# Patient Record
Sex: FEMALE | Race: WHITE | Employment: OTHER | URBAN - METROPOLITAN AREA
[De-identification: names, ages, dates, MRNs, and addresses within clinical notes are randomized per-mention and may not be internally consistent; named-entity substitution may affect disease eponyms.]

---

## 2017-01-19 ENCOUNTER — ANTI-COAG VISIT (OUTPATIENT)
Dept: PHARMACY | Facility: CLINIC | Age: 77
End: 2017-01-19

## 2017-01-19 LAB — INR BLD: 3.1

## 2017-01-26 ENCOUNTER — HOSPITAL ENCOUNTER (OUTPATIENT)
Dept: CT IMAGING | Age: 77
Discharge: HOME | End: 2017-01-26
Attending: NURSE PRACTITIONER | Admitting: NURSE PRACTITIONER

## 2017-01-26 DIAGNOSIS — C91.10 CLL (CHRONIC LYMPHOID LEUKEMIA) WITH FAILED REMISSION (HCC): ICD-10-CM

## 2017-01-26 DIAGNOSIS — Z08 ENCOUNTER FOR FOLLOW-UP EXAMINATION AFTER COMPLETED TREATMENT FOR CANCER: ICD-10-CM

## 2017-01-26 DIAGNOSIS — C91.10 CHRONIC LYMPHOCYTIC LEUKEMIA OF B-CELL TYPE NOT HAVING ACHIEVED REMISSION (HCC): ICD-10-CM

## 2017-01-26 LAB
GFR AFRICAN AMERICAN: >60
GFR NON-AFRICAN AMERICAN: >60
PERFORMED ON: NORMAL
POC CREATININE: 0.9 MG/DL (ref 0.6–1.2)
POC SAMPLE TYPE: NORMAL

## 2017-02-14 ENCOUNTER — ANTI-COAG VISIT (OUTPATIENT)
Dept: PHARMACY | Facility: CLINIC | Age: 77
End: 2017-02-14

## 2017-02-14 DIAGNOSIS — R79.1 ABNORMAL COAGULATION PROFILE: ICD-10-CM

## 2017-02-14 LAB — INR BLD: 2.1

## 2017-03-03 ENCOUNTER — OFFICE VISIT (OUTPATIENT)
Dept: FAMILY MEDICINE CLINIC | Age: 77
End: 2017-03-03

## 2017-03-03 VITALS
WEIGHT: 168 LBS | DIASTOLIC BLOOD PRESSURE: 70 MMHG | OXYGEN SATURATION: 97 % | HEART RATE: 67 BPM | BODY MASS INDEX: 33.87 KG/M2 | HEIGHT: 59 IN | RESPIRATION RATE: 10 BRPM | SYSTOLIC BLOOD PRESSURE: 124 MMHG

## 2017-03-03 DIAGNOSIS — G89.29 CHRONIC PAIN OF BOTH KNEES: ICD-10-CM

## 2017-03-03 DIAGNOSIS — D69.6 THROMBOCYTOPENIA (HCC): ICD-10-CM

## 2017-03-03 DIAGNOSIS — J43.2 CENTRILOBULAR EMPHYSEMA (HCC): ICD-10-CM

## 2017-03-03 DIAGNOSIS — D68.51 FACTOR V LEIDEN MUTATION (HCC): ICD-10-CM

## 2017-03-03 DIAGNOSIS — R20.9 DISTURBANCE OF SKIN SENSATION: ICD-10-CM

## 2017-03-03 DIAGNOSIS — M25.561 CHRONIC PAIN OF BOTH KNEES: ICD-10-CM

## 2017-03-03 DIAGNOSIS — E55.9 VITAMIN D DEFICIENCY: ICD-10-CM

## 2017-03-03 DIAGNOSIS — E78.00 HYPERCHOLESTEROLEMIA: Primary | ICD-10-CM

## 2017-03-03 DIAGNOSIS — L82.1 SEBORRHEIC KERATOSES: ICD-10-CM

## 2017-03-03 DIAGNOSIS — C85.80: ICD-10-CM

## 2017-03-03 DIAGNOSIS — M25.562 CHRONIC PAIN OF BOTH KNEES: ICD-10-CM

## 2017-03-03 PROCEDURE — G8417 CALC BMI ABV UP PARAM F/U: HCPCS | Performed by: FAMILY MEDICINE

## 2017-03-03 PROCEDURE — 1036F TOBACCO NON-USER: CPT | Performed by: FAMILY MEDICINE

## 2017-03-03 PROCEDURE — 3023F SPIROM DOC REV: CPT | Performed by: FAMILY MEDICINE

## 2017-03-03 PROCEDURE — G8482 FLU IMMUNIZE ORDER/ADMIN: HCPCS | Performed by: FAMILY MEDICINE

## 2017-03-03 PROCEDURE — 1123F ACP DISCUSS/DSCN MKR DOCD: CPT | Performed by: FAMILY MEDICINE

## 2017-03-03 PROCEDURE — 1090F PRES/ABSN URINE INCON ASSESS: CPT | Performed by: FAMILY MEDICINE

## 2017-03-03 PROCEDURE — G8427 DOCREV CUR MEDS BY ELIG CLIN: HCPCS | Performed by: FAMILY MEDICINE

## 2017-03-03 PROCEDURE — G8400 PT W/DXA NO RESULTS DOC: HCPCS | Performed by: FAMILY MEDICINE

## 2017-03-03 PROCEDURE — 4040F PNEUMOC VAC/ADMIN/RCVD: CPT | Performed by: FAMILY MEDICINE

## 2017-03-03 PROCEDURE — G8926 SPIRO NO PERF OR DOC: HCPCS | Performed by: FAMILY MEDICINE

## 2017-03-03 PROCEDURE — 99214 OFFICE O/P EST MOD 30 MIN: CPT | Performed by: FAMILY MEDICINE

## 2017-03-03 PROCEDURE — 17110 DESTRUCTION B9 LES UP TO 14: CPT | Performed by: FAMILY MEDICINE

## 2017-03-06 ENCOUNTER — NURSE ONLY (OUTPATIENT)
Dept: FAMILY MEDICINE CLINIC | Age: 77
End: 2017-03-06

## 2017-03-06 DIAGNOSIS — E78.00 HYPERCHOLESTEROLEMIA: ICD-10-CM

## 2017-03-06 DIAGNOSIS — E55.9 VITAMIN D DEFICIENCY: ICD-10-CM

## 2017-03-06 LAB
A/G RATIO: 2 (ref 1.1–2.2)
ALBUMIN SERPL-MCNC: 4.1 G/DL (ref 3.4–5)
ALP BLD-CCNC: 95 U/L (ref 40–129)
ALT SERPL-CCNC: 28 U/L (ref 10–40)
ANION GAP SERPL CALCULATED.3IONS-SCNC: 9 MMOL/L (ref 3–16)
AST SERPL-CCNC: 25 U/L (ref 15–37)
BILIRUB SERPL-MCNC: 0.4 MG/DL (ref 0–1)
BUN BLDV-MCNC: 11 MG/DL (ref 7–20)
CALCIUM SERPL-MCNC: 9.1 MG/DL (ref 8.3–10.6)
CHLORIDE BLD-SCNC: 104 MMOL/L (ref 99–110)
CHOLESTEROL, TOTAL: 261 MG/DL (ref 0–199)
CO2: 29 MMOL/L (ref 21–32)
CREAT SERPL-MCNC: 0.6 MG/DL (ref 0.6–1.2)
GFR AFRICAN AMERICAN: >60
GFR NON-AFRICAN AMERICAN: >60
GLOBULIN: 2.1 G/DL
GLUCOSE BLD-MCNC: 100 MG/DL (ref 70–99)
HDLC SERPL-MCNC: 63 MG/DL (ref 40–60)
LDL CHOLESTEROL CALCULATED: 182 MG/DL
POTASSIUM SERPL-SCNC: 5.2 MMOL/L (ref 3.5–5.1)
SODIUM BLD-SCNC: 142 MMOL/L (ref 136–145)
TOTAL PROTEIN: 6.2 G/DL (ref 6.4–8.2)
TRIGL SERPL-MCNC: 81 MG/DL (ref 0–150)
VITAMIN D 25-HYDROXY: 21.4 NG/ML
VLDLC SERPL CALC-MCNC: 16 MG/DL

## 2017-03-06 PROCEDURE — 36415 COLL VENOUS BLD VENIPUNCTURE: CPT | Performed by: FAMILY MEDICINE

## 2017-03-14 ENCOUNTER — ANTI-COAG VISIT (OUTPATIENT)
Dept: PHARMACY | Facility: CLINIC | Age: 77
End: 2017-03-14

## 2017-03-14 DIAGNOSIS — R79.1 ABNORMAL COAGULATION PROFILE: ICD-10-CM

## 2017-03-14 LAB — INR BLD: 2.5

## 2017-04-28 ENCOUNTER — ANTI-COAG VISIT (OUTPATIENT)
Dept: PHARMACY | Facility: CLINIC | Age: 77
End: 2017-04-28

## 2017-04-28 DIAGNOSIS — R79.1 ABNORMAL COAGULATION PROFILE: ICD-10-CM

## 2017-04-28 LAB — INR BLD: 2.6

## 2017-05-26 ENCOUNTER — ANTI-COAG VISIT (OUTPATIENT)
Dept: PHARMACY | Facility: CLINIC | Age: 77
End: 2017-05-26

## 2017-05-26 DIAGNOSIS — R79.1 ABNORMAL COAGULATION PROFILE: ICD-10-CM

## 2017-05-26 LAB — INR BLD: 3.8

## 2017-06-23 ENCOUNTER — ANTI-COAG VISIT (OUTPATIENT)
Dept: PHARMACY | Facility: CLINIC | Age: 77
End: 2017-06-23

## 2017-06-23 DIAGNOSIS — R79.1 ABNORMAL COAGULATION PROFILE: ICD-10-CM

## 2017-06-23 LAB — INR BLD: 2.6

## 2017-07-20 ENCOUNTER — ANTI-COAG VISIT (OUTPATIENT)
Dept: PHARMACY | Facility: CLINIC | Age: 77
End: 2017-07-20

## 2017-07-20 DIAGNOSIS — R79.1 ABNORMAL COAGULATION PROFILE: ICD-10-CM

## 2017-07-20 LAB — INR BLD: 2.8

## 2017-08-18 ENCOUNTER — ANTI-COAG VISIT (OUTPATIENT)
Dept: PHARMACY | Facility: CLINIC | Age: 77
End: 2017-08-18

## 2017-08-18 DIAGNOSIS — R79.1 ABNORMAL COAGULATION PROFILE: ICD-10-CM

## 2017-08-18 LAB — INR BLD: 2.8

## 2017-09-21 ENCOUNTER — ANTI-COAG VISIT (OUTPATIENT)
Dept: PHARMACY | Facility: CLINIC | Age: 77
End: 2017-09-21

## 2017-09-21 LAB — INR BLD: 1.7

## 2017-10-23 ENCOUNTER — ANTI-COAG VISIT (OUTPATIENT)
Dept: PHARMACY | Facility: CLINIC | Age: 77
End: 2017-10-23

## 2017-10-23 DIAGNOSIS — R79.1 ABNORMAL COAGULATION PROFILE: ICD-10-CM

## 2017-10-23 LAB — INR BLD: 2.9

## 2017-10-23 NOTE — PROGRESS NOTES
Ms. Lisa Pickering is a 64 y.o.  female with history of factor V Leiden who presents today for anticoagulation monitoring and adjustment. Patient verifies current dosing regimen. Patient denies s/s bleeding/bruising/swelling/SOB. No blood in urine or stool. No dietary changes. No changes in medication/OTC agents/Herbals. No change in alcohol use. No missed doses. No Procedures scheduled in the future at this time. Lab Results   Component Value Date    INR 2.9 10/23/2017    INR 1.7 09/21/2017    INR 2.8 08/18/2017           Coumadin dose: Take 2.5mg today then  Continue Warfarin 5mg (one tablet) daily EXCEPT 7.5mg every Monday, Wednesday and Friday    . Recheck INR in 4 weeks. Patient reminded to call the Anticoagulation Clinic with any signs or symptoms of bleeding or with any medication changes. Patient given instructions utilizing the teach back method. After visit summary printed and reviewed with patient.       Medications reviewed and updated on home medication list Yes  Warfarin dose updated on patient home medication list  Yes    Influenza vaccine:   [] given    [] declined   [] received previously   [] plans to receive at a later time   [] refused    [] documented in EPIC

## 2017-11-01 ENCOUNTER — HOSPITAL ENCOUNTER (OUTPATIENT)
Dept: OTHER | Age: 77
Discharge: HOME | End: 2017-11-30
Attending: FAMILY MEDICINE | Admitting: FAMILY MEDICINE

## 2017-11-21 ENCOUNTER — ANTI-COAG VISIT (OUTPATIENT)
Dept: PHARMACY | Facility: CLINIC | Age: 77
End: 2017-11-21

## 2017-11-21 DIAGNOSIS — R79.1 ABNORMAL COAGULATION PROFILE: ICD-10-CM

## 2017-11-21 LAB — INR BLD: 3.2

## 2017-11-24 RX ORDER — WARFARIN SODIUM 5 MG/1
TABLET ORAL
Qty: 122 TABLET | Refills: 0 | Status: SHIPPED | OUTPATIENT
Start: 2017-11-24 | End: 2018-01-17 | Stop reason: SDUPTHER

## 2017-12-01 ENCOUNTER — HOSPITAL ENCOUNTER (OUTPATIENT)
Dept: OTHER | Age: 77
Discharge: HOME | End: 2017-12-31
Attending: FAMILY MEDICINE | Admitting: FAMILY MEDICINE

## 2017-12-19 ENCOUNTER — HOSPITAL ENCOUNTER (OUTPATIENT)
Dept: CT IMAGING | Age: 77
Discharge: HOME | End: 2017-12-19
Attending: INTERNAL MEDICINE | Admitting: INTERNAL MEDICINE

## 2017-12-19 DIAGNOSIS — C91.10 CHRONIC LYMPHOCYTIC LEUKEMIA OF B-CELL TYPE NOT HAVING ACHIEVED REMISSION (HCC): ICD-10-CM

## 2017-12-19 DIAGNOSIS — C91.10 CLL (CHRONIC LYMPHOCYTIC LEUKEMIA) (HCC): ICD-10-CM

## 2017-12-19 LAB
GFR AFRICAN AMERICAN: >60
GFR AFRICAN AMERICAN: >60
GFR NON-AFRICAN AMERICAN: >60
GFR NON-AFRICAN AMERICAN: >60
PERFORMED ON: ABNORMAL
PERFORMED ON: ABNORMAL
POC CREATININE: <0.3 MG/DL (ref 0.6–1.2)
POC CREATININE: <0.3 MG/DL (ref 0.6–1.2)
POC SAMPLE TYPE: ABNORMAL
POC SAMPLE TYPE: ABNORMAL

## 2017-12-21 ENCOUNTER — ANTI-COAG VISIT (OUTPATIENT)
Dept: PHARMACY | Facility: CLINIC | Age: 77
End: 2017-12-21

## 2017-12-21 DIAGNOSIS — R79.1 ABNORMAL COAGULATION PROFILE: ICD-10-CM

## 2017-12-21 LAB — INTERNATIONAL NORMALIZATION RATIO, POC: 1.7

## 2017-12-21 NOTE — PROGRESS NOTES
Ms. Lexy Tellez is a 76 y.o.  female with history of Factor V Leiden who presents today for anticoagulation monitoring and adjustment. Patient verifies current dosing regimen. Patient denies s/s bleeding/bruising/swelling/SOB. No blood in urine or stool. No changes in medication/OTC agents/Herbals. No change in alcohol use. No Procedures scheduled in the future at this time. Lab Results   Component Value Date    INR 1.7 12/21/2017    INR 3.2 11/21/2017    INR 2.9 10/23/2017       Patient appears well. Ms. Christ Skinner was out of town visiting relatives and states that they walked 5-10 miles most days. Also every day for breakfast she had spinach, banana, strawberry smoothie. The increased activity along with the increase in Vitamin K from the spinach would account for decreased INR today. Patient also states she may have missed a Warfarin dose, but not sure. Coumadin dose: Take Warfarin 7.5mg (1 & 1/2 tablets)   Then continue Warfarin 5mg (one tablet) daily EXCEPT 7.5mg every Monday, Wednesday and Friday    Recheck INR in 4 weeks. Patient reminded to call the Anticoagulation Clinic with any medication changes. Patient given instructions utilizing the teach back method. After visit summary printed and reviewed with patient. Medications reviewed and Warfarin dose updated.

## 2018-01-01 ENCOUNTER — HOSPITAL ENCOUNTER (OUTPATIENT)
Dept: OTHER | Age: 78
Discharge: HOME | End: 2018-01-31
Attending: FAMILY MEDICINE | Admitting: FAMILY MEDICINE

## 2018-01-17 ENCOUNTER — ANTI-COAG VISIT (OUTPATIENT)
Dept: PHARMACY | Facility: CLINIC | Age: 78
End: 2018-01-17

## 2018-01-17 LAB — INTERNATIONAL NORMALIZATION RATIO, POC: 3.2

## 2018-01-17 NOTE — PROGRESS NOTES
Ms. Araceli Garcia is a 76 y.o.  female with history of abnormal anticoagulation profile who presents today for anticoagulation monitoring and adjustment. Patient verifies current dosing regimen  Patient denies s/s bleeding/bruising/swelling/SOB  No blood in urine or stool. No dietary changes. No changes in medication/OTC agents/Herbals. No change in alcohol use. No missed doses. No Procedures scheduled in the future at this time. Lab Results   Component Value Date    INR 3.2 01/17/2018    INR 1.7 12/21/2017    INR 3.2 11/21/2017     She looks and feels well today. She denies any changes in meds or diet at this time. Her INR is slightly elevated today at 3.2. We will have her take 5mg today instead if 7.5mg then resume her previous dose. She will call with any new medications ar bleeding/bruising issues. She is almost out of her warfarin so the following was called into her pharmacy. Called RX to [facility], phone [phone]  Coumadin 5mg tablets  #36 tablets, UD, 5RF  Physician Marino Trujillo  After visit summary printed and reviewed with patient.

## 2018-02-01 ENCOUNTER — HOSPITAL ENCOUNTER (OUTPATIENT)
Dept: OTHER | Age: 78
Discharge: HOME | End: 2018-02-28
Attending: FAMILY MEDICINE | Admitting: FAMILY MEDICINE

## 2018-03-01 ENCOUNTER — HOSPITAL ENCOUNTER (OUTPATIENT)
Dept: OTHER | Age: 78
Discharge: HOME | End: 2018-03-31
Attending: FAMILY MEDICINE | Admitting: FAMILY MEDICINE

## 2018-03-02 ENCOUNTER — ANTI-COAG VISIT (OUTPATIENT)
Dept: PHARMACY | Facility: CLINIC | Age: 78
End: 2018-03-02

## 2018-03-02 DIAGNOSIS — R79.1 ABNORMAL COAGULATION PROFILE: ICD-10-CM

## 2018-03-02 LAB — INTERNATIONAL NORMALIZATION RATIO, POC: 2.8

## 2018-03-28 ENCOUNTER — ANTI-COAG VISIT (OUTPATIENT)
Dept: PHARMACY | Facility: CLINIC | Age: 78
End: 2018-03-28

## 2018-03-28 LAB — INTERNATIONAL NORMALIZATION RATIO, POC: 2.6

## 2018-04-01 ENCOUNTER — HOSPITAL ENCOUNTER (OUTPATIENT)
Dept: OTHER | Age: 78
Discharge: HOME | End: 2018-04-30
Attending: FAMILY MEDICINE | Admitting: FAMILY MEDICINE

## 2018-04-27 ENCOUNTER — ANTI-COAG VISIT (OUTPATIENT)
Dept: PHARMACY | Facility: CLINIC | Age: 78
End: 2018-04-27

## 2018-04-27 LAB — INTERNATIONAL NORMALIZATION RATIO, POC: 2.6

## 2018-04-27 RX ORDER — WARFARIN SODIUM 5 MG/1
TABLET ORAL
Qty: 115 TABLET | Refills: 0 | Status: SHIPPED | OUTPATIENT
Start: 2018-04-27 | End: 2018-05-25 | Stop reason: SDUPTHER

## 2018-05-01 ENCOUNTER — HOSPITAL ENCOUNTER (OUTPATIENT)
Dept: OTHER | Age: 78
Discharge: HOME | End: 2018-05-31
Attending: FAMILY MEDICINE | Admitting: FAMILY MEDICINE

## 2018-05-25 ENCOUNTER — OFFICE VISIT (OUTPATIENT)
Dept: FAMILY MEDICINE CLINIC | Age: 78
End: 2018-05-25

## 2018-05-25 VITALS
WEIGHT: 166 LBS | BODY MASS INDEX: 33.47 KG/M2 | HEART RATE: 74 BPM | DIASTOLIC BLOOD PRESSURE: 74 MMHG | SYSTOLIC BLOOD PRESSURE: 110 MMHG | HEIGHT: 59 IN

## 2018-05-25 DIAGNOSIS — D68.51 FACTOR V LEIDEN MUTATION (HCC): ICD-10-CM

## 2018-05-25 DIAGNOSIS — J43.2 CENTRILOBULAR EMPHYSEMA (HCC): ICD-10-CM

## 2018-05-25 DIAGNOSIS — M25.561 CHRONIC PAIN OF BOTH KNEES: ICD-10-CM

## 2018-05-25 DIAGNOSIS — M15.9 PRIMARY OSTEOARTHRITIS INVOLVING MULTIPLE JOINTS: Primary | ICD-10-CM

## 2018-05-25 DIAGNOSIS — G89.29 CHRONIC PAIN OF BOTH KNEES: ICD-10-CM

## 2018-05-25 DIAGNOSIS — B02.29 POST HERPETIC NEURALGIA: ICD-10-CM

## 2018-05-25 DIAGNOSIS — D69.6 THROMBOCYTOPENIA (HCC): ICD-10-CM

## 2018-05-25 DIAGNOSIS — M25.562 CHRONIC PAIN OF BOTH KNEES: ICD-10-CM

## 2018-05-25 DIAGNOSIS — Z79.899 LONG-TERM USE OF HIGH-RISK MEDICATION: ICD-10-CM

## 2018-05-25 PROCEDURE — G8400 PT W/DXA NO RESULTS DOC: HCPCS | Performed by: FAMILY MEDICINE

## 2018-05-25 PROCEDURE — 1123F ACP DISCUSS/DSCN MKR DOCD: CPT | Performed by: FAMILY MEDICINE

## 2018-05-25 PROCEDURE — G8417 CALC BMI ABV UP PARAM F/U: HCPCS | Performed by: FAMILY MEDICINE

## 2018-05-25 PROCEDURE — G8427 DOCREV CUR MEDS BY ELIG CLIN: HCPCS | Performed by: FAMILY MEDICINE

## 2018-05-25 PROCEDURE — 3023F SPIROM DOC REV: CPT | Performed by: FAMILY MEDICINE

## 2018-05-25 PROCEDURE — 3288F FALL RISK ASSESSMENT DOCD: CPT | Performed by: FAMILY MEDICINE

## 2018-05-25 PROCEDURE — G8510 SCR DEP NEG, NO PLAN REQD: HCPCS | Performed by: FAMILY MEDICINE

## 2018-05-25 PROCEDURE — 99214 OFFICE O/P EST MOD 30 MIN: CPT | Performed by: FAMILY MEDICINE

## 2018-05-25 PROCEDURE — 1036F TOBACCO NON-USER: CPT | Performed by: FAMILY MEDICINE

## 2018-05-25 PROCEDURE — 1090F PRES/ABSN URINE INCON ASSESS: CPT | Performed by: FAMILY MEDICINE

## 2018-05-25 PROCEDURE — G8926 SPIRO NO PERF OR DOC: HCPCS | Performed by: FAMILY MEDICINE

## 2018-05-25 PROCEDURE — 4040F PNEUMOC VAC/ADMIN/RCVD: CPT | Performed by: FAMILY MEDICINE

## 2018-05-25 RX ORDER — WARFARIN SODIUM 5 MG/1
TABLET ORAL
Qty: 115 TABLET | Refills: 3 | Status: SHIPPED | OUTPATIENT
Start: 2018-05-25 | End: 2018-10-19 | Stop reason: DRUGHIGH

## 2018-05-25 RX ORDER — PREGABALIN 300 MG/1
CAPSULE ORAL
Qty: 180 CAPSULE | Refills: 3 | Status: SHIPPED | OUTPATIENT
Start: 2018-05-25 | End: 2018-08-13 | Stop reason: SDUPTHER

## 2018-05-25 ASSESSMENT — PATIENT HEALTH QUESTIONNAIRE - PHQ9
1. LITTLE INTEREST OR PLEASURE IN DOING THINGS: 0
SUM OF ALL RESPONSES TO PHQ9 QUESTIONS 1 & 2: 0
SUM OF ALL RESPONSES TO PHQ QUESTIONS 1-9: 0
2. FEELING DOWN, DEPRESSED OR HOPELESS: 0

## 2018-05-30 ENCOUNTER — ANTI-COAG VISIT (OUTPATIENT)
Dept: PHARMACY | Facility: CLINIC | Age: 78
End: 2018-05-30

## 2018-05-30 LAB — INTERNATIONAL NORMALIZATION RATIO, POC: 3.6

## 2018-06-01 ENCOUNTER — HOSPITAL ENCOUNTER (OUTPATIENT)
Dept: OTHER | Age: 78
Discharge: HOME | End: 2018-06-30
Attending: FAMILY MEDICINE | Admitting: FAMILY MEDICINE

## 2018-06-13 ENCOUNTER — ANTI-COAG VISIT (OUTPATIENT)
Dept: PHARMACY | Facility: CLINIC | Age: 78
End: 2018-06-13

## 2018-06-13 LAB — INTERNATIONAL NORMALIZATION RATIO, POC: 2.4

## 2018-06-25 ENCOUNTER — OFFICE VISIT (OUTPATIENT)
Dept: FAMILY MEDICINE CLINIC | Age: 78
End: 2018-06-25

## 2018-06-25 VITALS
RESPIRATION RATE: 16 BRPM | WEIGHT: 165.8 LBS | OXYGEN SATURATION: 94 % | HEART RATE: 57 BPM | DIASTOLIC BLOOD PRESSURE: 70 MMHG | HEIGHT: 59 IN | SYSTOLIC BLOOD PRESSURE: 116 MMHG | BODY MASS INDEX: 33.43 KG/M2

## 2018-06-25 DIAGNOSIS — R20.9 DISTURBANCE OF SKIN SENSATION: ICD-10-CM

## 2018-06-25 DIAGNOSIS — L82.1 SEBORRHEIC KERATOSES: Primary | ICD-10-CM

## 2018-06-25 PROCEDURE — 99999 PR OFFICE/OUTPT VISIT,PROCEDURE ONLY: CPT | Performed by: FAMILY MEDICINE

## 2018-06-25 PROCEDURE — 17110 DESTRUCTION B9 LES UP TO 14: CPT | Performed by: FAMILY MEDICINE

## 2018-07-01 ENCOUNTER — HOSPITAL ENCOUNTER (OUTPATIENT)
Dept: OTHER | Age: 78
Discharge: HOME | End: 2018-07-31
Attending: FAMILY MEDICINE | Admitting: FAMILY MEDICINE

## 2018-07-11 ENCOUNTER — ANTI-COAG VISIT (OUTPATIENT)
Dept: PHARMACY | Facility: CLINIC | Age: 78
End: 2018-07-11

## 2018-07-11 DIAGNOSIS — R79.1 ABNORMAL COAGULATION PROFILE: ICD-10-CM

## 2018-07-11 LAB — INR BLD: 2.5

## 2018-07-11 NOTE — PROGRESS NOTES
Ms. Fawn Styles is a 76 y.o.  female with history of abnormal coagulation profile who presents today for anticoagulation monitoring and adjustment. Patient verifies current dosing regimen  Patient denies s/s bleeding/bruising/swelling/SOB  No blood in urine or stool. No dietary changes. No changes in medication/OTC agents/Herbals. No change in alcohol use. No missed doses. No Procedures scheduled in the future at this time. Lab Results   Component Value Date    INR 2.50 07/11/2018    INR 2.4 06/13/2018    INR 3.6 05/30/2018       Pertinent findings: Patient appears well. Does not report any changes in medications or diet. Warfarin dosing: INR stable and therapeutic. Continue Warfarin 5mg (one tablet) daily EXCEPT 7.5mg every Monday, Wednesday and Friday    After visit summary printed and reviewed with patient.

## 2018-08-01 ENCOUNTER — HOSPITAL ENCOUNTER (OUTPATIENT)
Dept: OTHER | Age: 78
Discharge: HOME | End: 2018-08-31
Attending: FAMILY MEDICINE | Admitting: FAMILY MEDICINE

## 2018-08-10 ENCOUNTER — ANTI-COAG VISIT (OUTPATIENT)
Dept: PHARMACY | Facility: CLINIC | Age: 78
End: 2018-08-10

## 2018-08-10 DIAGNOSIS — R79.1 ABNORMAL COAGULATION PROFILE: ICD-10-CM

## 2018-08-10 LAB — INR BLD: 3

## 2018-08-13 ENCOUNTER — OFFICE VISIT (OUTPATIENT)
Dept: FAMILY MEDICINE CLINIC | Age: 78
End: 2018-08-13

## 2018-08-13 VITALS
DIASTOLIC BLOOD PRESSURE: 80 MMHG | RESPIRATION RATE: 16 BRPM | HEART RATE: 68 BPM | BODY MASS INDEX: 32.86 KG/M2 | WEIGHT: 163 LBS | SYSTOLIC BLOOD PRESSURE: 130 MMHG | HEIGHT: 59 IN

## 2018-08-13 DIAGNOSIS — B02.29 POST HERPETIC NEURALGIA: Primary | ICD-10-CM

## 2018-08-13 DIAGNOSIS — G44.209 ACUTE NON INTRACTABLE TENSION-TYPE HEADACHE: ICD-10-CM

## 2018-08-13 DIAGNOSIS — R11.0 NAUSEA: ICD-10-CM

## 2018-08-13 PROCEDURE — 1101F PT FALLS ASSESS-DOCD LE1/YR: CPT | Performed by: REGISTERED NURSE

## 2018-08-13 PROCEDURE — 99214 OFFICE O/P EST MOD 30 MIN: CPT | Performed by: REGISTERED NURSE

## 2018-08-13 PROCEDURE — G8400 PT W/DXA NO RESULTS DOC: HCPCS | Performed by: REGISTERED NURSE

## 2018-08-13 PROCEDURE — 1036F TOBACCO NON-USER: CPT | Performed by: REGISTERED NURSE

## 2018-08-13 PROCEDURE — G8510 SCR DEP NEG, NO PLAN REQD: HCPCS | Performed by: REGISTERED NURSE

## 2018-08-13 PROCEDURE — 1090F PRES/ABSN URINE INCON ASSESS: CPT | Performed by: REGISTERED NURSE

## 2018-08-13 PROCEDURE — G8417 CALC BMI ABV UP PARAM F/U: HCPCS | Performed by: REGISTERED NURSE

## 2018-08-13 PROCEDURE — 1123F ACP DISCUSS/DSCN MKR DOCD: CPT | Performed by: REGISTERED NURSE

## 2018-08-13 PROCEDURE — 4040F PNEUMOC VAC/ADMIN/RCVD: CPT | Performed by: REGISTERED NURSE

## 2018-08-13 PROCEDURE — G8427 DOCREV CUR MEDS BY ELIG CLIN: HCPCS | Performed by: REGISTERED NURSE

## 2018-08-13 RX ORDER — PREGABALIN 300 MG/1
300 CAPSULE ORAL DAILY
Qty: 30 CAPSULE | Refills: 0 | Status: SHIPPED | OUTPATIENT
Start: 2018-08-13 | End: 2018-08-13 | Stop reason: SDUPTHER

## 2018-08-13 RX ORDER — ONDANSETRON 8 MG/1
8 TABLET, ORALLY DISINTEGRATING ORAL EVERY 8 HOURS PRN
Qty: 12 TABLET | Refills: 0 | Status: SHIPPED | OUTPATIENT
Start: 2018-08-13 | End: 2019-02-01 | Stop reason: ALTCHOICE

## 2018-08-13 RX ORDER — PREGABALIN 300 MG/1
300 CAPSULE ORAL DAILY
Qty: 30 CAPSULE | Refills: 0 | Status: SHIPPED | OUTPATIENT
Start: 2018-08-13 | End: 2018-09-06 | Stop reason: SDUPTHER

## 2018-08-13 ASSESSMENT — ENCOUNTER SYMPTOMS
NAUSEA: 1
ABDOMINAL PAIN: 0
CONSTIPATION: 0
DIARRHEA: 0
ABDOMINAL DISTENTION: 0
VOMITING: 0
WHEEZING: 0
SHORTNESS OF BREATH: 0
CHEST TIGHTNESS: 0
COUGH: 0

## 2018-08-13 ASSESSMENT — PATIENT HEALTH QUESTIONNAIRE - PHQ9
1. LITTLE INTEREST OR PLEASURE IN DOING THINGS: 0
2. FEELING DOWN, DEPRESSED OR HOPELESS: 0
SUM OF ALL RESPONSES TO PHQ9 QUESTIONS 1 & 2: 0
SUM OF ALL RESPONSES TO PHQ QUESTIONS 1-9: 0
SUM OF ALL RESPONSES TO PHQ QUESTIONS 1-9: 0

## 2018-09-01 ENCOUNTER — HOSPITAL ENCOUNTER (OUTPATIENT)
Dept: OTHER | Age: 78
Discharge: HOME | End: 2018-09-01
Attending: FAMILY MEDICINE | Admitting: FAMILY MEDICINE

## 2018-09-06 DIAGNOSIS — B02.29 POST HERPETIC NEURALGIA: ICD-10-CM

## 2018-09-07 ENCOUNTER — ANTI-COAG VISIT (OUTPATIENT)
Dept: PHARMACY | Facility: CLINIC | Age: 78
End: 2018-09-07

## 2018-09-07 DIAGNOSIS — R79.1 ABNORMAL COAGULATION PROFILE: ICD-10-CM

## 2018-09-07 LAB — INTERNATIONAL NORMALIZATION RATIO, POC: 2.8

## 2018-10-05 ENCOUNTER — ANTI-COAG VISIT (OUTPATIENT)
Dept: PHARMACY | Age: 78
End: 2018-10-05
Payer: MEDICARE

## 2018-10-05 DIAGNOSIS — R79.1 ABNORMAL COAGULATION PROFILE: ICD-10-CM

## 2018-10-05 LAB — INTERNATIONAL NORMALIZATION RATIO, POC: 3.8

## 2018-10-05 PROCEDURE — 99211 OFF/OP EST MAY X REQ PHY/QHP: CPT

## 2018-10-05 PROCEDURE — 85610 PROTHROMBIN TIME: CPT

## 2018-10-05 NOTE — PROGRESS NOTES
Ms. Elian Duke is a 76 y.o.  female with history of abnormal coagulation profile who presents today for anticoagulation monitoring and adjustment. Patient verifies current dosing regimen  Patient denies s/s bleeding/bruising/swelling/SOB  No blood in urine or stool. No dietary changes. No changes in medication/OTC agents/Herbals. No change in alcohol use. No missed doses. No Procedures scheduled in the future at this time. Lab Results   Component Value Date    INR 3.8 10/05/2018    INR 2.8 09/07/2018    INR 3.00 08/10/2018       Pertinent findings: She looks and feels well today. No changes in meds or diet noted. Her INR tends to be both high and low at the same dosing. Her INR is elevated today at 3.8. She states that she has been eating her normal amount of greens. She will try to add a few more in the coming days. For INR of 3.8, we will hold her warfarin today and then continue her current dosing. She will return in 2 weeks for her next INR. Warfarin dosing: Hold warfarin TODAY ONLY then continue Warfarin 5mg (one tablet) daily EXCEPT 7.5mg every Monday, Wednesday and Friday    After visit summary printed and reviewed with patient.

## 2018-10-08 DIAGNOSIS — B02.29 POST HERPETIC NEURALGIA: ICD-10-CM

## 2018-10-19 ENCOUNTER — ANTI-COAG VISIT (OUTPATIENT)
Dept: PHARMACY | Age: 78
End: 2018-10-19
Payer: MEDICARE

## 2018-10-19 DIAGNOSIS — R79.1 ABNORMAL COAGULATION PROFILE: ICD-10-CM

## 2018-10-19 LAB — INTERNATIONAL NORMALIZATION RATIO, POC: 3.3

## 2018-10-19 PROCEDURE — 99211 OFF/OP EST MAY X REQ PHY/QHP: CPT

## 2018-10-19 PROCEDURE — 85610 PROTHROMBIN TIME: CPT

## 2018-10-19 RX ORDER — WARFARIN SODIUM 5 MG/1
7.5 TABLET ORAL EVERY EVENING
COMMUNITY
End: 2019-07-16 | Stop reason: DRUGHIGH

## 2018-10-23 ENCOUNTER — OFFICE VISIT (OUTPATIENT)
Dept: FAMILY MEDICINE CLINIC | Age: 78
End: 2018-10-23
Payer: MEDICARE

## 2018-10-23 VITALS
SYSTOLIC BLOOD PRESSURE: 126 MMHG | RESPIRATION RATE: 12 BRPM | BODY MASS INDEX: 33.67 KG/M2 | WEIGHT: 167 LBS | HEART RATE: 72 BPM | HEIGHT: 59 IN | DIASTOLIC BLOOD PRESSURE: 78 MMHG

## 2018-10-23 DIAGNOSIS — H10.33 ACUTE BACTERIAL CONJUNCTIVITIS OF BOTH EYES: ICD-10-CM

## 2018-10-23 DIAGNOSIS — C44.310 BASAL CELL CARCINOMA, FACE: ICD-10-CM

## 2018-10-23 DIAGNOSIS — B02.29 POST HERPETIC NEURALGIA: Primary | ICD-10-CM

## 2018-10-23 DIAGNOSIS — Z79.899 LONG-TERM USE OF HIGH-RISK MEDICATION: ICD-10-CM

## 2018-10-23 DIAGNOSIS — Z23 NEED FOR INFLUENZA VACCINATION: ICD-10-CM

## 2018-10-23 LAB

## 2018-10-23 PROCEDURE — 99213 OFFICE O/P EST LOW 20 MIN: CPT | Performed by: FAMILY MEDICINE

## 2018-10-23 PROCEDURE — 4040F PNEUMOC VAC/ADMIN/RCVD: CPT | Performed by: FAMILY MEDICINE

## 2018-10-23 PROCEDURE — G8400 PT W/DXA NO RESULTS DOC: HCPCS | Performed by: FAMILY MEDICINE

## 2018-10-23 PROCEDURE — G8482 FLU IMMUNIZE ORDER/ADMIN: HCPCS | Performed by: FAMILY MEDICINE

## 2018-10-23 PROCEDURE — G8417 CALC BMI ABV UP PARAM F/U: HCPCS | Performed by: FAMILY MEDICINE

## 2018-10-23 PROCEDURE — 1101F PT FALLS ASSESS-DOCD LE1/YR: CPT | Performed by: FAMILY MEDICINE

## 2018-10-23 PROCEDURE — 90662 IIV NO PRSV INCREASED AG IM: CPT | Performed by: FAMILY MEDICINE

## 2018-10-23 PROCEDURE — G0008 ADMIN INFLUENZA VIRUS VAC: HCPCS | Performed by: FAMILY MEDICINE

## 2018-10-23 PROCEDURE — 1090F PRES/ABSN URINE INCON ASSESS: CPT | Performed by: FAMILY MEDICINE

## 2018-10-23 PROCEDURE — 80305 DRUG TEST PRSMV DIR OPT OBS: CPT | Performed by: FAMILY MEDICINE

## 2018-10-23 PROCEDURE — 1123F ACP DISCUSS/DSCN MKR DOCD: CPT | Performed by: FAMILY MEDICINE

## 2018-10-23 PROCEDURE — 1036F TOBACCO NON-USER: CPT | Performed by: FAMILY MEDICINE

## 2018-10-23 PROCEDURE — G8427 DOCREV CUR MEDS BY ELIG CLIN: HCPCS | Performed by: FAMILY MEDICINE

## 2018-10-23 RX ORDER — PREGABALIN 300 MG/1
CAPSULE ORAL
Qty: 30 CAPSULE | Refills: 5 | Status: SHIPPED | OUTPATIENT
Start: 2018-10-23 | End: 2019-01-23 | Stop reason: SDUPTHER

## 2018-10-23 RX ORDER — TOBRAMYCIN AND DEXAMETHASONE 3; 1 MG/ML; MG/ML
1 SUSPENSION/ DROPS OPHTHALMIC
Qty: 1 BOTTLE | Refills: 0 | Status: SHIPPED | OUTPATIENT
Start: 2018-10-23 | End: 2018-11-02

## 2018-11-02 ENCOUNTER — ANTI-COAG VISIT (OUTPATIENT)
Dept: PHARMACY | Age: 78
End: 2018-11-02
Payer: MEDICARE

## 2018-11-02 DIAGNOSIS — R79.1 ABNORMAL COAGULATION PROFILE: ICD-10-CM

## 2018-11-02 LAB
INR BLD: 2.31 (ref 0.86–1.14)
PROTHROMBIN TIME: 26.3 SEC (ref 9.8–13)

## 2018-11-02 PROCEDURE — 36415 COLL VENOUS BLD VENIPUNCTURE: CPT

## 2018-11-02 PROCEDURE — 99211 OFF/OP EST MAY X REQ PHY/QHP: CPT

## 2018-11-02 PROCEDURE — 85610 PROTHROMBIN TIME: CPT

## 2018-11-30 ENCOUNTER — ANTI-COAG VISIT (OUTPATIENT)
Dept: PHARMACY | Age: 78
End: 2018-11-30
Payer: MEDICARE

## 2018-11-30 DIAGNOSIS — R79.1 ABNORMAL COAGULATION PROFILE: ICD-10-CM

## 2018-11-30 LAB — INTERNATIONAL NORMALIZATION RATIO, POC: 2

## 2018-11-30 PROCEDURE — 99211 OFF/OP EST MAY X REQ PHY/QHP: CPT

## 2018-11-30 PROCEDURE — 85610 PROTHROMBIN TIME: CPT

## 2019-01-04 ENCOUNTER — ANTI-COAG VISIT (OUTPATIENT)
Dept: PHARMACY | Age: 79
End: 2019-01-04
Payer: MEDICARE

## 2019-01-04 DIAGNOSIS — R79.1 ABNORMAL COAGULATION PROFILE: ICD-10-CM

## 2019-01-04 LAB — INTERNATIONAL NORMALIZATION RATIO, POC: 2

## 2019-01-04 PROCEDURE — 85610 PROTHROMBIN TIME: CPT

## 2019-01-04 PROCEDURE — 99211 OFF/OP EST MAY X REQ PHY/QHP: CPT

## 2019-01-15 ENCOUNTER — HOSPITAL ENCOUNTER (OUTPATIENT)
Dept: CT IMAGING | Age: 79
Discharge: HOME | End: 2019-01-15
Payer: MEDICARE

## 2019-01-15 DIAGNOSIS — C91.11 CHRONIC LYMPHOCYTIC LEUKEMIA OF B-CELL TYPE IN REMISSION (HCC): ICD-10-CM

## 2019-01-15 PROCEDURE — 82565 ASSAY OF CREATININE: CPT

## 2019-01-15 PROCEDURE — 6360000004 HC RX CONTRAST MEDICATION: Performed by: INTERNAL MEDICINE

## 2019-01-15 PROCEDURE — 74177 CT ABD & PELVIS W/CONTRAST: CPT

## 2019-01-15 RX ADMIN — IOHEXOL 50 ML: 240 INJECTION, SOLUTION INTRATHECAL; INTRAVASCULAR; INTRAVENOUS; ORAL at 10:22

## 2019-01-15 RX ADMIN — IOPAMIDOL 75 ML: 755 INJECTION, SOLUTION INTRAVENOUS at 10:22

## 2019-01-21 ENCOUNTER — OFFICE VISIT (OUTPATIENT)
Dept: DERMATOLOGY | Age: 79
End: 2019-01-21
Payer: MEDICARE

## 2019-01-21 DIAGNOSIS — Z12.83 SKIN CANCER SCREENING: ICD-10-CM

## 2019-01-21 DIAGNOSIS — D48.5 NEOPLASM OF UNCERTAIN BEHAVIOR OF SKIN: Primary | ICD-10-CM

## 2019-01-21 DIAGNOSIS — Z85.828 HISTORY OF SQUAMOUS CELL CARCINOMA OF SKIN: ICD-10-CM

## 2019-01-21 PROCEDURE — 11103 TANGNTL BX SKIN EA SEP/ADDL: CPT | Performed by: DERMATOLOGY

## 2019-01-21 PROCEDURE — 11102 TANGNTL BX SKIN SINGLE LES: CPT | Performed by: DERMATOLOGY

## 2019-01-21 PROCEDURE — 99202 OFFICE O/P NEW SF 15 MIN: CPT | Performed by: DERMATOLOGY

## 2019-01-23 ENCOUNTER — OFFICE VISIT (OUTPATIENT)
Dept: FAMILY MEDICINE CLINIC | Age: 79
End: 2019-01-23
Payer: MEDICARE

## 2019-01-23 VITALS
DIASTOLIC BLOOD PRESSURE: 74 MMHG | HEIGHT: 59 IN | RESPIRATION RATE: 14 BRPM | WEIGHT: 166 LBS | SYSTOLIC BLOOD PRESSURE: 112 MMHG | HEART RATE: 76 BPM | BODY MASS INDEX: 33.47 KG/M2

## 2019-01-23 DIAGNOSIS — C44.319 BASAL CELL CARCINOMA (BCC) OF LEFT TEMPLE REGION: Primary | ICD-10-CM

## 2019-01-23 DIAGNOSIS — K86.89 DILATED PANCREATIC DUCT: ICD-10-CM

## 2019-01-23 DIAGNOSIS — C44.310 BASAL CELL CARCINOMA (BCC) OF SKIN OF FACE, UNSPECIFIED PART OF FACE: ICD-10-CM

## 2019-01-23 DIAGNOSIS — C44.311 BASAL CELL CARCINOMA (BCC) OF NASOLABIAL GROOVE: ICD-10-CM

## 2019-01-23 DIAGNOSIS — B02.23 NEUROPATHY DUE TO HERPES ZOSTER: Primary | ICD-10-CM

## 2019-01-23 DIAGNOSIS — H02.402 PTOSIS, LEFT EYELID: ICD-10-CM

## 2019-01-23 DIAGNOSIS — C85.80: ICD-10-CM

## 2019-01-23 DIAGNOSIS — H18.892 CORNEAL IRRITATION OF LEFT EYE: ICD-10-CM

## 2019-01-23 DIAGNOSIS — D68.51 FACTOR V LEIDEN MUTATION (HCC): ICD-10-CM

## 2019-01-23 DIAGNOSIS — J43.2 CENTRILOBULAR EMPHYSEMA (HCC): ICD-10-CM

## 2019-01-23 DIAGNOSIS — D69.6 THROMBOCYTOPENIA (HCC): ICD-10-CM

## 2019-01-23 LAB — DERMATOLOGY PATHOLOGY REPORT: ABNORMAL

## 2019-01-23 PROCEDURE — 4040F PNEUMOC VAC/ADMIN/RCVD: CPT | Performed by: FAMILY MEDICINE

## 2019-01-23 PROCEDURE — G8926 SPIRO NO PERF OR DOC: HCPCS | Performed by: FAMILY MEDICINE

## 2019-01-23 PROCEDURE — 1123F ACP DISCUSS/DSCN MKR DOCD: CPT | Performed by: FAMILY MEDICINE

## 2019-01-23 PROCEDURE — G8400 PT W/DXA NO RESULTS DOC: HCPCS | Performed by: FAMILY MEDICINE

## 2019-01-23 PROCEDURE — 99214 OFFICE O/P EST MOD 30 MIN: CPT | Performed by: FAMILY MEDICINE

## 2019-01-23 PROCEDURE — G8427 DOCREV CUR MEDS BY ELIG CLIN: HCPCS | Performed by: FAMILY MEDICINE

## 2019-01-23 PROCEDURE — 3023F SPIROM DOC REV: CPT | Performed by: FAMILY MEDICINE

## 2019-01-23 PROCEDURE — G8482 FLU IMMUNIZE ORDER/ADMIN: HCPCS | Performed by: FAMILY MEDICINE

## 2019-01-23 PROCEDURE — 1101F PT FALLS ASSESS-DOCD LE1/YR: CPT | Performed by: FAMILY MEDICINE

## 2019-01-23 PROCEDURE — 1036F TOBACCO NON-USER: CPT | Performed by: FAMILY MEDICINE

## 2019-01-23 PROCEDURE — G8417 CALC BMI ABV UP PARAM F/U: HCPCS | Performed by: FAMILY MEDICINE

## 2019-01-23 PROCEDURE — 1090F PRES/ABSN URINE INCON ASSESS: CPT | Performed by: FAMILY MEDICINE

## 2019-01-23 RX ORDER — PREGABALIN 150 MG/1
150 CAPSULE ORAL 2 TIMES DAILY
Qty: 60 CAPSULE | Refills: 5 | Status: SHIPPED | OUTPATIENT
Start: 2019-01-23 | End: 2019-04-26

## 2019-02-01 ENCOUNTER — APPOINTMENT (OUTPATIENT)
Dept: PHARMACY | Age: 79
End: 2019-02-01
Payer: MEDICARE

## 2019-02-04 ENCOUNTER — ANESTHESIA EVENT (OUTPATIENT)
Dept: ENDOSCOPY | Age: 79
End: 2019-02-04
Payer: MEDICARE

## 2019-02-05 ENCOUNTER — ANESTHESIA (OUTPATIENT)
Dept: ENDOSCOPY | Age: 79
End: 2019-02-05
Payer: MEDICARE

## 2019-02-05 ENCOUNTER — ANTI-COAG VISIT (OUTPATIENT)
Dept: PHARMACY | Age: 79
End: 2019-02-05
Payer: MEDICARE

## 2019-02-05 ENCOUNTER — HOSPITAL ENCOUNTER (OUTPATIENT)
Age: 79
Setting detail: OUTPATIENT SURGERY
Discharge: HOME | End: 2019-02-05
Attending: INTERNAL MEDICINE | Admitting: INTERNAL MEDICINE
Payer: MEDICARE

## 2019-02-05 VITALS
SYSTOLIC BLOOD PRESSURE: 170 MMHG | WEIGHT: 166.23 LBS | OXYGEN SATURATION: 92 % | DIASTOLIC BLOOD PRESSURE: 67 MMHG | RESPIRATION RATE: 18 BRPM | BODY MASS INDEX: 32.64 KG/M2 | HEART RATE: 68 BPM | TEMPERATURE: 97 F | HEIGHT: 60 IN

## 2019-02-05 VITALS
RESPIRATION RATE: 8 BRPM | DIASTOLIC BLOOD PRESSURE: 141 MMHG | SYSTOLIC BLOOD PRESSURE: 179 MMHG | OXYGEN SATURATION: 91 %

## 2019-02-05 LAB
ANION GAP SERPL CALCULATED.3IONS-SCNC: 12 MMOL/L (ref 3–16)
BUN BLDV-MCNC: 10 MG/DL (ref 7–20)
CALCIUM SERPL-MCNC: 9.2 MG/DL (ref 8.3–10.6)
CHLORIDE BLD-SCNC: 104 MMOL/L (ref 99–110)
CO2: 26 MMOL/L (ref 21–32)
CREAT SERPL-MCNC: 0.6 MG/DL (ref 0.6–1.2)
EKG ATRIAL RATE: 61 BPM
EKG DIAGNOSIS: NORMAL
EKG P AXIS: 56 DEGREES
EKG P-R INTERVAL: 170 MS
EKG Q-T INTERVAL: 426 MS
EKG QRS DURATION: 92 MS
EKG QTC CALCULATION (BAZETT): 428 MS
EKG R AXIS: -27 DEGREES
EKG T AXIS: 54 DEGREES
EKG VENTRICULAR RATE: 61 BPM
GFR AFRICAN AMERICAN: >60
GFR NON-AFRICAN AMERICAN: >60
GLUCOSE BLD-MCNC: 95 MG/DL (ref 70–99)
HCT VFR BLD CALC: 42.9 % (ref 36–48)
HEMOGLOBIN: 14.3 G/DL (ref 12–16)
INR BLD: 0.97 (ref 0.86–1.14)
INTERNATIONAL NORMALIZATION RATIO, POC: 1
MCH RBC QN AUTO: 30.8 PG (ref 26–34)
MCHC RBC AUTO-ENTMCNC: 33.4 G/DL (ref 31–36)
MCV RBC AUTO: 92.2 FL (ref 80–100)
PDW BLD-RTO: 13.8 % (ref 12.4–15.4)
PLATELET # BLD: 300 K/UL (ref 135–450)
PMV BLD AUTO: 9.5 FL (ref 5–10.5)
POTASSIUM REFLEX MAGNESIUM: 4.9 MMOL/L (ref 3.5–5.1)
PROTHROMBIN TIME: 11.1 SEC (ref 9.8–13)
RBC # BLD: 4.66 M/UL (ref 4–5.2)
SODIUM BLD-SCNC: 142 MMOL/L (ref 136–145)
WBC # BLD: 11 K/UL (ref 4–11)

## 2019-02-05 PROCEDURE — 7100000001 HC PACU RECOVERY - ADDTL 15 MIN: Performed by: INTERNAL MEDICINE

## 2019-02-05 PROCEDURE — 2580000003 HC RX 258: Performed by: NURSE ANESTHETIST, CERTIFIED REGISTERED

## 2019-02-05 PROCEDURE — 7100000010 HC PHASE II RECOVERY - FIRST 15 MIN: Performed by: INTERNAL MEDICINE

## 2019-02-05 PROCEDURE — 3700000001 HC ADD 15 MINUTES (ANESTHESIA): Performed by: INTERNAL MEDICINE

## 2019-02-05 PROCEDURE — 2500000003 HC RX 250 WO HCPCS: Performed by: NURSE ANESTHETIST, CERTIFIED REGISTERED

## 2019-02-05 PROCEDURE — 99211 OFF/OP EST MAY X REQ PHY/QHP: CPT

## 2019-02-05 PROCEDURE — 3609018500 HC EGD US SCOPE W/ADJACENT STRUCTURES: Performed by: INTERNAL MEDICINE

## 2019-02-05 PROCEDURE — 85610 PROTHROMBIN TIME: CPT

## 2019-02-05 PROCEDURE — 3700000000 HC ANESTHESIA ATTENDED CARE: Performed by: INTERNAL MEDICINE

## 2019-02-05 PROCEDURE — 80048 BASIC METABOLIC PNL TOTAL CA: CPT

## 2019-02-05 PROCEDURE — 2709999900 HC NON-CHARGEABLE SUPPLY: Performed by: INTERNAL MEDICINE

## 2019-02-05 PROCEDURE — 7100000011 HC PHASE II RECOVERY - ADDTL 15 MIN: Performed by: INTERNAL MEDICINE

## 2019-02-05 PROCEDURE — 93005 ELECTROCARDIOGRAM TRACING: CPT

## 2019-02-05 PROCEDURE — 6360000002 HC RX W HCPCS: Performed by: NURSE ANESTHETIST, CERTIFIED REGISTERED

## 2019-02-05 PROCEDURE — 36415 COLL VENOUS BLD VENIPUNCTURE: CPT

## 2019-02-05 PROCEDURE — 85027 COMPLETE CBC AUTOMATED: CPT

## 2019-02-05 PROCEDURE — 7100000000 HC PACU RECOVERY - FIRST 15 MIN: Performed by: INTERNAL MEDICINE

## 2019-02-05 PROCEDURE — 2580000003 HC RX 258: Performed by: ANESTHESIOLOGY

## 2019-02-05 RX ORDER — PROPOFOL 10 MG/ML
INJECTION, EMULSION INTRAVENOUS CONTINUOUS PRN
Status: DISCONTINUED | OUTPATIENT
Start: 2019-02-05 | End: 2019-02-05 | Stop reason: SDUPTHER

## 2019-02-05 RX ORDER — PROMETHAZINE HYDROCHLORIDE 25 MG/ML
6.25 INJECTION, SOLUTION INTRAMUSCULAR; INTRAVENOUS
Status: DISCONTINUED | OUTPATIENT
Start: 2019-02-05 | End: 2019-02-05 | Stop reason: HOSPADM

## 2019-02-05 RX ORDER — LABETALOL HYDROCHLORIDE 5 MG/ML
5 INJECTION, SOLUTION INTRAVENOUS EVERY 10 MIN PRN
Status: DISCONTINUED | OUTPATIENT
Start: 2019-02-05 | End: 2019-02-05 | Stop reason: HOSPADM

## 2019-02-05 RX ORDER — SODIUM CHLORIDE 9 MG/ML
INJECTION, SOLUTION INTRAVENOUS CONTINUOUS PRN
Status: DISCONTINUED | OUTPATIENT
Start: 2019-02-05 | End: 2019-02-05 | Stop reason: SDUPTHER

## 2019-02-05 RX ORDER — LIDOCAINE HYDROCHLORIDE 20 MG/ML
INJECTION, SOLUTION INFILTRATION; PERINEURAL PRN
Status: DISCONTINUED | OUTPATIENT
Start: 2019-02-05 | End: 2019-02-05 | Stop reason: SDUPTHER

## 2019-02-05 RX ORDER — PROPOFOL 10 MG/ML
INJECTION, EMULSION INTRAVENOUS PRN
Status: DISCONTINUED | OUTPATIENT
Start: 2019-02-05 | End: 2019-02-05 | Stop reason: SDUPTHER

## 2019-02-05 RX ORDER — ONDANSETRON 2 MG/ML
4 INJECTION INTRAMUSCULAR; INTRAVENOUS
Status: DISCONTINUED | OUTPATIENT
Start: 2019-02-05 | End: 2019-02-05 | Stop reason: HOSPADM

## 2019-02-05 RX ORDER — SODIUM CHLORIDE 9 MG/ML
INJECTION, SOLUTION INTRAVENOUS CONTINUOUS
Status: DISCONTINUED | OUTPATIENT
Start: 2019-02-05 | End: 2019-02-05 | Stop reason: HOSPADM

## 2019-02-05 RX ORDER — SODIUM CHLORIDE 0.9 % (FLUSH) 0.9 %
10 SYRINGE (ML) INJECTION PRN
Status: DISCONTINUED | OUTPATIENT
Start: 2019-02-05 | End: 2019-02-05 | Stop reason: HOSPADM

## 2019-02-05 RX ORDER — SODIUM CHLORIDE 0.9 % (FLUSH) 0.9 %
10 SYRINGE (ML) INJECTION EVERY 12 HOURS SCHEDULED
Status: DISCONTINUED | OUTPATIENT
Start: 2019-02-05 | End: 2019-02-05 | Stop reason: HOSPADM

## 2019-02-05 RX ADMIN — SODIUM CHLORIDE: 9 INJECTION, SOLUTION INTRAVENOUS at 13:28

## 2019-02-05 RX ADMIN — LIDOCAINE HYDROCHLORIDE 40 MG: 20 INJECTION, SOLUTION INFILTRATION; PERINEURAL at 14:15

## 2019-02-05 RX ADMIN — PROPOFOL 30 MG: 10 INJECTION, EMULSION INTRAVENOUS at 14:25

## 2019-02-05 RX ADMIN — PROPOFOL 50 MG: 10 INJECTION, EMULSION INTRAVENOUS at 14:15

## 2019-02-05 RX ADMIN — PROPOFOL 140 MCG/KG/MIN: 10 INJECTION, EMULSION INTRAVENOUS at 14:15

## 2019-02-05 RX ADMIN — PROPOFOL 20 MG: 10 INJECTION, EMULSION INTRAVENOUS at 14:20

## 2019-02-05 RX ADMIN — SODIUM CHLORIDE: 9 INJECTION, SOLUTION INTRAVENOUS at 14:00

## 2019-02-05 ASSESSMENT — PULMONARY FUNCTION TESTS
PIF_VALUE: 1
PIF_VALUE: 0

## 2019-02-05 ASSESSMENT — PAIN SCALES - GENERAL
PAINLEVEL_OUTOF10: 0

## 2019-02-05 ASSESSMENT — PAIN - FUNCTIONAL ASSESSMENT: PAIN_FUNCTIONAL_ASSESSMENT: 0-10

## 2019-02-20 PROBLEM — Z12.83 SKIN CANCER SCREENING: Status: RESOLVED | Noted: 2019-01-21 | Resolved: 2019-02-20

## 2019-02-28 ENCOUNTER — ANTI-COAG VISIT (OUTPATIENT)
Dept: PHARMACY | Age: 79
End: 2019-02-28
Payer: MEDICARE

## 2019-02-28 DIAGNOSIS — R79.1 ABNORMAL COAGULATION PROFILE: ICD-10-CM

## 2019-02-28 LAB — INTERNATIONAL NORMALIZATION RATIO, POC: 2.7

## 2019-02-28 PROCEDURE — 85610 PROTHROMBIN TIME: CPT

## 2019-02-28 PROCEDURE — 99211 OFF/OP EST MAY X REQ PHY/QHP: CPT

## 2019-03-12 ENCOUNTER — PROCEDURE VISIT (OUTPATIENT)
Dept: SURGERY | Age: 79
End: 2019-03-12
Payer: MEDICARE

## 2019-03-12 VITALS
WEIGHT: 163 LBS | HEART RATE: 64 BPM | TEMPERATURE: 97.7 F | DIASTOLIC BLOOD PRESSURE: 74 MMHG | OXYGEN SATURATION: 89 % | SYSTOLIC BLOOD PRESSURE: 152 MMHG | BODY MASS INDEX: 31.83 KG/M2

## 2019-03-12 DIAGNOSIS — C44.319 BASAL CELL CARCINOMA OF RIGHT CHEEK: Primary | ICD-10-CM

## 2019-03-12 DIAGNOSIS — C44.319 BASAL CELL CARCINOMA OF LEFT TEMPLE REGION: ICD-10-CM

## 2019-03-12 PROCEDURE — 17311 MOHS 1 STAGE H/N/HF/G: CPT | Performed by: DERMATOLOGY

## 2019-03-12 PROCEDURE — 12053 INTMD RPR FACE/MM 5.1-7.5 CM: CPT | Performed by: DERMATOLOGY

## 2019-03-12 RX ORDER — PREGABALIN 100 MG/1
100 CAPSULE ORAL 2 TIMES DAILY
COMMUNITY
End: 2019-04-26

## 2019-03-13 ENCOUNTER — TELEPHONE (OUTPATIENT)
Dept: SURGERY | Age: 79
End: 2019-03-13

## 2019-03-28 ENCOUNTER — ANTI-COAG VISIT (OUTPATIENT)
Dept: PHARMACY | Age: 79
End: 2019-03-28
Payer: MEDICARE

## 2019-03-28 DIAGNOSIS — R79.1 ABNORMAL COAGULATION PROFILE: ICD-10-CM

## 2019-03-28 LAB — INTERNATIONAL NORMALIZATION RATIO, POC: 2

## 2019-03-28 PROCEDURE — 85610 PROTHROMBIN TIME: CPT

## 2019-03-28 PROCEDURE — 99211 OFF/OP EST MAY X REQ PHY/QHP: CPT

## 2019-04-01 LAB — PANCREATIC ELASTASE, FECAL: >500 UG/G

## 2019-04-26 ENCOUNTER — ANTI-COAG VISIT (OUTPATIENT)
Dept: PHARMACY | Age: 79
End: 2019-04-26
Payer: MEDICARE

## 2019-04-26 DIAGNOSIS — R79.1 ABNORMAL COAGULATION PROFILE: ICD-10-CM

## 2019-04-26 LAB — INTERNATIONAL NORMALIZATION RATIO, POC: 1.7

## 2019-04-26 PROCEDURE — 85610 PROTHROMBIN TIME: CPT

## 2019-04-26 PROCEDURE — 99211 OFF/OP EST MAY X REQ PHY/QHP: CPT

## 2019-04-26 RX ORDER — PREGABALIN 150 MG/1
150 CAPSULE ORAL 2 TIMES DAILY
COMMUNITY
End: 2019-07-23

## 2019-04-26 NOTE — PROGRESS NOTES
Ms. Natanael Fermin is a [de-identified] y.o.  female with history of abnormal coagulation profile who presents today for anticoagulation monitoring and adjustment. Patient verifies current dosing regimen. Patient denies s/s bleeding/bruising/swelling/SOB. No blood in urine or stool. No changes in medication/OTC agents/Herbals. No change in alcohol use. No missed doses. No Procedures scheduled in the future at this time. Lab Results   Component Value Date    INR 1.7 04/26/2019    INR 2 03/28/2019    INR 2.7 02/28/2019       Patient appears well. Patient states she has been on a no-carb diet. She has been eating a lot of green vegetables and protein (chicken). The increase in greens (Vitamin K) would explain subtherapeutic INR today. I am going to increase Warfarin dose ~6%. Coumadin Dose :   Take Warfarin 10mg (2 tablets) today only 4-26-19. Then NEW DOSE : Warfarin 5mg (one tablet) daily EXCEPT 7.5mg every Monday, Wednesday and Friday    Return in 4 weeks. Patient reminded to call the Anticoagulation Clinic with any medication changes. Patient given instructions utilizing the teach back method. After visit summary printed and reviewed with patient. Medications reviewed and Warfarin dose updated.

## 2019-05-24 ENCOUNTER — ANTI-COAG VISIT (OUTPATIENT)
Dept: PHARMACY | Age: 79
End: 2019-05-24
Payer: MEDICARE

## 2019-05-24 DIAGNOSIS — R79.1 ABNORMAL COAGULATION PROFILE: ICD-10-CM

## 2019-05-24 LAB — INTERNATIONAL NORMALIZATION RATIO, POC: 1.6

## 2019-05-24 PROCEDURE — 85610 PROTHROMBIN TIME: CPT

## 2019-05-24 PROCEDURE — 99211 OFF/OP EST MAY X REQ PHY/QHP: CPT

## 2019-05-30 ENCOUNTER — OFFICE VISIT (OUTPATIENT)
Dept: FAMILY MEDICINE CLINIC | Age: 79
End: 2019-05-30
Payer: MEDICARE

## 2019-05-30 VITALS
WEIGHT: 153.4 LBS | OXYGEN SATURATION: 93 % | DIASTOLIC BLOOD PRESSURE: 82 MMHG | BODY MASS INDEX: 30.12 KG/M2 | HEART RATE: 75 BPM | HEIGHT: 60 IN | SYSTOLIC BLOOD PRESSURE: 128 MMHG

## 2019-05-30 DIAGNOSIS — L50.9 HIVES: Primary | ICD-10-CM

## 2019-05-30 PROCEDURE — 4040F PNEUMOC VAC/ADMIN/RCVD: CPT | Performed by: NURSE PRACTITIONER

## 2019-05-30 PROCEDURE — 1123F ACP DISCUSS/DSCN MKR DOCD: CPT | Performed by: NURSE PRACTITIONER

## 2019-05-30 PROCEDURE — 1036F TOBACCO NON-USER: CPT | Performed by: NURSE PRACTITIONER

## 2019-05-30 PROCEDURE — 3288F FALL RISK ASSESSMENT DOCD: CPT | Performed by: NURSE PRACTITIONER

## 2019-05-30 PROCEDURE — 1090F PRES/ABSN URINE INCON ASSESS: CPT | Performed by: NURSE PRACTITIONER

## 2019-05-30 PROCEDURE — G8417 CALC BMI ABV UP PARAM F/U: HCPCS | Performed by: NURSE PRACTITIONER

## 2019-05-30 PROCEDURE — G8510 SCR DEP NEG, NO PLAN REQD: HCPCS | Performed by: NURSE PRACTITIONER

## 2019-05-30 PROCEDURE — G8427 DOCREV CUR MEDS BY ELIG CLIN: HCPCS | Performed by: NURSE PRACTITIONER

## 2019-05-30 PROCEDURE — 99213 OFFICE O/P EST LOW 20 MIN: CPT | Performed by: NURSE PRACTITIONER

## 2019-05-30 PROCEDURE — G8400 PT W/DXA NO RESULTS DOC: HCPCS | Performed by: NURSE PRACTITIONER

## 2019-05-30 ASSESSMENT — PATIENT HEALTH QUESTIONNAIRE - PHQ9
1. LITTLE INTEREST OR PLEASURE IN DOING THINGS: 0
SUM OF ALL RESPONSES TO PHQ QUESTIONS 1-9: 0
SUM OF ALL RESPONSES TO PHQ9 QUESTIONS 1 & 2: 0
SUM OF ALL RESPONSES TO PHQ QUESTIONS 1-9: 0
2. FEELING DOWN, DEPRESSED OR HOPELESS: 0

## 2019-05-30 NOTE — PROGRESS NOTES
ZYRSUBJECTIVE:    Patient ID: Melissa Seals is a [de-identified] y.o. y.o. female. HPI  Chief Complaint   Patient presents with    Foot Pain     PROBLEM WITH FEET X 2 WEEKS, VERY ITCHY AND PAINFUL, LOTS OF BURNING, ITCH SO BAD THEY BLEED. THEY GET WELTS FROM ALL THE ITCHING. USING ICE PACKS TO HELP THE BURNING. THE ITCHING IS VERY BAD. PT C/O WELTS AND ITCHING FOR THE LAST TWO WEEKS GETTING WORSE SHE HAS NOT HAD INSECT BITES - NO CHANGE IN DETERGENTS OR FOODS - THE WELTS SHOW UP AFTER THE ITCHING - BURNING STARTS - SHE HAS TRIED SEVERAL OTC MEDS - CREAMS THAT HAVE NOT HELPED - CALAMINE LOTION HELPED- NOTHING HAS MADE IT WORSE- THEY TEND TO OCCUR RANDOMLY  Review of Systems   Skin: Positive for rash. WELTS NOTED TO LOWER EXTREMITIES -    All other systems reviewed and are negative. OBJECTIVE:  Vitals:    05/30/19 1038   BP: 128/82   Pulse: 75   SpO2: 93%     Past Medical History:   Diagnosis Date    [facility]     [facility] 2/12/2015    COPD [facility]ch[facility] obstructive [facility] disease[facility]     Essential hypertension 2015    controlled with salt restriction (initially)    Factor V Leiden mutation ([facility]     Goiter Nov, 2011    1.5 cm midline    Hypercholesterolemia     Impaired fasting glucose Nov, 2011    Osteoarthritis     Osteoporosis     Post herpetic neuralgia     Vitamin D deficiency Nov, 2011      Physical Exam   Constitutional: She is oriented to person, place, and time. Vital signs are normal. She appears well-developed and well-nourished. She is active. Cardiovascular: Normal rate, regular rhythm, S1 normal, S2 normal and normal heart sounds. Pulmonary/Chest: Effort normal and breath sounds normal.   Neurological: She is alert and oriented to person, place, and time.    Skin:   BILATERAL LOWER EXTREMITIES-  WITH RED WELT LIKE LESIONS NOTED TO TOP OF FEET ANKLES SHINS AND CALVES   Psychiatric: She has a normal mood and affect. Her speech is normal and behavior is normal. Thought content normal.       Sandor Mays was seen today for foot pain.     Diagnoses and all orders for this visit:    Hives  STEROID INJECTIONS  DEPO MEDROL 80 MG SOLU CORTEF 100 MG IM X 1  ZYRTEC 10 MG - ZANTAC 150-300 MG FOR ITCHING  ETIOLOGY OF HIVES DW PT AS WELL  HANDOUT GIVEN TO PT

## 2019-05-30 NOTE — PATIENT INSTRUCTIONS
Patient Education        Hives: Care Instructions  Your Care Instructions  Hives are raised, red, itchy patches of skin. They are also called wheals or welts. They usually have red borders and pale centers. Hives range in size from ¼ inch to 3 inches or more across. They may seem to move from place to place on the skin. Several hives may form a large area of raised, red skin. You can get hives after an insect sting, after taking medicine or eating certain foods, or because of infection or stress. Other causes include plants, things you breathe in, makeup, heat, cold, sunlight, and latex. You cannot spread hives to other people. Hives may last a few minutes or a few days, but a single spot may last less than 36 hours. Follow-up care is a key part of your treatment and safety. Be sure to make and go to all appointments, and call your doctor if you are having problems. It's also a good idea to know your test results and keep a list of the medicines you take. How can you care for yourself at home? · Avoid whatever you think may have caused your hives, such as a certain food or medicine. However, you may not know the cause. · Put a cool, wet towel on the area to relieve itching. · Take an over-the-counter antihistamine, such as diphenhydramine (Benadryl), cetirizine (Zyrtec), or loratadine (Claritin), to help stop the hives and calm the itching. Read and follow directions on the label. These medicines can make you feel sleepy. Do not drive while using them. · Stay away from strong soaps, detergents, and chemicals. These can make itching worse. When should you call for help? Call 911 anytime you think you may need emergency care. For example, call if:    · You have symptoms of a severe allergic reaction. These may include:  ? Sudden raised, red areas (hives) all over your body. ? Swelling of the throat, mouth, lips, or tongue. ? Trouble breathing. ? Passing out (losing consciousness).  Or you may feel very lightheaded or suddenly feel weak, confused, or restless.    Call your doctor now or seek immediate medical care if:    · You have symptoms of an allergic reaction, such as:  ? A rash or hives (raised, red areas on the skin). ? Itching. ? Swelling. ? Belly pain, nausea, or vomiting.     · You get hives after you start a new medicine.     · Hives have not gone away after 24 hours.    Watch closely for changes in your health, and be sure to contact your doctor if:    · You do not get better as expected. Where can you learn more? Go to [email or web address] and sign in to your [employer] account. Enter W673 in the [employer] box to learn more about \"Hives: Care Instructions. \"     If you do not have an account, please click on the \"Sign Up Now\" link. Current as of: September 23, 2018  Content Version: 12.0  © 2189-1798 [employer]wise, Incorporated. Care instructions adapted under license by [facility] ([facility]). If you have questions about a medical condition or this instruction, always ask your healthcare professional. [place] [address] any warranty or liability for your use of this information.

## 2019-06-09 RX ORDER — METHYLPREDNISOLONE ACETATE 80 MG/ML
80 INJECTION, SUSPENSION INTRA-ARTICULAR; INTRALESIONAL; INTRAMUSCULAR; SOFT TISSUE ONCE
Status: DISCONTINUED | OUTPATIENT
Start: 2019-06-09 | End: 2019-10-04

## 2019-06-13 ENCOUNTER — ANTI-COAG VISIT (OUTPATIENT)
Dept: PHARMACY | Age: 79
End: 2019-06-13
Payer: MEDICARE

## 2019-06-13 DIAGNOSIS — R79.1 ABNORMAL COAGULATION PROFILE: ICD-10-CM

## 2019-06-13 LAB — INTERNATIONAL NORMALIZATION RATIO, POC: 1.8

## 2019-06-13 PROCEDURE — 85610 PROTHROMBIN TIME: CPT

## 2019-06-13 PROCEDURE — 99211 OFF/OP EST MAY X REQ PHY/QHP: CPT

## 2019-07-05 ENCOUNTER — APPOINTMENT (OUTPATIENT)
Dept: PHARMACY | Age: 79
End: 2019-07-05
Payer: MEDICARE

## 2019-07-16 ENCOUNTER — ANTI-COAG VISIT (OUTPATIENT)
Dept: PHARMACY | Age: 79
End: 2019-07-16
Payer: MEDICARE

## 2019-07-16 DIAGNOSIS — R79.1 ABNORMAL COAGULATION PROFILE: ICD-10-CM

## 2019-07-16 LAB — INTERNATIONAL NORMALIZATION RATIO, POC: 1.4

## 2019-07-16 PROCEDURE — 85610 PROTHROMBIN TIME: CPT

## 2019-07-16 PROCEDURE — 99211 OFF/OP EST MAY X REQ PHY/QHP: CPT

## 2019-07-16 RX ORDER — WARFARIN SODIUM 5 MG/1
TABLET ORAL
Qty: 45 TABLET | Refills: 3 | Status: SHIPPED | OUTPATIENT
Start: 2019-07-16 | End: 2019-12-04 | Stop reason: DRUGHIGH

## 2019-07-16 NOTE — PROGRESS NOTES
Ms. Carleen Medley is a [de-identified] y.o.  female with history of abnormal coagulation profile who presents today for anticoagulation monitoring and adjustment. Patient verifies current dosing regimen. Patient denies s/s bleeding/bruising/swelling/SOB. No blood in urine or stool. No changes in medication/OTC agents/Herbals. No change in alcohol use. No missed doses. No Procedures scheduled in the future at this time. Lab Results   Component Value Date    INR 1.4 07/16/2019    INR 1.8 06/13/2019    INR 1.6 05/24/2019       Patient appears well. She continues to eat more greens in her diet including kale. She has lost approximately 12 pounds. Patient denies any missed Warfarin doses. She confirms that she has Warfarin 5mg PEACH tablets. Her INR has been subtherapeutic her past several visits despite increasing her Warfarin dose. I am going to increase her Warfarin dose again today ~10%. Coumadin Dose :  Take Warfarin 10mg (2 tablets) today only 7/16/19  Then NEW DOSE : Warfarin 7.5mg daily EXCEPT 10mg every Wednesday. Return in 2 weeks. Patient reminded to call the Anticoagulation Clinic with any medication changes. Patient given instructions utilizing the teach back method. After visit summary printed and reviewed with patient. Medications reviewed and Warfarin dose updated. Escribed RX to [address]   Coumadin (Warfarin) 5mg tablets  #45 tablets  Warfarin 7.5mg (1 & 1/2 tablets) daily EXCEPT 10mg (2 tablets) every Wednesday or UD by Anticoagulation Clinic  3 RF  Physician Dr. Vipin Hightower

## 2019-07-23 ENCOUNTER — OFFICE VISIT (OUTPATIENT)
Dept: FAMILY MEDICINE CLINIC | Age: 79
End: 2019-07-23
Payer: MEDICARE

## 2019-07-23 VITALS
RESPIRATION RATE: 10 BRPM | SYSTOLIC BLOOD PRESSURE: 118 MMHG | HEART RATE: 62 BPM | BODY MASS INDEX: 30.23 KG/M2 | DIASTOLIC BLOOD PRESSURE: 74 MMHG | WEIGHT: 154 LBS | HEIGHT: 60 IN | OXYGEN SATURATION: 96 %

## 2019-07-23 DIAGNOSIS — C91.11 CHRONIC LYMPHOCYTIC LEUKEMIA IN REMISSION (HCC): ICD-10-CM

## 2019-07-23 DIAGNOSIS — B02.29 POST HERPETIC NEURALGIA: ICD-10-CM

## 2019-07-23 DIAGNOSIS — B02.23 ACUTE HERPES ZOSTER NEUROPATHY: Primary | ICD-10-CM

## 2019-07-23 DIAGNOSIS — D68.51 FACTOR 5 LEIDEN MUTATION, HETEROZYGOUS (HCC): ICD-10-CM

## 2019-07-23 PROCEDURE — G8400 PT W/DXA NO RESULTS DOC: HCPCS | Performed by: FAMILY MEDICINE

## 2019-07-23 PROCEDURE — 99213 OFFICE O/P EST LOW 20 MIN: CPT | Performed by: FAMILY MEDICINE

## 2019-07-23 PROCEDURE — 1090F PRES/ABSN URINE INCON ASSESS: CPT | Performed by: FAMILY MEDICINE

## 2019-07-23 PROCEDURE — G8417 CALC BMI ABV UP PARAM F/U: HCPCS | Performed by: FAMILY MEDICINE

## 2019-07-23 PROCEDURE — G8427 DOCREV CUR MEDS BY ELIG CLIN: HCPCS | Performed by: FAMILY MEDICINE

## 2019-07-23 PROCEDURE — 1036F TOBACCO NON-USER: CPT | Performed by: FAMILY MEDICINE

## 2019-07-23 PROCEDURE — 1123F ACP DISCUSS/DSCN MKR DOCD: CPT | Performed by: FAMILY MEDICINE

## 2019-07-23 PROCEDURE — 4040F PNEUMOC VAC/ADMIN/RCVD: CPT | Performed by: FAMILY MEDICINE

## 2019-07-23 RX ORDER — PREGABALIN 100 MG/1
100 CAPSULE ORAL 2 TIMES DAILY
Qty: 60 CAPSULE | Refills: 5 | Status: SHIPPED | OUTPATIENT
Start: 2019-07-23 | End: 2020-02-13

## 2019-08-07 ENCOUNTER — ANTI-COAG VISIT (OUTPATIENT)
Dept: PHARMACY | Age: 79
End: 2019-08-07
Payer: MEDICARE

## 2019-08-07 LAB — INTERNATIONAL NORMALIZATION RATIO, POC: 2.6

## 2019-08-07 PROCEDURE — 85610 PROTHROMBIN TIME: CPT

## 2019-08-07 PROCEDURE — 99211 OFF/OP EST MAY X REQ PHY/QHP: CPT

## 2019-08-20 ENCOUNTER — OFFICE VISIT (OUTPATIENT)
Dept: DERMATOLOGY | Age: 79
End: 2019-08-20
Payer: MEDICARE

## 2019-08-20 DIAGNOSIS — Z12.83 SKIN CANCER SCREENING: ICD-10-CM

## 2019-08-20 DIAGNOSIS — D48.5 NEOPLASM OF UNCERTAIN BEHAVIOR OF SKIN: Primary | ICD-10-CM

## 2019-08-20 DIAGNOSIS — Z85.828 HISTORY OF BASAL CELL CARCINOMA: ICD-10-CM

## 2019-08-20 DIAGNOSIS — Z85.828 HISTORY OF SQUAMOUS CELL CARCINOMA OF SKIN: ICD-10-CM

## 2019-08-20 DIAGNOSIS — L82.0 SEBORRHEIC KERATOSES, INFLAMED: ICD-10-CM

## 2019-08-20 PROCEDURE — 99213 OFFICE O/P EST LOW 20 MIN: CPT | Performed by: DERMATOLOGY

## 2019-08-22 LAB — DERMATOLOGY PATHOLOGY REPORT: NORMAL

## 2019-09-06 ENCOUNTER — ANTI-COAG VISIT (OUTPATIENT)
Dept: PHARMACY | Age: 79
End: 2019-09-06
Payer: MEDICARE

## 2019-09-06 ENCOUNTER — APPOINTMENT (OUTPATIENT)
Dept: PHARMACY | Age: 79
End: 2019-09-06
Payer: MEDICARE

## 2019-09-06 DIAGNOSIS — R79.1 ABNORMAL COAGULATION PROFILE: ICD-10-CM

## 2019-09-06 LAB — INTERNATIONAL NORMALIZATION RATIO, POC: 3.3

## 2019-09-06 PROCEDURE — 85610 PROTHROMBIN TIME: CPT

## 2019-09-06 PROCEDURE — 99211 OFF/OP EST MAY X REQ PHY/QHP: CPT

## 2019-10-04 ENCOUNTER — ANTI-COAG VISIT (OUTPATIENT)
Dept: PHARMACY | Age: 79
End: 2019-10-04
Payer: MEDICARE

## 2019-10-04 DIAGNOSIS — R79.1 ABNORMAL COAGULATION PROFILE: ICD-10-CM

## 2019-10-04 LAB — INTERNATIONAL NORMALIZATION RATIO, POC: 4.3

## 2019-10-04 PROCEDURE — 85610 PROTHROMBIN TIME: CPT

## 2019-10-04 PROCEDURE — 99211 OFF/OP EST MAY X REQ PHY/QHP: CPT

## 2019-10-18 ENCOUNTER — ANTI-COAG VISIT (OUTPATIENT)
Dept: PHARMACY | Age: 79
End: 2019-10-18
Payer: MEDICARE

## 2019-10-18 DIAGNOSIS — R79.1 ABNORMAL COAGULATION PROFILE: ICD-10-CM

## 2019-10-18 LAB — INTERNATIONAL NORMALIZATION RATIO, POC: 3.9

## 2019-10-18 PROCEDURE — 99211 OFF/OP EST MAY X REQ PHY/QHP: CPT

## 2019-10-18 PROCEDURE — 85610 PROTHROMBIN TIME: CPT

## 2019-11-01 ENCOUNTER — ANTI-COAG VISIT (OUTPATIENT)
Dept: PHARMACY | Age: 79
End: 2019-11-01
Payer: MEDICARE

## 2019-11-01 DIAGNOSIS — R79.1 ABNORMAL COAGULATION PROFILE: ICD-10-CM

## 2019-11-01 LAB — INTERNATIONAL NORMALIZATION RATIO, POC: 2.7

## 2019-11-01 PROCEDURE — 85610 PROTHROMBIN TIME: CPT

## 2019-11-01 PROCEDURE — 99211 OFF/OP EST MAY X REQ PHY/QHP: CPT

## 2019-11-05 ENCOUNTER — HOSPITAL ENCOUNTER (OUTPATIENT)
Dept: CT IMAGING | Age: 79
Discharge: HOME | End: 2019-11-05
Payer: MEDICARE

## 2019-11-05 DIAGNOSIS — C91.11 CHRONIC LYMPHOCYTIC LEUKEMIA IN REMISSION (HCC): ICD-10-CM

## 2019-11-05 LAB
GFR AFRICAN AMERICAN: >60
GFR NON-AFRICAN AMERICAN: >60
PERFORMED ON: NORMAL
POC CREATININE: 0.6 MG/DL (ref 0.6–1.2)
POC SAMPLE TYPE: NORMAL

## 2019-11-05 PROCEDURE — 82565 ASSAY OF CREATININE: CPT

## 2019-11-05 PROCEDURE — 74177 CT ABD & PELVIS W/CONTRAST: CPT

## 2019-11-05 PROCEDURE — 6360000004 HC RX CONTRAST MEDICATION: Performed by: INTERNAL MEDICINE

## 2019-11-05 RX ADMIN — IOPAMIDOL 75 ML: 755 INJECTION, SOLUTION INTRAVENOUS at 11:04

## 2019-11-05 RX ADMIN — IOHEXOL 50 ML: 240 INJECTION, SOLUTION INTRATHECAL; INTRAVASCULAR; INTRAVENOUS; ORAL at 10:05

## 2019-12-04 ENCOUNTER — ANTI-COAG VISIT (OUTPATIENT)
Dept: PHARMACY | Age: 79
End: 2019-12-04
Payer: MEDICARE

## 2019-12-04 DIAGNOSIS — R79.1 ABNORMAL COAGULATION PROFILE: ICD-10-CM

## 2019-12-04 LAB — INR BLD: 4.3

## 2019-12-04 PROCEDURE — 99211 OFF/OP EST MAY X REQ PHY/QHP: CPT

## 2019-12-04 PROCEDURE — 85610 PROTHROMBIN TIME: CPT

## 2019-12-04 RX ORDER — WARFARIN SODIUM 5 MG/1
TABLET ORAL
Qty: 45 TABLET | Refills: 3 | Status: SHIPPED | OUTPATIENT
Start: 2019-12-04 | End: 2020-02-28 | Stop reason: DRUGHIGH

## 2019-12-19 ENCOUNTER — ANTI-COAG VISIT (OUTPATIENT)
Dept: PHARMACY | Age: 79
End: 2019-12-19
Payer: MEDICARE

## 2019-12-19 DIAGNOSIS — R79.1 ABNORMAL COAGULATION PROFILE: ICD-10-CM

## 2019-12-19 LAB — INTERNATIONAL NORMALIZATION RATIO, POC: 2

## 2019-12-19 PROCEDURE — 99211 OFF/OP EST MAY X REQ PHY/QHP: CPT

## 2019-12-19 PROCEDURE — 85610 PROTHROMBIN TIME: CPT

## 2020-01-16 ENCOUNTER — ANTI-COAG VISIT (OUTPATIENT)
Dept: PHARMACY | Age: 80
End: 2020-01-16
Payer: MEDICARE

## 2020-01-16 LAB — INTERNATIONAL NORMALIZATION RATIO, POC: 2.4

## 2020-01-16 PROCEDURE — 85610 PROTHROMBIN TIME: CPT

## 2020-01-16 PROCEDURE — 99211 OFF/OP EST MAY X REQ PHY/QHP: CPT

## 2020-01-31 ENCOUNTER — OFFICE VISIT (OUTPATIENT)
Dept: FAMILY MEDICINE CLINIC | Age: 80
End: 2020-01-31
Payer: MEDICARE

## 2020-01-31 VITALS
HEART RATE: 76 BPM | SYSTOLIC BLOOD PRESSURE: 118 MMHG | DIASTOLIC BLOOD PRESSURE: 70 MMHG | RESPIRATION RATE: 16 BRPM | HEIGHT: 60 IN | BODY MASS INDEX: 32.39 KG/M2 | WEIGHT: 165 LBS

## 2020-01-31 PROCEDURE — G8417 CALC BMI ABV UP PARAM F/U: HCPCS | Performed by: FAMILY MEDICINE

## 2020-01-31 PROCEDURE — G8427 DOCREV CUR MEDS BY ELIG CLIN: HCPCS | Performed by: FAMILY MEDICINE

## 2020-01-31 PROCEDURE — 1123F ACP DISCUSS/DSCN MKR DOCD: CPT | Performed by: FAMILY MEDICINE

## 2020-01-31 PROCEDURE — 4040F PNEUMOC VAC/ADMIN/RCVD: CPT | Performed by: FAMILY MEDICINE

## 2020-01-31 PROCEDURE — G8484 FLU IMMUNIZE NO ADMIN: HCPCS | Performed by: FAMILY MEDICINE

## 2020-01-31 PROCEDURE — G8400 PT W/DXA NO RESULTS DOC: HCPCS | Performed by: FAMILY MEDICINE

## 2020-01-31 PROCEDURE — G8510 SCR DEP NEG, NO PLAN REQD: HCPCS | Performed by: FAMILY MEDICINE

## 2020-01-31 PROCEDURE — 99213 OFFICE O/P EST LOW 20 MIN: CPT | Performed by: FAMILY MEDICINE

## 2020-01-31 PROCEDURE — 1036F TOBACCO NON-USER: CPT | Performed by: FAMILY MEDICINE

## 2020-01-31 PROCEDURE — G0444 DEPRESSION SCREEN ANNUAL: HCPCS | Performed by: FAMILY MEDICINE

## 2020-01-31 PROCEDURE — 1090F PRES/ABSN URINE INCON ASSESS: CPT | Performed by: FAMILY MEDICINE

## 2020-01-31 RX ORDER — PREGABALIN 100 MG/1
100 CAPSULE ORAL 2 TIMES DAILY
Qty: 60 CAPSULE | Refills: 5 | Status: CANCELLED | OUTPATIENT
Start: 2020-01-31 | End: 2020-03-01

## 2020-01-31 RX ORDER — GABAPENTIN 300 MG/1
CAPSULE ORAL
Qty: 60 CAPSULE | Refills: 5 | Status: SHIPPED | OUTPATIENT
Start: 2020-01-31 | End: 2020-08-04

## 2020-01-31 ASSESSMENT — PATIENT HEALTH QUESTIONNAIRE - PHQ9
7. TROUBLE CONCENTRATING ON THINGS, SUCH AS READING THE NEWSPAPER OR WATCHING TELEVISION: 1
9. THOUGHTS THAT YOU WOULD BE BETTER OFF DEAD, OR OF HURTING YOURSELF: 0
SUM OF ALL RESPONSES TO PHQ9 QUESTIONS 1 & 2: 3
5. POOR APPETITE OR OVEREATING: 1
8. MOVING OR SPEAKING SO SLOWLY THAT OTHER PEOPLE COULD HAVE NOTICED. OR THE OPPOSITE, BEING SO FIGETY OR RESTLESS THAT YOU HAVE BEEN MOVING AROUND A LOT MORE THAN USUAL: 0
3. TROUBLE FALLING OR STAYING ASLEEP: 3
SUM OF ALL RESPONSES TO PHQ QUESTIONS 1-9: 11
6. FEELING BAD ABOUT YOURSELF - OR THAT YOU ARE A FAILURE OR HAVE LET YOURSELF OR YOUR FAMILY DOWN: 0
2. FEELING DOWN, DEPRESSED OR HOPELESS: 3
1. LITTLE INTEREST OR PLEASURE IN DOING THINGS: 0
10. IF YOU CHECKED OFF ANY PROBLEMS, HOW DIFFICULT HAVE THESE PROBLEMS MADE IT FOR YOU TO DO YOUR WORK, TAKE CARE OF THINGS AT HOME, OR GET ALONG WITH OTHER PEOPLE: 0
SUM OF ALL RESPONSES TO PHQ QUESTIONS 1-9: 11
4. FEELING TIRED OR HAVING LITTLE ENERGY: 3

## 2020-01-31 NOTE — PROGRESS NOTES
Dr. [address], [place], [address]  Phone: [phone]    HPI:  Chief Complaint   Patient presents with    Pain     ROUTINE PAIN FOLLOW UP Chelsea Vyas is a 78 y.o. female here for evaluation of pain. Patient states that she has been taking lyrica 100 mg 1 tablet daily and her insurance will no longer cover it. She reports that she decreased her lyrica dosage from 2 capsules a day to 1 capsule a day so that her medication would last longer and notes that it is currently working well. Patient notes that she does not sleep well. She reports that she visits Dr. Rainer Altamirano, oncologist, regularly and notes that she has had a good check up. She states that she has neuropathy pain in her lower left abdomen and has history of shingles on her lower back. Patient states that she has INR's done regularly for her Factor V Leiden mutation. She reports that she has not had a blood clot for a while. Patient notes that she tried lidoderm patches on her abdomen before and did not like it. She states that lyrica does not completely get rid of her pain but it helps her pain. Denies chest pain, pressure, tightness, or palpitations. Denies shortness of breath, dyspnea on exertion, or cough.      Vitals:  BP Readings from Last 3 Encounters:   01/31/20 118/70   07/23/19 118/74   05/30/19 128/82       Pulse Readings from Last 3 Encounters:   07/23/19 62   05/30/19 75   03/12/19 64        Wt Readings from Last 3 Encounters:   07/23/19 154 lb (69.9 kg)   05/30/19 153 lb 6.4 oz (69.6 kg)   03/12/19 163 lb (73.9 kg)        Medication Review:  Current Outpatient Medications   Medication Sig Dispense Refill    warfarin (COUMADIN) 5 MG tablet Warfarin 7.5mg ( 1 & 1/2 tablets) daily EXCEPT 5mg (1 tablets) every Monday, Thursday and Saturday or as directed by [facility] Coumadin [facility] [phone] 44 tablet 3    pregabalin (LYRICA) 100 MG capsule Take 1 capsule by mouth 2 times daily for 30 days. 60 capsule 5     No current facility-administered medications for this visit. Review of Systems:   All others are negative, except as noted in the HPI.     Patient History:  Past Medical History:   Diagnosis Date    [facility]     [facility]    COPD [facility]ch[facility] obstructive pulmonary disease) ([facility]     Essential hypertension     controlled with salt restriction (initially)    Factor V Leiden mutation ([facility]     Goiter 2011    1.5 cm midline    Hypercholesterolemia     Impaired fasting glucose 2011    Osteoarthritis     Osteoporosis     Post herpetic neuralgia     Vitamin D deficiency 2011        Past Surgical History:   Procedure Laterality Date    CATARACT REMOVAL WITH IMPLANT  12    left eye    JOINT REPLACEMENT      partial knee R and L    KNEE SURGERY      partial replacements, both knees    MOHS SURGERY  2019    R cheek and R superior temple    SPLENECTOMY      TUNNELED VENOUS PORT PLACEMENT      UPPER GASTROINTESTINAL ENDOSCOPY N/A 2019    EGD ESOPHAGOGASTRODUODENOSCOPY, WITH ENDOSCOPIC ULTRASOUND OF ESOPHAGUS performed by Doug Wayne MD at [address] History     Socioeconomic History    Marital status:      Spouse name: Not on file    Number of children: Not on file    Years of education: Not on file    Highest education level: Not on file   Occupational History    Not on file   Social Needs    Financial resource strain: Not on file    Food insecurity:     Worry: Not on file     Inability: Not on file    Transportation needs:     Medical: Not on file     Non-medical: Not on file   Tobacco Use    Smoking status: Former Smoker     Packs/day: 0.30     Years: 50.00     Pack years: 15.00     Types: Cigarettes     Last attempt to quit: 1995     Years since quittin.2    Smokeless tobacco: Never PM.      I, Giulia Richards MD, personally performed the services described in this documentation. All medical record entries made by the scribe were at my direction and in my presence. I have reviewed the chart and discharge instructions (if applicable) and agree that the record reflects my personal performance and is accurate and complete.  Giulia Richards MD, 1/31/2020, 7:01 PM.

## 2020-02-13 ENCOUNTER — ANTI-COAG VISIT (OUTPATIENT)
Dept: PHARMACY | Age: 80
End: 2020-02-13
Payer: MEDICARE

## 2020-02-13 LAB — INTERNATIONAL NORMALIZATION RATIO, POC: 3.7

## 2020-02-13 PROCEDURE — 99211 OFF/OP EST MAY X REQ PHY/QHP: CPT

## 2020-02-13 PROCEDURE — 85610 PROTHROMBIN TIME: CPT

## 2020-02-13 NOTE — PROGRESS NOTES
Ms. Brett Perez is a 78 y.o.  female with history of factor V Leiden who presents today for anticoagulation monitoring and adjustment. Patient verifies current dosing regimen  Patient denies s/s bleeding/bruising/swelling/SOB  No blood in urine or stool. No dietary changes. No changes in medication/OTC agents/Herbals. No change in alcohol use. No missed doses. No Procedures scheduled in the future at this time. Lab Results   Component Value Date    INR 3.7 02/13/2020    INR 2.4 01/16/2020    INR 2 12/19/2019       Pertinent findings: Her INR is highly variable. She can be both high and low at the same dosing. She recently discontinued her pregabalin and started gabapentin, dur to insurance coverage. Gabapentin does not usually cause any alteration in the INR, so the elevated INR may not be due to the medication change. For INR of 3.7, we will hold her warfarin today. We will then continue her current dose and check her INR in 2 weeks. If her INR remains high at that time, we may need to consider a reduction in her warfarin dose. Warfarin dosing: Do not take any warfarin TODAY ONLY then  Continue Warfarin 7.5mg daily EXCEPT 5mg (1 tablet) Mondays, Thursdays and Saturdays. After visit summary printed and reviewed with patient.

## 2020-02-20 ENCOUNTER — OFFICE VISIT (OUTPATIENT)
Dept: DERMATOLOGY | Age: 80
End: 2020-02-20
Payer: MEDICARE

## 2020-02-20 PROCEDURE — 99213 OFFICE O/P EST LOW 20 MIN: CPT | Performed by: DERMATOLOGY

## 2020-02-20 PROCEDURE — 17110 DESTRUCTION B9 LES UP TO 14: CPT | Performed by: DERMATOLOGY

## 2020-02-20 PROCEDURE — G8484 FLU IMMUNIZE NO ADMIN: HCPCS | Performed by: DERMATOLOGY

## 2020-02-20 PROCEDURE — 1090F PRES/ABSN URINE INCON ASSESS: CPT | Performed by: DERMATOLOGY

## 2020-02-20 PROCEDURE — G8400 PT W/DXA NO RESULTS DOC: HCPCS | Performed by: DERMATOLOGY

## 2020-02-20 PROCEDURE — 1123F ACP DISCUSS/DSCN MKR DOCD: CPT | Performed by: DERMATOLOGY

## 2020-02-20 PROCEDURE — G8417 CALC BMI ABV UP PARAM F/U: HCPCS | Performed by: DERMATOLOGY

## 2020-02-20 PROCEDURE — 1036F TOBACCO NON-USER: CPT | Performed by: DERMATOLOGY

## 2020-02-20 PROCEDURE — G8427 DOCREV CUR MEDS BY ELIG CLIN: HCPCS | Performed by: DERMATOLOGY

## 2020-02-20 PROCEDURE — 4040F PNEUMOC VAC/ADMIN/RCVD: CPT | Performed by: DERMATOLOGY

## 2020-02-20 NOTE — PROGRESS NOTES
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 ABCDE's of melanoma also reviewed    2.) ISKs:  - LN2 x 15 sec x 2 cycles x 5 lesions; edu: irritation, dyspigmentation, redness, possible recurrence, use of Vaseline until healed        Return to Clinic:  6 mo for FBSE  Discussed plan with patient and/or primary caretaker. Patient to call clinic with any questions / concerns. Reviewed side effects of treatment(s) and/or medication(s) with patient and/or primary caretaker.    AVS provided or is available on [employer]   ____________________________________________________________________________   Nevin Valente MD, MPH, FAAD  [facility], [address] [address]

## 2020-02-27 ENCOUNTER — APPOINTMENT (OUTPATIENT)
Dept: PHARMACY | Age: 80
End: 2020-02-27
Payer: MEDICARE

## 2020-02-28 ENCOUNTER — ANTI-COAG VISIT (OUTPATIENT)
Dept: PHARMACY | Age: 80
End: 2020-02-28
Payer: MEDICARE

## 2020-02-28 LAB — INTERNATIONAL NORMALIZATION RATIO, POC: 3.8

## 2020-02-28 PROCEDURE — 99211 OFF/OP EST MAY X REQ PHY/QHP: CPT

## 2020-02-28 PROCEDURE — 85610 PROTHROMBIN TIME: CPT

## 2020-02-28 RX ORDER — WARFARIN SODIUM 5 MG/1
TABLET ORAL
Qty: 45 TABLET | Refills: 3 | Status: SHIPPED
Start: 2020-02-28 | End: 2020-06-22

## 2020-03-12 ENCOUNTER — APPOINTMENT (OUTPATIENT)
Dept: PHARMACY | Age: 80
End: 2020-03-12
Payer: MEDICARE

## 2020-03-16 ENCOUNTER — ANTI-COAG VISIT (OUTPATIENT)
Dept: PHARMACY | Age: 80
End: 2020-03-16
Payer: MEDICARE

## 2020-03-16 LAB — INTERNATIONAL NORMALIZATION RATIO, POC: 2.6

## 2020-03-16 PROCEDURE — 99211 OFF/OP EST MAY X REQ PHY/QHP: CPT

## 2020-03-16 PROCEDURE — 85610 PROTHROMBIN TIME: CPT

## 2020-04-23 ENCOUNTER — TELEPHONE (OUTPATIENT)
Dept: PHARMACY | Age: 80
End: 2020-04-23

## 2020-04-23 ENCOUNTER — ANTI-COAG VISIT (OUTPATIENT)
Dept: PHARMACY | Age: 80
End: 2020-04-23
Payer: MEDICARE

## 2020-04-23 DIAGNOSIS — R79.1 ABNORMAL COAGULATION PROFILE: ICD-10-CM

## 2020-04-23 LAB
INR BLD: 3.93 (ref 0.86–1.14)
PROTHROMBIN TIME: 46.2 SEC (ref 10–13.2)

## 2020-04-23 PROCEDURE — 99211 OFF/OP EST MAY X REQ PHY/QHP: CPT

## 2020-05-29 ENCOUNTER — ANTI-COAG VISIT (OUTPATIENT)
Dept: PHARMACY | Age: 80
End: 2020-05-29
Payer: MEDICARE

## 2020-05-29 DIAGNOSIS — R79.1 ABNORMAL COAGULATION PROFILE: ICD-10-CM

## 2020-05-29 LAB
INR BLD: 2.71 (ref 0.86–1.14)
PROTHROMBIN TIME: 31.8 SEC (ref 10–13.2)

## 2020-05-29 PROCEDURE — 99211 OFF/OP EST MAY X REQ PHY/QHP: CPT | Performed by: PHARMACIST

## 2020-06-22 RX ORDER — WARFARIN SODIUM 5 MG/1
TABLET ORAL
Qty: 115 TABLET | Refills: 1 | Status: SHIPPED | OUTPATIENT
Start: 2020-06-22 | End: 2021-02-24

## 2020-06-23 RX ORDER — WARFARIN SODIUM 5 MG/1
TABLET ORAL
Qty: 172 TABLET | OUTPATIENT
Start: 2020-06-23

## 2020-07-07 ENCOUNTER — ANTI-COAG VISIT (OUTPATIENT)
Dept: PHARMACY | Age: 80
End: 2020-07-07
Payer: MEDICARE

## 2020-07-07 VITALS — TEMPERATURE: 97.3 F

## 2020-07-07 LAB — INTERNATIONAL NORMALIZATION RATIO, POC: 2.9

## 2020-07-07 PROCEDURE — 99211 OFF/OP EST MAY X REQ PHY/QHP: CPT

## 2020-07-07 PROCEDURE — 85610 PROTHROMBIN TIME: CPT

## 2020-08-04 ENCOUNTER — ANTI-COAG VISIT (OUTPATIENT)
Dept: PHARMACY | Age: 80
End: 2020-08-04
Payer: MEDICARE

## 2020-08-04 VITALS — TEMPERATURE: 97.5 F

## 2020-08-04 LAB — INTERNATIONAL NORMALIZATION RATIO, POC: 4.1

## 2020-08-04 PROCEDURE — 99211 OFF/OP EST MAY X REQ PHY/QHP: CPT

## 2020-08-04 PROCEDURE — 85610 PROTHROMBIN TIME: CPT

## 2020-08-04 RX ORDER — GABAPENTIN 300 MG/1
300 CAPSULE ORAL
COMMUNITY
End: 2020-08-17

## 2020-08-04 NOTE — PROGRESS NOTES
Ms. Chelsea Martinez is a 78 y.o.  female. Ms. Chelsea Martinez had an INR test today. Results were reviewed and appropriate warfarin management was completed. THIS VISIT WAS COMPLETED AS:   []    A VIRTUAL VISIT VIA TELEPHONE IN EFFORTS TO REDUCE THE SPREAD OF COVID-19.  []    A DRIVE-THRU VISIT IN EFFORTS TO REDUCE THE SPREAD OF COVID-19. [x]    AN IN PERSON VISIT. PROTOCOLS WERE FOLLOWED WITH PRECAUTIONS TO REDUCE THE SPREAD OF COVID-19. Patient verifies current dosing regimen: Yes     Warfarin medication reviewed and updated on the patient 's home medication list: Yes   All other medications reviewed and updated on the patient 's home medication list: Yes     Lab Results   Component Value Date    INR 4.1 2020    INR 2.9 2020    INR 2.71 (H) 2020       Patient Findings     Positives:   Change in diet/appetite    Negatives:   Signs/symptoms of bleeding, Missed doses, Change in medications, Bruising    Comments:   Eating out more but she states she is still getting her salads 3-4 times week. Anticoagulation Summary  As of 2020    INR goal:   2.0-3.0   TTR:   53.3 % (9.1 y)   INR used for dosin.1! (2020)   Warfarin maintenance plan:   7.5 mg (5 mg x 1.5) every Mon; 5 mg (5 mg x 1) all other days   Weekly warfarin total:   37.5 mg   Plan last modified:   Regi Mccabe, [facility] (2020)   Next INR check:   2020   Priority:   Maintenance   Target end date:    Indefinite    Indications    Abnormal coagulation profile [R79.1]             Anticoagulation Episode Summary     INR check location:       Preferred lab:       Send INR reminders to:   [facility]    Comments:   EPIC      Factor V Leiden       Anticoagulation Care Providers     Provider Role Specialty Phone number    Frank Cook MD Referring Family Medicine [phone]          Warfarin plan:   She denies any alcohol changes, no OTC medications except ibuprofen  X1 dose over the past week. No cranberry juice. Could not find any explanation of why her INR is elevated. Other than she stated that she has been eating out more. Just a change in diet overall may have affected her INR. Her INR today is elevated and her last two INRs were on the high end of her range. I will decrease her weekly dose just slightly by 6%. Expect this to put her INR closer to the middle of her range. Description    Hold warfarin today ONLY  THEN NEW DOSE:  Warfarin 5mg (1 tablet) daily EXCEPT 7.5mg (1.5 tablets) every Monday     Keep your green vegetable intake consistent (3-4 salads per week)                 Reviewed AVS with patient / caregiver.       CLINICAL PHARMACY CONSULT: MED RECONCILIATION/REVIEW ADDENDUM    For Pharmacy Admin Tracking Only    PHSO: No  Total # of Interventions Recommended: 1  - Decreased Dose #: 1  - Updated Order #: 1 Updated Order Reason(s):  Medication  Total Interventions Accepted: 1  Time Spent (min): 15

## 2020-08-17 RX ORDER — GABAPENTIN 300 MG/1
CAPSULE ORAL
Qty: 60 CAPSULE | Refills: 1 | Status: SHIPPED | OUTPATIENT
Start: 2020-08-17 | End: 2020-10-23

## 2020-08-19 RX ORDER — GABAPENTIN 300 MG/1
CAPSULE ORAL
Qty: 60 CAPSULE | Refills: 1 | OUTPATIENT
Start: 2020-08-19 | End: 2020-09-18

## 2020-09-10 ENCOUNTER — TELEPHONE (OUTPATIENT)
Dept: PHARMACY | Age: 80
End: 2020-09-10

## 2020-09-10 NOTE — TELEPHONE ENCOUNTER
Attempted to reach Ms. Paulino Alcantara regarding missed INR appointment on 9-1-20 - no answer. Unable to leave a message. Will follow up at a later date.     [address]  Anticoagulation Service  [phone]

## 2020-09-16 NOTE — TELEPHONE ENCOUNTER
Attempted to reach Ms. Siddiqi Luiz - no answer.   Unable to leave message  regarding missed appointment for INR check on 9-1-20    [facility] \[facility] [place] [facility]\"" - [place]  9/16/2020  11:50 AM

## 2020-09-23 ENCOUNTER — OFFICE VISIT (OUTPATIENT)
Dept: DERMATOLOGY | Age: 80
End: 2020-09-23
Payer: MEDICARE

## 2020-09-23 VITALS — TEMPERATURE: 98.2 F

## 2020-09-23 PROCEDURE — 99214 OFFICE O/P EST MOD 30 MIN: CPT | Performed by: DERMATOLOGY

## 2020-09-23 PROCEDURE — 17110 DESTRUCTION B9 LES UP TO 14: CPT | Performed by: DERMATOLOGY

## 2020-09-23 PROCEDURE — 11102 TANGNTL BX SKIN SINGLE LES: CPT | Performed by: DERMATOLOGY

## 2020-09-25 ENCOUNTER — ANTI-COAG VISIT (OUTPATIENT)
Dept: PHARMACY | Age: 80
End: 2020-09-25
Payer: MEDICARE

## 2020-09-25 LAB
DERMATOLOGY PATHOLOGY REPORT: NORMAL
INR BLD: 1.8

## 2020-09-25 PROCEDURE — 85610 PROTHROMBIN TIME: CPT

## 2020-09-25 PROCEDURE — 99211 OFF/OP EST MAY X REQ PHY/QHP: CPT

## 2020-09-25 NOTE — PROGRESS NOTES
to take 7.5mg today then continue normal regimen indicated below. Scheduled f/u in 4 weeks. Description    TODAY take warfarin 7.5mg, then  Continue:  Warfarin 5mg (1 tablet) daily EXCEPT 7.5mg (1.5 tablets) every Monday     Keep your green vegetable intake consistent (3-4 salads per week)                 Reviewed AVS with patient / caregiver.       CLINICAL PHARMACY CONSULT: MED RECONCILIATION/REVIEW ADDENDUM    For Pharmacy Admin Tracking Only    PHSO: Yes  Total # of Interventions Recommended: 1  - Increased Dose #: 1  - Maintenance Safety Lab Monitoring #: 1  Total Interventions Accepted: 1  Time Spent (min): 15

## 2020-10-20 ENCOUNTER — HOSPITAL ENCOUNTER (OUTPATIENT)
Dept: CT IMAGING | Age: 80
Discharge: HOME | End: 2020-10-20
Payer: MEDICARE

## 2020-10-20 ENCOUNTER — ANTI-COAG VISIT (OUTPATIENT)
Dept: PHARMACY | Age: 80
End: 2020-10-20
Payer: MEDICARE

## 2020-10-20 LAB
GFR AFRICAN AMERICAN: >60
GFR NON-AFRICAN AMERICAN: >60
INTERNATIONAL NORMALIZATION RATIO, POC: 2.7
PERFORMED ON: NORMAL
POC CREATININE: 0.8 MG/DL (ref 0.6–1.2)
POC SAMPLE TYPE: NORMAL

## 2020-10-20 PROCEDURE — 82565 ASSAY OF CREATININE: CPT

## 2020-10-20 PROCEDURE — 74177 CT ABD & PELVIS W/CONTRAST: CPT

## 2020-10-20 PROCEDURE — 99211 OFF/OP EST MAY X REQ PHY/QHP: CPT

## 2020-10-20 PROCEDURE — 85610 PROTHROMBIN TIME: CPT

## 2020-10-20 PROCEDURE — 6360000004 HC RX CONTRAST MEDICATION: Performed by: INTERNAL MEDICINE

## 2020-10-20 RX ORDER — CYANOCOBALAMIN (VITAMIN B-12) 500 MCG
500 TABLET ORAL DAILY
Status: ON HOLD | COMMUNITY

## 2020-10-20 RX ADMIN — IOPAMIDOL 75 ML: 755 INJECTION, SOLUTION INTRAVENOUS at 10:18

## 2020-10-20 RX ADMIN — IOHEXOL 50 ML: 240 INJECTION, SOLUTION INTRATHECAL; INTRAVASCULAR; INTRAVENOUS; ORAL at 10:18

## 2020-10-20 NOTE — PROGRESS NOTES
caregiver.       CLINICAL PHARMACY CONSULT: MED RECONCILIATION/REVIEW ADDENDUM    For Pharmacy Admin Tracking Only    PHSO: Yes  Total # of Interventions Recommended: 0      Total Interventions Accepted: 0  Time Spent (min): 15

## 2020-11-24 ENCOUNTER — ANTI-COAG VISIT (OUTPATIENT)
Dept: PHARMACY | Age: 80
End: 2020-11-24
Payer: MEDICARE

## 2020-11-24 VITALS — TEMPERATURE: 96.4 F

## 2020-11-24 LAB — INR BLD: 4

## 2020-11-24 PROCEDURE — 99211 OFF/OP EST MAY X REQ PHY/QHP: CPT

## 2020-11-24 PROCEDURE — 85610 PROTHROMBIN TIME: CPT

## 2020-11-24 NOTE — PROGRESS NOTES
Ms. Kennedy Phalen is a 78 y.o.  female. Ms. Kennedy Phalen had an INR test today. Results were reviewed and appropriate warfarin management was completed. THIS VISIT WAS COMPLETED AS:   []    A VIRTUAL VISIT VIA TELEPHONE IN EFFORTS TO REDUCE THE SPREAD OF COVID-19.  []    A DRIVE-THRU VISIT IN EFFORTS TO REDUCE THE SPREAD OF COVID-19. [x]    AN IN PERSON VISIT. PROTOCOLS WERE FOLLOWED WITH PRECAUTIONS TO REDUCE THE SPREAD OF COVID-19. Patient verifies current dosing regimen: Yes     Warfarin medication reviewed and updated on the patient 's home medication list: Yes   All other medications reviewed and updated on the patient 's home medication list: No: no changes     Lab Results   Component Value Date    INR 4.00 2020    INR 2.7 10/20/2020    INR 1.80 2020       Patient Findings     Negatives:   Signs/symptoms of bleeding, Change in medications, Change in diet/appetite, Bruising          Anticoagulation Summary  As of 2020    INR goal:   2.0-3.0   TTR:   53.0 % (9.4 y)   INR used for dosin.00! (2020)   Warfarin maintenance plan:   5 mg (5 mg x 1) every day   Weekly warfarin total:   35 mg   Plan last modified:   Luciana Joya (2020)   Next INR check:   2020   Priority:   Maintenance   Target end date: Indefinite    Indications    Abnormal coagulation profile [R79.1]             Anticoagulation Episode Summary     INR check location:       Preferred lab:       Send INR reminders to:   [facility]    Comments:   EPIC      Factor V Leiden       Anticoagulation Care Providers     Provider Role Specialty Phone number    Richie Valladares MD Referring Family Medicine [phone]          Warfarin plan:     Patient states she is doing well and reports no bruising or bleeding. Denies missed doses and reports no changes to her medications.  Maybe ate slightly less salad this past week than usual.    Her INR was 4.0 today, which is above her goal (2-3). We will hold her warfarin today, and decrease her weekly dose by 6.7%. We will recheck her INR in 2 weeks. Description    Continue:  Warfarin 5mg (1 tablet) daily    Keep your green vegetable intake consistent (3-4 salads per week)                  Immunization History   Administered Date(s) Administered    Influenza Virus Vaccine 10/03/2016    Influenza, High Dose (Fluzone 65 yrs and older) 09/27/2011, 10/01/2012, 10/29/2013, 10/23/2014, 11/24/2015, 10/23/2018, 12/21/2019    Pneumococcal Conjugate 13-valent ([id number]) 07/02/2015    Pneumococcal Polysaccharide (Ezalwajzt39) 07/01/2006, 11/07/2011    Td, unspecified formulation 10/04/2004    Tdap (Boostrix, Adacel) 09/01/2015       Immunization history reviewed and updated:  Yes   Influenza vaccine given:  No     Reviewed AVS with patient / caregiver. CLINICAL PHARMACY CONSULT: MED RECONCILIATION/REVIEW ADDENDUM    For Pharmacy Admin Tracking Only    PHSO (orange banner): Yes  Total # of Interventions Recommended (warfarin changes): 2  (warfarin changes) - Decreased Dose #: 2  (#1 for reviewing INR) - Maintenance Safety Lab Monitoring #: 1  Total Interventions Accepted (warfarin related):  2  Time Spent (min) (round up): [address], Pharmacy Student  11/24/2020 10:14 AM

## 2020-12-04 ENCOUNTER — PROCEDURE VISIT (OUTPATIENT)
Dept: DERMATOLOGY | Age: 80
End: 2020-12-04
Payer: MEDICARE

## 2020-12-04 VITALS — TEMPERATURE: 97.9 F

## 2020-12-04 PROCEDURE — 4040F PNEUMOC VAC/ADMIN/RCVD: CPT | Performed by: DERMATOLOGY

## 2020-12-04 PROCEDURE — 1090F PRES/ABSN URINE INCON ASSESS: CPT | Performed by: DERMATOLOGY

## 2020-12-04 PROCEDURE — 1123F ACP DISCUSS/DSCN MKR DOCD: CPT | Performed by: DERMATOLOGY

## 2020-12-04 PROCEDURE — 1036F TOBACCO NON-USER: CPT | Performed by: DERMATOLOGY

## 2020-12-04 PROCEDURE — G8484 FLU IMMUNIZE NO ADMIN: HCPCS | Performed by: DERMATOLOGY

## 2020-12-04 PROCEDURE — 11102 TANGNTL BX SKIN SINGLE LES: CPT | Performed by: DERMATOLOGY

## 2020-12-04 PROCEDURE — 99213 OFFICE O/P EST LOW 20 MIN: CPT | Performed by: DERMATOLOGY

## 2020-12-04 PROCEDURE — G8417 CALC BMI ABV UP PARAM F/U: HCPCS | Performed by: DERMATOLOGY

## 2020-12-04 PROCEDURE — G8427 DOCREV CUR MEDS BY ELIG CLIN: HCPCS | Performed by: DERMATOLOGY

## 2020-12-04 PROCEDURE — G8400 PT W/DXA NO RESULTS DOC: HCPCS | Performed by: DERMATOLOGY

## 2020-12-04 NOTE — PATIENT INSTRUCTIONS
dilute vinegar soaks daily (mix 2 Tbsp vinegar with 2 cups of water, put the mix in the fridge, and when you change the wound dressing each day, use a clean cloth or piece of gauze, dip it in the dilute vinegar solution, and then soak the wound for 5 minutes before putting a small amount of vaseline on the wound and covering it with a new dressing).

## 2020-12-08 ENCOUNTER — ANTI-COAG VISIT (OUTPATIENT)
Dept: PHARMACY | Age: 80
End: 2020-12-08
Payer: MEDICARE

## 2020-12-08 VITALS — TEMPERATURE: 96.5 F

## 2020-12-08 LAB
DERMATOLOGY PATHOLOGY REPORT: ABNORMAL
INTERNATIONAL NORMALIZATION RATIO, POC: 3.6

## 2020-12-08 PROCEDURE — 85610 PROTHROMBIN TIME: CPT

## 2020-12-08 PROCEDURE — 99211 OFF/OP EST MAY X REQ PHY/QHP: CPT

## 2020-12-08 NOTE — PROGRESS NOTES
check. Description    Hold warfarin today only, then  NEW DOSE:  Warfarin 5mg (1 tablet) DAILY EXCEPT 2.5 mg(half a tablet) every [facility]    Keep your green vegetable intake consistent (3-4 salads per week)                  Immunization History   Administered Date(s) Administered    Influenza Virus Vaccine 10/03/2016    Influenza, High Dose (Fluzone 65 yrs and older) 09/27/2011, 10/01/2012, 10/29/2013, 10/23/2014, 11/24/2015, 10/23/2018, 12/21/2019    Pneumococcal Conjugate 13-valent (Fwbelyv73) 07/02/2015    Pneumococcal Polysaccharide (Pecwxiacc83) 07/01/2006, 11/07/2011    Td, unspecified formulation 10/04/2004    Tdap (Boostrix, Adacel) 09/01/2015     Immunization history reviewed and updated:  Yes   Influenza vaccine given:  No: she will get the flu vaccine per visiting nurse later this month. Reviewed AVS with patient / caregiver.       CLINICAL PHARMACY CONSULT: MED RECONCILIATION/REVIEW ADDENDUM    For Pharmacy Admin Tracking Only    PHSO (orange banner): Yes  Total # of Interventions Recommended (warfarin changes): 1  (warfarin changes) - Decreased Dose #: 1  (other medication updates)- Updated Order #: 0 Updated Order Reason(s):   (#1 for reviewing INR) - Maintenance Safety Lab Monitoring #: 1  Total Interventions Accepted (warfarin related): 1  Time Spent (min) (round up): 15

## 2020-12-21 NOTE — RESULT ENCOUNTER NOTE
Made several attempts to reach patient but was not able to connect with her. Please prepare letter for me to sign. Dear Mrs. Yazmin Zaidi ,    It was a pleasure to see you in the dermatology clinic at the [facility] location    As you know, a biopsy was taken of a lesion on your chest during your office visit on 12/4/20. The pathology results showed that this was a squamous cell carcinoma (skin cancer). Following the biopsy, I treated the lesion with electrodesiccation and curettage (scraping and burning). This treatment may have been sufficient,however follow up of the lesion is needed tot determine if further treatment is needed. Please reach out to our office and request to speak with my assistant Ophelia Collins to schedule a follow up appointment before your trip to [place]. If you have any questions or concerns regarding the results of your biopsy, please don't hesitate to contact me.     Sincerely,     Robina Alejo MD, MS

## 2020-12-22 ENCOUNTER — ANTI-COAG VISIT (OUTPATIENT)
Dept: PHARMACY | Age: 80
End: 2020-12-22
Payer: MEDICARE

## 2020-12-22 VITALS — TEMPERATURE: 96.8 F

## 2020-12-22 LAB — INR BLD: 2.5

## 2020-12-22 PROCEDURE — 99211 OFF/OP EST MAY X REQ PHY/QHP: CPT

## 2020-12-22 PROCEDURE — 6360000002 HC RX W HCPCS: Performed by: FAMILY MEDICINE

## 2020-12-22 PROCEDURE — 85610 PROTHROMBIN TIME: CPT

## 2020-12-22 PROCEDURE — G0008 ADMIN INFLUENZA VIRUS VAC: HCPCS | Performed by: FAMILY MEDICINE

## 2020-12-22 PROCEDURE — 90686 IIV4 VACC NO PRSV 0.5 ML IM: CPT | Performed by: FAMILY MEDICINE

## 2020-12-22 RX ADMIN — INFLUENZA A VIRUS A/VICTORIA/2454/2019 IVR-207 (H1N1) ANTIGEN (PROPIOLACTONE INACTIVATED), INFLUENZA A VIRUS A/HONG KONG/2671/2019 IVR-208 (H3N2) ANTIGEN (PROPIOLACTONE INACTIVATED), INFLUENZA B VIRUS B/VICTORIA/705/2018 BVR-11 ANTIGEN (PROPIOLACTONE INACTIVATED), INFLUENZA B VIRUS B/PHUKET/3073/2013 BVR-1B ANTIGEN (PROPIOLACTONE INACTIVATED) 0.5 ML: 15; 15; 15; 15 INJECTION, SUSPENSION INTRAMUSCULAR at 10:14

## 2020-12-22 NOTE — PROGRESS NOTES
Ms. Alvino Lawson is a 78 y.o.  female. Ms. Alvino Lawson had an INR test today. Results were reviewed and appropriate warfarin management was completed. THIS VISIT WAS COMPLETED AS:   []    A VIRTUAL VISIT VIA TELEPHONE IN EFFORTS TO REDUCE THE SPREAD OF COVID-19.  []    A DRIVE-THRU VISIT IN EFFORTS TO REDUCE THE SPREAD OF COVID-19. [x]    AN IN PERSON VISIT. PROTOCOLS WERE FOLLOWED WITH PRECAUTIONS TO REDUCE THE SPREAD OF COVID-19. Patient verifies current dosing regimen: Yes     Warfarin medication reviewed and updated on the patient 's home medication list: Yes   All other medications reviewed and updated on the patient 's home medication list: Yes. No changes     Lab Results   Component Value Date    INR 2.50 2020    INR 3.6 2020    INR 4.00 2020       Patient Findings     Negatives:  Signs/symptoms of bleeding, Change in health, Missed doses, Change in medications, Change in diet/appetite, Bruising          Anticoagulation Summary  As of 2020    INR goal:  2.0-3.0   TTR:  52.8 % (9.5 y)   INR used for dosin.50 (2020)   Warfarin maintenance plan:  2.5 mg (5 mg x 0.5) every Wed; 5 mg (5 mg x 1) all other days   Weekly warfarin total:  32.5 mg   Plan last modified:  Puar Kennedy, [facility] (2020)   Next INR check:  2021   Priority:  Maintenance   Target end date: Indefinite    Indications    Abnormal coagulation profile [R79.1]             Anticoagulation Episode Summary     INR check location:      Preferred lab:      Send INR reminders to:  [facility]    Comments:  EPIC      Factor V Leiden       Anticoagulation Care Providers     Provider Role Specialty Phone number    Suleman Mckinney MD Referring Family Medicine [phone]          Warfarin plan:   Looks and feels well today. No changes in medications or diet. No bruising or bleeding noted. No change in dose for INR of  2.5. Will see hr in 4 weeks for next INR. Description      CONTINUE:  Warfarin 5mg (1 tablet) DAILY EXCEPT 2.5 mg(half a tablet) every [facility]    Keep your green vegetable intake consistent (3-4 salads per week)                  Immunization History   Administered Date(s) Administered    Influenza Virus Vaccine 10/03/2016    Influenza, High Dose (Fluzone 65 yrs and older) 09/27/2011, 10/01/2012, 10/29/2013, 10/23/2014, 11/24/2015, 10/23/2018, 12/21/2019    Influenza, Quadv, IM, PF (6 mo and older Fluzone, Flulaval, Fluarix, and 3 yrs and older Afluria) 12/22/2020    Pneumococcal Conjugate 13-valent (Tknrvcx36) 07/02/2015    Pneumococcal Polysaccharide (Bxapsejyz96) 07/01/2006, 11/07/2011    Td, unspecified formulation 10/04/2004    Tdap (Boostrix, Adacel) 09/01/2015       Immunization history reviewed and updated:  Yes   Influenza vaccine given:  Yes     Reviewed AVS with patient / caregiver.       CLINICAL PHARMACY CONSULT: MED RECONCILIATION/REVIEW ADDENDUM    For Pharmacy Admin Tracking Only    PHSO (orange banner): Yes  Total # of Interventions Recommended (warfarin changes): 0  (warfarin changes)   (other medication updates)- Updated Order #: 0 Updated Order Reason(s):   (#1 for reviewing INR) - Maintenance Safety Lab Monitoring #: 1  Total Interventions Accepted (warfarin related): 0  Time Spent (min) (round up): 15

## 2021-01-19 ENCOUNTER — ANTI-COAG VISIT (OUTPATIENT)
Dept: PHARMACY | Age: 81
End: 2021-01-19
Payer: MEDICARE

## 2021-01-19 VITALS — TEMPERATURE: 96.7 F

## 2021-01-19 DIAGNOSIS — R79.1 ABNORMAL COAGULATION PROFILE: ICD-10-CM

## 2021-01-19 LAB — INR BLD: 2.4

## 2021-01-19 PROCEDURE — 99211 OFF/OP EST MAY X REQ PHY/QHP: CPT

## 2021-01-19 PROCEDURE — 85610 PROTHROMBIN TIME: CPT

## 2021-01-19 NOTE — PROGRESS NOTES
Ms. Fuentes Butler is a 78 y.o.  female. Ms. Fuentes Butler had an INR test today. Results were reviewed and appropriate warfarin management was completed. THIS VISIT WAS COMPLETED AS:   []    A VIRTUAL VISIT VIA TELEPHONE IN EFFORTS TO REDUCE THE SPREAD OF COVID-19.  []    A DRIVE-THRU VISIT IN EFFORTS TO REDUCE THE SPREAD OF COVID-19. [x]    AN IN PERSON VISIT. PROTOCOLS WERE FOLLOWED WITH PRECAUTIONS TO REDUCE THE SPREAD OF COVID-19. Patient verifies current dosing regimen: Yes     Warfarin medication reviewed and updated on the patient 's home medication list: Yes   All other medications reviewed and updated on the patient 's home medication list: No: no changes     Lab Results   Component Value Date    INR 2.40 2021    INR 2.50 2020    INR 3.6 2020       Patient Findings     Negatives:  Signs/symptoms of bleeding, Missed doses, Change in medications, Change in diet/appetite, Bruising          Anticoagulation Summary  As of 2021    INR goal:  2.0-3.0   TTR:  53.2 % (9.5 y)   INR used for dosin.40 (2021)   Warfarin maintenance plan:  2.5 mg (5 mg x 0.5) every Wed; 5 mg (5 mg x 1) all other days   Weekly warfarin total:  32.5 mg   Plan last modified:  [facility] [address] [place] [address] (2020)   Next INR check:  2021   Priority:  Maintenance   Target end date: Indefinite    Indications    Abnormal coagulation profile [R79.1]             Anticoagulation Episode Summary     INR check location:      Preferred lab:      Send INR reminders to:  [facility]AFF    Comments:  EPIC      Factor V Leiden       Anticoagulation Care Providers     Provider Role Specialty Phone number    Stephanie Ribera MD Referring Family Medicine [phone]          Warfarin plan:   INR within goal range today, will continue on same dose and return in 4 weeks.     Description    CONTINUE:  Warfarin 5mg (1 tablet) DAILY EXCEPT 2.5 mg(half a tablet) every [facility] Keep your green vegetable intake consistent (3-4 salads per week)                  Immunization History   Administered Date(s) Administered    Influenza Virus Vaccine 10/03/2016    Influenza, High Dose (Fluzone 65 yrs and older) 09/27/2011, 10/01/2012, 10/29/2013, 10/23/2014, 11/24/2015, 10/23/2018, 12/21/2019    Influenza, Quadv, IM, PF (6 mo and older Fluzone, Flulaval, Fluarix, and 3 yrs and older Afluria) 12/22/2020    Pneumococcal Conjugate 13-valent (Dcznsbo99) 07/02/2015    Pneumococcal Polysaccharide (Aenadrogw88) 07/01/2006, 11/07/2011    Td, unspecified formulation 10/04/2004    Tdap (Boostrix, Adacel) 09/01/2015       Reviewed AVS with patient / caregiver.       CLINICAL PHARMACY CONSULT: MED RECONCILIATION/REVIEW ADDENDUM    For Pharmacy Admin Tracking Only    PHSO (orange banner): Yes  Total # of Interventions Recommended (warfarin changes): 0  (warfarin changes)   (other medication updates)- Updated Order #: 0 Updated Order Reason(s):   (#1 for reviewing INR) - Maintenance Safety Lab Monitoring #: 1  Total Interventions Accepted (warfarin related): 0  Time Spent (min) (round up): 15

## 2021-01-25 ENCOUNTER — TELEPHONE (OUTPATIENT)
Dept: DERMATOLOGY | Age: 81
End: 2021-01-25

## 2021-01-28 RX ORDER — GABAPENTIN 300 MG/1
CAPSULE ORAL
Qty: 60 CAPSULE | Refills: 0 | Status: SHIPPED | OUTPATIENT
Start: 2021-01-28 | End: 2021-02-26 | Stop reason: SDUPTHER

## 2021-01-28 NOTE — TELEPHONE ENCOUNTER
Pt states it is not bothering her, she will send picture    Her grandson is coming to help her set up her mychart and send it then

## 2021-01-28 NOTE — TELEPHONE ENCOUNTER
Refill done for 1 month - please make appointment - virtual okay in next few weeks or can do 2/26 w/ me in office

## 2021-01-29 ENCOUNTER — IMMUNIZATION (OUTPATIENT)
Dept: PRIMARY CARE CLINIC | Age: 81
End: 2021-01-29
Payer: MEDICARE

## 2021-01-29 PROCEDURE — 91300 COVID-19, PFIZER VACCINE 30MCG/0.3ML DOSE: CPT | Performed by: FAMILY MEDICINE

## 2021-01-29 PROCEDURE — 0001A COVID-19, PFIZER VACCINE 30MCG/0.3ML DOSE: CPT | Performed by: FAMILY MEDICINE

## 2021-02-19 ENCOUNTER — IMMUNIZATION (OUTPATIENT)
Dept: PRIMARY CARE CLINIC | Age: 81
End: 2021-02-19
Payer: MEDICARE

## 2021-02-19 PROCEDURE — 91300 COVID-19, PFIZER VACCINE 30MCG/0.3ML DOSE: CPT | Performed by: FAMILY MEDICINE

## 2021-02-19 PROCEDURE — 0002A COVID-19, PFIZER VACCINE 30MCG/0.3ML DOSE: CPT | Performed by: FAMILY MEDICINE

## 2021-02-23 ENCOUNTER — ANTI-COAG VISIT (OUTPATIENT)
Dept: PHARMACY | Age: 81
End: 2021-02-23
Payer: MEDICARE

## 2021-02-23 DIAGNOSIS — R79.1 ABNORMAL COAGULATION PROFILE: ICD-10-CM

## 2021-02-23 LAB — INR BLD: 2.7

## 2021-02-23 NOTE — PROGRESS NOTES
Ms. Comfort Arnold is a 78 y.o.  female. Ms. Comfort Arnold had an INR test today. Results were reviewed and appropriate warfarin management was completed. THIS VISIT WAS COMPLETED AS:   []    A VIRTUAL VISIT VIA TELEPHONE IN EFFORTS TO REDUCE THE SPREAD OF COVID-19.  []    A DRIVE-THRU VISIT IN EFFORTS TO REDUCE THE SPREAD OF COVID-19. [x]    AN IN PERSON VISIT. PROTOCOLS WERE FOLLOWED WITH PRECAUTIONS TO REDUCE THE SPREAD OF COVID-19. Patient verifies current dosing regimen: Yes     Warfarin medication reviewed and updated on the patient 's home medication list: Yes   All other medications reviewed and updated on the patient 's home medication list: No: No Change     Lab Results   Component Value Date    INR 2.70 2021    INR 2.40 2021    INR 2.50 2020       Patient Findings     Negatives:  Signs/symptoms of thrombosis, Signs/symptoms of bleeding, Missed doses, Extra doses, Change in medications, Change in diet/appetite, Bruising          Anticoagulation Summary  As of 2021    INR goal:  2.0-3.0   TTR:  53.6 % (9.6 y)   INR used for dosin.70 (2021)   Warfarin maintenance plan:  2.5 mg (5 mg x 0.5) every Tue; 5 mg (5 mg x 1) all other days   Weekly warfarin total:  32.5 mg   Plan last modified:  Dimitry Downs (2021)   Next INR check:  3/23/2021   Priority:  Maintenance   Target end date:   Indefinite    Indications    Abnormal coagulation profile [R79.1]             Anticoagulation Episode Summary     INR check location:      Preferred lab:      Send INR reminders to:  [facility]AFF    Comments:  EPIC      Factor V Leiden       Anticoagulation Care Providers     Provider Role Specialty Phone number    Christian Barnard MD Referring Family Medicine [phone]          Warfarin assessment / plan: Patient appears well. Patient denies any change in medication and diet. Patient instructed to continue current warfarin regimen based on INR of 2.7. Follow-up scheduled 4 weeks. Description    CONTINUE:  Warfarin 5mg (1 tablet) DAILY EXCEPT 2.5 mg(half a tablet) every [facility]    Keep your green vegetable intake consistent (3-4 salads per week)                  Immunization History   Administered Date(s) Administered    COVID-19, Pfizer, 30mcg/0.3ml Dose 01/29/2021, 02/19/2021    Influenza Virus Vaccine 10/16/2009, 01/26/2010, 09/29/2010, 10/03/2016    Influenza, High Dose (Fluzone 65 yrs and older) 09/27/2011, 10/01/2012, 10/29/2013, 10/23/2014, 11/24/2015, 10/23/2018, 12/21/2019    Influenza, Quadv, IM, PF (6 mo and older Fluzone, Flulaval, Fluarix, and 3 yrs and older Afluria) 12/22/2020    Pneumococcal Conjugate 13-valent (Oibrncd21) 07/02/2015    Pneumococcal Polysaccharide (Isuwbaicv16) 07/01/2006, 11/07/2011    Td, unspecified formulation 10/04/2004    Tdap (Boostrix, Adacel) 09/01/2015     Reviewed AVS with patient / caregiver.       CLINICAL PHARMACY CONSULT: MED RECONCILIATION/REVIEW ADDENDUM    For Pharmacy Admin Tracking Only    PHSO (orange banner): Yes  Total # of Interventions Recommended (warfarin changes): 0  (#1 for reviewing INR) - Maintenance Safety Lab Monitoring #: 1  Total Interventions Accepted (warfarin related): 0  Time Spent (min) (round up): 15

## 2021-02-24 PROCEDURE — 85610 PROTHROMBIN TIME: CPT

## 2021-02-24 PROCEDURE — 99211 OFF/OP EST MAY X REQ PHY/QHP: CPT

## 2021-02-24 RX ORDER — WARFARIN SODIUM 5 MG/1
5 TABLET ORAL DAILY
COMMUNITY
End: 2021-03-08

## 2021-02-26 ENCOUNTER — OFFICE VISIT (OUTPATIENT)
Dept: FAMILY MEDICINE CLINIC | Age: 81
End: 2021-02-26
Payer: MEDICARE

## 2021-02-26 VITALS
BODY MASS INDEX: 31.8 KG/M2 | SYSTOLIC BLOOD PRESSURE: 118 MMHG | DIASTOLIC BLOOD PRESSURE: 74 MMHG | TEMPERATURE: 96 F | WEIGHT: 162 LBS | HEIGHT: 60 IN

## 2021-02-26 DIAGNOSIS — G47.00 INSOMNIA, UNSPECIFIED TYPE: ICD-10-CM

## 2021-02-26 DIAGNOSIS — D68.51 FACTOR V LEIDEN MUTATION (HCC): ICD-10-CM

## 2021-02-26 DIAGNOSIS — C91.11 CHRONIC LYMPHOCYTIC LEUKEMIA IN REMISSION (HCC): ICD-10-CM

## 2021-02-26 DIAGNOSIS — B02.23 ACUTE HERPES ZOSTER NEUROPATHY: Primary | ICD-10-CM

## 2021-02-26 PROCEDURE — 4040F PNEUMOC VAC/ADMIN/RCVD: CPT | Performed by: FAMILY MEDICINE

## 2021-02-26 PROCEDURE — G8482 FLU IMMUNIZE ORDER/ADMIN: HCPCS | Performed by: FAMILY MEDICINE

## 2021-02-26 PROCEDURE — G8400 PT W/DXA NO RESULTS DOC: HCPCS | Performed by: FAMILY MEDICINE

## 2021-02-26 PROCEDURE — G8427 DOCREV CUR MEDS BY ELIG CLIN: HCPCS | Performed by: FAMILY MEDICINE

## 2021-02-26 PROCEDURE — 1036F TOBACCO NON-USER: CPT | Performed by: FAMILY MEDICINE

## 2021-02-26 PROCEDURE — 1090F PRES/ABSN URINE INCON ASSESS: CPT | Performed by: FAMILY MEDICINE

## 2021-02-26 PROCEDURE — 1123F ACP DISCUSS/DSCN MKR DOCD: CPT | Performed by: FAMILY MEDICINE

## 2021-02-26 PROCEDURE — 99214 OFFICE O/P EST MOD 30 MIN: CPT | Performed by: FAMILY MEDICINE

## 2021-02-26 PROCEDURE — G8417 CALC BMI ABV UP PARAM F/U: HCPCS | Performed by: FAMILY MEDICINE

## 2021-02-26 RX ORDER — GABAPENTIN 300 MG/1
CAPSULE ORAL
Qty: 60 CAPSULE | Refills: 5 | Status: SHIPPED | OUTPATIENT
Start: 2021-02-26 | End: 2021-03-01

## 2021-02-26 SDOH — ECONOMIC STABILITY: FOOD INSECURITY: WITHIN THE PAST 12 MONTHS, YOU WORRIED THAT YOUR FOOD WOULD RUN OUT BEFORE YOU GOT MONEY TO BUY MORE.: NEVER TRUE

## 2021-02-26 SDOH — ECONOMIC STABILITY: INCOME INSECURITY: HOW HARD IS IT FOR YOU TO PAY FOR THE VERY BASICS LIKE FOOD, HOUSING, MEDICAL CARE, AND HEATING?: NOT HARD AT ALL

## 2021-02-26 SDOH — ECONOMIC STABILITY: FOOD INSECURITY: WITHIN THE PAST 12 MONTHS, THE FOOD YOU BOUGHT JUST DIDN'T LAST AND YOU DIDN'T HAVE MONEY TO GET MORE.: NEVER TRUE

## 2021-02-26 SDOH — ECONOMIC STABILITY: TRANSPORTATION INSECURITY
IN THE PAST 12 MONTHS, HAS THE LACK OF TRANSPORTATION KEPT YOU FROM MEDICAL APPOINTMENTS OR FROM GETTING MEDICATIONS?: NO

## 2021-02-26 SDOH — ECONOMIC STABILITY: TRANSPORTATION INSECURITY
IN THE PAST 12 MONTHS, HAS LACK OF TRANSPORTATION KEPT YOU FROM MEETINGS, WORK, OR FROM GETTING THINGS NEEDED FOR DAILY LIVING?: NO

## 2021-02-26 NOTE — PROGRESS NOTES
[facility] Medicine  Clinic Note    Date: 2/26/2021                                               Subjective:     Chief Complaint   Patient presents with    Other     CHRONIC CONDITION UPDATE     HPI  USING 600 GABAPENTIN AT NIGHT - WORKING WELL FOR NEUROPATHY PAIN  Waking 0400 recently - hard to get back to sleep. Frustrated w/ isolation - goes to [employer] on Sunday. Goes to bed late. O/w no se's w/ gabapentin - on it for awhile  Not sleeping during day  Shingles neuropathy left lower lateral rib cage  A lot of stress in family - granddaughter age 39 w/ young kids - dying brain ca  Got covid vaccine - arm soreness 2nd shot -ow did okay  Sees oncology regularly - lab work good recently  INR stable per pharmacy mgmt - on 10mg/d w/ 5mg one day right now  No bleeding/ bruising issues. Took melatonin  Caring for  w/ urinary incontinence/ fall risk - hard to manage. 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 07/24/2006     Priority: High    Osteoporosis,Dexas:10/18/02 (Lumbar spine -1.52), hip -0.55 (T scores), Actonel, then fosamax;  Dexa 1/25/06 (Lumbar spine -1.1 and hip -0.5)      Priority: Medium    Factor V Leiden mutation ([facility] [address]      Priority: Medium    Impaired fasting glucose 11/01/2011     Priority: Medium    Vitamin D deficiency 11/01/2011     Priority: Medium    Post herpetic neuralgia      Priority: Low    Osteoarthritis      Priority: Low    Goiter, 1.5 cm midline 11/01/2011     Priority: Low    History of basal cell carcinoma 08/20/2019    Seborrheic keratoses, inflamed 08/20/2019    Basal cell carcinoma of right medial nasolabial fold 03/12/2019    Basal cell carcinoma of left temple region 03/12/2019    Neoplasm of uncertain behavior of skin 01/21/2019    History of squamous cell carcinoma of skin 01/21/2019  Arthritis of knee 2016    Hyperuricemia 2016    Encounter for care related to Port-a-Cath 2016    Encounter for follow-up examination after completed treatment for cancer 01/15/2016    Abnormal coagulation profile 2015    B12 deficiency 11/10/2014    Small cleaved cell (diffuse) NHL ([facility] [address] 10/17/2014     Past Medical History:   Diagnosis Date    Chronic lymphocytic leukemia in remission ([facility] [address]     CLL (chronic lymphocytic leukemia) ([facility] [address] 2015    COPD (chronic obstructive pulmonary disease) ([facility] [address]     Essential hypertension     controlled with salt restriction (initially)    Factor V Leiden mutation ([facility] [address]     Goiter 2011    1.5 cm midline    Hypercholesterolemia     Impaired fasting glucose 2011    Osteoarthritis     Osteoporosis     Post herpetic neuralgia     Vitamin D deficiency 2011        Past Surgical History:   Procedure Laterality Date    CATARACT REMOVAL WITH IMPLANT  12    left eye    JOINT REPLACEMENT      partial knee R and L    KNEE SURGERY      partial replacements, both knees    MOHS SURGERY  2019    R cheek and R superior temple    SPLENECTOMY      TUNNELED VENOUS PORT PLACEMENT      UPPER GASTROINTESTINAL ENDOSCOPY N/A 2019    EGD ESOPHAGOGASTRODUODENOSCOPY, WITH ENDOSCOPIC ULTRASOUND OF ESOPHAGUS performed by Reba Guerra MD at [address] [place] [address]     Anti-coag visit on 2021   Component Date Value Ref Range Status    INR 2021 2.70   Final     Family History   Problem Relation Age of Onset    Diabetes Father     Stroke Sister 50         of a stroke     Current Outpatient Medications   Medication Sig Dispense Refill    warfarin (COUMADIN) 5 MG tablet Take 5 mg by mouth daily EXCEPT 2.5mg every Wednesday or as directed by [facility] Coumadin Service [phone]      gabapentin (NEURONTIN) 300 MG capsule TAKE 1 TO 2 CAPSULES BY MOUTH EVERY NIGHT AS DIRECTED 60 capsule 0  Cyanocobalamin (VITAMIN B 12) 500 MCG TABS Take 1 tablet by mouth daily       No current facility-administered medications for this visit. No Known Allergies    Review of Systems    Objective:  /74 (Site: Left Upper Arm, Position: Sitting, Cuff Size: Medium Adult)   Temp 96 °F (35.6 °C) (Infrared)   Ht 5' (1.524 m)   Wt 162 lb (73.5 kg)   LMP  (Exact Date)   Breastfeeding No   BMI 31.64 kg/m²     BP Readings from Last 3 Encounters:   02/26/21 118/74   01/31/20 118/70   07/23/19 118/74       Pulse Readings from Last 3 Encounters:   01/31/20 76   07/23/19 62   05/30/19 75       Wt Readings from Last 3 Encounters:   02/26/21 162 lb (73.5 kg)   01/31/20 165 lb (74.8 kg)   07/23/19 154 lb (69.9 kg)       Physical Exam  Constitutional:       Appearance: Normal appearance. HENT:      Head: Normocephalic. Eyes:      Conjunctiva/sclera: Conjunctivae normal.   Cardiovascular:      Rate and Rhythm: Normal rate and regular rhythm. Pulmonary:      Effort: Pulmonary effort is normal.      Breath sounds: Normal breath sounds. Abdominal:      General: There is no distension. Palpations: Abdomen is soft. Tenderness: There is no abdominal tenderness. Musculoskeletal:         General: No swelling. Neurological:      Mental Status: She is alert. Psychiatric:         Mood and Affect: Mood normal.         Assessment/Plan:   There are no diagnoses linked to this encounter. Diagnosis Orders   1. Acute herpes zoster neuropathy     2. Factor V Leiden mutation ([facility] [address]     3. Chronic lymphocytic leukemia in remission     4.  Insomnia, unspecified type       Cont gabapentin 600 nightly - working well for neuropathy pain = refill done  Cont warfarin per pharm mgmt - for factor 5 - stable - tolerating well  cll per oncology monitoring  Sleep hygiene dw/ pt - wants to hold on pharm rx - will try benadryl, melatonin and/or sleepy time tea Dw/ pt stressors - coping w/  at length - will try to get help for care of . No follow-ups on file.     Nubia Coronel DO  2/26/2021  1:55 PM

## 2021-03-01 RX ORDER — GABAPENTIN 300 MG/1
CAPSULE ORAL
Qty: 60 CAPSULE | Refills: 5 | Status: SHIPPED | OUTPATIENT
Start: 2021-03-01 | End: 2022-01-18

## 2021-03-08 RX ORDER — WARFARIN SODIUM 5 MG/1
TABLET ORAL
Qty: 115 TABLET | Refills: 1 | Status: ON HOLD | OUTPATIENT
Start: 2021-03-08 | End: 2021-10-22

## 2021-03-23 ENCOUNTER — OFFICE VISIT (OUTPATIENT)
Dept: DERMATOLOGY | Age: 81
End: 2021-03-23
Payer: MEDICARE

## 2021-03-23 VITALS — TEMPERATURE: 98.2 F

## 2021-03-23 DIAGNOSIS — L81.4 SOLAR LENTIGINOSIS: ICD-10-CM

## 2021-03-23 DIAGNOSIS — C44.529 SQUAMOUS CELL CARCINOMA OF SKIN OF CHEST: Primary | ICD-10-CM

## 2021-03-23 DIAGNOSIS — L57.8 ACTINODERMATOSIS: ICD-10-CM

## 2021-03-23 PROCEDURE — 1123F ACP DISCUSS/DSCN MKR DOCD: CPT | Performed by: DERMATOLOGY

## 2021-03-23 PROCEDURE — 4040F PNEUMOC VAC/ADMIN/RCVD: CPT | Performed by: DERMATOLOGY

## 2021-03-23 PROCEDURE — G8417 CALC BMI ABV UP PARAM F/U: HCPCS | Performed by: DERMATOLOGY

## 2021-03-23 PROCEDURE — 1036F TOBACCO NON-USER: CPT | Performed by: DERMATOLOGY

## 2021-03-23 PROCEDURE — G8400 PT W/DXA NO RESULTS DOC: HCPCS | Performed by: DERMATOLOGY

## 2021-03-23 PROCEDURE — 1090F PRES/ABSN URINE INCON ASSESS: CPT | Performed by: DERMATOLOGY

## 2021-03-23 PROCEDURE — 99213 OFFICE O/P EST LOW 20 MIN: CPT | Performed by: DERMATOLOGY

## 2021-03-23 PROCEDURE — G8482 FLU IMMUNIZE ORDER/ADMIN: HCPCS | Performed by: DERMATOLOGY

## 2021-03-23 PROCEDURE — G8427 DOCREV CUR MEDS BY ELIG CLIN: HCPCS | Performed by: DERMATOLOGY

## 2021-03-23 NOTE — PROGRESS NOTES
Patient's Name: Frances Cyr  MRN: [id number]  YOB: 1940  Date of Visit: 3/23/2021  Primary Care Provider: Twin Maya MD  Referring Provider: No ref. provider found    Subjective:   Chief Complaint:   Follow-up (lesion on upper central, sore, scabs over,, and very itchy )      History of Present Illness: Frances Cyr is a 78 y.o. female with CLL in remission and h/o NMSC who presents in clinic today for a follow up on scc on her upper chest.  Patient was supposed to have been scheduled for an excision, however she declined stating that she is not able to drive in the winter and prior to that she was traveling. She presents today reporting the lesion not healing. She reports it is tender to touch and scaly. Past Dermatologic History:  Personal history of non melanoma skin cancer: yes,  basal cell carcinoma, squamous cell carcinoma and actinic keratosis   Personal history of melanoma: no  Personal history of abnormal/dysplastic moles: no  Personal history of tanning bed use current: No  Personal history of tanning bed use: Yes  Personal history of blistering sunburns: Yes  Personal history of extensive sun exposure: No  Burns easily: No  Practicing sun protective habits:  Yes using  sunscreen SPF  30-50        Past medical and surgical histories, current medications, allergies, social and family histories were reviewed with no change since the last visit        Allergies:  No Known Allergies    Current Medications:  Current Outpatient Medications   Medication Sig Dispense Refill    warfarin (COUMADIN) 5 MG tablet TAKE 1 1/2 TABLETS BY MOUTH MONDAY AND FRIDAY, THEN 1 TABLET BY MOUTH DAILY ON THE OTHER DAYS 115 tablet 1    gabapentin (NEURONTIN) 300 MG capsule TAKE 1 TO 2 CAPSULES BY MOUTH EVERY NIGHT AS DIRECTED 60 capsule 5    Cyanocobalamin (VITAMIN B 12) 500 MCG TABS Take 1 tablet by mouth daily       No current facility-administered medications for this visit.         Review of Systems:  Constitutional: No fevers, chills or recent illness. Skin: Skin:As per HPI AND otherwise no new, bleeding or symptomatic skin lesions      Objective:     Vitals:    03/23/21 1143   Temp: 98.2 °F (36.8 °C)   TempSrc: Temporal       Physical Examination:  General: alert, comfortable, no apparent distress, well-appearing  Psych: alert, oriented and pleasant  Neuro: oriented to person, place, and time  Skin: Areas examined: head including face, lips, conjunctiva and lids, neck, hair/scalp, chest, including breasts and axilla, left hand, right hand and digits and nails      All areas examined were within normal limits except those listed below with the appropriate assessment and plan    Assessment and Plan (with relevant objective exam findings):     1. Squamous cell carcinoma  Location/objective findings: upper central chest with an indurated hyperkeratotic plaque. - Discussed the need for an excision, however patient reported she will be traveling to visit her grand daughter who is terminal brain cancer. She will be back end of April and would like to schedule after that. - Will schedule excision    2. Solar Lentigo  Location: sun exposed areas, most prominently on the dorsal hands, face, forearms, neck, shoulders and upper chest      Objective: Numerous lacy brown macules ranging in size from 2-10 mm diameter. The lentigines seen today are benign in character, caused by the sun, and do not require treatment. However, they do occasionally transform into a malignancy, so the patient needs to monitor for changes. They were educated on what a suspicious change would include, change in size or color, and included education on the ABCDs of melanoma. 3. Actinodermatosis  In all photo-exposed areas there were numerous light brown, lacy, 3-6 mm macules and some mottled hypo- and hyperpigmentation. This was most prominent on the face, anterior chest, and shoulders.      The patient was reassured that these

## 2021-03-25 ENCOUNTER — ANTI-COAG VISIT (OUTPATIENT)
Dept: PHARMACY | Age: 81
End: 2021-03-25
Payer: MEDICARE

## 2021-03-25 DIAGNOSIS — R79.1 ABNORMAL COAGULATION PROFILE: ICD-10-CM

## 2021-03-25 LAB — INTERNATIONAL NORMALIZATION RATIO, POC: 2.2

## 2021-03-25 PROCEDURE — 99211 OFF/OP EST MAY X REQ PHY/QHP: CPT

## 2021-03-25 PROCEDURE — 85610 PROTHROMBIN TIME: CPT

## 2021-03-25 NOTE — PROGRESS NOTES
Ms. Nadia Marquez is a 78 y.o.  female. Ms. Nadia Marquez had an INR test today. Results were reviewed and appropriate warfarin management was completed. THIS VISIT WAS COMPLETED AS:   []    A VIRTUAL VISIT VIA TELEPHONE IN EFFORTS TO REDUCE THE SPREAD OF COVID-19.  []    A DRIVE-THRU VISIT IN EFFORTS TO REDUCE THE SPREAD OF COVID-19. [x]    AN IN PERSON VISIT. PROTOCOLS WERE FOLLOWED WITH PRECAUTIONS TO REDUCE THE SPREAD OF COVID-19. Patient verifies current dosing regimen: Yes     Warfarin medication reviewed and updated on the patient 's home medication list: Yes   All other medications reviewed and updated on the patient 's home medication list: Yes     Lab Results   Component Value Date    INR 2.2 2021    INR 2.70 2021    INR 2.40 2021           Anticoagulation Summary  As of 3/25/2021    INR goal:  2.0-3.0   TTR:  54.0 % (9.7 y)   INR used for dosin.2 (3/25/2021)   Warfarin maintenance plan:  2.5 mg (5 mg x 0.5) every Tue; 5 mg (5 mg x 1) all other days   Weekly warfarin total:  32.5 mg   Plan last modified:  Elijah Natalie (2021)   Next INR check:  2021   Priority:  Maintenance   Target end date:   Indefinite    Indications    Abnormal coagulation profile [R79.1]             Anticoagulation Episode Summary     INR check location:      Preferred lab:      Send INR reminders to:  [address] [place] [address]    Comments:  [facility]      Factor V Leiden       Anticoagulation Care Providers     Provider Role Specialty Phone number    Gomez Mixon MD Referring Family Medicine [phone]          Warfarin assessment / plan:   Continue same see in 4 weeks    Description    CONTINUE:  Warfarin 5mg (1 tablet) DAILY EXCEPT 2.5 mg(half a tablet) every [facility]    Keep your green vegetable intake consistent (3-4 salads per week)                  Immunization History   Administered Date(s) Administered    COVID-19, Agee Peter, PF, 30mcg/0.3mL 2021, 2021    Influenza Virus Vaccine 10/16/2009, 01/26/2010, 09/29/2010, 10/03/2016    Influenza, High Dose (Fluzone 65 yrs and older) 09/27/2011, 10/01/2012, 10/29/2013, 10/23/2014, 11/24/2015, 10/23/2018, 12/21/2019    Influenza, Quadv, IM, PF (6 mo and older Fluzone, Flulaval, Fluarix, and 3 yrs and older Afluria) 12/22/2020    Pneumococcal Conjugate 13-valent ([id number]) 07/02/2015    Pneumococcal Polysaccharide ([id number]) 07/01/2006, 11/07/2011    Td, unspecified formulation 10/04/2004    Tdap (Boostrix, Adacel) 09/01/2015         Reviewed AVS with patient / caregiver.       CLINICAL PHARMACY CONSULT: MED RECONCILIATION/REVIEW ADDENDUM    For Pharmacy Admin Tracking Only    PHSO (orange banner): Yes  Total # of Interventions Recommended (warfarin changes): 0  (warfarin changes)   (other medication updates)- Updated Order #: 0 Updated Order Reason(s):   (#1 for reviewing INR) - Maintenance Safety Lab Monitoring #: 1  Total Interventions Accepted (warfarin related): 0  Time Spent (min) (round up): 15

## 2021-04-19 ENCOUNTER — TELEPHONE (OUTPATIENT)
Dept: DERMATOLOGY | Age: 81
End: 2021-04-19

## 2021-04-19 NOTE — TELEPHONE ENCOUNTER
Patient is requesting a return call re/ scheduling an appt to take off a spot on her upper chest. Please advise. Thank you!

## 2021-04-20 ENCOUNTER — OFFICE VISIT (OUTPATIENT)
Dept: FAMILY MEDICINE CLINIC | Age: 81
End: 2021-04-20
Payer: MEDICARE

## 2021-04-20 VITALS
SYSTOLIC BLOOD PRESSURE: 118 MMHG | HEIGHT: 60 IN | DIASTOLIC BLOOD PRESSURE: 60 MMHG | OXYGEN SATURATION: 92 % | WEIGHT: 162 LBS | HEART RATE: 70 BPM | BODY MASS INDEX: 31.8 KG/M2

## 2021-04-20 DIAGNOSIS — H61.23 BILATERAL IMPACTED CERUMEN: ICD-10-CM

## 2021-04-20 DIAGNOSIS — R73.01 IMPAIRED FASTING GLUCOSE: ICD-10-CM

## 2021-04-20 DIAGNOSIS — Z00.00 ROUTINE GENERAL MEDICAL EXAMINATION AT A HEALTH CARE FACILITY: Primary | ICD-10-CM

## 2021-04-20 DIAGNOSIS — R73.03 PREDIABETES: ICD-10-CM

## 2021-04-20 DIAGNOSIS — E78.2 MIXED HYPERLIPIDEMIA: ICD-10-CM

## 2021-04-20 DIAGNOSIS — R79.89 ELEVATED LFTS: ICD-10-CM

## 2021-04-20 DIAGNOSIS — Z71.89 ACP (ADVANCE CARE PLANNING): ICD-10-CM

## 2021-04-20 DIAGNOSIS — N30.01 ACUTE CYSTITIS WITH HEMATURIA: ICD-10-CM

## 2021-04-20 LAB
A/G RATIO: 2 (ref 1.1–2.2)
ALBUMIN SERPL-MCNC: 4.1 G/DL (ref 3.4–5)
ALP BLD-CCNC: 101 U/L (ref 40–129)
ALT SERPL-CCNC: 23 U/L (ref 10–40)
ANION GAP SERPL CALCULATED.3IONS-SCNC: 9 MMOL/L (ref 3–16)
AST SERPL-CCNC: 23 U/L (ref 15–37)
BILIRUB SERPL-MCNC: 0.4 MG/DL (ref 0–1)
BILIRUBIN, POC: ABNORMAL
BLOOD URINE, POC: ABNORMAL
BUN BLDV-MCNC: 9 MG/DL (ref 7–20)
CALCIUM SERPL-MCNC: 8.5 MG/DL (ref 8.3–10.6)
CHLORIDE BLD-SCNC: 105 MMOL/L (ref 99–110)
CHOLESTEROL, TOTAL: 259 MG/DL (ref 0–199)
CLARITY, POC: ABNORMAL
CO2: 28 MMOL/L (ref 21–32)
COLOR, POC: YELLOW
CREAT SERPL-MCNC: 0.7 MG/DL (ref 0.6–1.2)
ESTIMATED AVERAGE GLUCOSE: 131.2 MG/DL
GFR AFRICAN AMERICAN: >60
GFR NON-AFRICAN AMERICAN: >60
GLOBULIN: 2.1 G/DL
GLUCOSE BLD-MCNC: 105 MG/DL (ref 70–99)
GLUCOSE URINE, POC: ABNORMAL
HBA1C MFR BLD: 6.2 %
HDLC SERPL-MCNC: 55 MG/DL (ref 40–60)
KETONES, POC: ABNORMAL
LDL CHOLESTEROL CALCULATED: 178 MG/DL
LEUKOCYTE EST, POC: ABNORMAL
NITRITE, POC: ABNORMAL
PH, POC: 7
POTASSIUM SERPL-SCNC: 4.4 MMOL/L (ref 3.5–5.1)
PROTEIN, POC: 30
SODIUM BLD-SCNC: 142 MMOL/L (ref 136–145)
SPECIFIC GRAVITY, POC: >=1.03
TOTAL PROTEIN: 6.2 G/DL (ref 6.4–8.2)
TRIGL SERPL-MCNC: 128 MG/DL (ref 0–150)
UROBILINOGEN, POC: 0.2
VLDLC SERPL CALC-MCNC: 26 MG/DL

## 2021-04-20 PROCEDURE — 1123F ACP DISCUSS/DSCN MKR DOCD: CPT | Performed by: NURSE PRACTITIONER

## 2021-04-20 PROCEDURE — 4040F PNEUMOC VAC/ADMIN/RCVD: CPT | Performed by: NURSE PRACTITIONER

## 2021-04-20 PROCEDURE — G0438 PPPS, INITIAL VISIT: HCPCS | Performed by: NURSE PRACTITIONER

## 2021-04-20 PROCEDURE — 36415 COLL VENOUS BLD VENIPUNCTURE: CPT | Performed by: NURSE PRACTITIONER

## 2021-04-20 PROCEDURE — 69210 REMOVE IMPACTED EAR WAX UNI: CPT | Performed by: NURSE PRACTITIONER

## 2021-04-20 PROCEDURE — 99497 ADVNCD CARE PLAN 30 MIN: CPT | Performed by: NURSE PRACTITIONER

## 2021-04-20 PROCEDURE — 81002 URINALYSIS NONAUTO W/O SCOPE: CPT | Performed by: NURSE PRACTITIONER

## 2021-04-20 RX ORDER — SULFAMETHOXAZOLE AND TRIMETHOPRIM 800; 160 MG/1; MG/1
1 TABLET ORAL 2 TIMES DAILY
Qty: 10 TABLET | Refills: 0 | Status: SHIPPED | OUTPATIENT
Start: 2021-04-20 | End: 2021-04-25

## 2021-04-20 ASSESSMENT — ENCOUNTER SYMPTOMS
CHEST TIGHTNESS: 0
NAUSEA: 0
CONSTIPATION: 0
COUGH: 0
COLOR CHANGE: 0
SINUS PAIN: 0
ABDOMINAL DISTENTION: 0
ABDOMINAL PAIN: 0
SINUS PRESSURE: 0
BACK PAIN: 0
DIARRHEA: 0
EYE DISCHARGE: 0
SHORTNESS OF BREATH: 0

## 2021-04-20 ASSESSMENT — LIFESTYLE VARIABLES: HOW OFTEN DO YOU HAVE A DRINK CONTAINING ALCOHOL: 0

## 2021-04-20 ASSESSMENT — PATIENT HEALTH QUESTIONNAIRE - PHQ9
SUM OF ALL RESPONSES TO PHQ9 QUESTIONS 1 & 2: 0
1. LITTLE INTEREST OR PLEASURE IN DOING THINGS: 0

## 2021-04-20 NOTE — PATIENT INSTRUCTIONS
Ask Dr Brenton Taylor about  Vaccines:  Meningococcal (ACWY) series  Hib (H influenza) series  Meningococcal B series      Call insurance company to discuss coverage for shingles vaccine (Shingrix) 2 dose series     Avoid/limit 4 Cs- carbonation, caffeine, citrus, and chocolate as these are bladder irritants    Urinate before and after sexual intercourse    Push fluids, water is best    Wipe front to back    Use mild unscented soap for josé area    Avoid bubble baths, keep baths short    Minimize or avoid hot tub use    Take antibiotic in its entirety even if feeling better       Advance Directives: Care Instructions  Overview  An advance directive is a legal way to state your wishes at the end of your life. It tells your family and your doctor what to do if you can't say what you want. There are two main types of advance directives. You can change them any time your wishes change. Living will. This form tells your family and your doctor your wishes about life support and other treatment. The form is also called a declaration. Medical power of . This form lets you name a person to make treatment decisions for you when you can't speak for yourself. This person is called a health care agent (health care proxy, health care surrogate). The form is also called a durable power of  for health care. If you do not have an advance directive, decisions about your medical care may be made by a family member, or by a doctor or a  who doesn't know you. It may help to think of an advance directive as a gift to the people who care for you. If you have one, they won't have to make tough decisions by themselves. Follow-up care is a key part of your treatment and safety. Be sure to make and go to all appointments, and call your doctor if you are having problems. It's also a good idea to know your test results and keep a list of the medicines you take. What should you include in an advance directive?   Many states have a unique advance directive form. (It may ask you to address specific issues.) Or you might use a universal form that's approved by many states. If your form doesn't tell you what to address, it may be hard to know what to include in your advance directive. Use the questions below to help you get started. · Who do you want to make decisions about your medical care if you are not able to? · What life-support measures do you want if you have a serious illness that gets worse over time or can't be cured? · What are you most afraid of that might happen? (Maybe you're afraid of having pain, losing your independence, or being kept alive by machines.)  · Where would you prefer to die? (Your home? A hospital? A nursing home?)  · Do you want to donate your organs when you die? · Do you want certain Orthodox practices performed before you die? When should you call for help? Be sure to contact your doctor if you have any questions. Where can you learn more? Go to https://Communication Intelligence.authorGEN. org and sign in to your BloggersBase account. Enter R264 in the Maven box to learn more about \"Advance Directives: Care Instructions. \"     If you do not have an account, please click on the \"Sign Up Now\" link. Current as of: July 17, 2020               Content Version: 12.8  © 7195-6417 Healthwise, Incorporated. Care instructions adapted under license by [facility] ([facility]). If you have questions about a medical condition or this instruction, always ask your healthcare professional. [place] [address] any warranty or liability for your use of this information. Learning About Medical Power of   What is a medical power of ? A medical power of , also called a durable power of  for health care, is one type of the legal forms called advance directives.  It lets you name the person you want to make treatment decisions for you if you can't speak or agent? You name your health care agent on a legal form. This form is usually called a medical power of . Ask your hospital, state bar association, or office on aging where to find these forms. You must sign the form to make it legal. Some states require you to get the form notarized. This means that a person called a  watches you sign the form and then he or she signs the form. Some states also require that two or more witnesses sign the form. Be sure to tell your family members and doctors who your health care agent is. Where can you learn more? Go to https://chpepiceweb.[email or web address] [email or web address] org and sign in to your Collecta account. Enter [id number] in the Seek & Adore box to learn more about \"Learning About [address]. \"     If you do not have an account, please click on the \"Sign Up Now\" link. Current as of: July 17, 2020               Content Version: 12.8  © 2550-8462 iPositioning. Care instructions adapted under license by [facility] ([facility]). If you have questions about a medical condition or this instruction, always ask your healthcare professional. [address] any warranty or liability for your use of this information. Learning About Living Alpesh Friedman  What is a living will? A living will, also called a declaration, is a legal form. It tells your family and your doctor your wishes when you can't speak for yourself. It's used by the health professionals who will treat you as you near the end of your life or if you get seriously hurt or ill. If you put your wishes in writing, your loved ones and others will know what kind of care you want. They won't need to guess. This can ease your mind and be helpful to others. And you can change or cancel your living will at any time. A living will is not the same as an estate or property will. An estate will explains what you want to happen with your money and property after you die.   How do you use it? A living will is used to describe the kinds of treatment or life support you want as you near the end of your life or if you get seriously hurt or ill. Keep these facts in mind about living solis. · Your living will is used only if you can't speak or make decisions for yourself. Most often, one or more doctors must certify that you can't speak or decide for yourself before your living will takes effect. · If you get better and can speak for yourself again, you can accept or refuse any treatment. It doesn't matter what you said in your living will. · Some states may limit your right to refuse treatment in certain cases. For example, you may need to clearly state in your living will that you don't want artificial hydration and nutrition, such as being fed through a tube. Is a living will a legal document? A living will is a legal document. Each state has its own laws about living solis. And a living will may be called something else in your state. Here are some things to know about living solis. · You don't need an  to complete a living will. But legal advice can be helpful if your state's laws are unclear. It can also help if your health history is complicated or your family can't agree on what should be in your living will. · You can change your living will at any time. Some people find that their wishes about end-of-life care change as their health changes. If you make big changes to your living will, complete a new form. · If you move to another state, make sure that your living will is legal in the state where you now live. In most cases, doctors will respect your wishes even if you have a form from a different state. · You might use a universal form that has been approved by many states. This kind of form can sometimes be filled out and stored online. Your digital copy will then be available wherever you have a connection to the internet.  The doctors and nurses who need to treat you can find it right away. · Your state may offer an online registry. This is another place where you can store your living will online. · It's a good idea to get your living will notarized. This means using a person called a  to watch two people sign, or witness, your living will. What should you know when you create a living will? Here are some questions to ask yourself as you make your living will:  · Do you know enough about life support methods that might be used? If not, talk to your doctor so you know what might be done if you can't breathe on your own, your heart stops, or you can't swallow. · What things would you still want to be able to do after you receive life-support methods? Would you want to be able to walk? To speak? To eat on your own? To live without the help of machines? · Do you want certain Zoroastrian practices performed if you become very ill? · If you have a choice, where do you want to be cared for? In your home? At a hospital or nursing home? · If you have a choice at the end of your life, where would you prefer to die? At home? In a hospital or nursing home? Somewhere else? · Would you prefer to be buried or cremated? · Do you want your organs to be donated after you die? What should you do with your living will? · Make sure that your family members and your health care agent have copies of your living will (also called a declaration). · Give your doctor a copy of your living will. Ask him or her to keep it as part of your medical record. If you have more than one doctor, make sure that each one has a copy. · Put a copy of your living will where it can be easily found. For example, some people may put a copy on their refrigerator door. If you are using a digital copy, be sure your doctor, family members, and health care agent know how to find and access it. Where can you learn more? Go to https://mary.healthDouble R [email or web address] org and sign in to your MyChart account. Enter J254 in the [facility] box to learn more about \"Learning About Living Chandler Mckeon. \"     If you do not have an account, please click on the \"Sign Up Now\" link. Current as of: July 17, 2020               Content Version: 12.8  © 2006-2021 Healthwise, Incorporated. Care instructions adapted under license by [facility] ([facility]). If you have questions about a medical condition or this instruction, always ask your healthcare professional. [address] any warranty or liability for your use of this information. Learning About Cutting Calories  How do calories affect your weight? Food gives your body energy. Energy from the food you eat is measured in calories. This energy keeps your heart beating, your brain active, and your muscles working. Your body needs a certain number of calories each day. After your body uses the calories it needs, it stores extra calories as fat. To lose weight safely, you have to eat fewer calories while eating in a healthy way. How many calories do you need each day? The more active you are, the more calories you need. When you are less active, you need fewer calories. How many calories you need each day also depends on several things, including your age and whether you are male or female. Here are some general guidelines for adults:  · Less active women and older adults need 1,600 to 2,000 calories each day. · Active women and less active men need 2,000 to 2,400 calories each day. · Active men need 2,400 to 3,000 calories each day. How can you cut calories and eat healthy meals? Whole grains, vegetables and fruits, and dried beans are good lower-calorie foods. They give you lots of nutrients and fiber. And they fill you up. Sweets, energy drinks, and soda pop are high in calories. They give you few nutrients and no fiber. Try to limit soda pop, fruit juice, and energy drinks. Drink water instead.   Some fats can be part of a healthy diet. But cutting back on fats from highly processed foods like fast foods and many snack foods is a good way to lower the calories in your diet. Also, use smaller amounts of fats like butter, margarine, salad dressing, and mayonnaise. Add fresh garlic, lemon, or herbs to your meals to add flavor without adding fat. Meats and dairy products can be a big source of hidden fats. Try to choose lean or low-fat versions of these products. Fat-free cookies, candies, chips, and frozen treats can still be high in sugar and calories. Some fat-free foods have more calories than regular ones. Eat fat-free treats in moderation, as you would other foods. If your favorite foods are high in fat, salt, sugar, or calories, limit how often you eat them. Eat smaller servings, or look for healthy substitutes. Fill up on fruits, vegetables, and whole grains. Eating at home  · Use meat as a side dish instead of as the main part of your meal.  · Try main dishes that use whole wheat pasta, brown rice, dried beans, or vegetables. · Find ways to cook with little or no fat, such as broiling, steaming, or grilling. · Use cooking spray instead of oil. If you use oil, use a monounsaturated oil, such as canola or olive oil. · Trim fat from meats before you cook them. · Drain off fat after you brown the meat or while you roast it. · Chill soups and stews after you cook them. Then skim the fat off the top after it hardens. Eating out  · Order foods that are broiled or poached rather than fried or breaded. · Cut back on the amount of butter or margarine that you use on bread. · Order sauces, gravies, and salad dressings on the side, and use only a little. · When you order pasta, choose tomato sauce rather than cream sauce. · Ask for salsa with your baked potato instead of sour cream, butter, cheese, or de la paz. · Order meals in a small size instead of upgrading to a large.   · Share an entree, or take part of your food home to eat as another meal.  · Share appetizers and desserts. Where can you learn more? Go to https://[email or web address] Fix [email or web address] and sign in to your hipages Group account. Enter I880 in the [facility] box to learn more about \"Learning About Cutting Calories. \"     If you do not have an account, please click on the \"Sign Up Now\" link. Current as of: December 17, 2020               Content Version: 12.8  © 2006-2021 Healthwise, [place]. Care instructions adapted under license by [facility] ([facility]). If you have questions about a medical condition or this instruction, always ask your healthcare professional. [place] [address] any warranty or liability for your use of this information. Eating Healthy Foods: Care Instructions  Your Care Instructions     Eating healthy foods can help lower your risk for disease. Healthy food gives you energy and keeps your heart strong, your brain active, your muscles working, and your bones strong. A healthy diet includes a variety of foods from the basic food groups: grains, vegetables, fruits, milk and milk products, and meat and beans. Some people may eat more of their favorite foods from only one food group and, as a result, miss getting the nutrients they need. So, it is important to pay attention not only to what you eat but also to what you are missing from your diet. You can eat a healthy, balanced diet by making a few small changes. Follow-up care is a key part of your treatment and safety. Be sure to make and go to all appointments, and call your doctor if you are having problems. It's also a good idea to know your test results and keep a list of the medicines you take. How can you care for yourself at home? Look at what you eat  · Keep a food diary for a week or two and record everything you eat or drink. Track the number of servings you eat from each food group.   · For a balanced diet every day, eat a variety of:  ? 6 or more ounce-equivalents of grains, such as cereals, breads, crackers, rice, or pasta, every day. An ounce-equivalent is 1 slice of bread, 1 cup of ready-to-eat cereal, or ½ cup of cooked rice, cooked pasta, or cooked cereal.  ? 2½ cups of vegetables, especially:  § Dark-green vegetables such as broccoli and spinach. § Orange vegetables such as carrots and sweet potatoes. § Dry beans (such as pelayo and kidney beans) and peas (such as lentils). ? 2 cups of fresh, frozen, or canned fruit. A small apple or 1 banana or orange equals 1 cup. ? 3 cups of nonfat or low-fat milk, yogurt, or other milk products. ? 5½ ounces of meat and beans, such as chicken, fish, lean meat, beans, nuts, and seeds. One egg, 1 tablespoon of peanut butter, ½ ounce nuts or seeds, or ¼ cup of cooked beans equals 1 ounce of meat. · Learn how to read food labels for serving sizes and ingredients. Fast-food and convenience-food meals often contain few or no fruits or vegetables. Make sure you eat some fruits and vegetables to make the meal more nutritious. · Look at your food diary. For each food group, add up what you have eaten and then divide the total by the number of days. This will give you an idea of how much you are eating from each food group. See if you can find some ways to change your diet to make it more healthy. Start small  · Do not try to make dramatic changes to your diet all at once. You might feel that you are missing out on your favorite foods and then be more likely to fail. · Start slowly, and gradually change your habits. Try some of the following:  ? Use whole wheat bread instead of white bread. ? Use nonfat or low-fat milk instead of whole milk. ? Eat brown rice instead of white rice, and eat whole wheat pasta instead of white-flour pasta. ? Try low-fat cheeses and low-fat yogurt. ? Add more fruits and vegetables to meals and have them for snacks. ? Add lettuce, tomato, cucumber, and onion to sandwiches.   ? Add fruit to yogurt and cereal.  Enjoy food  · You can still eat your favorite foods. You just may need to eat less of them. If your favorite foods are high in fat, salt, and sugar, limit how often you eat them, but do not cut them out entirely. · Eat a wide variety of foods. Make healthy choices when eating out  · The type of restaurant you choose can help you make healthy choices. Even fast-food chains are now offering more low-fat or healthier choices on the menu. · Choose smaller portions, or take half of your meal home. · When eating out, try:  ? A veggie pizza with a whole wheat crust or grilled chicken (instead of sausage or pepperoni). ? Pasta with roasted vegetables, grilled chicken, or marinara sauce instead of cream sauce. ? A vegetable wrap or grilled chicken wrap. ? Broiled or poached food instead of fried or breaded items. Make healthy choices easy  · Buy packaged, prewashed, ready-to-eat fresh vegetables and fruits, such as baby carrots, salad mixes, and chopped or shredded broccoli and cauliflower. · Buy packaged, presliced fruits, such as melon or pineapple. · Choose 100% fruit or vegetable juice instead of soda. Limit juice intake to 4 to 6 oz (½ to ¾ cup) a day. · Blend low-fat yogurt, fruit juice, and canned or frozen fruit to make a smoothie for breakfast or a snack. Where can you learn more? Go to https://FeedBurnermathew.healthQteros. org and sign in to your Stem[employer] account. Enter J469 in the [facility] box to learn more about \"Eating Healthy Foods: Care Instructions. \"     If you do not have an account, please click on the \"Sign Up Now\" link. Current as of: December 17, 2020               Content Version: 12.8  © 7632-1569 Healthwise, Incorporated. Care instructions adapted under license by [facility] ([facility]).  If you have questions about a medical condition or this instruction, always ask your healthcare professional. [place] [address] any warranty or liability for your use of this information. Personalized Preventive Plan for Spike Vasquez - 4/20/2021  Medicare offers a range of preventive health benefits. Some of the tests and screenings are paid in full while other may be subject to a deductible, co-insurance, and/or copay. Some of these benefits include a comprehensive review of your medical history including lifestyle, illnesses that may run in your family, and various assessments and screenings as appropriate. After reviewing your medical record and screening and assessments performed today your provider may have ordered immunizations, labs, imaging, and/or referrals for you. A list of these orders (if applicable) as well as your Preventive Care list are included within your After Visit Summary for your review. Other Preventive Recommendations:    · A preventive eye exam performed by an eye specialist is recommended every 1-2 years to screen for glaucoma; cataracts, macular degeneration, and other eye disorders. · A preventive dental visit is recommended every 6 months. · Try to get at least 150 minutes of exercise per week or 10,000 steps per day on a pedometer . · Order or download the FREE \"Exercise & Physical Activity: Your Everyday Guide\" from [employer]. Call 7-170.666.9002 or search [employer] online. · You need 9500-8135 mg of calcium and 3482-8080 IU of vitamin D per day. It is possible to meet your calcium requirement with diet alone, but a vitamin D supplement is usually necessary to meet this goal.  · When exposed to the sun, use a sunscreen that protects against both UVA and UVB radiation with an SPF of 30 or greater. Reapply every 2 to 3 hours or after sweating, drying off with a towel, or swimming. · Always wear a seat belt when traveling in a car. Always wear a helmet when riding a bicycle or motorcycle.       Patient Education        Advance Directives: Care Instructions  Overview  An advance directive Be sure to make and go to all appointments, and call your doctor if you are having problems. It's also a good idea to know your test results and keep a list of the medicines you take. How can you care for yourself at home? · Watch your weight. A healthy weight helps your body use insulin properly. · Limit the amount of calories, sweets, and unhealthy fat you eat. Ask your doctor if you should see a dietitian. A registered dietitian can help you create meal plans that fit your lifestyle. · Get at least 30 minutes of exercise on most days of the week. Exercise helps control your blood sugar. It also helps you maintain a healthy weight. Walking is a good choice. You also may want to do other activities, such as running, swimming, cycling, or playing tennis or team sports. · Do not smoke. Smoking can make prediabetes worse. If you need help quitting, talk to your doctor about stop-smoking programs and medicines. These can increase your chances of quitting for good. · If your doctor prescribed medicines, take them exactly as prescribed. Call your doctor if you think you are having a problem with your medicine. You will get more details on the specific medicines your doctor prescribes. When should you call for help? Watch closely for changes in your health, and be sure to contact your doctor if:    · You have any symptoms of diabetes. These may include:  ? Being thirsty more often. ? Urinating more. ? Being hungrier. ? Losing weight. ? Being very tired. ? Having blurry vision.     · You have a wound that will not heal.     · You have an infection that will not go away.     · You have problems with your blood pressure.     · You want more information about diabetes and how you can keep from getting it. Where can you learn more? Go to https://mary.Guidekick. org and sign in to your Advasense account.  Enter I222 in the Admittedly box to learn more about \"Prediabetes: Care Instructions. \"     If you do not have an account, please click on the \"Sign Up Now\" link. Current as of: August 31, 2020               Content Version: 12.8  © 2006-2021 MeBeam. Care instructions adapted under license by [facility] ([facility]). If you have questions about a medical condition or this instruction, always ask your healthcare professional. [address]  any warranty or liability for your use of this information. Patient Education        Urinary Tract Infection (UTI) in Women: Care Instructions  Overview     A urinary tract infection, or UTI, is a general term for an infection anywhere between the kidneys and the urethra (where urine comes out). Most UTIs are bladder infections. They often cause pain or burning when you urinate. UTIs are caused by bacteria and can be cured with antibiotics. Be sure to complete your treatment so that the infection does not get worse. Follow-up care is a key part of your treatment and safety. Be sure to make and go to all appointments, and call your doctor if you are having problems. It's also a good idea to know your test results and keep a list of the medicines you take. How can you care for yourself at home? · Take your antibiotics as directed. Do not stop taking them just because you feel better. You need to take the full course of antibiotics. · Drink extra water and other fluids for the next day or two. This will help make the urine less concentrated and help wash out the bacteria that are causing the infection. (If you have kidney, heart, or liver disease and have to limit fluids, talk with your doctor before you increase the amount of fluids you drink.)  · Avoid drinks that are carbonated or have caffeine. They can irritate the bladder. · Urinate often. Try to empty your bladder each time. · To relieve pain, take a hot bath or lay a heating pad set on low over your lower belly or genital area.  Never go to sleep with a heating pad in place. To prevent UTIs  · Drink plenty of water each day. This helps you urinate often, which clears bacteria from your system. (If you have kidney, heart, or liver disease and have to limit fluids, talk with your doctor before you increase the amount of fluids you drink.)  · Urinate when you need to. · If you are sexually active, urinate right after you have sex. · Change sanitary pads often. · Avoid douches, bubble baths, feminine hygiene sprays, and other feminine hygiene products that have deodorants. · After going to the bathroom, wipe from front to back. When should you call for help? Call your doctor now or seek immediate medical care if:    · Symptoms such as fever, chills, nausea, or vomiting get worse or appear for the first time.     · You have new pain in your back just below your rib cage. This is called flank pain.     · There is new blood or pus in your urine.     · You have any problems with your antibiotic medicine. Watch closely for changes in your health, and be sure to contact your doctor if:    · You are not getting better after taking an antibiotic for 2 days.     · Your symptoms go away but then come back. Where can you learn more? Go to [email or web address] and sign in to your [employer] account. Enter W424 in the [facility] box to learn more about \"Urinary Tract Infection (UTI) in Women: Care Instructions. \"     If you do not have an account, please click on the \"Sign Up Now\" link. Current as of: June 29, 2020               Content Version: 12.8  © 1601-6261 [employer]. Care instructions adapted under license by [facility] ([facility]). If you have questions about a medical condition or this instruction, always ask your healthcare professional. [place] [address] any warranty or liability for your use of this information.

## 2021-04-20 NOTE — PROGRESS NOTES
Medicare Annual Wellness Visit  Name: Karlo Turner Date: 2021   MRN: [id number] Sex: Female   Age: 78 y.o. Ethnicity: Non-/Non    : 1940 Race: Marlin Humphrey is here for Medicare AWV (awv ) and Urinary Tract Infection (pt is c/o of dysuria, pink tint to the urine, some days ae worse, urinary frequency )    Screenings for behavioral, psychosocial and functional/safety risks, and cognitive dysfunction are all negative except as indicated below. These results, as well as other patient data from the [address] form, are documented in Flowsheets linked to this Encounter. No Known Allergies      Prior to Visit Medications    Medication Sig Taking?  Authorizing Provider   sulfamethoxazole-trimethoprim (BACTRIM DS;SEPTRA DS) 800-160 MG per tablet Take 1 tablet by mouth 2 times daily for 5 days Yes Bhaskar Nam APRN - CNP   warfarin (COUMADIN) 5 MG tablet TAKE 1 1/2 TABLETS BY MOUTH MONDAY AND FRIDAY, THEN 1 TABLET BY MOUTH DAILY ON THE OTHER DAYS Yes Kianna Jimenez MD   gabapentin (NEURONTIN) 300 MG capsule TAKE 1 TO 2 CAPSULES BY MOUTH EVERY NIGHT AS DIRECTED Yes Telma Akers APRN - CNP   Cyanocobalamin (VITAMIN B 12) 500 MCG TABS Take 1 tablet by mouth daily Yes Historical Provider, MD         Past Medical History:   Diagnosis Date    [facility]     CLL [facility]chronic lymphocytic leukemia) [facility] 2015    COPD [facility]ronic obstructive pulmonary disease) [facility]     Essential hypertension     controlled with salt restriction (initially)    Factor V Leiden mutation ([facility]     Goiter 2011    1.5 cm midline    Hypercholesterolemia     Impaired fasting glucose 2011    Osteoarthritis     Osteoporosis     Post herpetic neuralgia     Vitamin D deficiency 2011       Past Surgical History:   Procedure Laterality Date    CATARACT REMOVAL WITH IMPLANT  12    left eye    JOINT REPLACEMENT      partial knee R and L    KNEE SURGERY  2008    partial replacements, both knees    MOHS SURGERY  2019    R cheek and R superior temple    SPLENECTOMY      TUNNELED VENOUS PORT PLACEMENT      UPPER GASTROINTESTINAL ENDOSCOPY N/A 2019    EGD ESOPHAGOGASTRODUODENOSCOPY, WITH ENDOSCOPIC ULTRASOUND OF ESOPHAGUS performed by Radha Arora MD at [address] [place] [address] History   Problem Relation Age of Onset    Diabetes Father     Stroke Sister 50         of a stroke       CareTeam (Including outside providers/suppliers regularly involved in providing care):   Patient Care Team:  Kianna Jimenez MD as PCP - Sana Wade MD as PCP - Hematology/Oncology (Hematology and Oncology)  Kianna Jimenez MD as PCP - [facility]    Wt Readings from Last 3 Encounters:   21 162 lb (73.5 kg)   21 162 lb (73.5 kg)   20 165 lb (74.8 kg)     Vitals:    21 0749   BP: 118/60   Site: Right Upper Arm   Position: Sitting   Cuff Size: Medium Adult   Pulse: 70   SpO2: 92%   Weight: 162 lb (73.5 kg)   Height: 5' (1.524 m)     Body mass index is 31.64 kg/m². Based upon direct observation of the patient, evaluation of cognition reveals recent and remote memory intact. Review of Systems   Constitutional: Negative for activity change, appetite change, fatigue, fever and unexpected weight change. HENT: Positive for hearing loss (left ear worst- hearing aid). Negative for congestion, ear pain, sinus pressure and sinus pain. Eyes: Negative for discharge and visual disturbance. Respiratory: Negative for cough, chest tightness and shortness of breath. Cardiovascular: Negative for chest pain, palpitations and leg swelling. Gastrointestinal: Negative for abdominal distention, abdominal pain, constipation, diarrhea and nausea. Endocrine: Negative for cold intolerance, heat intolerance, polydipsia, polyphagia and polyuria.    Genitourinary: Positive for decreased urine volume, difficulty urinating, dysuria, frequency and urgency. Negative for flank pain. Musculoskeletal: Negative for arthralgias, back pain, gait problem, joint swelling, myalgias and neck pain. Skin: Negative for color change, rash and wound. Allergic/Immunologic: Negative for food allergies and immunocompromised state. Neurological: Negative for dizziness, tremors, speech difficulty, weakness, light-headedness, numbness and headaches. Hematological: Negative for adenopathy. Does not bruise/bleed easily. Psychiatric/Behavioral: Negative for confusion, decreased concentration, self-injury, sleep disturbance and suicidal ideas. The patient is not nervous/anxious. Patient's complete Health Risk Assessment and screening values have been reviewed and are found in Flowsheets. The following problems were reviewed today and where indicated follow up appointments were made and/or referrals ordered. Physical Exam  Vitals signs reviewed. Constitutional:       General: She is awake. Appearance: Normal appearance. She is well-developed and well-groomed. She is obese. She is not ill-appearing. HENT:      Head: Normocephalic and atraumatic. Right Ear: Tympanic membrane and external ear normal. Decreased hearing (hearing aid) noted. There is impacted cerumen. Left Ear: External ear normal. Decreased hearing noted. There is impacted cerumen. Tympanic membrane is erythematous. Nose: Nose normal.      Mouth/Throat:      Lips: Pink. Mouth: Mucous membranes are moist.      Pharynx: Oropharynx is clear. Eyes:      General: Lids are normal.      Extraocular Movements: Extraocular movements intact. Conjunctiva/sclera: Conjunctivae normal.      Pupils: Pupils are equal, round, and reactive to light. Neck:      Musculoskeletal: Full passive range of motion without pain, normal range of motion and neck supple. Thyroid: No thyromegaly. Speech: Speech normal.         Behavior: Behavior normal. Behavior is cooperative. Thought Content: Thought content normal.         Cognition and Memory: Cognition and memory normal.         Judgment: Judgment normal.           Positive Risk Factor Screenings with Interventions:            General Health and ACP:  General  In general, how would you say your health is?: Very Good  In the past 7 days, have you experienced any of the following?  New or Increased Pain, New or Increased Fatigue, Loneliness, Social Isolation, Stress or Anger?: (!) New or Increased Pain(uti)  Do you get the social and emotional support that you need?: Yes  Do you have a Living Will?: Yes  Advance Directives     Power of  Living Will ACP-Advance Directive ACP-Power of     Not on File Not on File Not on File Not on File      General Health Risk Interventions:  · Pain issues: home exercises provided, treat UTI  · No Living Will: requested pt bring a copy for records, says [facility]- [facility] Habits/Nutrition:  Health Habits/Nutrition  Do you exercise for at least 20 minutes 2-3 times per week?: Yes  Have you lost any weight without trying in the past 3 months?: No  Do you eat only one meal per day?: No  Have you seen the dentist within the past year?: (!) No  Body mass index: (!) 31.64  Health Habits/Nutrition Interventions:  · Dental exam overdue:  patient encouraged to make appointment with his/her dentist    Hearing/Vision:  No exam data present  Hearing/Vision  Do you or your family notice any trouble with your hearing that hasn't been managed with hearing aids?: (!) Yes  Do you have difficulty driving, watching TV, or doing any of your daily activities because of your eyesight?: No  Have you had an eye exam within the past year?: Yes  Hearing/Vision Interventions:  · Hearing concerns:  wears hearing aids, encouraged to follow up with audiologist      Personalized Preventive Plan Current Health Maintenance Status  Immunization History   Administered Date(s) Administered    COVID-19, Pfizer, PF, 30mcg/0.3mL 01/29/2021, 02/19/2021    Influenza Virus Vaccine 10/16/2009, 01/26/2010, 09/29/2010, 10/03/2016    Influenza, High Dose (Fluzone 65 yrs and older) 09/27/2011, 10/01/2012, 10/29/2013, 10/23/2014, 11/24/2015, 10/23/2018, 12/21/2019    Influenza, Quadv, IM, PF (6 mo and older Fluzone, Flulaval, Fluarix, and 3 yrs and older Afluria) 12/22/2020    Pneumococcal Conjugate 13-valent ([id number]) 07/02/2015    Pneumococcal Polysaccharide ([id number]) 07/01/2006, 11/07/2011    Td, unspecified formulation 10/04/2004    Tdap (Boostrix, Adacel) 09/01/2015        Health Maintenance   Topic Date Due    Meningococcal (ACWY) vaccine (1 - Risk start before 7 months 4-dose series) Never done    Hib vaccine (1 of 1 - Risk 1-dose series) Never done    Meningococcal B vaccine (1 of 4 - Increased Risk Bexsero 2-dose series) Never done    Shingles Vaccine (1 of 2) Never done    Annual Wellness Visit (AWV)  Never done    DTaP/Tdap/Td vaccine (2 - Td) 09/01/2025    DEXA (modify frequency per FRAX score)  Completed    Flu vaccine  Completed    Pneumococcal 65+ yrs at Risk Vaccine  Completed    COVID-19 Vaccine  Completed    Hepatitis A vaccine  Aged Out    Hepatitis B vaccine  Aged Out     Recommendations for [employer] Due: see orders and patient instructions/AVS.  . Recommended screening schedule for the next 5-10 years is provided to the patient in written form: see Patient Eufemia Poole was seen today for medicare awv and urinary tract infection. Diagnoses and all orders for this visit:    Routine general medical examination at a health care facility  -     Comprehensive Metabolic Panel;  Future    ACP (advance care planning)  -     [place] ADVANCED CARE PLAN FACE TO FACE, 1ST 30MIN [id number]    Acute cystitis with hematuria  -     sulfamethoxazole-trimethoprim (BACTRIM DS;SEPTRA DS) 800-160 MG per tablet; Take 1 tablet by mouth 2 times daily for 5 days    Prediabetes    Impaired fasting glucose  -     Hemoglobin A1C; Future    Mixed hyperlipidemia  -     Lipid Panel; Future    Elevated LFTs  -     Comprehensive Metabolic Panel; Future    Bilateral impacted cerumen                 Advance Care Planning   Advanced Care Planning: Discussed the patients choices for care and treatment in case of a health event that adversely affects decision-making abilities. Also discussed the patients long-term treatment options. Reviewed with the patient the [address] [place] [address] of [address] [place] [address] Declaration forms  Reviewed the process of designating a competent adult as an Agent (or -in-fact) that could take make health care decisions for the patient if incompetent. Patient was asked to complete the declaration forms, either acknowledge the forms by a public notary or an eligible witness and provide a signed copy to the practice office. Time spent (minutes): 15    Pt has a DNR that she states is signed by Dr Harjit Cai. Pt would not want to be resuscitated. Pt wants DNR-CCA. States her  would be [facility]. Obesity Counseling: Assessed behavioral health risks and factors affecting choice of behavior. Suggested weight control approaches, including dietary changes behavioral modification and follow up plan. Provided educational and support documentation. Time spent (minutes): 15  Cardiovascular Disease Risk Counseling: Assessed the patient's risk to develop cardiovascular disease and reviewed main risk factors.    Reviewed steps to reduce disease risk including:   · Quitting tobacco use, reducing amount smoked, or not starting the habit  · Making healthy food choices  · Being physically active and gradualy increasing activity levels   · Reduce weight and determine a healthy BMI goal  · Monitor blood pressure and treat if higher than 140/90 mmHg  · Maintain blood total cholesterol levels under 5 mmol/l or 190 mg/dl  · Maintain LDL cholesterol levels under 3.0 mmol/l or 115 mg/dl   · Control blood glucose levels  · Consider taking aspirin (75 mg daily), once blood pressure is controlled   Provided a follow up plan. Time spent (minutes): 15    Urinary Tract Infection: Patient complains of dysuria, frequency, urgency, hematuria, incomplete bladder emptying, nocturia, suprapubic pressure She has had symptoms for 1 week. Patient also complains of none. Patient denies back pain, congestion, cough, fever, headache, rhinitis, sorethroat, stomach ache and vaginal discharge. Patient does not have a history of recurrent UTI. Patient does not have a history of pyelonephritis. UA + small leukocytes and moderate blood. 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 1. Routine general medical examination at a health care facility  -     Comprehensive Metabolic Panel; Future    2. ACP (advance care planning)  -     [place] ADVANCED CARE PLAN FACE TO [address], 1ST 30MIN [19763]  - Pt wants to be DNR-CCA, states she has paperwork completed on [facility]. Requested a copy for our office. 3. Acute cystitis with hematuria  -     sulfamethoxazole-trimethoprim (BACTRIM DS;SEPTRA DS) 800-160 MG per tablet; Take 1 tablet by mouth 2 times daily for 5 days, Disp-10 tablet, R-0Normal  - Take medication in its entirety even if feeling better to avoid a resistance to antibiotics  Avoid/limit 4 Cs- carbonation, caffeine, citrus, and chocolate as these are bladder irritants  Urinate before and after sexual intercourse  Push fluids, water is best  Wipe front to back  Use mild unscented soap for josé area  Avoid bubble baths, keep baths short  Minimize or avoid hot tub use  Take antibiotic in its entirety even if feeling better  - We will call pt in 1-3 days with culture result.  No previous urine cultures to compare to. 4. Prediabetes   - on labs, A1C 8 years ago 6.1, no repeat level noted    This does not require medication at this time but a diet moderate in carbohydrates, weight loss with an initial goal of 10% of your body weight (16 lbs), and physical activity of 150 minutes weekly is recommended. We will follow this level yearly. 5. Impaired fasting glucose  -     Hemoglobin A1C; Future   - on labs, A1C 8 years ago 6.1, no repeat level noted    6. Mixed hyperlipidemia  -     Lipid Panel; Future  Work on limiting saturated fats in diet, and eating a healthy balance of fruits, vegetables, lean proteins, and multigrains. Physical activity 150 minutes weekly recommended   Weight loss, initial goal 10% of body weight recommended    7. Elevated LFTs  -     Comprehensive Metabolic Panel; Future    8. Bilateral impacted cerumen   Bilateral ear canal with impacted cerumen interfering with visualization of TM; cerumen removed using combination of lighted curette instrumentation and irrigation; cerumen successfully removed. Pt tolerated well. External canal slightly erythematous following procedure. Red/cloudy TM after procedure. Avoid use of Qtips   Recommend use of hydrogen peroxide or debrox   [id number] provider remova; impacted cerumen instrumentation unilateral- right ear   74971 MA removal of impacted cerumen with irrigation- left ear      Care Gaps Addressed  AWV today  Call insurance company to discuss coverage for shingles vaccine (Shingrix) 2 dose series   Pt has CLL in remission- vaccines to be discussed with oncologist- follows with Dr Navjot Beverly every 3-6 months    I have reviewed patient's pertinent medical history, relevant laboratory and imaging studies, and past/future health maintenance. Discussed with the patient the importance of adhering to their current medication regimen as directed.  Advised the patient that they should continue to work on eating a healthy balanced diet and staying active by exercising within their personal limits. Orders as listed above. Patient was advised to keep future appointments with their respective specialty care team(s). Patient had the opportunity to ask questions, all of which were answered to the best of my ability and with patient satisfaction. Patient understands and is agreeable with the care plan following today's visit. Patient is to schedule an appointment for any new or worsening symptoms. Go to ER for significant shortness of breath, chest pain, or uncontrolled pain or fever. I discussed with patient the risk and benefits of any medications that were prescribed today. I verified that the patient understands their medications, labs, and/or procedures. The patient is doing well with current medication regimen and does not have any barriers to adherence. The patient's self-management abilities are good.      Follow Up in 6 Months for Gabapentin Refill Check Up

## 2021-04-22 ENCOUNTER — ANTI-COAG VISIT (OUTPATIENT)
Dept: PHARMACY | Age: 81
End: 2021-04-22
Payer: MEDICARE

## 2021-04-22 DIAGNOSIS — R79.1 ABNORMAL COAGULATION PROFILE: ICD-10-CM

## 2021-04-22 LAB
INTERNATIONAL NORMALIZATION RATIO, POC: 2.3
ORGANISM: ABNORMAL
URINE CULTURE, ROUTINE: ABNORMAL
URINE CULTURE, ROUTINE: ABNORMAL

## 2021-04-22 PROCEDURE — 85610 PROTHROMBIN TIME: CPT

## 2021-04-22 PROCEDURE — 99211 OFF/OP EST MAY X REQ PHY/QHP: CPT

## 2021-04-22 NOTE — PROGRESS NOTES
Ms. Spike Vasquez is a 78 y.o.  female. Ms. Spike Vasquez had an INR test today. Results were reviewed and appropriate warfarin management was completed. THIS VISIT WAS COMPLETED AS:   []    A VIRTUAL VISIT VIA TELEPHONE IN EFFORTS TO REDUCE THE SPREAD OF COVID-19.  []    A DRIVE-THRU VISIT IN EFFORTS TO REDUCE THE SPREAD OF COVID-19. [x]    AN IN PERSON VISIT. PROTOCOLS WERE FOLLOWED WITH PRECAUTIONS TO REDUCE THE SPREAD OF COVID-19. Patient verifies current dosing regimen: Yes     Warfarin medication reviewed and updated on the patient 's home medication list: Yes   All other medications reviewed and updated on the patient 's home medication list: Yes : 5 day course of Bactrim on 21    Lab Results   Component Value Date    INR 2.3 2021    INR 2.2 2021    INR 2.70 2021           Anticoagulation Summary  As of 2021    INR goal:  2.0-3.0   TTR:  54.4 % (9.8 y)   INR used for dosin.3 (2021)   Warfarin maintenance plan:  2.5 mg (5 mg x 0.5) every Tue; 5 mg (5 mg x 1) all other days   Weekly warfarin total:  32.5 mg   Plan last modified:  Tyson Montoya (2021)   Next INR check:  2021   Priority:  Maintenance   Target end date: Indefinite    Indications    Abnormal coagulation profile [R79.1]             Anticoagulation Episode Summary     INR check location:      Preferred lab:      Send INR reminders to:  [facility]    Comments:  EPIC      Factor V Leiden       Anticoagulation Care Providers     Provider Role Specialty Phone number    Wendi Anaya MD Referring Family Medicine [phone]          Warfarin assessment / plan: [facility] states that she started a 5 day course of Bactrim on 21 for a UTI. I advised her to reduce her warfarin dose to 2.5mg daily while on the antibiotic and then resume her previous dosing. Her INR is in range today at 2.3. She will return in 4 weeks for her next INR. She will call with any issues. Description    Take warfarin 2.5mg daily while on the Bactrim then   CONTINUE:  Warfarin 5mg (1 tablet) DAILY EXCEPT 2.5 mg(half a tablet) every TUESDAY    Keep your green vegetable intake consistent (3-4 salads per week)                  Immunization History   Administered Date(s) Administered    COVID-19, Pfizer, PF, 30mcg/0.3mL 01/29/2021, 02/19/2021    Influenza Virus Vaccine 10/16/2009, 01/26/2010, 09/29/2010, 10/03/2016    Influenza, High Dose (Fluzone 65 yrs and older) 09/27/2011, 10/01/2012, 10/29/2013, 10/23/2014, 11/24/2015, 10/23/2018, 12/21/2019    Influenza, Quadv, IM, PF (6 mo and older Fluzone, Flulaval, Fluarix, and 3 yrs and older Afluria) 12/22/2020    Pneumococcal Conjugate 13-valent ([id number]) 07/02/2015    Pneumococcal Polysaccharide ([id number]) 07/01/2006, 11/07/2011    Td, unspecified formulation 10/04/2004    Tdap (Boostrix, Adacel) 09/01/2015         Reviewed AVS with patient / caregiver.       CLINICAL PHARMACY CONSULT: MED RECONCILIATION/REVIEW ADDENDUM    For Pharmacy Admin Tracking Only    PHSO (orange banner): Yes  Total # of Interventions Recommended (warfarin changes): 1  (warfarin changes) - Decreased Dose #: 1  (other medication updates)- Updated Order #: 0 Updated Order Reason(s):   (#1 for reviewing INR) - Maintenance Safety Lab Monitoring #: 1  Total Interventions Accepted (warfarin related): 1  Time Spent (min) (round up): 15

## 2021-04-23 ENCOUNTER — PROCEDURE VISIT (OUTPATIENT)
Dept: DERMATOLOGY | Age: 81
End: 2021-04-23
Payer: MEDICARE

## 2021-04-23 VITALS — TEMPERATURE: 97.7 F | DIASTOLIC BLOOD PRESSURE: 88 MMHG | SYSTOLIC BLOOD PRESSURE: 139 MMHG

## 2021-04-23 DIAGNOSIS — C44.529 SQUAMOUS CELL CARCINOMA OF SKIN OF CHEST: Primary | ICD-10-CM

## 2021-04-23 PROCEDURE — 13101 CMPLX RPR TRUNK 2.6-7.5 CM: CPT | Performed by: DERMATOLOGY

## 2021-04-23 PROCEDURE — 11603 EXC TR-EXT MAL+MARG 2.1-3 CM: CPT | Performed by: DERMATOLOGY

## 2021-04-23 NOTE — PATIENT INSTRUCTIONS
Excision Wound Steri-strip Bandage Instructions          Location of wound- upper central chest     Appointment for removal of stitches or Wound check if absorbable sutures- 2 months    Keep the wound completely dry for the next 48 hours. After 48 hours, cover the area with plastic when showering if possible and  never soak in water (no swimming or bathing until the stitches are removed/or 14 days if steri-strips in)    Remove the white bandage from the wound 48 hours after surgery. This bandage consists of telfa (white non-adhesive pad) and white tape. Please start acetic acid soaks, daily until sutures are removed or 14 days if absorbable sutures (sutures that dissolve on their own) placed. You may apply ointment (like vaseline or aquaphor) over the steri-strips on the excision line and then cover, after soaks. Underneath the bandage are steri-strips (narrow white strips) that should remain on the wound until stitches are removed or after 14 days if absorbable sutures (sutures that dissolve on their own)    Take two Extra Strength Tylenol (500mg) or age recommended dose 4-6 hours following surgery for pain and repeat as necessary. Call if pain becomes worse. Apply an ice pack to the wound (not directly, but wrapped in a towel) for 10-15 minutes an hour for the next 3-4 hours or longer if directed. Watch the wound for signs of infection which includes: redness spreading from the wound, pus like drainage, pain in the area or if the area becomes warm to the touch. If these occur, please call the clinic. If the area is actively bleeding, apply pressure  for 20 minutes to the wound with no peaking and please call the clinic if it doesn't stop.

## 2021-04-23 NOTE — PROGRESS NOTES
Patient's Name: Clarissa Fragoso  MRN: [id number]  YOB: 1940  Date of Visit: 4/23/2021  Referring Provider: No ref. provider found      Clarissa Fragoso is a 78 y.o. female who presents in clinic today for Removal of a squamous cell carcinoma, which is located on the central chest        Patient is on a blood thinner, Yes. Patient has a pacemaker, No    Vitals:    04/23/21 1403   BP: 139/88   Temp: 97.7 °F (36.5 °C)       PRE PROCEDURE TIME OUT:  Site confirmed using dermpath report, photographs and residual biopsy scar with patient by:           Qasim Francois MD, MS    PATHOLOGY REPORT:    DIAGNOSIS:   CENTRAL CHEST-   Squamous cell carcinoma, moderately differentiated   There are atypical keratinocytes with moderate pleomorphism   proliferating within the epithelium.  These cells are   extending into the underlying dermis as irregularly shaped   islands. RESULTS SIGNATURE   Carmen Kan MD     Surgeon: Qasim Francois MD, MS  Assistant: Crystal Kruse MD  Procedure:   Excision  Reason for Excision:  excision of skin cancer  Location:  Central chest  Indication:  Squamous cell carcinoma  Size of Lesion: 13mm  Size of Defect (lesion plus margins):  21 mm  The distance of undermining was 21 mm, which is equal to or greater than the maximum width of the defect, measured perpendicular to the closure line along at least one entire edge of the defect   Size of Closure: 65 mm    Risks including bleeding, scarring, infection and recurrence discussed. Verbal and written informed consent was obtained. The area was not photographed. The area was anesthetized using 1% lidocaine with epinephrine buffered with 8.4% sodium bicarbonate. The area was prepped and draped in the usual sterile manner and an incision was made to the level of deep subcutaneous fat. The specimen was removed and marked with a surgical stitch in the 9 o'clock pole for orientation.  Wide  and extensive undermining was performed to help release tension on the skin and make the repair of the large defect easier. Hemostasis was achieved with electrocautery. The defect was repaired with a complex linear repair to help restore normal function of the skin and prevent infection using  3.0 vicryl suture in the subcutis and dermal layers, and 4.0 monocryl suture to close superficial wound edges in a subcuticular fashion. steristrips and a pressure dressing was placed. The patient tolerated the procedure well. Wound care instructions were given.        Sonam Braxton MD, MS

## 2021-04-27 LAB — DERMATOLOGY PATHOLOGY REPORT: ABNORMAL

## 2021-05-20 ENCOUNTER — ANTI-COAG VISIT (OUTPATIENT)
Dept: PHARMACY | Age: 81
End: 2021-05-20
Payer: MEDICARE

## 2021-05-20 DIAGNOSIS — R79.1 ABNORMAL COAGULATION PROFILE: Primary | ICD-10-CM

## 2021-05-20 LAB — INTERNATIONAL NORMALIZATION RATIO, POC: 1.8

## 2021-05-20 PROCEDURE — 99211 OFF/OP EST MAY X REQ PHY/QHP: CPT

## 2021-05-20 PROCEDURE — 85610 PROTHROMBIN TIME: CPT

## 2021-05-20 NOTE — PROGRESS NOTES
Ms. Abhinav Prince is a 78 y.o.  female. Ms. Abhinav Prince had an INR test today. Results were reviewed and appropriate warfarin management was completed. THIS VISIT WAS PERFORMED AS: AN IN PERSON VISIT. PROTOCOLS WERE FOLLOWED WITH PRECAUTIONS TO REDUCE THE SPREAD OF COVID-19. Patient verifies current dosing regimen: Yes     Warfarin medication reviewed and updated on the patient 's home medication list: Yes   All other medications reviewed and updated on the patient 's home medication list: No changes reported     Lab Results   Component Value Date    INR 1.8 2021    INR 2.3 2021    INR 2.2 2021       Patient Findings     Negatives:  Signs/symptoms of thrombosis, Signs/symptoms of bleeding, Change in health, Missed doses, Change in medications, Change in diet/appetite, Bruising          Anticoagulation Summary  As of 2021    INR goal:  2.0-3.0   TTR:  54.4 % (9.9 y)   INR used for dosin.8 (2021)   Warfarin maintenance plan:  2.5 mg (5 mg x 0.5) every Tue; 5 mg (5 mg x 1) all other days   Weekly warfarin total:  32.5 mg   Plan last modified:  Artemio Persaud (2021)   Next INR check:  2021   Priority:  Maintenance   Target end date: Indefinite    Indications    Abnormal coagulation profile [R79.1]             Anticoagulation Episode Summary     INR check location:      Preferred lab:      Send INR reminders to:  [facility]    Comments:  EPIC      Factor V Leiden       Anticoagulation Care Providers     Provider Role Specialty Phone number    Tiffanie Somers MD Referring Family Medicine [phone]          Warfarin assessment / plan:   Patient looks and feels well today. No medication or dietary changes. Patients INR was a little low today at 1.8 so told patient to take 7.5 mg today only and then continue her normal warfarin dosing regimen.  Her INR has been in range on the same warfarin dose for over 6 months so will follow up in 4 weeks. Description    Take Warfarin 7.5 mg (1 &1/2 tablets) today ONLY and then   CONTINUE:  Warfarin 5mg (1 tablet) DAILY EXCEPT 2.5 mg(half a tablet) every TUESDAY    Keep your green vegetable intake consistent (3-4 salads per week)                  Immunization History   Administered Date(s) Administered    COVID-19, Pfizer, PF, 30mcg/0.3mL 01/29/2021, 02/19/2021    Influenza Virus Vaccine 10/16/2009, 01/26/2010, 09/29/2010, 10/03/2016    Influenza, High Dose (Fluzone 65 yrs and older) 09/27/2011, 10/01/2012, 10/29/2013, 10/23/2014, 11/24/2015, 10/23/2018, 12/21/2019    Influenza, Quadv, IM, PF (6 mo and older Fluzone, Flulaval, Fluarix, and 3 yrs and older Afluria) 12/22/2020    Pneumococcal Conjugate 13-valent ([id number]) 07/02/2015    Pneumococcal Polysaccharide ([id number]) 07/01/2006, 11/07/2011    Td, unspecified formulation 10/04/2004    Tdap (Boostrix, Adacel) 09/01/2015         Reviewed AVS with patient / caregiver.       CLINICAL PHARMACY CONSULT: MED RECONCILIATION/REVIEW ADDENDUM    For Pharmacy Admin Tracking Only     Intervention Detail: Dose Adjustment: 1: reason: Therapy Optimization   Total # of Interventions Recommended: 1   Total # of Interventions Accepted: 1   Time Spent (min): 15

## 2021-06-17 ENCOUNTER — TELEPHONE (OUTPATIENT)
Dept: DERMATOLOGY | Age: 81
End: 2021-06-17

## 2021-06-17 ENCOUNTER — ANTI-COAG VISIT (OUTPATIENT)
Dept: PHARMACY | Age: 81
End: 2021-06-17
Payer: MEDICARE

## 2021-06-17 DIAGNOSIS — R79.1 ABNORMAL COAGULATION PROFILE: Primary | ICD-10-CM

## 2021-06-17 LAB — INR BLD: 1.9

## 2021-06-17 PROCEDURE — 85610 PROTHROMBIN TIME: CPT

## 2021-06-17 PROCEDURE — 99211 OFF/OP EST MAY X REQ PHY/QHP: CPT

## 2021-06-17 NOTE — PROGRESS NOTES
Ms. El Soni is a 78 y.o.  female. Ms. El Soni had an INR test today. Results were reviewed and appropriate warfarin management was completed. THIS VISIT WAS PERFORMED AS: An in person visit. Protocols were followed with precautions to reduce the spread of COVID-19. Patient verifies current dosing regimen: Yes     Warfarin medication reviewed and updated on the patient 's home medication list: Yes   All other medications reviewed and updated on the patient 's home medication list: No changes     Lab Results   Component Value Date    INR 1.90 2021    INR 1.8 2021    INR 2.3 2021       Patient Findings     Negatives:  Signs/symptoms of bleeding, Change in health, Missed doses, Change in medications, Change in diet/appetite, Bruising          Anticoagulation Summary  As of 2021    INR goal:  2.0-3.0   TTR:  54.0 % (9.9 y)   INR used for dosin.90 (2021)   Warfarin maintenance plan:  5 mg (5 mg x 1) every day   Weekly warfarin total:  35 mg   Plan last modified:  Pita Colindres (2021)   Next INR check:  7/15/2021   Priority:  Maintenance   Target end date: Indefinite    Indications    Abnormal coagulation profile [R79.1]             Anticoagulation Episode Summary     INR check location:      Preferred lab:      Send INR reminders to:  [facility]    Comments:  EPIC      Factor V Leiden       Anticoagulation Care Providers     Provider Role Specialty Phone number    Clay Black MD Referring Family Medicine [phone]          Warfarin assessment / plan:   Subtherapeutic INR: 1.9  Denies missed doses  Denies increased vitamin K intake   Denies medication changes     Appears well, no complaints. This her second consecutive subtherapeutic INR, it is unclear why it has been low. We will increase her weekly dose today by 7.7% and see her back in 4 weeks.          Description    NEW DOSE: Warfarin 5mg (1 tablet) DAILY     Keep your green vegetable intake consistent (3-4 salads per week)                  Immunization History   Administered Date(s) Administered    COVID-19, Pfizer, PF, 30mcg/0.3mL 01/29/2021, 02/19/2021    Influenza Virus Vaccine 10/16/2009, 01/26/2010, 09/29/2010, 10/03/2016    Influenza, High Dose (Fluzone 65 yrs and older) 09/27/2011, 10/01/2012, 10/29/2013, 10/23/2014, 11/24/2015, 10/23/2018, 12/21/2019    Influenza, Quadv, IM, PF (6 mo and older Fluzone, Flulaval, Fluarix, and 3 yrs and older Afluria) 12/22/2020    Pneumococcal Conjugate 13-valent ([id number]) 07/02/2015    Pneumococcal Polysaccharide ([id number]) 07/01/2006, 11/07/2011    Td, unspecified formulation 10/04/2004    Tdap (Boostrix, Adacel) 09/01/2015         Reviewed AVS with patient / caregiver.       CLINICAL PHARMACY CONSULT: MED RECONCILIATION/REVIEW ADDENDUM    For Pharmacy Admin Tracking Only     Intervention Detail: Dose Adjustment: 1, reason: Therapy Optimization   Total # of Interventions Recommended: 1   Total # of Interventions Accepted: 1   Time Spent (min): 20

## 2021-06-17 NOTE — TELEPHONE ENCOUNTER
Called patient. Patient did not answer. I was not able to leave a message, patient mailbox is not yet set up.

## 2021-06-29 ENCOUNTER — OFFICE VISIT (OUTPATIENT)
Dept: DERMATOLOGY | Age: 81
End: 2021-06-29
Payer: MEDICARE

## 2021-06-29 VITALS — TEMPERATURE: 98.1 F

## 2021-06-29 DIAGNOSIS — Z85.9 HISTORY OF SQUAMOUS CELL CARCINOMA EXCISION: ICD-10-CM

## 2021-06-29 DIAGNOSIS — L90.5 SCAR CONDITION AND FIBROSIS OF SKIN: Primary | ICD-10-CM

## 2021-06-29 DIAGNOSIS — Z98.890 HISTORY OF SQUAMOUS CELL CARCINOMA EXCISION: ICD-10-CM

## 2021-06-29 DIAGNOSIS — L57.8 ACTINODERMATOSIS: ICD-10-CM

## 2021-06-29 PROCEDURE — G8400 PT W/DXA NO RESULTS DOC: HCPCS | Performed by: DERMATOLOGY

## 2021-06-29 PROCEDURE — 4040F PNEUMOC VAC/ADMIN/RCVD: CPT | Performed by: DERMATOLOGY

## 2021-06-29 PROCEDURE — 1123F ACP DISCUSS/DSCN MKR DOCD: CPT | Performed by: DERMATOLOGY

## 2021-06-29 PROCEDURE — G8417 CALC BMI ABV UP PARAM F/U: HCPCS | Performed by: DERMATOLOGY

## 2021-06-29 PROCEDURE — 99213 OFFICE O/P EST LOW 20 MIN: CPT | Performed by: DERMATOLOGY

## 2021-06-29 PROCEDURE — 1090F PRES/ABSN URINE INCON ASSESS: CPT | Performed by: DERMATOLOGY

## 2021-06-29 PROCEDURE — G8427 DOCREV CUR MEDS BY ELIG CLIN: HCPCS | Performed by: DERMATOLOGY

## 2021-06-29 PROCEDURE — 1036F TOBACCO NON-USER: CPT | Performed by: DERMATOLOGY

## 2021-06-30 NOTE — PROGRESS NOTES
Patient's Name: Kristen Haji  MRN: [id number]  YOB: 1940  Date of Visit: 6/29/2021  Primary Care Provider: Marialuisa Shankar MD  Referring Provider: No ref. provider found    Subjective:     Chief Complaint   Patient presents with    Follow-up     2 month f/u, last visit 4/23/21 excision SCC Central Chest, she is doing well        History of Present Illness: Kristen Haji a 78 y.o. female is a follow up patient to my practice. They were last seen in clinic on 4/23/21     She reports the excision site healed very well and pleased with how the scar looks. She has no concerning lesions today. Denies any changing, bleeding, painful, or non healing lesions. Past medical/surgical/family history reviewed with no changes since last visit on 4/23/21        Allergies:  No Known Allergies    Current Medications:  Current Outpatient Medications   Medication Sig Dispense Refill    warfarin (COUMADIN) 5 MG tablet TAKE 1 1/2 TABLETS BY MOUTH MONDAY AND FRIDAY, THEN 1 TABLET BY MOUTH DAILY ON THE OTHER DAYS (Patient taking differently: 5 mg daily ) 115 tablet 1    Cyanocobalamin (VITAMIN B 12) 500 MCG TABS Take 1 tablet by mouth daily      gabapentin (NEURONTIN) 300 MG capsule TAKE 1 TO 2 CAPSULES BY MOUTH EVERY NIGHT AS DIRECTED (Patient taking differently: 300 mg. TAKE 1 TO 2 CAPSULES BY MOUTH EVERY NIGHT AS DIRECTED) 60 capsule 5     No current facility-administered medications for this visit. Review of Systems:  Constitutional: No fevers, chills or recent illness.   Skin: Skin:As per HPI AND otherwise no new, bleeding or symptomatic skin lesions      Objective:     Vitals:    06/29/21 1404   Temp: 98.1 °F (36.7 °C)       Physical Examination:  General: alert, comfortable, no apparent distress, well-appearing  Psych: alert, oriented and pleasant  Neuro: oriented to person, place, and time  Skin: Areas examined: head including face, lips, conjunctiva and lids, neck, hair/scalp, chest, including breasts and axilla, right upper extremity, left upper extremity, left hand, right hand and digits and nails      All areas examined were within normal limits except those listed below with the appropriate assessment and plan    Assessment and Plan (with relevant objective exam findings):     1. Scar conditions and fibrosis of skin. Squamous cell carcinoma s/p excision  Location: central chest  Objective findings: Linear scar without signs of recurrence, healed and remodeling well    Discussed Risk factors for conditions such as nevi, skin cancers, photo-aging and skin damage including genetics and UV radiation from the sun and tanning beds. Recommendation was made for sun avoidance, use of protective clothing and daily use of broad spectrum sunscreen containing avobenzone, titanium, or zinc with SPF 30 or higher and yearly full skin exams with a dermatologist.  Also instructed to do self skin checks each month, and return to clinic sooner than a year if any new, changing, or concerning spots are identified. 2. Actinodermatosis  In all photo-exposed areas there were numerous light brown, lacy, 3-6 mm macules and some mottled hypo- and hyperpigmentation. This was most prominent on the face, anterior chest and forearms     The patient was reassured that these changes do not require treatment, but indicated a significant amount of sun damage in the patient's past. The patient was briefly counseled on the importance of sun protective habits and encouraged to be seen for follow up evaluations in the future. ABCDEs of melanoma were reviewed. Follow up:  Return visit in December for TBSE or as needed for change in condition. All questions addressed.      Procedure:   No procedure performed          Phyllis Gowers, MD. MS

## 2021-07-15 ENCOUNTER — ANTI-COAG VISIT (OUTPATIENT)
Dept: PHARMACY | Age: 81
End: 2021-07-15
Payer: MEDICARE

## 2021-07-15 DIAGNOSIS — R79.1 ABNORMAL COAGULATION PROFILE: Primary | ICD-10-CM

## 2021-07-15 LAB — INTERNATIONAL NORMALIZATION RATIO, POC: 2.4

## 2021-07-15 PROCEDURE — 99211 OFF/OP EST MAY X REQ PHY/QHP: CPT

## 2021-07-15 PROCEDURE — 85610 PROTHROMBIN TIME: CPT

## 2021-07-15 NOTE — PROGRESS NOTES
Ms. Kelley Huitron is a 78 y.o.  female. Ms. Kelley Huitron had an INR test today. Results were reviewed and appropriate warfarin management was completed. This visit was performed as: An in person visit. Protocols were followed with precautions to reduce the spread of COVID-19. Patient verifies current dosing regimen: Yes     Warfarin medication reviewed and updated on the patient 's home medication list: Yes   All other medications reviewed and updated on the patient 's home medication list: No: no changes. Lab Results   Component Value Date    INR 2.4 07/15/2021    INR 1.90 2021    INR 1.8 2021       Patient Findings     Negatives:  Signs/symptoms of bleeding, Missed doses, Change in medications, Change in diet/appetite, Bruising          Anticoagulation Summary  As of 7/15/2021    INR goal:  2.0-3.0   TTR:  54.2 % (10 y)   INR used for dosin.4 (7/15/2021)   Warfarin maintenance plan:  5 mg (5 mg x 1) every day   Weekly warfarin total:  35 mg   Plan last modified:  Lily Kaye (2021)   Next INR check:  2021   Priority:  Maintenance   Target end date: Indefinite    Indications    Abnormal coagulation profile [R79.1]             Anticoagulation Episode Summary     INR check location:      Preferred lab:      Send INR reminders to:  [facility]    Comments:  EPIC      Factor V Leiden       Anticoagulation Care Providers     Provider Role Specialty Phone number    Joshua Mcgowan MD Referring Family Medicine [phone]          Warfarin assessment / plan:     Appears well. No changes   No acute findings  No change to warfarin therapy today. Description    CONTINUE: Warfarin 5mg (1 tablet) DAILY     Keep your green vegetable intake consistent (3-4 salads per week). Please call if this changes. Call [phone] with signs or symptoms of bleeding or ANY medication changes (including over-the-counter medications or herbal supplements). If significant bleeding occurs please seek immediate medical attention. Limit alcohol intake. Please call if this changes. Immunization History   Administered Date(s) Administered    COVID-19, Pfizer, PF, 30mcg/0.3mL 01/29/2021, 02/19/2021    Influenza Virus Vaccine 10/16/2009, 01/26/2010, 09/29/2010, 10/03/2016    Influenza, High Dose (Fluzone 65 yrs and older) 09/27/2011, 10/01/2012, 10/29/2013, 10/23/2014, 11/24/2015, 10/23/2018, 12/21/2019    Influenza, Quadv, IM, PF (6 mo and older Fluzone, Flulaval, Fluarix, and 3 yrs and older Afluria) 12/22/2020    Pneumococcal Conjugate 13-valent ([id number]) 07/02/2015    Pneumococcal Polysaccharide ([id number]) 07/01/2006, 11/07/2011    Td, unspecified formulation 10/04/2004    Tdap (Boostrix, Adacel) 09/01/2015         Reviewed AVS with patient / caregiver.       CLINICAL PHARMACY CONSULT: MED RECONCILIATION/REVIEW ADDENDUM    For Pharmacy Admin Tracking Only     Intervention Detail:    Total # of Interventions Recommended: 0   Total # of Interventions Accepted: 0   Time Spent (min): 20

## 2021-07-16 VITALS
BODY MASS INDEX: 32.04 KG/M2 | OXYGEN SATURATION: 93 % | RESPIRATION RATE: 24 BRPM | TEMPERATURE: 98.2 F | HEIGHT: 59 IN | HEART RATE: 122 BPM | DIASTOLIC BLOOD PRESSURE: 78 MMHG | WEIGHT: 158.95 LBS | SYSTOLIC BLOOD PRESSURE: 133 MMHG

## 2021-07-16 PROCEDURE — 93005 ELECTROCARDIOGRAM TRACING: CPT | Performed by: STUDENT IN AN ORGANIZED HEALTH CARE EDUCATION/TRAINING PROGRAM

## 2021-07-16 ASSESSMENT — PAIN DESCRIPTION - FREQUENCY: FREQUENCY: CONTINUOUS

## 2021-07-16 ASSESSMENT — PAIN - FUNCTIONAL ASSESSMENT: PAIN_FUNCTIONAL_ASSESSMENT: PREVENTS OR INTERFERES SOME ACTIVE ACTIVITIES AND ADLS

## 2021-07-16 ASSESSMENT — PAIN DESCRIPTION - LOCATION: LOCATION: BACK

## 2021-07-16 ASSESSMENT — PAIN DESCRIPTION - ORIENTATION: ORIENTATION: MID

## 2021-07-16 ASSESSMENT — PAIN DESCRIPTION - PAIN TYPE: TYPE: ACUTE PAIN

## 2021-07-16 ASSESSMENT — PAIN DESCRIPTION - DESCRIPTORS: DESCRIPTORS: PRESSURE;RADIATING;SHARP

## 2021-07-16 ASSESSMENT — PAIN SCALES - GENERAL: PAINLEVEL_OUTOF10: 10

## 2021-07-16 ASSESSMENT — PAIN DESCRIPTION - PROGRESSION: CLINICAL_PROGRESSION: NOT CHANGED

## 2021-07-16 ASSESSMENT — PAIN DESCRIPTION - ONSET: ONSET: ON-GOING

## 2021-07-17 ENCOUNTER — HOSPITAL ENCOUNTER (EMERGENCY)
Age: 81
Discharge: LEFT BEFORE DISCHARGE | DRG: 215 | End: 2021-07-17
Payer: MEDICARE

## 2021-07-19 ENCOUNTER — HOSPITAL ENCOUNTER (INPATIENT)
Age: 81
LOS: 7 days | Discharge: HOME | DRG: 215 | End: 2021-07-26
Attending: EMERGENCY MEDICINE | Admitting: INTERNAL MEDICINE
Payer: MEDICARE

## 2021-07-19 ENCOUNTER — NURSE TRIAGE (OUTPATIENT)
Dept: OTHER | Facility: CLINIC | Age: 81
End: 2021-07-19

## 2021-07-19 ENCOUNTER — APPOINTMENT (OUTPATIENT)
Dept: GENERAL RADIOLOGY | Age: 81
DRG: 215 | End: 2021-07-19
Payer: MEDICARE

## 2021-07-19 DIAGNOSIS — R07.9 CHEST PAIN, UNSPECIFIED TYPE: ICD-10-CM

## 2021-07-19 DIAGNOSIS — I21.3 ST ELEVATION MYOCARDIAL INFARCTION (STEMI), UNSPECIFIED ARTERY (HCC): Primary | ICD-10-CM

## 2021-07-19 DIAGNOSIS — I48.91 ATRIAL FIBRILLATION WITH RAPID VENTRICULAR RESPONSE (HCC): ICD-10-CM

## 2021-07-19 PROBLEM — I51.81 TAKOTSUBO CARDIOMYOPATHY: Status: ACTIVE | Noted: 2021-07-19

## 2021-07-19 PROBLEM — R57.0 SHOCK, CARDIOGENIC (HCC): Status: ACTIVE | Noted: 2021-07-19

## 2021-07-19 LAB
A/G RATIO: 1.3 (ref 1.1–2.2)
ALBUMIN SERPL-MCNC: 3.8 G/DL (ref 3.4–5)
ALP BLD-CCNC: 103 U/L (ref 40–129)
ALT SERPL-CCNC: 38 U/L (ref 10–40)
ANION GAP SERPL CALCULATED.3IONS-SCNC: 15 MMOL/L (ref 3–16)
APTT: 50.5 SEC (ref 26.2–38.6)
AST SERPL-CCNC: 84 U/L (ref 15–37)
ATYPICAL LYMPHOCYTE RELATIVE PERCENT: 32 % (ref 0–6)
BANDED NEUTROPHILS RELATIVE PERCENT: 1 % (ref 0–7)
BASE EXCESS MIXED: 0
BASE EXCESS MIXED: 1
BASE EXCESS MIXED: 2
BASE EXCESS MIXED: 3
BASOPHILS ABSOLUTE: 0 K/UL (ref 0–0.2)
BASOPHILS RELATIVE PERCENT: 0 %
BILIRUB SERPL-MCNC: 0.9 MG/DL (ref 0–1)
BUN BLDV-MCNC: 18 MG/DL (ref 7–20)
CALCIUM SERPL-MCNC: 8.5 MG/DL (ref 8.3–10.6)
CHLORIDE BLD-SCNC: 97 MMOL/L (ref 99–110)
CO2: 22 MMOL/L (ref 21–32)
CREAT SERPL-MCNC: 1 MG/DL (ref 0.6–1.2)
EKG ATRIAL RATE: 121 BPM
EKG DIAGNOSIS: NORMAL
EKG P AXIS: 73 DEGREES
EKG P-R INTERVAL: 152 MS
EKG Q-T INTERVAL: 324 MS
EKG QRS DURATION: 92 MS
EKG QTC CALCULATION (BAZETT): 460 MS
EKG R AXIS: -49 DEGREES
EKG T AXIS: 47 DEGREES
EKG VENTRICULAR RATE: 121 BPM
EOSINOPHILS ABSOLUTE: 0 K/UL (ref 0–0.6)
EOSINOPHILS RELATIVE PERCENT: 0 %
GFR AFRICAN AMERICAN: >60
GFR NON-AFRICAN AMERICAN: 53
GLOBULIN: 3 G/DL
GLUCOSE BLD-MCNC: 163 MG/DL (ref 70–99)
HCO3, MIXED: 25.9 MMOL/L
HCO3, MIXED: 26.7 MMOL/L
HCO3, MIXED: 27.2 MMOL/L
HCO3, MIXED: 28.2 MMOL/L
HCT VFR BLD CALC: 50.9 % (ref 36–48)
HEMATOLOGY PATH CONSULT: YES
HEMOGLOBIN: 16.7 G/DL (ref 12–16)
INR BLD: 4.75 (ref 0.88–1.12)
LACTATE: 1.3 MMOL/L (ref 0.4–2)
LYMPHOCYTES ABSOLUTE: 9.5 K/UL (ref 1–5.1)
LYMPHOCYTES RELATIVE PERCENT: 11 %
MCH RBC QN AUTO: 30.7 PG (ref 26–34)
MCHC RBC AUTO-ENTMCNC: 32.8 G/DL (ref 31–36)
MCV RBC AUTO: 93.5 FL (ref 80–100)
MONOCYTES ABSOLUTE: 0.2 K/UL (ref 0–1.3)
MONOCYTES RELATIVE PERCENT: 1 %
NEUTROPHILS ABSOLUTE: 12.3 K/UL (ref 1.7–7.7)
NEUTROPHILS RELATIVE PERCENT: 55 %
O2 SAT, MIXED: 49 %
O2 SAT, MIXED: 49 %
O2 SAT, MIXED: 51 %
O2 SAT, MIXED: 53 %
PCO2 MIXED: 48.1 MM HG
PCO2 MIXED: 48.6 MM HG
PCO2 MIXED: 49.5 MM HG
PCO2 MIXED: 52.4 MM HG
PDW BLD-RTO: 14.4 % (ref 12.4–15.4)
PERFORMED ON: ABNORMAL
PH, MIXED: 7.34 (ref 7.35–7.45)
PH, MIXED: 7.34 (ref 7.35–7.45)
PH, MIXED: 7.35 (ref 7.35–7.45)
PH, MIXED: 7.35 (ref 7.35–7.45)
PLATELET # BLD: 246 K/UL (ref 135–450)
PLATELET SLIDE REVIEW: ADEQUATE
PMV BLD AUTO: 8.8 FL (ref 5–10.5)
PO2 MIXED: 28 MM HG
PO2 MIXED: 28 MM HG
PO2 MIXED: 30 MM HG
PO2 MIXED: 30 MM HG
POC ACT LR: >400 SEC
POC SAMPLE TYPE: ABNORMAL
POTASSIUM REFLEX MAGNESIUM: 4.3 MMOL/L (ref 3.5–5.1)
PRO-BNP: ABNORMAL PG/ML (ref 0–449)
PROTHROMBIN TIME: 57.3 SEC (ref 9.9–12.7)
RBC # BLD: 5.45 M/UL (ref 4–5.2)
RBC # BLD: NORMAL 10*6/UL
SLIDE REVIEW: ABNORMAL
SODIUM BLD-SCNC: 134 MMOL/L (ref 136–145)
TCO2 CALC MIXED: 27 MMOL/L
TCO2 CALC MIXED: 28 MMOL/L
TCO2 CALC MIXED: 29 MMOL/L
TCO2 CALC MIXED: 30 MMOL/L
TOTAL PROTEIN: 6.8 G/DL (ref 6.4–8.2)
TROPONIN: 2.22 NG/ML
WBC # BLD: 22 K/UL (ref 4–11)

## 2021-07-19 PROCEDURE — 6370000000 HC RX 637 (ALT 250 FOR IP): Performed by: INTERNAL MEDICINE

## 2021-07-19 PROCEDURE — 6360000002 HC RX W HCPCS

## 2021-07-19 PROCEDURE — 2700000000 HC OXYGEN THERAPY PER DAY

## 2021-07-19 PROCEDURE — 93308 TTE F-UP OR LMTD: CPT

## 2021-07-19 PROCEDURE — 82803 BLOOD GASES ANY COMBINATION: CPT

## 2021-07-19 PROCEDURE — 83605 ASSAY OF LACTIC ACID: CPT

## 2021-07-19 PROCEDURE — 4A023N8 MEASUREMENT OF CARDIAC SAMPLING AND PRESSURE, BILATERAL, PERCUTANEOUS APPROACH: ICD-10-PCS | Performed by: INTERNAL MEDICINE

## 2021-07-19 PROCEDURE — 6360000002 HC RX W HCPCS: Performed by: INTERNAL MEDICINE

## 2021-07-19 PROCEDURE — 93460 R&L HRT ART/VENTRICLE ANGIO: CPT

## 2021-07-19 PROCEDURE — 94761 N-INVAS EAR/PLS OXIMETRY MLT: CPT

## 2021-07-19 PROCEDURE — 2500000003 HC RX 250 WO HCPCS: Performed by: INTERNAL MEDICINE

## 2021-07-19 PROCEDURE — 6370000000 HC RX 637 (ALT 250 FOR IP): Performed by: EMERGENCY MEDICINE

## 2021-07-19 PROCEDURE — 2720000010 HC SURG SUPPLY STERILE

## 2021-07-19 PROCEDURE — 99282 EMERGENCY DEPT VISIT SF MDM: CPT

## 2021-07-19 PROCEDURE — 2580000003 HC RX 258: Performed by: EMERGENCY MEDICINE

## 2021-07-19 PROCEDURE — 93010 ELECTROCARDIOGRAM REPORT: CPT | Performed by: INTERNAL MEDICINE

## 2021-07-19 PROCEDURE — 85610 PROTHROMBIN TIME: CPT

## 2021-07-19 PROCEDURE — 92960 CARDIOVERSION ELECTRIC EXT: CPT

## 2021-07-19 PROCEDURE — 33990 INSJ PERQ VAD L HRT ARTERIAL: CPT

## 2021-07-19 PROCEDURE — B2151ZZ FLUOROSCOPY OF LEFT HEART USING LOW OSMOLAR CONTRAST: ICD-10-PCS | Performed by: INTERNAL MEDICINE

## 2021-07-19 PROCEDURE — 2709999900 HC NON-CHARGEABLE SUPPLY

## 2021-07-19 PROCEDURE — 5A0221D ASSISTANCE WITH CARDIAC OUTPUT USING IMPELLER PUMP, CONTINUOUS: ICD-10-PCS | Performed by: INTERNAL MEDICINE

## 2021-07-19 PROCEDURE — C1769 GUIDE WIRE: HCPCS

## 2021-07-19 PROCEDURE — B2111ZZ FLUOROSCOPY OF MULTIPLE CORONARY ARTERIES USING LOW OSMOLAR CONTRAST: ICD-10-PCS | Performed by: INTERNAL MEDICINE

## 2021-07-19 PROCEDURE — 92960 CARDIOVERSION ELECTRIC EXT: CPT | Performed by: INTERNAL MEDICINE

## 2021-07-19 PROCEDURE — 85025 COMPLETE CBC W/AUTO DIFF WBC: CPT

## 2021-07-19 PROCEDURE — 83880 ASSAY OF NATRIURETIC PEPTIDE: CPT

## 2021-07-19 PROCEDURE — 85347 COAGULATION TIME ACTIVATED: CPT

## 2021-07-19 PROCEDURE — 51702 INSERT TEMP BLADDER CATH: CPT

## 2021-07-19 PROCEDURE — C1751 CATH, INF, PER/CENT/MIDLINE: HCPCS

## 2021-07-19 PROCEDURE — 85730 THROMBOPLASTIN TIME PARTIAL: CPT

## 2021-07-19 PROCEDURE — 02HA3RZ INSERTION OF SHORT-TERM EXTERNAL HEART ASSIST SYSTEM INTO HEART, PERCUTANEOUS APPROACH: ICD-10-PCS | Performed by: INTERNAL MEDICINE

## 2021-07-19 PROCEDURE — 99152 MOD SED SAME PHYS/QHP 5/>YRS: CPT | Performed by: INTERNAL MEDICINE

## 2021-07-19 PROCEDURE — 6360000002 HC RX W HCPCS: Performed by: EMERGENCY MEDICINE

## 2021-07-19 PROCEDURE — 6360000004 HC RX CONTRAST MEDICATION: Performed by: INTERNAL MEDICINE

## 2021-07-19 PROCEDURE — 5A2204Z RESTORATION OF CARDIAC RHYTHM, SINGLE: ICD-10-PCS | Performed by: INTERNAL MEDICINE

## 2021-07-19 PROCEDURE — 99152 MOD SED SAME PHYS/QHP 5/>YRS: CPT

## 2021-07-19 PROCEDURE — 80053 COMPREHEN METABOLIC PANEL: CPT

## 2021-07-19 PROCEDURE — 96374 THER/PROPH/DIAG INJ IV PUSH: CPT

## 2021-07-19 PROCEDURE — 2500000003 HC RX 250 WO HCPCS

## 2021-07-19 PROCEDURE — 36415 COLL VENOUS BLD VENIPUNCTURE: CPT

## 2021-07-19 PROCEDURE — 99153 MOD SED SAME PHYS/QHP EA: CPT

## 2021-07-19 PROCEDURE — C1760 CLOSURE DEV, VASC: HCPCS

## 2021-07-19 PROCEDURE — 33990 INSJ PERQ VAD L HRT ARTERIAL: CPT | Performed by: INTERNAL MEDICINE

## 2021-07-19 PROCEDURE — 93460 R&L HRT ART/VENTRICLE ANGIO: CPT | Performed by: INTERNAL MEDICINE

## 2021-07-19 PROCEDURE — 2100000000 HC CCU R&B

## 2021-07-19 PROCEDURE — C1894 INTRO/SHEATH, NON-LASER: HCPCS

## 2021-07-19 PROCEDURE — 84484 ASSAY OF TROPONIN QUANT: CPT

## 2021-07-19 PROCEDURE — 93005 ELECTROCARDIOGRAM TRACING: CPT | Performed by: EMERGENCY MEDICINE

## 2021-07-19 RX ORDER — FUROSEMIDE 10 MG/ML
40 INJECTION INTRAMUSCULAR; INTRAVENOUS ONCE
Status: COMPLETED | OUTPATIENT
Start: 2021-07-19 | End: 2021-07-19

## 2021-07-19 RX ORDER — ONDANSETRON 2 MG/ML
INJECTION INTRAMUSCULAR; INTRAVENOUS
Status: COMPLETED
Start: 2021-07-19 | End: 2021-07-19

## 2021-07-19 RX ORDER — MILRINONE LACTATE 0.2 MG/ML
0.25 INJECTION, SOLUTION INTRAVENOUS CONTINUOUS
Status: DISCONTINUED | OUTPATIENT
Start: 2021-07-19 | End: 2021-07-25

## 2021-07-19 RX ORDER — NITROGLYCERIN 20 MG/100ML
5-200 INJECTION INTRAVENOUS CONTINUOUS
Status: DISCONTINUED | OUTPATIENT
Start: 2021-07-19 | End: 2021-07-21

## 2021-07-19 RX ORDER — HEPARIN SODIUM 1000 [USP'U]/ML
4000 INJECTION, SOLUTION INTRAVENOUS; SUBCUTANEOUS ONCE
Status: COMPLETED | OUTPATIENT
Start: 2021-07-19 | End: 2021-07-19

## 2021-07-19 RX ORDER — ONDANSETRON 2 MG/ML
4 INJECTION INTRAMUSCULAR; INTRAVENOUS EVERY 6 HOURS PRN
Status: DISCONTINUED | OUTPATIENT
Start: 2021-07-19 | End: 2021-07-26 | Stop reason: HOSPADM

## 2021-07-19 RX ORDER — ASPIRIN 81 MG/1
324 TABLET, CHEWABLE ORAL ONCE
Status: COMPLETED | OUTPATIENT
Start: 2021-07-19 | End: 2021-07-19

## 2021-07-19 RX ORDER — ACETAMINOPHEN 325 MG/1
650 TABLET ORAL EVERY 4 HOURS PRN
Status: DISCONTINUED | OUTPATIENT
Start: 2021-07-19 | End: 2021-07-26 | Stop reason: HOSPADM

## 2021-07-19 RX ADMIN — AMIODARONE HYDROCHLORIDE 150 MG: 50 INJECTION, SOLUTION INTRAVENOUS at 11:00

## 2021-07-19 RX ADMIN — ASPIRIN 81 MG 324 MG: 81 TABLET ORAL at 11:00

## 2021-07-19 RX ADMIN — ONDANSETRON 4 MG: 2 INJECTION INTRAMUSCULAR; INTRAVENOUS at 20:27

## 2021-07-19 RX ADMIN — NITROGLYCERIN 5 MCG/MIN: 20 INJECTION INTRAVENOUS at 13:21

## 2021-07-19 RX ADMIN — AMIODARONE HYDROCHLORIDE 0.5 MG/MIN: 50 INJECTION, SOLUTION INTRAVENOUS at 18:31

## 2021-07-19 RX ADMIN — HEPARIN SODIUM 4000 UNITS: 1000 INJECTION INTRAVENOUS; SUBCUTANEOUS at 11:30

## 2021-07-19 RX ADMIN — FUROSEMIDE 40 MG: 10 INJECTION, SOLUTION INTRAMUSCULAR; INTRAVENOUS at 18:33

## 2021-07-19 RX ADMIN — MILRINONE LACTATE 0.25 MCG/KG/MIN: 0.2 INJECTION, SOLUTION INTRAVENOUS at 14:35

## 2021-07-19 RX ADMIN — AMIODARONE HYDROCHLORIDE 1 MG/MIN: 50 INJECTION, SOLUTION INTRAVENOUS at 11:02

## 2021-07-19 RX ADMIN — ACETAMINOPHEN 650 MG: 325 TABLET ORAL at 15:14

## 2021-07-19 RX ADMIN — IOPAMIDOL 65 ML: 755 INJECTION, SOLUTION INTRAVENOUS at 11:52

## 2021-07-19 ASSESSMENT — PAIN SCALES - GENERAL
PAINLEVEL_OUTOF10: 1
PAINLEVEL_OUTOF10: 1
PAINLEVEL_OUTOF10: 0
PAINLEVEL_OUTOF10: 3
PAINLEVEL_OUTOF10: 3

## 2021-07-19 ASSESSMENT — ENCOUNTER SYMPTOMS
NAUSEA: 0
SHORTNESS OF BREATH: 1
ABDOMINAL PAIN: 0
COUGH: 0
VOMITING: 0

## 2021-07-19 ASSESSMENT — PAIN DESCRIPTION - ONSET: ONSET: ON-GOING

## 2021-07-19 ASSESSMENT — PAIN DESCRIPTION - DESCRIPTORS: DESCRIPTORS: TIGHTNESS

## 2021-07-19 ASSESSMENT — PAIN DESCRIPTION - FREQUENCY: FREQUENCY: CONTINUOUS

## 2021-07-19 ASSESSMENT — PAIN DESCRIPTION - ORIENTATION: ORIENTATION: LEFT;MID

## 2021-07-19 ASSESSMENT — PAIN DESCRIPTION - PAIN TYPE: TYPE: ACUTE PAIN

## 2021-07-19 ASSESSMENT — PAIN DESCRIPTION - LOCATION: LOCATION: CHEST

## 2021-07-19 ASSESSMENT — PAIN DESCRIPTION - PROGRESSION: CLINICAL_PROGRESSION: NOT CHANGED

## 2021-07-19 ASSESSMENT — PAIN - FUNCTIONAL ASSESSMENT: PAIN_FUNCTIONAL_ASSESSMENT: PREVENTS OR INTERFERES SOME ACTIVE ACTIVITIES AND ADLS

## 2021-07-19 NOTE — ED NOTES
Pt on monitor rhythm was Vtach per monitor. Pt c/o SOB and chest pain. Pt placed on 2lpm via nc and defibrillator. Dr Anna Torres at bedside.       Romelia Jimenez RN  07/19/21 [id number]

## 2021-07-19 NOTE — PRE SEDATION
Sedation Pre-Procedure Note    Patient Name: Star Pritchett   YOB: 1940  Room/Bed: CATH/NONE  Medical Record Number: [id number]  Date: 7/19/2021   Time: 11:56 AM       Indication:  stemi     Consent: I have discussed with the patient and/or the patient representative the indication, alternatives, and the possible risks and/or complications of the planned procedure and the anesthesia methods. The patient and/or patient representative appear to understand and agree to proceed. Vital Signs:   Vitals:    07/19/21 1048   BP: 120/63   Pulse: 155   Resp: 28   Temp: 98.3 °F (36.8 °C)   SpO2: 92%       Past Medical History:   has a past medical history of Chronic lymphocytic leukemia in remission ([facility] [address] CLL (chronic lymphocytic leukemia) ([facility] COPD (chronic obstructive pulmonary disease) ([facility] Essential hypertension, Factor V Leiden mutation ([facility] Goiter, Hypercholesterolemia, Impaired fasting glucose, Osteoarthritis, Osteoporosis, Post herpetic neuralgia, and Vitamin D deficiency. Past Surgical History:   has a past surgical history that includes Splenectomy; knee surgery (2008); Cataract removal with implant (5/14/12); Tunneled venous port placement; Upper gastrointestinal endoscopy (N/A, 2/5/2019); Mohs surgery (03/12/2019); and joint replacement. Medications:   Scheduled Meds:   Continuous Infusions:    amiodarone 1 mg/min (07/19/21 1102)    Followed by   Creston Sicard amiodarone       PRN Meds:   Home Meds:   Prior to Admission medications    Medication Sig Start Date End Date Taking? Authorizing Provider   warfarin (COUMADIN) 5 MG tablet TAKE 1 1/2 TABLETS BY MOUTH MONDAY AND FRIDAY, THEN 1 TABLET BY MOUTH DAILY ON THE OTHER DAYS  Patient taking differently: 5 mg daily  3/8/21   Jackeline Lamb MD   gabapentin (NEURONTIN) 300 MG capsule TAKE 1 TO 2 CAPSULES BY MOUTH EVERY NIGHT AS DIRECTED  Patient taking differently: 300 mg.  TAKE 1 TO 2 CAPSULES BY MOUTH EVERY NIGHT AS DIRECTED 3/1/21 4/20/21  Telma Stephy Lozada APRN - CNP   Cyanocobalamin (VITAMIN B 12) 500 MCG TABS Take 1 tablet by mouth daily    Historical Provider, MD     Coumadin Use Last 7 Days:  yes -   Antiplatelet drug therapy use last 7 days: no  Other anticoagulant use last 7 days: no  Additional Medication Information:  n/a      Pre-Sedation Documentation and Exam:   I have personally completed a history, physical exam & review of systems for this patient (see notes).     Mallampati Airway Assessment:  Mallampati Class I - (soft palate, fauces, uvula & anterior/posterior tonsillar pillars are visible)    Prior History of Anesthesia Complications:   none    ASA Classification:  Class 3 - A patient with severe systemic disease that limits activity but is not incapacitating    Sedation/ Anesthesia Plan:   intravenous sedation    Medications Planned:   midazolam (Versed) intravenously    Patient is an appropriate candidate for plan of sedation: yes    Electronically signed by Brunilda Iverson MD on 7/19/2021 at 11:56 AM

## 2021-07-19 NOTE — OP NOTE
[address]   Procedure Note    CLINICAL HISTORY:       Colin Quezada is a 78 y.o. female with a history of CLL who presents with dyspnea, ECG c/w STEMI. 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    Factor V Leiden mutation ([facility] [address]    Chronic lymphocytic leukemia in remission    Osteoarthritis    Hypercholesterolemia    Goiter, 1.5 cm midline    Impaired fasting glucose    Vitamin D deficiency    Small cleaved cell (diffuse) NHL (HCC)    B12 deficiency    Abnormal coagulation profile    Encounter for follow-up examination after completed treatment for cancer    Encounter for care related to Port-a-Cath    Hyperuricemia    Arthritis of knee    Neoplasm of uncertain behavior of skin    History of squamous cell carcinoma of skin    Basal cell carcinoma of right medial nasolabial fold    Basal cell carcinoma of left temple region    History of basal cell carcinoma    Seborrheic keratoses, inflamed    Takotsubo cardiomyopathy       Prior to Admission medications    Medication Sig Start Date End Date Taking? Authorizing Provider   warfarin (COUMADIN) 5 MG tablet TAKE 1 1/2 TABLETS BY MOUTH MONDAY AND FRIDAY, THEN 1 TABLET BY MOUTH DAILY ON THE OTHER DAYS  Patient taking differently: 5 mg daily  3/8/21   Lynsey Moran MD   gabapentin (NEURONTIN) 300 MG capsule TAKE 1 TO 2 CAPSULES BY MOUTH EVERY NIGHT AS DIRECTED  Patient taking differently: 300 mg. TAKE 1 TO 2 CAPSULES BY MOUTH EVERY NIGHT AS DIRECTED 3/1/21 4/20/21  FAB Perez CNP   Cyanocobalamin (VITAMIN B 12) 500 MCG TABS Take 1 tablet by mouth daily    Historical Provider, MD        The risks, benefits, and details of the procedure were explained to the patient.   The patient verbalized understanding and wanted to proceed. Informed written consent was obtained. INDICATION:  STEMI     PROCEDURES PERFORMED:   [facility]   Percutaneous LVAD Impella CP mechanical support   Synchronized cardioversion     PROCEDURE TECHNIQUE:  The patient was approached from the right femoral artery using a 6  Indonesian sheath, with US/micro/fluroscopic technique. Left coronary angiography was done using a Miara L3.5 diagnostic catheter. Right coronary angiography was done using a Maira R4 guide catheter. Left ventriculography was done using a pigtail catheter. CONTRAST:  Total of 45 cc. COMPLICATIONS:  None. EBL: 10 cc    Moderate Sedation:  Start time: 1117  Stop time: 1200  1 mg versed   25 mcg fentanyl   An independent trained observer pushed medications at my direction. We monitored the patient's level of consciousness and vital signs/physiologic status throughout the procedure duration (see start and start times above). HEMODYNAMICS:  Aortic pressure was 100/74/83;  LVEDP 30. There was no gradient between the left ventricle and aorta. RA 7  RV 22/7  PA 30/17/22  PCWP 20   PA % 52  AO % 97  CO/CI 2.22 L/min 1.3 L/min/m2  SVR 2763 dynes . Sec/cm-5   dynes . Sec/cm-5  TPG 2   0.4      ANGIOGRAM/CORONARY ARTERIOGRAM:       The left main coronary artery is normal .    The left anterior descending artery is normal .    The left circumflex artery is normal .    The right coronary artery is normal .    LEFT VENTRICULOGRAM:  Left ventricular angiogram was done in the 30° MARCELO projection and revealed severe LV systolic dysfunction   with an estimated ejection fraction of 15%. INTERVENTION  1. From the right femoral approach to artery was dilated to 12F, followed by placement of 14F impella sheath   2. Impella CP was positioned into the LV apex to assist in cardiac output as patient is in cardiogenic shock   3.  After 40 mg brevitol, 2 synchronized cardioversions were performed from afib to NSR    SUMMARY:   Normal coronary arteries  Severe LV systolic dysfunction   Cardiogenic shock   Afib with rapid rate     RECOMMENDATION:     - Amio gtt   - heparin gtt  - nitro gtt to keep SVR < 1500   - stat echo for impella positioning   - MV02 q6 hrs to keep > 55%     Hansel Vargas MD [address] [facility], Interventional Cardiology, and Peripheral Vascular [address] [facility]   (C): [phone]  (O): [phone]

## 2021-07-19 NOTE — ED PROVIDER NOTES
[facility]  EMERGENCY DEPARTMENT ENCOUNTER      Pt Name: Arcadio Crockett  MRN: [id number]  [place] 1940  Date of evaluation: 7/19/2021  Provider: Camila Nino, [address] [place] [address]       Chief Complaint   Patient presents with    Shortness of Breath     Pt states she has been SOB dizziness with N/V/D. state she was here Friday but left due to being in the waiting area for 3 hours.  Dizziness         HISTORY OF PRESENT ILLNESS   (Location/Symptom, Timing/Onset, Context/Setting, Quality, Duration, Modifying Factors, Severity)  Note limiting factors. Arcadio Crockett is a 78 y.o. female who presents to the emergency department with a complaint of midsternal chest pain that radiates to the left scapular area that began on Friday evening at approximately 7 PM. She reports that she actually developed nausea vomiting diaphoresis and diarrhea prior to the onset of her chest pain. She has had persistent intermittent pain since that time. She reports that the vomiting and diarrhea only lasted for a couple of hours. She denies any current chest discomfort. She denies any abdominal pain back pain or flank pain. She denies any syncope or loss of consciousness. She does report some shortness of breath and dyspnea on exertion with lightheadedness today since waking. She denies any prior history of coronary artery disease. She does report a history of hyperlipidemia and type 2 diabetes. She does have a history of COPD but does not wear oxygen. She reports some family history of heart disease in her mother. 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 Nursing Notes were reviewed. HPI        REVIEW OF SYSTEMS    (2-9 systems for level 4, 10 or more for level 5)       Constitutional: Negative for fever or chills. HENT: Negative for rhinorrhea and sore throat. Eyes: Negative for redness or drainage.    Gastrointestinal: Negative for abdominal pain. Negative for vomiting or diarrhea. Genitourinary: Negative for flank pain. Negative for dysuria. Negative for hematuria. Neurological: Negative for headache. All systems are reviewed and are negative except for those listed above in the history of present illness and ROS.         PAST MEDICAL HISTORY     Past Medical History:   Diagnosis Date    [facility]ocytic [facility] remission ([facility]     CLL (chronic lymphocytic leukemia) ([facility] 2/12/2015    COPD (chronic obstructive pulmonary disease) ([facility]     Essential hypertension 2015    controlled with salt restriction (initially)    Factor V Leiden mutation ([facility]     Goiter Nov, 2011    1.5 cm midline    Hypercholesterolemia     Impaired fasting glucose Nov, 2011    Osteoarthritis     Osteoporosis     Post herpetic neuralgia     Vitamin D deficiency Nov, 2011         SURGICAL HISTORY       Past Surgical History:   Procedure Laterality Date    CATARACT REMOVAL WITH IMPLANT  5/14/12    left eye    JOINT REPLACEMENT      partial knee R and L    KNEE SURGERY  2008    partial replacements, both knees    MOHS SURGERY  03/12/2019    R cheek and R superior temple    SPLENECTOMY      TUNNELED VENOUS PORT PLACEMENT      UPPER GASTROINTESTINAL ENDOSCOPY N/A 2/5/2019    EGD ESOPHAGOGASTRODUODENOSCOPY, WITH ENDOSCOPIC ULTRASOUND OF ESOPHAGUS [facility]       Current Discharge Medication List      CONTINUE these medications which have NOT CHANGED    Details   warfarin (COUMADIN) 5 MG tablet TAKE 1 1/2 TABLETS BY MOUTH MONDAY AND FRIDAY, THEN 1 TABLET BY MOUTH DAILY ON THE OTHER DAYS  Qty: 115 tablet, Refills: 1      gabapentin (NEURONTIN) 300 MG capsule TAKE 1 TO 2 CAPSULES BY MOUTH EVERY NIGHT AS DIRECTED  Qty: 60 capsule, Refills: 5      Cyanocobalamin (VITAMIN B 12) 500 MCG TABS Take 1 tablet by mouth daily             ALLERGIES     Patient has no known allergies. FAMILY HISTORY       Family History   Problem Relation Age of Onset    Diabetes Father     Stroke Sister 50         of a stroke          SOCIAL HISTORY       Social History     Socioeconomic History    Marital status:      Spouse name: Not on file    Number of children: Not on file    Years of education: Not on file    Highest education level: Not on file   Occupational History    Not on file   Tobacco Use    Smoking status: Former Smoker     Packs/day: 0.30     Years: 50.00     Pack years: 15.00     Types: Cigarettes     Quit date: 1995     Years since quittin.7    Smokeless tobacco: Never Used   Vaping Use    Vaping Use: Never used   Substance and Sexual Activity    Alcohol use: No     Alcohol/week: 0.0 standard drinks    Drug use: Not on file    Sexual activity: Not on file   Other Topics Concern    Not on file   Social History Narrative    Not on file     Social Determinants of Health     Financial Resource Strain: Low Risk     Difficulty of Paying Living Expenses: Not hard at all   Food Insecurity: No Food Insecurity    Worried About [address] [employer] in the Last Year: Never true    [facility] [employer] in the Last Year: Never true   Transportation Needs: No Transportation Needs    Lack of Transportation (Medical): No    Lack of Transportation (Non-Medical):  No   Physical Activity:     Days of Exercise per Week:     Minutes of Exercise per Session:    Stress:     Feeling of Stress :    Social Connections:     Frequency of Communication with Friends and Family:     Frequency of Social Gatherings with Friends and Family:     Attends Christianity Services:     Active Member of Clubs or Organizations:     Attends Club or Organization Meetings:     Marital Status:    Intimate Partner Violence:     Fear of Current or Ex-Partner:     Emotionally Abused:     Physically Abused:     Sexually Abused:        SCREENINGS             PHYSICAL EXAM    (up to 7 for level 4, 8 or more for level 5)     ED Triage Vitals [07/19/21 1048]   BP Temp Temp Source Pulse Resp SpO2 Height Weight   120/63 98.3 °F (36.8 °C) Oral 155 28 92 % 4' 11\" (1.499 m) --         Physical Exam   Constitutional: Awake and alert. Slightly anxious about her symptoms. Head: No visible evidence of trauma. Normocephalic. Eyes: Pupils equal and reactive. No photophobia. Conjunctiva normal.    HENT: Oral mucosa moist.  Airway patent. Pharynx without erythema. Nares were clear. Neck:  Soft and supple. Nontender. Heart:  Regular rate and rhythm. No murmur. Lungs:  Clear to auscultation. No wheezes, rales, or ronchi. No conversational dyspnea or accessory muscle use. Chest: Chest wall non-tender. No evidence of trauma. Abdomen:  Soft, nondistended, bowel sounds present. Nontender. No guarding rigidity or rebound. No masses. Unable to elicit any abdominal discomfort to palpation. Musculoskeletal: Extremities non-tender with full range of motion. Radial and dorsalis pedis pulses were intact. No calf tenderness erythema or edema. Neurological: Alert and oriented x 3. Speech clear. Cranial nerves II-XII intact. No facial droop. No acute focal motor or sensory deficits. Skin: Skin is warm and dry. No rash. Lymphatic:  No lympadenopathy. Psychiatric: Normal mood and affect. Behavior is normal.         DIAGNOSTIC RESULTS     EKG: All EKG's are interpreted by the Emergency Department Physician who either signs or Co-signs this chart in the absence of a cardiologist.    Atrial fibrillation with rapid ventricular response. Rate 182. QRS duration 82 ms. QTc 448 ms. R axis 33 degrees.  ST elevation noted in the inferior lateral leads with ST depression/reciprocal changes in V1.    RADIOLOGY:   Non-plain film images such as CT, Ultrasound and MRI are read by the radiologist. Plain radiographic images are visualized and preliminarily interpreted by the emergency physician with the below findings:        Interpretation per the Radiologist below, if available at the time of this note:    XR CHEST PORTABLE    (Results Pending)         ED BEDSIDE ULTRASOUND:   Performed by ED Physician - none    LABS:  Labs Reviewed   PROTIME-INR - Abnormal; Notable for the following components:       Result Value    Protime 57.3 (*)     INR 4.75 (*)     All other components within normal limits    Narrative:     Leisa Burton tel. [phone],  Coag results called to and read back by Lakia Erazo RN, 07/19/2021 11:29,  by [facility]  Performed at:  [address] [facility] [address]   Phone [phone]   APTT - Abnormal; Notable for the following components:    aPTT 50.5 (*)     All other components within normal limits    Narrative:     Performed at:  [address] [facility] [address]   Phone [phone]   CBC WITH AUTO DIFFERENTIAL   COMPREHENSIVE METABOLIC PANEL W/ REFLEX TO MG FOR LOW K   TROPONIN   BRAIN NATRIURETIC PEPTIDE   URINE RT REFLEX TO CULTURE       All other labs were within normal range or not returned as of this dictation. EMERGENCY DEPARTMENT COURSE and DIFFERENTIAL DIAGNOSIS/MDM:   Vitals:    Vitals:    07/19/21 1048   BP: 120/63   Pulse: 155   Resp: 28   Temp: 98.3 °F (36.8 °C)   TempSrc: Oral   SpO2: 92%   Height: 4' 11\" (1.499 m)         MDM      The patient presents with chest pain that began on Friday evening as noted above at around 7 PM. She has had intermittent pain through the weekend. She reports palpitations since this morning. At the time of initial examination the patient was awake and alert. Initial EKG reveals ST elevation in the inferior and lateral leads with some reciprocal changes in the anterior septal leads. Findings are consistent with ST elevation myocardial infarction. STEMI alert was initiated.       At the time of my examination she denied any chest pain.    Because her rate was in the 180s with A. fib with RVR, amiodarone 150 mg bolus followed by amiodarone drip was initiated. She was given heparin 4000 units IV and aspirin 324 mg p.o. Cardiologist on call was paged. 11:01 AM: Dr. Ila Saucedo arrived to evaluate the patient. He plans to take the patient to the cardiac catheterization lab.    11:39 AM: INR is noted to be 4.75. Cath Lab was notified. Spoke with the charge nurse, Keke Ortiz. REASSESSMENT              CRITICAL CARE TIME   Total Critical Care time was 30 minutes, excluding separately reportable procedures. There was a high probability of clinically significant/life threatening deterioration in the patient's condition which required my urgent intervention. CONSULTS:  None    PROCEDURES:  Unless otherwise noted below, none     Procedures        FINAL IMPRESSION      1. ST elevation myocardial infarction (STEMI), unspecified artery (HCC)    2. Chest pain, unspecified type    3. Atrial fibrillation with rapid ventricular response (HCC)          DISPOSITION/PLAN   DISPOSITION        PATIENT REFERRED TO:  No follow-up provider specified. DISCHARGE MEDICATIONS:  Current Discharge Medication List        Controlled Substances Monitoring:     RX Monitoring 8/13/2018   Attestation The Prescription Monitoring Report for this patient was reviewed today. Periodic Controlled Substance Monitoring Possible medication side effects, risk of tolerance/dependence & alternative treatments discussed. ;Obtaining appropriate analgesic effect of treatment. ;No signs of potential drug abuse or diversion identified. (Please note that portions of this note were completed with a voice recognition program.  Efforts were made to edit the dictations but occasionally words are mis-transcribed. )    [id number] James Simon DO (electronically signed)  Attending Emergency Physician          Jolie Severe, DO  07/19/21 1146

## 2021-07-19 NOTE — TELEPHONE ENCOUNTER
Received call from [address] at [facility] with Red Flag Complaint. Brief description of triage: Shortness of breath, started 4 days ago, has not eaten much for 4 days,    Triage indicates for patient to call 911 now    Care advice provided, patient verbalizes understanding; denies any other questions or concerns; instructed to call back for any new or worsening symptoms. .    Attention Provider: Thank you for allowing me to participate in the care of your patient. The patient was connected to triage in response to information provided to the [facility]. Please do not respond through this encounter as the response is not directed to a shared pool. Reason for Disposition   SEVERE difficulty breathing (e.g., struggling for each breath, speaks in single words, pulse > 120)    Answer Assessment - Initial Assessment Questions  1. RESPIRATORY STATUS: \"Describe your breathing? \" (e.g., wheezing, shortness of breath, unable to speak, severe coughing)      Panting, wheezing, cold sweats    2. ONSET: \"When did this breathing problem begin? \"       4 days ago    3. PATTERN \"Does the difficult breathing come and go, or has it been constant since it started? \"       Constant    4. SEVERITY: \"How bad is your breathing? \" (e.g., mild, moderate, severe)     - MILD: No SOB at rest, mild SOB with walking, speaks normally in sentences, can lay down, no retractions, pulse < 100.     - MODERATE: SOB at rest, SOB with minimal exertion and prefers to sit, cannot lie down flat, speaks in phrases, mild retractions, audible wheezing, pulse 100-120.     - SEVERE: Very SOB at rest, speaks in single words, struggling to breathe, sitting hunched forward, retractions, pulse > 120       Moderate    5. RECURRENT SYMPTOM: \"Have you had difficulty breathing before? \" If so, ask: \"When was the last time? \" and \"What happened that time? \"       no    6. CARDIAC HISTORY: \"Do you have any history of heart disease? \" (e.g., heart attack, angina, bypass surgery, angioplasty)       No    7. LUNG HISTORY: \"Do you have any history of lung disease? \"  (e.g., pulmonary embolus, asthma, emphysema)      No    8. CAUSE: \"What do you think is causing the breathing problem? \"       Unknown    9. OTHER SYMPTOMS: \"Do you have any other symptoms? (e.g., dizziness, runny nose, cough, chest pain, fever)      Dizzy    10. PREGNANCY: \"Is there any chance you are pregnant? \" \"When was your last menstrual period? \"        N/a    11. TRAVEL: \"Have you traveled out of the country in the last month? \" (e.g., travel history, exposures)        no    Protocols used: BREATHING DIFFICULTY-ADULT-[place]

## 2021-07-19 NOTE — PROGRESS NOTES
1240: [facility] checked with cath lab RN. Patient attached to CVU cardiac monitors. Patient has amio gtt infusing. Patient on 4L NC. VSS, denies chest pain at this time. Admission assessment completed. Manual cardiac output completed. CO 2.4, CI 1.47. SVR 1966. Patient started on nitro gtt per orders for goal of SVR less than 1500. Heparin purge solution being held at the time due to high ACT and INR    1320: Nitro gtt titrated per orders parameters. 1345: Order received to insert stevenson catheter. Patient tolerated well. 250 ml of clear yellow urine noted. 1600: Reassessment completed. MVO2 51%. , heparin purge solution is being held until order parameters are met per Summer. Stevenson output adequate, urine is more tea colored than when stevenson was placed. SVR 2300, Nitro gtt titrated. 1800: ACT remain too high to start heparin purge solution. SVR 1742, MVO2 49%. Stevenson  Output remains dark tea/bloody with output dropping. Updates sent to Dr. Ira Monroe. Awaiting response. 1830: Call back received from Dr. Ira Monroe. Orders received for 40 Lasix one time, to drop Impella to P-6 and to increase Primacor to 0.3. read back and verified. Would like a recheck of Cardiac output/SVR and MVO2 at 2100 and would like to be updated. Will pass on to night shift RN    1900: report given to [facility] JUAREZ DONOHUE. Patient left in stable condition.   Electronically signed by Bill Hearn RN on 7/19/2021 at 7:00 PM

## 2021-07-19 NOTE — ED PROVIDER NOTES
[facility] LAB  EMERGENCY DEPARTMENT ENCOUNTER        Pt Name: Kennedy Phalen  MRN: [id number]  [place] 1940  Date of evaluation: 7/19/2021  Provider: KENA Su  PCP: Richie Valladares MD  Note Started: 11:19 AM EDT        I have seen and evaluated this patient with my supervising physician Dr Taylor Barr. CHIEF COMPLAINT       Chief Complaint   Patient presents with    Shortness of Breath     Pt states she has been SOB dizziness with N/V/D. state she was here Friday but left due to being in the waiting area for 3 hours.  Dizziness       HISTORY OF PRESENT ILLNESS   (Location, Timing/Onset, Context/Setting, Quality, Duration, Modifying Factors, Severity, Associated Signs and Symptoms)  Note limiting factors. Chief Complaint: SOB, dizziness     Kennedy Phalen is a 78 y.o. female who presents to the ER today with complaints of shortness of breath, dizziness that started on Friday. She states that it gets worse when she bends over. She has mild chest discomfort that has also been present since Friday. She has never had anything like this before. She has not tried taking anything to treat her symptoms. She states she has been treated for CLL in the past but is currently in remission. She has no further complaints at this time. Nursing Notes were all reviewed and agreed with or any disagreements were addressed in the HPI. REVIEW OF SYSTEMS    (2-9 systems for level 4, 10 or more for level 5)     Review of Systems   Constitutional: Negative for chills and fever. Respiratory: Positive for shortness of breath. Negative for cough. Cardiovascular: Positive for chest pain. Negative for palpitations. Gastrointestinal: Negative for abdominal pain, nausea and vomiting. Neurological: Positive for dizziness. Negative for seizures and weakness. Positives and Pertinent negatives as per HPI. Except as noted above in the ROS, all other systems were reviewed and negative. PAST MEDICAL HISTORY     Past Medical History:   Diagnosis Date    [facility]     CLL (chronic lymphocytic leukemia) ([facility] 2015    COPD (chronic obstructive pulmonary disease) [facility]     Essential hypertension     controlled with salt restriction (initially)    Factor V Leiden mutation ([facility]     Goiter 2011    1.5 cm midline    Hypercholesterolemia     Impaired fasting glucose 2011    Osteoarthritis     Osteoporosis     Post herpetic neuralgia     Vitamin D deficiency 2011         SURGICAL HISTORY     Past Surgical History:   Procedure Laterality Date    CATARACT REMOVAL WITH IMPLANT  12    left eye    JOINT REPLACEMENT      partial knee R and L    KNEE SURGERY      partial replacements, both knees    MOHS SURGERY  2019    R cheek and R superior temple    SPLENECTOMY      TUNNELED VENOUS PORT PLACEMENT      UPPER GASTROINTESTINAL ENDOSCOPY N/A 2019    EGD ESOPHAGOGASTRODUODENOSCOPY, WITH ENDOSCOPIC ULTRASOUND OF ESOPHAGUS performed by Aleksandra Carlos MD at [facility]       Current Discharge Medication List      CONTINUE these medications which have NOT CHANGED    Details   warfarin (COUMADIN) 5 MG tablet TAKE 1 1/2 TABLETS BY MOUTH MONDAY AND FRIDAY, THEN 1 TABLET BY MOUTH DAILY ON THE OTHER DAYS  Qty: 115 tablet, Refills: 1      gabapentin (NEURONTIN) 300 MG capsule TAKE 1 TO 2 CAPSULES BY MOUTH EVERY NIGHT AS DIRECTED  Qty: 60 capsule, Refills: 5      Cyanocobalamin (VITAMIN B 12) 500 MCG TABS Take 1 tablet by mouth daily               ALLERGIES     Patient has no known allergies.     FAMILYHISTORY       Family History   Problem Relation Age of Onset    Diabetes Father     Stroke Sister 50         of a stroke          SOCIAL HISTORY       Social History     Tobacco Use    Smoking status: Former Smoker     Packs/day: 0.30     Years: 50.00     Pack years: 15.00     Types: Cigarettes     Quit date: 1995     Years since quittin.7    Smokeless tobacco: Never Used   Vaping Use    Vaping Use: Never used   Substance Use Topics    Alcohol use: No     Alcohol/week: 0.0 standard drinks    Drug use: Not on file       SCREENINGS             PHYSICAL EXAM    (up to 7 for level 4, 8 or more for level 5)     ED Triage Vitals [21 1048]   BP Temp Temp Source Pulse Resp SpO2 Height Weight   120/63 98.3 °F (36.8 °C) Oral 155 28 92 % 4' 11\" (1.499 m) --       Physical Exam  Vitals and nursing note reviewed. Constitutional:       General: She is not in acute distress. Appearance: She is well-developed. She is not ill-appearing, toxic-appearing or diaphoretic. HENT:      Head: Normocephalic and atraumatic. Eyes:      Conjunctiva/sclera: Conjunctivae normal.      Pupils: Pupils are equal, round, and reactive to light. Cardiovascular:      Rate and Rhythm: Regular rhythm. Tachycardia present. Pulses: Normal pulses. Pulmonary:      Effort: Pulmonary effort is normal. No tachypnea or respiratory distress. Breath sounds: No decreased breath sounds. Musculoskeletal:      Right lower leg: No edema. Left lower leg: No edema. Skin:     General: Skin is warm and dry. Neurological:      Mental Status: She is alert and oriented to person, place, and time. Psychiatric:         Behavior: Behavior normal. Behavior is cooperative. Thought Content:  Thought content normal.         DIAGNOSTIC RESULTS   LABS:    Labs Reviewed   PROTIME-INR - Abnormal; Notable for the following components:       Result Value    Protime 57.3 (*)     INR 4.75 (*)     All other components within normal limits    Narrative:     Adi Manuel tel. [phone],  Coag results called to and read back by Hal Tolbert RN, 2021 11:29,  by [facility]  Performed at:  [address] [address]   Phone [phone]   APTT - Abnormal; Notable for the following components:    aPTT 50.5 (*)     All other components within normal limits    Narrative:     Performed at:  [facility]  [address] [place] [address]   Phone [phone]   CBC WITH AUTO DIFFERENTIAL   COMPREHENSIVE METABOLIC PANEL W/ REFLEX TO MG FOR LOW K   TROPONIN   BRAIN NATRIURETIC PEPTIDE   URINE RT REFLEX TO CULTURE       When ordered only abnormal lab results are displayed. All other labs were within normal range or not returned as of this dictation. EKG: When ordered, EKG's are interpreted by the Emergency Department Physician in the absence of a cardiologist.  Please see their note for interpretation of EKG. RADIOLOGY:   Non-plain film images such as CT, Ultrasound and MRI are read by the radiologist. Plain radiographic images are visualized and preliminarily interpreted by the ED Provider with the below findings:        Interpretation per the Radiologist below, if available at the time of this note:    XR CHEST PORTABLE    (Results Pending)     No results found.         PROCEDURES   Unless otherwise noted below, none     Procedures    CRITICAL CARE TIME   N/A    CONSULTS:  None      EMERGENCY DEPARTMENT COURSE and DIFFERENTIAL DIAGNOSIS/MDM:   Vitals:    Vitals:    07/19/21 1048   BP: 120/63   Pulse: 155   Resp: 28   Temp: 98.3 °F (36.8 °C)   TempSrc: Oral   SpO2: 92%   Height: 4' 11\" (1.499 m)       Patient was given the following medications:  Medications   amiodarone (CORDARONE) 150 mg in dextrose 5 % 100 mL bolus (150 mg Intravenous New Bag 7/19/21 1100)     Followed by   amiodarone (CORDARONE) 450 mg in dextrose 5 % 250 mL infusion (1 mg/min Intravenous New Bag 7/19/21 1102)     Followed by   amiodarone (CORDARONE) 450 mg in dextrose 5 % 250 mL infusion (has no administration in time range)   heparin (porcine) injection 4,000 Units (4,000 Units Intravenous Given 7/19/21 1130)   aspirin chewable tablet 324 mg (324 mg Oral Given 7/19/21 1100)           ED COURSE & MEDICAL DECISION MAKING    - The patient presented to the ER with complaints of shortness of breath, dizziness. Vital signs were reviewed. Exam with well-developed, well-nourished female in no acute distress. Peripheral IV placed. Labs, Imaging ordered. - Pertinent Labs & Imaging studies reviewed. (See chart for details)   -  Patient seen and evaluated in the emergency department. -  Triage and nursing notes reviewed and incorporated. -  Old chart records reviewed and incorporated. -   I have seen and evaluated this patient with my supervising physician Dr Mack Bush. -  Differential diagnosis includes: acute coronary syndrome, pulmonary embolism, COPD/asthma, pneumonia, sepsis, pericardial tamponade, pneumothorax, CHF, thoracic aortic dissection, anxiety  -  Work-up included:  See above  -  ED treatment included:   Aspirin, heparin, amiodarone  - Consults: Cardiology, STEMI activated  -  Results discussed with patient and/or family. Patient with findings of STEMI on EKG, Cath Lab activated and patient is taken to the Cath Lab emergently. At this time, we recommend admission, as the patient is having an acute MI. The patient and/or family is agreeable with plan of care and disposition.  -  Disposition:   Admission  - Critical Care: Because of high probability of sudden clinical deterioration of the patient's condition or  further deterioration, critical care time included my full attention to the patient's condition, including chart data review, documentation, medication ordering, reviewing the patient's old records, reevaluation patient's cardiac, pulmonary and neurological status. Reassessment of vital signs. Consultations with ED attending and admitting physician. Ordering, interpreting reviewing diagnostic testing.  Therefore, my critical care time was 7 minutes of direct attention to the patient's condition did not include time spent on separately billable procedures. FINAL IMPRESSION      1. ST elevation myocardial infarction (STEMI), unspecified artery (HCC)    2. Chest pain, unspecified type    3. Atrial fibrillation with rapid ventricular response (HCC)          DISPOSITION/PLAN   DISPOSITION        PATIENT REFERRED TO:  No follow-up provider specified.     DISCHARGE MEDICATIONS:  Current Discharge Medication List          DISCONTINUED MEDICATIONS:  Current Discharge Medication List                 (Please note that portions of this note were completed with a voice recognition program.  Efforts were made to edit the dictations but occasionally words are mis-transcribed.)    KENA Chacon (electronically signed)            Amando Youssef, [address]  07/19/21 7954

## 2021-07-19 NOTE — H&P
Cardiology Consultation     Scotty Tubbs  1940      Referring Physician: [facility] ER   Reason for Referral: stemi   Chief Complaint:   Chief Complaint   Patient presents with    Shortness of Breath     Pt states she has been SOB dizziness with N/V/D. state she was here Friday but left due to being in the waiting area for 3 hours.  Dizziness       Subjective:     History of Present Illness: The patient is 78 y.o. female with a past medical history significant for CLL, factor V mutation who presents with the above complaint. Admits to 3 days of worsening dyspnea with minimal exertion. Took OTC tylenol with no help thus coming to the ER for evaluation.  STEMI page activated by ER staff           Past Medical History:   Diagnosis Date    Chronic lymphocytic leukemia in remission ([address]     [facility] 2015    COPD (chronic obstructive pulmonary disease) ([address]     Essential hypertension     controlled with salt restriction (initially)    Factor V Leiden mutation ([facility]     Goiter 2011    1.5 cm midline    Hypercholesterolemia     Impaired fasting glucose 2011    Osteoarthritis     Osteoporosis     Post herpetic neuralgia     Vitamin D deficiency 2011     Past Surgical History:   Procedure Laterality Date    CATARACT REMOVAL WITH IMPLANT  12    left eye    JOINT REPLACEMENT      partial knee R and L    KNEE SURGERY      partial replacements, both knees    MOHS SURGERY  2019    R cheek and R superior temple    SPLENECTOMY      TUNNELED VENOUS PORT PLACEMENT      UPPER GASTROINTESTINAL ENDOSCOPY N/A 2019    EGD ESOPHAGOGASTRODUODENOSCOPY, WITH ENDOSCOPIC ULTRASOUND OF ESOPHAGUS performed by Rigoberto Rivera MD at [address] History   Problem Relation Age of Onset    Diabetes Father     Stroke Sister 50         of a stroke     Social History     Tobacco Use    Smoking status: Former Smoker     Packs/day: 0.30     Years: 50.00     Pack years: 15.00     Types: Cigarettes     Quit date: 1995     Years since quittin.7    Smokeless tobacco: Never Used   Vaping Use    Vaping Use: Never used   Substance Use Topics    Alcohol use: No     Alcohol/week: 0.0 standard drinks    Drug use: Not on file       No Known Allergies  Current Facility-Administered Medications   Medication Dose Route Frequency Provider Last Rate Last Admin    amiodarone (CORDARONE) 450 mg in dextrose 5 % 250 mL infusion  1 mg/min Intravenous Continuous Lockett Bermudian, DO 33.3 mL/hr at 21 1102 1 mg/min at 21 1102    Followed by   [facility] amiodarone (CORDARONE) 450 mg in dextrose 5 % 250 mL infusion  0.5 mg/min Intravenous Continuous Lockett Bermudian, DO        iopamidol (ISOVUE-370) 76 % injection 65 mL  65 mL Other ONCE PRN Pete Watts MD           Review of Systems:  · Constitutional: No unanticipated weight loss. There's been no change in energy level, sleep pattern, or activity level. No fevers, chills. · Eyes: No visual changes or diplopia. No scleral icterus. · ENT: No Headaches, hearing loss or vertigo. No mouth sores or sore throat. · Cardiovascular: as reviewed in HPI  · Respiratory: No cough or wheezing, no sputum production. No hemoptysis. · Gastrointestinal: No abdominal pain, appetite loss, blood in stools. No change in bowel or bladder habits. · Genitourinary: No dysuria, trouble voiding, or hematuria. · Musculoskeletal:  No gait disturbance, no joint complaints. · Integumentary: No rash or pruritis. · Neurological: No headache, diplopia, change in muscle strength, numbness or tingling. · Psychiatric: No anxiety or depression. · Endocrine: No temperature intolerance. No excessive thirst, fluid intake, or urination. No tremor. · Hematologic/Lymphatic: No abnormal bruising or bleeding, blood clots or swollen lymph nodes. · Allergic/Immunologic: No nasal congestion or hives.     Physical hesitate to contact me if you have any questions.     Valeria Patel MD [address], Interventional Cardiology, and Peripheral Vascular Disease   [address]   Ph: [phone]  Fax: [phone]

## 2021-07-19 NOTE — ED NOTES
Bed: E-46  Expected date: 7/19/21  Expected time: 10:38 AM  Means of arrival: [facility]  Comments:  80F SOB     Porfirio Schwartz RN  07/19/21 4270

## 2021-07-20 LAB
A/G RATIO: 1 (ref 1.1–2.2)
ALBUMIN SERPL-MCNC: 3 G/DL (ref 3.4–5)
ALP BLD-CCNC: 75 U/L (ref 40–129)
ALT SERPL-CCNC: 31 U/L (ref 10–40)
ANION GAP SERPL CALCULATED.3IONS-SCNC: 8 MMOL/L (ref 3–16)
AST SERPL-CCNC: 135 U/L (ref 15–37)
BASE EXCESS MIXED: 0
BASE EXCESS MIXED: 0
BASE EXCESS MIXED: 3
BILIRUB SERPL-MCNC: 1.5 MG/DL (ref 0–1)
BUN BLDV-MCNC: 28 MG/DL (ref 7–20)
CALCIUM SERPL-MCNC: 7.9 MG/DL (ref 8.3–10.6)
CHLORIDE BLD-SCNC: 96 MMOL/L (ref 99–110)
CO2: 26 MMOL/L (ref 21–32)
CREAT SERPL-MCNC: 1.6 MG/DL (ref 0.6–1.2)
EKG ATRIAL RATE: 340 BPM
EKG DIAGNOSIS: NORMAL
EKG Q-T INTERVAL: 258 MS
EKG QRS DURATION: 82 MS
EKG QTC CALCULATION (BAZETT): 448 MS
EKG R AXIS: 33 DEGREES
EKG T AXIS: 36 DEGREES
EKG VENTRICULAR RATE: 182 BPM
GFR AFRICAN AMERICAN: 37
GFR NON-AFRICAN AMERICAN: 31
GLOBULIN: 2.9 G/DL
GLUCOSE BLD-MCNC: 169 MG/DL (ref 70–99)
GLUCOSE BLD-MCNC: 181 MG/DL (ref 70–99)
GLUCOSE BLD-MCNC: 186 MG/DL (ref 70–99)
HCO3, MIXED: 25.9 MMOL/L
HCO3, MIXED: 26.2 MMOL/L
HCO3, MIXED: 28.5 MMOL/L
HCT VFR BLD CALC: 41.4 % (ref 36–48)
HEMATOLOGY PATH CONSULT: NORMAL
HEMOGLOBIN: 13.9 G/DL (ref 12–16)
INR BLD: 6.37 (ref 0.88–1.12)
MAGNESIUM: 1.9 MG/DL (ref 1.8–2.4)
MCH RBC QN AUTO: 31 PG (ref 26–34)
MCHC RBC AUTO-ENTMCNC: 33.6 G/DL (ref 31–36)
MCV RBC AUTO: 92.3 FL (ref 80–100)
O2 SAT, MIXED: 46 %
O2 SAT, MIXED: 51 %
O2 SAT, MIXED: 57 %
PCO2 MIXED: 46.6 MM HG
PCO2 MIXED: 50.6 MM HG
PCO2 MIXED: 53.7 MM HG
PDW BLD-RTO: 13.9 % (ref 12.4–15.4)
PERFORMED ON: ABNORMAL
PERFORMED ON: ABNORMAL
PERFORMED ON: NORMAL
PH, MIXED: 7.32 (ref 7.35–7.45)
PH, MIXED: 7.33 (ref 7.35–7.45)
PH, MIXED: 7.35 (ref 7.35–7.45)
PHOSPHORUS: 3.1 MG/DL (ref 2.5–4.9)
PLATELET # BLD: 203 K/UL (ref 135–450)
PMV BLD AUTO: 9.1 FL (ref 5–10.5)
PO2 MIXED: 28 MM HG
PO2 MIXED: 30 MM HG
PO2 MIXED: 31 MM HG
POC SAMPLE TYPE: ABNORMAL
POC SAMPLE TYPE: ABNORMAL
POC SAMPLE TYPE: NORMAL
POTASSIUM SERPL-SCNC: 3.6 MMOL/L (ref 3.5–5.1)
PROTHROMBIN TIME: 77.8 SEC (ref 9.9–12.7)
RBC # BLD: 4.48 M/UL (ref 4–5.2)
SODIUM BLD-SCNC: 130 MMOL/L (ref 136–145)
TCO2 CALC MIXED: 27 MMOL/L
TCO2 CALC MIXED: 28 MMOL/L
TCO2 CALC MIXED: 30 MMOL/L
TOTAL PROTEIN: 5.9 G/DL (ref 6.4–8.2)
WBC # BLD: 19.7 K/UL (ref 4–11)

## 2021-07-20 PROCEDURE — 83735 ASSAY OF MAGNESIUM: CPT

## 2021-07-20 PROCEDURE — 80051 ELECTROLYTE PANEL: CPT

## 2021-07-20 PROCEDURE — 80047 BASIC METABLC PNL IONIZED CA: CPT

## 2021-07-20 PROCEDURE — 2580000003 HC RX 258: Performed by: INTERNAL MEDICINE

## 2021-07-20 PROCEDURE — 36415 COLL VENOUS BLD VENIPUNCTURE: CPT

## 2021-07-20 PROCEDURE — 6360000002 HC RX W HCPCS: Performed by: EMERGENCY MEDICINE

## 2021-07-20 PROCEDURE — 2700000000 HC OXYGEN THERAPY PER DAY

## 2021-07-20 PROCEDURE — 85027 COMPLETE CBC AUTOMATED: CPT

## 2021-07-20 PROCEDURE — 83605 ASSAY OF LACTIC ACID: CPT

## 2021-07-20 PROCEDURE — 2580000003 HC RX 258: Performed by: EMERGENCY MEDICINE

## 2021-07-20 PROCEDURE — 6360000002 HC RX W HCPCS: Performed by: INTERNAL MEDICINE

## 2021-07-20 PROCEDURE — 2500000003 HC RX 250 WO HCPCS: Performed by: INTERNAL MEDICINE

## 2021-07-20 PROCEDURE — 83880 ASSAY OF NATRIURETIC PEPTIDE: CPT

## 2021-07-20 PROCEDURE — 84484 ASSAY OF TROPONIN QUANT: CPT

## 2021-07-20 PROCEDURE — 85610 PROTHROMBIN TIME: CPT

## 2021-07-20 PROCEDURE — 84132 ASSAY OF SERUM POTASSIUM: CPT

## 2021-07-20 PROCEDURE — 94761 N-INVAS EAR/PLS OXIMETRY MLT: CPT

## 2021-07-20 PROCEDURE — 36592 COLLECT BLOOD FROM PICC: CPT

## 2021-07-20 PROCEDURE — 82803 BLOOD GASES ANY COMBINATION: CPT

## 2021-07-20 PROCEDURE — 93010 ELECTROCARDIOGRAM REPORT: CPT | Performed by: INTERNAL MEDICINE

## 2021-07-20 PROCEDURE — 82330 ASSAY OF CALCIUM: CPT

## 2021-07-20 PROCEDURE — 93308 TTE F-UP OR LMTD: CPT

## 2021-07-20 PROCEDURE — 82565 ASSAY OF CREATININE: CPT

## 2021-07-20 PROCEDURE — 84100 ASSAY OF PHOSPHORUS: CPT

## 2021-07-20 PROCEDURE — 6370000000 HC RX 637 (ALT 250 FOR IP): Performed by: INTERNAL MEDICINE

## 2021-07-20 PROCEDURE — 84520 ASSAY OF UREA NITROGEN: CPT

## 2021-07-20 PROCEDURE — 85014 HEMATOCRIT: CPT

## 2021-07-20 PROCEDURE — 80053 COMPREHEN METABOLIC PANEL: CPT

## 2021-07-20 PROCEDURE — 85347 COAGULATION TIME ACTIVATED: CPT

## 2021-07-20 PROCEDURE — 82947 ASSAY GLUCOSE BLOOD QUANT: CPT

## 2021-07-20 PROCEDURE — 99221 1ST HOSP IP/OBS SF/LOW 40: CPT | Performed by: THORACIC SURGERY (CARDIOTHORACIC VASCULAR SURGERY)

## 2021-07-20 PROCEDURE — 84295 ASSAY OF SERUM SODIUM: CPT

## 2021-07-20 PROCEDURE — 2100000000 HC CCU R&B

## 2021-07-20 RX ORDER — DOCUSATE SODIUM 100 MG/1
100 CAPSULE, LIQUID FILLED ORAL DAILY
Status: DISCONTINUED | OUTPATIENT
Start: 2021-07-20 | End: 2021-07-26 | Stop reason: HOSPADM

## 2021-07-20 RX ORDER — DEXTROSE MONOHYDRATE 50 MG/ML
500 INJECTION, SOLUTION INTRAVENOUS CONTINUOUS
Status: DISCONTINUED | OUTPATIENT
Start: 2021-07-20 | End: 2021-07-22

## 2021-07-20 RX ORDER — 0.9 % SODIUM CHLORIDE 0.9 %
250 INTRAVENOUS SOLUTION INTRAVENOUS ONCE
Status: COMPLETED | OUTPATIENT
Start: 2021-07-20 | End: 2021-07-20

## 2021-07-20 RX ORDER — SENNA PLUS 8.6 MG/1
1 TABLET ORAL NIGHTLY
Status: DISCONTINUED | OUTPATIENT
Start: 2021-07-20 | End: 2021-07-26 | Stop reason: HOSPADM

## 2021-07-20 RX ORDER — DEXTROSE MONOHYDRATE 50 MG/ML
500 INJECTION, SOLUTION INTRAVENOUS CONTINUOUS
Status: DISCONTINUED | OUTPATIENT
Start: 2021-07-20 | End: 2021-07-20

## 2021-07-20 RX ORDER — METOPROLOL TARTRATE 5 MG/5ML
5 INJECTION INTRAVENOUS ONCE
Status: COMPLETED | OUTPATIENT
Start: 2021-07-20 | End: 2021-07-20

## 2021-07-20 RX ORDER — POTASSIUM CHLORIDE 7.45 MG/ML
10 INJECTION INTRAVENOUS
Status: COMPLETED | OUTPATIENT
Start: 2021-07-20 | End: 2021-07-20

## 2021-07-20 RX ADMIN — PHYTONADIONE 1 MG: 10 INJECTION, EMULSION INTRAMUSCULAR; INTRAVENOUS; SUBCUTANEOUS at 15:30

## 2021-07-20 RX ADMIN — ONDANSETRON 4 MG: 2 INJECTION INTRAMUSCULAR; INTRAVENOUS at 20:24

## 2021-07-20 RX ADMIN — SODIUM NITROPRUSSIDE 5 MCG/MIN: 25 INJECTION, SOLUTION, CONCENTRATE INTRAVENOUS at 13:07

## 2021-07-20 RX ADMIN — AMIODARONE HYDROCHLORIDE 1 MG/MIN: 50 INJECTION, SOLUTION INTRAVENOUS at 23:09

## 2021-07-20 RX ADMIN — METOPROLOL TARTRATE 5 MG: 1 INJECTION, SOLUTION INTRAVENOUS at 18:48

## 2021-07-20 RX ADMIN — ACETAMINOPHEN 650 MG: 325 TABLET ORAL at 03:10

## 2021-07-20 RX ADMIN — DEXTROSE MONOHYDRATE 500 ML: 50 INJECTION, SOLUTION INTRAVENOUS at 22:21

## 2021-07-20 RX ADMIN — Medication 10 MEQ: at 18:49

## 2021-07-20 RX ADMIN — AMIODARONE HYDROCHLORIDE 150 MG: 50 INJECTION, SOLUTION INTRAVENOUS at 08:14

## 2021-07-20 RX ADMIN — NITROGLYCERIN 80 MCG/MIN: 20 INJECTION INTRAVENOUS at 06:19

## 2021-07-20 RX ADMIN — SODIUM CHLORIDE 250 ML: 9 INJECTION, SOLUTION INTRAVENOUS at 13:46

## 2021-07-20 RX ADMIN — SODIUM CHLORIDE 250 ML: 9 INJECTION, SOLUTION INTRAVENOUS at 12:27

## 2021-07-20 RX ADMIN — SENNOSIDES 8.6 MG: 8.6 TABLET, FILM COATED ORAL at 20:20

## 2021-07-20 RX ADMIN — DOCUSATE SODIUM 100 MG: 100 CAPSULE ORAL at 20:20

## 2021-07-20 RX ADMIN — METOPROLOL TARTRATE 25 MG: 25 TABLET, FILM COATED ORAL at 22:08

## 2021-07-20 RX ADMIN — ONDANSETRON 4 MG: 2 INJECTION INTRAMUSCULAR; INTRAVENOUS at 12:14

## 2021-07-20 RX ADMIN — HEPARIN SODIUM: 10000 INJECTION INTRAVENOUS; SUBCUTANEOUS at 22:08

## 2021-07-20 RX ADMIN — MILRINONE LACTATE 0.45 MCG/KG/MIN: 0.2 INJECTION, SOLUTION INTRAVENOUS at 02:10

## 2021-07-20 RX ADMIN — AMIODARONE HYDROCHLORIDE 0.5 MG/MIN: 50 INJECTION, SOLUTION INTRAVENOUS at 10:49

## 2021-07-20 RX ADMIN — Medication 10 MEQ: at 18:09

## 2021-07-20 ASSESSMENT — PAIN DESCRIPTION - PROGRESSION
CLINICAL_PROGRESSION: GRADUALLY WORSENING
CLINICAL_PROGRESSION: GRADUALLY WORSENING

## 2021-07-20 ASSESSMENT — PAIN DESCRIPTION - PAIN TYPE: TYPE: ACUTE PAIN

## 2021-07-20 ASSESSMENT — PAIN DESCRIPTION - FREQUENCY: FREQUENCY: CONTINUOUS

## 2021-07-20 ASSESSMENT — PAIN DESCRIPTION - DESCRIPTORS: DESCRIPTORS: ACHING;SORE

## 2021-07-20 ASSESSMENT — PAIN SCALES - GENERAL
PAINLEVEL_OUTOF10: 7
PAINLEVEL_OUTOF10: 3
PAINLEVEL_OUTOF10: 0
PAINLEVEL_OUTOF10: 0

## 2021-07-20 ASSESSMENT — PAIN DESCRIPTION - LOCATION: LOCATION: ABDOMEN

## 2021-07-20 ASSESSMENT — PAIN DESCRIPTION - ORIENTATION: ORIENTATION: LOWER;MID

## 2021-07-20 ASSESSMENT — PAIN - FUNCTIONAL ASSESSMENT: PAIN_FUNCTIONAL_ASSESSMENT: PREVENTS OR INTERFERES SOME ACTIVE ACTIVITIES AND ADLS

## 2021-07-20 ASSESSMENT — PAIN DESCRIPTION - ONSET: ONSET: ON-GOING

## 2021-07-20 NOTE — PROGRESS NOTES
Site check to right groin completed. Bruising noted to right groin area, groin however remains soft and non tender. Impella remains in place and operating. Patient stated that she was not feeling well since going back into afib. Patient in Afib with RVR and RN was at bedside administering amio bolus per MD order.   Nader Hobbs RN  8:32 AM

## 2021-07-20 NOTE — PROGRESS NOTES
4704 - Pt went into A fib RVR. Dr. Ira Monroe made aware, HR 120s-180s. Verbal order from amio bolus ordered. 6177 - Pt converted to NSR  0930 - CO 3.22, CI 1.97, SVR 1565. 22. Dr. Ira Monroe made aware. 200 - Dr. Ira Monroe made aware of CVP on 2, urine being bright red, and creatine now at 1.6. No new orders at this time. 1213 - CO 2.87, CI 1.76, SvO2 46, SVR 1923.34. Dr. Ira Monroe made aware. Order to give 250 ml normal saline bolus, turn impella to p-8. And stop nitro and start nipride. INR 6.37, Summer, [facility] made aware. 1230 - Suction alarm on impella. States resolved. Dr. Ira Monroe aware. 18 - Dr. Ira Monroe and Liu Kaur with [employer] at bedside. Another order for 250 ml NS bolus. 1400 - CO 3.35, CI 2.06, SVR 1695.52  1420 - Dr. Angeles Leach and Mac Scott NP at bedside to discuss possible 5.5 axillary impella placement. 1520 - Vitamin K infusion started at this time. 1600 - CO 3.34, CI 2.1, SVR 1632.65  1617 - Pt in A Fib RVR. Dr Ira Monroe made aware. 1620 - Pt converted to NSR  1700 - CO 3.55, CI 2.18, SvO2 59.8, SVR 1554.93. Dr Ira Monroe made aware. 1748 - Pt in and out of SVT. Dr. Ira Monroe made aware. 1830 - Order for 5mg IVP metoprolol and increase amio gtt to 1 mg/min  1850 - Pt complaining of abdominal pain. Abdomen soft, no reproducible pain, Dr. Ira Monroe made aware. Order to given colace and senna. 1915 - Report given KERMIT Lima. No further questions.

## 2021-07-20 NOTE — CARE COORDINATION
Chart Reviewed. Met with raine and her sister at bedside to introduce  role, initiate discussion regarding DC planning needs and to complete ACP. See separate note for ACP Planning. INITIAL CASE MANAGEMENT ASSESSMENT    Reviewed chart, met with patient to assess possible discharge needs. Explained Case Management role/services. Living Situation:   Patient and spouse live in a condo with two steps entry. It is a one floor plan. She is the spouse's caregiver as he needs constant care. Their son came into town to be with the spouse while she is in hospital       [facility] provided brochure for [address] [place] [address], Adult Day Care Program, Private Duty Services for the family's review. Pt reports she thinks it is time for them to consider moving into [facility]. [facility] also gave her [facility] living resources. [facility] also provided information on Visiting MDs as pt is unable to leave house with two steps. ADLs:   Pt is totally independent with all adls. She changes linens daily as spouse is incontinent; does all cooking and cleaning. DME:   None for her    PT/OT Recs:   TBD     Active Services:   She reports her insurance sends a visiting RN from [place] to check on them once a month. Spouse uses them for home care pt. Transportation:    family     Medications:   Likes to use [employer] [address]    PCP:   Carleen Celaya;    Sees him frequently      HD/PD:   neither    PLAN/COMMENTS:   Following for DC needs.      [facility]     Case Management   [phone]    7/20/2021  1:08 PM

## 2021-07-20 NOTE — CONSULTS
Consultation H&P    Date of Admission:  7/19/2021 10:44 AM  Date of Consultation:  7/20/2021    PCP:  Ankur Black MD      Cards: Ursula Dwyer    Chief Complaint:  Nonischemic CMP    History of Present Illness: We are asked to see this patient in consultation by Dr. Ursula Dwyer regarding potential alternative mechanical support. Meghana Davila is a 78 y.o. female with hx CLL 2015, Factor V Leiden (Coumadin). no prior cardiac hx. Presented to ED 7/19/21 - chest pain since 7pm 7/18, N/V/D and diaphoresis. +dyspnea. VT, AF, Amio. STEMI. Cath lab, no CAD, EF 15. R fem Impella placed. Past Medical History:  Past Medical History:   Diagnosis Date    [facility]     CLL (chronic lymphocytic leukemia) ([facility] 2/12/2015    COPD (chronic obstructive pulmonary disease) ([facility]     Essential hypertension 2015    controlled with salt restriction (initially)    Factor V Leiden mutation ([facility]     Goiter Nov, 2011    1.5 cm midline    Hypercholesterolemia     Impaired fasting glucose Nov, 2011    Osteoarthritis     Osteoporosis     Post herpetic neuralgia     Vitamin D deficiency Nov, 2011       Past Surgical History:  Past Surgical History:   Procedure Laterality Date    CATARACT REMOVAL WITH IMPLANT  5/14/12    left eye    JOINT REPLACEMENT      partial knee R and L    KNEE SURGERY  2008    partial replacements, both knees    MOHS SURGERY  03/12/2019    R cheek and R superior temple    SPLENECTOMY      TUNNELED VENOUS PORT PLACEMENT      UPPER GASTROINTESTINAL ENDOSCOPY N/A 2/5/2019    EGD ESOPHAGOGASTRODUODENOSCOPY, WITH ENDOSCOPIC ULTRASOUND OF ESOPHAGUS performed by Dayan Ryan MD at [address] [place] [address] Medications:   Prior to Admission medications    Medication Sig Start Date End Date Taking?  Authorizing Provider   warfarin (COUMADIN) 5 MG tablet TAKE 1 1/2 TABLETS BY MOUTH MONDAY AND FRIDAY, THEN 1 TABLET BY MOUTH DAILY ON THE OTHER DAYS  Patient taking differently: 5 mg daily  3/8/21   Chandu Hutchison MD   gabapentin (NEURONTIN) 300 MG capsule TAKE 1 TO 2 CAPSULES BY MOUTH EVERY NIGHT AS DIRECTED  Patient taking differently: 300 mg. TAKE 1 TO 2 CAPSULES BY MOUTH EVERY NIGHT AS DIRECTED 3/1/21 4/20/21  Dino Girard APRN - CNP   Cyanocobalamin (VITAMIN B 12) 500 MCG TABS Take 1 tablet by mouth daily    Historical Provider, MD        Facility Administered Medications:    sodium chloride  250 mL Intravenous Once    phytonadione (VITAMIN K)  IVPB  2.5 mg Intravenous Once    metoprolol tartrate  25 mg Oral BID       Allergies:  No Known Allergies     Social History:    Working: no  Caffeine:   Lifestyle: cares for spouse   Social History     Socioeconomic History    Marital status:      Spouse name: Not on file    Number of children: Not on file    Years of education: Not on file    Highest education level: Not on file   Occupational History    Not on file   Tobacco Use    Smoking status: Former Smoker     Packs/day: 0.30     Years: 50.00     Pack years: 15.00     Types: Cigarettes     Quit date: 1995     Years since quittin.7    Smokeless tobacco: Never Used   Vaping Use    Vaping Use: Never used   Substance and Sexual Activity    Alcohol use: No     Alcohol/week: 0.0 standard drinks    Drug use: Not on file    Sexual activity: Not on file   Other Topics Concern    Not on file   Social History Narrative    Not on file     Social Determinants of Health     Financial Resource Strain: Low Risk     Difficulty of Paying Living Expenses: Not hard at all   Food Insecurity: No Food Insecurity    Worried About Running Out of Food in the Last Year: Never true    Nguyen of Food in the Last Year: Never true   Transportation Needs: No Transportation Needs    Lack of Transportation (Medical): No    Lack of Transportation (Non-Medical):  No   Physical Activity:     Days of Exercise per Week:     Minutes of Exercise per Session:    Stress:     Feeling of Stress :    Social Connections:     Frequency of Communication with Friends and Family:     Frequency of Social Gatherings with Friends and Family:     Attends Voodoo Services:     Active Member of Clubs or Organizations:     Attends Club or Organization Meetings:     Marital Status:    Intimate Partner Violence:     Fear of Current or Ex-Partner:     Emotionally Abused:     Physically Abused:     Sexually Abused:        Family History:      Problem Relation Age of Onset    Diabetes Father     Stroke Sister 50         of a stroke       Review of Systems:  Reviewed, negative unless noted  Constitutional: weight change, weakness, impairment of ADLs  Eyes: eyestrain, redness, discharges  ENMT: post nasal drip, sinus pain, discharge   Cardiovascular: faintness, vertigo, color changes in fingers/toes  Respiratory: cough, sputum, hemoptysis  GI: excessive thirst, changes in bowel habits, abdominal swelling  : painful urination, pyuria, incontinence  Musculoskeletal: cramps, swelling, limitation of motor activity  Integumentary: cyanosis, changes in skin, dryness  Neurological: paralysis, tingling, tremors  Psychiatric: restlessness, irritability, mood swings  Endocrine: heat/cold intolerance, excessive sweating, hair loss  Hematologic/lymphatic: swollen glands, anemia, easy bruising/bleeding      Physical Examination:    /65   Pulse 96   Temp 98.7 °F (37.1 °C) (Core)   Resp 18   Ht 4' 11\" (1.499 m)   Wt 150 lb 9.2 oz (68.3 kg)   SpO2 90%   BMI 30.41 kg/m²      Admission Weight: 150 lb 9.2 oz (68.3 kg)   Drips: primacor, ntg, nipride    General appearance: NAD, well nourished  Eyes: anicteric, PERRLA  ENMT: no scars or lesions, no nasal deformity, normal dentition, no cyanosis of oral mucosa  Neck: no masses, no thyroid enlargement, no JVD. Respiratory: effort is unlabored, symmetric, no crackles, wheezes or rubs.  No palpable/percussable abnormalities. Cardiovascular: regular, no murmur. PMI normal, no thrill. No edema or varicosities. Abdominal aorta cannot be appreciated given body habitus. Pulses:    carotid brachial radial femoral popliteal DP PT   RIGHT   2+       LEFT   2+       GI: abdomen soft, nondistended, no organomegaly. No masses. Lymphatic: no cervical/supraclavicular adenopathy  Musculoskeletal: strength and tone normal. No scoliosis. Extremities: warm and pink. No clubbing or petechiae. Skin: no dermatitis or ulceration. No nodularity or induration. Neuro: CN grossly intact. Sensation and motor function grossly intact. Psychiatric: oriented, appropriate mood/affect. MEDICAL DECISION MAKING/TESTING  Studies personally reviewed. Cath: 7/19/21  RA 7  RV 22/7  PA 30/17/22  PCWP 20   PA % 52  AO % 97  CO/CI 2.22 L/min 1.3 L/min/m2  SVR 2763 dynes . Sec/cm-5   dynes . Sec/cm-5  TPG 2   0.4  The left main coronary artery is normal .   The left anterior descending artery is normal .   The left circumflex artery is normal .   The right coronary artery is normal .  Left ventricular angiogram was done in the 30° MARCELO projection and revealed severe LV systolic dysfunction   with an estimated ejection fraction of 15%.    Echo: 7/19/21  Impella well seated LV measuring 3.2cm from inlet to AV. Ejection fraction is visually estimated to be 15-20%. Regional wall motion abnormalities are noted. CT chest/abd: 10/20/20  Mediastinum: Coronary artery calcification is seen.  No pericardial effusion   is seen.  Small hiatal hernia seen. Yancy Pita is nonspecific thickening at the   GE junction. .  Thyroid gland is unchanged.       Lungs/pleura: No obstructing endobronchial lesions are seen.  No pneumonia.    No edema.  No pleural effusion a few scattered punctate noncalcified   pulmonary nodules in the right upper lobe appears similar to prior.       A few scattered tiny punctate noncalcified pulmonary nodule seen in left   upper lobe, similar to prior       Calcified granuloma seen in the right lower lobe.       Punctate pulmonary nodule left lower lobe is also unchanged       Soft Tissues/Bones: Degenerative changes are seen in the spine and shoulder   joints           Abdomen/Pelvis:       Organs: Spleen is absent.       Hypodense nodules in the adrenal glands appears similar       No hydronephrosis on the right.  No hydronephrosis on the left       Circumscribed hypodense nodules seen in the right and left kidney,   incompletely characterized, likely cyst.       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       No intrahepatic ductal dilatation.  No perihepatic fluid       No peripancreatic inflammatory change.  Prominence of the pancreatic duct in   the pancreatic head appears similar       GI/Bowel: No significant small or large bowel distention noted.  Moderate   stool seen in the colon.  Scattered diverticula are seen.  No bowel   obstruction       Pelvis: No free fluid in pelvis.  No pelvic adenopathy.  Bladder is   incompletely distended, accentuating its wall thickness.       Peritoneum/Retroperitoneum: Atherosclerotic change seen in aorta.  No   aneurysm.  Atherosclerotic change seen in the iliacs.       Bones/Soft Tissues: Bladder is incompletely distended, accentuating its wall   thickness       Spurring is seen in the spine.  Spurring is seen in the hips. Morna Jose Raul   periumbilical hernia containing fat is seen       Labs:   CBC:   Recent Labs     07/19/21  1100 07/20/21  0412   WBC 22.0* 19.7*   HGB 16.7* 13.9   HCT 50.9* 41.4   MCV 93.5 92.3    203     BMP:   Recent Labs     07/19/21  1100 07/20/21  0412   * 130*   K 4.3 3.6   CL 97* 96*   CO2 22 26   PHOS  --  3.1   BUN 18 28*   CREATININE 1.0 1.6*   CALCIUM 8.5 7.9*   MG  --  1.90     Cardiac Enzymes:   Recent Labs     07/19/21  1100   TROPONINI 2.22*     PT/INR:   Recent Labs     07/19/21  1100 07/20/21  1200   PROTIME 57.3* 77.8* INR 4.75* 6.37*     APTT:   Recent Labs     07/19/21  1100   APTT 50.5*     Liver Profile:  Lab Results   Component Value Date     07/20/2021    ALT 31 07/20/2021    BILITOT 1.5 07/20/2021    ALKPHOS 75 07/20/2021    LABALBU 3.0 07/20/2021     Lab Results   Component Value Date    CHOL 259 04/20/2021    HDL 55 04/20/2021    HDL 62 11/08/2011    TRIG 128 04/20/2021     HgbA1c:  Lab Results   Component Value Date    LABA1C 6.2 04/20/2021     UA:   Lab Results   Component Value Date    NITRITE neg 04/20/2021    COLORU yellow 04/20/2021    PHUR 7.0 04/20/2021    CLARITYU cloudy 04/20/2021    SPECGRAV >=1.030 04/20/2021    LEUKOCYTESUR small 04/20/2021    BILIRUBINUR neg 04/20/2021    BLOODU moderate 04/20/2021    GLUCOSEU neg 04/20/2021       History obtained: chart, pt    Diagnosis: nonischemic CMP    Plan:   - nonischemic CMP  No prior echo for comparison. Will review records with regard to CLL, poss chemo (rituxin, fludarabine) affecting cardiac function? Regardless of etiology, supported currently with Impella. BSA is 1.6. Device flowing 3.6 L so over 2.0 index. Appears well perfused. No metabolic acidosis. LFTs essentially ok but INR further elevated. Could be representative of last dose effects being seen today, compounded by vit deficiency. Cr elevated. Appears to be acute injury. BUN elevated as well so likely prerenal. good u/o. Continue support with current femoral device. Monitor metabolic parameters over next 24-36 hours. Follow with serial echo. If improvement, try weaning to explantation.  If none, consider transitioning to axillary device to allow upright positioning, optimal pulm toilet, ambulation etc.    Discussed with Dr. Cristofer Atkinson MD  7/20/2021  7:17 PM

## 2021-07-20 NOTE — ACP (ADVANCE CARE PLANNING)
Advance Care Planning     Advance Care Planning Activator (Inpatient)  Conversation Note      Date of ACP Conversation: 7/20/2021     [employer] with: Anika Mendez and her sister at bedside    ACP Activator: [id number] Decision Maker:     Current Designated Health Care Decision Maker:   Theresa Albarran, [facility] [phone]    Click here to complete Healthcare Decision Makers including section of the Healthcare Decision Maker Relationship (ie \"Primary\")    Care Preferences    Ventilation: \"If you were in your present state of health and suddenly became very ill and were unable to breathe on your own, what would your preference be about the use of a ventilator (breathing machine) if it were available to you? \"      Would the patient desire the use of ventilator (breathing machine)?:   \"Yes\"    \"If your health worsens and it becomes clear that your chance of recovery is unlikely, what would your preference be about the use of a ventilator (breathing machine) if it were available to you? \"     Would the patient desire the use of ventilator (breathing machine)?:   \"NO\"      Resuscitation  \"CPR works best to restart the heart when there is a sudden event, like a heart attack, in someone who is otherwise healthy. Unfortunately, CPR does not typically restart the heart for people who have serious health conditions or who are very sick. \"    \"In the event your heart stopped as a result of an underlying serious health condition, would you want attempts to be made to restart your heart (answer \"yes\" for attempt to resuscitate) or would you prefer a natural death (answer \"no\" for do not attempt to resuscitate)? \"   \"Yes\"       [] Yes   [] No   Educated Patient / Alfie Isbell regarding differences between Advance Directives and portable DNR orders.     Length of ACP Conversation in minutes:  3 minutes    Conversation Outcomes:  [x] ACP discussion completed  [] Existing advance directive reviewed with patient; no changes to patient's previously recorded wishes  [] New Advance Directive completed  [] Portable Do Not Rescitate prepared for Provider review and signature  [] POLST/POST/MOLST/MOST prepared for Provider review and signature      Follow-up plan:    [] Schedule follow-up conversation to continue planning  [] Referred individual to Provider for additional questions/concerns   [] Advised patient/agent/surrogate to review completed ACP document and update if needed with changes in condition, patient preferences or care setting    [x] This note routed to one or more involved healthcare providers    {    Patient reports she already has completed Advanced Directives in place. Reviewed her wishes as above.     Diane Cevallos, [place]     Case Management   [phone]    7/20/2021  1:03 PM

## 2021-07-20 NOTE — PROGRESS NOTES
Late Entry    A call was placed to cardiology because the pt's rhythm went back into a-fib. The rate was 140-150. Dr. Tanika Whitehead called back and after the change was discussed, no new orders was taken at this time.

## 2021-07-20 NOTE — PROGRESS NOTES
Late Entry    Pt was complaining of nausea so a call was placed to cardiology. Dr. Claudia Ba was covering. New orders was taken see MAR.

## 2021-07-20 NOTE — PROGRESS NOTES
Late Entry    A call was placed to Dr. Dayan Ospina to update him on the pt's most recent labs and cardiac numbers. After all questions was answered new orders was taken for both of the meds infusing, see MAR.

## 2021-07-21 LAB
A/G RATIO: 1.2 (ref 1.1–2.2)
ALBUMIN SERPL-MCNC: 3 G/DL (ref 3.4–5)
ALP BLD-CCNC: 63 U/L (ref 40–129)
ALT SERPL-CCNC: 25 U/L (ref 10–40)
ANION GAP SERPL CALCULATED.3IONS-SCNC: 10 MMOL/L (ref 3–16)
AST SERPL-CCNC: 127 U/L (ref 15–37)
BASE EXCESS MIXED: -1
BASE EXCESS MIXED: 0
BASE EXCESS MIXED: 1
BASE EXCESS MIXED: 2
BASE EXCESS MIXED: 3
BILIRUB SERPL-MCNC: 1.9 MG/DL (ref 0–1)
BUN BLDV-MCNC: 33 MG/DL (ref 7–20)
CALCIUM SERPL-MCNC: 8.4 MG/DL (ref 8.3–10.6)
CHLORIDE BLD-SCNC: 105 MMOL/L (ref 99–110)
CO2: 25 MMOL/L (ref 21–32)
CREAT SERPL-MCNC: 1.2 MG/DL (ref 0.6–1.2)
GFR AFRICAN AMERICAN: 52
GFR NON-AFRICAN AMERICAN: 43
GLOBULIN: 2.6 G/DL
GLUCOSE BLD-MCNC: 158 MG/DL (ref 70–99)
GLUCOSE BLD-MCNC: 165 MG/DL (ref 70–99)
HCO3, MIXED: 25.5 MMOL/L
HCO3, MIXED: 25.9 MMOL/L
HCO3, MIXED: 26 MMOL/L
HCO3, MIXED: 26.3 MMOL/L
HCO3, MIXED: 27.3 MMOL/L
HCO3, MIXED: 28.2 MMOL/L
HCO3, MIXED: 28.7 MMOL/L
HCT VFR BLD CALC: 36.1 % (ref 36–48)
HEMOGLOBIN: 10.4 GM/DL (ref 12–16)
HEMOGLOBIN: 11.1 GM/DL (ref 12–16)
HEMOGLOBIN: 12.3 G/DL (ref 12–16)
INR BLD: 1.17 (ref 0.88–1.12)
INR BLD: 1.48 (ref 0.88–1.12)
LACTATE: 0.74 MMOL/L (ref 0.4–2)
MAGNESIUM: 2.2 MG/DL (ref 1.8–2.4)
MCH RBC QN AUTO: 31.5 PG (ref 26–34)
MCHC RBC AUTO-ENTMCNC: 34.1 G/DL (ref 31–36)
MCV RBC AUTO: 92.4 FL (ref 80–100)
O2 SAT, MIXED: 55 %
O2 SAT, MIXED: 57 %
O2 SAT, MIXED: 58 %
O2 SAT, MIXED: 60 %
O2 SAT, MIXED: 60 %
O2 SAT, MIXED: 62 %
O2 SAT, MIXED: 62 %
PCO2 MIXED: 49.2 MM HG
PCO2 MIXED: 50.7 MM HG
PCO2 MIXED: 51.5 MM HG
PCO2 MIXED: 51.6 MM HG
PCO2 MIXED: 51.9 MM HG
PCO2 MIXED: 55.1 MM HG
PCO2 MIXED: 55.8 MM HG
PDW BLD-RTO: 13.8 % (ref 12.4–15.4)
PERFORMED ON: ABNORMAL
PH, MIXED: 7.3 (ref 7.35–7.45)
PH, MIXED: 7.32 (ref 7.35–7.45)
PH, MIXED: 7.33 (ref 7.35–7.45)
PH, MIXED: 7.33 (ref 7.35–7.45)
PLATELET # BLD: 168 K/UL (ref 135–450)
PMV BLD AUTO: 9.2 FL (ref 5–10.5)
PO2 MIXED: 32 MM HG
PO2 MIXED: 33 MM HG
PO2 MIXED: 34 MM HG
PO2 MIXED: 34 MM HG
PO2 MIXED: 35 MM HG
POC HEMATOCRIT: 31 % (ref 36–48)
POC HEMATOCRIT: 33 % (ref 36–48)
POC POTASSIUM: 3.4 MMOL/L (ref 3.5–5.1)
POC SAMPLE TYPE: ABNORMAL
POC SODIUM: 135 MMOL/L (ref 136–145)
POTASSIUM SERPL-SCNC: 4.2 MMOL/L (ref 3.5–5.1)
PROTHROMBIN TIME: 13.3 SEC (ref 9.9–12.7)
PROTHROMBIN TIME: 17 SEC (ref 9.9–12.7)
RBC # BLD: 3.91 M/UL (ref 4–5.2)
SODIUM BLD-SCNC: 140 MMOL/L (ref 136–145)
TCO2 CALC MIXED: 27 MMOL/L
TCO2 CALC MIXED: 28 MMOL/L
TCO2 CALC MIXED: 29 MMOL/L
TCO2 CALC MIXED: 30 MMOL/L
TCO2 CALC MIXED: 30 MMOL/L
TOTAL PROTEIN: 5.6 G/DL (ref 6.4–8.2)
WBC # BLD: 22.5 K/UL (ref 4–11)

## 2021-07-21 PROCEDURE — 80051 ELECTROLYTE PANEL: CPT

## 2021-07-21 PROCEDURE — 2100000000 HC CCU R&B

## 2021-07-21 PROCEDURE — 85027 COMPLETE CBC AUTOMATED: CPT

## 2021-07-21 PROCEDURE — 2500000003 HC RX 250 WO HCPCS: Performed by: INTERNAL MEDICINE

## 2021-07-21 PROCEDURE — 82803 BLOOD GASES ANY COMBINATION: CPT

## 2021-07-21 PROCEDURE — 6360000002 HC RX W HCPCS: Performed by: INTERNAL MEDICINE

## 2021-07-21 PROCEDURE — 82565 ASSAY OF CREATININE: CPT

## 2021-07-21 PROCEDURE — 99291 CRITICAL CARE FIRST HOUR: CPT | Performed by: INTERNAL MEDICINE

## 2021-07-21 PROCEDURE — 83605 ASSAY OF LACTIC ACID: CPT

## 2021-07-21 PROCEDURE — 85014 HEMATOCRIT: CPT

## 2021-07-21 PROCEDURE — 84484 ASSAY OF TROPONIN QUANT: CPT

## 2021-07-21 PROCEDURE — 2700000000 HC OXYGEN THERAPY PER DAY

## 2021-07-21 PROCEDURE — 80053 COMPREHEN METABOLIC PANEL: CPT

## 2021-07-21 PROCEDURE — 2580000003 HC RX 258: Performed by: INTERNAL MEDICINE

## 2021-07-21 PROCEDURE — 85347 COAGULATION TIME ACTIVATED: CPT

## 2021-07-21 PROCEDURE — 83735 ASSAY OF MAGNESIUM: CPT

## 2021-07-21 PROCEDURE — 94761 N-INVAS EAR/PLS OXIMETRY MLT: CPT

## 2021-07-21 PROCEDURE — 82330 ASSAY OF CALCIUM: CPT

## 2021-07-21 PROCEDURE — 85610 PROTHROMBIN TIME: CPT

## 2021-07-21 PROCEDURE — 84520 ASSAY OF UREA NITROGEN: CPT

## 2021-07-21 PROCEDURE — 83880 ASSAY OF NATRIURETIC PEPTIDE: CPT

## 2021-07-21 PROCEDURE — 6370000000 HC RX 637 (ALT 250 FOR IP): Performed by: INTERNAL MEDICINE

## 2021-07-21 PROCEDURE — 99024 POSTOP FOLLOW-UP VISIT: CPT | Performed by: THORACIC SURGERY (CARDIOTHORACIC VASCULAR SURGERY)

## 2021-07-21 PROCEDURE — 36415 COLL VENOUS BLD VENIPUNCTURE: CPT

## 2021-07-21 RX ORDER — HEPARIN SODIUM 1000 [USP'U]/ML
1000 INJECTION, SOLUTION INTRAVENOUS; SUBCUTANEOUS ONCE
Status: COMPLETED | OUTPATIENT
Start: 2021-07-21 | End: 2021-07-21

## 2021-07-21 RX ORDER — 0.9 % SODIUM CHLORIDE 0.9 %
250 INTRAVENOUS SOLUTION INTRAVENOUS ONCE
Status: DISCONTINUED | OUTPATIENT
Start: 2021-07-21 | End: 2021-07-26 | Stop reason: HOSPADM

## 2021-07-21 RX ORDER — 0.9 % SODIUM CHLORIDE 0.9 %
500 INTRAVENOUS SOLUTION INTRAVENOUS ONCE
Status: DISCONTINUED | OUTPATIENT
Start: 2021-07-21 | End: 2021-07-26 | Stop reason: HOSPADM

## 2021-07-21 RX ORDER — HEPARIN SODIUM 10000 [USP'U]/100ML
0-3000 INJECTION, SOLUTION INTRAVENOUS CONTINUOUS
Status: DISCONTINUED | OUTPATIENT
Start: 2021-07-21 | End: 2021-07-22

## 2021-07-21 RX ORDER — SODIUM CHLORIDE 9 MG/ML
INJECTION, SOLUTION INTRAVENOUS CONTINUOUS
Status: DISCONTINUED | OUTPATIENT
Start: 2021-07-21 | End: 2021-07-24

## 2021-07-21 RX ADMIN — SENNOSIDES 8.6 MG: 8.6 TABLET, FILM COATED ORAL at 22:19

## 2021-07-21 RX ADMIN — AMIODARONE HYDROCHLORIDE 1 MG/MIN: 50 INJECTION, SOLUTION INTRAVENOUS at 19:13

## 2021-07-21 RX ADMIN — SODIUM CHLORIDE: 9 INJECTION, SOLUTION INTRAVENOUS at 11:30

## 2021-07-21 RX ADMIN — SODIUM CHLORIDE: 9 INJECTION, SOLUTION INTRAVENOUS at 21:44

## 2021-07-21 RX ADMIN — ONDANSETRON 4 MG: 2 INJECTION INTRAMUSCULAR; INTRAVENOUS at 08:01

## 2021-07-21 RX ADMIN — ACETAMINOPHEN 325 MG: 325 TABLET ORAL at 13:04

## 2021-07-21 RX ADMIN — SODIUM CHLORIDE: 9 INJECTION, SOLUTION INTRAVENOUS at 15:05

## 2021-07-21 RX ADMIN — Medication 250 ML: at 11:30

## 2021-07-21 RX ADMIN — HEPARIN SODIUM 100 UNITS/HR: 10000 INJECTION, SOLUTION INTRAVENOUS at 03:45

## 2021-07-21 RX ADMIN — SACUBITRIL AND VALSARTAN 1 TABLET: 24; 26 TABLET, FILM COATED ORAL at 13:36

## 2021-07-21 RX ADMIN — MILRINONE LACTATE 0.45 MCG/KG/MIN: 0.2 INJECTION, SOLUTION INTRAVENOUS at 23:50

## 2021-07-21 RX ADMIN — DOCUSATE SODIUM 100 MG: 100 CAPSULE ORAL at 10:49

## 2021-07-21 RX ADMIN — METOPROLOL TARTRATE 25 MG: 25 TABLET, FILM COATED ORAL at 08:18

## 2021-07-21 RX ADMIN — ACETAMINOPHEN 325 MG: 325 TABLET ORAL at 18:41

## 2021-07-21 RX ADMIN — HEPARIN SODIUM 1000 UNITS: 1000 INJECTION INTRAVENOUS; SUBCUTANEOUS at 11:26

## 2021-07-21 RX ADMIN — ONDANSETRON 4 MG: 2 INJECTION INTRAMUSCULAR; INTRAVENOUS at 15:08

## 2021-07-21 RX ADMIN — HEPARIN SODIUM 1000 UNITS: 1000 INJECTION INTRAVENOUS; SUBCUTANEOUS at 22:17

## 2021-07-21 RX ADMIN — MILRINONE LACTATE 0.45 MCG/KG/MIN: 0.2 INJECTION, SOLUTION INTRAVENOUS at 01:13

## 2021-07-21 RX ADMIN — ACETAMINOPHEN 325 MG: 325 TABLET ORAL at 08:15

## 2021-07-21 RX ADMIN — MILRINONE LACTATE 0.45 MCG/KG/MIN: 0.2 INJECTION, SOLUTION INTRAVENOUS at 11:50

## 2021-07-21 ASSESSMENT — PAIN - FUNCTIONAL ASSESSMENT
PAIN_FUNCTIONAL_ASSESSMENT: PREVENTS OR INTERFERES SOME ACTIVE ACTIVITIES AND ADLS
PAIN_FUNCTIONAL_ASSESSMENT: PREVENTS OR INTERFERES SOME ACTIVE ACTIVITIES AND ADLS

## 2021-07-21 ASSESSMENT — PAIN SCALES - GENERAL
PAINLEVEL_OUTOF10: 5
PAINLEVEL_OUTOF10: 0
PAINLEVEL_OUTOF10: 4
PAINLEVEL_OUTOF10: 3
PAINLEVEL_OUTOF10: 2
PAINLEVEL_OUTOF10: 5

## 2021-07-21 ASSESSMENT — PAIN DESCRIPTION - DESCRIPTORS
DESCRIPTORS: ACHING;SORE
DESCRIPTORS: ACHING
DESCRIPTORS: ACHING;SORE

## 2021-07-21 ASSESSMENT — PAIN DESCRIPTION - ORIENTATION
ORIENTATION: LOWER;MID

## 2021-07-21 ASSESSMENT — PAIN DESCRIPTION - LOCATION
LOCATION: HEAD
LOCATION: ABDOMEN
LOCATION: ABDOMEN;BACK
LOCATION: ABDOMEN

## 2021-07-21 ASSESSMENT — PAIN DESCRIPTION - ONSET
ONSET: ON-GOING
ONSET: ON-GOING

## 2021-07-21 ASSESSMENT — PAIN DESCRIPTION - PAIN TYPE
TYPE: ACUTE PAIN

## 2021-07-21 ASSESSMENT — PAIN DESCRIPTION - PROGRESSION
CLINICAL_PROGRESSION: GRADUALLY IMPROVING

## 2021-07-21 ASSESSMENT — PAIN DESCRIPTION - FREQUENCY
FREQUENCY: CONTINUOUS
FREQUENCY: CONTINUOUS

## 2021-07-21 NOTE — PROGRESS NOTES
overnight. 0500- , no changes. 0600- , no changes. 0630- CO 3.44, CI 2.11, SvO2 61.5, SVR 1604. Dr. Nirmala Watkins updated via text by this RN. No new orders at this time. 0700- Report given to [facility].

## 2021-07-21 NOTE — PROGRESS NOTES
Issues with EPOC crossing over into epic    Mixed Venous blood gas performed at 1209:    PH          7.318  PCO2     55.1 mmhg  PO2        32.8 mmol/L  cHCO3-  28.2 mmol/L  BE(ecf)   2.1  cSO2      56.6 %  cTCO2   29.9 mmol/L  Hct          33 %  cHgb       11.1 g/dl

## 2021-07-21 NOTE — PLAN OF CARE
Problem: Skin Integrity:  Goal: Will show no infection signs and symptoms  Description: Will show no infection signs and symptoms  Outcome: Ongoing     Problem: Falls - Risk of:  Goal: Will remain free from falls  Description: Will remain free from falls  Outcome: Ongoing     Problem: OXYGENATION/RESPIRATORY FUNCTION  Goal: Patient will maintain patent airway  Outcome: Ongoing     Problem: FLUID AND ELECTROLYTE IMBALANCE  Goal: Fluid and electrolyte balance are achieved/maintained  Outcome: Ongoing

## 2021-07-21 NOTE — CARE COORDINATION
Chart Reviewed. Spoke with bedside RN about pt's condition. Following for DC disposition.   [place], [place]     Case Management   [phone]    7/21/2021  3:39 PM

## 2021-07-21 NOTE — PROGRESS NOTES
Progress Note  Nonischemic CMP-    Vital Signs:                                                 /63   Pulse 78   Temp 98.6 °F (37 °C) (Core)   Resp 15   Ht 4' 11\" (1.499 m)   Wt 153 lb 3.5 oz (69.5 kg)   SpO2 96%   BMI 30.95 kg/m²  O2 Flow Rate (L/min): 3 L/min   SR  CVP (Mean): 2 mmHg  PAP: 30/9  PAP (Mean): 17 mmHg  CCI: 2.11 L/min  Admission Weight: 150 lb 9.2 oz (68.3 kg)      Drips:  Mil 0.45, nipride, amio, hep  P8    I/O:      Intake/Output Summary (Last 24 hours) at 7/21/2021 0723  Last data filed at 7/21/2021 0630  Gross per 24 hour   Intake 2575.22 ml   Output 4170 ml   Net -1594.78 ml       Data Review:  CBC:   Recent Labs     07/19/21  1100 07/20/21  0412 07/21/21  0510   WBC 22.0* 19.7* 22.5*   HGB 16.7* 13.9 12.3   HCT 50.9* 41.4 36.1   MCV 93.5 92.3 92.4    203 168     BMP:   Recent Labs     07/19/21  1100 07/20/21  0412 07/21/21  0510   * 130* 140   K 4.3 3.6 4.2   CL 97* 96* 105   CO2 22 26 25   PHOS  --  3.1  --    BUN 18 28* 33*   CREATININE 1.0 1.6* 1.2   CALCIUM 8.5 7.9* 8.4   MG  --  1.90  --      Cardiac Enzymes:   Recent Labs     07/19/21  1100   TROPONINI 2.22*     PT/INR:   Recent Labs     07/19/21  1100 07/20/21  1200 07/21/21  0510   PROTIME 57.3* 77.8* 17.0*   INR 4.75* 6.37* 1.48*     APTT:   Recent Labs     07/19/21  1100   APTT 50.5*       Assessment/Plan:  CV - echo 7/20 EF<10%. Impella at P8, flowing 3.2L. Remains well perfused, no acidosis. Suspect pt has had chronic low EF, well compensated. pulm - should be able to wean O2   Renal - Cr normalized.   Heme - INR down after 1mg vit K    Pastora Alcocer MD  7/21/2021  7:23 AM

## 2021-07-21 NOTE — PROGRESS NOTES
ACT  133   Current rate: 100 units/hr     Increase to 200 uinits/h          Give bolus 1000 unit x 1   Titrated per pharmacy protocol to target ACT ACT

## 2021-07-21 NOTE — PROGRESS NOTES
EPOC not crossing into Epic    Mixed venous performed at 1457     PH          7.329  PCO2     51.9 mmhg  PO2        31.5 mmol/L  cHCO3-  27.3 mmol/L  BE(ecf)   1.3  cSO2      55 %  cTCO2   28.9 mmol/L  Hct          31 %  cHgb       10.4 g/dl

## 2021-07-21 NOTE — PROGRESS NOTES
Cardiology Progress    [facility]  1940      Referring Physician: [facility] DIV[facility] ER   Reason for Referral: stemi   Chief Complaint:   Chief Complaint   Patient presents with    Shortness of Breath     Pt states she has been SOB dizziness with N/V/D. state she was here Friday but left due to being in the waiting area for 3 hours.  Dizziness       Interval history:  Severe LV dysfunction   Mechanical support with IMpella CP LVAD  Intropic support with primacor   No chest pain or dyspnea, lying comfortable in bed     Subjective:     History of Present Illness: The patient is 78 y.o. female with a past medical history significant for CLL, factor V mutation who presents with the above complaint. Admits to 3 days of worsening dyspnea with minimal exertion. Took OTC tylenol with no help thus coming to the ER for evaluation.  STEMI page activated by ER staff           Past Medical History:   Diagnosis Date    Chronic lymphocytic leukemia in remission ([address]     CLL (chronic lymphocytic [facility]) ([facility] 2/12/2015    COPD (chronic obstructive pulmonary disease) ([facility]     Essential hypertension 2015    controlled with salt restriction (initially)    Factor V Leiden mutation ([facility]     Goiter Nov, 2011    1.5 cm midline    Hypercholesterolemia     Impaired fasting glucose Nov, 2011    Osteoarthritis     Osteoporosis     Post herpetic neuralgia     Vitamin D deficiency Nov, 2011     Past Surgical History:   Procedure Laterality Date    CATARACT REMOVAL WITH IMPLANT  5/14/12    left eye    JOINT REPLACEMENT      partial knee R and L    KNEE SURGERY  2008    partial replacements, both knees    MOHS SURGERY  03/12/2019    R cheek and R superior temple    SPLENECTOMY      TUNNELED VENOUS PORT PLACEMENT      UPPER GASTROINTESTINAL ENDOSCOPY N/A 2/5/2019    EGD ESOPHAGOGASTRODUODENOSCOPY, WITH ENDOSCOPIC ULTRASOUND OF ESOPHAGUS performed by Odalys Hernandez MD at [address] [facility], [place] milrinone (PRIMACOR) 20 mg in dextrose 5 % 100 mL infusion  0.45 mcg/kg/min Intravenous Continuous Maribell Nelson MD 9.2 mL/hr at 07/20/21 0210 0.45 mcg/kg/min at 07/20/21 0210    ondansetron (ZOFRAN) injection 4 mg  4 mg Intravenous Q6H PRN Sandra Viera MD   4 mg at 07/20/21 2024    metoprolol tartrate (LOPRESSOR) tablet 25 mg  25 mg Oral BID Maribell Nelson MD   25 mg at 07/20/21 2208       Review of Systems:  · Constitutional: No unanticipated weight loss. There's been no change in energy level, sleep pattern, or activity level. No fevers, chills. · Eyes: No visual changes or diplopia. No scleral icterus. · ENT: No Headaches, hearing loss or vertigo. No mouth sores or sore throat. · Cardiovascular: as reviewed in HPI  · Respiratory: No cough or wheezing, no sputum production. No hemoptysis. · Gastrointestinal: No abdominal pain, appetite loss, blood in stools. No change in bowel or bladder habits. · Genitourinary: No dysuria, trouble voiding, or hematuria. · Musculoskeletal:  No gait disturbance, no joint complaints. · Integumentary: No rash or pruritis. · Neurological: No headache, diplopia, change in muscle strength, numbness or tingling. · Psychiatric: No anxiety or depression. · Endocrine: No temperature intolerance. No excessive thirst, fluid intake, or urination. No tremor. · Hematologic/Lymphatic: No abnormal bruising or bleeding, blood clots or swollen lymph nodes. · Allergic/Immunologic: No nasal congestion or hives. Physical Exam:   BP 97/68   Pulse 77   Temp 99.5 °F (37.5 °C) (Core)   Resp 19   Ht 4' 11\" (1.499 m)   Wt 158 lb 15.2 oz (72.1 kg)   SpO2 95%   BMI 32.10 kg/m²   Wt Readings from Last 3 Encounters:   07/20/21 158 lb 15.2 oz (72.1 kg)   07/16/21 158 lb 15.2 oz (72.1 kg)   04/20/21 162 lb (73.5 kg)     Constitutional: appears ill, diaphoretic   Head: Normocephalic and atraumatic. Pupils equal and round. Neck: Neck supple. + JVP or carotid bruit appreciated.  No mass and no thyromegaly present. No lymphadenopathy present. Cardiovascular: Normal rate. Normal heart sounds. Exam reveals no gallop and no friction rub. No murmur heard. Pulmonary/Chest: diffuse rales   Abdominal: Soft, non-tender. Bowel sounds are normal. She exhibits no organomegaly, mass or bruit. Extremities: No edema. No cyanosis or clubbing. Pulses are 2+ radial/carotid bilaterally. Neurological: No gross cranial nerve deficit. Coordination normal.   Skin: Skin is warm and dry. There is no rash or diaphoresis. Psychiatric: She has a normal mood and affect. Her speech is normal and behavior is normal.     Lab Review:   FLP:    Lab Results   Component Value Date    TRIG 128 04/20/2021    HDL 55 04/20/2021    HDL 62 11/08/2011    LDLCALC 178 04/20/2021    LDLDIRECT 117 12/14/2015    LABVLDL 26 04/20/2021     BUN/Creatinine:    Lab Results   Component Value Date    BUN 28 07/20/2021    CREATININE 1.6 07/20/2021     PT/INR, TNI, HGB A1C:   Lab Results   Component Value Date/Time    TROPONINI 2.22 (HH) 07/19/2021 11:00 AM    LABA1C 6.2 04/20/2021 08:54 AM      No results found for: CBCAUTODIF    EKG Interpretation: afib, with diffuse ST elevations     Echo: severe LV dysfunction with EF < 10%     Stress Test:     Cath:   Normal coronary arteries     CT:    Doppler:     All above diagnostic testing and laboratory data was independently visualized and reviewed by me (not simply review of report)       Assessment and Plan   1) acute on chronic LV systolic heart failure   NYHA class IV, stage D  Attempt to titrate down impella to p6  C/w primacor to keep MVO2 > 55 %   SVR remains elevated, will changed nitro to nipride gtt   Will ask CT SX to comment about impella 5.0 axillary upgrade     2) pafib   - c/w amio gtt   - INR supratherapeutic   - low dose vit K     3) BRENNAN   - continue to monitor   - good UOP   - maintain CVP ~ 5-8       Thank you very much for allowing me to participate in the care of your patient. Please do not hesitate to contact me if you have any questions.     Danii Mcfarland MD [address] [facility], Interventional Cardiology, and Peripheral Vascular Disease   [facility]   Ph: [phone]  Fax: [phone]

## 2021-07-21 NOTE — PROGRESS NOTES
Late entries due to patient care: At 0730, report received from [address] [facility]. Medications handed off at bedside. Patient repositioned. Complaining of some back discomfort. Patient satisfied at this time. Will monitor. At [id number] [phone], Dr. India Zavala and Robb Carrizales NP at bedside to assess the patient. At 0800, , no changes. At [facility], Patient's impella with suction alarms and CVP of 0, PA and CVP had been leveled and rezeroed. Message sent to Dr. Gavi Rockwell to notify him, and 250 ml Normal saline bolus started for patient. Dr. Gavi Rockwell also notified that patient still having some abdominal pain and nausea. Request made for protonix IV once a day and physician asked if he would like an abdominal xray. Awaiting response. At [address] [facility], Patient's sister Cleveland Johnson at bedside. At 0900, , no changes. At 0942, nipride titrated per parameters of  SVR. Current SVR with last obtained CO at 1505. Blood pressure also taken into account, for nipride titration. 86/58. Patient received beta blocker this morning. At 0947, nipride titrated per parameters of  SVR. Current SVR with last obtained CO at 1552. Blood pressure also taken into account, for nipride titration; 93/60. Decreased urine output this hour. CVP at 1. Receiving and additional 100 mls of fluid bolus at current time to increase CVP into physician prefferred range of 5-8. Closely monitoring all intake and output. At 1000, 162, no changes. At 1020, Michelle Garcia Representative at bedside to assess the patient. Updated on suction alarms, and fluid given to support urine output, CVP levels today. States she will update physician    At 65, Dr. Gavi Rockwell at bedside to assess the patient. States to give a 250 ml bolus and start a maintenance Normal saline infusion at 75 mls/hr. At 1100, ACT was 133. Pharmacy was notified and orders for heparin bolus and dose change received. At 1200, ACT at 172. No changes.     At 1255, Dr. Matt Ortega was updated on most recent Mixed Venous Blood gas results, and Ficks Cardiac Output results. Instructed to reduce P level to 6 and repeat Mixed Venous in 2 hours. Asked for order clarification regarding metoprolol, dose and frequency. Awaiting response. At 1300, , no changes. At 1320, Dr. Matt Ortega updated on patient's condition. Order received for new medication, entresto. At 1400, , no changes. At 1500, Dr. Matt Ortega updated on patient's mixed venous results, and ficks output calculation. No new orders received. , no changes. At 1600, , no changes. At 1700, ACT of 163. Discussed with pharmacy. ACT to be kept between 165-180. Heparin adjusted per pharmacy. At [id number], Dr. Matt Ortega at bedside to speak with and assess the patient. Updated on current manual cardiac output. Impella weaned to P-4. Instructed to have nightshift run next Mixed venous gases and report to Physician. Will monitor patient closely. At 1800, ACT of 165. No changes. At 1900, ACT of 164. Heparin dose adjusted per pharmacy. At 1915, bedside report with Flory Klnie RN. Medications handed off at bedside.

## 2021-07-22 ENCOUNTER — APPOINTMENT (OUTPATIENT)
Dept: CARDIAC CATH/INVASIVE PROCEDURES | Age: 81
DRG: 215 | End: 2021-07-22
Payer: MEDICARE

## 2021-07-22 LAB
A/G RATIO: 1.1 (ref 1.1–2.2)
ALBUMIN SERPL-MCNC: 2.4 G/DL (ref 3.4–5)
ALP BLD-CCNC: 61 U/L (ref 40–129)
ALT SERPL-CCNC: 18 U/L (ref 10–40)
ANION GAP SERPL CALCULATED.3IONS-SCNC: 6 MMOL/L (ref 3–16)
APTT: 30.6 SEC (ref 26.2–38.6)
AST SERPL-CCNC: 46 U/L (ref 15–37)
BASE EXCESS MIXED: -1
BASE EXCESS MIXED: -1
BASE EXCESS MIXED: 0
BASE EXCESS MIXED: 1
BASE EXCESS VENOUS: 0 (ref -3–3)
BASE EXCESS VENOUS: 1 (ref -3–3)
BASE EXCESS VENOUS: 1 (ref -3–3)
BASOPHILS ABSOLUTE: 0.1 K/UL (ref 0–0.2)
BASOPHILS RELATIVE PERCENT: 0.4 %
BILIRUB SERPL-MCNC: 0.8 MG/DL (ref 0–1)
BUN BLDV-MCNC: 19 MG/DL (ref 7–20)
CALCIUM SERPL-MCNC: 7.4 MG/DL (ref 8.3–10.6)
CHLORIDE BLD-SCNC: 110 MMOL/L (ref 99–110)
CO2: 25 MMOL/L (ref 21–32)
CREAT SERPL-MCNC: 1.1 MG/DL (ref 0.6–1.2)
EOSINOPHILS ABSOLUTE: 0.5 K/UL (ref 0–0.6)
EOSINOPHILS RELATIVE PERCENT: 2.3 %
GFR AFRICAN AMERICAN: 57
GFR NON-AFRICAN AMERICAN: 47
GLOBULIN: 2.2 G/DL
GLUCOSE BLD-MCNC: 135 MG/DL (ref 70–99)
HCO3 VENOUS: 25.9 MMOL/L (ref 23–29)
HCO3 VENOUS: 27.1 MMOL/L (ref 23–29)
HCO3 VENOUS: 27.2 MMOL/L (ref 23–29)
HCO3, MIXED: 25.1 MMOL/L
HCO3, MIXED: 25.6 MMOL/L
HCO3, MIXED: 26.4 MMOL/L
HCO3, MIXED: 27.1 MMOL/L
HCT VFR BLD CALC: 30.3 % (ref 36–48)
HEMATOLOGY PATH CONSULT: NO
HEMOGLOBIN: 9.8 G/DL (ref 12–16)
INR BLD: 1.31 (ref 0.88–1.12)
LYMPHOCYTES ABSOLUTE: 9.2 K/UL (ref 1–5.1)
LYMPHOCYTES RELATIVE PERCENT: 43.7 %
MCH RBC QN AUTO: 30.3 PG (ref 26–34)
MCHC RBC AUTO-ENTMCNC: 32.5 G/DL (ref 31–36)
MCV RBC AUTO: 93.4 FL (ref 80–100)
MONOCYTES ABSOLUTE: 2 K/UL (ref 0–1.3)
MONOCYTES RELATIVE PERCENT: 9.7 %
NEUTROPHILS ABSOLUTE: 9.2 K/UL (ref 1.7–7.7)
NEUTROPHILS RELATIVE PERCENT: 43.9 %
O2 SAT, MIXED: 55 %
O2 SAT, MIXED: 57 %
O2 SAT, MIXED: 59 %
O2 SAT, MIXED: 62 %
O2 SAT, VEN: 55 %
O2 SAT, VEN: 60 %
O2 SAT, VEN: 63 %
PCO2 MIXED: 48 MM HG
PCO2 MIXED: 52.1 MM HG
PCO2 MIXED: 52.7 MM HG
PCO2 MIXED: 53.2 MM HG
PCO2, VEN: 50.9 MM HG (ref 40–50)
PCO2, VEN: 51.7 MM HG (ref 40–50)
PCO2, VEN: 55.5 MM HG (ref 40–50)
PDW BLD-RTO: 14.1 % (ref 12.4–15.4)
PERFORMED ON: ABNORMAL
PH VENOUS: 7.3 (ref 7.35–7.45)
PH VENOUS: 7.31 (ref 7.35–7.45)
PH VENOUS: 7.34 (ref 7.35–7.45)
PH, MIXED: 7.29 (ref 7.35–7.45)
PH, MIXED: 7.3 (ref 7.35–7.45)
PH, MIXED: 7.32 (ref 7.35–7.45)
PH, MIXED: 7.33 (ref 7.35–7.45)
PLATELET # BLD: 132 K/UL (ref 135–450)
PMV BLD AUTO: 9.7 FL (ref 5–10.5)
PO2 MIXED: 32 MM HG
PO2 MIXED: 33 MM HG
PO2 MIXED: 33 MM HG
PO2 MIXED: 36 MM HG
PO2, VEN: 31 MM HG
PO2, VEN: 35 MM HG
PO2, VEN: 37 MM HG
POC ACT LR: 121 SEC
POC ACT LR: 133 SEC
POC ACT LR: 140 SEC
POC ACT LR: 149 SEC
POC ACT LR: 152 SEC
POC ACT LR: 154 SEC
POC ACT LR: 157 SEC
POC ACT LR: 159 SEC
POC ACT LR: 160 SEC
POC ACT LR: 161 SEC
POC ACT LR: 162 SEC
POC ACT LR: 163 SEC
POC ACT LR: 163 SEC
POC ACT LR: 164 SEC
POC ACT LR: 164 SEC
POC ACT LR: 165 SEC
POC ACT LR: 166 SEC
POC ACT LR: 166 SEC
POC ACT LR: 168 SEC
POC ACT LR: 169 SEC
POC ACT LR: 170 SEC
POC ACT LR: 170 SEC
POC ACT LR: 171 SEC
POC ACT LR: 171 SEC
POC ACT LR: 172 SEC
POC ACT LR: 172 SEC
POC ACT LR: 173 SEC
POC ACT LR: 174 SEC
POC ACT LR: 174 SEC
POC ACT LR: 177 SEC
POC ACT LR: 180 SEC
POC ACT LR: 181 SEC
POC ACT LR: 193 SEC
POC ACT LR: 194 SEC
POC ACT LR: 201 SEC
POC ACT LR: 201 SEC
POC ACT LR: 204 SEC
POC ACT LR: 206 SEC
POC ACT LR: 209 SEC
POC ACT LR: 213 SEC
POC ACT LR: 213 SEC
POC ACT LR: 214 SEC
POC ACT LR: 215 SEC
POC ACT LR: 218 SEC
POC ACT LR: 218 SEC
POC ACT LR: 219 SEC
POC ACT LR: 223 SEC
POC ACT LR: 224 SEC
POC ACT LR: 225 SEC
POC ACT LR: 229 SEC
POC ACT LR: 230 SEC
POC ACT LR: 239 SEC
POC SAMPLE TYPE: ABNORMAL
POTASSIUM SERPL-SCNC: 3.4 MMOL/L (ref 3.5–5.1)
PROTHROMBIN TIME: 15 SEC (ref 9.9–12.7)
RBC # BLD: 3.24 M/UL (ref 4–5.2)
SODIUM BLD-SCNC: 141 MMOL/L (ref 136–145)
TCO2 CALC MIXED: 27 MMOL/L
TCO2 CALC MIXED: 27 MMOL/L
TCO2 CALC MIXED: 28 MMOL/L
TCO2 CALC MIXED: 29 MMOL/L
TCO2 CALC VENOUS: 28 MMOL/L
TCO2 CALC VENOUS: 29 MMOL/L
TCO2 CALC VENOUS: 29 MMOL/L
TOTAL PROTEIN: 4.6 G/DL (ref 6.4–8.2)
WBC # BLD: 21 K/UL (ref 4–11)

## 2021-07-22 PROCEDURE — 6360000002 HC RX W HCPCS: Performed by: INTERNAL MEDICINE

## 2021-07-22 PROCEDURE — 2580000003 HC RX 258: Performed by: INTERNAL MEDICINE

## 2021-07-22 PROCEDURE — 80053 COMPREHEN METABOLIC PANEL: CPT

## 2021-07-22 PROCEDURE — 6360000002 HC RX W HCPCS: Performed by: NURSE PRACTITIONER

## 2021-07-22 PROCEDURE — 85347 COAGULATION TIME ACTIVATED: CPT

## 2021-07-22 PROCEDURE — 88185 FLOWCYTOMETRY/TC ADD-ON: CPT

## 2021-07-22 PROCEDURE — 88184 FLOWCYTOMETRY/ TC 1 MARKER: CPT

## 2021-07-22 PROCEDURE — 6370000000 HC RX 637 (ALT 250 FOR IP): Performed by: INTERNAL MEDICINE

## 2021-07-22 PROCEDURE — 2500000003 HC RX 250 WO HCPCS: Performed by: NURSE PRACTITIONER

## 2021-07-22 PROCEDURE — 02PA3RZ REMOVAL OF SHORT-TERM EXTERNAL HEART ASSIST SYSTEM FROM HEART, PERCUTANEOUS APPROACH: ICD-10-PCS | Performed by: INTERNAL MEDICINE

## 2021-07-22 PROCEDURE — 85610 PROTHROMBIN TIME: CPT

## 2021-07-22 PROCEDURE — 82803 BLOOD GASES ANY COMBINATION: CPT

## 2021-07-22 PROCEDURE — 2700000000 HC OXYGEN THERAPY PER DAY

## 2021-07-22 PROCEDURE — 85025 COMPLETE CBC W/AUTO DIFF WBC: CPT

## 2021-07-22 PROCEDURE — 6360000002 HC RX W HCPCS

## 2021-07-22 PROCEDURE — 99231 SBSQ HOSP IP/OBS SF/LOW 25: CPT | Performed by: THORACIC SURGERY (CARDIOTHORACIC VASCULAR SURGERY)

## 2021-07-22 PROCEDURE — 2500000003 HC RX 250 WO HCPCS: Performed by: INTERNAL MEDICINE

## 2021-07-22 PROCEDURE — 2580000003 HC RX 258: Performed by: NURSE PRACTITIONER

## 2021-07-22 PROCEDURE — 94761 N-INVAS EAR/PLS OXIMETRY MLT: CPT

## 2021-07-22 PROCEDURE — 2100000000 HC CCU R&B

## 2021-07-22 PROCEDURE — 85730 THROMBOPLASTIN TIME PARTIAL: CPT

## 2021-07-22 RX ORDER — SODIUM CHLORIDE 9 MG/ML
25 INJECTION, SOLUTION INTRAVENOUS PRN
Status: DISCONTINUED | OUTPATIENT
Start: 2021-07-22 | End: 2021-07-26 | Stop reason: HOSPADM

## 2021-07-22 RX ORDER — ATROPINE SULFATE 0.1 MG/ML
INJECTION INTRAVENOUS
Status: DISPENSED
Start: 2021-07-22 | End: 2021-07-23

## 2021-07-22 RX ORDER — MIDODRINE HYDROCHLORIDE 10 MG/1
10 TABLET ORAL ONCE
Status: COMPLETED | OUTPATIENT
Start: 2021-07-22 | End: 2021-07-22

## 2021-07-22 RX ORDER — SODIUM CHLORIDE 0.9 % (FLUSH) 0.9 %
5-40 SYRINGE (ML) INJECTION EVERY 12 HOURS SCHEDULED
Status: DISCONTINUED | OUTPATIENT
Start: 2021-07-22 | End: 2021-07-26 | Stop reason: HOSPADM

## 2021-07-22 RX ORDER — LIDOCAINE HYDROCHLORIDE 10 MG/ML
INJECTION, SOLUTION INFILTRATION; PERINEURAL
Status: DISPENSED
Start: 2021-07-22 | End: 2021-07-23

## 2021-07-22 RX ORDER — FENTANYL CITRATE 50 UG/ML
25 INJECTION, SOLUTION INTRAMUSCULAR; INTRAVENOUS ONCE
Status: COMPLETED | OUTPATIENT
Start: 2021-07-22 | End: 2021-07-22

## 2021-07-22 RX ORDER — HEPARIN SODIUM 10000 [USP'U]/100ML
0-3000 INJECTION, SOLUTION INTRAVENOUS CONTINUOUS
Status: DISCONTINUED | OUTPATIENT
Start: 2021-07-22 | End: 2021-07-23

## 2021-07-22 RX ORDER — AMIODARONE HYDROCHLORIDE 200 MG/1
400 TABLET ORAL 2 TIMES DAILY
Status: DISCONTINUED | OUTPATIENT
Start: 2021-07-22 | End: 2021-07-25

## 2021-07-22 RX ORDER — SODIUM CHLORIDE 0.9 % (FLUSH) 0.9 %
5-40 SYRINGE (ML) INJECTION PRN
Status: DISCONTINUED | OUTPATIENT
Start: 2021-07-22 | End: 2021-07-26 | Stop reason: HOSPADM

## 2021-07-22 RX ORDER — POTASSIUM CHLORIDE 29.8 MG/ML
20 INJECTION INTRAVENOUS
Status: COMPLETED | OUTPATIENT
Start: 2021-07-22 | End: 2021-07-22

## 2021-07-22 RX ORDER — FENTANYL CITRATE 50 UG/ML
INJECTION, SOLUTION INTRAMUSCULAR; INTRAVENOUS
Status: COMPLETED
Start: 2021-07-22 | End: 2021-07-22

## 2021-07-22 RX ORDER — LIDOCAINE HYDROCHLORIDE 10 MG/ML
5 INJECTION, SOLUTION EPIDURAL; INFILTRATION; INTRACAUDAL; PERINEURAL ONCE
Status: COMPLETED | OUTPATIENT
Start: 2021-07-22 | End: 2021-07-22

## 2021-07-22 RX ORDER — MORPHINE SULFATE 2 MG/ML
INJECTION, SOLUTION INTRAMUSCULAR; INTRAVENOUS
Status: COMPLETED
Start: 2021-07-22 | End: 2021-07-22

## 2021-07-22 RX ORDER — WARFARIN SODIUM 5 MG/1
10 TABLET ORAL
Status: COMPLETED | OUTPATIENT
Start: 2021-07-22 | End: 2021-07-22

## 2021-07-22 RX ORDER — MORPHINE SULFATE 2 MG/ML
2 INJECTION, SOLUTION INTRAMUSCULAR; INTRAVENOUS ONCE
Status: COMPLETED | OUTPATIENT
Start: 2021-07-22 | End: 2021-07-22

## 2021-07-22 RX ADMIN — WARFARIN SODIUM 10 MG: 5 TABLET ORAL at 19:39

## 2021-07-22 RX ADMIN — FENTANYL CITRATE 25 MCG: 50 INJECTION, SOLUTION INTRAMUSCULAR; INTRAVENOUS at 14:04

## 2021-07-22 RX ADMIN — ACETAMINOPHEN 650 MG: 325 TABLET ORAL at 07:47

## 2021-07-22 RX ADMIN — DOCUSATE SODIUM 100 MG: 100 CAPSULE ORAL at 08:40

## 2021-07-22 RX ADMIN — METOPROLOL TARTRATE 25 MG: 25 TABLET, FILM COATED ORAL at 19:40

## 2021-07-22 RX ADMIN — AMIODARONE HYDROCHLORIDE 400 MG: 200 TABLET ORAL at 20:50

## 2021-07-22 RX ADMIN — HEPARIN SODIUM: 10000 INJECTION INTRAVENOUS; SUBCUTANEOUS at 02:00

## 2021-07-22 RX ADMIN — AMIODARONE HYDROCHLORIDE 400 MG: 200 TABLET ORAL at 08:40

## 2021-07-22 RX ADMIN — FENTANYL CITRATE 25 MCG: 50 INJECTION INTRAMUSCULAR; INTRAVENOUS at 14:04

## 2021-07-22 RX ADMIN — Medication 20 MEQ: at 08:20

## 2021-07-22 RX ADMIN — LIDOCAINE HYDROCHLORIDE 5 ML: 10 INJECTION, SOLUTION EPIDURAL; INFILTRATION; INTRACAUDAL; PERINEURAL at 13:30

## 2021-07-22 RX ADMIN — MORPHINE SULFATE 2 MG: 2 INJECTION, SOLUTION INTRAMUSCULAR; INTRAVENOUS at 13:30

## 2021-07-22 RX ADMIN — CALCIUM CHLORIDE 1000 MG: 100 INJECTION, SOLUTION INTRAVENOUS at 10:01

## 2021-07-22 RX ADMIN — Medication 20 MEQ: at 13:00

## 2021-07-22 RX ADMIN — SENNOSIDES 8.6 MG: 8.6 TABLET, FILM COATED ORAL at 20:50

## 2021-07-22 RX ADMIN — SODIUM CHLORIDE: 9 INJECTION, SOLUTION INTRAVENOUS at 18:21

## 2021-07-22 RX ADMIN — ACETAMINOPHEN 650 MG: 325 TABLET ORAL at 03:16

## 2021-07-22 RX ADMIN — MIDODRINE HYDROCHLORIDE 10 MG: 10 TABLET ORAL at 10:01

## 2021-07-22 RX ADMIN — ONDANSETRON 4 MG: 2 INJECTION INTRAMUSCULAR; INTRAVENOUS at 07:47

## 2021-07-22 RX ADMIN — AMIODARONE HYDROCHLORIDE 1 MG/MIN: 50 INJECTION, SOLUTION INTRAVENOUS at 07:59

## 2021-07-22 RX ADMIN — AMIODARONE HYDROCHLORIDE 1 MG/MIN: 50 INJECTION, SOLUTION INTRAVENOUS at 00:27

## 2021-07-22 RX ADMIN — ACETAMINOPHEN 650 MG: 325 TABLET ORAL at 23:59

## 2021-07-22 RX ADMIN — ONDANSETRON 4 MG: 2 INJECTION INTRAMUSCULAR; INTRAVENOUS at 18:21

## 2021-07-22 ASSESSMENT — PAIN DESCRIPTION - ORIENTATION
ORIENTATION: RIGHT
ORIENTATION: LOWER;MID

## 2021-07-22 ASSESSMENT — PAIN SCALES - GENERAL
PAINLEVEL_OUTOF10: 0
PAINLEVEL_OUTOF10: 5
PAINLEVEL_OUTOF10: 3
PAINLEVEL_OUTOF10: 0
PAINLEVEL_OUTOF10: 1
PAINLEVEL_OUTOF10: 10
PAINLEVEL_OUTOF10: 0
PAINLEVEL_OUTOF10: 0

## 2021-07-22 ASSESSMENT — PAIN DESCRIPTION - LOCATION
LOCATION: BACK
LOCATION: GROIN

## 2021-07-22 ASSESSMENT — PAIN DESCRIPTION - ONSET
ONSET: ON-GOING
ONSET: SUDDEN

## 2021-07-22 ASSESSMENT — PAIN DESCRIPTION - PAIN TYPE
TYPE: ACUTE PAIN
TYPE: ACUTE PAIN

## 2021-07-22 ASSESSMENT — PAIN DESCRIPTION - FREQUENCY
FREQUENCY: CONTINUOUS
FREQUENCY: CONTINUOUS

## 2021-07-22 ASSESSMENT — PAIN DESCRIPTION - DESCRIPTORS
DESCRIPTORS: ACHING
DESCRIPTORS: ACHING

## 2021-07-22 ASSESSMENT — PAIN DESCRIPTION - PROGRESSION
CLINICAL_PROGRESSION: NOT CHANGED
CLINICAL_PROGRESSION: GRADUALLY WORSENING

## 2021-07-22 NOTE — PROGRESS NOTES
Clinical Pharmacy Note  Heparin Dosing - Impella Device    Patient receiving heparin purge solution via Impella device. Consult received from Dr. Zandra Florentino to titrate systemic heparin to maintain -180. Shift ACT Results and Heparin Adjustments:      Date   Time POC ACT  Result Heparin  Bolus   (units) Systemic Heparin Infusion Rate (100 units/mL)   7/21/21 2100 162  450 units/hr    2200 140 1000 units 550 units/hr    2300 181  550 units/hr   7/22/21 0000 180  550 units/hr    0100 169  550 units/hr    0200 170  550 units/hr    0300 169  550 units/hr    0400 171  550 units/hr    0500 154  650 units/hr    0600 174  650 units/hr       Pharmacy will continue to monitor and adjust based on ACT results.     Yeison Siu, [facility]  7/22/2021 10:07 PM

## 2021-07-22 NOTE — PROGRESS NOTES
Physician Progress Note      Juhi Tariq  [facility] #:                  [id number]  :                       1940  ADMIT DATE:       2021 10:44 AM  DISCH DATE:  RESPONDING  PROVIDER #:        SANTA Caceres MD          QUERY TEXT:    Pt admitted with STEMI and had cardiac cath which showed normal coronaries. Takotsubo cardiomyopathy added to Active Problem List.  If possible, please   document in the progress notes and discharge summary if STEMI was: The medical record reflects the following:  Risk Factors: HTN, COPD  Clinical Indicators: ST elevation, normal coronaries  Treatment: C & RHC, Impella, Primacor    Thank you,  James Lynch RN, BSN, [email or web address]  Options provided:  -- STEMI confirmed after study  -- STEMI ruled out after study  -- Other - I will add my own diagnosis  -- Disagree - Not applicable / Not valid  -- Disagree - Clinically unable to determine / Unknown  -- Refer to Clinical Documentation Reviewer    PROVIDER RESPONSE TEXT:    STEMI ruled out after study. Query created by: Daniella Wilhelm on 2021 9:58 AM      QUERY TEXT:    Pt admitted with suspected STEMI and had cardiac cath. On arrival BUN 18,   creatinine 1.0, GFR 53 and the following day BUN 28, creatinine 1.6, GFR 31. If possible, please document in the progress notes and discharge summary if   you are evaluating and/or treating any of the following: The medical record reflects the following:  Risk Factors: post cardiac cath  Clinical Indicators: initially BUN 18, creatinine 1.0, GFR 53; following cath   BUN 28, creatinine 1.6, GFR 31  Treatment: monitoring renal labs    Thank you,  James Lynch RN, BSN, [email or web address]  Options provided:  -- Acute kidney failure  -- Acute kidney injury  -- Other - I will add my own diagnosis  -- Disagree - Not applicable / Not valid  -- Disagree - Clinically unable to determine / Unknown  -- Refer to Clinical Documentation Reviewer    PROVIDER RESPONSE TEXT:    This patient has an Acute kidney injury.     Query created by: Marialuisa Elliott on 7/20/2021 9:58 AM      Electronically signed by:  Tom Fernández MD 7/22/2021 9:26 AM

## 2021-07-22 NOTE — PROGRESS NOTES
1900- Report received from [facility]. See MAR for med handoff. Patient resting in bed with Impella running @ P-4 at time of report. Patient tolerating well. 2000- , this RN calls Pharmacist Helene Persaud. Heparin gtt increased to 400 units/hr. 2100- , Pharmacist Ridge Shafer notified and Heparin gtt increased to 450 units/hr. CO 4.53, CI 2.78, SvO2 60.3. Dr. Lily Gary texted updates. Ordered 500ml NS bolus for CVP of -1.     2200- , Pharmacist Larry notified. Increased Heparin gtt to 500units/hr. [facility] calls back to also add 1,000 unit Heparin bolus and increase Heparin gtt to 550units/hr. Pumps cleared. 2300- , no changes. 0000- Patient made NPO. , no changes. 0100- , no changes. CO 4.5, CI 2.76, SvO2 61.6.     0200- , no changes. New 1/4 strength Heparin purge hung. 0230- High purge pressure alarms go off on Impella. This RN tracks down the purge line to find it pinched under patient's foot. Alarms turn off.     0300- , no changes. 0400- , no changes. 0500- , Pharmacist Vasquez orders for Heparin gtt to be increased to 650 units/hr. 0600- , no changes. CO 4.3, CI 2.64, SvO2 59, & SVR 1041. Dr. Lily Gary updated. 0700- , Heparin gtt increased to 700 units/hr. 0715-Dr. Mckay orders impella be dropped to P-2. This RN drops impella to P-2. Also orders to let patient eat as she will not be going to the OR. Report given to [facility].

## 2021-07-22 NOTE — PROGRESS NOTES
Clinical Pharmacy Note  Heparin Dosing - Impella Device    Patient receiving heparin purge solution via Impella device. Consult received from Dr. Javier Pettit to titrate systemic heparin to maintain -180. Shift ACT Results and Heparin Adjustments:      Date   Time POC ACT  Result Heparin  Bolus   (units) Systemic Heparin Infusion Rate (100 units/mL)      07/22    0700    163       700 units/hr       07/22    0800      177       700 units/hr      07/22    0930    174       700 units/hr     Pharmacy will continue to monitor and adjust based on ACT results.     Cherylene Hausen, [facility]  7/22/2021  7:43 AM

## 2021-07-22 NOTE — PROGRESS NOTES
Clinical Pharmacy Note  Heparin Dosing Consult    Moody Calvillo is a 78 y.o. female ordered heparin per low dose nomogram (no bolus) by Dr. Shira Bobby. Lab Results   Component Value Date    APTT 30.6 07/22/2021     Lab Results   Component Value Date    HGB 9.8 07/22/2021    HCT 30.3 07/22/2021     07/22/2021    INR 1.31 07/22/2021       Ht Readings from Last 1 Encounters:   07/22/21 4' 11\" (1.499 m)        Wt Readings from Last 1 Encounters:   07/22/21 157 lb 13.6 oz (71.6 kg)         Assessment/Plan:  Initial infusion rate: 1000 units/hr  Next aPTT: 7/23/21 at [address] [place] [address] Rubén will continue to monitor adjust heparin based on aPTT results using nomogram below:     LOW DOSE HEPARIN PROTOCOL (ACS/STEMI/A FIB)     Initial Rate: 12 units/kg/hr Max Initial Rate: 1,000 units/hr     aPTT < 45   No bolus   Increase infusion by 4 units/kg/hr   aPTT 45-59.9   No bolus   Increase infusion by 2 units/kg/hr   aPTT 60-90   No bolus   No change   aPTT 90.1-97.5  No bolus   Decrease infusion by 1 units/kg/hr   aPTT 97.6-105   No bolus   Decrease infusion by 2 units/kg/hr   aPTT > 105     Hold heparin for 1 hour Decrease infusion by 3 units/kg/hr     Obtain aPTT 6 hours after initial bolus and 6 hours after any dose change until two consecutive therapeutic aPTTs are achieved - then daily.     ANGELA Ramirez [facility], PharmD 7/22/2021 7:38 PM

## 2021-07-22 NOTE — PROGRESS NOTES
Progress Note  Nonischemic CMP-    Vital Signs:                                                 BP (!) 112/46   Pulse 74   Temp 97.7 °F (36.5 °C) (Core)   Resp 14   Ht 4' 11\" (1.499 m)   Wt 157 lb 13.6 oz (71.6 kg)   SpO2 97%   BMI 31.88 kg/m²  O2 Flow Rate (L/min): 2 L/min   SR  CVP (Mean): 8 mmHg  PAP: 35/16  PAP (Mean): 22 mmHg  CCI: 2.64 L/min  SVO2 (%): 60.3 %  Admission Weight: 150 lb 9.2 oz (68.3 kg)      Drips:  Mil 0.45, nipride, amio, hep  P4    I/O:      Intake/Output Summary (Last 24 hours) at 7/22/2021 0806  Last data filed at 7/22/2021 0552  Gross per 24 hour   Intake 4360.87 ml   Output 2495 ml   Net 1865.87 ml       Data Review:  CBC:   Recent Labs     07/20/21  0412 07/20/21  0412 07/21/21  0510 07/21/21  0510 07/21/21  1209 07/21/21  1457 07/22/21  0530   WBC 19.7*  --  22.5*  --   --   --  21.0*   HGB 13.9   < > 12.3   < > 11.1* 10.4* 9.8*   HCT 41.4  --  36.1  --   --   --  30.3*   MCV 92.3  --  92.4  --   --   --  93.4     --  168  --   --   --  132*    < > = values in this interval not displayed. BMP:   Recent Labs     07/20/21  0412 07/21/21  0510 07/22/21  0530   * 140 141   K 3.6 4.2 3.4*   CL 96* 105 110   CO2 26 25 25   PHOS 3.1  --   --    BUN 28* 33* 19   CREATININE 1.6* 1.2 1.1   CALCIUM 7.9* 8.4 7.4*   MG 1.90 2.20  --      Cardiac Enzymes:   Recent Labs     07/19/21  1100   TROPONINI 2.22*     PT/INR:   Recent Labs     07/20/21  1200 07/21/21  0510 07/21/21  1936   PROTIME 77.8* 17.0* 13.3*   INR 6.37* 1.48* 1.17*     APTT:   Recent Labs     07/19/21  1100   APTT 50.5*       Assessment/Plan:  CV - echo 7/20 EF<10%. Impella at [facility]. Well perfused. Replete Ca. Agree with plan for trial P2.   - entresto, BB  pulm - wean O2   Renal - Cr nl. Excellent u/o without diuretics.    - replete K  Heme - eventual resumption of coumadin for Kalina Thurman MD  7/22/2021  8:06 AM

## 2021-07-22 NOTE — PROGRESS NOTES
Cardiology Progress    [facility]  1940      Referring Physician: [facility] [place] [facility] DIV[facility] ER   Reason for Referral: stemi   Chief Complaint:   Chief Complaint   Patient presents with    Shortness of Breath     Pt states she has been SOB dizziness with N/V/D. state she was here Friday but left due to being in the waiting area for 3 hours.  Dizziness       Interval history:  Severe LV dysfunction   Mechanical support with IMpella CP LVAD, titrated down to p6  Intropic support with primacor   No chest pain or dyspnea, lying comfortable in bed     Subjective:     History of Present Illness: The patient is 78 y.o. female with a past medical history significant for CLL, factor V mutation who presents with the above complaint. Admits to 3 days of worsening dyspnea with minimal exertion. Took OTC tylenol with no help thus coming to the ER for evaluation.  STEMI page activated by ER staff           Past Medical History:   Diagnosis Date    [facility]     CLL (chronic lymphocytic leukemia) ([facility] 2/12/2015    COPD (chronic obstructive pulmonary disease) ([facility]     Essential hypertension 2015    controlled with salt restriction (initially)    Factor V Leiden mutation ([facility]     Goiter Nov, 2011    1.5 cm midline    Hypercholesterolemia     Impaired fasting glucose Nov, 2011    Osteoarthritis     Osteoporosis     Post herpetic neuralgia     Vitamin D deficiency Nov, 2011     Past Surgical History:   Procedure Laterality Date    CATARACT REMOVAL WITH IMPLANT  5/14/12    left eye    JOINT REPLACEMENT      partial knee R and L    KNEE SURGERY  2008    partial replacements, both knees    MOHS SURGERY  03/12/2019    R cheek and R superior temple    SPLENECTOMY      TUNNELED VENOUS PORT PLACEMENT      UPPER GASTROINTESTINAL ENDOSCOPY N/A 2/5/2019    EGD ESOPHAGOGASTRODUODENOSCOPY, WITH ENDOSCOPIC ULTRASOUND OF ESOPHAGUS performed by Austyn Morrison MD at [facility] ENDOSCOPY     Family History   Problem Relation Age of Onset    Diabetes Father     Stroke Sister 50         of a stroke     Social History     Tobacco Use    Smoking status: Former Smoker     Packs/day: 0.30     Years: 50.00     Pack years: 15.00     Types: Cigarettes     Quit date: 1995     Years since quittin.7    Smokeless tobacco: Never Used   Vaping Use    Vaping Use: Never used   Substance Use Topics    Alcohol use: No     Alcohol/week: 0.0 standard drinks    Drug use: Not on file       No Known Allergies  Current Facility-Administered Medications   Medication Dose Route Frequency Provider Last Rate Last Admin    heparin 25,000 unit in sodium chloride 0.45% 250 mL (premix) infusion  0-3,000 Units/hr Intravenous Continuous Marianela Sarah MD 5.5 mL/hr at 218 550 Units/hr at 21 2218    0.9 % sodium chloride bolus  250 mL Intravenous Once Marianela Sarah MD        0.9 % sodium chloride infusion   Intravenous Continuous Marianela Sarah MD 75 mL/hr at 214 New Bag at 21    sacubitril-valsartan (ENTRESTO) 24-26 MG per tablet 1 tablet  1 tablet Oral BID Marianela Sarah MD   1 tablet at 21 1336    0.9 % sodium chloride bolus  500 mL Intravenous Once Marianela Sarah  mL/hr at 215 Restarted at 21 211    nitroPRUSSide (NIPRIDE) 50 mg in dextrose 5 % 250 mL infusion  5-200 mcg/min Intravenous Continuous Marianela Sarah MD   Stopped at 21 1045    perflutren lipid microspheres (DEFINITY) injection 1.65 mg  1.5 mL Intravenous ONCE PRN Marianela Sarah MD        heparin (porcine) 6,250 Units in dextrose 5 % 500 mL infusion (FOR IMPELLA PURGE)   Intracatheter Continuous Marianela Sarah MD   Stopped at 21 2221    docusate sodium (COLACE) capsule 100 mg  100 mg Oral Daily Marianela Sarah MD   100 mg at 21 1049    senna (SENOKOT) tablet 8.6 mg  1 tablet Oral Nightly Marianela Sarah MD   8.6 mg at 21 2219    dextrose 5 % solution 500 mL  500 mL Intravenous Continuous Brunilda Iverson  mL/hr at 07/20/21 2221 500 mL at 07/20/21 2221    amiodarone (CORDARONE) 450 mg in dextrose 5 % 250 mL infusion  1 mg/min Intravenous Continuous Brunilda Iverson MD 33.3 mL/hr at 07/21/21 1913 1 mg/min at 07/21/21 1913    acetaminophen (TYLENOL) tablet 650 mg  650 mg Oral Q4H PRN Brunilda Iverson MD   325 mg at 07/21/21 1841    perflutren lipid microspheres (DEFINITY) injection 1.65 mg  1.5 mL Intravenous ONCE PRN Brunilda Iverson MD        [facility]) 20 mg in dextrose 5 % 100 mL infusion  0.45 mcg/kg/min Intravenous Continuous Brunilda Iverson MD 9.2 mL/hr at 07/21/21 1150 0.45 mcg/kg/min at 07/21/21 1150    ondansetron (ZOFRAN) injection 4 mg  4 mg Intravenous Q6H PRN Jessica Hope MD   4 mg at 07/21/21 1508    metoprolol tartrate (LOPRESSOR) tablet 25 mg  25 mg Oral BID Brunilda Iverson MD   25 mg at 07/21/21 0818       Review of Systems:  · Constitutional: No unanticipated weight loss. There's been no change in energy level, sleep pattern, or activity level. No fevers, chills. · Eyes: No visual changes or diplopia. No scleral icterus. · ENT: No Headaches, hearing loss or vertigo. No mouth sores or sore throat. · Cardiovascular: as reviewed in HPI  · Respiratory: No cough or wheezing, no sputum production. No hemoptysis. · Gastrointestinal: No abdominal pain, appetite loss, blood in stools. No change in bowel or bladder habits. · Genitourinary: No dysuria, trouble voiding, or hematuria. · Musculoskeletal:  No gait disturbance, no joint complaints. · Integumentary: No rash or pruritis. · Neurological: No headache, diplopia, change in muscle strength, numbness or tingling. · Psychiatric: No anxiety or depression. · Endocrine: No temperature intolerance. No excessive thirst, fluid intake, or urination. No tremor.   · Hematologic/Lymphatic: No abnormal bruising or bleeding, blood clots or swollen lymph nodes.  · Allergic/Immunologic: No nasal congestion or hives. Physical Exam:   BP (!) 91/49   Pulse 74   Temp 98.3 °F (36.8 °C) (Core)   Resp 16   Ht 4' 11\" (1.499 m)   Wt 153 lb 3.5 oz (69.5 kg)   SpO2 98%   BMI 30.95 kg/m²   Wt Readings from Last 3 Encounters:   07/21/21 153 lb 3.5 oz (69.5 kg)   07/16/21 158 lb 15.2 oz (72.1 kg)   04/20/21 162 lb (73.5 kg)     Constitutional: appears ill, diaphoretic   Head: Normocephalic and atraumatic. Pupils equal and round. Neck: Neck supple. + JVP or carotid bruit appreciated. No mass and no thyromegaly present. No lymphadenopathy present. Cardiovascular: Normal rate. Normal heart sounds. Exam reveals no gallop and no friction rub. No murmur heard. Pulmonary/Chest: diffuse rales   Abdominal: Soft, non-tender. Bowel sounds are normal. She exhibits no organomegaly, mass or bruit. Extremities: No edema. No cyanosis or clubbing. Pulses are 2+ radial/carotid bilaterally. Neurological: No gross cranial nerve deficit. Coordination normal.   Skin: Skin is warm and dry. There is no rash or diaphoresis. Psychiatric: She has a normal mood and affect.  Her speech is normal and behavior is normal.     Lab Review:   FLP:    Lab Results   Component Value Date    TRIG 128 04/20/2021    HDL 55 04/20/2021    HDL 62 11/08/2011    LDLCALC 178 04/20/2021    LDLDIRECT 117 12/14/2015    LABVLDL 26 04/20/2021     BUN/Creatinine:    Lab Results   Component Value Date    BUN 33 07/21/2021    CREATININE 1.2 07/21/2021     PT/INR, TNI, HGB A1C:   Lab Results   Component Value Date/Time    TROPONINI 2.22 (HH) 07/19/2021 11:00 AM    LABA1C 6.2 04/20/2021 08:54 AM      No results found for: CBCAUTODIF    EKG Interpretation: afib, with diffuse ST elevations     Echo: severe LV dysfunction with EF < 10%     Stress Test:     Cath:   Normal coronary arteries     CT:    Doppler:     All above diagnostic testing and laboratory data was independently visualized and reviewed by me (not simply

## 2021-07-22 NOTE — PROGRESS NOTES
1066- Report and handoff with Adry Linares RN. Patient in bed. Impella to right groin. Having a great deal of back discomfort and some upset stomach. PRN Tylenol and Zofran to be given. 0730- Impella to p2.   0800- Patient complaining of soreness to right forearm. Below where amio is infusing. IV with amio infusing has brisk blood return. Switched amio to other peripheral and applied ice pack to forearm. Will bring up in rounds switching to po amiodarone. 0900- Dr. Nirmala Watkins rounding. Order for PICC. Will remove impella around lunch time and if able to get PICC everything else as well. 1000- Spoke to Dr. Nirmala Watkins about low BP. Order for one time dose of midodrine. 1115- Heparin stopped for impella removal.   1200- Dr. Nirmala Watkins and CCL at bedside for impella removal.   1330- CCL staff done with removal of impella, swan and sheath. Bedrest for 4 hours. Pressure device placed for 1 hour. 1430- Pressure dressing removed, d-stat transparent dressing applied. 1700- Spoke with Dr. Nirmala Watkins. To restart heparin and coumadin. 1730- Patient HOB elevated. Groin site with old drainage on d-stat, no hematoma. Site soft. 1800-Groin site still soft with some old oozing on dressing but does not appear to be actively bleeding. Heparin drip restarted after ptt drawn. 1900-Report and handoff with Kristina Nicole. Groin site double checked and signed off at bedside.

## 2021-07-22 NOTE — PROGRESS NOTES
Clinical Pharmacy Note  Heparin Dosing - Impella Device    Patient receiving heparin purge solution via Impella device. Consult received from Dr. Misa Urena to titrate systemic heparin to maintain -180. Shift ACT Results and Heparin Adjustments:      Date   Time POC ACT  Result Heparin  Bolus   (units) Systemic Heparin Infusion Rate (100 units/mL)      07/21    1500    169      200 units/hr      07/21    1600    161      200 units/hr      07/21    1700    163      250 units/hr      07/21    1800    165      250 units/hr      07/21    1900    164      300 units/hr      07/21    2000    153      400 units/hr       Pharmacy will continue to monitor and adjust based on ACT results.     Yodit Malagon, PharmD, BCPS  7/21/2021  9:16 PM

## 2021-07-22 NOTE — CONSULTS
[address] [place] [address]                                  CONSULTATION    PATIENT NAME: Qasim Stern                    :        1940  MED REC NO:   [id number]                          ROOM:       6944  ACCOUNT NO:   [de-identified]                           ADMIT DATE: 2021  PROVIDER:     Aneesh Calderon MD    ONCOLOGY CONSULTATION    CONSULT DATE:  2021    REASON FOR CONSULTATION:  History of CLL, in with cardiogenic shock. CONSULTING PROVIDER:  Mikhail Chang MD    HISTORY OF PRESENT ILLNESS:  The patient is an 39-year-old female that I  follow with a history of CLL, who presented to the hospital with  increasing dyspnea on exertion and shortness of breath. She was found  to be in cardiogenic shock and underwent a heart catheterization on  admission that showed normal coronary arteries. Her echo showed an EF  in the 10% range. She is now on LVAD device. She denies any recent  fevers or night sweats or weight loss or enlarging lymphadenopathy. Her  white blood cell count is 22 and she does have an absolute  lymphocytosis. Her last white blood cell count when I saw her four to  six months ago was in the 17-range. PAST MEDICAL HISTORY:  1.  CLL. She was treated in  with fludarabine and Rituxan. She did  have a splenectomy in the past.  I did not follow her at that time. 2.  COPD. 3.  Hypertension. 4.  Factor V Leiden mutation. 5.  Hypercholesterolemia. 6.  Osteoporosis. 7.  Osteoarthritis. 8.  Vitamin D deficiency. 9.  Postherpetic neuralgia. PAST SURGICAL HISTORY:  1. Cataracts. 2.  Partial right and left knee replacement. 3.  Mohs surgery. 4.  Splenectomy. ALLERGIES:  She has no known drug allergies. MEDICINES:  Colace 100 mg p.o. daily, heparin, amiodarone drip. SOCIAL HISTORY:  She is . She does not drink or smoke. She is  retired.     FAMILY HISTORY: Noncontributory. REVIEW OF SYSTEMS:  She denies any recent fever, chills, sweats, nausea,  vomiting, abdominal pain, chest pain, headaches, any new bone aches,  dysphagia, odynophagia, diarrhea, constipation, hemoptysis, hematemesis,  change in vision/hearing/smell/taste, weakness, neuropathy, skin rashes,  productive cough, urinary or bowel prolapse or incontinence, petechiae,  purpura, skin rashes, vaginal bleeding, pruritus, hallucinations, nasal  congestion or drainage, seizures, strokes, syncope, depression, anxiety,  suicidal ideations, melena, or hematochezia. She has mild to moderate  fatigue and mild to moderate dyspnea on exertion. Her 10-system review  of systems is otherwise negative. PHYSICAL EXAMINATION:  VITAL SIGNS:  She is afebrile with normal vital signs. GENERAL:  She is in no acute distress. HEENT:  Her pupils are round and reactive to light and accommodation. Extraocular muscles are intact. NECK:  She has no jugular venous distention. No thyromegaly. Oropharynx is clear. She has no carotid bruits. She has no palpable  cervical, supraclavicular, or axillary lymphadenopathy. HEART:  Regular rate and rhythm. LUNGS:  Clear to auscultation bilaterally. ABDOMEN:  Nondistended, nontender with bowel sounds x4. No  hepatosplenomegaly. EXTREMITIES:  She has trace lower extremity edema bilaterally. NEUROLOGIC EXAM:  Nonfocal.    LABORATORY DATA:  White blood cell count is 22.5, hemoglobin 12.3,  platelets of 880. ASSESSMENT AND PLAN:  History of CCL/cardiogenic shock. She did receive  fludarabine and Rituxan in 2015. Fludarabine has an extremely rare risk  of cardiomyopathy in the less than 1% range. This is unlikely the  cause. Her white blood cell count is going up slowly likely due to  recurrence of her CLL. I do not see any signs or symptoms of infection  and she has been afebrile.   She does have stage 0 disease with  lymphocytosis only and a normal hemoglobin and platelet count. She has  no indication for treatment at this time. Her prognosis is good from a  CLL standpoint, so I would consider aggressive care for cardiogenic  shock. I will get a flow cytometry to rule out progression to some sort  of acute leukemia or high-grade lymphoma but this is very unlikely. Thank you for the consultation. I will follow closely.         Terence Specne MD    D: 07/21/2021 23:41:21       T: 07/22/2021 2:42:17     [email or web address]  Job#: [id number]     Doc#: [id number]    CC:  Maricel Martinez MD

## 2021-07-23 LAB
A/G RATIO: 1.3 (ref 1.1–2.2)
ALBUMIN SERPL-MCNC: 2.5 G/DL (ref 3.4–5)
ALP BLD-CCNC: 53 U/L (ref 40–129)
ALT SERPL-CCNC: 12 U/L (ref 10–40)
ANION GAP SERPL CALCULATED.3IONS-SCNC: 5 MMOL/L (ref 3–16)
APTT: 101.1 SEC (ref 26.2–38.6)
APTT: 60.3 SEC (ref 26.2–38.6)
APTT: 75.9 SEC (ref 26.2–38.6)
AST SERPL-CCNC: 20 U/L (ref 15–37)
BASE EXCESS VENOUS: 2.4 MMOL/L
BASE EXCESS VENOUS: 3 (ref -3–3)
BASE EXCESS VENOUS: 3.2 MMOL/L
BASOPHILS ABSOLUTE: 0.2 K/UL (ref 0–0.2)
BASOPHILS RELATIVE PERCENT: 1 %
BILIRUB SERPL-MCNC: 0.5 MG/DL (ref 0–1)
BUN BLDV-MCNC: 18 MG/DL (ref 7–20)
CALCIUM SERPL-MCNC: 8 MG/DL (ref 8.3–10.6)
CARBOXYHEMOGLOBIN: 1.4 %
CARBOXYHEMOGLOBIN: 1.4 %
CHLORIDE BLD-SCNC: 108 MMOL/L (ref 99–110)
CO2: 27 MMOL/L (ref 21–32)
CREAT SERPL-MCNC: 0.9 MG/DL (ref 0.6–1.2)
EOSINOPHILS ABSOLUTE: 0.4 K/UL (ref 0–0.6)
EOSINOPHILS RELATIVE PERCENT: 2 %
GFR AFRICAN AMERICAN: >60
GFR NON-AFRICAN AMERICAN: 60
GLOBULIN: 1.9 G/DL
GLUCOSE BLD-MCNC: 105 MG/DL (ref 70–99)
HCO3 VENOUS: 28 MMOL/L (ref 23–29)
HCO3 VENOUS: 28.7 MMOL/L (ref 23–29)
HCO3 VENOUS: 29 MMOL/L (ref 23–29)
HCT VFR BLD CALC: 26.3 % (ref 36–48)
HEMATOLOGY PATH CONSULT: NO
HEMOGLOBIN: 8.7 G/DL (ref 12–16)
INR BLD: 2.22 (ref 0.88–1.12)
LYMPHOCYTES ABSOLUTE: 6.9 K/UL (ref 1–5.1)
LYMPHOCYTES RELATIVE PERCENT: 31 %
MCH RBC QN AUTO: 30.8 PG (ref 26–34)
MCHC RBC AUTO-ENTMCNC: 33 G/DL (ref 31–36)
MCV RBC AUTO: 93.2 FL (ref 80–100)
METHEMOGLOBIN VENOUS: 0.7 %
METHEMOGLOBIN VENOUS: 0.7 %
MONOCYTES ABSOLUTE: 1.6 K/UL (ref 0–1.3)
MONOCYTES RELATIVE PERCENT: 7 %
NEUTROPHILS ABSOLUTE: 13.2 K/UL (ref 1.7–7.7)
NEUTROPHILS RELATIVE PERCENT: 59 %
NUCLEATED RED BLOOD CELLS: 2 /100 WBC
O2 SAT, VEN: 60 %
O2 SAT, VEN: 71 %
O2 SAT, VEN: 77 %
O2 THERAPY: NORMAL
O2 THERAPY: NORMAL
PCO2, VEN: 49.6 MMHG (ref 40–50)
PCO2, VEN: 49.8 MMHG (ref 40–50)
PCO2, VEN: 54.1 MM HG (ref 40–50)
PDW BLD-RTO: 14.1 % (ref 12.4–15.4)
PERFORMED ON: ABNORMAL
PH VENOUS: 7.33 (ref 7.35–7.45)
PH VENOUS: 7.36 (ref 7.35–7.45)
PH VENOUS: 7.38 (ref 7.35–7.45)
PLATELET # BLD: 131 K/UL (ref 135–450)
PMV BLD AUTO: 10.1 FL (ref 5–10.5)
PO2, VEN: 34 MM HG
PO2, VEN: 37 MMHG
PO2, VEN: 41 MMHG
POC SAMPLE TYPE: ABNORMAL
POLYCHROMASIA: ABNORMAL
POTASSIUM SERPL-SCNC: 3.7 MMOL/L (ref 3.5–5.1)
PROTHROMBIN TIME: 25.9 SEC (ref 9.9–12.7)
RBC # BLD: 2.82 M/UL (ref 4–5.2)
REASON FOR REJECTION: NORMAL
REJECTED TEST: NORMAL
SMUDGE CELLS: PRESENT
SODIUM BLD-SCNC: 140 MMOL/L (ref 136–145)
TCO2 CALC VENOUS: 30 MMOL/L
TCO2 CALC VENOUS: 30 MMOL/L
TCO2 CALC VENOUS: 31 MMOL/L
TOTAL PROTEIN: 4.4 G/DL (ref 6.4–8.2)
WBC # BLD: 22.3 K/UL (ref 4–11)

## 2021-07-23 PROCEDURE — 6360000002 HC RX W HCPCS: Performed by: INTERNAL MEDICINE

## 2021-07-23 PROCEDURE — 82803 BLOOD GASES ANY COMBINATION: CPT

## 2021-07-23 PROCEDURE — 6370000000 HC RX 637 (ALT 250 FOR IP): Performed by: INTERNAL MEDICINE

## 2021-07-23 PROCEDURE — 85730 THROMBOPLASTIN TIME PARTIAL: CPT

## 2021-07-23 PROCEDURE — 97110 THERAPEUTIC EXERCISES: CPT

## 2021-07-23 PROCEDURE — 2580000003 HC RX 258: Performed by: INTERNAL MEDICINE

## 2021-07-23 PROCEDURE — 2100000000 HC CCU R&B

## 2021-07-23 PROCEDURE — 85610 PROTHROMBIN TIME: CPT

## 2021-07-23 PROCEDURE — 2700000000 HC OXYGEN THERAPY PER DAY

## 2021-07-23 PROCEDURE — 97530 THERAPEUTIC ACTIVITIES: CPT

## 2021-07-23 PROCEDURE — 97535 SELF CARE MNGMENT TRAINING: CPT

## 2021-07-23 PROCEDURE — 80053 COMPREHEN METABOLIC PANEL: CPT

## 2021-07-23 PROCEDURE — 97166 OT EVAL MOD COMPLEX 45 MIN: CPT

## 2021-07-23 PROCEDURE — 97116 GAIT TRAINING THERAPY: CPT

## 2021-07-23 PROCEDURE — 97162 PT EVAL MOD COMPLEX 30 MIN: CPT

## 2021-07-23 PROCEDURE — 2500000003 HC RX 250 WO HCPCS: Performed by: INTERNAL MEDICINE

## 2021-07-23 PROCEDURE — 85025 COMPLETE CBC W/AUTO DIFF WBC: CPT

## 2021-07-23 PROCEDURE — 94761 N-INVAS EAR/PLS OXIMETRY MLT: CPT

## 2021-07-23 RX ORDER — MIDODRINE HYDROCHLORIDE 5 MG/1
5 TABLET ORAL PRN
Status: DISCONTINUED | OUTPATIENT
Start: 2021-07-23 | End: 2021-07-26 | Stop reason: HOSPADM

## 2021-07-23 RX ORDER — MIDODRINE HYDROCHLORIDE 5 MG/1
5 TABLET ORAL 2 TIMES DAILY WITH MEALS
Status: DISCONTINUED | OUTPATIENT
Start: 2021-07-23 | End: 2021-07-26 | Stop reason: HOSPADM

## 2021-07-23 RX ADMIN — MILRINONE LACTATE 0.3 MCG/KG/MIN: 0.2 INJECTION, SOLUTION INTRAVENOUS at 18:13

## 2021-07-23 RX ADMIN — SENNOSIDES 8.6 MG: 8.6 TABLET, FILM COATED ORAL at 20:44

## 2021-07-23 RX ADMIN — METOPROLOL TARTRATE 25 MG: 25 TABLET, FILM COATED ORAL at 20:44

## 2021-07-23 RX ADMIN — SACUBITRIL AND VALSARTAN 1 TABLET: 24; 26 TABLET, FILM COATED ORAL at 15:42

## 2021-07-23 RX ADMIN — ACETAMINOPHEN 650 MG: 325 TABLET ORAL at 20:44

## 2021-07-23 RX ADMIN — DOCUSATE SODIUM 100 MG: 100 CAPSULE ORAL at 09:54

## 2021-07-23 RX ADMIN — HEPARIN SODIUM 1000 UNITS/HR: 10000 INJECTION, SOLUTION INTRAVENOUS at 06:37

## 2021-07-23 RX ADMIN — METOPROLOL TARTRATE 25 MG: 25 TABLET, FILM COATED ORAL at 09:54

## 2021-07-23 RX ADMIN — SACUBITRIL AND VALSARTAN 1 TABLET: 24; 26 TABLET, FILM COATED ORAL at 23:07

## 2021-07-23 RX ADMIN — SODIUM CHLORIDE, PRESERVATIVE FREE 10 ML: 5 INJECTION INTRAVENOUS at 09:56

## 2021-07-23 RX ADMIN — ONDANSETRON 4 MG: 2 INJECTION INTRAMUSCULAR; INTRAVENOUS at 17:35

## 2021-07-23 RX ADMIN — MILRINONE LACTATE 0.35 MCG/KG/MIN: 0.2 INJECTION, SOLUTION INTRAVENOUS at 02:48

## 2021-07-23 RX ADMIN — AMIODARONE HYDROCHLORIDE 400 MG: 200 TABLET ORAL at 20:44

## 2021-07-23 RX ADMIN — AMIODARONE HYDROCHLORIDE 400 MG: 200 TABLET ORAL at 09:54

## 2021-07-23 RX ADMIN — MIDODRINE HYDROCHLORIDE 5 MG: 5 TABLET ORAL at 17:34

## 2021-07-23 RX ADMIN — ONDANSETRON 4 MG: 2 INJECTION INTRAMUSCULAR; INTRAVENOUS at 00:30

## 2021-07-23 ASSESSMENT — PAIN SCALES - GENERAL
PAINLEVEL_OUTOF10: 0
PAINLEVEL_OUTOF10: 1
PAINLEVEL_OUTOF10: 2

## 2021-07-23 ASSESSMENT — PAIN DESCRIPTION - ONSET: ONSET: ON-GOING

## 2021-07-23 ASSESSMENT — PAIN DESCRIPTION - LOCATION: LOCATION: GENERALIZED

## 2021-07-23 ASSESSMENT — PAIN DESCRIPTION - DESCRIPTORS: DESCRIPTORS: ACHING;SORE

## 2021-07-23 ASSESSMENT — PAIN - FUNCTIONAL ASSESSMENT: PAIN_FUNCTIONAL_ASSESSMENT: ACTIVITIES ARE NOT PREVENTED

## 2021-07-23 ASSESSMENT — PAIN DESCRIPTION - FREQUENCY: FREQUENCY: CONTINUOUS

## 2021-07-23 ASSESSMENT — PAIN DESCRIPTION - PROGRESSION: CLINICAL_PROGRESSION: GRADUALLY IMPROVING

## 2021-07-23 NOTE — PROGRESS NOTES
0730- Handoff completed with Dulce Stoddard.    0800- PT alert and oriented x4, vital signs stable, bilateral breath sounds clear. Pt up to chair with PT/OT. 1000- PT has small stool in toilet, ambulates self with aid of walker and one person assist to stand. Pt ambulates back to chair. 1150- pt returned to bed ambulated with aid of walker and one person assist to stand. 1400- aptt redraw for lab. Pt stable and comfortable in bed turning self. 1- MD Gimenez called RN concerning pt medications, Heparin drip is to D/C, decrease Milrinone from 0.35mcg to . 3mcg and midorine starting and give the morning dose of Entresto. 1630- pt in bed resting comfortably, turning self as needed. Pt vitals stable . 1800- pt in bed stable and resting. Pt states that she is feeling nausea, 4mg of zofran given IV push.

## 2021-07-23 NOTE — PROGRESS NOTES
Physical Therapy    Facility/Department: [address] CVICU  Initial Assessment    NAME: Susie Love  : 1940  MRN: [id number]    Date of Service: 2021    Discharge Recommendations:  Continue to assess pending progress   PT Equipment Recommendations  Other: Will monitor for potential equipt needs. Assessment   Body structures, Functions, Activity limitations: Decreased functional mobility ; Decreased endurance  Assessment: 79 y/o female admit 2021 with A-Fib wirh RVR, R/O STEMI, Takotsubo Cardiomyopathy. 2021 Cardiac Cath : Normal Coronary Arteries, Severe LV Systolic Dysfunction, Cardiogenic Shock; Impella placed (removed 2021). PMH as noted including CLL, COPD, Factor V Leinden Mutation, Post Herpatic Neuralgia, OA, B Partial Knee Replacement. PTA pt living with  in [address] [place] [address] with 1+1 step to enter and 1st floor bed/bath etc.  Pt independent daily care and functional mobility (without assist device) and is caregiver for . Pt has son who has been staying to assist; supportive family also nearby. Pt anticipates adequate assist/support upon d/c. Do not anticipate need cont PT Services upon d/c. Will monitor pt's progress. Prognosis: Good  Decision Making: Medium Complexity  History: 79 y/o female admit 2021 with A-Fib wirh RVR, R/O STEMI, Takotsubo Cardiomyopathy. 2021 Cardiac Cath : Normal Coronary Arteries, Severe LV Systolic Dysfunction, Cardiogenic Shock; Impella placed (removed 2021). PMH as noted including CLL, COPD, Factor V Leinden Mutation, Post Herpatic Neuralgia, OA, B Partial Knee Replacement. Exam: See above. Clinical Presentation: See above. Patient Education: Role of PT, POC, Need to call for assist.  Barriers to Learning: [place]. REQUIRES PT FOLLOW UP: Yes  Activity Tolerance  Activity Tolerance: Patient Tolerated treatment well  Activity Tolerance: No c/o dizziness/lighthead. BP following oob : 127/55 (72).        Patient Diagnosis(es): The primary encounter diagnosis was ST elevation myocardial infarction (STEMI), unspecified [facility] Diagnoses of Chest pain, unspecified type and Atrial fibrillation with rapid ventricular response ([facility] were also pertinent to this visit. has a past medical history of Chronic lymphocytic leukemia in remission ([facility] CLL (chronic lymphocytic leukemia) ([facility] COPD (chronic obstructive pulmonary disease) ([facility] Essential hypertension, Factor V Leiden mutation ([facility] Goiter, Hypercholesterolemia, Impaired fasting glucose, Osteoarthritis, Osteoporosis, Post herpetic neuralgia, and Vitamin D deficiency. has a past surgical history that includes Splenectomy; knee surgery (2008); Cataract removal with implant (5/14/12); Tunneled venous port placement; Upper gastrointestinal endoscopy (N/A, 2/5/2019); Mohs surgery (03/12/2019); and joint replacement. Restrictions  Restrictions/Precautions  Restrictions/Precautions: Fall Risk, Up as Tolerated  Position Activity Restriction  Other position/activity restrictions: O2 2L via NC. Vision/Hearing  Vision: Within Functional Limits (Wears Glasses.)  Hearing: Exceptions to [facility]  Hearing Exceptions: Hard of hearing/hearing concerns     Subjective  General  Chart Reviewed: Yes  Patient assessed for rehabilitation services?: Yes  Additional Pertinent Hx: 77 y/o female admit 7/19/2021 with A-Fib wirh RVR, R/O STEMI, Takotsubo Cardiomyopathy. 7/19/2021 Cardiac Cath : Normal Coronary Arteries, Severe LV Systolic Dysfunction, Cardiogenic Shock; Impella placed (removed 7/22/2021). PMH as noted including CLL, COPD, Factor V Leinden Mutation, Post Herpatic Neuralgia, OA, B Partial Knee Replacement. Family / Caregiver Present: No  Referring Practitioner: Dr. Betzy Browning  Diagnosis: Severe LV Systolic Dysfunction, Cardiogenic Shock. Follows Commands: Within Functional Limits  Subjective  Subjective: Pt agreeable to PT Eval/Rx.   Pain Screening  Patient Currently in Pain: Denies          Orientation  Orientation  Overall Orientation Status: Within Functional Limits  Social/Functional History  Social/Functional History  Lives With: Spouse ( Deandre Syed) : amb with walker, requires assist bathing/dressing.)  Type of Home:  (Condo)  Home Layout: Two level, Able to Live on Main level with bedroom/bathroom (Main level with basement (laundry on main level). )  Home Access: Stairs to enter without rails (1+1 step to enter.)  Bathroom Shower/Tub: Tub/Shower unit, Walk-in shower  Bathroom Toilet:  (Comfort Height.)  Bathroom Equipment: Grab bars in shower, Shower chair, Grab bars around toilet  Bathroom Accessibility: [place]e [place]: 4 wheeled walker ( (uses Luis Opal). 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 Additional Comments: Pt is caregiver for her . Son has been able to stay with them (works from home); supportive family (5 children in total). Cognition   Cognition  Overall Cognitive Status: WFL    Objective          AROM RLE (degrees)  RLE AROM: WFL  AROM LLE (degrees)  LLE AROM : WFL  AROM RUE (degrees)  RUE AROM : WFL  AROM LUE (degrees)  LUE AROM : WFL  Strength RLE  Strength RLE: WFL  Strength LLE  Strength LLE: WFL  Strength RUE  Strength RUE: WFL  Strength LUE  Strength LUE: WFL     Sensation  Overall Sensation Status: WFL  Bed mobility  Supine to Sit: Moderate assistance (HOB elevated. Use of Bedrail.)  Transfers  Sit to Stand: Minimal Assistance (With Luis Wallingford. Cues for safe hand placement.)  Stand to sit: Minimal Assistance  Ambulation  Ambulation?: Yes  Ambulation 1  Device: Rolling Walker  Distance: 4-5 steps bed to chair, to/from bathroom  with Lilia Colonler assist (+1 CGA).   No LE buckling/giving way; alittle guarded. No c/o dizziness/lighthead. Comments: Pt to bathroom for care with OT prior return to chair. Balance  Sitting - Static: Good  Sitting - Dynamic: Good  Standing - Static: Good (With Walker.)  Standing - Dynamic: Good;- (With Walker. Alittle guarded.)  Exercises  Hip Flexion: 10x2  Knee Long Arc Quad: 10x2  Ankle Pumps: 10x2     Plan   Plan  Times per week: 3-5x week while in acute care setting. Current Treatment Recommendations: Strengthening, Functional Mobility Training, Transfer Training, Gait Training, Safety Education & Training, Patient/Caregiver Education & Training  Safety Devices  Type of devices: Call light within reach, Left in chair, Nurse notified          AM-PAC Score  AM-PAC Inpatient Mobility Raw Score : 18 (07/23/21 0744)  AM-PAC Inpatient T-Scale Score : 43.63 (07/23/21 0744)  Mobility Inpatient CMS 0-100% Score: 46.58 (07/23/21 0744)  Mobility Inpatient CMS G-Code Modifier : CK (07/23/21 0744)          Goals  Short term goals  Time Frame for Short term goals: Upon d/c acute care setting. Short term goal 1: Bed Mob Independent. Short term goal 2: Transfers with/without assist device Supervision. Short term goal 3: Amb with/without assist device 100' SBA/Supervision. Patient Goals   Patient goals : Return home with .        Therapy Time   Individual Concurrent Group Co-treatment   Time In [address]         Time Out 0730         Minutes Diane Gardner

## 2021-07-23 NOTE — PROGRESS NOTES
reach;Gait belt           Patient Diagnosis(es): The primary encounter diagnosis was ST elevation myocardial infarction (STEMI), unspecified artery ([facility] Diagnoses of Chest pain, unspecified type and Atrial fibrillation with rapid ventricular response ([facility] were also pertinent to this visit. has a past medical history of Chronic lymphocytic leukemia in remission ([facility] CLL (chronic lymphocytic leukemia) ([facility] COPD (chronic obstructive pulmonary disease) ([facility] Essential hypertension, Factor V Leiden mutation ([facility] Goiter, Hypercholesterolemia, Impaired fasting glucose, Osteoarthritis, Osteoporosis, Post herpetic neuralgia, and Vitamin D deficiency. has a past surgical history that includes Splenectomy; knee surgery (2008); Cataract removal with implant (5/14/12); Tunneled venous port placement; Upper gastrointestinal endoscopy (N/A, 2/5/2019); Mohs surgery (03/12/2019); and joint replacement. Restrictions  Restrictions/Precautions  Restrictions/Precautions: Fall Risk, Up as Tolerated  Position Activity Restriction  Other position/activity restrictions: O2 2L via NC. Subjective   General  Chart Reviewed: Yes  Patient assessed for rehabilitation services?: Yes  Additional Pertinent Hx: 77 y/o female admit 7/19/2021 with A-Fib wirh RVR, R/O STEMI, Takotsubo Cardiomyopathy. 7/19/2021 Cardiac Cath : Normal Coronary Arteries, Severe LV Systolic Dysfunction, Cardiogenic Shock; Impella placed (removed 7/22/2021). PMH as noted including CLL, COPD, Factor V Leinden Mutation, Post Herpatic Neuralgia, OA, B Partial Knee Replacement. Family / Caregiver Present: No  Referring Practitioner: Dr. Juan Roman  Subjective  Subjective: Pt seen bedside and agreeable to therapy. General Comment  Comments: Per RN ok for therapy.   Patient Currently in Pain: No  Pain Assessment  Pain Assessment: 0-10  Pain Level: 0  Patient's Stated Pain Goal: No pain  Vital Signs  Temp: 98.3 °F (36.8 °C)  Pulse: 84  Resp: 12  BP: (!) 114/44  MAP (mmHg): (!) 61  Patient Position: Sitting  Patient Currently in Pain: No  Oxygen Therapy  SpO2: 93 %  O2 Device: Nasal cannula  Skin Assessment: Clean, dry, & intact  O2 Flow Rate (L/min): 2 L/min     Social/Functional History  Social/Functional History  Lives With: Spouse ( Pavan Wheeler) : amb with walker, requires assist bathing/dressing.)  Type of Home:  (Condo)  Home Layout: Two level, Able to Live on Main level with bedroom/bathroom (Main level with basement (laundry on main level). )  Home Access: Stairs to enter without rails (1+1 step to enter.)  Bathroom Shower/Tub: Tub/Shower unit, Walk-in shower  Bathroom Toilet:  (Comfort Height.)  Bathroom Equipment: Grab bars in shower, Shower chair, Grab bars around toilet  Bathroom Accessibility: [facility]: 4 wheeled walker ( (uses [employer]). 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 Additional Comments: Pt is caregiver for her . Son has been able to stay with them (works from home); supportive family (5 children in total). Objective   Vision: Within Functional Limits (Wears Glasses.)  Hearing: Exceptions to [facility]  Hearing Exceptions: Hard of hearing/hearing concerns    Orientation  Overall Orientation Status: Within Functional Limits     Balance  Sitting Balance: Stand by assistance  Standing Balance: Contact guard assistance  Functional Mobility  Functional Mobility Comments: chair <> bathroom with 4WW with CGA. ADL  Grooming: Stand by assistance (seated in front of sink)  Toileting: Dependent/Total (stevenson)  Additional Comments: Pt washed hair seated in front of sink- assist with changing down and socks prior to ambulation back to chair.  Anticipate will require min A for LB dressing and standing balance. Bed mobility  Comment: Pt in chair at start/end of session  Transfers  Sit to stand: Contact guard assistance  Stand to sit: Contact guard assistance  Transfer Comments: to/from 4WW     Cognition  Overall Cognitive Status: WFL  Perception  Overall Perceptual Status: WFL     Sensation  Overall Sensation Status: WFL        LUE AROM (degrees)  LUE AROM : WFL  Left Hand AROM (degrees)  Left Hand AROM: WFL  RUE AROM (degrees)  RUE AROM : WFL  Right Hand AROM (degrees)  Right Hand AROM: WFL           Plan   Plan  Times per week: 3-5  Current Treatment Recommendations: Strengthening, Endurance Training, Balance Training, Gait Training, Functional Mobility Training, Self-Care / ADL    AM-PAC Score  AM-PAC Inpatient Daily Activity Raw Score: 18 (07/23/21 0832)  AM-PAC Inpatient ADL T-Scale Score : 38.66 (07/23/21 0832)  ADL Inpatient CMS 0-100% Score: 46.65 (07/23/21 0832)  ADL Inpatient CMS G-Code Modifier : CK (07/23/21 8104)    Goals  Short term goals  Time Frame for Short term goals: Prior to DC: Short term goal 1: Pt will complete ADL transfers and mobility with supervision  Short term goal 2: Pt will tolerate standing > 5 min for functional task with supervision  Short term goal 3: Pt will complete toileting with supervision  Short term goal 4: Pt will complete LB dressing with supervision  Patient Goals   Patient goals : to return home       Therapy Time   Individual Concurrent Group Co-treatment   Time In 0720         Time Out 0800         Minutes 40         Timed Code Treatment Minutes: 25 Minutes     This note to serve as OT d/c summary if pt is d/c-ed prior to next therapy session.     Ulises Andrea OTR/L

## 2021-07-23 NOTE — CONSULTS
Clinical Pharmacy Note  Warfarin Consult    Ashley Kirby is a 78 y.o. female receiving warfarin managed by pharmacy. Patient being bridged with NSAIDS and heparin. Warfarin Indication: Factor V Leiden mutation  Target INR range: 2-3   Dose prior to admission: 5 mg daily    Current warfarin drug-drug interactions: Amiodarone 400 mg BID  Drug- disease state interactions: Acute systolic heart failure    Recent Labs     07/21/21  0510 07/21/21  0510 07/21/21  1209 07/21/21  1457 07/21/21  1936 07/22/21  0530 07/22/21  1500 07/23/21  0527 07/23/21  1040   HGB 12.3  --    < > 10.4*  --  9.8*  --  8.7*  --    HCT 36.1  --   --   --   --  30.3*  --  26.3*  --    INR 1.48*   < >  --   --  1.17*  --  1.31*  --  2.22*    < > = values in this interval not displayed. Assessment/Plan:    Warfarin ordered as 10 mg x 1 per Dr. Erik Jones last PM  INR jumped from 1.3 to 2.2 likely due to amiodarone interaction, acute CHF, and patient was supratherapeutic upon admission on 5 mg daily. Hold warfarin tonight. Daily PT/INR until stable within therapeutic range. Thank you for the consult. Will continue to follow.     Mickey Rodriguez, GaetanoD.  7/23/2021  2:12 PM

## 2021-07-23 NOTE — PROGRESS NOTES
Hematology Oncology Daily Progress Note    Admit Date: 7/19/2021  Hospital day several    Subjective:     Patient has complaints of mild to moderate fatigue and BRUNER--denies SOB/CP. Medication side effects: none    Scheduled Meds:   amiodarone  400 mg Oral BID    sodium chloride flush  5-40 mL Intravenous 2 times per day    warfarin (COUMADIN) daily dosing (placeholder)   Other RX Placeholder    sodium chloride  250 mL Intravenous Once    sacubitril-valsartan  1 tablet Oral BID    sodium chloride  500 mL Intravenous Once    docusate sodium  100 mg Oral Daily    senna  1 tablet Oral Nightly    metoprolol tartrate  25 mg Oral BID     Continuous Infusions:   sodium chloride      heparin (Porcine) 1,000 Units/hr (07/23/21 9893)    sodium chloride 50 mL/hr at 07/22/21 1821    nitroprusside (NIPRIDE) 50 mg in D5W infusion Stopped (07/21/21 1045)    milrinone 0.35 mcg/kg/min (07/23/21 0248)     PRN Meds:sodium chloride flush, sodium chloride, perflutren lipid microspheres, acetaminophen, ondansetron    Review of Systems  Pertinent items are noted in HPI. REVIEW OF SYSTEMS:         · Constitutional: Denies fever, sweats, weight loss     · Eyes: No visual changes or diplopia. No scleral icterus. · ENT: No Headaches, hearing loss or vertigo. No mouth sores or sore throat. · Cardiovascular: No chest pain, dyspnea on exertion, palpitations or loss of consciousness. · Respiratory: No cough or wheezing, no sputum production. No hemoptysis. .    · Gastrointestinal: No abdominal pain, appetite loss, blood in stools. No change in bowel habits. · Genitourinary: No dysuria, trouble voiding, or hematuria. · Musculoskeletal:  Generalized weakness. No joint complaints. · Integumentary: No rash or pruritis. · Neurological: No headache, diplopia. No change in gait, balance, or coordination. No paresthesias. · Endocrine: No temperature intolerance. No excessive thirst, fluid intake, or urination. · Hematologic/Lymphatic: No abnormal bruising or ecchymoses, blood clots or swollen lymph nodes. · Allergic/Immunologic: No nasal congestion or hives. ·     Objective:     Patient Vitals for the past 8 hrs:   BP Temp Pulse Resp SpO2 Weight   07/23/21 1145 (!) 108/44 -- 77 12 97 % --   07/23/21 1100 (!) 107/38 -- 70 17 91 % --   07/23/21 1030 -- -- 78 17 95 % --   07/23/21 1000 (!) 122/46 -- 92 16 94 % --   07/23/21 0900 136/62 -- 85 17 92 % --   07/23/21 0818 -- -- 84 12 93 % --   07/23/21 0800 (!) 114/44 -- 80 15 95 % --   07/23/21 0730 -- 98.3 °F (36.8 °C) 81 19 93 % --   07/23/21 0600 (!) 115/48 -- 75 14 92 % --   07/23/21 0535 -- -- -- -- -- 157 lb 3 oz (71.3 kg)   07/23/21 0500 (!) 89/36 -- 74 15 94 % --     I/O last 3 completed shifts: In: 3734.5 [P.O.:600;  I.V.:2934.5; IV Piggyback:200]  Out: 9093 [Urine:2615]  I/O this shift:  In: -   Out: 350 [Urine:350]    BP (!) 108/44   Pulse 77   Temp 98.3 °F (36.8 °C)   Resp 12   Ht 4' 11\" (1.499 m)   Wt 157 lb 3 oz (71.3 kg)   SpO2 97%   BMI 31.75 kg/m²     General Appearance:    Alert, cooperative, no distress, appears stated age   Head:    Normocephalic, without obvious abnormality, atraumatic   Eyes:    PERRL, conjunctiva/corneas clear, EOM's intact, fundi     benign, both eyes        Ears:    Normal TM's and external ear canals, both ears   Nose:   Nares normal, septum midline, mucosa normal, no drainage    or sinus tenderness   Throat:   Lips, mucosa, and tongue normal; teeth and gums normal   Neck:   Supple, symmetrical, trachea midline, no adenopathy;        thyroid:  No enlargement/tenderness/nodules; no carotid    bruit or JVD   Back:     Symmetric, no curvature, ROM normal, no CVA tenderness   Lungs:     Clear to auscultation bilaterally, respirations unlabored   Chest wall:    No tenderness or deformity   Heart:    Regular rate and rhythm, S1 and S2 normal, no murmur, rub   or gallop   Abdomen:     Soft, non-tender, bowel sounds active all four quadrants,     no masses, no organomegaly           Extremities:   Extremities normal, atraumatic, no cyanosis or edema   Pulses:   2+ and symmetric all extremities   Skin:   Skin color, texture, turgor normal, no rashes or lesions   Lymph nodes:   Cervical, supraclavicular, and axillary nodes normal   Neurologic:   CNII-XII intact. Normal strength, sensation and reflexes       throughout       Data Review  CBC:   Lab Results   Component Value Date    WBC 22.3 07/23/2021    RBC 2.82 07/23/2021    RBC 4.62 06/23/2017       Assessment:     Active Problems:    Takotsubo cardiomyopathy    Shock, cardiogenic ([facility]  Resolved Problems:    * No resolved hospital problems. *      Plan:     1.  CLL. Her white blood cell count is stable. There are no signs of infection. Her hemoglobin and platelet count on admission were normal.  It recently dropped likely due to procedures and blood draws. I will check for hemolysis. From a CLL perspective, her last treatment was in 2015 and included fludarabine and Rituxan. Fludarabine has an extremely low risk of cardiac toxicity (less than 1%). She has been followed ever since. She has a very good prognosis from a CLL perspective and there are multiple other treatment options if she does need treatment so I would consider being aggressive from the cardiac perspective. 2.  New onset cardiomyopathy.   I will defer to cardiology        Electronically signed by Steph Gallagher MD on 7/23/2021 at 12:10 PM

## 2021-07-24 LAB
A/G RATIO: 1.2 (ref 1.1–2.2)
ALBUMIN SERPL-MCNC: 2.4 G/DL (ref 3.4–5)
ALP BLD-CCNC: 55 U/L (ref 40–129)
ALT SERPL-CCNC: 11 U/L (ref 10–40)
ANION GAP SERPL CALCULATED.3IONS-SCNC: 5 MMOL/L (ref 3–16)
AST SERPL-CCNC: 17 U/L (ref 15–37)
BASE EXCESS MIXED: 5
BASE EXCESS VENOUS: 5.4 MMOL/L
BILIRUB SERPL-MCNC: 0.5 MG/DL (ref 0–1)
BUN BLDV-MCNC: 15 MG/DL (ref 7–20)
CALCIUM SERPL-MCNC: 7.9 MG/DL (ref 8.3–10.6)
CARBOXYHEMOGLOBIN: 1.4 %
CHLORIDE BLD-SCNC: 106 MMOL/L (ref 99–110)
CO2: 29 MMOL/L (ref 21–32)
CREAT SERPL-MCNC: 1 MG/DL (ref 0.6–1.2)
GFR AFRICAN AMERICAN: >60
GFR NON-AFRICAN AMERICAN: 53
GLOBULIN: 2 G/DL
GLUCOSE BLD-MCNC: 101 MG/DL (ref 70–99)
HCO3 VENOUS: 32 MMOL/L (ref 23–29)
HCO3, MIXED: 30.3 MMOL/L
INR BLD: 4.3 (ref 0.88–1.12)
METHEMOGLOBIN VENOUS: 0.5 %
O2 SAT, MIXED: 56 %
O2 SAT, VEN: 68 %
O2 THERAPY: ABNORMAL
PCO2 MIXED: 50.7 MM HG
PCO2, VEN: 54.3 MMHG (ref 40–50)
PERFORMED ON: NORMAL
PH VENOUS: 7.37 (ref 7.35–7.45)
PH, MIXED: 7.38 (ref 7.35–7.45)
PO2 MIXED: 31 MM HG
PO2, VEN: 36 MMHG
POC SAMPLE TYPE: NORMAL
POTASSIUM SERPL-SCNC: 3.2 MMOL/L (ref 3.5–5.1)
PROTHROMBIN TIME: 51.6 SEC (ref 9.9–12.7)
SODIUM BLD-SCNC: 140 MMOL/L (ref 136–145)
TCO2 CALC MIXED: 32 MMOL/L
TCO2 CALC VENOUS: 33 MMOL/L
TOTAL PROTEIN: 4.4 G/DL (ref 6.4–8.2)

## 2021-07-24 PROCEDURE — 6360000002 HC RX W HCPCS: Performed by: INTERNAL MEDICINE

## 2021-07-24 PROCEDURE — 2140000000 HC CCU INTERMEDIATE R&B

## 2021-07-24 PROCEDURE — 36592 COLLECT BLOOD FROM PICC: CPT

## 2021-07-24 PROCEDURE — 85610 PROTHROMBIN TIME: CPT

## 2021-07-24 PROCEDURE — 2700000000 HC OXYGEN THERAPY PER DAY

## 2021-07-24 PROCEDURE — 99233 SBSQ HOSP IP/OBS HIGH 50: CPT | Performed by: INTERNAL MEDICINE

## 2021-07-24 PROCEDURE — 6370000000 HC RX 637 (ALT 250 FOR IP): Performed by: INTERNAL MEDICINE

## 2021-07-24 PROCEDURE — 2580000003 HC RX 258: Performed by: INTERNAL MEDICINE

## 2021-07-24 PROCEDURE — 82803 BLOOD GASES ANY COMBINATION: CPT

## 2021-07-24 PROCEDURE — 80053 COMPREHEN METABOLIC PANEL: CPT

## 2021-07-24 PROCEDURE — 85025 COMPLETE CBC W/AUTO DIFF WBC: CPT

## 2021-07-24 PROCEDURE — 94761 N-INVAS EAR/PLS OXIMETRY MLT: CPT

## 2021-07-24 RX ADMIN — AMIODARONE HYDROCHLORIDE 400 MG: 200 TABLET ORAL at 09:37

## 2021-07-24 RX ADMIN — MIDODRINE HYDROCHLORIDE 5 MG: 5 TABLET ORAL at 09:38

## 2021-07-24 RX ADMIN — SENNOSIDES 8.6 MG: 8.6 TABLET, FILM COATED ORAL at 21:27

## 2021-07-24 RX ADMIN — AMIODARONE HYDROCHLORIDE 400 MG: 200 TABLET ORAL at 21:27

## 2021-07-24 RX ADMIN — METOPROLOL TARTRATE 25 MG: 25 TABLET, FILM COATED ORAL at 09:38

## 2021-07-24 RX ADMIN — SODIUM CHLORIDE, PRESERVATIVE FREE 10 ML: 5 INJECTION INTRAVENOUS at 09:38

## 2021-07-24 RX ADMIN — DOCUSATE SODIUM 100 MG: 100 CAPSULE ORAL at 09:38

## 2021-07-24 RX ADMIN — SACUBITRIL AND VALSARTAN 1 TABLET: 24; 26 TABLET, FILM COATED ORAL at 10:49

## 2021-07-24 RX ADMIN — MIDODRINE HYDROCHLORIDE 5 MG: 5 TABLET ORAL at 17:41

## 2021-07-24 RX ADMIN — MILRINONE LACTATE 0.3 MCG/KG/MIN: 0.2 INJECTION, SOLUTION INTRAVENOUS at 12:33

## 2021-07-24 RX ADMIN — SACUBITRIL AND VALSARTAN 1 TABLET: 24; 26 TABLET, FILM COATED ORAL at 21:27

## 2021-07-24 RX ADMIN — MIDODRINE HYDROCHLORIDE 5 MG: 5 TABLET ORAL at 01:18

## 2021-07-24 RX ADMIN — METOPROLOL TARTRATE 25 MG: 25 TABLET, FILM COATED ORAL at 21:27

## 2021-07-24 ASSESSMENT — PAIN SCALES - GENERAL: PAINLEVEL_OUTOF10: 0

## 2021-07-24 NOTE — PROGRESS NOTES
Clinical Pharmacy Note  Warfarin Consult    Flakita Padilla is a 78 y.o. female receiving warfarin managed by pharmacy. Warfarin Indication: Factor V Leiden mutation  Target INR range: 2-3   Dose prior to admission: 5 mg daily    Current warfarin drug-drug interactions: Amiodarone 400 mg BID  Drug- disease state interactions: Acute systolic heart failure    Recent Labs     07/21/21  1936 07/22/21  0530 07/22/21  1500 07/23/21  0527 07/23/21  1040 07/24/21  0540   HGB  --  9.8*  --  8.7*  --  8.4*   HCT  --  30.3*  --  26.3*  --  25.8*   INR   < >  --  1.31*  --  2.22* 4.30*    < > = values in this interval not displayed. Assessment/Plan:    Warfarin ordered as 10 mg x 1 per Dr. Eric Frame PM 7/22/21  INR jumped from 1.3 -> 2.2 -> 4.3 likely due to amiodarone interaction, acute CHF, and patient was supratherapeutic upon admission on 5 mg daily.     Continue to hold warfarin tonight.      Daily PT/INR until stable within therapeutic range. Thank you for the consult. Will continue to follow.     Marixa Artis, PharmD, BCPS  7/24/2021  10:26 AM

## 2021-07-24 NOTE — PROGRESS NOTES
@6992 Dr Dawna Reyna contacted regarding evening Entresto dose as morning dose was not administered until 1542 this afternoon. Current . Per Dr Ila Saucedo give BB now and give Entresto dose at 2300hrs. New order also received for Midodrine 5mg PRN overnight for SBP <90, and change VBG to daily. @2300 ; Entresto given per order, will monitor BP and use PRN Midodrine of meets parameters. @0100 SBP 84, will re-check t 0115.    @0115 SBP remains 84; Midodrine given per PRN eMAR order. @0200 . @1552 Bed scale weight 73.9kg, up from 71.3kg, despite approx 1400ml negative fluid balance over past 24 hours.

## 2021-07-24 NOTE — PROGRESS NOTES
Cardiology Progress    Herlinda Bara  1940      Referring Physician: [facility]   Reason for Referral: stemi   Chief Complaint:   Chief Complaint   Patient presents with    Shortness of Breath     Pt states she has been SOB dizziness with N/V/D. state she was here Friday but left due to being in the waiting area for 3 hours.  Dizziness       Interval history:  Severe LV dysfunction   S/p impella explant   Maintaining hemodynamics on inotropic support   Good UOP    Subjective:     History of Present Illness: The patient is 78 y.o. female with a past medical history significant for CLL, factor V mutation who presents with the above complaint. Admits to 3 days of worsening dyspnea with minimal exertion. Took OTC tylenol with no help thus coming to the ER for evaluation.  STEMI page activated by ER staff           Past Medical History:   Diagnosis Date    Chronic lymphocytic leukemia in remission ([address]     CLL (chronic lymphocytic leukemia) ([facility] 2/12/2015    COPD (chronic obstructive pulmonary disease) ([address]     Essential hypertension 2015    controlled with salt restriction (initially)    Factor V Leiden mutation ([address]     Goiter Nov, 2011    1.5 cm midline    Hypercholesterolemia     Impaired fasting glucose Nov, 2011    Osteoarthritis     Osteoporosis     Post herpetic neuralgia     Vitamin D deficiency Nov, 2011     Past Surgical History:   Procedure Laterality Date    CATARACT REMOVAL WITH IMPLANT  5/14/12    left eye    JOINT REPLACEMENT      partial knee R and L    KNEE SURGERY  2008    partial replacements, both knees    MOHS SURGERY  03/12/2019    R cheek and R superior temple    SPLENECTOMY      TUNNELED VENOUS PORT PLACEMENT      UPPER GASTROINTESTINAL ENDOSCOPY N/A 2/5/2019    EGD ESOPHAGOGASTRODUODENOSCOPY, WITH ENDOSCOPIC ULTRASOUND OF ESOPHAGUS performed by Wilmer Terrell MD at [address] History   Problem Relation Age of Onset    Diabetes Father     Stroke Sister 50         of a stroke     Social History     Tobacco Use    Smoking status: Former Smoker     Packs/day: 0.30     Years: 50.00     Pack years: 15.00     Types: Cigarettes     Quit date: 1995     Years since quittin.7    Smokeless tobacco: Never Used   Vaping Use    Vaping Use: Never used   Substance Use Topics    Alcohol use: No     Alcohol/week: 0.0 standard drinks    Drug use: Not on file       No Known Allergies  Current Facility-Administered Medications   Medication Dose Route Frequency Provider Last Rate Last Admin    midodrine (PROAMATINE) tablet 5 mg  5 mg Oral BID WC Carlota Cisneros MD   5 mg at 21 1734    midodrine (PROAMATINE) tablet 5 mg  5 mg Oral PRN Carlota Cisneros MD        amiodarone (CORDARONE) tablet 400 mg  400 mg Oral BID Carlota Cisneros MD   400 mg at 21 2044    sodium chloride flush 0.9 % injection 5-40 mL  5-40 mL Intravenous 2 times per day Carlota Cisneros MD   10 mL at 21 3037    sodium chloride flush 0.9 % injection 5-40 mL  5-40 mL Intravenous PRN Carlota Cisneros MD        0.9 % sodium chloride infusion  25 mL Intravenous PRN Carlota Cisneros MD        warfarin (COUMADIN) daily dosing (placeholder)   Other RX Placeholder Carlota Cisneros MD        0.9 % sodium chloride bolus  250 mL Intravenous Once Carlota Cisneros MD        0.9 % sodium chloride infusion   Intravenous Continuous Carlota Cisneros MD 50 mL/hr at 21 1821 New Bag at 21 1821    sacubitril-valsartan (ENTRESTO) 24-26 MG per tablet 1 tablet  1 tablet Oral BID Carlota Cisneros MD   1 tablet at 21 1542    0.9 % sodium chloride bolus  500 mL Intravenous Once Carlota Cisneros MD   Stopped at 21 2315    nitroPRUSSide (NIPRIDE) 50 mg in dextrose 5 % 250 mL infusion  5-200 mcg/min Intravenous Continuous Carlota Cisneros MD   Stopped at 21 1045    perflutren lipid microspheres (DEFINITY) injection 1.65 mg  1.5 mL Intravenous ONCE PRN Carlene Dunn Lily Gary MD        docusate sodium (COLACE) capsule 100 mg  100 mg Oral Daily Jamel Saeed MD   100 mg at 07/23/21 0954    senna (SENOKOT) tablet 8.6 mg  1 tablet Oral Nightly Jamel Saeed MD   8.6 mg at 07/23/21 2044    acetaminophen (TYLENOL) tablet 650 mg  650 mg Oral Q4H PRN Jamel Saeed MD   650 mg at 07/23/21 2044    milrinone (PRIMACOR) 20 mg in dextrose 5 % 100 mL infusion  0.3 mcg/kg/min Intravenous Continuous Jamel Saeed MD 6.1 mL/hr at 07/23/21 1813 0.3 mcg/kg/min at 07/23/21 1813    ondansetron (ZOFRAN) injection 4 mg  4 mg Intravenous Q6H PRN Wilian Hannha MD   4 mg at 07/23/21 1735    metoprolol tartrate (LOPRESSOR) tablet 25 mg  25 mg Oral BID Jamel Saeed MD   25 mg at 07/23/21 2044       Review of Systems:  · Constitutional: No unanticipated weight loss. There's been no change in energy level, sleep pattern, or activity level. No fevers, chills. · Eyes: No visual changes or diplopia. No scleral icterus. · ENT: No Headaches, hearing loss or vertigo. No mouth sores or sore throat. · Cardiovascular: as reviewed in HPI  · Respiratory: No cough or wheezing, no sputum production. No hemoptysis. · Gastrointestinal: No abdominal pain, appetite loss, blood in stools. No change in bowel or bladder habits. · Genitourinary: No dysuria, trouble voiding, or hematuria. · Musculoskeletal:  No gait disturbance, no joint complaints. · Integumentary: No rash or pruritis. · Neurological: No headache, diplopia, change in muscle strength, numbness or tingling. · Psychiatric: No anxiety or depression. · Endocrine: No temperature intolerance. No excessive thirst, fluid intake, or urination. No tremor. · Hematologic/Lymphatic: No abnormal bruising or bleeding, blood clots or swollen lymph nodes. · Allergic/Immunologic: No nasal congestion or hives.     Physical Exam:   BP (!) 115/41   Pulse 80   Temp 98.3 °F (36.8 °C) (Oral)   Resp 20   Ht 4' 11\" (1.499 m)   Wt 157 lb 3 oz (71.3 kg) SpO2 98%   BMI 31.75 kg/m²   Wt Readings from Last 3 Encounters:   07/23/21 157 lb 3 oz (71.3 kg)   07/16/21 158 lb 15.2 oz (72.1 kg)   04/20/21 162 lb (73.5 kg)     Constitutional: appears ill, diaphoretic   Head: Normocephalic and atraumatic. Pupils equal and round. Neck: Neck supple. + JVP or carotid bruit appreciated. No mass and no thyromegaly present. No lymphadenopathy present. Cardiovascular: Normal rate. Normal heart sounds. Exam reveals no gallop and no friction rub. No murmur heard. Pulmonary/Chest: diffuse rales   Abdominal: Soft, non-tender. Bowel sounds are normal. She exhibits no organomegaly, mass or bruit. Extremities: No edema. No cyanosis or clubbing. Pulses are 2+ radial/carotid bilaterally. Neurological: No gross cranial nerve deficit. Coordination normal.   Skin: Skin is warm and dry. There is no rash or diaphoresis. Psychiatric: She has a normal mood and affect.  Her speech is normal and behavior is normal.     Lab Review:   FLP:    Lab Results   Component Value Date    TRIG 128 04/20/2021    HDL 55 04/20/2021    HDL 62 11/08/2011    LDLCALC 178 04/20/2021    LDLDIRECT 117 12/14/2015    LABVLDL 26 04/20/2021     BUN/Creatinine:    Lab Results   Component Value Date    BUN 18 07/23/2021    CREATININE 0.9 07/23/2021     PT/INR, TNI, HGB A1C:   Lab Results   Component Value Date/Time    TROPONINI 2.22 (HH) 07/19/2021 11:00 AM    LABA1C 6.2 04/20/2021 08:54 AM      No results found for: CBCAUTODIF    EKG Interpretation: afib, with diffuse ST elevations     Echo: severe LV dysfunction with EF < 10%     Stress Test:     Cath:   Normal coronary arteries     CT:    Doppler:     All above diagnostic testing and laboratory data was independently visualized and reviewed by me (not simply review of report)       Assessment and Plan   1) acute on chronic LV systolic heart failure   NYHA class IV, stage D  impella explanted   Start GDMT w/ BB/entresto  Low dose midodrine to initiate medical therapy   Titrate inotropes based on mixed O2    2) pafib   - warfarin  - PO amio   - PO BB     3) BRENNAN   - continue to monitor   - good UOP       Thank you very much for allowing me to participate in the care of your patient. Please do not hesitate to contact me if you have any questions.     Baron Leanna MD [address], Interventional Cardiology, and Peripheral Vascular Disease   [facility]   Ph: [phone]  Fax: [phone]

## 2021-07-25 LAB
A/G RATIO: 1.2 (ref 1.1–2.2)
ALBUMIN SERPL-MCNC: 2.7 G/DL (ref 3.4–5)
ALP BLD-CCNC: 66 U/L (ref 40–129)
ALT SERPL-CCNC: 15 U/L (ref 10–40)
ANION GAP SERPL CALCULATED.3IONS-SCNC: 8 MMOL/L (ref 3–16)
AST SERPL-CCNC: 24 U/L (ref 15–37)
BASE EXCESS VENOUS: 7.2 MMOL/L
BILIRUB SERPL-MCNC: 0.5 MG/DL (ref 0–1)
BUN BLDV-MCNC: 12 MG/DL (ref 7–20)
CALCIUM SERPL-MCNC: 8 MG/DL (ref 8.3–10.6)
CARBOXYHEMOGLOBIN: 1.4 %
CHLORIDE BLD-SCNC: 105 MMOL/L (ref 99–110)
CO2: 29 MMOL/L (ref 21–32)
CREAT SERPL-MCNC: 0.9 MG/DL (ref 0.6–1.2)
GFR AFRICAN AMERICAN: >60
GFR NON-AFRICAN AMERICAN: 60
GLOBULIN: 2.2 G/DL
GLUCOSE BLD-MCNC: 139 MG/DL (ref 70–99)
HCO3 VENOUS: 33 MMOL/L (ref 23–29)
INR BLD: 4.15 (ref 0.88–1.12)
METHEMOGLOBIN VENOUS: 0.5 %
O2 SAT, VEN: 68 %
O2 THERAPY: ABNORMAL
PCO2, VEN: 55.2 MMHG (ref 40–50)
PH VENOUS: 7.39 (ref 7.35–7.45)
PO2, VEN: 36 MMHG
POTASSIUM SERPL-SCNC: 3 MMOL/L (ref 3.5–5.1)
PROTHROMBIN TIME: 49.7 SEC (ref 9.9–12.7)
SODIUM BLD-SCNC: 142 MMOL/L (ref 136–145)
TCO2 CALC VENOUS: 35 MMOL/L
TOTAL PROTEIN: 4.9 G/DL (ref 6.4–8.2)

## 2021-07-25 PROCEDURE — 82803 BLOOD GASES ANY COMBINATION: CPT

## 2021-07-25 PROCEDURE — 94761 N-INVAS EAR/PLS OXIMETRY MLT: CPT

## 2021-07-25 PROCEDURE — 6370000000 HC RX 637 (ALT 250 FOR IP): Performed by: INTERNAL MEDICINE

## 2021-07-25 PROCEDURE — 36592 COLLECT BLOOD FROM PICC: CPT

## 2021-07-25 PROCEDURE — 6360000002 HC RX W HCPCS: Performed by: INTERNAL MEDICINE

## 2021-07-25 PROCEDURE — 80053 COMPREHEN METABOLIC PANEL: CPT

## 2021-07-25 PROCEDURE — 2580000003 HC RX 258: Performed by: INTERNAL MEDICINE

## 2021-07-25 PROCEDURE — 2700000000 HC OXYGEN THERAPY PER DAY

## 2021-07-25 PROCEDURE — 2140000000 HC CCU INTERMEDIATE R&B

## 2021-07-25 PROCEDURE — 85025 COMPLETE CBC W/AUTO DIFF WBC: CPT

## 2021-07-25 PROCEDURE — 85610 PROTHROMBIN TIME: CPT

## 2021-07-25 PROCEDURE — 99233 SBSQ HOSP IP/OBS HIGH 50: CPT | Performed by: INTERNAL MEDICINE

## 2021-07-25 RX ORDER — POTASSIUM CHLORIDE 29.8 MG/ML
40 INJECTION INTRAVENOUS ONCE
Status: COMPLETED | OUTPATIENT
Start: 2021-07-25 | End: 2021-07-25

## 2021-07-25 RX ORDER — MILRINONE LACTATE 0.2 MG/ML
0.25 INJECTION, SOLUTION INTRAVENOUS CONTINUOUS
Status: DISCONTINUED | OUTPATIENT
Start: 2021-07-25 | End: 2021-07-26 | Stop reason: HOSPADM

## 2021-07-25 RX ORDER — AMIODARONE HYDROCHLORIDE 200 MG/1
200 TABLET ORAL DAILY
Status: DISCONTINUED | OUTPATIENT
Start: 2021-07-26 | End: 2021-07-26 | Stop reason: HOSPADM

## 2021-07-25 RX ADMIN — ONDANSETRON 4 MG: 2 INJECTION INTRAMUSCULAR; INTRAVENOUS at 16:51

## 2021-07-25 RX ADMIN — METOPROLOL TARTRATE 25 MG: 25 TABLET, FILM COATED ORAL at 08:48

## 2021-07-25 RX ADMIN — SODIUM CHLORIDE, PRESERVATIVE FREE 10 ML: 5 INJECTION INTRAVENOUS at 08:47

## 2021-07-25 RX ADMIN — METOPROLOL TARTRATE 25 MG: 25 TABLET, FILM COATED ORAL at 20:53

## 2021-07-25 RX ADMIN — POTASSIUM CHLORIDE 40 MEQ: 29.8 INJECTION, SOLUTION INTRAVENOUS at 10:40

## 2021-07-25 RX ADMIN — MIDODRINE HYDROCHLORIDE 5 MG: 5 TABLET ORAL at 16:51

## 2021-07-25 RX ADMIN — AMIODARONE HYDROCHLORIDE 400 MG: 200 TABLET ORAL at 08:48

## 2021-07-25 RX ADMIN — SACUBITRIL AND VALSARTAN 1 TABLET: 24; 26 TABLET, FILM COATED ORAL at 20:53

## 2021-07-25 RX ADMIN — MIDODRINE HYDROCHLORIDE 5 MG: 5 TABLET ORAL at 08:48

## 2021-07-25 RX ADMIN — SENNOSIDES 8.6 MG: 8.6 TABLET, FILM COATED ORAL at 20:53

## 2021-07-25 RX ADMIN — SACUBITRIL AND VALSARTAN 1 TABLET: 24; 26 TABLET, FILM COATED ORAL at 08:48

## 2021-07-25 RX ADMIN — MILRINONE LACTATE 0.3 MCG/KG/MIN: 0.2 INJECTION, SOLUTION INTRAVENOUS at 05:37

## 2021-07-25 ASSESSMENT — PAIN SCALES - GENERAL
PAINLEVEL_OUTOF10: 0

## 2021-07-25 NOTE — PROGRESS NOTES
2635    senna (SENOKOT) tablet 8.6 mg  1 tablet Oral Nightly Raiza Segura MD   8.6 mg at 07/23/21 2044    acetaminophen (TYLENOL) tablet 650 mg  650 mg Oral Q4H PRN Raiza Segura MD   650 mg at 07/23/21 2044    milrinone (PRIMACOR) 20 mg in dextrose 5 % 100 mL infusion  0.3 mcg/kg/min Intravenous Continuous Raiza Segura MD 6.1 mL/hr at 07/24/21 1233 0.3 mcg/kg/min at 07/24/21 1233    ondansetron (ZOFRAN) injection 4 mg  4 mg Intravenous Q6H PRN Jyoti Eason MD   4 mg at 07/23/21 1735    metoprolol tartrate (LOPRESSOR) tablet 25 mg  25 mg Oral BID Raiza Segura MD   25 mg at 07/24/21 0528       Review of Systems:  · Constitutional: No unanticipated weight loss. There's been no change in energy level, sleep pattern, or activity level. No fevers, chills. · Eyes: No visual changes or diplopia. No scleral icterus. · ENT: No Headaches, hearing loss or vertigo. No mouth sores or sore throat. · Cardiovascular: as reviewed in HPI  · Respiratory: No cough or wheezing, no sputum production. No hemoptysis. · Gastrointestinal: No abdominal pain, appetite loss, blood in stools. No change in bowel or bladder habits. · Genitourinary: No dysuria, trouble voiding, or hematuria. · Musculoskeletal:  No gait disturbance, no joint complaints. · Integumentary: No rash or pruritis. · Neurological: No headache, diplopia, change in muscle strength, numbness or tingling. · Psychiatric: No anxiety or depression. · Endocrine: No temperature intolerance. No excessive thirst, fluid intake, or urination. No tremor. · Hematologic/Lymphatic: No abnormal bruising or bleeding, blood clots or swollen lymph nodes. · Allergic/Immunologic: No nasal congestion or hives.     Physical Exam:   BP (!) 116/38   Pulse 69   Temp 97.9 °F (36.6 °C) (Oral)   Resp 17   Ht 4' 11\" (1.499 m)   Wt 162 lb 14.7 oz (73.9 kg)   SpO2 97%   BMI 32.91 kg/m²   Wt Readings from Last 3 Encounters:   07/24/21 162 lb 14.7 oz (73.9 kg) 07/16/21 158 lb 15.2 oz (72.1 kg)   04/20/21 162 lb (73.5 kg)     Constitutional: appears ill, diaphoretic   Head: Normocephalic and atraumatic. Pupils equal and round. Neck: Neck supple. + JVP or carotid bruit appreciated. No mass and no thyromegaly present. No lymphadenopathy present. Cardiovascular: Normal rate. Normal heart sounds. Exam reveals no gallop and no friction rub. No murmur heard. Pulmonary/Chest: diffuse rales   Abdominal: Soft, non-tender. Bowel sounds are normal. She exhibits no organomegaly, mass or bruit. Extremities: No edema. No cyanosis or clubbing. Pulses are 2+ radial/carotid bilaterally. Neurological: No gross cranial nerve deficit. Coordination normal.   Skin: Skin is warm and dry. There is no rash or diaphoresis. Psychiatric: She has a normal mood and affect.  Her speech is normal and behavior is normal.     Lab Review:   FLP:    Lab Results   Component Value Date    TRIG 128 04/20/2021    HDL 55 04/20/2021    HDL 62 11/08/2011    LDLCALC 178 04/20/2021    LDLDIRECT 117 12/14/2015    LABVLDL 26 04/20/2021     BUN/Creatinine:    Lab Results   Component Value Date    BUN 15 07/24/2021    CREATININE 1.0 07/24/2021     PT/INR, TNI, HGB A1C:   Lab Results   Component Value Date/Time    TROPONINI 2.22 (HH) 07/19/2021 11:00 AM    LABA1C 6.2 04/20/2021 08:54 AM      No results found for: CBCAUTODIF    EKG Interpretation: afib, with diffuse ST elevations     Echo: severe LV dysfunction with EF < 10%     Stress Test:     Cath:   Normal coronary arteries     CT:    Doppler:     All above diagnostic testing and laboratory data was independently visualized and reviewed by me (not simply review of report)       Assessment and Plan   1) acute on chronic LV systolic heart failure   NYHA class IV, stage D  impella explanted   Start GDMT w/ BB/entresto  Low dose midodrine to initiate medical therapy   Titrate inotropes based on mixed O2, 56% today , will continue at 0.3 mcg/kg/min   Repeat echo on Monday     2) pafib   - warfarin  - PO amio   - PO BB     3) BRENNAN   - resolved   - continue to monitor   - good UOP       Thank you very much for allowing me to participate in the care of your patient. Please do not hesitate to contact me if you have any questions.     Alla Gray MD [address] [place] [address], Interventional Cardiology, and Peripheral Vascular Disease   [facility]   Ph: [phone]  Fax: [phone]

## 2021-07-25 NOTE — PROGRESS NOTES
Cardiology Progress    Arcadio Crockett  1940      Referring Physician: [facility] ER   Reason for Referral: stemi   Chief Complaint:   Chief Complaint   Patient presents with    Shortness of Breath     Pt states she has been SOB dizziness with N/V/D. state she was here Friday but left due to being in the waiting area for 3 hours.  Dizziness       Interval history:  Severe LV dysfunction   S/p impella explant   Maintaining hemodynamics on inotropic support   Good UOP  Walked on the unit today  No new issues       Subjective:     History of Present Illness: The patient is 78 y.o. female with a past medical history significant for CLL, factor V mutation who presents with the above complaint. Admits to 3 days of worsening dyspnea with minimal exertion. Took OTC tylenol with no help thus coming to the ER for evaluation.  STEMI page activated by ER staff           Past Medical History:   Diagnosis Date    Chronic lymphocytic leukemia in remission ([address]     CLL (chronic lymphocytic leukemia) ([address] 2/12/2015    COPD (chronic obstructive pulmonary disease) ([address]     Essential hypertension 2015    controlled with salt restriction (initially)    Factor V Leiden mutation ([address]     Goiter Nov, 2011    1.5 cm midline    Hypercholesterolemia     Impaired fasting glucose Nov, 2011    Osteoarthritis     Osteoporosis     Post herpetic neuralgia     Vitamin D deficiency Nov, 2011     Past Surgical History:   Procedure Laterality Date    CATARACT REMOVAL WITH IMPLANT  5/14/12    left eye    JOINT REPLACEMENT      partial knee R and L    KNEE SURGERY  2008    partial replacements, both knees    MOHS SURGERY  03/12/2019    R cheek and R superior temple    SPLENECTOMY      TUNNELED VENOUS PORT PLACEMENT      UPPER GASTROINTESTINAL ENDOSCOPY N/A 2/5/2019    EGD ESOPHAGOGASTRODUODENOSCOPY, WITH ENDOSCOPIC ULTRASOUND OF ESOPHAGUS performed by Areli Guy MD at [facility] ENDOSCOPY     Family History Problem Relation Age of Onset    Diabetes Father     Stroke Sister 50         of a stroke     Social History     Tobacco Use    Smoking status: Former Smoker     Packs/day: 0.30     Years: 50.00     Pack years: 15.00     Types: Cigarettes     Quit date: 1995     Years since quittin.7    Smokeless tobacco: Never Used   Vaping Use    Vaping Use: Never used   Substance Use Topics    Alcohol use: No     Alcohol/week: 0.0 standard drinks    Drug use: Not on file       No Known Allergies  Current Facility-Administered Medications   Medication Dose Route Frequency Provider Last Rate Last Admin    milrinone (PRIMACOR) 20 mg in dextrose 5 % 100 mL infusion  0.25 mcg/kg/min Intravenous Continuous Nilda García MD 5.1 mL/hr at 21 0944 0.25 mcg/kg/min at 21 0944    [START ON 2021] amiodarone (CORDARONE) tablet 200 mg  200 mg Oral Daily Nilda García MD        midodrine (PROAMATINE) tablet 5 mg  5 mg Oral BID WC Nilda García MD   5 mg at 21 0848    midodrine (PROAMATINE) tablet 5 mg  5 mg Oral PRN Nilda García MD   5 mg at 21 0118    perflutren lipid microspheres (DEFINITY) injection 1.65 mg  1.5 mL Intravenous ONCE PRN Nilda García MD        sodium chloride flush 0.9 % injection 5-40 mL  5-40 mL Intravenous 2 times per day Nilda García MD   10 mL at 21 0847    sodium chloride flush 0.9 % injection 5-40 mL  5-40 mL Intravenous PRN Nilda García MD        0.9 % sodium chloride infusion  25 mL Intravenous PRN Nilda García MD        warfarin (COUMADIN) daily dosing (placeholder)   Other RX Placeholder Nilda García MD        0.9 % sodium chloride bolus  250 mL Intravenous Once Nilda García MD        sacubitril-valsartan (ENTRESTO) 24-26 MG per tablet 1 tablet  1 tablet Oral BID Nilda García MD   1 tablet at 21 0848    0.9 % sodium chloride bolus  500 mL Intravenous Once Nilda García MD   Stopped at 21 2315    perflutren lipid microspheres (DEFINITY) injection 1.65 mg  1.5 mL Intravenous ONCE PRN Francis Lora MD        docusate sodium (COLACE) capsule 100 mg  100 mg Oral Daily Francis Lora MD   100 mg at 07/24/21 4693    senna (SENOKOT) tablet 8.6 mg  1 tablet Oral Nightly Francis Lora MD   8.6 mg at 07/24/21 2127    acetaminophen (TYLENOL) tablet 650 mg  650 mg Oral Q4H PRN Francis Lora MD   650 mg at 07/23/21 2044    ondansetron (ZOFRAN) injection 4 mg  4 mg Intravenous Q6H PRN Roberta Roman MD   4 mg at 07/23/21 1735    metoprolol tartrate (LOPRESSOR) tablet 25 mg  25 mg Oral BID Francis Lora MD   25 mg at 07/25/21 0848       Review of Systems:  · Constitutional: No unanticipated weight loss. There's been no change in energy level, sleep pattern, or activity level. No fevers, chills. · Eyes: No visual changes or diplopia. No scleral icterus. · ENT: No Headaches, hearing loss or vertigo. No mouth sores or sore throat. · Cardiovascular: as reviewed in HPI  · Respiratory: No cough or wheezing, no sputum production. No hemoptysis. · Gastrointestinal: No abdominal pain, appetite loss, blood in stools. No change in bowel or bladder habits. · Genitourinary: No dysuria, trouble voiding, or hematuria. · Musculoskeletal:  No gait disturbance, no joint complaints. · Integumentary: No rash or pruritis. · Neurological: No headache, diplopia, change in muscle strength, numbness or tingling. · Psychiatric: No anxiety or depression. · Endocrine: No temperature intolerance. No excessive thirst, fluid intake, or urination. No tremor. · Hematologic/Lymphatic: No abnormal bruising or bleeding, blood clots or swollen lymph nodes. · Allergic/Immunologic: No nasal congestion or hives.     Physical Exam:   BP (!) 124/51   Pulse 69   Temp 98 °F (36.7 °C) (Oral)   Resp 17   Ht 4' 11\" (1.499 m)   Wt 165 lb 12.6 oz (75.2 kg)   SpO2 95%   BMI 33.48 kg/m²   Wt Readings from Last 3 Encounters:   07/25/21 165 lb 12.6 oz (75.2 kg)   07/16/21 158 lb 15.2 oz (72.1 kg)   04/20/21 162 lb (73.5 kg)     Constitutional: appears ill, diaphoretic   Head: Normocephalic and atraumatic. Pupils equal and round. Neck: Neck supple. + JVP or carotid bruit appreciated. No mass and no thyromegaly present. No lymphadenopathy present. Cardiovascular: Normal rate. Normal heart sounds. Exam reveals no gallop and no friction rub. No murmur heard. Pulmonary/Chest: diffuse rales   Abdominal: Soft, non-tender. Bowel sounds are normal. She exhibits no organomegaly, mass or bruit. Extremities: No edema. No cyanosis or clubbing. Pulses are 2+ radial/carotid bilaterally. Neurological: No gross cranial nerve deficit. Coordination normal.   Skin: Skin is warm and dry. There is no rash or diaphoresis. Psychiatric: She has a normal mood and affect.  Her speech is normal and behavior is normal.     Lab Review:   FLP:    Lab Results   Component Value Date    TRIG 128 04/20/2021    HDL 55 04/20/2021    HDL 62 11/08/2011    LDLCALC 178 04/20/2021    LDLDIRECT 117 12/14/2015    LABVLDL 26 04/20/2021     BUN/Creatinine:    Lab Results   Component Value Date    BUN 12 07/25/2021    CREATININE 0.9 07/25/2021     PT/INR, TNI, HGB A1C:   Lab Results   Component Value Date/Time    TROPONINI 2.22 (HH) 07/19/2021 11:00 AM    LABA1C 6.2 04/20/2021 08:54 AM      No results found for: CBCAUTODIF    EKG Interpretation: afib, with diffuse ST elevations     Echo: severe LV dysfunction with EF < 10%     Stress Test:     Cath:   Normal coronary arteries     CT:    Doppler:     All above diagnostic testing and laboratory data was independently visualized and reviewed by me (not simply review of report)       Assessment and Plan   1) acute on chronic LV systolic heart failure   NYHA class IV, stage D  impella explanted   Start GDMT w/ BB/entresto  Low dose midodrine to initiate medical therapy   Titrate inotropes based on mixed O2, decrease to 0.25 and titrate by 0.05 q6 hrs until off   Repeat echo on Monday     2) pafib   - warfarin  - PO amio; decrease to 200mg qd, INR supra-therapeutic     - PO BB     3) BRENNAN   - resolved   - continue to monitor   - good UOP       Thank you very much for allowing me to participate in the care of your patient. Please do not hesitate to contact me if you have any questions.     Bailey Bates MD [address] [facility], Interventional Cardiology, and Peripheral Vascular Disease   [facility]   Ph: [phone]  Fax: [phone]

## 2021-07-25 NOTE — PROGRESS NOTES
Clinical Pharmacy Note  Warfarin Consult    Renetta Myers is a 78 y.o. female receiving warfarin managed by pharmacy. Warfarin Indication: Factor V Leiden mutation  Target INR range: 2-3   Dose prior to admission: 5 mg daily    Current warfarin drug-drug interactions: Amiodarone 400 mg BID  Drug- disease state interactions: Acute systolic heart failure     Recent Labs     07/22/21  1500 07/23/21  0527 07/23/21  1040 07/24/21  0540 07/25/21  0534   HGB  --  8.7*  --  8.4* 8.8*   HCT  --  26.3*  --  25.8* 26.7*   INR   < >  --  2.22* 4.30* 4.15*    < > = values in this interval not displayed. Assessment/Plan:    Warfarin ordered as 10 mg x 1 per Dr. Brandon Anthony PM 7/22/21  INR jumped from 1.3 -> 2.2 -> 4.3 and now 4.15 today, likely due to amiodarone interaction, acute CHF, and patient was supratherapeutic upon admission on 5 mg daily.     Continue to hold warfarin tonight.     Thank you for the consult. Will continue to follow.     Francisco Morse, GaetanoD, BCPS  7/25/2021  9:44 AM

## 2021-07-26 VITALS
OXYGEN SATURATION: 93 % | WEIGHT: 165.79 LBS | HEART RATE: 75 BPM | RESPIRATION RATE: 16 BRPM | TEMPERATURE: 97.9 F | DIASTOLIC BLOOD PRESSURE: 79 MMHG | SYSTOLIC BLOOD PRESSURE: 108 MMHG | HEIGHT: 59 IN | BODY MASS INDEX: 33.42 KG/M2

## 2021-07-26 LAB
A/G RATIO: 1.3 (ref 1.1–2.2)
ALBUMIN SERPL-MCNC: 2.8 G/DL (ref 3.4–5)
ALP BLD-CCNC: 63 U/L (ref 40–129)
ALT SERPL-CCNC: 15 U/L (ref 10–40)
ANION GAP SERPL CALCULATED.3IONS-SCNC: 6 MMOL/L (ref 3–16)
AST SERPL-CCNC: 22 U/L (ref 15–37)
BANDED NEUTROPHILS RELATIVE PERCENT: 1 % (ref 0–7)
BASE EXCESS VENOUS: 8.1 MMOL/L
BASOPHILIC STIPPLING: ABNORMAL
BASOPHILS ABSOLUTE: 0 K/UL (ref 0–0.2)
BASOPHILS ABSOLUTE: 0 K/UL (ref 0–0.2)
BASOPHILS ABSOLUTE: 0.4 K/UL (ref 0–0.2)
BASOPHILS RELATIVE PERCENT: 0 %
BASOPHILS RELATIVE PERCENT: 0 %
BASOPHILS RELATIVE PERCENT: 2 %
BILIRUB SERPL-MCNC: 0.6 MG/DL (ref 0–1)
BUN BLDV-MCNC: 10 MG/DL (ref 7–20)
CALCIUM SERPL-MCNC: 8.2 MG/DL (ref 8.3–10.6)
CARBOXYHEMOGLOBIN: 1.3 %
CHLORIDE BLD-SCNC: 107 MMOL/L (ref 99–110)
CO2: 32 MMOL/L (ref 21–32)
CREAT SERPL-MCNC: 0.9 MG/DL (ref 0.6–1.2)
EOSINOPHILS ABSOLUTE: 0.9 K/UL (ref 0–0.6)
EOSINOPHILS ABSOLUTE: 1.1 K/UL (ref 0–0.6)
EOSINOPHILS ABSOLUTE: 2 K/UL (ref 0–0.6)
EOSINOPHILS RELATIVE PERCENT: 4 %
EOSINOPHILS RELATIVE PERCENT: 5 %
EOSINOPHILS RELATIVE PERCENT: 9 %
GFR AFRICAN AMERICAN: >60
GFR NON-AFRICAN AMERICAN: 60
GLOBULIN: 2.1 G/DL
GLUCOSE BLD-MCNC: 107 MG/DL (ref 70–99)
HCO3 VENOUS: 34 MMOL/L (ref 23–29)
HCT VFR BLD CALC: 25.8 % (ref 36–48)
HCT VFR BLD CALC: 26.7 % (ref 36–48)
HCT VFR BLD CALC: 27.9 % (ref 36–48)
HEMATOLOGY PATH CONSULT: NO
HEMOGLOBIN: 8.4 G/DL (ref 12–16)
HEMOGLOBIN: 8.8 G/DL (ref 12–16)
HEMOGLOBIN: 9.3 G/DL (ref 12–16)
HOWELL-JOLLY BODIES: ABNORMAL
HOWELL-JOLLY BODIES: ABNORMAL
INR BLD: 3.9 (ref 0.88–1.12)
LYMPHOCYTES ABSOLUTE: 12.4 K/UL (ref 1–5.1)
LYMPHOCYTES ABSOLUTE: 6.4 K/UL (ref 1–5.1)
LYMPHOCYTES ABSOLUTE: 6.9 K/UL (ref 1–5.1)
LYMPHOCYTES RELATIVE PERCENT: 29 %
LYMPHOCYTES RELATIVE PERCENT: 30 %
LYMPHOCYTES RELATIVE PERCENT: 56 %
MACROCYTES: ABNORMAL
MACROCYTES: ABNORMAL
MCH RBC QN AUTO: 30.3 PG (ref 26–34)
MCH RBC QN AUTO: 30.6 PG (ref 26–34)
MCH RBC QN AUTO: 31.4 PG (ref 26–34)
MCHC RBC AUTO-ENTMCNC: 32.4 G/DL (ref 31–36)
MCHC RBC AUTO-ENTMCNC: 32.8 G/DL (ref 31–36)
MCHC RBC AUTO-ENTMCNC: 33.3 G/DL (ref 31–36)
MCV RBC AUTO: 93.3 FL (ref 80–100)
MCV RBC AUTO: 93.5 FL (ref 80–100)
MCV RBC AUTO: 94.1 FL (ref 80–100)
METAMYELOCYTES RELATIVE PERCENT: 1 %
METHEMOGLOBIN VENOUS: 0.5 %
MONOCYTES ABSOLUTE: 0.7 K/UL (ref 0–1.3)
MONOCYTES ABSOLUTE: 1.8 K/UL (ref 0–1.3)
MONOCYTES ABSOLUTE: 2 K/UL (ref 0–1.3)
MONOCYTES RELATIVE PERCENT: 3 %
MONOCYTES RELATIVE PERCENT: 8 %
MONOCYTES RELATIVE PERCENT: 9 %
NEUTROPHILS ABSOLUTE: 11.4 K/UL (ref 1.7–7.7)
NEUTROPHILS ABSOLUTE: 14.4 K/UL (ref 1.7–7.7)
NEUTROPHILS ABSOLUTE: 6.6 K/UL (ref 1.7–7.7)
NEUTROPHILS RELATIVE PERCENT: 30 %
NEUTROPHILS RELATIVE PERCENT: 52 %
NEUTROPHILS RELATIVE PERCENT: 61 %
NUCLEATED RED BLOOD CELLS: 3 /100 WBC
NUCLEATED RED BLOOD CELLS: 4 /100 WBC
O2 SAT, VEN: 64 %
O2 THERAPY: ABNORMAL
PCO2, VEN: 55.4 MMHG (ref 40–50)
PDW BLD-RTO: 14.1 % (ref 12.4–15.4)
PDW BLD-RTO: 14.2 % (ref 12.4–15.4)
PDW BLD-RTO: 14.4 % (ref 12.4–15.4)
PH VENOUS: 7.4 (ref 7.35–7.45)
PLATELET # BLD: 157 K/UL (ref 135–450)
PLATELET # BLD: 232 K/UL (ref 135–450)
PLATELET # BLD: 290 K/UL (ref 135–450)
PLATELET SLIDE REVIEW: ADEQUATE
PMV BLD AUTO: 9.4 FL (ref 5–10.5)
PMV BLD AUTO: 9.5 FL (ref 5–10.5)
PMV BLD AUTO: 9.6 FL (ref 5–10.5)
PO2, VEN: 34 MMHG
POLYCHROMASIA: ABNORMAL
POTASSIUM SERPL-SCNC: 3.4 MMOL/L (ref 3.5–5.1)
PROTHROMBIN TIME: 46.7 SEC (ref 9.9–12.7)
RBC # BLD: 2.76 M/UL (ref 4–5.2)
RBC # BLD: 2.86 M/UL (ref 4–5.2)
RBC # BLD: 2.96 M/UL (ref 4–5.2)
SLIDE REVIEW: ABNORMAL
SMUDGE CELLS: PRESENT
SODIUM BLD-SCNC: 145 MMOL/L (ref 136–145)
STOMATOCYTES: ABNORMAL
TCO2 CALC VENOUS: 36 MMOL/L
TOTAL PROTEIN: 4.9 G/DL (ref 6.4–8.2)
WBC # BLD: 22 K/UL (ref 4–11)
WBC # BLD: 22.1 K/UL (ref 4–11)
WBC # BLD: 22.9 K/UL (ref 4–11)

## 2021-07-26 PROCEDURE — 97116 GAIT TRAINING THERAPY: CPT

## 2021-07-26 PROCEDURE — 80053 COMPREHEN METABOLIC PANEL: CPT

## 2021-07-26 PROCEDURE — 6370000000 HC RX 637 (ALT 250 FOR IP): Performed by: INTERNAL MEDICINE

## 2021-07-26 PROCEDURE — 93308 TTE F-UP OR LMTD: CPT

## 2021-07-26 PROCEDURE — 85610 PROTHROMBIN TIME: CPT

## 2021-07-26 PROCEDURE — 93321 DOPPLER ECHO F-UP/LMTD STD: CPT

## 2021-07-26 PROCEDURE — 94761 N-INVAS EAR/PLS OXIMETRY MLT: CPT

## 2021-07-26 PROCEDURE — 82803 BLOOD GASES ANY COMBINATION: CPT

## 2021-07-26 PROCEDURE — 6360000002 HC RX W HCPCS: Performed by: INTERNAL MEDICINE

## 2021-07-26 PROCEDURE — 93325 DOPPLER ECHO COLOR FLOW MAPG: CPT

## 2021-07-26 PROCEDURE — 2700000000 HC OXYGEN THERAPY PER DAY

## 2021-07-26 PROCEDURE — 99238 HOSP IP/OBS DSCHRG MGMT 30/<: CPT | Performed by: INTERNAL MEDICINE

## 2021-07-26 PROCEDURE — 85025 COMPLETE CBC W/AUTO DIFF WBC: CPT

## 2021-07-26 PROCEDURE — 6360000004 HC RX CONTRAST MEDICATION

## 2021-07-26 PROCEDURE — 97530 THERAPEUTIC ACTIVITIES: CPT

## 2021-07-26 RX ORDER — AMIODARONE HYDROCHLORIDE 200 MG/1
200 TABLET ORAL DAILY
Qty: 90 TABLET | Refills: 3 | Status: SHIPPED | OUTPATIENT
Start: 2021-07-27 | End: 2022-08-26

## 2021-07-26 RX ORDER — MIDODRINE HYDROCHLORIDE 5 MG/1
5 TABLET ORAL 2 TIMES DAILY WITH MEALS
Qty: 90 TABLET | Refills: 3 | Status: SHIPPED | OUTPATIENT
Start: 2021-07-26 | End: 2021-07-26

## 2021-07-26 RX ORDER — MIDODRINE HYDROCHLORIDE 5 MG/1
TABLET ORAL
Qty: 180 TABLET | Refills: 1 | Status: SHIPPED | OUTPATIENT
Start: 2021-07-26 | End: 2021-08-18

## 2021-07-26 RX ADMIN — MILRINONE LACTATE IN DEXTROSE 0.1 MCG/KG/MIN: 200 INJECTION, SOLUTION INTRAVENOUS at 06:39

## 2021-07-26 RX ADMIN — AMIODARONE HYDROCHLORIDE 200 MG: 200 TABLET ORAL at 08:20

## 2021-07-26 RX ADMIN — METOPROLOL TARTRATE 25 MG: 25 TABLET, FILM COATED ORAL at 08:20

## 2021-07-26 RX ADMIN — SACUBITRIL AND VALSARTAN 1 TABLET: 24; 26 TABLET, FILM COATED ORAL at 08:20

## 2021-07-26 RX ADMIN — DOCUSATE SODIUM 100 MG: 100 CAPSULE ORAL at 08:20

## 2021-07-26 RX ADMIN — MIDODRINE HYDROCHLORIDE 5 MG: 5 TABLET ORAL at 08:20

## 2021-07-26 RX ADMIN — PERFLUTREN 1.65 MG: 6.52 INJECTION, SUSPENSION INTRAVENOUS at 14:55

## 2021-07-26 ASSESSMENT — PAIN SCALES - GENERAL: PAINLEVEL_OUTOF10: 0

## 2021-07-26 NOTE — TELEPHONE ENCOUNTER
Last OV: x  Next OV: 8/4/21  Last refill: x  Most recent Labs: cmp today  Last PT/INR (if needed):  Last EKG (if needed):

## 2021-07-26 NOTE — PROGRESS NOTES
Physical Therapy  Facility/Department: [facility]  Daily Treatment Note  NAME: Gordy Mustafa  : 1940  MRN: [id number]    Date of Service: 2021    Discharge Recommendations:  Continue to assess pending progress   PT Equipment Recommendations  Other: Russell Islas. Assessment   Body structures, Functions, Activity limitations: Decreased functional mobility ; Decreased endurance  Assessment: 77 y/o female admit 2021 with A-Fib wirh RVR, R/O STEMI, Takotsubo Cardiomyopathy. 2021 Cardiac Cath : Normal Coronary Arteries, Severe LV Systolic Dysfunction, Cardiogenic Shock; Impella placed (removed 2021). PMH as noted including CLL, COPD, Factor V Leinden Mutation, Post Herpatic Neuralgia, OA, B Partial Knee Replacement. PTA pt living with  in [address] [place] [address] with 1+1 step to enter and 1st floor bed/bath etc.  Pt independent daily care and functional mobility (without assist device) and is caregiver for . Pt has son who has been staying to assist; supportive family also nearby. Pt anticipates adequate assist/support upon d/c. Progressing well; pt hopeful for d/c home soon and reports that she is more comfortable with use of Walker. Prognosis: Good  Decision Making: Medium Complexity  History: 77 y/o female admit 2021 with A-Fib wirh RVR, R/O STEMI, Takotsubo Cardiomyopathy. 2021 Cardiac Cath : Normal Coronary Arteries, Severe LV Systolic Dysfunction, Cardiogenic Shock; Impella placed (removed 2021). PMH as noted including CLL, COPD, Factor V Leinden Mutation, Post Herpatic Neuralgia, OA, B Partial Knee Replacement. Exam: See above. Clinical Presentation: See above. Patient Education: Role of PT, POC, Need to call for assist, Safe use of Walker. Barriers to Learning: Teller.   REQUIRES PT FOLLOW UP: Yes  Activity Tolerance  Activity Tolerance: Patient Tolerated treatment well     Patient Diagnosis(es): The primary encounter diagnosis was ST elevation myocardial infarction (STEMI), unspecified artery ([facility] Diagnoses of Chest pain, unspecified type and Atrial fibrillation with rapid ventricular response ([facility] were also pertinent to this visit. has a past medical history of Chronic lymphocytic leukemia in remission ([facility] CLL (chronic lymphocytic leukemia) ([facility] COPD [facility]chronic obstructive [facility] disease) [facility] Essential hypertension, Factor V Leiden mutation ([facility] Goiter, Hypercholesterolemia, Impaired fasting glucose, Osteoarthritis, Osteoporosis, Post herpetic neuralgia, and Vitamin D deficiency. has a past surgical history that includes Splenectomy; knee surgery (2008); Cataract removal with implant (5/14/12); Tunneled venous port placement; Upper gastrointestinal endoscopy (N/A, 2/5/2019); Mohs surgery (03/12/2019); and joint replacement. Restrictions  Restrictions/Precautions  Restrictions/Precautions: Up as Tolerated  Position Activity Restriction  Other position/activity restrictions: O2 1L via NC. Subjective   General  Chart Reviewed: Yes  Additional Pertinent Hx: 77 y/o female admit 7/19/2021 with A-Fib wirh RVR, R/O STEMI, Takotsubo Cardiomyopathy. 7/19/2021 Cardiac Cath : Normal Coronary Arteries, Severe LV Systolic Dysfunction, Cardiogenic Shock; Impella placed (removed 7/22/2021). PMH as noted including CLL, COPD, Factor V Leinden Mutation, Post Herpatic Neuralgia, OA, B Partial Knee Replacement. Response To Previous Treatment: Patient with no complaints from previous session. Family / Caregiver Present: No  Referring Practitioner: Dr. Cherri Olsen  Subjective  Subjective: Pt agreeable to PT Rx. Feeling better, hopeful to go home soon. Pt reports more comfortable with use of Walker at this time. Orientation  Orientation  Overall Orientation Status: Within Functional Limits  Cognition   Cognition  Overall Cognitive Status: WFL  Objective   Bed mobility  Supine to Sit: Stand by assistance (HOB elevated.    Use of Bedrail.)  Transfers  Sit to Stand: Stand by assistance (With Jeananne Garre. Initial cues for safe hand placement.)  Stand to sit: Stand by assistance (With Jeananne Garre. Initial cues for safe hand placement.)  Comment: Toilet Transfer SBA. Independent pericare. Ambulation  Ambulation?: Yes  Ambulation 1  Distance: Pt amb 150' x 2 with Walker SBA. No LE buckling/giving way. Guarded  alittle although improving as distance progressed. No LOB noted. AM-PAC Score  AM-PAC Inpatient Mobility Raw Score : 20 (07/26/21 0707)  AM-PAC Inpatient T-Scale Score : 47.67 (07/26/21 0707)  Mobility Inpatient CMS 0-100% Score: 35.83 (07/26/21 0707)  Mobility Inpatient CMS G-Code Modifier : CJ (07/26/21 9778)          Goals  Short term goals  Time Frame for Short term goals: Upon d/c acute care setting. Short term goal 1: Bed Mob Independent. Short term goal 2: Transfers with/without assist device Supervision. Short term goal 3: Amb with/without assist device 100' SBA/Supervision. Patient Goals   Patient goals : Return home with . Plan    Plan  Times per week: 3-5x week while in acute care setting.   Current Treatment Recommendations: Strengthening, Functional Mobility Training, Transfer Training, Gait Training, Safety Education & Training, Patient/Caregiver Education & Training  Safety Devices  Type of devices: Call light within reach, Left in chair, Nurse notified     Therapy Time   Individual Concurrent Group Co-treatment   Time In 0630         Time Out [address], PT

## 2021-07-26 NOTE — PROGRESS NOTES
admission       Nutrition Monitoring and Evaluation:   Food/Nutrient Intake Outcomes:  Food and Nutrient Intake, Supplement Intake  Physical Signs/Symptoms Outcomes:  Biochemical Data, Weight, Skin, Nutrition Focused Physical Findings     Discharge Planning:     Too soon to determine     Electronically signed by Navjot Gutierrez RD, LD on 7/26/21 at 1:21 PM EDT    Contact: [phone]

## 2021-07-26 NOTE — PROGRESS NOTES
Clinical Pharmacy Note  Warfarin Consult    Lana Bailey is a 78 y.o. female receiving warfarin managed by pharmacy. Warfarin Indication: Factor V Leiden mutation  Target INR range: 2-3   Dose prior to admission: 5 mg daily    Current warfarin drug-drug interactions: Amiodarone 400 mg BID - decreased to 200 mg daily on 07/26/21  Drug- disease state interactions: Acute systolic heart failure     Recent Labs     07/24/21  0540 07/25/21  0534 07/26/21  0510   HGB 8.4* 8.8* 9.3*   HCT 25.8* 26.7* 27.9*   INR 4.30* 4.15* 3.90*       Assessment/Plan:    Warfarin ordered as 10 mg x 1 per Dr. Norma Lamb PM 7/22/21  INR jumped from 1.3 -> 2.2 -> 4.3->4.15 and now 3.9 today, likely due to amiodarone interaction, acute CHF, and patient was supratherapeutic upon admission on 5 mg daily.     Continue to hold warfarin tonight.     Thank you for the consult. Will continue to follow.     Elizabeth Bloom, PharmD.  7/26/2021  1:05 PM

## 2021-07-26 NOTE — PROGRESS NOTES
1645: Discharge instructions reviewed with patient and family member. Patient and family verbalized understanding. All home medications have been reviewed, questions answered and patient voiced understanding. All medication side effects reviewed and patient and family verbalized understanding. Follow up appointment(s) reviewed with patient and all attempts made to schedule within 7-10 days of discharge. Patient given prescriptions, discharge instructions, and appointment times. Patient discharged to home with family via private car. Taken to lobby via wheelchair.       Electronically signed by Diamante Pinon RN on 7/26/2021 at 4:59 PM

## 2021-07-26 NOTE — CARE COORDINATION
[employer] rep delivered requested 2-Wheeled Walker Gokul to patient and reviewed insurance coverage and equipment set up with patient. Notified RN's at desk.     Thank you for the referral.  Electronically signed by Joyceann Lanes on 7/26/2021 at 3:50 PM Cell ph# [phone]

## 2021-07-26 NOTE — PROGRESS NOTES
Hematology Oncology Daily Progress Note    Admit Date: 7/19/2021  Hospital day several    Subjective:     Patient has complaints of improved BRUNER/SOB--denies CP. Medication side effects: none    Scheduled Meds:   amiodarone  200 mg Oral Daily    midodrine  5 mg Oral BID WC    sodium chloride flush  5-40 mL Intravenous 2 times per day    warfarin (COUMADIN) daily dosing (placeholder)   Other RX Placeholder    sodium chloride  250 mL Intravenous Once    sacubitril-valsartan  1 tablet Oral BID    sodium chloride  500 mL Intravenous Once    docusate sodium  100 mg Oral Daily    senna  1 tablet Oral Nightly    metoprolol tartrate  25 mg Oral BID     Continuous Infusions:   milrinone Stopped (07/26/21 0744)    sodium chloride       PRN Meds:midodrine, perflutren lipid microspheres, sodium chloride flush, sodium chloride, perflutren lipid microspheres, acetaminophen, ondansetron    Review of Systems  Pertinent items are noted in HPI. REVIEW OF SYSTEMS:         · Constitutional: Denies fever, sweats, weight loss     · Eyes: No visual changes or diplopia. No scleral icterus. · ENT: No Headaches, hearing loss or vertigo. No mouth sores or sore throat. · Cardiovascular: No chest pain, dyspnea on exertion, palpitations or loss of consciousness. · Respiratory: No cough or wheezing, no sputum production. No hemoptysis. .    · Gastrointestinal: No abdominal pain, appetite loss, blood in stools. No change in bowel habits. · Genitourinary: No dysuria, trouble voiding, or hematuria. · Musculoskeletal:  Generalized weakness. No joint complaints. · Integumentary: No rash or pruritis. · Neurological: No headache, diplopia. No change in gait, balance, or coordination. No paresthesias. · Endocrine: No temperature intolerance. No excessive thirst, fluid intake, or urination. · Hematologic/Lymphatic: No abnormal bruising or ecchymoses, blood clots or swollen lymph nodes.   · Allergic/Immunologic: No nasal congestion or hives. ·     Objective:     Patient Vitals for the past 8 hrs:   BP Pulse SpO2   07/26/21 0410 103/62 67 92 %   07/26/21 0022 (!) 114/50 67 95 %     I/O last 3 completed shifts: In: 480 [P.O.:480]  Out: 1725 [Urine:1725]  No intake/output data recorded. /62   Pulse 67   Temp 97.8 °F (36.6 °C) (Oral)   Resp 18   Ht 4' 11\" (1.499 m)   Wt 165 lb 12.6 oz (75.2 kg)   SpO2 92%   BMI 33.48 kg/m²     General Appearance:    Alert, cooperative, no distress, appears stated age   Head:    Normocephalic, without obvious abnormality, atraumatic   Eyes:    PERRL, conjunctiva/corneas clear, EOM's intact, fundi     benign, both eyes        Ears:    Normal TM's and external ear canals, both ears   Nose:   Nares normal, septum midline, mucosa normal, no drainage    or sinus tenderness   Throat:   Lips, mucosa, and tongue normal; teeth and gums normal   Neck:   Supple, symmetrical, trachea midline, no adenopathy;        thyroid:  No enlargement/tenderness/nodules; no carotid    bruit or JVD   Back:     Symmetric, no curvature, ROM normal, no CVA tenderness   Lungs:     Clear to auscultation bilaterally, respirations unlabored   Chest wall:    No tenderness or deformity   Heart:    Regular rate and rhythm, S1 and S2 normal, no murmur, rub   or gallop   Abdomen:     Soft, non-tender, bowel sounds active all four quadrants,     no masses, no organomegaly           Extremities:   Extremities normal, atraumatic, no cyanosis or edema   Pulses:   2+ and symmetric all extremities   Skin:   Skin color, texture, turgor normal, no rashes or lesions   Lymph nodes:   Cervical, supraclavicular, and axillary nodes normal   Neurologic:   CNII-XII intact.  Normal strength, sensation and reflexes       throughout         Data Review  CBC:   Lab Results   Component Value Date    WBC 22.1 07/26/2021    RBC 2.96 07/26/2021    RBC 4.62 06/23/2017       Assessment:     Active Problems:    Takotsubo cardiomyopathy    Shock, cardiogenic [facility])  Resolved Problems:    * No resolved hospital problems. *      Plan:     1.  CLL. Her white blood cell count is stable and close to her baseline. Her hemoglobin did drop during this admission which is likely due to blood draws and various procedures. It is stable. I do not suspect suspect hemolysis. 2.  Cardiogenic shock.   I will defer to the cardiologist.        Electronically signed by Aniya Alberto MD on 7/26/2021 at 8:09 AM

## 2021-07-26 NOTE — CARE COORDINATION
Dc ORDER RECEIVED. Spoke with bedside RN who reports her ride will come later today around dinner time. She needs a wh walker. Call placed to [facility] for wh walker. Rn to put in the order for it.   [place], [place]     Case Management   [phone]    7/26/2021  3:00 PM

## 2021-07-27 ENCOUNTER — CARE COORDINATION (OUTPATIENT)
Dept: CASE MANAGEMENT | Age: 81
End: 2021-07-27

## 2021-07-27 NOTE — CARE COORDINATION
Patrick 45 Transitions Initial Follow Up Call    Call within 2 business days of discharge: Yes    Patient: Lizeth John Patient : 1940   MRN: <[id number]>  Reason for Admission: STEMI  Discharge Date: 21 RARS: Readmission Risk Score: 19      Last Discharge [address]       Complaint Diagnosis Description Type Department Provider    21 Shortness of Breath; Dizziness ST elevation myocardial infarction (STEMI), unspecified artery ([address] . .. ED to Hosp-Admission (Discharged) (ADMITTED) Bibi Hanley MD; Brendon Cruz,... Spoke with: NA    Facility: [facility]    Attempted to reach patient via phone for initial post hospital transition call. VM left stating purpose of call along with my contact information requesting a return call.    Sanjana Luna LPN [address] [place] [address]  [phone]      Care Transitions 24 Hour Call    Were you discharged with any Home Care or Post Acute Services: No  Care Transitions Interventions         Follow Up  Future Appointments   Date Time Provider Buck Soares   2021  1:00 PM [address]   2021  3:20 PM Pura Lujan APRN - CNP [facility]   2021 10:00 AM [place] [facility]   10/20/2021  1:40 PM Latanya Louis MD [place] [facility]   2021  9:00 AM Betsy Bales MD [facility]       Sanjana Luna LPN

## 2021-07-28 ENCOUNTER — ANTI-COAG VISIT (OUTPATIENT)
Dept: PHARMACY | Age: 81
End: 2021-07-28
Payer: MEDICARE

## 2021-07-28 ENCOUNTER — CARE COORDINATION (OUTPATIENT)
Dept: CASE MANAGEMENT | Age: 81
End: 2021-07-28

## 2021-07-28 DIAGNOSIS — R79.1 ABNORMAL COAGULATION PROFILE: Primary | ICD-10-CM

## 2021-07-28 DIAGNOSIS — R57.0 SHOCK, CARDIOGENIC (HCC): Primary | ICD-10-CM

## 2021-07-28 LAB — INTERNATIONAL NORMALIZATION RATIO, POC: 2.3

## 2021-07-28 PROCEDURE — 1111F DSCHRG MED/CURRENT MED MERGE: CPT | Performed by: FAMILY MEDICINE

## 2021-07-28 NOTE — CARE COORDINATION
Patrick 45 Transitions Initial Follow Up Call    Call within 2 business days of discharge: Yes    Patient: Moody Calvillo Patient : 1940   MRN: <[id number]>  Reason for Admission: STEMI  Discharge Date: 21 RARS: Readmission Risk Score: 19      Last Discharge [address]       Complaint Diagnosis Description Type Department Provider    21 Shortness of Breath; Dizziness ST elevation myocardial infarction (STEMI), unspecified artery ([address] . .. ED to Hosp-Admission (Discharged) (ADMITTED) Pam Quintanilla MD; Bam Lancaster... Spoke with: [address] [facility]    Non-face-to-face services provided:  Obtained and reviewed discharge summary and/or continuity of care documents     Transitions of Care Initial Call    Was this an external facility discharge? No Discharge Facility: NA     Challenges to be reviewed by the provider   Additional needs identified to be addressed with provider: No  none             Method of communication with provider : phone      Advance Care Planning:   Does patient have an Advance Directive: reviewed and current. Advance Care Planning   Healthcare Decision Maker:    Primary Decision Maker: Gurmeet Quinn Child - [phone]    Was this a readmission? No  Patient stated reason for admission: SOB, dizziness  Patients top risk factors for readmission: medical condition-STEMI    Care Transition Nurse (CTN) contacted the patient by telephone to perform post hospital discharge assessment. Verified name and  with patient as identifiers. Provided introduction to self, and explanation of the CTN role. CTN reviewed discharge instructions, medical action plan and red flags with patient who verbalized understanding. Patient given an opportunity to ask questions and does not have any further questions or concerns at this time. Were discharge instructions available to patient? Yes.  Reviewed appropriate site of care based on symptoms and resources available to patient including: PCP and Specialist. The patient agrees to contact the PCP office for questions related to their healthcare. Medication reconciliation was performed with patient, who verbalizes understanding of administration of home medications. Advised obtaining a 90-day supply of all daily and as-needed medications. Covid Risk Education  Completed Pfizer 21     Educated patient about risk for severe COVID-19 due to risk factors according to [facility] guidelines. LPN CC reviewed discharge instructions, medical action plan and red flag symptoms with the patient who verbalized understanding. Discussed COVID vaccination status: Yes. Education provided on COVID-19 vaccination as appropriate. Discussed exposure protocols and quarantine with CDC Guidelines. Patient was given an opportunity to verbalize any questions and concerns and agrees to contact LPN CC or health care provider for questions related to their healthcare. Reviewed and educated patient on any new and changed medications related to discharge diagnosis. Was patient discharged with a pulse oximeter? No Discussed and confirmed pulse oximeter discharge instructions and when to notify provider or seek emergency care. Patient verified  and was pleasant and agreeable to transition calls. States she is much better. Denies CP, SOB, dizziness, N/V/D. Very complimentary of staff and care at [facility]. Appetite good. Ambulating with and without wheeled walker. Activity level improving. Scheduled for bloodwork today d/t Factor V Leiden mutation. Cardiology . Full medication reconciliation and 1111f completed. Denies any acute needs at present time. Agreeable to f/u calls. Educated on the use of urgent care or physicians 24 hr access line if assistance is needed after hours. LPN CC provided contact information. Plan for follow-up call in 5-7 days based on severity of symptoms and risk factors.   N[place]hi

## 2021-07-28 NOTE — PROGRESS NOTES
Ms. Gordy Mustafa is a 78 y.o.  female. Ms. Gordy Mustafa had an INR test today. Results were reviewed and appropriate warfarin management was completed. This visit was performed as: An in person visit. Protocols were followed with precautions to reduce the spread of COVID-19. Patient verifies current dosing regimen: Yes     Warfarin medication reviewed and updated on the patient 's home medication list: Yes   All other medications reviewed and updated on the patient 's home medication list: No: no changes. Lab Results   Component Value Date    INR 2.3 2021    INR 3.90 (H) 2021    INR 4.15 (H) 2021       Patient Findings     Positives:  Missed doses, Change in medications, Change in diet/appetite, Hospital admission, Bruising    Negatives:  Signs/symptoms of bleeding    Comments:  Ad[facility] - for Heart Failure and Atrial Fibrillation  Bruising from IVs as well as redness from IV-- getting better  Started Entresto and amiodarone. Significant interaction with amiodarone and warfarin. Received vitamin K on  during her stay prior to a procedure. No appetite, nauseous          Anticoagulation Summary  As of 2021    INR goal:  2.0-3.0   TTR:  54.2 % (10 y)   INR used for dosin.3 (2021)   Warfarin maintenance plan:  2.5 mg (5 mg x 0.5) every day   Weekly warfarin total:  17.5 mg   Plan last modified:  [place], [facility] (2021)   Next INR check:  2021   Priority:  Maintenance   Target end date:   Indefinite    Indications    Abnormal coagulation profile [R79.1]             Anticoagulation Episode Summary     INR check location:      Preferred lab:      Send INR reminders to:  [facility]    Comments:  EPIC      Factor V Leiden       Anticoagulation Care Providers     Provider Role Specialty Phone number    Morgan Beard MD Referring Family Medicine [phone]          Warfarin assessment / plan:     Appears Pneumococcal Polysaccharide ([id number]) 07/01/2006, 11/07/2011    Td, unspecified formulation 10/04/2004    Tdap (Boostrix, Adacel) 09/01/2015         Reviewed AVS with patient / caregiver.       CLINICAL PHARMACY CONSULT: MED RECONCILIATION/REVIEW ADDENDUM    For Pharmacy Admin Tracking Only     Intervention Detail: Dose Adjustment: 2, reason: Therapy Optimization   Total # of Interventions Recommended: 2   Total # of Interventions Accepted: 2   Time Spent (min): 20     Seen by Yessenia Bunn, Student P4

## 2021-07-29 PROCEDURE — 99211 OFF/OP EST MAY X REQ PHY/QHP: CPT

## 2021-07-29 PROCEDURE — 85610 PROTHROMBIN TIME: CPT

## 2021-07-30 ENCOUNTER — ANTI-COAG VISIT (OUTPATIENT)
Dept: PHARMACY | Age: 81
End: 2021-07-30
Payer: MEDICARE

## 2021-07-30 VITALS
SYSTOLIC BLOOD PRESSURE: 121 MMHG | WEIGHT: 166.9 LBS | DIASTOLIC BLOOD PRESSURE: 88 MMHG | BODY MASS INDEX: 33.71 KG/M2 | HEART RATE: 75 BPM | OXYGEN SATURATION: 93 %

## 2021-07-30 DIAGNOSIS — R79.1 ABNORMAL COAGULATION PROFILE: Primary | ICD-10-CM

## 2021-07-30 LAB — INTERNATIONAL NORMALIZATION RATIO, POC: 2.1

## 2021-07-30 PROCEDURE — 99211 OFF/OP EST MAY X REQ PHY/QHP: CPT

## 2021-07-30 PROCEDURE — 85610 PROTHROMBIN TIME: CPT

## 2021-07-30 NOTE — PROGRESS NOTES
pounds. She reports her weight has been up and down. She does not take a diuretic. However, she is taking Entresto. She denies any SOB with lying down and normally sleeps well. However, she was constipated and took some stool softeners and was up most of the night last night trying to go to the bathroom which was straining her body. She did finally have a BM. She denies any SOB at rest.     Her BP, HR and O2 are normal  Although O2 is on the low end of range. It teetered between 90-93. She felt better knowing her vitals and reports that she was told some of her SOB was from being anxious. I advised to call [facility] should her weight increase and if her symptoms should worsen or continue to be bothersome. She sees them 8/4/21. She reports getting back to a normal diet and she is getting help for her handicapped  and her children ordered a cleaning lady. We decreased her warfarin dose at her last visit due to starting amiodarone. I will continue her at 2.5mg daily. I expect her INR will increase. Description    Warfarin 2.5 mg (half a tablet) DAILY     Keep your green vegetable intake consistent (3-4 salads per week). Please call if this changes. Call [phone] with signs or symptoms of bleeding or ANY medication changes (including over-the-counter medications or herbal supplements). If significant bleeding occurs please seek immediate medical attention. Limit alcohol intake. Please call if this changes.           Immunization History   Administered Date(s) Administered    COVID-19, Agee Peter, PF, 30mcg/0.3mL 01/29/2021, 02/19/2021    Influenza Virus Vaccine 10/16/2009, 01/26/2010, 09/29/2010, 10/03/2016    Influenza, High Dose (Fluzone 65 yrs and older) 09/27/2011, 10/01/2012, 10/29/2013, 10/23/2014, 11/24/2015, 10/23/2018, 12/21/2019    Influenza, Quadv, IM, PF (6 mo and older Fluzone, Flulaval, Fluarix, and 3 yrs and older Afluria) 12/22/2020    Pneumococcal Conjugate 13-valent Lulú Causey) 07/02/2015    Pneumococcal Polysaccharide ([id number]) 07/01/2006, 11/07/2011    Td, unspecified formulation 10/04/2004    Tdap (Boostrix, Adacel) 09/01/2015         Reviewed AVS with patient / caregiver.       CLINICAL PHARMACY CONSULT: MED RECONCILIATION/REVIEW ADDENDUM    For Pharmacy Admin Tracking Only     Intervention Detail:    Total # of Interventions Recommended: 0   Total # of Interventions Accepted: 0   Time Spent (min): 20

## 2021-08-02 RX ORDER — TORSEMIDE 20 MG/1
TABLET ORAL
Qty: 90 TABLET | OUTPATIENT
Start: 2021-08-02

## 2021-08-04 ENCOUNTER — CARE COORDINATION (OUTPATIENT)
Dept: CASE MANAGEMENT | Age: 81
End: 2021-08-04

## 2021-08-04 ENCOUNTER — TELEPHONE (OUTPATIENT)
Dept: CARDIOLOGY CLINIC | Age: 81
End: 2021-08-04

## 2021-08-04 ENCOUNTER — OFFICE VISIT (OUTPATIENT)
Dept: CARDIOLOGY CLINIC | Age: 81
End: 2021-08-04
Payer: MEDICARE

## 2021-08-04 ENCOUNTER — ANTI-COAG VISIT (OUTPATIENT)
Dept: PHARMACY | Age: 81
End: 2021-08-04
Payer: MEDICARE

## 2021-08-04 VITALS
OXYGEN SATURATION: 95 % | SYSTOLIC BLOOD PRESSURE: 114 MMHG | WEIGHT: 159 LBS | BODY MASS INDEX: 32.05 KG/M2 | HEIGHT: 59 IN | HEART RATE: 54 BPM | DIASTOLIC BLOOD PRESSURE: 62 MMHG

## 2021-08-04 DIAGNOSIS — I50.21 ACUTE SYSTOLIC HEART FAILURE (HCC): ICD-10-CM

## 2021-08-04 DIAGNOSIS — I50.21 ACUTE SYSTOLIC HEART FAILURE (HCC): Primary | ICD-10-CM

## 2021-08-04 DIAGNOSIS — R06.02 SOB (SHORTNESS OF BREATH) ON EXERTION: ICD-10-CM

## 2021-08-04 DIAGNOSIS — R06.02 SOB (SHORTNESS OF BREATH) ON EXERTION: Primary | ICD-10-CM

## 2021-08-04 DIAGNOSIS — R79.1 ABNORMAL COAGULATION PROFILE: Primary | ICD-10-CM

## 2021-08-04 LAB
ANION GAP SERPL CALCULATED.3IONS-SCNC: 11 MMOL/L (ref 3–16)
BUN BLDV-MCNC: 13 MG/DL (ref 7–20)
CALCIUM SERPL-MCNC: 8.5 MG/DL (ref 8.3–10.6)
CHLORIDE BLD-SCNC: 103 MMOL/L (ref 99–110)
CO2: 29 MMOL/L (ref 21–32)
CREAT SERPL-MCNC: 1.6 MG/DL (ref 0.6–1.2)
GFR AFRICAN AMERICAN: 37
GFR NON-AFRICAN AMERICAN: 31
GLUCOSE BLD-MCNC: 111 MG/DL (ref 70–99)
INTERNATIONAL NORMALIZATION RATIO, POC: 2.1
MAGNESIUM: 2.3 MG/DL (ref 1.8–2.4)
POTASSIUM SERPL-SCNC: 4.8 MMOL/L (ref 3.5–5.1)
PRO-BNP: 9250 PG/ML (ref 0–449)
SODIUM BLD-SCNC: 143 MMOL/L (ref 136–145)

## 2021-08-04 PROCEDURE — 85610 PROTHROMBIN TIME: CPT

## 2021-08-04 PROCEDURE — G8400 PT W/DXA NO RESULTS DOC: HCPCS | Performed by: NURSE PRACTITIONER

## 2021-08-04 PROCEDURE — 93000 ELECTROCARDIOGRAM COMPLETE: CPT | Performed by: NURSE PRACTITIONER

## 2021-08-04 PROCEDURE — 99214 OFFICE O/P EST MOD 30 MIN: CPT | Performed by: NURSE PRACTITIONER

## 2021-08-04 PROCEDURE — 1090F PRES/ABSN URINE INCON ASSESS: CPT | Performed by: NURSE PRACTITIONER

## 2021-08-04 PROCEDURE — 4040F PNEUMOC VAC/ADMIN/RCVD: CPT | Performed by: NURSE PRACTITIONER

## 2021-08-04 PROCEDURE — 1111F DSCHRG MED/CURRENT MED MERGE: CPT | Performed by: NURSE PRACTITIONER

## 2021-08-04 PROCEDURE — 1036F TOBACCO NON-USER: CPT | Performed by: NURSE PRACTITIONER

## 2021-08-04 PROCEDURE — 99211 OFF/OP EST MAY X REQ PHY/QHP: CPT

## 2021-08-04 PROCEDURE — G8427 DOCREV CUR MEDS BY ELIG CLIN: HCPCS | Performed by: NURSE PRACTITIONER

## 2021-08-04 PROCEDURE — 1123F ACP DISCUSS/DSCN MKR DOCD: CPT | Performed by: NURSE PRACTITIONER

## 2021-08-04 PROCEDURE — G8417 CALC BMI ABV UP PARAM F/U: HCPCS | Performed by: NURSE PRACTITIONER

## 2021-08-04 RX ORDER — TORSEMIDE 20 MG/1
20 TABLET ORAL DAILY
COMMUNITY
Start: 2021-07-31 | End: 2021-08-18 | Stop reason: SDUPTHER

## 2021-08-04 NOTE — PATIENT INSTRUCTIONS
Instructions:   1. Medications: continue current medications  2. Labs: BNP, BMP, troponin  3. Follow up: 2 weeks   4. Daily weight: Call for increase 3 lbs/day or 5 lbs/week. 5. 2 gm sodium diet:  6. Fluid Restriction: 64 oz.

## 2021-08-04 NOTE — PROGRESS NOTES
The [address] [place] [address], [address] [place] [address] [address] [address]  [phone]    PrimaryCare Doctor:  Joshua Mcgowan MD  Primary Cardiologist: Dr Nelson Torres   Patient presents with    Follow-Up from Hospital     SOB on exertion    Edema     feet, ankles, calves    Other     INR managed by [facility]         History of Present Illness: Kelley Huitron is a 78 y.o. female with PMH SHF, NICM, PAF (warfarin), BRENNAN, CLL, factor V mutation. Former smoker. Patient was admitted to [facility] 7/19-7/26/2021 with CP. EKG indicated inferoanterolateral STEMI. Went for emergent LHC>patent coronaries. Found acute systolic heart failure, EF initially <10%. Unclear etiology. Required impella support and inotropes. Started on GDMT and midodrine for low BP. Hem/Onc consulted with possible concern for hx chemo R/T CM and noted that it was unlikely. DC wt 166lbs. Patient presents to [facility] cardiology for follow up for heart failure. Daily wt 166>161 lbs.   + SOB with exertion. She is taking torsemide intermittnently. Last took 2 days ago. SOB does improve with taking torsemide. SOB associated with fatigue. SOB and fatigue improve with rest. Denies any CP with exertion or at rest.     She is following 2gm Na diet and 64 fl oz restriction. Denies orthopnea - she lays flat to sleep at night. Denies palpitations, LH, dizziness, syncope. Review of Systems:   General: Denies fever, chills  Skin: Denies skin changes, rash, itching, lesions.   HEENT: Denies headache, dizziness, vision changes, nosebleeds, sore throat, nasal drainage  RESP: Denies cough, sputum,wheeze , snoring  CARD: Denies palpitations,  murmur  GI:Denies nausea, vomiting, heartburn, loss of appetite, change in bowels  : Denies frequency, pain, incontinence, polyuria  VASC: Denies claudication, leg cramps, clots  MUSC/SKEL: Denies pain, stiffness, arthritis  PSYCH: Denies anxiety, depression, stress  NEURO: Denies numbness, tingling, weakness,change in mood or memory  HEME: Denies abn bruising, bleeding, anemia  ENDO: Denies intolerance to heat, cold, excessive thirst or hunger, hx thyroid disease    /62   Pulse 54   Ht 4' 11\" (1.499 m)   Wt 159 lb (72.1 kg) Comment: with shoes  SpO2 95%   BMI 32.11 kg/m²   Wt Readings from Last 3 Encounters:   08/04/21 159 lb (72.1 kg)   07/30/21 166 lb 14.4 oz (75.7 kg)   07/25/21 165 lb 12.6 oz (75.2 kg)     Physical Exam:  GEN: Appears fatigued, no acute distress  SKIN: Pink, warm, dry. Nails without clubbing. HEENT: PERRLA. Normocephalic, atraumatic. Neck supple. No adenopathy. LUNG: AP diameter normal. Diminished bilateral bases. Clear bilateral. No wheeze, rales, or ronchi. Respiratory effort increased with activity. HEART: S1S2 A/R. No JVD. No carotid bruit. No murmur, rub or gallop. ABD: Soft, nontender. +BS X 4 quads. No hepatomegaly. EXT: Radial and pedal pulses 2+ and symmetric. Without varicosities. Trace pedal/ankle edema. MUSCSKEL: Good ROM X4 extremities. No deformity. NEURO: A/O X3. Calm and cooperative. Past Medical History:   has a past medical history of Chronic lymphocytic leukemia in remission ([facility] [address] CLL (chronic lymphocytic leukemia) ([facility] [address] COPD (chronic obstructive pulmonary disease) ([facility] [address] Essential hypertension, Factor V Leiden mutation ([facility] [address] Goiter, Hypercholesterolemia, Impaired fasting glucose, Osteoarthritis, Osteoporosis, Post herpetic neuralgia, and Vitamin D deficiency. Surgical History:   has a past surgical history that includes Splenectomy; knee surgery (2008); Cataract removal with implant (5/14/12); Tunneled venous port placement; Upper gastrointestinal endoscopy (N/A, 2/5/2019); Mohs surgery (03/12/2019); and joint replacement. Social History:   reports that she quit smoking about 25 years ago. Her smoking use included cigarettes. She has a 15.00 pack-year smoking history.  She has never used smokeless tobacco. She reports that she does not drink alcohol. Family History:   Family History   Problem Relation Age of Onset    Diabetes Father     Stroke Sister 50         of a stroke       HomeMedications:  Prior to Admission medications    Medication Sig Start Date End Date Taking? Authorizing Provider   torsemide (DEMADEX) 20 MG tablet Take 20 mg by mouth daily as needed 21  Yes Historical Provider, MD   sacubitril-valsartan (ENTRESTO) 24-26 MG per tablet Take 1 tablet by mouth 2 times daily 21  Yes Maribell Nelson MD   amiodarone (CORDARONE) 200 MG tablet Take 1 tablet by mouth daily 21  Yes Maribell Nelson MD   midodrine (PROAMATINE) 5 MG tablet TAKE 1 TABLET BY MOUTH TWICE DAILY WITH MEALS 21  Yes Maribell Nelson MD   metoprolol tartrate (LOPRESSOR) 25 MG tablet TAKE 1 TABLET BY MOUTH TWICE DAILY 21  Yes Maribell Nelson MD   warfarin (COUMADIN) 5 MG tablet TAKE 1 1/2 TABLETS BY MOUTH MONDAY AND FRIDAY, THEN 1 TABLET BY MOUTH DAILY ON THE OTHER DAYS  Patient taking differently: Take 2.5 mg by mouth daily EXCEPT 5 mg (one tablet) on  or as directed by [facility] [phone] 3/8/21  Yes Evan Baker MD   gabapentin (NEURONTIN) 300 MG capsule TAKE 1 TO 2 CAPSULES BY MOUTH EVERY NIGHT AS DIRECTED 3/1/21 8/4/21 Yes Telma Chavis APRN - CNP   Cyanocobalamin (VITAMIN B 12) 500 MCG TABS Take 1 tablet by mouth daily   Yes Historical Provider, MD        Allergies:  Patient has no known allergies. LABS: Results reviewed with patient today.     CBC:   Lab Results   Component Value Date    WBC 22.1 2021    WBC 22.9 2021    WBC 22.0 2021    RBC 2.96 2021    RBC 2.86 2021    RBC 2.76 2021    RBC 4.62 2017    RBC 4.70 2017    RBC 4.97 2016    HGB 9.3 2021    HGB 8.8 2021    HGB 8.4 2021    HCT 27.9 2021    HCT 26.7 2021    HCT 25.8 2021    MCV 94.1 2021    MCV 93.3 07/25/2021    MCV 93.5 07/24/2021    RDW 14.4 07/26/2021    RDW 14.1 07/25/2021    RDW 14.2 07/24/2021     07/26/2021     07/25/2021     07/24/2021     BMP:  Lab Results   Component Value Date     07/26/2021     07/25/2021     07/24/2021    K 3.4 07/26/2021    K 3.0 07/25/2021    K 3.2 07/24/2021    K 4.3 07/19/2021    K 4.9 02/05/2019     07/26/2021     07/25/2021     07/24/2021    CO2 32 07/26/2021    CO2 29 07/25/2021    CO2 29 07/24/2021    PHOS 3.1 07/20/2021    BUN 10 07/26/2021    BUN 12 07/25/2021    BUN 15 07/24/2021    CREATININE 0.9 07/26/2021    CREATININE 0.9 07/25/2021    CREATININE 1.0 07/24/2021     BNP:   Lab Results   Component Value Date    PROBNP 25,962 07/19/2021       Parameters:   > 450 pg/mL under age 48  > 900 pg/mL ages 54-65  > 1800 pg/mL over age 76    Iron Studies:  No results found for: TIBC, FERRITIN  GLUCOSE: No results for input(s): GLUCOSE in the last 72 hours. LIVER PROFILE:   Lab Results   Component Value Date    AST 22 07/26/2021    ALT 15 07/26/2021    LABALBU 2.8 07/26/2021    BILITOT 0.6 07/26/2021    ALKPHOS 63 07/26/2021     PT/INR:   Lab Results   Component Value Date    PROTIME 46.7 07/26/2021    INR 2.1 08/04/2021    INR 3.90 07/26/2021     Cardiac Enzymes:  Lab Results   Component Value Date    TROPONINI 2.22 07/19/2021     FASTING LIPID PANEL:  Lab Results   Component Value Date    CHOL 259 04/20/2021    HDL 55 04/20/2021    HDL 62 11/08/2011    LDLCALC 178 04/20/2021    TRIG 128 04/20/2021       Cardiac Imaging: Reports reviewed with patient today. EKG: Anterlateral and inferior infarct  Today, continues with inverted T waves anterolateral and inferior leads. ECHO:     Summary 7/19/2021  Impella well seated LV measuring 3.2cm from inlet to AV. Ejection fraction is visually estimated to be 15-20%. Regional wall motion abnormalities are noted.     Summary 7/20/2021  Impella device well seated in LV and Aorta measuring 3.1cm  Ejection fraction is visually estimated to be <10%. , Severe hypokinesis except for the bases  Unchanged from prior study    Summary 7/26/2021  Ejection fraction is visually estimated to be 45-50%. There is mid to apical akinesis. No evidence of left ventricular mass or thrombus noted. There is a moderate left pleural effusion. Estimated pulmonary artery systolic pressure is moderately elevated at 63 mmHg assuming a right atrial pressure of 8 mmHg. CATH:  Cath: 7/19/21  RA 7  RV 22/7  PA 30/17/22  PCWP 20   PA % 52  AO % 97  CO/CI 2.22 L/min 1.3 L/min/m2  LWF 4954 ZKRJU . Sec/cm-5  MARIELLA 188 GSFJT . Sec/cm-5  TPG 2   0.4  The left main coronary artery is normal .   The left anterior descending artery is normal .   The left circumflex artery is normal .   The right coronary artery is normal .  Left ventricular angiogram was done in the 30° MARCELO projection and revealed severe LV systolic dysfunction   with an estimated ejection fraction of 15%. Assessment/Plan:    1.) Acute systolic heart failure EF <10%, improved to 45%: Etiology unclear. Discussed with Dr. Misa Urena. Tachycardia induced from AF? Viral? Takotsubo? She continues with SOB that improves with torsemide. She will continue to take torsemide daily. Cont GDMT. Check labs today. NYHA Class III   Stage C  Diuretic: torsemide 20mg daily  Beta Blocker:lopressor- decrease to 12.5mg  ACEi/ARB/ARNI:entresto  Aldosterone Antagonist: held D/T low BP  SGLT2 Inhibitor: consider when HF stable  2gm Na diet, daily weight, 64 oz fluid restriction  Avoid NSAIDS and other nephrotoxic meds  Cardiac Rehab: Not indicated for EF>35%  ICD: Not indicated for EF>35%  [facility] Referral: yes  Not a candidate for advanced HF therapy such as LVAD per Dr. Misa Urena (discussed with [facility] HF team).      2.) Paroxysmal Atrial Fib: NSR/Sinus walt  CHADSVASC- 4    HASBLED-  Thromboembolic risk reduction: warfarin INR goal 2-3 mgmt per coag clinic  Rate Control/ Rhythm Control: lopressor- decrease to 12.5mg BID    3.) Hypotension:   Continue midodrine    Instructions:   1. Medications: decrease lopressor to 12.5mg BID, take torsemide daily  2. Labs: BNP, BMP, troponin  3. Follow up: 2 weeks   4. Daily weight: Call for increase 3 lbs/day or 5 lbs/week. 5. 2 gm sodium diet:  6. Fluid Restriction: 64 oz.       I appreciate the opportunity of cooperating in the care of this individual.    ELIAS Metz, FAB - CNP, CNP, 8/4/2021,4:00 PM

## 2021-08-04 NOTE — PROGRESS NOTES
Ms. Troy Hu is a 78 y.o.  female. Ms. Troy Hu had an INR test today. Results were reviewed and appropriate warfarin management was completed. This visit was performed as: An in person visit. Protocols were followed with precautions to reduce the spread of COVID-19. Patient verifies current dosing regimen: Yes     Warfarin medication reviewed and updated on the patient 's home medication list: Yes   All other medications reviewed and updated on the patient 's home medication list: No, discussed medication changes     Lab Results   Component Value Date    INR 2.1 2021    INR 2.1 2021    INR 2.3 2021       Patient Findings     Positives:  Change in medications, Change in diet/appetite, Hospital admission    Negatives:  Missed doses    Comments:  Decreased appetite, started torsemide as needed for fluid retention, hospital admission -          Anticoagulation Summary  As of 2021    INR goal:  2.0-3.0   TTR:  54.3 % (10 y)   INR used for dosin.1 (2021)   Warfarin maintenance plan:  5 mg (5 mg x 1) every Wed; 2.5 mg (5 mg x 0.5) all other days   Weekly warfarin total:  20 mg   Plan last modified:  Ancelmo Neil, [address] (2021)   Next INR check:  2021   Priority:  Maintenance   Target end date: Indefinite    Indications    Abnormal coagulation profile [R79.1]             Anticoagulation Episode Summary     INR check location:      Preferred lab:      Send INR reminders to:  [facility]    Comments:  EPIC      Factor V Leiden       Anticoagulation Care Providers     Provider Role Specialty Phone number    Dario Ocasio MD Referring Family Medicine [phone]          Warfarin assessment / plan:     Patient states she has recently hospitalized with an MI. Patient requiring close monitoring of INR with recent medication and dietary changes including reduced appetite.  Patient requiring far less warfarin than before hospitalization. For INR of 2.1, we will increase dose slightly to achieve a value that is more center to her therapeutic range. Plan - Increase warfarin to 2.5 mg daily EXCEPT 5 mg on Wednesdays. Recheck INR in 10 days. Description    Warfarin 2.5 mg (half a tablet) daily EXCEPT 5 mg (one tablet) on Wednesdays    Keep your green vegetable intake consistent (3-4 salads per week). Please call if this changes. Call [phone] with signs or symptoms of bleeding or ANY medication changes (including over-the-counter medications or herbal supplements). If significant bleeding occurs please seek immediate medical attention. Limit alcohol intake. Please call if this changes. Immunization History   Administered Date(s) Administered    COVID-19, Pfizer, PF, 30mcg/0.3mL 01/29/2021, 02/19/2021    Influenza Virus Vaccine 10/16/2009, 01/26/2010, 09/29/2010, 10/03/2016    Influenza, High Dose (Fluzone 65 yrs and older) 09/27/2011, 10/01/2012, 10/29/2013, 10/23/2014, 11/24/2015, 10/23/2018, 12/21/2019    Influenza, Quadv, IM, PF (6 mo and older Fluzone, Flulaval, Fluarix, and 3 yrs and older Afluria) 12/22/2020    Pneumococcal Conjugate 13-valent ([id number]) 07/02/2015    Pneumococcal Polysaccharide ([id number]) 07/01/2006, 11/07/2011    Td, unspecified formulation 10/04/2004    Tdap (Boostrix, Adacel) 09/01/2015         Reviewed AVS with patient / caregiver.       CLINICAL PHARMACY CONSULT: MED RECONCILIATION/REVIEW ADDENDUM    For Pharmacy Admin Tracking Only     Intervention Detail: Dose Adjustment: 1, reason: Therapy Optimization   Total # of Interventions Recommended: 1   Total # of Interventions Accepted: 1   Time Spent (min): 20

## 2021-08-04 NOTE — TELEPHONE ENCOUNTER
Called patient. Relayed message from Pura PATTERSON. Patient verbalized and confirmed understanding.

## 2021-08-04 NOTE — CARE COORDINATION
[facility] Transitions Follow Up Call    2021    Patient: Dixie Basurto  Patient : 1940   MRN: <[id number]>  Reason for Admission: STEMI  Discharge Date: 21 RARS: Readmission Risk Score: 19         Spoke with: Ezekiel [place] Transitions Follow Up Call    Needs to be reviewed by the provider   Additional needs identified to be addressed with provider: No  none             Method of communication with provider : phone      Care Transition Nurse (CTN) contacted the patient by telephone to follow up after admission. Verified name and  with patient as identifiers. Addressed changes since last contact: none  Discussed follow-up appointments. If no appointment was previously scheduled, appointment scheduling offered: Yes. Is follow up appointment scheduled within 7 days of discharge? Yes. Advance Care Planning:   Does patient have an Advance Directive: decision maker updated. CTN reviewed discharge instructions, medical action plan and red flags with patient and discussed any barriers to care and/or understanding of plan of care after discharge. Discussed appropriate site of care based on symptoms and resources available to patient including: PCP and Specialist. The patient agrees to contact the PCP office for questions related to their healthcare. Patients top risk factors for readmission: medical condition-STEMI    Patient verified  and was pleasant and agreeable to transition calls. States she is doing well, though wishes she was recovered a little more quickly. Denies SOB, CP, dizziness, N/V/D. Denies problems with bowel or bladder. Appetite good. Ambulating without walker. Showering independently. Got a pedicure and manicure. Son is helping at home. Visiting Sarah Mcgowan helping with care of  a few days a week. Family hired cleaning lady. Activity level good. Patient again very complimentary of [facility] staff.  Patient scheduled for cardiac visit and coag visit today. Denies any acute needs at present time. Agreeable to f/u calls. Educated on the use of urgent care or physicians 24 hr access line if assistance is needed after hours. CTN provided contact information for future needs. Plan for follow-up call in 7-10 days based on severity of symptoms and risk factors. Nidhi Haile  [facility]  Care Transitions  [phone]    Care Transitions Subsequent and Final Call    Subsequent and Final Calls  Do you have any ongoing symptoms?: No  Have your medications changed?: No  Do you have any questions related to your medications?: No  Do you currently have any active services?: No  Do you have any needs or concerns that I can assist you with?: No  Identified Barriers: None  Care Transitions Interventions  No Identified Needs  Other Interventions:            Follow Up  Future Appointments   Date Time Provider Buck Soares   8/4/2021  2:40 PM [place] [facility] [address] [facility]   8/4/2021  3:20 PM Pura Breaux APRN - CNP [facility]   10/20/2021  1:40 PM Nhung Wu MD [place] - [facility]   12/6/2021  9:00 AM MD Hans Gilliam formerly [facility]       Mariel Styles LPN

## 2021-08-04 NOTE — TELEPHONE ENCOUNTER
Changed lopressor to 12.5mg BID. Change made after she left office. Attempted to reach via telephone, could not leave VM. Please reach out to patient and notify of change.

## 2021-08-09 NOTE — DISCHARGE SUMMARY
Cardiology Discharge     Discharge condition: stable   Discharge: home        Chief Complaint:        Chief Complaint   Patient presents with    Shortness of Breath       Pt states she has been SOB dizziness with N/V/D. state she was here Friday but left due to being in the waiting area for 3 hours.  Dizziness         Interval history:  Repeat echo with improved LV function  S/p impella explant   Maintaining hemodynamics off inotropic support   Good UOP  Walked on the unit today  No new issues         Subjective:      History of Present Illness: The patient is 78 y.o. female with a past medical history significant for CLL, factor V mutation who presents with the above complaint. Admits to 3 days of worsening dyspnea with minimal exertion. Took OTC tylenol with no help thus coming to the ER for evaluation.  STEMI page activated by ER staff               Past Medical History        Past Medical History:   Diagnosis Date    [facility] [address]      CLL (chronic lymphocytic leukemia) ([facility] [address] 2/12/2015    COPD (chronic obstructive pulmonary disease) ([facility] [address] [facility]      Essential hypertension 2015     controlled with salt restriction (initially)    Factor V Leiden mutation [facility])      Goiter Nov, 2011     1.5 cm midline    Hypercholesterolemia      Impaired fasting glucose Nov, 2011    Osteoarthritis      Osteoporosis      Post herpetic neuralgia      Vitamin D deficiency Nov, 2011         Past Surgical History         Past Surgical History:   Procedure Laterality Date    CATARACT REMOVAL WITH IMPLANT   5/14/12     left eye    JOINT REPLACEMENT         partial knee R and L    KNEE SURGERY   2008     partial replacements, both knees    MOHS SURGERY   03/12/2019     R cheek and R superior temple    SPLENECTOMY        TUNNELED VENOUS PORT PLACEMENT        UPPER GASTROINTESTINAL ENDOSCOPY N/A 2/5/2019     EGD ESOPHAGOGASTRODUODENOSCOPY, WITH ENDOSCOPIC ULTRASOUND OF ESOPHAGUS performed by Bryson Islas MD at [address] History         Family History   Problem Relation Age of Onset    Diabetes Father      Stroke Sister 50          of a stroke         Social History            Tobacco Use    Smoking status: Former Smoker       Packs/day: 0.30       Years: 50.00       Pack years: 15.00       Types: Cigarettes       Quit date: 1995       Years since quittin.7    Smokeless tobacco: Never Used   Vaping Use    Vaping Use: Never used   Substance Use Topics    Alcohol use:  No       Alcohol/week: 0.0 standard drinks    Drug use: Not on file         No Known Allergies  Current Facility-Administered Medications             Current Facility-Administered Medications   Medication Dose Route Frequency Provider Last Rate Last Admin    milrinone (PRIMACOR) 20 mg in dextrose 5 % 100 mL infusion  0.25 mcg/kg/min Intravenous Continuous Melani Mcgowan MD 5.1 mL/hr at 21 0944 0.25 mcg/kg/min at 21 0944    [START ON 2021] amiodarone (CORDARONE) tablet 200 mg  200 mg Oral Daily Melani Mcgowan MD        midodrine (PROAMATINE) tablet 5 mg  5 mg Oral BID WC Melani Mcgowan MD   5 mg at 21 0848    midodrine (PROAMATINE) tablet 5 mg  5 mg Oral PRN Melani Mcgowan MD   5 mg at 21 0118    perflutren lipid microspheres (DEFINITY) injection 1.65 mg  1.5 mL Intravenous ONCE PRN Melani Mcgowan MD        sodium chloride flush 0.9 % injection 5-40 mL  5-40 mL Intravenous 2 times per day Melani Mcgowan MD   10 mL at 21 0847    sodium chloride flush 0.9 % injection 5-40 mL  5-40 mL Intravenous PRN Melani Mcgowan MD        0.9 % sodium chloride infusion  25 mL Intravenous PRN Melani Mcgowan MD        warfarin (COUMADIN) daily dosing (placeholder)   Other RX Placeholder Melani Mcgowan MD        0.9 % sodium chloride bolus  250 mL Intravenous Once Melani Mcgowan MD        sacubitril-valsartan (ENTRESTO) 24-26 MG per tablet 1 tablet 1 tablet Oral BID Leisa Lopez MD   1 tablet at 07/25/21 0848    0.9 % sodium chloride bolus  500 mL Intravenous Once Leisa Lopez MD   Stopped at 07/21/21 2315    perflutren lipid microspheres (DEFINITY) injection 1.65 mg  1.5 mL Intravenous ONCE PRN Leisa Lopez MD        docusate sodium (COLACE) capsule 100 mg  100 mg Oral Daily Leisa Lopez MD   100 mg at 07/24/21 3675    senna (SENOKOT) tablet 8.6 mg  1 tablet Oral Nightly Leisa Lopez MD   8.6 mg at 07/24/21 2127    acetaminophen (TYLENOL) tablet 650 mg  650 mg Oral Q4H PRN Leisa Lopez MD   650 mg at 07/23/21 2044    ondansetron (ZOFRAN) injection 4 mg  4 mg Intravenous Q6H PRN Sven Burden MD   4 mg at 07/23/21 1735    metoprolol tartrate (LOPRESSOR) tablet 25 mg  25 mg Oral BID Leisa Lopez MD   25 mg at 07/25/21 0848            Review of Systems:  · Constitutional: No unanticipated weight loss. There's been no change in energy level, sleep pattern, or activity level. No fevers, chills. · Eyes: No visual changes or diplopia. No scleral icterus. · ENT: No Headaches, hearing loss or vertigo. No mouth sores or sore throat. · Cardiovascular: as reviewed in HPI  · Respiratory: No cough or wheezing, no sputum production. No hemoptysis. · Gastrointestinal: No abdominal pain, appetite loss, blood in stools. No change in bowel or bladder habits. · Genitourinary: No dysuria, trouble voiding, or hematuria. · Musculoskeletal:  No gait disturbance, no joint complaints. · Integumentary: No rash or pruritis. · Neurological: No headache, diplopia, change in muscle strength, numbness or tingling. · Psychiatric: No anxiety or depression. · Endocrine: No temperature intolerance. No excessive thirst, fluid intake, or urination. No tremor. · Hematologic/Lymphatic: No abnormal bruising or bleeding, blood clots or swollen lymph nodes.   · Allergic/Immunologic: No nasal congestion or hives.     Physical Exam:   BP (!) 124/51   Pulse 69 Temp 98 °F (36.7 °C) (Oral)   Resp 17   Ht 4' 11\" (1.499 m)   Wt 165 lb 12.6 oz (75.2 kg)   SpO2 95%   BMI 33.48 kg/m²       Wt Readings from Last 3 Encounters:   07/25/21 165 lb 12.6 oz (75.2 kg)   07/16/21 158 lb 15.2 oz (72.1 kg)   04/20/21 162 lb (73.5 kg)      Constitutional: appears ill, diaphoretic   Head: Normocephalic and atraumatic. Pupils equal and round. Neck: Neck supple. + JVP or carotid bruit appreciated. No mass and no thyromegaly present. No lymphadenopathy present. Cardiovascular: Normal rate. Normal heart sounds. Exam reveals no gallop and no friction rub. No murmur heard. Pulmonary/Chest: diffuse rales   Abdominal: Soft, non-tender. Bowel sounds are normal. She exhibits no organomegaly, mass or bruit. Extremities: No edema. No cyanosis or clubbing. Pulses are 2+ radial/carotid bilaterally. Neurological: No gross cranial nerve deficit. Coordination normal.   Skin: Skin is warm and dry. There is no rash or diaphoresis. Psychiatric: She has a normal mood and affect.  Her speech is normal and behavior is normal.      Lab Review:   FLP:          Lab Results   Component Value Date     TRIG 128 04/20/2021     HDL 55 04/20/2021     HDL 62 11/08/2011     LDLCALC 178 04/20/2021     LDLDIRECT 117 12/14/2015     LABVLDL 26 04/20/2021      BUN/Creatinine:          Lab Results   Component Value Date     BUN 12 07/25/2021     CREATININE 0.9 07/25/2021      PT/INR, TNI, HGB A1C:         Lab Results   Component Value Date/Time     TROPONINI 2.22 (HH) 07/19/2021 11:00 AM     LABA1C 6.2 04/20/2021 08:54 AM      No results found for: CBCAUTODIF     EKG Interpretation: afib, with diffuse ST elevations      Echo: severe LV dysfunction with EF < 10%      Stress Test:      Cath:   Normal coronary arteries      CT:     Doppler:      All above diagnostic testing and laboratory data was independently visualized and reviewed by me (not simply review of report)         Assessment and Plan   1) acute on chronic LV systolic heart failure   NYHA class IV, stage D  impella explanted   Start GDMT w/ BB/entresto  Low dose midodrine to initiate medical therapy   Titrate inotropes based on mixed O2, decrease to 0.25 and titrate by 0.05 q6 hrs until off   Repeat echo on Monday      2) pafib   - warfarin  - PO amio; decrease to 200mg qd, INR supra-therapeutic     - PO BB      3) BRENNAN   - resolved   - continue to monitor   - good UOP         Thank you very much for allowing me to participate in the care of your patient. Please do not hesitate to contact me if you have any questions.     Valentino Bhatia MD [address] [facility], Interventional Cardiology, and Peripheral Vascular Disease   [address]   Ph: [phone]  Fax: [phone]                        Cardiology Progress     [facility]  1940        Referring Physician: [facility] ER   Reason for Referral: stemi   Chief Complaint:        Chief Complaint   Patient presents with    Shortness of Breath       Pt states she has been SOB dizziness with N/V/D. state she was here Friday but left due to being in the waiting area for 3 hours.  Dizziness         Interval history:  Severe LV dysfunction   S/p impella explant   Maintaining hemodynamics on inotropic support   Good UOP  Walked on the unit today  No new issues         Subjective:      History of Present Illness: The patient is 78 y.o. female with a past medical history significant for CLL, factor V mutation who presents with the above complaint. Admits to 3 days of worsening dyspnea with minimal exertion. Took OTC tylenol with no help thus coming to the ER for evaluation.  STEMI page activated by ER staff               Past Medical History        Past Medical History:   Diagnosis Date    Chronic lymphocytic leukemia in remission ([facility] [address]      CLL (chronic lymphocytic leukemia) ([facility] [address] 2/12/2015    COPD (chronic obstructive pulmonary disease) ([facility] [address]      Essential hypertension 2015     controlled with salt restriction (initially)    Factor V Leiden mutation [facility])      Goiter 2011     1.5 cm midline    Hypercholesterolemia      Impaired fasting glucose 2011    Osteoarthritis      Osteoporosis      Post herpetic neuralgia      Vitamin D deficiency 2011         Past Surgical History         Past Surgical History:   Procedure Laterality Date    CATARACT REMOVAL WITH IMPLANT   12     left eye    JOINT REPLACEMENT         partial knee R and L    KNEE SURGERY        partial replacements, both knees    MOHS SURGERY   2019     R cheek and R superior temple    SPLENECTOMY        TUNNELED VENOUS PORT PLACEMENT        UPPER GASTROINTESTINAL ENDOSCOPY N/A 2019     EGD ESOPHAGOGASTRODUODENOSCOPY, WITH ENDOSCOPIC ULTRASOUND OF ESOPHAGUS performed by Alfredo Medrano MD at [facility] History         Family History   Problem Relation Age of Onset    Diabetes Father      Stroke Sister 50          of a stroke         Social History            Tobacco Use    Smoking status: Former Smoker       Packs/day: 0.30       Years: 50.00       Pack years: 15.00       Types: Cigarettes       Quit date: 1995       Years since quittin.7    Smokeless tobacco: Never Used   Vaping Use    Vaping Use: Never used   Substance Use Topics    Alcohol use:  No       Alcohol/week: 0.0 standard drinks    Drug use: Not on file         No Known Allergies  Current Facility-Administered Medications             Current Facility-Administered Medications   Medication Dose Route Frequency Provider Last Rate Last Admin    milrinone (PRIMACOR) 20 mg in dextrose 5 % 100 mL infusion  0.25 mcg/kg/min Intravenous Continuous Edson Cruz MD 5.1 mL/hr at 21 0944 0.25 mcg/kg/min at 21 0944    [START ON 2021] amiodarone (CORDARONE) tablet 200 mg  200 mg Oral Daily Edson Cruz MD        midodrine (PROAMATINE) tablet 5 mg  5 mg Oral BID  Edson Cruz MD   5 mg at 21 0628  midodrine (PROAMATINE) tablet 5 mg  5 mg Oral PRN Roxanne Dotson MD   5 mg at 07/24/21 0118    perflutren lipid microspheres (DEFINITY) injection 1.65 mg  1.5 mL Intravenous ONCE PRN Roxanne Dotson MD        sodium chloride flush 0.9 % injection 5-40 mL  5-40 mL Intravenous 2 times per day Roxanne Dotson MD   10 mL at 07/25/21 0847    sodium chloride flush 0.9 % injection 5-40 mL  5-40 mL Intravenous PRN Roxanne Dotson MD        0.9 % sodium chloride infusion  25 mL Intravenous PRN Roxanne Dotson MD        warfarin (COUMADIN) daily dosing (placeholder)   Other RX Pratima Tate MD        0.9 % sodium chloride bolus  250 mL Intravenous Once Roxanne Dotson MD        sacubitril-valsartan (ENTRESTO) 24-26 MG per tablet 1 tablet  1 tablet Oral BID Roxanne Dotson MD   1 tablet at 07/25/21 0848    0.9 % sodium chloride bolus  500 mL Intravenous Once Roxanne Dotson MD   Stopped at 07/21/21 2315    perflutren lipid microspheres (DEFINITY) injection 1.65 mg  1.5 mL Intravenous ONCE PRN Roxanne Dotson MD        docusate sodium (COLACE) capsule 100 mg  100 mg Oral Daily Roxanne Dotson MD   100 mg at 07/24/21 1770    senna (SENOKOT) tablet 8.6 mg  1 tablet Oral Nightly Roxanne Dotson MD   8.6 mg at 07/24/21 2127    acetaminophen (TYLENOL) tablet 650 mg  650 mg Oral Q4H PRN Roxanne Dotson MD   650 mg at 07/23/21 2044    ondansetron (ZOFRAN) injection 4 mg  4 mg Intravenous Q6H PRN Alec Tse MD   4 mg at 07/23/21 1735    metoprolol tartrate (LOPRESSOR) tablet 25 mg  25 mg Oral BID Roxanne Dotson MD   25 mg at 07/25/21 0848            Review of Systems:  · Constitutional: No unanticipated weight loss. There's been no change in energy level, sleep pattern, or activity level. No fevers, chills. · Eyes: No visual changes or diplopia. No scleral icterus. · ENT: No Headaches, hearing loss or vertigo. No mouth sores or sore throat.   · Cardiovascular: as reviewed in HPI  · Respiratory: No cough or wheezing, no sputum production. No hemoptysis. · Gastrointestinal: No abdominal pain, appetite loss, blood in stools. No change in bowel or bladder habits. · Genitourinary: No dysuria, trouble voiding, or hematuria. · Musculoskeletal:  No gait disturbance, no joint complaints. · Integumentary: No rash or pruritis. · Neurological: No headache, diplopia, change in muscle strength, numbness or tingling. · Psychiatric: No anxiety or depression. · Endocrine: No temperature intolerance. No excessive thirst, fluid intake, or urination. No tremor. · Hematologic/Lymphatic: No abnormal bruising or bleeding, blood clots or swollen lymph nodes. · Allergic/Immunologic: No nasal congestion or hives.     Physical Exam:   BP (!) 124/51   Pulse 69   Temp 98 °F (36.7 °C) (Oral)   Resp 17   Ht 4' 11\" (1.499 m)   Wt 165 lb 12.6 oz (75.2 kg)   SpO2 95%   BMI 33.48 kg/m²       Wt Readings from Last 3 Encounters:   07/25/21 165 lb 12.6 oz (75.2 kg)   07/16/21 158 lb 15.2 oz (72.1 kg)   04/20/21 162 lb (73.5 kg)      Constitutional: appears ill, diaphoretic   Head: Normocephalic and atraumatic. Pupils equal and round. Neck: Neck supple. + JVP or carotid bruit appreciated. No mass and no thyromegaly present. No lymphadenopathy present. Cardiovascular: Normal rate. Normal heart sounds. Exam reveals no gallop and no friction rub. No murmur heard. Pulmonary/Chest: diffuse rales   Abdominal: Soft, non-tender. Bowel sounds are normal. She exhibits no organomegaly, mass or bruit. Extremities: No edema. No cyanosis or clubbing. Pulses are 2+ radial/carotid bilaterally. Neurological: No gross cranial nerve deficit. Coordination normal.   Skin: Skin is warm and dry. There is no rash or diaphoresis. Psychiatric: She has a normal mood and affect.  Her speech is normal and behavior is normal.      Lab Review:   FLP:          Lab Results   Component Value Date     TRIG 128 04/20/2021     HDL 55 04/20/2021     HDL 62 11/08/2011   LDLCALC 178 04/20/2021     LDLDIRECT 117 12/14/2015     LABVLDL 26 04/20/2021      BUN/Creatinine:          Lab Results   Component Value Date     BUN 12 07/25/2021     CREATININE 0.9 07/25/2021      PT/INR, TNI, HGB A1C:   Lab Results   Component Value Date/Time     TROPONINI 2.22 (HH) 07/19/2021 11:00 AM     LABA1C 6.2 04/20/2021 08:54 AM      No results found for: CBCAUTODIF     EKG Interpretation: afib, with diffuse ST elevations      Echo: severe LV dysfunction with EF < 10%      Stress Test:      Cath:   Normal coronary arteries      CT:     Doppler:      All above diagnostic testing and laboratory data was independently visualized and reviewed by me (not simply review of report)         Assessment and Plan   1) acute on chronic LV systolic heart failure   NYHA class III, stage C  impella explanted   Start GDMT w/ BB/entresto  Low dose midodrine to initiate medical therapy      2) pafib   - warfarin  - PO amio; decrease to 200mg qd,   - PO BB      3) BRENNAN   - resolved   - continue to monitor   - good UOP         Thank you very much for allowing me to participate in the care of your patient.  Please do not hesitate to contact me if you have any questions.     Lana Spring MD [facility], Interventional Cardiology, and Peripheral Vascular Disease   [address]   Ph: [phone]  Fax: [phone]

## 2021-08-12 ENCOUNTER — TELEPHONE (OUTPATIENT)
Dept: PHARMACY | Age: 81
End: 2021-08-12

## 2021-08-12 NOTE — TELEPHONE ENCOUNTER
Outbound call from the outpatient wellness center. Attempting to schedule patient for new heart failure visit. Unable to leave voicemail.

## 2021-08-13 ENCOUNTER — ANTI-COAG VISIT (OUTPATIENT)
Dept: PHARMACY | Age: 81
End: 2021-08-13
Payer: MEDICARE

## 2021-08-13 ENCOUNTER — CARE COORDINATION (OUTPATIENT)
Dept: CASE MANAGEMENT | Age: 81
End: 2021-08-13

## 2021-08-13 DIAGNOSIS — R79.1 ABNORMAL COAGULATION PROFILE: Primary | ICD-10-CM

## 2021-08-13 LAB — INR BLD: 1.9

## 2021-08-13 PROCEDURE — 99211 OFF/OP EST MAY X REQ PHY/QHP: CPT

## 2021-08-13 PROCEDURE — 85610 PROTHROMBIN TIME: CPT

## 2021-08-13 NOTE — PROGRESS NOTES
Ms. Dixie Basurto is a 78 y.o.  female. Ms. Dixie Basurto had an INR test today. Results were reviewed and appropriate warfarin management was completed. This visit was performed as: An in person visit. Protocols were followed with precautions to reduce the spread of COVID-19. Patient verifies current dosing regimen: Yes     Warfarin medication reviewed and updated on the patient 's home medication list: Yes   All other medications reviewed and updated on the patient 's home medication list: No: no changes     Lab Results   Component Value Date    INR 1.90 2021    INR 2.1 2021    INR 2.1 2021       Patient Findings     Negatives:  Missed doses, Change in medications          Anticoagulation Summary  As of 2021    INR goal:  2.0-3.0   TTR:  54.2 % (10.1 y)   INR used for dosin.90 (2021)   Warfarin maintenance plan:  5 mg (5 mg x 1) every Tue, Fri; 2.5 mg (5 mg x 0.5) all other days   Weekly warfarin total:  22.5 mg   Plan last modified:  [facility] (2021)   Next INR check:  2021   Priority:  Maintenance   Target end date: Indefinite    Indications    Abnormal coagulation profile [R79.1]             Anticoagulation Episode Summary     INR check location:      Preferred lab:      Send INR reminders to:  [facility]    Comments:  EPIC      Factor V Leiden       Anticoagulation Care Providers     Provider Role Specialty Phone number    Jamel Richardson MD Referring Family Medicine [phone]          Warfarin assessment / plan:     Patient's INR subtherapeutic today at 1.9. Patient denies missed doses. Patient recently hospitalized with an MI and started on multiple new medications including amiodarone - requiring less warfarin since her hospital stay and medication changes. Plan - warfarin dose increased to warfarin 2.5 mg daily EXCEPT 5 mg on  and .  Will recheck INR next week on Wednesday ()    Description NEW DOSE: Warfarin 2.5 mg (half a tablet) daily EXCEPT 5 mg (one tablet) on Tuesdays and Fridays    Keep your green vegetable intake consistent (3-4 salads per week). Please call if this changes. Call [phone] with signs or symptoms of bleeding or ANY medication changes (including over-the-counter medications or herbal supplements). If significant bleeding occurs please seek immediate medical attention. Limit alcohol intake. Please call if this changes. Immunization History   Administered Date(s) Administered    COVID-19, Pfizer, PF, 30mcg/0.3mL 01/29/2021, 02/19/2021    Influenza Virus Vaccine 10/16/2009, 01/26/2010, 09/29/2010, 10/03/2016    Influenza, High Dose (Fluzone 65 yrs and older) 09/27/2011, 10/01/2012, 10/29/2013, 10/23/2014, 11/24/2015, 10/23/2018, 12/21/2019    Influenza, Quadv, IM, PF (6 mo and older Fluzone, Flulaval, Fluarix, and 3 yrs and older Afluria) 12/22/2020    Pneumococcal Conjugate 13-valent ([id number]) 07/02/2015    Pneumococcal Polysaccharide ([id number]) 07/01/2006, 11/07/2011    Td, unspecified formulation 10/04/2004    Tdap (Boostrix, Adacel) 09/01/2015         Reviewed AVS with patient / caregiver.       CLINICAL PHARMACY CONSULT: MED RECONCILIATION/REVIEW ADDENDUM    For Pharmacy Admin Tracking Only     Intervention Detail: Dose Adjustment: 1, reason: Therapy Optimization   Total # of Interventions Recommended: 1   Total # of Interventions Accepted: 1   Time Spent (min): 20

## 2021-08-13 NOTE — CARE COORDINATION
[facility] Transitions Follow Up Call    2021    Patient: Ashley Jarquin  Patient : 1940   MRN: <[id number]>  Reason for Admission: STEMI  Discharge Date: 21 RARS: Readmission Risk Score: 19         Spoke with: Ashley Jarquin    Patient verified  and was pleasant and agreeable to transition calls. Call was very brief. Patient states she is doing fine, but her  is in the hospital. States he will have to go to SNF. Denies CP, SOB. Upon chart review, patient went to cardiology f/u and had medication changes. Adia Brown  [facility]  Care Transitions  [phone]       Care Transitions Subsequent and Final Call    Subsequent and Final Calls  Do you currently have any active services?: No  Care Transitions Interventions  Other Interventions:            Follow Up  Future Appointments   Date Time Provider Buck Soares   2021 12:15 PM Marisa Baird APRN - CNP [facility]   2021  1:20 PM Tan Markham [facility]   2021  1:40 PM Pura Sullivan APRN - CNP [facility]   10/20/2021  1:40 PM Luz Pritchard MD [facility]   2021  9:00 AM MD Norbert Pickard [facility]       Adia Brown LPN

## 2021-08-18 ENCOUNTER — ANTI-COAG VISIT (OUTPATIENT)
Dept: PHARMACY | Age: 81
End: 2021-08-18
Payer: MEDICARE

## 2021-08-18 ENCOUNTER — OFFICE VISIT (OUTPATIENT)
Dept: CARDIOLOGY CLINIC | Age: 81
End: 2021-08-18
Payer: MEDICARE

## 2021-08-18 VITALS
BODY MASS INDEX: 31.69 KG/M2 | WEIGHT: 157.2 LBS | DIASTOLIC BLOOD PRESSURE: 60 MMHG | HEART RATE: 52 BPM | HEIGHT: 59 IN | OXYGEN SATURATION: 93 % | SYSTOLIC BLOOD PRESSURE: 112 MMHG

## 2021-08-18 DIAGNOSIS — R79.1 ABNORMAL COAGULATION PROFILE: Primary | ICD-10-CM

## 2021-08-18 DIAGNOSIS — I50.22 CHRONIC SYSTOLIC CONGESTIVE HEART FAILURE (HCC): Primary | ICD-10-CM

## 2021-08-18 DIAGNOSIS — I50.22 CHRONIC SYSTOLIC HEART FAILURE (HCC): ICD-10-CM

## 2021-08-18 LAB — INR BLD: 1.7

## 2021-08-18 PROCEDURE — G8417 CALC BMI ABV UP PARAM F/U: HCPCS | Performed by: NURSE PRACTITIONER

## 2021-08-18 PROCEDURE — 99214 OFFICE O/P EST MOD 30 MIN: CPT | Performed by: NURSE PRACTITIONER

## 2021-08-18 PROCEDURE — G8400 PT W/DXA NO RESULTS DOC: HCPCS | Performed by: NURSE PRACTITIONER

## 2021-08-18 PROCEDURE — 99211 OFF/OP EST MAY X REQ PHY/QHP: CPT

## 2021-08-18 PROCEDURE — 4040F PNEUMOC VAC/ADMIN/RCVD: CPT | Performed by: NURSE PRACTITIONER

## 2021-08-18 PROCEDURE — 1123F ACP DISCUSS/DSCN MKR DOCD: CPT | Performed by: NURSE PRACTITIONER

## 2021-08-18 PROCEDURE — 1036F TOBACCO NON-USER: CPT | Performed by: NURSE PRACTITIONER

## 2021-08-18 PROCEDURE — G8427 DOCREV CUR MEDS BY ELIG CLIN: HCPCS | Performed by: NURSE PRACTITIONER

## 2021-08-18 PROCEDURE — 1111F DSCHRG MED/CURRENT MED MERGE: CPT | Performed by: NURSE PRACTITIONER

## 2021-08-18 PROCEDURE — 1090F PRES/ABSN URINE INCON ASSESS: CPT | Performed by: NURSE PRACTITIONER

## 2021-08-18 PROCEDURE — 85610 PROTHROMBIN TIME: CPT

## 2021-08-18 RX ORDER — TORSEMIDE 20 MG/1
20 TABLET ORAL DAILY
Qty: 100 TABLET | Refills: 3 | Status: SHIPPED | OUTPATIENT
Start: 2021-08-18 | End: 2021-08-24

## 2021-08-18 RX ORDER — MIDODRINE HYDROCHLORIDE 5 MG/1
2.5 TABLET ORAL 2 TIMES DAILY
Qty: 30 TABLET | Refills: 3 | Status: SHIPPED | OUTPATIENT
Start: 2021-08-18 | End: 2022-05-16

## 2021-08-18 NOTE — PROGRESS NOTES
[facility], [address] [place] [address] [address], [place] [address]  [phone]    PrimaryCare Doctor:  Moraima Patel MD  Primary Cardiologist: Dr Karyn Damon    Chief Complaint   Patient presents with    Follow-up     no cc         History of Present Illness: Lila Deng is a 78 y.o. female with PMH SHF, NICM, PAF (warfarin), BRENNAN, CLL, factor V mutation. Former smoker. Patient was admitted to [facility] 7/19-7/26/2021 with CP. EKG indicated inferoanterolateral STEMI. Went for emergent LHC>patent coronaries. Found acute systolic heart failure, EF initially <10%. Unclear etiology. Required impella support and inotropes. Started on GDMT and midodrine for low BP. Hem/Onc consulted with possible concern for hx chemo R/T CM and noted that it was unlikely. DC wt 166lbs. Patient presents to [facility] cardiology for follow up for heart failure. Daily wt 166>161 lbs>157lsb today. Denies SOB. Taking torsemide 20mg twice daily but would like to decrease to daily to avoid voiding at night. She has been active taking care of her  who is now at [facility]. Denies wt gain, CP, orthopnea, edema, activity intolerance, syncope. Lopressor was decreased last OV D/T bradycardia. She is following 2gm Na diet and 64 fl oz restriction. Review of Systems:   General: Denies fever, chills  Skin: Denies skin changes, rash, itching, lesions.   HEENT: Denies headache, dizziness, vision changes, nosebleeds, sore throat, nasal drainage  RESP: Denies cough, sputum,wheeze , snoring  CARD: Denies palpitations,  murmur  GI:Denies nausea, vomiting, heartburn, loss of appetite, change in bowels  : Denies frequency, pain, incontinence, polyuria  VASC: Denies claudication, leg cramps, clots  MUSC/SKEL: Denies pain, stiffness, arthritis  PSYCH: Denies anxiety, depression, stress  NEURO: Denies numbness, tingling, weakness,change in mood or memory  HEME: Denies abn bruising, bleeding, anemia  ENDO: Denies intolerance to heat, cold, excessive thirst or hunger, hx thyroid disease    /60   Pulse 52   Ht 4' 11\" (1.499 m)   Wt 157 lb 3.2 oz (71.3 kg)   SpO2 93%   BMI 31.75 kg/m²   Wt Readings from Last 3 Encounters:   21 157 lb 3.2 oz (71.3 kg)   21 159 lb (72.1 kg)   21 166 lb 14.4 oz (75.7 kg)     Physical Exam:  GEN: Appears well, no acute distress  SKIN: Pink, warm, dry. Nails without clubbing. HEENT: PERRLA. Normocephalic, atraumatic. Neck supple. No adenopathy. LUNG: AP diameter normal.  Clear bilateral. No wheeze, rales, or ronchi. Respiratory effort increased with activity. HEART: S1S2 A/R. No JVD. No carotid bruit. No murmur, rub or gallop. ABD: Soft, nontender. +BS X 4 quads. No hepatomegaly. EXT: Radial and pedal pulses 2+ and symmetric. Without varicosities. No edema. MUSCSKEL: Good ROM X4 extremities. No deformity. NEURO: A/O X3. Calm and cooperative. Past Medical History:   has a past medical history of Chronic lymphocytic leukemia in remission ([facility] [address] [facility] CLL (chronic lymphocytic leukemia) ([facility] [address] [facility] COPD (chronic obstructive pulmonary disease) ([facility] [address] [facility] Essential hypertension, Factor V Leiden mutation ([facility] [address] Goiter, Hypercholesterolemia, Impaired fasting glucose, Osteoarthritis, Osteoporosis, Post herpetic neuralgia, and Vitamin D deficiency. Surgical History:   has a past surgical history that includes Splenectomy; knee surgery (); Cataract removal with implant (12); Tunneled venous port placement; Upper gastrointestinal endoscopy (N/A, 2019); Mohs surgery (2019); and joint replacement. Social History:   reports that she quit smoking about 25 years ago. Her smoking use included cigarettes. She has a 15.00 pack-year smoking history. She has never used smokeless tobacco. She reports that she does not drink alcohol.    Family History:   Family History   Problem Relation Age of Onset    Diabetes Father     Stroke Sister 50         of a stroke HomeMedications:  Prior to Admission medications    Medication Sig Start Date End Date Taking? Authorizing Provider   torsemide (DEMADEX) 20 MG tablet Take 20 mg by mouth daily as needed 7/31/21  Yes Historical Provider, MD   metoprolol tartrate (LOPRESSOR) 25 MG tablet Take 0.5 tablets by mouth 2 times daily 8/4/21  Yes FAB Powell - CNP   sacubitril-valsartan (ENTRESTO) 24-26 MG per tablet Take 1 tablet by mouth 2 times daily 7/26/21  Yes James Jaramillo MD   amiodarone (CORDARONE) 200 MG tablet Take 1 tablet by mouth daily 7/27/21  Yes James Jaramillo MD   midodrine (PROAMATINE) 5 MG tablet TAKE 1 TABLET BY MOUTH TWICE DAILY WITH MEALS 7/26/21  Yes James Jaramillo MD   warfarin (COUMADIN) 5 MG tablet TAKE 1 1/2 TABLETS BY MOUTH MONDAY AND FRIDAY, THEN 1 TABLET BY MOUTH DAILY ON THE OTHER DAYS  Patient taking differently: Take 2.5 mg by mouth daily EXCEPT 5 mg (one tablet) on Mondays, Wednesdays, and Fridays or as directed by [facility] [phone] 3/8/21  Yes Brett Bennett MD   gabapentin (NEURONTIN) 300 MG capsule TAKE 1 TO 2 CAPSULES BY MOUTH EVERY NIGHT AS DIRECTED 3/1/21 8/18/21 Yes FAB Juarez - CNP   Cyanocobalamin (VITAMIN B 12) 500 MCG TABS Take 1 tablet by mouth daily   Yes Historical Provider, MD        Allergies:  Patient has no known allergies. LABS: Results reviewed with patient today.     CBC:   Lab Results   Component Value Date    WBC 22.1 07/26/2021    WBC 22.9 07/25/2021    WBC 22.0 07/24/2021    RBC 2.96 07/26/2021    RBC 2.86 07/25/2021    RBC 2.76 07/24/2021    RBC 4.62 06/23/2017    RBC 4.70 02/03/2017    RBC 4.97 07/29/2016    HGB 9.3 07/26/2021    HGB 8.8 07/25/2021    HGB 8.4 07/24/2021    HCT 27.9 07/26/2021    HCT 26.7 07/25/2021    HCT 25.8 07/24/2021    MCV 94.1 07/26/2021    MCV 93.3 07/25/2021    MCV 93.5 07/24/2021    RDW 14.4 07/26/2021    RDW 14.1 07/25/2021    RDW 14.2 07/24/2021     07/26/2021     07/25/2021    PLT 157 07/24/2021     BMP:  Lab Results   Component Value Date     08/04/2021     07/26/2021     07/25/2021    K 4.8 08/04/2021    K 3.4 07/26/2021    K 3.0 07/25/2021    K 4.3 07/19/2021    K 4.9 02/05/2019     08/04/2021     07/26/2021     07/25/2021    CO2 29 08/04/2021    CO2 32 07/26/2021    CO2 29 07/25/2021    PHOS 3.1 07/20/2021    BUN 13 08/04/2021    BUN 10 07/26/2021    BUN 12 07/25/2021    CREATININE 1.6 08/04/2021    CREATININE 0.9 07/26/2021    CREATININE 0.9 07/25/2021     BNP:   Lab Results   Component Value Date    PROBNP 9,250 08/04/2021    PROBNP 25,962 07/19/2021       Parameters:   > 450 pg/mL under age 48  > 900 pg/mL ages 54-65  > 1800 pg/mL over age 76    Iron Studies:  No results found for: TIBC, FERRITIN  GLUCOSE: No results for input(s): GLUCOSE in the last 72 hours. LIVER PROFILE:   Lab Results   Component Value Date    AST 22 07/26/2021    ALT 15 07/26/2021    LABALBU 2.8 07/26/2021    BILITOT 0.6 07/26/2021    ALKPHOS 63 07/26/2021     PT/INR:   Lab Results   Component Value Date    PROTIME 46.7 07/26/2021    INR 1.70 08/18/2021     Cardiac Enzymes:  Lab Results   Component Value Date    TROPONINI 2.22 07/19/2021     FASTING LIPID PANEL:  Lab Results   Component Value Date    CHOL 259 04/20/2021    HDL 55 04/20/2021    HDL 62 11/08/2011    LDLCALC 178 04/20/2021    TRIG 128 04/20/2021       Cardiac Imaging: Reports reviewed with patient today. EKG: Anterlateral and inferior infarct  Today, continues with inverted T waves anterolateral and inferior leads. ECHO:     Summary 7/19/2021  Impella well seated LV measuring 3.2cm from inlet to AV. Ejection fraction is visually estimated to be 15-20%. Regional wall motion abnormalities are noted. Summary 7/20/2021  Impella device well seated in LV and Aorta measuring 3.1cm  Ejection fraction is visually estimated to be <10%. , Severe hypokinesis except for the bases  Unchanged from prior study    Summary 7/26/2021  Ejection fraction is visually estimated to be 45-50%. There is mid to apical akinesis. No evidence of left ventricular mass or thrombus noted. There is a moderate left pleural effusion. Estimated pulmonary artery systolic pressure is moderately elevated at 63 mmHg assuming a right atrial pressure of 8 mmHg. CATH:  Cath: 7/19/21  RA 7  RV 22/7  PA 30/17/22  PCWP 20   PA % 52  AO % 97  CO/CI 2.22 L/min 1.3 L/min/m2  CPC 8379 RIJYI . Sec/cm-5  RPE 658 RXFGI . Sec/cm-5  TPG 2   0.4  The left main coronary artery is normal .   The left anterior descending artery is normal .   The left circumflex artery is normal .   The right coronary artery is normal .  Left ventricular angiogram was done in the 30° MARCELO projection and revealed severe LV systolic dysfunction   with an estimated ejection fraction of 15%. Assessment/Plan:    1.) Chronic systolic heart failure EF <10%, improved to 45%: Etiology unclear. Discussed with Dr. Juan Roman. Tachycardia induced from AF? Viral? Takotsubo? SOB now resolved and she is feeling much improved. NYHA Class III   Stage C  Diuretic: decrease to torsemide 20mg daily and extra tab as needed  Beta Blocker:lopressor- decreased to 12.5mg for bradycardia  ACEi/ARB/ARNI:entresto  Aldosterone Antagonist: held D/T low BP  SGLT2 Inhibitor: consider when HF stable  2gm Na diet, daily weight, 64 oz fluid restriction  Avoid NSAIDS and other nephrotoxic meds  Cardiac Rehab: Not indicated for EF>35%  ICD: Not indicated for EF>35%  [facility] Referral: yes  Not a candidate for advanced HF therapy such as LVAD per Dr. Juan Roman (discussed with [facility] HF team). 2.) Paroxysmal Atrial Fib: NSR/Sinus walt  CHADSVASC- 4    HASBLED-  Thromboembolic risk reduction: warfarin INR goal 2-3 mgmt per coag clinic  Rate Control/ Rhythm Control: lopressor- decrease to 12.5mg BID    3.) Hypotension:   Improving - decrease midodrine to 2.5mg BID    Instructions:   1. Medications: take torsemide daily and extra tab if needed for weight gain,SOB, swelling  2. Labs: BNP, BMP  3. Follow up: 4-6  weeks - Dr. Juan Roman  4. Daily weight: Call for increase 3 lbs/day or 5 lbs/week. 5. 2 gm sodium diet:  6. Fluid Restriction: 64 oz.       I appreciate the opportunity of cooperating in the care of this individual.    FAB Kyle - CNP, CNP, 8/18/2021,2:02 PM

## 2021-08-18 NOTE — PROGRESS NOTES
Ms. Troy Hu is a 78 y.o.  female. Ms. Troy Hu had an INR test today. Results were reviewed and appropriate warfarin management was completed. This visit was performed as: An in person visit. Protocols were followed with precautions to reduce the spread of COVID-19. Patient verifies current dosing regimen: Yes     Warfarin medication reviewed and updated on the patient 's home medication list: Yes   All other medications reviewed and updated on the patient 's home medication list: No: medication changes reviewed     Lab Results   Component Value Date    INR 1.70 2021    INR 1.90 2021    INR 2.1 2021       Patient Findings     Positives:  Change in medications    Negatives:  Signs/symptoms of bleeding, Missed doses, Change in diet/appetite    Comments:  Metoprolol dose decreased - no expected effect on warfarin          Anticoagulation Summary  As of 2021    INR goal:  2.0-3.0   TTR:  54.2 % (10.1 y)   INR used for dosin.70 (2021)   Warfarin maintenance plan:  5 mg (5 mg x 1) every Mon, Wed, Fri; 2.5 mg (5 mg x 0.5) all other days   Weekly warfarin total:  25 mg   Plan last modified:  [facility] (2021)   Next INR check:  2021   Priority:  Maintenance   Target end date: Indefinite    Indications    Abnormal coagulation profile [R79.1]             Anticoagulation Episode Summary     INR check location:      Preferred lab:      Send INR reminders to:  [facility]    Comments:  EPIC      Factor V Leiden       Anticoagulation Care Providers     Provider Role Specialty Phone number    Dario Ocasio MD Referring Family Medicine [phone]          Warfarin assessment / plan:     Patient's INR subtherapeutic today at 1.7 despite an increase in her warfarin dose five days ago. Patient denies any missed doses or any change in the amount of vitamin K rich foods she has been eating.  Patient's metoprolol dose was recently decreased - no expected effect on warfarin. Patient started on amiodarone about 4 weeks ago. Patient instructed to take warfarin 7.5 mg today and then start new dose of warfarin 2.5 mg daily EXCEPT 5 mg on Monday, Wednesday, and Friday. Plan to recheck INR in 10 days. Description    TODAY take 7.5 mg (one and a half tablet) then  NEW DOSE: Warfarin 2.5 mg (half a tablet) daily EXCEPT 5 mg (one tablet) on Monday, Wednesday, and Friday    Keep your green vegetable intake consistent (3-4 salads per week). Please call if this changes. Call [phone] with signs or symptoms of bleeding or ANY medication changes (including over-the-counter medications or herbal supplements). If significant bleeding occurs please seek immediate medical attention. Limit alcohol intake. Please call if this changes. Immunization History   Administered Date(s) Administered    COVID-19, Pfizer, PF, 30mcg/0.3mL 01/29/2021, 02/19/2021    Influenza Virus Vaccine 10/16/2009, 01/26/2010, 09/29/2010, 10/03/2016    Influenza, High Dose (Fluzone 65 yrs and older) 09/27/2011, 10/01/2012, 10/29/2013, 10/23/2014, 11/24/2015, 10/23/2018, 12/21/2019    Influenza, Quadv, IM, PF (6 mo and older Fluzone, Flulaval, Fluarix, and 3 yrs and older Afluria) 12/22/2020    Pneumococcal Conjugate 13-valent ([id number]) 07/02/2015    Pneumococcal Polysaccharide ([id number]) 07/01/2006, 11/07/2011    Td, unspecified formulation 10/04/2004    Tdap (Boostrix, Adacel) 09/01/2015         Reviewed AVS with patient / caregiver.       CLINICAL PHARMACY CONSULT: MED RECONCILIATION/REVIEW ADDENDUM    For Pharmacy Admin Tracking Only     Intervention Detail: Dose Adjustment: 1, reason: Therapy Optimization   Total # of Interventions Recommended: 1   Total # of Interventions Accepted: 1   Time Spent (min): 20

## 2021-08-18 NOTE — PATIENT INSTRUCTIONS
Instructions:   1. Medications: take torsemide daily and extra tab if needed for weight gain,SOB, swelling  2. Labs: BNP, BMP  3. Follow up: 4-6  weeks - Dr. Elena Mathews  4. Daily weight: Call for increase 3 lbs/day or 5 lbs/week. 5. 2 gm sodium diet:  6. Fluid Restriction: 64 oz.

## 2021-08-19 PROBLEM — I50.22 CHRONIC SYSTOLIC HEART FAILURE (HCC): Status: ACTIVE | Noted: 2021-08-19

## 2021-08-20 ENCOUNTER — CARE COORDINATION (OUTPATIENT)
Dept: CASE MANAGEMENT | Age: 81
End: 2021-08-20

## 2021-08-20 RX ORDER — MIDODRINE HYDROCHLORIDE 5 MG/1
TABLET ORAL
Qty: 90 TABLET | OUTPATIENT
Start: 2021-08-20

## 2021-08-20 NOTE — CARE COORDINATION
[facility] Transitions Follow Up Call    2021    Patient: Lila Deng  Patient : 1940   MRN: <[id number]>  Reason for Admission: STEMI  Discharge Date: 21 RARS: Readmission Risk Score: 19         Spoke with: Ezekiel [place] Transitions Follow Up Call    Needs to be reviewed by the provider   Additional needs identified to be addressed with provider: No  none             Method of communication with provider : phone      Care Transition Nurse (CTN) contacted the patient by telephone to follow up after admission. Verified name and  with patient as identifiers. Addressed changes since last contact: none  Discussed follow-up appointments. If no appointment was previously scheduled, appointment scheduling offered: Yes. Is follow up appointment scheduled within 7 days of discharge? Yes. Advance Care Planning:   Does patient have an Advance Directive: reviewed and current. CTN reviewed discharge instructions, medical action plan and red flags with patient and discussed any barriers to care and/or understanding of plan of care after discharge. Discussed appropriate site of care based on symptoms and resources available to patient including: PCP and Specialist. The patient agrees to contact the PCP office for questions related to their healthcare. Patients top risk factors for readmission: medical condition-STEMI    Patient verified  and was pleasant and agreeable to transition calls. States she is well. Visited  in [address]. Denies SOB, cough, edema, CP. Weight 156 this AM. Had good cardiology and coag visits. Taking all medications as directed. States lab work was good. Cardio . Denies any acute needs at present time. Educated on the use of urgent care or physicians 24 hr access line if assistance is needed after hours. CTN provided contact information for future needs. No further follow-up call indicated based on severity of symptoms and risk factors.  Episode ended at this time. Nidhi Haile  [facility]  Care Transitions  [phone]    Care Transitions Subsequent and Final Call    Subsequent and Final Calls  Do you have any ongoing symptoms?: No  Have your medications changed?: No  Do you have any questions related to your medications?: No  Do you currently have any active services?: No  Do you have any needs or concerns that I can assist you with?: No  Identified Barriers: None  Care Transitions Interventions  Other Interventions:            Follow Up  Future Appointments   Date Time Provider Buck Soares   8/24/2021  1:00 PM FAB [place] - CNP [facility] [place] [facility]   8/27/2021 11:40 AM [place] [facility] MG[facility] CLINIC [facility] [place] [facility]   9/28/2021  3:00 PM Benja Soria MD [facility]   10/20/2021  1:40 PM Summer Maza MD [facility]   12/6/2021  9:00 [facility]       Irene Belcher LPN

## 2021-08-24 ENCOUNTER — OFFICE VISIT (OUTPATIENT)
Dept: PHARMACY | Age: 81
End: 2021-08-24
Payer: MEDICARE

## 2021-08-24 ENCOUNTER — ANTI-COAG VISIT (OUTPATIENT)
Dept: PHARMACY | Age: 81
End: 2021-08-24
Payer: MEDICARE

## 2021-08-24 DIAGNOSIS — R79.1 ABNORMAL COAGULATION PROFILE: Primary | ICD-10-CM

## 2021-08-24 DIAGNOSIS — I50.22 CHRONIC SYSTOLIC HEART FAILURE (HCC): Primary | ICD-10-CM

## 2021-08-24 LAB — INR BLD: 3.2

## 2021-08-24 PROCEDURE — 85610 PROTHROMBIN TIME: CPT

## 2021-08-24 PROCEDURE — 99213 OFFICE O/P EST LOW 20 MIN: CPT | Performed by: NURSE PRACTITIONER

## 2021-08-24 PROCEDURE — 99211 OFF/OP EST MAY X REQ PHY/QHP: CPT

## 2021-08-24 RX ORDER — TORSEMIDE 20 MG/1
TABLET ORAL
Qty: 30 TABLET | Refills: 3 | Status: ON HOLD | OUTPATIENT
Start: 2021-08-24 | End: 2021-09-18 | Stop reason: SDUPTHER

## 2021-08-24 NOTE — PROGRESS NOTES
[facility]  Heart Failure    [address]  Phone: [phone]  Fax: [phone]      NAME: Tre Chaudhry RECORD NUMBER:  [id number]  AGE: 78 y.o. GENDER: female  : 1940  EPISODE DATE:  2021      No chief complaint on file.        Past Medical History:   Diagnosis Date    [facility]ocytic [facility] in [facility]     CLL (chronic lymphocytic leukemia) ([facility] 2015    COPD (chronic obstructive pulmonary disease) ([facility]     Essential hypertension     controlled with salt restriction (initially)    Factor V Leiden mutation ([facility]     Goiter 2011    1.5 cm midline    Hypercholesterolemia     Impaired fasting glucose 2011    Osteoarthritis     Osteoporosis     Post herpetic neuralgia     Vitamin D deficiency 2011      Past Surgical History:   Procedure Laterality Date    CATARACT REMOVAL WITH IMPLANT  12    left eye    JOINT REPLACEMENT      partial knee R and L    KNEE SURGERY      partial replacements, both knees    MOHS SURGERY  2019    R cheek and R superior temple    SPLENECTOMY      TUNNELED VENOUS PORT PLACEMENT      UPPER GASTROINTESTINAL ENDOSCOPY N/A 2019    EGD ESOPHAGOGASTRODUODENOSCOPY, WITH ENDOSCOPIC ULTRASOUND OF ESOPHAGUS performed by Wilmer Terrell MD at [address] [place] [address] Road History   Problem Relation Age of Onset    Diabetes Father     Stroke Sister 50         of a stroke     Social History     Tobacco Use    Smoking status: Former Smoker     Packs/day: 0.30     Years: 50.00     Pack years: 15.00     Types: Cigarettes     Quit date: 1995     Years since quittin.8    Smokeless tobacco: Never Used   Vaping Use    Vaping Use: Never used   Substance Use Topics    Alcohol use: No     Alcohol/week: 0.0 standard drinks    Drug use: Not on file     Counseling given: Not Answered     Immunization History Administered Date(s) Administered    COVID-19, Pfizer, PF, 30mcg/0.3mL 01/29/2021, 02/19/2021    Influenza Virus Vaccine 10/16/2009, 01/26/2010, 09/29/2010, 10/03/2016    Influenza, High Dose (Fluzone 65 yrs and older) 09/27/2011, 10/01/2012, 10/29/2013, 10/23/2014, 11/24/2015, 10/23/2018, 12/21/2019    Influenza, Quadv, IM, PF (6 mo and older Fluzone, Flulaval, Fluarix, and 3 yrs and older Afluria) 12/22/2020    Pneumococcal Conjugate 13-valent ([id number]) 07/02/2015    Pneumococcal Polysaccharide ([id number]) 07/01/2006, 11/07/2011    Td, unspecified formulation 10/04/2004    Tdap (Boostrix, Adacel) 09/01/2015     No Known Allergies       ECHO EF%: 45-50  date: 7/19/2021      Last Hospitalization: 7/19/2021    History of ALYSON: Denies  []BIPAP/CPAP use:   []Education about proper cleaning completed today     Subjective   HPI: Ms. Gilma Aguilar is a 78 y.o. female here for Heart Failure Services. She has a medical history of chronic systolic heart failure, MI July 2021, factor V Leiden and CLL in remission. She tells me that she is not sleeping well because her  of 59 years is in a SNF. She has 5 children to work a great deal of help to her and they have hired a . Reports fatigue but states is probably due to not sleeping well. Does have some shortness of breath with exertion. Is weighing herself daily and also checking her blood pressure. She monitors her sodium closely as well as her fluids. She loves Diet Coke and drinks four 12 ounce cans per day and a little bit of water she tells me. Overall reports that her intake is decreased and she is not as interested in eatimg now that her  is not home. She has all of her medications at home and reports good compliance. Denies edema, fatigue, CP, palpitations, orthopnea, or abdominal fullness. Her only complaint today is that she misses driving and traveling.   She is anxiously awaiting clearance from cardiology to do so.    Review of Systems:   Constitutional: + fatigue, Negative for activity change, appetite change, chills, diaphoresis, fever and unexpected weight change. HENT: Negative for congestion, ear pain, postnasal drip, rhinorrhea, sinus pressure, sinus pain, sneezing, sore throat and trouble swallowing. Eyes: Negative for discharge and redness. Respiratory: Negative for cough, chest tightness, sputum, shortness of breath and wheezing. Cardiovascular: Negative for chest pain, palpitations and leg swelling. Gastrointestinal: Negative for abdominal distention, abdominal pain, constipation, diarrhea, nausea and vomiting. Genitourinary: Negative for decreased urine volume, difficulty urinating, dysuria and hematuria. Skin: Negative for pallor and rash. Neurological: Negative for dizziness, tremors, weakness, light-headedness and headaches. Psychiatric/Behavioral: Negative for confusion and sleep disturbance. The patient is not nervous/anxious. Sleep: + restless sleep and frequent waking       Functional Activity New York Heart Association Classification  Patient Symptoms   []   Class I No limitation of physical activity. Ordinary physical activity does not cause undue fatigue, palpitation, dyspnea (shortness of breath). [x]   Class II Slight limitation of physical activity. Comfortable at rest. Ordinary physical activity results in fatigue, palpitation, dyspnea (shortness of breath). []   Class III  Jarrod limitation of physical activity. Comfortable at rest.  Less than ordinary activity causes fatigue, palpitation, dyspnea. []   Class IV Unable to carry on any physical activity without discomfort. Symptoms of heart failure at rest.  If any physical activity is undertaken, discomfort increases.     Shortness of Breath:   []   None   [x]   Dyspnea on exertion   []   Dyspnea with normal activities  []   Dyspnea at rest  []   Dyspnea while sleeping    Patient Findings:   []  Missed doses  []  Diet changes  []  Sodium intake changes    []  Alcohol intake changes  []  Night time cough  []  Edema    []  Activity changes   [x]  Fatigue that limits activity []  Acute illness  []  Early saiety, abd fullness []  Chest pain   []  Other        Objective: There were no vitals taken for this visit. BP Readings from Last 3 Encounters:   08/18/21 112/60   08/04/21 114/62   07/30/21 121/88     Wt Readings from Last 6 Encounters:   08/18/21 157 lb 3.2 oz (71.3 kg)   08/04/21 159 lb (72.1 kg)   07/30/21 166 lb 14.4 oz (75.7 kg)   07/25/21 165 lb 12.6 oz (75.2 kg)   07/16/21 158 lb 15.2 oz (72.1 kg)   04/20/21 162 lb (73.5 kg)     Physical Exam:   Constitutional: Oriented to person, place, and time. Appears well-developed and well-nourished. No distress. HENT:   Head: Normocephalic and atraumatic. Right Ear: External ear normal.   Left Ear: External ear normal.   Eyes: Pupils are equal, round, and reactive to light. Conjunctivae and EOM are normal.   Neck: Normal range of motion. Neck supple. No JVD present. No tracheal deviation present. Cardiovascular: Normal rate, regular rhythm and normal heart sounds. No murmur heard. Pulmonary/Chest: Effort normal and breath sounds normal. No respiratory distress. No wheezes. No rales. Abdominal: Soft. Bowel sounds are normal. No distension. There is no tenderness. Musculoskeletal: Normal range of motion. No edema. Neurological: Alert and oriented to person, place, and time. Skin: Skin is warm and dry. Capillary refill takes less than 2 seconds. No rash noted. He is not diaphoretic. Psychiatric: Normal mood and affect.      BMP:   Lab Results   Component Value Date     08/18/2021    K 4.7 08/18/2021    K 4.3 07/19/2021    CL 99 08/18/2021    CO2 28 08/18/2021    BUN 14 08/18/2021    CREATININE 1.7 08/18/2021       CBC:    Lab Results   Component Value Date    WBC 22.1 07/26/2021    HGB 9.3 07/26/2021    HCT 27.9 07/26/2021     07/26/2021     HgA1C: Lab Results   Component Value Date    LABA1C 6.2 04/20/2021    LABA1C 6.1 11/01/2013    LABA1C 6.3 (H) 11/08/2011     TSH:   Lab Results   Component Value Date    TSH 1.67 11/08/2011     Lipids:   Lab Results   Component Value Date    CHOL 259 04/20/2021    TRIG 128 04/20/2021    HDL 55 04/20/2021    HDL 62 11/08/2011    LDLCALC 178 04/20/2021     ProBNP:   Lab Results   Component Value Date    PROBNP 3,148 08/18/2021    PROBNP 9,250 08/04/2021    PROBNP 25,962 07/19/2021       [x]Reviewed daily weight log and assessed self management skills including early recognition and notification of exacerbation   [x]Encouraged daily weights and to call with increase of 3 pounds in one day or 5 pounds in one week or weight increased above dry weight    [x]Discussed fluid restriction and sodium restriction as well as nutrition goals   []Weight loss counseling performed  []Exercise Counseling performed      Medications reviewed:   [] compared patient's bottles with EPIC list  [x] patient did not bring medication bottles      Current medications:  Outpatient Medications Marked as Taking for the 8/24/21 encounter (Office Visit) with FAB Barton CNP   Medication Sig Dispense Refill    midodrine (PROAMATINE) 5 MG tablet Take 0.5 tablets by mouth 2 times daily 30 tablet 3    [DISCONTINUED] torsemide (DEMADEX) 20 MG tablet Take 1 tablet by mouth daily Take 20mg daily. May take extra dose in the evening for weight gain, SOB or swelling.  100 tablet 3    metoprolol tartrate (LOPRESSOR) 25 MG tablet Take 0.5 tablets by mouth 2 times daily 45 tablet 3    sacubitril-valsartan (ENTRESTO) 24-26 MG per tablet Take 1 tablet by mouth 2 times daily 60 tablet 3    amiodarone (CORDARONE) 200 MG tablet Take 1 tablet by mouth daily 90 tablet 3    warfarin (COUMADIN) 5 MG tablet TAKE 1 1/2 TABLETS BY MOUTH MONDAY AND FRIDAY, THEN 1 TABLET BY MOUTH DAILY ON THE OTHER DAYS (Patient taking differently: Take 2.5 mg by mouth daily EXCEPT 5 mg (one tablet) on Mondays, Wednesdays, and Fridays or as directed by [facility] Coumadin Service [phone]) 115 tablet 1    gabapentin (NEURONTIN) 300 MG capsule TAKE 1 TO 2 CAPSULES BY MOUTH EVERY NIGHT AS DIRECTED 60 capsule 5    Cyanocobalamin (VITAMIN B 12) 500 MCG TABS Take 1 tablet by mouth daily         [x]Reviewed heart failure medications including how they work and potential side effects. [x]Advised patient to avoid NSAIDs. Get With The Guidelines  Ms. Dana Lagunas is on a Beta-Blocker for (HFrEF) (systolic) EF </= 53%  Yes    Ms. Dana Lagunas is on an Ace-Inhibitor / ARB / Entresto for (HFrEF) (systolic) EF </= 19% Yes      Ms. Dana Lagunas is on a Aldosterone Receptor Antagonist for (HFrEF) (systolic) EF </= 89% or EF </= 40% with MI (Okay to use if SCr </= 2.5mg/dL in men, SCr </= 2mg/dL in women; Potassium < 5.0meq/L) No      Ms. Dana Lagunas is on a Diuretic Yes    If the above guidelines are not met, reason documented above or recommendations made: Yes.       [] Advanced Directives completed and scanned  [x] Advanced Directives need to be addressed      Barriers to Adherence: Verbalizes interest and Active attentive participant    Environmental Concerns: unremarkable    Readiness to Change  action - ready to set action plan and implement      A heart failure binder has been provided in addition to extensive education including the information noted above. Assessment/Plan     Problem List Items Addressed This Visit     Chronic systolic heart failure ([facility] [address] - Primary     No symptoms of acute exacerbation today. Continue daily weight, sodium reduction and fluid restriction. Emergency action plan reviewed today with patient. Continue management and follow-up with cardiology. No orders of the defined types were placed in this encounter.        Follow up with wellness center: States she would like to follow-up sometime in the future and will schedule when she is ready  Time spent in visit today including counseling and education: 60 minutes. *This note was transcribed using [id number] Vsnap. Please disregard any translational errors.          Electronically signed by FAB Calderon CNP,  on 8/26/2021 at 1:07 PM

## 2021-08-24 NOTE — PROGRESS NOTES
Ms. Nba Hooks is a 78 y.o.  female. Ms. Nba Hooks had an INR test today. Results were reviewed and appropriate warfarin management was completed. This visit was performed as: An in person visit. Protocols were followed with precautions to reduce the spread of COVID-19. Patient verifies current dosing regimen: Yes     Warfarin medication reviewed and updated on the patient 's home medication list: Yes   All other medications reviewed and updated on the patient 's home medication list: No: no changes     Lab Results   Component Value Date    INR 3.20 08/24/2021    INR 1.70 08/18/2021    INR 1.90 08/13/2021       Patient Findings     Negatives:  Signs/symptoms of bleeding, Missed doses, Change in medications, Change in diet/appetite          Anticoagulation Summary  As of 8/24/2021    INR goal:  2.0-3.0   TTR:  54.2 % (10.1 y)   INR used for dosing:  3.20 (8/24/2021)   Warfarin maintenance plan:  5 mg (5 mg x 1) every Mon, Wed, Fri; 2.5 mg (5 mg x 0.5) all other days   Weekly warfarin total:  25 mg   Plan last modified:  Maryann Leisure (8/18/2021)   Next INR check:  9/8/2021   Priority:  Maintenance   Target end date: Indefinite    Indications    Abnormal coagulation profile [R79.1]             Anticoagulation Episode Summary     INR check location:      Preferred lab:      Send INR reminders to:  [facility]    Comments:  EPIC      Factor V Leiden       Anticoagulation Care Providers     Provider Role Specialty Phone number    Nhung Wu MD Referring Family Medicine [phone]          Warfarin assessment / plan:     Patient's INR slightly supratherapeutic today at 3.2 - has been subtherapeutic the past two weeks. Patient reports no changes to her medications or diet. Reports no missed doses. She is likely above goal due to getting a higher dose last week. Plan - continue warfarin 2.5 mg daily EXCEPT 5 mg on Monday, Wednesday, and Friday.  Recheck INR in 2 weeks. Description    CONTINUE: Warfarin 2.5 mg (half a tablet) daily EXCEPT 5 mg (one tablet) on Monday, Wednesday, and Friday    Keep your green vegetable intake consistent (3-4 salads per week). Please call if this changes. Call [phone] with signs or symptoms of bleeding or ANY medication changes (including over-the-counter medications or herbal supplements). If significant bleeding occurs please seek immediate medical attention. Limit alcohol intake. Please call if this changes. Immunization History   Administered Date(s) Administered    COVID-19, Pfizer, PF, 30mcg/0.3mL 01/29/2021, 02/19/2021    Influenza Virus Vaccine 10/16/2009, 01/26/2010, 09/29/2010, 10/03/2016    Influenza, High Dose (Fluzone 65 yrs and older) 09/27/2011, 10/01/2012, 10/29/2013, 10/23/2014, 11/24/2015, 10/23/2018, 12/21/2019    Influenza, Quadv, IM, PF (6 mo and older Fluzone, Flulaval, Fluarix, and 3 yrs and older Afluria) 12/22/2020    Pneumococcal Conjugate 13-valent ([id number]) 07/02/2015    Pneumococcal Polysaccharide ([id number]) 07/01/2006, 11/07/2011    Td, unspecified formulation 10/04/2004    Tdap (Boostrix, Adacel) 09/01/2015         Reviewed AVS with patient / caregiver.       CLINICAL PHARMACY CONSULT: MED RECONCILIATION/REVIEW ADDENDUM    For Pharmacy Admin Tracking Only     Intervention Detail:    Total # of Interventions Recommended: 0   Total # of Interventions Accepted: 0   Time Spent (min): 15

## 2021-08-24 NOTE — TELEPHONE ENCOUNTER
Last OV: 8/18/21  Next OV: 9/28/21  Last refill:8/18/21  Most recent Labs: 8/19/21  Last PT/INR (if needed):8/18/21  Last EKG (if needed):

## 2021-08-26 VITALS
BODY MASS INDEX: 32.56 KG/M2 | DIASTOLIC BLOOD PRESSURE: 57 MMHG | HEART RATE: 58 BPM | SYSTOLIC BLOOD PRESSURE: 115 MMHG | RESPIRATION RATE: 12 BRPM | WEIGHT: 161.2 LBS | OXYGEN SATURATION: 96 %

## 2021-08-26 NOTE — ASSESSMENT & PLAN NOTE
No symptoms of acute exacerbation today. Continue daily weight, sodium reduction and fluid restriction. Emergency action plan reviewed today with patient. Continue management and follow-up with cardiology.

## 2021-08-31 ENCOUNTER — NURSE TRIAGE (OUTPATIENT)
Dept: OTHER | Facility: CLINIC | Age: 81
End: 2021-08-31

## 2021-08-31 NOTE — TELEPHONE ENCOUNTER
Reason for Disposition   MODERATE-SEVERE itching (i.e., interferes with school, work, or sleep)    Answer Assessment - Initial Assessment Questions  1. SYMPTOM: \"What's the main symptom you're concerned about? \" (e.g., rash, itching, swelling, dryness)      Vaginal itching and swollen in groin area. Vulva and pelvic area. 2. LOCATION: \"Where is the  itchiness located? \" (e.g., inside/outside, left/right)      Outside, right and left. 3. ONSET: \"When did the  itchiness  start? \"      3-4 weeks ago . Was hoping for it to get better but she is itching until she bleeds. 4. PAIN: \"Is there any pain? \" If so, ask: \"How bad is it? \" (Scale: 1-10; mild, moderate, severe)    -  MILD (1-3): doesn't interfere with normal activities     -  MODERATE (4-7): interferes with normal activities (e.g., work or school) or awakens from sleep      -  SEVERE (8-10): excruciating pain, unable to do any normal activities      She did have pain with urination, which has resolved. 5. CAUSE: \"What do you think is causing the symptoms? \"      Possibly new medication? 6. OTHER SYMPTOMS: \"Do you have any other symptoms? \" (e.g., fever, vaginal bleeding, pain with urination)      No other symptoms    7. PREGNANCY: \"Is there any chance you are pregnant? \" \"When was your last menstrual period? \"      No    Protocols used: VULVAR SYMPTOMS-ADULT-AH, VULVAR SYMPTOMS-ADULT-OH    Received call from [address] [place] [address] at [facility] with Red Flag Complaint. Brief description of triage: severe vaginal itchiness and swelling. No pain, no burning with urination and no urinary odor. Triage indicates for patient to be seen within the next 24 hours. Care advice provided, patient verbalizes understanding; denies any other questions or concerns; instructed to call back for any new or worsening symptoms. Writer provided warm transfer to Ambrosio at [facility] for appointment scheduling. Attention Provider:   Thank you for allowing me to participate in the care of your patient. The patient was connected to triage in response to information provided to the ECC. Please do not respond through this encounter as the response is not directed to a shared pool.

## 2021-09-01 ENCOUNTER — OFFICE VISIT (OUTPATIENT)
Dept: FAMILY MEDICINE CLINIC | Age: 81
End: 2021-09-01
Payer: MEDICARE

## 2021-09-01 VITALS
HEART RATE: 56 BPM | WEIGHT: 161 LBS | OXYGEN SATURATION: 94 % | SYSTOLIC BLOOD PRESSURE: 106 MMHG | BODY MASS INDEX: 32.46 KG/M2 | DIASTOLIC BLOOD PRESSURE: 52 MMHG | HEIGHT: 59 IN

## 2021-09-01 DIAGNOSIS — I21.3 ACUTE ST ELEVATION MYOCARDIAL INFARCTION (STEMI), UNSPECIFIED ARTERY (HCC): ICD-10-CM

## 2021-09-01 DIAGNOSIS — N89.8 VAGINA ITCHING: Primary | ICD-10-CM

## 2021-09-01 DIAGNOSIS — Z09 HOSPITAL DISCHARGE FOLLOW-UP: ICD-10-CM

## 2021-09-01 PROCEDURE — 1123F ACP DISCUSS/DSCN MKR DOCD: CPT | Performed by: NURSE PRACTITIONER

## 2021-09-01 PROCEDURE — G8427 DOCREV CUR MEDS BY ELIG CLIN: HCPCS | Performed by: NURSE PRACTITIONER

## 2021-09-01 PROCEDURE — 1090F PRES/ABSN URINE INCON ASSESS: CPT | Performed by: NURSE PRACTITIONER

## 2021-09-01 PROCEDURE — 1036F TOBACCO NON-USER: CPT | Performed by: NURSE PRACTITIONER

## 2021-09-01 PROCEDURE — G8400 PT W/DXA NO RESULTS DOC: HCPCS | Performed by: NURSE PRACTITIONER

## 2021-09-01 PROCEDURE — G8417 CALC BMI ABV UP PARAM F/U: HCPCS | Performed by: NURSE PRACTITIONER

## 2021-09-01 PROCEDURE — 4040F PNEUMOC VAC/ADMIN/RCVD: CPT | Performed by: NURSE PRACTITIONER

## 2021-09-01 PROCEDURE — 99214 OFFICE O/P EST MOD 30 MIN: CPT | Performed by: NURSE PRACTITIONER

## 2021-09-01 RX ORDER — FLUCONAZOLE 150 MG/1
150 TABLET ORAL
Qty: 2 TABLET | Refills: 0 | Status: SHIPPED | OUTPATIENT
Start: 2021-09-01 | End: 2021-09-07

## 2021-09-01 ASSESSMENT — ENCOUNTER SYMPTOMS
DIARRHEA: 0
SINUS PRESSURE: 0
NAUSEA: 0
EYE DISCHARGE: 0
CHEST TIGHTNESS: 0
COLOR CHANGE: 0
COUGH: 0
ABDOMINAL PAIN: 0
CONSTIPATION: 0
SHORTNESS OF BREATH: 0
ABDOMINAL DISTENTION: 0
SINUS PAIN: 0
BACK PAIN: 0

## 2021-09-01 NOTE — PROGRESS NOTES
Date of Service:  2021    Goran Shepard (:  1940) is a 78 y.o. female, here for evaluation of the following medical concerns:    Chief Complaint   Patient presents with    Vaginal Itching     pt is c/o of vaginal itching, feels swollen, x 3 weeks, took benarly last night         HPI    Patient just 6 weeks ago had massive heart attack and was in hospital for 8 days. Pt not currently allowed to drive. Pt reports vaginal itching started about 3 weeks ago when she returned from the hospital. Itches herself so much and feels raw in her vaginal area. Pt tried vagisil but uses about a tube a day. Pt tried benadryl last night which helped the itching but made her too tired. No discharge. Possible mild swelling of labia. No odor. Review of Systems   Constitutional: Negative for activity change, appetite change, fatigue, fever and unexpected weight change. HENT: Negative for congestion, ear pain, sinus pressure and sinus pain. Eyes: Negative for discharge and visual disturbance. Respiratory: Negative for cough, chest tightness and shortness of breath. Cardiovascular: Negative for chest pain, palpitations and leg swelling. Gastrointestinal: Negative for abdominal distention, abdominal pain, constipation, diarrhea and nausea. Endocrine: Negative for cold intolerance, heat intolerance, polydipsia, polyphagia and polyuria. Genitourinary: Negative for decreased urine volume, difficulty urinating, dysuria, flank pain, frequency and urgency. Vaginal pain: severe itching. Musculoskeletal: Negative for arthralgias, back pain, gait problem, joint swelling, myalgias and neck pain. Skin: Positive for rash (itching in vaginal area). Negative for color change and wound. Allergic/Immunologic: Negative for food allergies and immunocompromised state. Neurological: Negative for dizziness, tremors, speech difficulty, weakness, light-headedness, numbness and headaches.    Hematological: Negative for adenopathy. Does not bruise/bleed easily. Psychiatric/Behavioral: Negative for confusion, decreased concentration, self-injury, sleep disturbance and suicidal ideas. The patient is not nervous/anxious. Prior to Visit Medications    Medication Sig Taking?  Authorizing Provider   fluconazole (DIFLUCAN) 150 MG tablet Take 1 tablet by mouth every 72 hours for 6 days Yes FAB Ji CNP   torsemide (DEMADEX) 20 MG tablet TAKE 1 TABLET BY MOUTH DAILY AS NEEDED Yes Timmy Lay MD   midodrine (PROAMATINE) 5 MG tablet Take 0.5 tablets by mouth 2 times daily Yes FAB Porter CNP   metoprolol tartrate (LOPRESSOR) 25 MG tablet Take 0.5 tablets by mouth 2 times daily Yes FAB Porter CNP   sacubitril-valsartan (ENTRESTO) 24-26 MG per tablet Take 1 tablet by mouth 2 times daily Yes Kasandra Masters MD   amiodarone (CORDARONE) 200 MG tablet Take 1 tablet by mouth daily Yes Kasandra Masters MD   warfarin (COUMADIN) 5 MG tablet TAKE 1 1/2 TABLETS BY MOUTH MONDAY AND FRIDAY, THEN 1 TABLET BY MOUTH DAILY ON THE OTHER DAYS  Patient taking differently: Take 2.5 mg by mouth daily EXCEPT 5 mg (one tablet) on , , and  or as directed by [facility] Coumadin Service [phone] Yes Jae Nguyễn MD   gabapentin (NEURONTIN) 300 MG capsule TAKE 1 TO 2 CAPSULES BY MOUTH EVERY NIGHT AS DIRECTED Yes FAB Teixeira CNP   Cyanocobalamin (VITAMIN B 12) 500 MCG TABS Take 1 tablet by mouth daily Yes Historical Provider, MD        Social History     Tobacco Use    Smoking status: Former Smoker     Packs/day: 0.30     Years: 50.00     Pack years: 15.00     Types: Cigarettes     Quit date: 1995     Years since quittin.8    Smokeless tobacco: Never Used   Substance Use Topics    Alcohol use: No     Alcohol/week: 0.0 standard drinks        Vitals:    21 1052 21 1056   BP: (!) 108/50 (!) 106/52   Site: Right Upper Arm Right Upper Arm Position: Sitting Sitting   Cuff Size: Medium Adult Medium Adult   Pulse: 56    SpO2: 94%    Weight: 161 lb (73 kg)    Height: 4' 11\" (1.499 m)      Estimated body mass index is 32.52 kg/m² as calculated from the following:    Height as of this encounter: 4' 11\" (1.499 m). Weight as of this encounter: 161 lb (73 kg). Physical Exam  Vitals reviewed. Constitutional:       General: She is awake. Appearance: Normal appearance. She is well-developed and well-groomed. She is obese. She is not ill-appearing. HENT:      Head: Normocephalic and atraumatic. Right Ear: Hearing, tympanic membrane, ear canal and external ear normal.      Left Ear: Hearing, tympanic membrane, ear canal and external ear normal.      Nose: Nose normal.      Mouth/Throat:      Lips: Pink. Mouth: Mucous membranes are moist.      Pharynx: Oropharynx is clear. Eyes:      General: Lids are normal.      Extraocular Movements: Extraocular movements intact. Conjunctiva/sclera: Conjunctivae normal.      Pupils: Pupils are equal, round, and reactive to light. Neck:      Thyroid: No thyromegaly. Vascular: No carotid bruit. Cardiovascular:      Rate and Rhythm: Normal rate. Pulses:           Carotid pulses are 2+ on the right side and 2+ on the left side. Radial pulses are 2+ on the right side and 2+ on the left side. Posterior tibial pulses are 2+ on the right side and 2+ on the left side. Heart sounds: Normal heart sounds, S1 normal and S2 normal. No murmur heard. Pulmonary:      Effort: Pulmonary effort is normal.      Breath sounds: Normal breath sounds. Abdominal:      General: Bowel sounds are normal. There is no abdominal bruit. Palpations: Abdomen is soft. Tenderness: There is no abdominal tenderness. Genitourinary:     Comments: Declined  Musculoskeletal:         General: Normal range of motion.       Cervical back: Full passive range of motion without pain, normal range of motion and neck supple. Right lower leg: No edema. Left lower leg: No edema. Lymphadenopathy:      Head:      Right side of head: No submental, submandibular, tonsillar, preauricular, posterior auricular or occipital adenopathy. Left side of head: No submental, submandibular, tonsillar, preauricular, posterior auricular or occipital adenopathy. Cervical: No cervical adenopathy. Right cervical: No superficial, deep or posterior cervical adenopathy. Left cervical: No superficial, deep or posterior cervical adenopathy. Upper Body:      Right upper body: No supraclavicular adenopathy. Left upper body: No supraclavicular adenopathy. Skin:     General: Skin is warm and dry. Capillary Refill: Capillary refill takes less than 2 seconds. Neurological:      General: No focal deficit present. Mental Status: She is alert and oriented to person, place, and time. Mental status is at baseline. Sensory: Sensation is intact. Motor: Motor function is intact. Coordination: Coordination is intact. Gait: Gait is intact. Psychiatric:         Attention and Perception: Attention and perception normal.         Mood and Affect: Mood and affect normal.         Speech: Speech normal.         Behavior: Behavior normal. Behavior is cooperative. Thought Content: Thought content normal.         Cognition and Memory: Cognition and memory normal.         Judgment: Judgment normal.         ASSESSMENT/PLAN:  1. Vagina itching  -     fluconazole (DIFLUCAN) 150 MG tablet; Take 1 tablet by mouth every 72 hours for 6 days, Disp-2 tablet, R-0Normal  2. Acute ST elevation myocardial infarction (STEMI), unspecified artery ([facility] [address]  3.  Hospital discharge follow-up   Discussed hospitalization from about 6 weeks ago   Reviewed EKG   Pt following with Dr Kip Cruz- cardiology now   Had interventional cath for AMI       Care Gaps Addressed  Vaccines recommended  Call insurance company to discuss coverage for shingles vaccine (Shingrix) 2 dose series   Second COVID vaccine recommended  Thyroid testing recommended with next blood draw  Flu vaccine recommended       I have reviewed patient's pertinent medical history, relevant laboratory and imaging studies, and past/future health maintenance. Discussed with the patient the importance of adhering to their current medication regimen as directed. Advised the patient that they should continue to work on eating a healthy balanced diet and staying active by exercising within their personal limits. Orders as listed above. Patient was advised to keep future appointments with their respective specialty care team(s). Patient had the opportunity to ask questions, all of which were answered to the best of my ability and with patient satisfaction. Patient understands and is agreeable with the care plan following today's visit. Patient is to schedule an appointment for any new or worsening symptoms. Go to ER for significant shortness of breath, chest pain, or uncontrolled pain or fever. I discussed with patient the risk and benefits of any medications that were prescribed today. I verified that the patient understands their medications, labs, and/or procedures. The patient is doing well with current medication regimen and does not have any barriers to adherence. The patient's self-management abilities are good. Return in about 7 weeks (around 10/20/2021) for Nerve Pain Follow Up. An electronic signature was used to authenticate this note.     --FAB Terry - CNP on 9/1/2021 at 11:17 AM

## 2021-09-01 NOTE — PATIENT INSTRUCTIONS
Patient Education        Vaginitis: Care Instructions  Your Care Instructions     Vaginitis is soreness or infection of the vagina. This common problem can cause itching and burning. And it can cause a change in vaginal discharge. Sometimes it can cause pain during sex. Vaginitis may be caused by bacteria, yeast, or other germs. Some infections that cause it are caught from a sexual partner. Bath products, spermicides, and douches can irritate the vagina too. Some women have this problem during and after menopause. A drop in estrogen levels during this time can cause dryness, soreness, and pain during sex. Your doctor can give you medicine to treat an infection. And home care may help you feel better. For certain types of infections, your sex partner must be treated too. Follow-up care is a key part of your treatment and safety. Be sure to make and go to all appointments, and call your doctor if you are having problems. It's also a good idea to know your test results and keep a list of the medicines you take. How can you care for yourself at home? · If your doctor prescribed antibiotics, take them as directed. Do not stop taking them just because you feel better. You need to take the full course of antibiotics. · Take your medicines exactly as prescribed. Call your doctor if you think you are having a problem with your medicine. · Do not eat or drink anything that has alcohol if you are taking metronidazole (Flagyl). · If you have a yeast infection, use over-the-counter products as your doctor tells you to. Or take medicine your doctor prescribes exactly as directed. · Wash your vaginal area daily with water. You also can use a mild, unscented soap if you want. · Do not use scented bath products. And do not use vaginal sprays or douches. · Put a washcloth soaked in cool water on the area to relieve itching. Or you can take cool baths.   · If you have dryness because of menopause, use estrogen cream or pills that your doctor prescribes. · Ask your doctor about when it is okay to have sex. · Use a personal lubricant before sex if you have dryness. Examples are Astroglide, K-Y Jelly, and Wet Lubricant Gel. · Ask your doctor if your sex partner also needs treatment. When should you call for help? Call your doctor now or seek immediate medical care if:    · You have a fever and pelvic pain. Watch closely for changes in your health, and be sure to contact your doctor if:    · You have bleeding other than your period.     · You do not get better as expected. Where can you learn more? Go to https://chpepiceweb.[email or web address] and sign in to your [employer] account. Enter G082 in the [employer] box to learn more about \"Vaginitis: Care Instructions. \"     If you do not have an account, please click on the \"Sign Up Now\" link. Current as of: February 11, 2021               Content Version: 12.9  © 2006-2021 [email or web address]. Care instructions adapted under license by [facility] ([facility]). If you have questions about a medical condition or this instruction, always ask your healthcare professional. [place] [address] any warranty or liability for your use of this information. Patient Education        Vaginal Yeast Infection: Care Instructions  Overview     A vaginal yeast infection is the growth of too many yeast cells in the vagina. This is common in women of all ages. Itching, vaginal discharge and irritation, and other symptoms can bother you. But yeast infections don't often cause other health problems. Some medicines can increase your risk of getting a yeast infection. These include antibiotics, birth control pills, hormones, and steroids. You may also be more likely to get a yeast infection if you are pregnant, have diabetes, douche, or wear tight clothes. With treatment, most yeast infections get better in 2 to 3 days.   Follow-up care is a key part of your treatment and safety. Be sure to make and go to all appointments, and call your doctor if you are having problems. It's also a good idea to know your test results and keep a list of the medicines you take. How can you care for yourself at home? · Take your medicines exactly as prescribed. Call your doctor if you think you are having a problem with your medicine. · Ask your doctor about over-the-counter (OTC) medicines for yeast infections. They may cost less than prescription medicines. If you use an OTC treatment, read and follow all instructions on the label. · Don't use tampons while using a vaginal cream or suppository. The tampons can absorb the medicine. Use pads instead. · Wear loose cotton clothing. Don't wear nylon or other fabric that holds body heat and moisture close to the skin. · Try sleeping without underwear. · Don't scratch. Relieve itching with a cold pack or a cool bath. · Don't wash your vaginal area more than once a day. Use plain water or a mild, unscented soap. Air-dry the vaginal area. · Change out of wet swimsuits after swimming. · If you are using a vaginal medicine, don't have sex until you have finished your treatment. But if you do have sex, don't depend on a condom or diaphragm for birth control. The oil in some vaginal medicines weakens latex. · Don't douche. When should you call for help? Call your doctor now or seek immediate medical care if:    · You have unexpected vaginal bleeding.     · You have new or increased pain in your vagina or pelvis. Watch closely for changes in your health, and be sure to contact your doctor if:    · You have a fever.     · You are not getting better after 2 days.     · Your symptoms come back after you finish your medicines. Where can you learn more? Go to https://chpepiceweb.[email or web address] and sign in to your [employer] account. Enter F884 in the [employer] box to learn more about \"Vaginal Yeast Infection: Care Instructions. \" If you do not have an account, please click on the \"Sign Up Now\" link. Current as of: February 11, 2021               Content Version: 12.9  © 2006-2021 [employer]. Care instructions adapted under license by [facility] ([facility]). If you have questions about a medical condition or this instruction, always ask your healthcare professional. [address] any warranty or liability for your use of this information.

## 2021-09-08 ENCOUNTER — APPOINTMENT (OUTPATIENT)
Dept: PHARMACY | Age: 81
End: 2021-09-08
Payer: MEDICARE

## 2021-09-11 ENCOUNTER — APPOINTMENT (OUTPATIENT)
Dept: GENERAL RADIOLOGY | Age: 81
DRG: 871 | End: 2021-09-11
Payer: MEDICARE

## 2021-09-11 ENCOUNTER — HOSPITAL ENCOUNTER (INPATIENT)
Age: 81
LOS: 6 days | Discharge: HOME HEALTH | DRG: 871 | End: 2021-09-18
Attending: EMERGENCY MEDICINE | Admitting: STUDENT IN AN ORGANIZED HEALTH CARE EDUCATION/TRAINING PROGRAM
Payer: MEDICARE

## 2021-09-11 DIAGNOSIS — I50.9 ACUTE CONGESTIVE HEART FAILURE, UNSPECIFIED HEART FAILURE TYPE (HCC): ICD-10-CM

## 2021-09-11 DIAGNOSIS — J96.01 ACUTE RESPIRATORY FAILURE WITH HYPOXIA AND HYPERCAPNIA (HCC): ICD-10-CM

## 2021-09-11 DIAGNOSIS — J18.9 PNEUMONIA OF BOTH LOWER LOBES DUE TO INFECTIOUS ORGANISM: ICD-10-CM

## 2021-09-11 DIAGNOSIS — Z79.01 ANTICOAGULATED: ICD-10-CM

## 2021-09-11 DIAGNOSIS — R06.03 RESPIRATORY DISTRESS: Primary | ICD-10-CM

## 2021-09-11 DIAGNOSIS — J96.02 ACUTE RESPIRATORY FAILURE WITH HYPOXIA AND HYPERCAPNIA (HCC): ICD-10-CM

## 2021-09-11 LAB
ANION GAP SERPL CALCULATED.3IONS-SCNC: 15 MMOL/L (ref 3–16)
APTT: 53.9 SEC (ref 26.2–38.6)
ATYPICAL LYMPHOCYTE RELATIVE PERCENT: 7 % (ref 0–6)
BASE EXCESS ARTERIAL: -5.6 MMOL/L (ref -3–3)
BASOPHILS ABSOLUTE: 0.2 K/UL (ref 0–0.2)
BASOPHILS RELATIVE PERCENT: 1 %
BUN BLDV-MCNC: 7 MG/DL (ref 7–20)
CALCIUM SERPL-MCNC: 9.1 MG/DL (ref 8.3–10.6)
CARBOXYHEMOGLOBIN ARTERIAL: 1.1 % (ref 0–1.5)
CHLORIDE BLD-SCNC: 100 MMOL/L (ref 99–110)
CO2: 20 MMOL/L (ref 21–32)
CREAT SERPL-MCNC: 0.8 MG/DL (ref 0.6–1.2)
EOSINOPHILS ABSOLUTE: 0.9 K/UL (ref 0–0.6)
EOSINOPHILS RELATIVE PERCENT: 6 %
GFR AFRICAN AMERICAN: >60
GFR NON-AFRICAN AMERICAN: >60
GLUCOSE BLD-MCNC: 127 MG/DL (ref 70–99)
HCO3 ARTERIAL: 24 MMOL/L (ref 21–29)
HCT VFR BLD CALC: 40.7 % (ref 36–48)
HEMATOLOGY PATH CONSULT: NO
HEMOGLOBIN, ART, EXTENDED: 14.7 G/DL (ref 12–16)
HEMOGLOBIN: 13.5 G/DL (ref 12–16)
INR BLD: 5.05 (ref 0.88–1.12)
LYMPHOCYTES ABSOLUTE: 10.6 K/UL (ref 1–5.1)
LYMPHOCYTES RELATIVE PERCENT: 60 %
MAGNESIUM: 2 MG/DL (ref 1.8–2.4)
MCH RBC QN AUTO: 31.8 PG (ref 26–34)
MCHC RBC AUTO-ENTMCNC: 33.1 G/DL (ref 31–36)
MCV RBC AUTO: 96.3 FL (ref 80–100)
METHEMOGLOBIN ARTERIAL: 0 %
MONOCYTES ABSOLUTE: 0.6 K/UL (ref 0–1.3)
MONOCYTES RELATIVE PERCENT: 4 %
NEUTROPHILS ABSOLUTE: 3.5 K/UL (ref 1.7–7.7)
NEUTROPHILS RELATIVE PERCENT: 22 %
O2 SAT, ARTERIAL: 95 %
O2 THERAPY: ABNORMAL
PCO2 ARTERIAL: 63.8 MMHG (ref 35–45)
PDW BLD-RTO: 15.2 % (ref 12.4–15.4)
PH ARTERIAL: 7.18 (ref 7.35–7.45)
PLATELET # BLD: 267 K/UL (ref 135–450)
PLATELET SLIDE REVIEW: ADEQUATE
PMV BLD AUTO: 9.1 FL (ref 5–10.5)
PO2 ARTERIAL: 91.4 MMHG (ref 75–108)
POTASSIUM REFLEX MAGNESIUM: 3.5 MMOL/L (ref 3.5–5.1)
PROTHROMBIN TIME: 61.1 SEC (ref 9.9–12.7)
RBC # BLD: 4.23 M/UL (ref 4–5.2)
SLIDE REVIEW: ABNORMAL
SODIUM BLD-SCNC: 135 MMOL/L (ref 136–145)
TCO2 ARTERIAL: 25.9 MMOL/L
TROPONIN: <0.01 NG/ML
WBC # BLD: 15.8 K/UL (ref 4–11)

## 2021-09-11 PROCEDURE — 71045 X-RAY EXAM CHEST 1 VIEW: CPT

## 2021-09-11 PROCEDURE — 6360000002 HC RX W HCPCS: Performed by: EMERGENCY MEDICINE

## 2021-09-11 PROCEDURE — 99285 EMERGENCY DEPT VISIT HI MDM: CPT

## 2021-09-11 PROCEDURE — 82803 BLOOD GASES ANY COMBINATION: CPT

## 2021-09-11 PROCEDURE — 94760 N-INVAS EAR/PLS OXIMETRY 1: CPT

## 2021-09-11 PROCEDURE — 85025 COMPLETE CBC W/AUTO DIFF WBC: CPT

## 2021-09-11 PROCEDURE — 36600 WITHDRAWAL OF ARTERIAL BLOOD: CPT

## 2021-09-11 PROCEDURE — 96365 THER/PROPH/DIAG IV INF INIT: CPT

## 2021-09-11 PROCEDURE — 84484 ASSAY OF TROPONIN QUANT: CPT

## 2021-09-11 PROCEDURE — 6370000000 HC RX 637 (ALT 250 FOR IP): Performed by: EMERGENCY MEDICINE

## 2021-09-11 PROCEDURE — 94640 AIRWAY INHALATION TREATMENT: CPT

## 2021-09-11 PROCEDURE — 94660 CPAP INITIATION&MGMT: CPT

## 2021-09-11 PROCEDURE — 83880 ASSAY OF NATRIURETIC PEPTIDE: CPT

## 2021-09-11 PROCEDURE — 83735 ASSAY OF MAGNESIUM: CPT

## 2021-09-11 PROCEDURE — 85730 THROMBOPLASTIN TIME PARTIAL: CPT

## 2021-09-11 PROCEDURE — 2700000000 HC OXYGEN THERAPY PER DAY

## 2021-09-11 PROCEDURE — 85610 PROTHROMBIN TIME: CPT

## 2021-09-11 PROCEDURE — 80048 BASIC METABOLIC PNL TOTAL CA: CPT

## 2021-09-11 PROCEDURE — 93005 ELECTROCARDIOGRAM TRACING: CPT | Performed by: EMERGENCY MEDICINE

## 2021-09-11 PROCEDURE — 96367 TX/PROPH/DG ADDL SEQ IV INF: CPT

## 2021-09-11 PROCEDURE — 36415 COLL VENOUS BLD VENIPUNCTURE: CPT

## 2021-09-11 RX ORDER — IPRATROPIUM BROMIDE AND ALBUTEROL SULFATE 2.5; .5 MG/3ML; MG/3ML
1 SOLUTION RESPIRATORY (INHALATION) ONCE
Status: COMPLETED | OUTPATIENT
Start: 2021-09-11 | End: 2021-09-11

## 2021-09-11 RX ORDER — METHYLPREDNISOLONE SODIUM SUCCINATE 40 MG/ML
40 INJECTION, POWDER, LYOPHILIZED, FOR SOLUTION INTRAMUSCULAR; INTRAVENOUS ONCE
Status: COMPLETED | OUTPATIENT
Start: 2021-09-11 | End: 2021-09-11

## 2021-09-11 RX ORDER — FUROSEMIDE 10 MG/ML
40 INJECTION INTRAMUSCULAR; INTRAVENOUS ONCE
Status: COMPLETED | OUTPATIENT
Start: 2021-09-11 | End: 2021-09-11

## 2021-09-11 RX ADMIN — IPRATROPIUM BROMIDE AND ALBUTEROL SULFATE 1 AMPULE: .5; 3 SOLUTION RESPIRATORY (INHALATION) at 23:13

## 2021-09-11 RX ADMIN — FUROSEMIDE 40 MG: 10 INJECTION, SOLUTION INTRAMUSCULAR; INTRAVENOUS at 23:37

## 2021-09-11 RX ADMIN — METHYLPREDNISOLONE SODIUM SUCCINATE 40 MG: 40 INJECTION, POWDER, FOR SOLUTION INTRAMUSCULAR; INTRAVENOUS at 23:37

## 2021-09-11 ASSESSMENT — PAIN DESCRIPTION - ONSET: ONSET: ON-GOING

## 2021-09-11 ASSESSMENT — PAIN DESCRIPTION - PAIN TYPE: TYPE: ACUTE PAIN

## 2021-09-11 ASSESSMENT — PAIN DESCRIPTION - DESCRIPTORS: DESCRIPTORS: DISCOMFORT

## 2021-09-11 ASSESSMENT — PAIN DESCRIPTION - LOCATION: LOCATION: BACK

## 2021-09-11 ASSESSMENT — PAIN DESCRIPTION - PROGRESSION: CLINICAL_PROGRESSION: NOT CHANGED

## 2021-09-11 ASSESSMENT — PAIN - FUNCTIONAL ASSESSMENT: PAIN_FUNCTIONAL_ASSESSMENT: PREVENTS OR INTERFERES SOME ACTIVE ACTIVITIES AND ADLS

## 2021-09-11 ASSESSMENT — PAIN SCALES - GENERAL: PAINLEVEL_OUTOF10: 5

## 2021-09-11 ASSESSMENT — PAIN DESCRIPTION - ORIENTATION: ORIENTATION: MID

## 2021-09-11 ASSESSMENT — PAIN DESCRIPTION - FREQUENCY: FREQUENCY: CONTINUOUS

## 2021-09-12 ENCOUNTER — APPOINTMENT (OUTPATIENT)
Dept: CT IMAGING | Age: 81
DRG: 871 | End: 2021-09-12
Payer: MEDICARE

## 2021-09-12 PROBLEM — J96.01 ACUTE RESPIRATORY FAILURE WITH HYPOXIA (HCC): Status: ACTIVE | Noted: 2021-09-12

## 2021-09-12 PROBLEM — A41.9 SEPSIS (HCC): Status: ACTIVE | Noted: 2021-09-12

## 2021-09-12 PROBLEM — I50.9 ACUTE CHF (CONGESTIVE HEART FAILURE) (HCC): Status: ACTIVE | Noted: 2021-09-12

## 2021-09-12 PROBLEM — J18.9 PNEUMONIA: Status: ACTIVE | Noted: 2021-09-12

## 2021-09-12 LAB
ANION GAP SERPL CALCULATED.3IONS-SCNC: 15 MMOL/L (ref 3–16)
BASE EXCESS ARTERIAL: 0.5 MMOL/L (ref -3–3)
BASOPHILS ABSOLUTE: 0.1 K/UL (ref 0–0.2)
BASOPHILS RELATIVE PERCENT: 0.5 %
BUN BLDV-MCNC: 8 MG/DL (ref 7–20)
CALCIUM SERPL-MCNC: 8.7 MG/DL (ref 8.3–10.6)
CARBOXYHEMOGLOBIN ARTERIAL: 0.9 % (ref 0–1.5)
CHLORIDE BLD-SCNC: 101 MMOL/L (ref 99–110)
CO2: 26 MMOL/L (ref 21–32)
CREAT SERPL-MCNC: 1 MG/DL (ref 0.6–1.2)
EOSINOPHILS ABSOLUTE: 0 K/UL (ref 0–0.6)
EOSINOPHILS RELATIVE PERCENT: 0 %
GFR AFRICAN AMERICAN: >60
GFR NON-AFRICAN AMERICAN: 53
GLUCOSE BLD-MCNC: 183 MG/DL (ref 70–99)
HCO3 ARTERIAL: 26.9 MMOL/L (ref 21–29)
HCT VFR BLD CALC: 42.2 % (ref 36–48)
HEMATOLOGY PATH CONSULT: NO
HEMOGLOBIN, ART, EXTENDED: 13.7 G/DL (ref 12–16)
HEMOGLOBIN: 13.7 G/DL (ref 12–16)
INR BLD: 5.02 (ref 0.88–1.12)
LACTIC ACID, SEPSIS: 2.2 MMOL/L (ref 0.4–1.9)
LACTIC ACID: 1.3 MMOL/L (ref 0.4–2)
LYMPHOCYTES ABSOLUTE: 6.9 K/UL (ref 1–5.1)
LYMPHOCYTES RELATIVE PERCENT: 35.3 %
MAGNESIUM: 1.9 MG/DL (ref 1.8–2.4)
MCH RBC QN AUTO: 30.9 PG (ref 26–34)
MCHC RBC AUTO-ENTMCNC: 32.5 G/DL (ref 31–36)
MCV RBC AUTO: 94.8 FL (ref 80–100)
METHEMOGLOBIN ARTERIAL: 0.2 %
MONOCYTES ABSOLUTE: 0.2 K/UL (ref 0–1.3)
MONOCYTES RELATIVE PERCENT: 1.1 %
NEUTROPHILS ABSOLUTE: 12.4 K/UL (ref 1.7–7.7)
NEUTROPHILS RELATIVE PERCENT: 63.1 %
O2 SAT, ARTERIAL: >100 %
O2 THERAPY: ABNORMAL
PCO2 ARTERIAL: 49.4 MMHG (ref 35–45)
PDW BLD-RTO: 15 % (ref 12.4–15.4)
PH ARTERIAL: 7.34 (ref 7.35–7.45)
PHOSPHORUS: 3.4 MG/DL (ref 2.5–4.9)
PLATELET # BLD: 285 K/UL (ref 135–450)
PMV BLD AUTO: 9.8 FL (ref 5–10.5)
PO2 ARTERIAL: 316 MMHG (ref 75–108)
POTASSIUM REFLEX MAGNESIUM: 3.4 MMOL/L (ref 3.5–5.1)
PRO-BNP: 5424 PG/ML (ref 0–449)
PROCALCITONIN: 0.33 NG/ML (ref 0–0.15)
PROTHROMBIN TIME: 60.7 SEC (ref 9.9–12.7)
RBC # BLD: 4.45 M/UL (ref 4–5.2)
SARS-COV-2, NAAT: NOT DETECTED
SODIUM BLD-SCNC: 142 MMOL/L (ref 136–145)
TCO2 ARTERIAL: 28.4 MMOL/L
WBC # BLD: 19.6 K/UL (ref 4–11)

## 2021-09-12 PROCEDURE — 6360000002 HC RX W HCPCS: Performed by: STUDENT IN AN ORGANIZED HEALTH CARE EDUCATION/TRAINING PROGRAM

## 2021-09-12 PROCEDURE — 96375 TX/PRO/DX INJ NEW DRUG ADDON: CPT

## 2021-09-12 PROCEDURE — 6360000002 HC RX W HCPCS: Performed by: EMERGENCY MEDICINE

## 2021-09-12 PROCEDURE — 85025 COMPLETE CBC W/AUTO DIFF WBC: CPT

## 2021-09-12 PROCEDURE — 36415 COLL VENOUS BLD VENIPUNCTURE: CPT

## 2021-09-12 PROCEDURE — 36591 DRAW BLOOD OFF VENOUS DEVICE: CPT

## 2021-09-12 PROCEDURE — 2580000003 HC RX 258: Performed by: STUDENT IN AN ORGANIZED HEALTH CARE EDUCATION/TRAINING PROGRAM

## 2021-09-12 PROCEDURE — 6360000002 HC RX W HCPCS: Performed by: INTERNAL MEDICINE

## 2021-09-12 PROCEDURE — 83605 ASSAY OF LACTIC ACID: CPT

## 2021-09-12 PROCEDURE — 94761 N-INVAS EAR/PLS OXIMETRY MLT: CPT

## 2021-09-12 PROCEDURE — 2580000003 HC RX 258: Performed by: INTERNAL MEDICINE

## 2021-09-12 PROCEDURE — 36600 WITHDRAWAL OF ARTERIAL BLOOD: CPT

## 2021-09-12 PROCEDURE — 85610 PROTHROMBIN TIME: CPT

## 2021-09-12 PROCEDURE — 2000000000 HC ICU R&B

## 2021-09-12 PROCEDURE — 87635 SARS-COV-2 COVID-19 AMP PRB: CPT

## 2021-09-12 PROCEDURE — 82803 BLOOD GASES ANY COMBINATION: CPT

## 2021-09-12 PROCEDURE — 6370000000 HC RX 637 (ALT 250 FOR IP): Performed by: INTERNAL MEDICINE

## 2021-09-12 PROCEDURE — 83735 ASSAY OF MAGNESIUM: CPT

## 2021-09-12 PROCEDURE — 2580000003 HC RX 258: Performed by: EMERGENCY MEDICINE

## 2021-09-12 PROCEDURE — 99291 CRITICAL CARE FIRST HOUR: CPT | Performed by: INTERNAL MEDICINE

## 2021-09-12 PROCEDURE — 6360000004 HC RX CONTRAST MEDICATION: Performed by: STUDENT IN AN ORGANIZED HEALTH CARE EDUCATION/TRAINING PROGRAM

## 2021-09-12 PROCEDURE — 6370000000 HC RX 637 (ALT 250 FOR IP): Performed by: STUDENT IN AN ORGANIZED HEALTH CARE EDUCATION/TRAINING PROGRAM

## 2021-09-12 PROCEDURE — 2700000000 HC OXYGEN THERAPY PER DAY

## 2021-09-12 PROCEDURE — 2500000003 HC RX 250 WO HCPCS: Performed by: STUDENT IN AN ORGANIZED HEALTH CARE EDUCATION/TRAINING PROGRAM

## 2021-09-12 PROCEDURE — 84100 ASSAY OF PHOSPHORUS: CPT

## 2021-09-12 PROCEDURE — 80048 BASIC METABOLIC PNL TOTAL CA: CPT

## 2021-09-12 PROCEDURE — 84145 PROCALCITONIN (PCT): CPT

## 2021-09-12 PROCEDURE — 87040 BLOOD CULTURE FOR BACTERIA: CPT

## 2021-09-12 PROCEDURE — 71260 CT THORAX DX C+: CPT

## 2021-09-12 RX ORDER — ACETAMINOPHEN 325 MG/1
650 TABLET ORAL EVERY 6 HOURS PRN
Status: DISCONTINUED | OUTPATIENT
Start: 2021-09-12 | End: 2021-09-18 | Stop reason: HOSPADM

## 2021-09-12 RX ORDER — ACETAMINOPHEN 650 MG/1
650 SUPPOSITORY RECTAL EVERY 6 HOURS PRN
Status: DISCONTINUED | OUTPATIENT
Start: 2021-09-12 | End: 2021-09-18 | Stop reason: HOSPADM

## 2021-09-12 RX ORDER — MIDODRINE HYDROCHLORIDE 5 MG/1
2.5 TABLET ORAL 2 TIMES DAILY WITH MEALS
Status: DISCONTINUED | OUTPATIENT
Start: 2021-09-12 | End: 2021-09-14

## 2021-09-12 RX ORDER — LEVOFLOXACIN 5 MG/ML
750 INJECTION, SOLUTION INTRAVENOUS
Status: DISCONTINUED | OUTPATIENT
Start: 2021-09-12 | End: 2021-09-12

## 2021-09-12 RX ORDER — SODIUM CHLORIDE 9 MG/ML
25 INJECTION, SOLUTION INTRAVENOUS PRN
Status: DISCONTINUED | OUTPATIENT
Start: 2021-09-12 | End: 2021-09-18 | Stop reason: HOSPADM

## 2021-09-12 RX ORDER — POTASSIUM CHLORIDE 20 MEQ/1
40 TABLET, EXTENDED RELEASE ORAL PRN
Status: DISCONTINUED | OUTPATIENT
Start: 2021-09-12 | End: 2021-09-18 | Stop reason: HOSPADM

## 2021-09-12 RX ORDER — AMIODARONE HYDROCHLORIDE 200 MG/1
200 TABLET ORAL DAILY
Status: DISCONTINUED | OUTPATIENT
Start: 2021-09-12 | End: 2021-09-18 | Stop reason: HOSPADM

## 2021-09-12 RX ORDER — SODIUM CHLORIDE 0.9 % (FLUSH) 0.9 %
5-40 SYRINGE (ML) INJECTION EVERY 12 HOURS SCHEDULED
Status: DISCONTINUED | OUTPATIENT
Start: 2021-09-12 | End: 2021-09-18 | Stop reason: HOSPADM

## 2021-09-12 RX ORDER — SODIUM CHLORIDE 9 MG/ML
INJECTION, SOLUTION INTRAVENOUS CONTINUOUS
Status: DISCONTINUED | OUTPATIENT
Start: 2021-09-12 | End: 2021-09-14

## 2021-09-12 RX ORDER — POTASSIUM CHLORIDE 7.45 MG/ML
10 INJECTION INTRAVENOUS PRN
Status: DISCONTINUED | OUTPATIENT
Start: 2021-09-12 | End: 2021-09-18 | Stop reason: HOSPADM

## 2021-09-12 RX ORDER — ONDANSETRON 2 MG/ML
4 INJECTION INTRAMUSCULAR; INTRAVENOUS EVERY 6 HOURS PRN
Status: DISCONTINUED | OUTPATIENT
Start: 2021-09-12 | End: 2021-09-18 | Stop reason: HOSPADM

## 2021-09-12 RX ORDER — SODIUM CHLORIDE 0.9 % (FLUSH) 0.9 %
5-40 SYRINGE (ML) INJECTION PRN
Status: DISCONTINUED | OUTPATIENT
Start: 2021-09-12 | End: 2021-09-18 | Stop reason: HOSPADM

## 2021-09-12 RX ADMIN — CEFTRIAXONE 1000 MG: 1 INJECTION, POWDER, FOR SOLUTION INTRAMUSCULAR; INTRAVENOUS at 00:48

## 2021-09-12 RX ADMIN — IOPAMIDOL 75 ML: 755 INJECTION, SOLUTION INTRAVENOUS at 02:17

## 2021-09-12 RX ADMIN — CEFTRIAXONE 1000 MG: 1 INJECTION, POWDER, FOR SOLUTION INTRAMUSCULAR; INTRAVENOUS at 13:49

## 2021-09-12 RX ADMIN — LEVOFLOXACIN 750 MG: 5 INJECTION, SOLUTION INTRAVENOUS at 07:39

## 2021-09-12 RX ADMIN — Medication 10 MCG/MIN: at 04:52

## 2021-09-12 RX ADMIN — Medication 8 MCG/MIN: at 03:26

## 2021-09-12 RX ADMIN — MIDODRINE HYDROCHLORIDE 2.5 MG: 5 TABLET ORAL at 07:39

## 2021-09-12 RX ADMIN — Medication 6 MCG/MIN: at 02:14

## 2021-09-12 RX ADMIN — SODIUM CHLORIDE: 9 INJECTION, SOLUTION INTRAVENOUS at 07:37

## 2021-09-12 RX ADMIN — AMIODARONE HYDROCHLORIDE 200 MG: 200 TABLET ORAL at 07:39

## 2021-09-12 RX ADMIN — Medication 4 MCG/MIN: at 01:47

## 2021-09-12 RX ADMIN — Medication 2 MCG/MIN: at 01:43

## 2021-09-12 RX ADMIN — POTASSIUM CHLORIDE 40 MEQ: 20 TABLET, EXTENDED RELEASE ORAL at 09:39

## 2021-09-12 RX ADMIN — Medication 10 ML: at 09:39

## 2021-09-12 RX ADMIN — MIDODRINE HYDROCHLORIDE 2.5 MG: 5 TABLET ORAL at 16:16

## 2021-09-12 ASSESSMENT — ENCOUNTER SYMPTOMS
ABDOMINAL PAIN: 0
SHORTNESS OF BREATH: 1
NAUSEA: 0
BACK PAIN: 0
RHINORRHEA: 0
SORE THROAT: 0
COUGH: 0
VOMITING: 0
EYE DISCHARGE: 0
DIARRHEA: 0
WHEEZING: 0
EYE PAIN: 0

## 2021-09-12 ASSESSMENT — PAIN SCALES - GENERAL
PAINLEVEL_OUTOF10: 0

## 2021-09-12 NOTE — PROGRESS NOTES
Clinical Pharmacy Note  Warfarin Consult    Katelynn Schulz is a 78 y.o. female receiving warfarin managed by pharmacy. Warfarin Indication: Factor V Leiden mutation  Target INR range: 2-3  Dose prior to admission: 5 mg MWF and 2.5 mg all other days    Current warfarin drug-drug interactions:     Recent Labs     09/11/21 2132 09/11/21 2212 09/12/21  0756   HGB 13.5  --  13.7   HCT 40.7  --  42.2   INR  --  5.05* 5.02*       Assessment/Plan:    No warfarin tonight due to INR = 5.02. Daily PT/INR until stable within therapeutic range. Thank you for the consult. Will continue to follow.     Mortimer Mooring, PharmD, BCPS  9/12/2021  9:23 AM

## 2021-09-12 NOTE — PROGRESS NOTES
Results for Shankar Garcia (MRN [id number]) as of 9/12/2021 01:41   Ref.  Range 9/12/2021 01:30   pH, Arterial Latest Ref Range: 7.350 - 7.450  7.344 (L)   pCO2, Arterial Latest Ref Range: 35.0 - 45.0 mmHg 49.4 (H)   pO2, Arterial Latest Ref Range: 75.0 - 108.0 mmHg 316.0 (H)   HCO3, Arterial Latest Ref Range: 21.0 - 29.0 mmol/L 26.9     Pt taken off bipap and placed on 6l/m cannula

## 2021-09-12 NOTE — PROGRESS NOTES
Results for Ryan Morton (MRN [id number]) as of 9/11/2021 23:56   Ref.  Range 9/11/2021 23:30   pH, Arterial Latest Ref Range: 7.350 - 7.450  7.183 (LL)   pCO2, Arterial Latest Ref Range: 35.0 - 45.0 mmHg 63.8 (H)   pO2, Arterial Latest Ref Range: 75.0 - 108.0 mmHg 91.4   HCO3, Arterial Latest Ref Range: 21.0 - 29.0 mmol/L 24.0       Bipap started

## 2021-09-12 NOTE — ED NOTES
Patient to CT via stretcher. Placed on non-rebreather mask for transport.       Netta Reddy RN  09/12/21 4844

## 2021-09-12 NOTE — ED PROVIDER NOTES
arthralgias, back pain, joint swelling and neck pain. Skin: Negative for rash. Allergic/Immunologic: Negative for environmental allergies. Neurological: Negative for dizziness, seizures, syncope and headaches. Hematological: Negative for adenopathy. Psychiatric/Behavioral: Negative for dysphoric mood and suicidal ideas. The patient is not nervous/anxious.           PAST MEDICAL HISTORY     Past Medical History:   Diagnosis Date    Acute ST elevation myocardial infarction (STEMI) ([facility] 07/19/2021    Chronic lymphocytic leukemia in remission ([facility]     CLL (chronic lymphocytic leukemia) ([facility] 02/12/2015    COPD (chronic obstructive pulmonary disease) ([facility]     Essential hypertension 01/01/2015    controlled with salt restriction (initially)    Factor V Leiden mutation ([facility]     Goiter 11/01/2011    1.5 cm midline    Hypercholesterolemia     Impaired fasting glucose 11/01/2011    Osteoarthritis     Osteoporosis     Post herpetic neuralgia     Vitamin D deficiency 11/01/2011         SURGICAL HISTORY     Past Surgical History:   Procedure Laterality Date    CATARACT REMOVAL WITH IMPLANT  5/14/12    left eye    JOINT REPLACEMENT      partial knee R and L    KNEE SURGERY  2008    partial replacements, both knees    MOHS SURGERY  03/12/2019    R cheek and R superior temple    SPLENECTOMY      TUNNELED VENOUS PORT PLACEMENT      UPPER GASTROINTESTINAL ENDOSCOPY N/A 2/5/2019    EGD ESOPHAGOGASTRODUODENOSCOPY, WITH ENDOSCOPIC ULTRASOUND OF ESOPHAGUS performed by Sofia Moore MD at \[facility]\""       Previous Medications    AMIODARONE (CORDARONE) 200 MG TABLET    Take 1 tablet by mouth daily    CYANOCOBALAMIN (VITAMIN B 12) 500 MCG TABS    Take 1 tablet by mouth daily    GABAPENTIN (NEURONTIN) 300 MG CAPSULE    TAKE 1 TO 2 CAPSULES BY MOUTH EVERY NIGHT AS DIRECTED    METOPROLOL TARTRATE (LOPRESSOR) 25 MG TABLET    Take 0.5 tablets by mouth 2 times daily    MIDODRINE (PROAMATINE) 5 MG TABLET    Take 0.5 tablets by mouth 2 times daily    SACUBITRIL-VALSARTAN (ENTRESTO) 24-26 MG PER TABLET    Take 1 tablet by mouth 2 times daily    TORSEMIDE (DEMADEX) 20 MG TABLET    TAKE 1 TABLET BY MOUTH DAILY AS NEEDED    WARFARIN (COUMADIN) 5 MG TABLET    TAKE 1 1/2 TABLETS BY MOUTH MONDAY AND FRIDAY, THEN 1 TABLET BY MOUTH DAILY ON THE OTHER DAYS       ALLERGIES     Patient has no known allergies. FAMILY HISTORY       Family History   Problem Relation Age of Onset    Diabetes Father     Stroke Sister 50         of a stroke          SOCIAL HISTORY       Social History     Socioeconomic History    Marital status:      Spouse name: None    Number of children: None    Years of education: None    Highest education level: None   Occupational History    None   Tobacco Use    Smoking status: Former Smoker     Packs/day: 0.30     Years: 50.00     Pack years: 15.00     Types: Cigarettes     Quit date: 1995     Years since quittin.8    Smokeless tobacco: Never Used   Vaping Use    Vaping Use: Never used   Substance and Sexual Activity    Alcohol use: No     Alcohol/week: 0.0 standard drinks    Drug use: None    Sexual activity: None   Other Topics Concern    None   Social History Narrative    None     Social Determinants of Health     Financial Resource Strain: Low Risk     Difficulty of Paying Living Expenses: Not hard at all   Food Insecurity: No Food Insecurity    Worried About Running Out of Food in the Last Year: Never true    Nguyen of Food in the Last Year: Never true   Transportation Needs: No Transportation Needs    Lack of Transportation (Medical): No    Lack of Transportation (Non-Medical):  No   Physical Activity:     Days of Exercise per Week:     Minutes of Exercise per Session:    Stress:     Feeling of Stress :    Social Connections:     Frequency of Communication with Friends and Family:     Frequency of Social Gatherings with Friends and Family:     Attends Nondenominational Services:     Active Member of Clubs or Organizations:     Attends Club or Organization Meetings:     Marital Status:    Intimate Partner Violence:     Fear of Current or Ex-Partner:     Emotionally Abused:     Physically Abused:     Sexually Abused:        SCREENINGS             PHYSICAL EXAM    (up to 7 for level 4, 8 or more for level 5)     ED Triage Vitals [09/11/21 2113]   BP Temp Temp src Pulse Resp SpO2 Height Weight   (!) 181/98 97.9 °F (36.6 °C) -- 86 16 90 % -- --      height is 4' 11\" (1.499 m) and weight is 166 lb 7.2 oz (75.5 kg). Her temperature is 97.9 °F (36.6 °C). Her blood pressure is 119/104 (abnormal) and her pulse is 103. Her respiration is 31 (abnormal) and oxygen saturation is 96%. Physical Exam  Constitutional:       Appearance: She is well-developed. She is not diaphoretic. HENT:      Head: Normocephalic and atraumatic. Right Ear: External ear normal.      Left Ear: External ear normal.   Eyes:      General: No scleral icterus. Right eye: No discharge. Left eye: No discharge. Neck:      Thyroid: No thyromegaly. Vascular: No JVD. Trachea: No tracheal deviation. Cardiovascular:      Rate and Rhythm: Normal rate and regular rhythm. Heart sounds: No murmur heard. No friction rub. No gallop. Pulmonary:      Effort: Pulmonary effort is normal. No respiratory distress. Breath sounds: Decreased air movement present. No stridor. Wheezing present. No rales. Abdominal:      General: There is no distension. Palpations: Abdomen is soft. Tenderness: There is no abdominal tenderness. There is no guarding or rebound. Musculoskeletal:         General: No tenderness. Cervical back: Normal range of motion. Skin:     General: Skin is warm and dry. Findings: No rash (On exposed body surfaces). Neurological:      Mental Status: She is alert and oriented to person, place, and time. Coordination: Coordination normal.   Psychiatric:         Behavior: Behavior normal.         Thought Content: Thought content normal.         DIAGNOSTIC RESULTS   LABS:    Results for orders placed or performed during the hospital encounter of 09/11/21   CBC Auto Differential   Result Value Ref Range    WBC 15.8 (H) 4.0 - 11.0 K/uL    RBC 4.23 4.00 - 5.20 M/uL    Hemoglobin 13.5 12.0 - 16.0 g/dL    Hematocrit 40.7 36.0 - 48.0 %    MCV 96.3 80.0 - 100.0 fL    MCH 31.8 26.0 - 34.0 pg    MCHC 33.1 31.0 - 36.0 g/dL    RDW 15.2 12.4 - 15.4 %    Platelets 624 163 - 514 K/uL    MPV 9.1 5.0 - 10.5 fL    PLATELET SLIDE REVIEW Adequate     SLIDE REVIEW see below     Path Consult No     Neutrophils % 22.0 %    Lymphocytes % 60.0 %    Monocytes % 4.0 %    Eosinophils % 6.0 %    Basophils % 1.0 %    Neutrophils Absolute 3.5 1.7 - 7.7 K/uL    Lymphocytes Absolute 10.6 (H) 1.0 - 5.1 K/uL    Monocytes Absolute 0.6 0.0 - 1.3 K/uL    Eosinophils Absolute 0.9 (H) 0.0 - 0.6 K/uL    Basophils Absolute 0.2 0.0 - 0.2 K/uL    Atypical Lymphocytes Relative 7 (H) 0 - 6 %   Basic Metabolic Panel w/ Reflex to MG   Result Value Ref Range    Sodium 135 (L) 136 - 145 mmol/L    Potassium reflex Magnesium 3.5 3.5 - 5.1 mmol/L    Chloride 100 99 - 110 mmol/L    CO2 20 (L) 21 - 32 mmol/L    Anion Gap 15 3 - 16    Glucose 127 (H) 70 - 99 mg/dL    BUN 7 7 - 20 mg/dL    CREATININE 0.8 0.6 - 1.2 mg/dL    GFR Non-African American >60 >60    GFR African American >60 >60    Calcium 9.1 8.3 - 10.6 mg/dL   Troponin   Result Value Ref Range    Troponin <0.01 <0.01 ng/mL   Brain Natriuretic Peptide   Result Value Ref Range    Pro-BNP 5,424 (H) 0 - 449 pg/mL   Protime-INR   Result Value Ref Range    Protime 61.1 (H) 9.9 - 12.7 sec    INR 5.05 (HH) 0.88 - 1.12   APTT   Result Value Ref Range    aPTT 53.9 (H) 26.2 - 38.6 sec   Magnesium   Result Value Ref Range    Magnesium 2.00 1.80 - 2.40 mg/dL   Blood Gas, Arterial   Result Value Ref Range    pH, Arterial 7. [facility] (LL) 7.350 - 7.450    pCO2, Arterial 63.8 (H) 35.0 - 45.0 mmHg    pO2, Arterial 91.4 75.0 - 108.0 mmHg    HCO3, Arterial 24.0 21.0 - 29.0 mmol/L    Base Excess, Arterial -5.6 (L) -3.0 - 3.0 mmol/L    Hemoglobin, Art, Extended 14.7 12.0 - 16.0 g/dL    O2 Sat, Arterial 95.0 >92 %    Carboxyhgb, Arterial 1.1 0.0 - 1.5 %    Methemoglobin, Arterial 0.0 <1.5 %    TCO2, Arterial 25.9 Not Established mmol/L    O2 Therapy Unknown        All other labs were within normal range or not returned as of this dictation. EKG: All EKG's are interpreted by the Emergency Department Physician who either signs orCo-signs this chart in the absence of a cardiologist.    EKG visualized preliminarily interpreted by myself shows sinus rhythm. The rate is 87 the axis is -43. There is an RSR prime pattern consistent with an incomplete right bundle branch block. Intervals are normal and there is no acute injury or acute ischemia. RADIOLOGY:   Non-plain film images such as CT, Ultrasound and MRI are read by the radiologist. Claudetta Goodie radiographic images are visualized and preliminarily interpreted by the  EDProvider with the below findings:    XR CHEST PORTABLE    Result Date: 9/11/2021  EXAMINATION: ONE XRAY VIEW OF THE CHEST 9/11/2021 11:43 pm COMPARISON: None. HISTORY: ORDERING SYSTEM PROVIDED HISTORY: sudden change in resp status TECHNOLOGIST PROVIDED HISTORY: Reason for exam:->sudden change in resp status Reason for Exam: sudden change in resp status Acuity: Acute Type of Exam: Initial FINDINGS: Possible right lower lobe opacity. There is no effusion or pneumothorax. The cardiomediastinal silhouette is without acute process. The osseous structures are without acute process. Right-sided PICC line tip in the mid SVC. Possible right lower lobe opacity. XR CHEST PORTABLE    Result Date: 9/11/2021  EXAMINATION: ONE XRAY VIEW OF THE CHEST 9/11/2021 9:59 pm COMPARISON: None.  HISTORY: ORDERING SYSTEM PROVIDED HISTORY: Chest Pain TECHNOLOGIST PROVIDED HISTORY: Reason for exam:->Chest Pain Reason for Exam: chest pain, irregular heart beat Acuity: Acute Type of Exam: Initial FINDINGS: The lungs are without acute focal process. There is no effusion or pneumothorax. The cardiomediastinal silhouette is without acute process. The osseous structures are without acute process. Right-sided PICC line tip in the SVC. No acute process. PROCEDURES   Unless otherwise noted below, none     Procedures    CRITICAL CARE TIME   CRITICAL CARE: There was a high probability of clinically significant/life threatening deterioration in this patient's condition which required my urgent intervention. Total critical care time was 60 minutes. This excludes any time for separately reportable procedures. CONSULTS:  None    EMERGENCY DEPARTMENT COURSE and DIFFERENTIAL DIAGNOSIS/MDM:   Vitals:    Vitals:    09/11/21 2334 09/11/21 2349 09/11/21 2353 09/11/21 2354   BP:       Pulse:       Resp: (!) 33   (!) 31   Temp:       SpO2: 94% 93% 96% 96%   Weight:       Height:           Patient was given the following medications:  Medications   ipratropium-albuterol (DUONEB) nebulizer solution 1 ampule (1 ampule Inhalation Given 9/11/21 2313)   furosemide (LASIX) injection 40 mg (40 mg IntraVENous Given 9/11/21 2337)   methylPREDNISolone sodium (SOLU-MEDROL) injection 40 mg (40 mg IntraVENous Given 9/11/21 2337)       Patient looks quite well. When I was first in the room she was warm dry resting comfortable and really did not have a lot of symptoms. I thought about doing a D-dimer for PE study but she is on Coumadin and if anything her INR came back high. I had done an add-on B-type natriuretic peptide but it just was not resulting it. I ordered a breathing treatment. Patient has to go to the bathroom. They walked her to the bathroom but she got increasingly short of breath and all of a sudden had an acute decompensation.   She got agitated she was diaphoretic she was short of breath. She received a DuoNeb breathing treatment I gave her 40 mg of Lasix and some Solu-Medrol. I repeated her x-ray and maybe there is a right lower lobe pneumonia but really still not significantly different. We did an arterial blood gas that was quite abnormal.  So we put her on BiPAP and at the moment that seems to be helping. She is drying up she is talking she is definitely improved. Currently I have a call out to the hospitalist for admission. 12:29 AM EDT  Fortunately, patient continues to improve. She looks markedly better. She is now warm and dry speaking without difficulty but she still is on the BiPAP. FINAL IMPRESSION      1. Respiratory distress    2. Acute respiratory failure with hypoxia and hypercapnia (HCC)    3. Anticoagulated          DISPOSITION/PLAN    DISPOSITION        PATIENT REFERRED TO:  No follow-up provider specified.     DISCHARGE MEDICATIONS:  New Prescriptions    No medications on file       DISCONTINUED MEDICATIONS:  Discontinued Medications    No medications on file              (Please note that portions of this note were completed with a voice recognition program.  Efforts were made to editthe dictations but occasionally words are mis-transcribed.)    Perry Trimble MD (electronically signed)           Perry Trimble MD  09/12/21 0022       Perry Trimble MD  09/12/21 0030

## 2021-09-12 NOTE — ED NOTES
Patient requesting  to ambulate to BR on room air, ambulated approx 50 ft before having to turn around d/t SOB, patient wheezing with labored breathing, SpO2 80% while on room air. SpO2 now 94% while on 3L NC, breathing remains labored with audible wheezing, but states she feels better now back in bed, will notify RT about scheduled breathing tx.       Michael Hansen RN  09/11/21 2405

## 2021-09-12 NOTE — PROGRESS NOTES
Pharmacy Medication Reconciliation Note     List of medications patient is currently taking is complete. Source of information:   1. Patient  2.  EMR      Chiqui Simon PharmD, BCPS  9/12/2021  3:15 PM

## 2021-09-12 NOTE — ED TRIAGE NOTES
Patient to the ED via private car, states starting this morning she has had trouble concentrating, and has been weak, states \"it has been hard to get off the couch and do anything. \" States she had chest pain \"sometime in the afternoon\" and she checked her HR and it was \"in the 130s and my blood pressure was high. \" Had MI about 7 weeks ago, states no interventions were done. No pain at this time, states she is nauseous, no other c/o.

## 2021-09-12 NOTE — ED NOTES
Bed: B-10  Expected date: 9/11/21  Expected time: 9:07 PM  Means of arrival: Walk In  Comments:  Chest pain     Tony Stevens RN  09/11/21 8704

## 2021-09-12 NOTE — ED NOTES
Dr. Frances Smith at bedside to evaluate, patient having increased work of breathing, diaphoretic, SPO2 85% while on 6L NC, RR 30s, using accessory muscles to breathe. Sitting high fowlers in bed, continues to be in respiratory distress. RT at bedside to place patient on Bipap.       Flores Kim RN  09/11/21 2383

## 2021-09-12 NOTE — H&P
become significantly decreased. Past Medical History:        Diagnosis Date    Acute ST elevation myocardial infarction (STEMI) ([facility] 07/19/2021    Chronic lymphocytic leukemia in remission ([facility]     CLL (chronic lymphocytic leukemia) ([facility] 02/12/2015    COPD (chronic obstructive pulmonary disease) ([facility]     Essential hypertension 01/01/2015    controlled with salt restriction (initially)    Factor V Leiden mutation ([facility]     Goiter 11/01/2011    1.5 cm midline    Hypercholesterolemia     Impaired fasting glucose 11/01/2011    Osteoarthritis     Osteoporosis     Post herpetic neuralgia     Vitamin D deficiency 11/01/2011       Past Surgical History:        Procedure Laterality Date    CATARACT REMOVAL WITH IMPLANT  5/14/12    left eye    JOINT REPLACEMENT      partial knee R and L    KNEE SURGERY  2008    partial replacements, both knees    MOHS SURGERY  03/12/2019    R cheek and R superior temple    SPLENECTOMY      TUNNELED VENOUS PORT PLACEMENT      UPPER GASTROINTESTINAL ENDOSCOPY N/A 2/5/2019    EGD ESOPHAGOGASTRODUODENOSCOPY, WITH ENDOSCOPIC ULTRASOUND OF ESOPHAGUS performed by Kianna Bledsoe MD at [facility]       Medications Prior to Admission:    Prior to Admission medications    Medication Sig Start Date End Date Taking?  Authorizing Provider   torsemide (DEMADEX) 20 MG tablet TAKE 1 TABLET BY MOUTH DAILY AS NEEDED 8/24/21   Marcus De Guzman MD   midodrine (PROAMATINE) 5 MG tablet Take 0.5 tablets by mouth 2 times daily 8/18/21   FAB Nicholson - PRATIMA   metoprolol tartrate (LOPRESSOR) 25 MG tablet Take 0.5 tablets by mouth 2 times daily 8/4/21   FAB Nicholson - CNP   sacubitril-valsartan (ENTRESTO) 24-26 MG per tablet Take 1 tablet by mouth 2 times daily 7/26/21   Rigoberto Barnes MD   amiodarone (CORDARONE) 200 MG tablet Take 1 tablet by mouth daily 7/27/21   Rigoberto Barnes MD   warfarin (COUMADIN) 5 MG tablet TAKE 1 1/2 TABLETS BY MOUTH MONDAY AND FRIDAY, THEN 1 TABLET BY MOUTH DAILY ON THE OTHER DAYS  Patient taking differently: Take 2.5 mg by mouth daily EXCEPT 5 mg (one tablet) on , , and  or as directed by [facility] Coumadin [facility] [phone] 3/8/21   Nkechi Orantes MD   gabapentin (NEURONTIN) 300 MG capsule TAKE 1 TO 2 CAPSULES BY MOUTH EVERY NIGHT AS DIRECTED 3/1/21 9/1/21  Telma Pierce APRN - CNP   Cyanocobalamin (VITAMIN B 12) 500 MCG TABS Take 1 tablet by mouth daily    Historical Provider, MD       Allergies:  Patient has no known allergies. Social History:  The patient currently lives home    TOBACCO:   reports that she quit smoking about 25 years ago. Her smoking use included cigarettes. She has a 15.00 pack-year smoking history. She has never used smokeless tobacco.  ETOH:   reports no history of alcohol use. Family History:  Reviewed in detail and negative for DM, Early CAD, Cancer, CVA. Positive as follows:        Problem Relation Age of Onset    Diabetes Father     Stroke Sister 50         of a stroke       REVIEW OF SYSTEMS:  as noted in the HPI. All other systems reviewed and negative. PHYSICAL EXAM:    BP (!) 114/47   Pulse 68   Temp 97.9 °F (36.6 °C) (Axillary)   Resp 18   Ht 4' 11\" (1.499 m)   Wt 161 lb 6 oz (73.2 kg)   SpO2 92%   BMI 32.59 kg/m²     General appearance: Acutely ill-appearing, pleasant and conversational, on nasal cannula oxygen at this time, more comfortable than before  HEENT Normal cephalic, atraumatic without obvious deformity. Pupils equal, round, and reactive to light. Extra ocular muscles intact. Conjunctivae/corneas clear.   Dry mucous membranes  Neck: Supple, no JVD  Lungs: Scattered crackles throughout, no wheezing  Heart: Tachycardic although regular rhythm, no murmurs  Abdomen: Soft, nontender, nondistended and active bowel sounds  Extremities: Trace bilateral lower extremity pitting edema  Skin: No rashes yet  Neurologic: Grossly intact neurologically  Mental status: Alert, oriented, thought content appropriate. Capillary Refill: Acceptable  < 3 seconds  Peripheral Pulses: +3 Easily felt, not easily obliterated with pressure      Chest x-ray: Initially no acute process, repeat chest x-ray showed possible right lower lobe opacity  CT chest pulmonary emphysema IV contrast:  1. No findings of pulmonary embolism. 2. Cardiovascular findings suggestive of pulmonary hypertension and right   heart dysfunction. 3. Central predominant interstitial and alveolar pulmonary edema suggestive   of congestive heart failure given mild cardiomegaly and bilateral pleural   effusions.  Superimposed pneumonia is not excluded. 4. Mild bronchial wall thickening potentially due to pulmonary vascular   congestion, reactive airways disease, or bronchitis. 5. 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  Recommend   follow-up imaging in 1 year as below. CBC   Recent Labs     09/11/21 2132   WBC 15.8*   HGB 13.5   HCT 40.7         RENAL  Recent Labs     09/11/21 2132 09/12/21  0756   * 142   K 3.5 3.4*    101   CO2 20* 26   PHOS  --  3.4   BUN 7 8   CREATININE 0.8 1.0     LFT'S  No results for input(s): AST, ALT, ALB, BILIDIR, BILITOT, ALKPHOS in the last 72 hours.   COAG  Recent Labs     09/11/21 2212   INR 5.05*     CARDIAC ENZYMES  Recent Labs     09/11/21 2132   TROPONINI <0.01       U/A:    Lab Results   Component Value Date    NITRITE neg 04/20/2021    COLORU yellow 04/20/2021    CLARITYU cloudy 04/20/2021    SPECGRAV >=1.030 04/20/2021    LEUKOCYTESUR small 04/20/2021    BLOODU moderate 04/20/2021    GLUCOSEU neg 04/20/2021       ABG    Lab Results   Component Value Date    MIO9XEB 26.9 09/12/2021    BEART 0.5 09/12/2021    W4IZTLLV >100.0 09/12/2021    PHART 7.344 09/12/2021    MUL5KXJ 49.4 09/12/2021    PO2ART 316.0 09/12/2021    FIZ6PSL 28.4 09/12/2021           Active Hospital Problems    Diagnosis Date Noted    Acute respiratory failure with hypoxia ([facility] [J96.01] 09/12/2021    Pneumonia [J18.9] 09/12/2021    Acute CHF (congestive heart failure) ([facility] [I50.9] 09/12/2021    Sepsis ([facility] [A41.9] 09/12/2021         PHYSICIANS CERTIFICATION:    I certify that Mayo Lujan is expected to be hospitalized for greater than 2 midnights based on the following assessment and plan:      ASSESSMENT/PLAN:  · Sepsis  · Pneumonia  · Acute CHF  · Acute respiratory failure with hypoxia    Plan:  · Start patient on Levophed for worsening hypotension and taken to ICU  · Start patient on Zosyn and Levaquin for broad-spectrum antibiotic coverage  · Restart patient's home medications  · If pressures are improving, can consider further Lasix but for now will refrain due to hypotension  · Repeat labs in the morning    DVT Prophylaxis: Lovenox  Diet: ADULT DIET; Regular; 4 carb choices (60 gm/meal); Low Sodium (2 gm); 1500 ml  Code Status: Full Code  PT/OT Eval Status: Ambulatory    Dispo -pending clinical course       Brandon Barnett, DO    Thank you Fabienne Newberry MD for the opportunity to be involved in this patient's care. If you have any questions or concerns please feel free to contact me at [phone].

## 2021-09-12 NOTE — CONSULTS
REASON FOR CONSULTATION/CC: sob       Consult at request of DO Clifford for sob    PCP: Antonette Ruelas MD  Established Pulmonologist:  None    HISTORY OF PRESENT ILLNESS: Ciera Gottlieb is a 78y.o. year old female with a history of MI who presents with :     Patient was recently admitted for STEMI who presented with hypotension after self check at home. She also had complaints of shortness of breath and hypoxemia initially treated with Lasix. Assessment:        History of CHF, EF 45%, PASP pressure 63  History of CLL  History of factor V mutation  History of STEMI    Plan:      Hospital Day 0      Bilateral effusions  *Likely secondary to fluid overload. Monitor after able to diurese    Patchy airspace disease  *Likely commendation of pneumonia along with fluid overload. *Currently on levo. When improves, start diuresis      Acute hypoxemic respiratory failure saturation less than 90% on room air  *Wean to 90% saturations      Sepsis with shock  *Empiric antibiotics with Zosyn Levaquin  *Check and follow procalcitonin  *Started on midodrine  *Levaquin improving. 6  *Last hospitalization was July. Therefore, CAP, therefore, transitioned over to ceftriaxone azithromycin  * WBC may be secondary to CLL . Typically ~ 19. Coagulopathy  *On Coumadin. INR currently 5. On antibiotics. Electrolytes  - K, hypokalemia, start replacement protocol   - Ca:  - Mg:  - Phos:         Nutrition  - ADULT DIET; Regular; 4 carb choices (60 gm/meal); Low Sodium (2 gm); 1500 ml    Mobility       Access  PORT    I spent 34 minutes of critical care time with this patient excluding any procedures. This note was transcribed using [address]. Please disregard any translational errors.     Thank you for the consult    Joan Gill [facility]  [phone]             Data:     PAST MEDICAL HISTORY:  Past Medical History:   Diagnosis Date    Acute ST elevation myocardial infarction (STEMI) ([facility] 07/19/2021    Chronic lymphocytic leukemia in remission ([facility]     CLL (chronic lymphocytic leukemia) ([facility] 02/12/2015    COPD (chronic obstructive pulmonary disease) ([facility]     Essential hypertension 01/01/2015    controlled with salt restriction (initially)    Factor V Leiden mutation ([facility]     Goiter 11/01/2011    1.5 cm midline    Hypercholesterolemia     Impaired fasting glucose 11/01/2011    Osteoarthritis     Osteoporosis     Post herpetic neuralgia     Vitamin D deficiency 11/01/2011       PAST SURGICAL HISTORY:  Past Surgical History:   Procedure Laterality Date    CATARACT REMOVAL WITH IMPLANT  5/14/12    left eye    JOINT REPLACEMENT      partial knee R and L    KNEE SURGERY  2008    partial replacements, both knees    MOHS SURGERY  03/12/2019    R cheek and R superior temple    SPLENECTOMY      TUNNELED VENOUS PORT PLACEMENT      UPPER GASTROINTESTINAL ENDOSCOPY N/A 2/5/2019    EGD ESOPHAGOGASTRODUODENOSCOPY, WITH ENDOSCOPIC ULTRASOUND OF ESOPHAGUS performed by Leticia Roche MD at [address]:  family history includes Diabetes in her father; Stroke (age of onset: 50) in her sister. SOCIAL HISTORY:   reports that she quit smoking about 25 years ago. Her smoking use included cigarettes. She has a 15.00 pack-year smoking history.  She has never used smokeless tobacco.    Scheduled Meds:   amiodarone  200 mg Oral Daily    midodrine  2.5 mg Oral BID WC    sodium chloride flush  5-40 mL IntraVENous 2 times per day    piperacillin-tazobactam  3,375 mg IntraVENous Q8H    And    levofloxacin  750 mg IntraVENous Q48H    warfarin (COUMADIN) daily dosing (placeholder)   Other RX Placeholder       Continuous Infusions:   norepinephrine 6 mcg/min (09/12/21 0732)    sodium chloride      sodium chloride 5 mL/hr at 09/12/21 0737       PRN Meds:  sodium chloride flush, sodium chloride, acetaminophen **OR** acetaminophen, ondansetron    ALLERGIES:  Patient has No Known Allergies. REVIEW OF SYSTEMS:  Constitutional: Negative for fever    HENT: Negative for sore throat  Eyes: Negative for redness   Respiratory: + for dyspnea, - cough  Cardiovascular: Negative for chest pain  Gastrointestinal: Negative for vomiting, diarrhea    Genitourinary: Negative for hematuria   Musculoskeletal: Negative for arthralgias   Skin: Negative for rash  Neurological: Negative for syncope  Hematological: Negative for adenopathy  Psychiatric/Behavorial: Negative for anxiety    Objective:   PHYSICAL EXAM:  Blood pressure (!) 114/47, pulse 68, temperature 97.9 °F (36.6 °C), temperature source Axillary, resp. rate 18, height 4' 11\" (1.499 m), weight 161 lb 6 oz (73.2 kg), SpO2 92 %, not currently breastfeeding.'  Gen: No distress. Eyes: PERRL. No sclera icterus. No conjunctival injection. ENT: No discharge. Pharynx clear. External appearance of ears and nose normal.  Neck: Trachea midline. No obvious mass. Resp: No accessory muscle use. fewcrackles. No wheezes. No rhonchi. CV: Regular rate. Regular rhythm. No murmur or rub. No edema. GI: Non-tender. Non-distended. No hernia. Skin: Warm, dry, normal texture and turgor. No nodule on exposed extremities. Lymph: No cervical LAD. No supraclavicular LAD. M/S: No cyanosis. No clubbing. No joint deformity. Neuro: Moves all four extremities. Psych: Oriented x 3. No anxiety. Awake. Alert. Intact judgement and insight. Data Reviewed:   LABS:  CBC:   Recent Labs     09/11/21 2132 09/12/21  0756   WBC 15.8* 19.6*   HGB 13.5 13.7   HCT 40.7 42.2   MCV 96.3 94.8    285     BMP:   Recent Labs     09/11/21 2132 09/12/21  0756   * 142   K 3.5 3.4*    101   CO2 20* 26   PHOS  --  3.4   BUN 7 8   CREATININE 0.8 1.0     LIVER PROFILE: No results for input(s): AST, ALT, LIPASE, BILIDIR, BILITOT, ALKPHOS in the last 72 hours. Invalid input(s):   AMYLASE,  ALB  PT/INR:   Recent Labs     09/11/21 2212 09/12/21  0756   PROTIME 61.1* 60.7*   INR 5.05* 5.02*     APTT:   Recent Labs     09/11/21 2212   APTT 53.9*     UA:No results for input(s): NITRITE, COLORU, PHUR, LABCAST, WBCUA, RBCUA, MUCUS, TRICHOMONAS, YEAST, BACTERIA, CLARITYU, SPECGRAV, LEUKOCYTESUR, UROBILINOGEN, BILIRUBINUR, BLOODU, GLUCOSEU, AMORPHOUS in the last 72 hours. Invalid input(s): KETONESU  Recent Labs     09/11/21  2330 09/12/21  0130   PHART 7.183* 7.344*   SFA8XMT 63.8* 49.4*   PO2ART 91.4 316.0*       Vent Information  Skin Assessment: Clean, dry, & intact  FiO2 : 100 %  SpO2: 92 %  SpO2/FiO2 ratio: 98  I Time/ I Time %: 0.8 s  Mask Type: Full face mask  Mask Size: Small    Radiology Review:  Pertinent images / reports were reviewed as a part of this visit. CT Chest w/ contrast: No results found for this or any previous visit. CT Chest w/o contrast: No results found for this or any previous visit. CTPA: Results for orders placed during the hospital encounter of 09/11/21    CT CHEST PULMONARY EMBOLISM W CONTRAST    Narrative  EXAMINATION:  CTA OF THE CHEST    9/12/2021 1:43 am    TECHNIQUE:  CTA of the chest was performed after the administration of intravenous  contrast.  Multiplanar reformatted images are provided for review. MIP  images are provided for review. Dose modulation, iterative reconstruction,  and/or weight based adjustment of the mA/kV was utilized to reduce the  radiation dose to as low as reasonably achievable.     COMPARISON:  Chest radiograph 09/11/2021 at 11:33 p.m.; chest, abdomen, and pelvis CTs  12/19/2017 and 11/21/2012    HISTORY:  ORDERING SYSTEM PROVIDED HISTORY: SOB  TECHNOLOGIST PROVIDED HISTORY:  Reason for exam:->SOB  Decision Support Exception - unselect if not a suspected or confirmed  emergency medical condition->Emergency Medical Condition (MA)  Reason for Exam: SOB  Acuity: Acute  Type of Exam: Initial    FINDINGS:  PULMONARY ARTERIES:  Mild dilation of the pulmonary trunk but not out of  proportion with the ascending thoracic aorta. Slight dilation of  intrapulmonary branches out of proportion with accompanying bronchi. Adequately opacified for evaluation. No filling defects consistent with  emboli. MEDIASTINUM:  Right subclavian central venous port catheter with the tip not  well seen but extending at least into the upper superior vena cava. Mild  cardiomegaly. Moderate right ventricular hypertrophy. Suspected moderate  concentric left ventricular hypertrophy. Mitral and aortic valve annular  calcifications. Flattening of the interventricular septum. No pericardial  effusion. Minimal coronary and mild systemic atherosclerotic calcifications. Reflux of the contrast bolus into the azygos vein. No mediastinal nor hilar  lymphadenopathy. Calcified mediastinal and right hilar lymph nodes, likely  sequelae of granulomatous disease. LUNGS/PLEURA:  Patent central airways. Mild bronchial wall thickening. Smooth interlobular septal thickening most prominent near the apices and  bases. Central predominant peribronchovascular groundglass opacities with  superimposed consolidative opacities in the bilateral lower lobes. No  significant change in scattered noncalcified solid nodules bilaterally,  considered benign. Calcified granuloma in the right middle lobe. Passive  atelectasis in the bilateral lower lobes. Small right and trace to small  left pleural effusions. No pneumothoraces. UPPER ABDOMEN:  Mild to moderate pancreatic atrophy. Unchanged 1.5 cm x 1.1  cm nodular soft tissue along the anteroinferior margin of the pancreas,  potentially a splenule or accessory pancreatic tissue with no significant  change since 11/21/2012 and considered benign. Suspected splenectomy. Unchanged 1.9 cm x 1.3 cm right adrenal adenoma. Adjacent peripherally  calcified aneurysm of the distal splenic artery measuring approximately 1.7  cm x 1.1 cm and 1.4 cm x 1.2 cm. Reflux of the contrast bolus into the  inferior vena cava and hepatic veins. SOFT TISSUES/BONES:   Reflux of the contrast bolus into venous collaterals  about the right shoulder. Minimal subcutaneous edema. No supraclavicular  nor axillary lymphadenopathy. No acute fractures nor suspicious bony lesions. Impression  1. No findings of pulmonary embolism. 2. Cardiovascular findings suggestive of pulmonary hypertension and right  heart dysfunction. 3. Central predominant interstitial and alveolar pulmonary edema suggestive  of congestive heart failure given mild cardiomegaly and bilateral pleural  effusions. Superimposed pneumonia is not excluded. 4. Mild bronchial wall thickening potentially due to pulmonary vascular  congestion, reactive airways disease, or bronchitis. 5. 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 Recommend  follow-up imaging in 1 year as below. RECOMMENDATIONS:  Incidental Vascular Findings on CT and MRI    - Splenic artery aneurysm, <2 cm:  Follow-up imaging every 1 year    Reference:  Harley et al.  Managing incidental findings on abdominal and  pelvic CT and MRI, part 2: white paper of the ACR Incidental Findings  Committee II on vascular findings. [address] [id number]. CXR PA/LAT: No results found for this or any previous visit. CXR portable: Results for orders placed during the hospital encounter of 09/11/21    XR CHEST PORTABLE    Narrative  EXAMINATION:  ONE XRAY VIEW OF THE CHEST    9/11/2021 11:43 pm    COMPARISON:  None. HISTORY:  ORDERING SYSTEM PROVIDED HISTORY: sudden change in resp status  TECHNOLOGIST PROVIDED HISTORY:  Reason for exam:->sudden change in resp status  Reason for Exam: sudden change in resp status  Acuity: Acute  Type of Exam: Initial    FINDINGS:  Possible right lower lobe opacity. There is no effusion or pneumothorax. The  cardiomediastinal silhouette is without acute process.  The osseous structures  are without acute process. Right-sided PICC line tip in the mid SVC. Impression  Possible right lower lobe opacity.

## 2021-09-13 ENCOUNTER — APPOINTMENT (OUTPATIENT)
Dept: PHARMACY | Age: 81
End: 2021-09-13
Payer: MEDICARE

## 2021-09-13 LAB
A/G RATIO: 1.5 (ref 1.1–2.2)
ALBUMIN SERPL-MCNC: 3.4 G/DL (ref 3.4–5)
ALP BLD-CCNC: 81 U/L (ref 40–129)
ALT SERPL-CCNC: 18 U/L (ref 10–40)
ANION GAP SERPL CALCULATED.3IONS-SCNC: 13 MMOL/L (ref 3–16)
AST SERPL-CCNC: 18 U/L (ref 15–37)
BASOPHILS ABSOLUTE: 0.1 K/UL (ref 0–0.2)
BASOPHILS RELATIVE PERCENT: 0.8 %
BILIRUB SERPL-MCNC: 0.4 MG/DL (ref 0–1)
BUN BLDV-MCNC: 13 MG/DL (ref 7–20)
CALCIUM SERPL-MCNC: 8.5 MG/DL (ref 8.3–10.6)
CHLORIDE BLD-SCNC: 103 MMOL/L (ref 99–110)
CO2: 26 MMOL/L (ref 21–32)
CREAT SERPL-MCNC: 0.9 MG/DL (ref 0.6–1.2)
EKG ATRIAL RATE: 87 BPM
EKG DIAGNOSIS: NORMAL
EKG P AXIS: 45 DEGREES
EKG P-R INTERVAL: 174 MS
EKG Q-T INTERVAL: 354 MS
EKG QRS DURATION: 100 MS
EKG QTC CALCULATION (BAZETT): 425 MS
EKG R AXIS: -43 DEGREES
EKG T AXIS: 56 DEGREES
EKG VENTRICULAR RATE: 87 BPM
EOSINOPHILS ABSOLUTE: 0 K/UL (ref 0–0.6)
EOSINOPHILS RELATIVE PERCENT: 0.2 %
GFR AFRICAN AMERICAN: >60
GFR NON-AFRICAN AMERICAN: 60
GLOBULIN: 2.3 G/DL
GLUCOSE BLD-MCNC: 109 MG/DL (ref 70–99)
HCT VFR BLD CALC: 37.6 % (ref 36–48)
HEMATOLOGY PATH CONSULT: NO
HEMOGLOBIN: 12.3 G/DL (ref 12–16)
INR BLD: 4.77 (ref 0.88–1.12)
LYMPHOCYTES ABSOLUTE: 8.3 K/UL (ref 1–5.1)
LYMPHOCYTES RELATIVE PERCENT: 48.7 %
MAGNESIUM: 2 MG/DL (ref 1.8–2.4)
MCH RBC QN AUTO: 31.4 PG (ref 26–34)
MCHC RBC AUTO-ENTMCNC: 32.8 G/DL (ref 31–36)
MCV RBC AUTO: 95.9 FL (ref 80–100)
MONOCYTES ABSOLUTE: 1.4 K/UL (ref 0–1.3)
MONOCYTES RELATIVE PERCENT: 8.4 %
NEUTROPHILS ABSOLUTE: 7.1 K/UL (ref 1.7–7.7)
NEUTROPHILS RELATIVE PERCENT: 41.9 %
PDW BLD-RTO: 15.1 % (ref 12.4–15.4)
PLATELET # BLD: 270 K/UL (ref 135–450)
PMV BLD AUTO: 9.8 FL (ref 5–10.5)
POTASSIUM SERPL-SCNC: 3.8 MMOL/L (ref 3.5–5.1)
PROCALCITONIN: 0.38 NG/ML (ref 0–0.15)
PROTHROMBIN TIME: 57.5 SEC (ref 9.9–12.7)
RBC # BLD: 3.93 M/UL (ref 4–5.2)
SODIUM BLD-SCNC: 142 MMOL/L (ref 136–145)
TOTAL PROTEIN: 5.7 G/DL (ref 6.4–8.2)
WBC # BLD: 17 K/UL (ref 4–11)

## 2021-09-13 PROCEDURE — 94761 N-INVAS EAR/PLS OXIMETRY MLT: CPT

## 2021-09-13 PROCEDURE — 6370000000 HC RX 637 (ALT 250 FOR IP): Performed by: INTERNAL MEDICINE

## 2021-09-13 PROCEDURE — 99223 1ST HOSP IP/OBS HIGH 75: CPT | Performed by: INTERNAL MEDICINE

## 2021-09-13 PROCEDURE — 99232 SBSQ HOSP IP/OBS MODERATE 35: CPT | Performed by: INTERNAL MEDICINE

## 2021-09-13 PROCEDURE — 2580000003 HC RX 258: Performed by: INTERNAL MEDICINE

## 2021-09-13 PROCEDURE — 6370000000 HC RX 637 (ALT 250 FOR IP): Performed by: STUDENT IN AN ORGANIZED HEALTH CARE EDUCATION/TRAINING PROGRAM

## 2021-09-13 PROCEDURE — 2700000000 HC OXYGEN THERAPY PER DAY

## 2021-09-13 PROCEDURE — 93010 ELECTROCARDIOGRAM REPORT: CPT | Performed by: INTERNAL MEDICINE

## 2021-09-13 PROCEDURE — 1200000000 HC SEMI PRIVATE

## 2021-09-13 PROCEDURE — 80053 COMPREHEN METABOLIC PANEL: CPT

## 2021-09-13 PROCEDURE — 2580000003 HC RX 258: Performed by: STUDENT IN AN ORGANIZED HEALTH CARE EDUCATION/TRAINING PROGRAM

## 2021-09-13 PROCEDURE — 6360000002 HC RX W HCPCS: Performed by: STUDENT IN AN ORGANIZED HEALTH CARE EDUCATION/TRAINING PROGRAM

## 2021-09-13 PROCEDURE — 85025 COMPLETE CBC W/AUTO DIFF WBC: CPT

## 2021-09-13 PROCEDURE — 85610 PROTHROMBIN TIME: CPT

## 2021-09-13 PROCEDURE — 84145 PROCALCITONIN (PCT): CPT

## 2021-09-13 PROCEDURE — 6360000002 HC RX W HCPCS: Performed by: INTERNAL MEDICINE

## 2021-09-13 PROCEDURE — 83735 ASSAY OF MAGNESIUM: CPT

## 2021-09-13 RX ORDER — METOPROLOL SUCCINATE 25 MG/1
25 TABLET, EXTENDED RELEASE ORAL DAILY
Status: DISCONTINUED | OUTPATIENT
Start: 2021-09-13 | End: 2021-09-18 | Stop reason: HOSPADM

## 2021-09-13 RX ORDER — LANOLIN ALCOHOL/MO/W.PET/CERES
3 CREAM (GRAM) TOPICAL NIGHTLY PRN
Status: DISCONTINUED | OUTPATIENT
Start: 2021-09-13 | End: 2021-09-18 | Stop reason: HOSPADM

## 2021-09-13 RX ORDER — TORSEMIDE 20 MG/1
20 TABLET ORAL DAILY
Status: DISCONTINUED | OUTPATIENT
Start: 2021-09-13 | End: 2021-09-15

## 2021-09-13 RX ADMIN — Medication 10 ML: at 08:52

## 2021-09-13 RX ADMIN — TORSEMIDE 20 MG: 20 TABLET ORAL at 13:14

## 2021-09-13 RX ADMIN — METOPROLOL SUCCINATE 25 MG: 25 TABLET, EXTENDED RELEASE ORAL at 13:13

## 2021-09-13 RX ADMIN — CEFTRIAXONE 1000 MG: 1 INJECTION, POWDER, FOR SOLUTION INTRAMUSCULAR; INTRAVENOUS at 16:19

## 2021-09-13 RX ADMIN — AZITHROMYCIN MONOHYDRATE 500 MG: 500 INJECTION, POWDER, LYOPHILIZED, FOR SOLUTION INTRAVENOUS at 06:48

## 2021-09-13 RX ADMIN — AMIODARONE HYDROCHLORIDE 200 MG: 200 TABLET ORAL at 08:52

## 2021-09-13 RX ADMIN — ONDANSETRON 4 MG: 2 INJECTION INTRAMUSCULAR; INTRAVENOUS at 17:25

## 2021-09-13 RX ADMIN — Medication 10 ML: at 22:09

## 2021-09-13 RX ADMIN — MIDODRINE HYDROCHLORIDE 2.5 MG: 5 TABLET ORAL at 08:52

## 2021-09-13 RX ADMIN — Medication 3 MG: at 22:09

## 2021-09-13 RX ADMIN — MIDODRINE HYDROCHLORIDE 2.5 MG: 5 TABLET ORAL at 16:13

## 2021-09-13 RX ADMIN — ACETAMINOPHEN 650 MG: 325 TABLET ORAL at 16:13

## 2021-09-13 RX ADMIN — SODIUM CHLORIDE 25 ML: 9 INJECTION, SOLUTION INTRAVENOUS at 16:16

## 2021-09-13 ASSESSMENT — PAIN SCALES - GENERAL
PAINLEVEL_OUTOF10: 0
PAINLEVEL_OUTOF10: 4
PAINLEVEL_OUTOF10: 3
PAINLEVEL_OUTOF10: 0
PAINLEVEL_OUTOF10: 0
PAINLEVEL_OUTOF10: 4
PAINLEVEL_OUTOF10: 0

## 2021-09-13 NOTE — PROGRESS NOTES
Hospitalist Progress Note      PCP: Anastasia Souza MD    Chief Complaint. Presented to hospital for low BP    Date of Admission: 9/11/2021      Subjective:   denies chest pain, nausea, vomiting, shortness of breath, fever or chills. mention feels overall better    Medications:  Reviewed    Infusion Medications    sodium chloride 25 mL (09/13/21 1616)    sodium chloride Stopped (09/13/21 0650)     Scheduled Medications    metoprolol succinate  25 mg Oral Daily    torsemide  20 mg Oral Daily    amiodarone  200 mg Oral Daily    midodrine  2.5 mg Oral BID WC    sodium chloride flush  5-40 mL IntraVENous 2 times per day    warfarin (COUMADIN) daily dosing (placeholder)   Other RX Placeholder    cefTRIAXone (ROCEPHIN) IV  1,000 mg IntraVENous Q24H    [START ON 9/14/2021] azithromycin  250 mg IntraVENous Q24H     PRN Meds: sodium chloride flush, sodium chloride, acetaminophen **OR** acetaminophen, ondansetron, potassium chloride **OR** potassium alternative oral replacement **OR** potassium chloride      Intake/Output Summary (Last 24 hours) at 9/13/2021 1636  Last data filed at 9/13/2021 1502  Gross per 24 hour   Intake 294.82 ml   Output 1125 ml   Net -830.18 ml       Physical Exam Performed:    /66   Pulse 82   Temp 98.1 °F (36.7 °C) (Oral)   Resp 20   Ht 4' 11\" (1.499 m)   Wt 158 lb 15.2 oz (72.1 kg)   SpO2 95%   BMI 32.10 kg/m²     General appearance: No apparent distress,   HEENT:  Conjunctivae/corneas clear. Neck: Supple, with full range of motion. Respiratory:  Normal respiratory effort. Clear to auscultation, bilaterally without Rales/Wheezes/Rhonchi. Cardiovascular: Regular rate and rhythm with normal S1/S2 without murmurs or rubs  Abdomen: Soft, non-tender, non-distended, normal bowel sounds. Musculoskeletal: No cyanosis or edema bilaterally  Neurologic:  without any focal sensory/motor deficits.  grossly non-focal.  Psychiatric: Alert and oriented, Normal mood  Peripheral Pulses: +2 palpable, equal bilaterally       Labs:   Recent Labs     09/11/21 2132 09/12/21  0756 09/13/21  0544   WBC 15.8* 19.6* 17.0*   HGB 13.5 13.7 12.3   HCT 40.7 42.2 37.6    285 270     Recent Labs     09/11/21 2132 09/12/21  0756 09/13/21  0544   * 142 142   K 3.5 3.4* 3.8    101 103   CO2 20* 26 26   BUN 7 8 13   CREATININE 0.8 1.0 0.9   CALCIUM 9.1 8.7 8.5   PHOS  --  3.4  --      Recent Labs     09/13/21  0544   AST 18   ALT 18   BILITOT 0.4   ALKPHOS 81     Recent Labs     09/11/21 2212 09/12/21  0756 09/13/21  0544   INR 5.05* 5.02* 4.77*     Recent Labs     09/11/21 2132   TROPONINI <0.01       Urinalysis:      Lab Results   Component Value Date    BLOODU moderate 04/20/2021    SPECGRAV >=1.030 04/20/2021    GLUCOSEU neg 04/20/2021       Radiology:  CT CHEST PULMONARY EMBOLISM W CONTRAST   Final Result   1. No findings of pulmonary embolism. 2. Cardiovascular findings suggestive of pulmonary hypertension and right   heart dysfunction. 3. Central predominant interstitial and alveolar pulmonary edema suggestive   of congestive heart failure given mild cardiomegaly and bilateral pleural   effusions. Superimposed pneumonia is not excluded. 4. Mild bronchial wall thickening potentially due to pulmonary vascular   congestion, reactive airways disease, or bronchitis. 5. 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 Recommend   follow-up imaging in 1 year as below. RECOMMENDATIONS:   Incidental Vascular Findings on CT and MRI      - Splenic artery aneurysm, <2 cm:  Follow-up imaging every 1 year      Reference:  Harley et al.  Managing incidental findings on abdominal and   pelvic CT and MRI, part 2: white paper of the ACR Incidental Findings   Committee II on vascular findings. [address];[id number]. XR CHEST PORTABLE   Final Result   Possible right lower lobe opacity. XR CHEST PORTABLE   Final Result   No acute process. Assessment/Plan:    Active Hospital Problems    Diagnosis     [facility] [J96.01]     Pneumonia [J18.9]     Acute CHF [facility]congestive heart failure) [facility] [I50.9]     Sepsis ([facility] [A41.9]      Assessment  · Sepsis  · Pneumonia  · Acute CHF  · Acute respiratory failure with hypoxia     Plan:  · Off of levophed, continue IV abx with azithro, and rocephin,   · Restart patient's home medications  start torsemide     DVT Prophylaxis: ordered  Diet: ADULT DIET; Regular; 4 carb choices (60 gm/meal);  Low Sodium (2 gm); 1500 ml  Code Status: Full Code    PT/OT Eval Status: ordered    Dispo - floor    Dileep Lawrence MD

## 2021-09-13 NOTE — ACP (ADVANCE CARE PLANNING)
Spoke with patient in the room with her daughter present. She states she has a HPOA but it is not in her chart. Her daughter will try to locate the HPOA and will bring it to the hospital to be placed in her chart. She confirmed her decisions regarding ACP are the same as previous assessment.     Shanell Esposito RN, BSN, Case Management  Phone: [phone]  Electronically signed by Shanell Esposito RN on 9/13/2021 at 1:11 PM

## 2021-09-13 NOTE — PROGRESS NOTES
Clinical Pharmacy Note  Warfarin Consult    Margarita Mathews is a 78 y.o. female receiving warfarin managed by pharmacy. Warfarin Indication: Factor V Leiden mutation  Target INR range: 2-3  Dose prior to admission: 5 mg MWF and 2.5 mg all other days    Current warfarin drug-drug interactions: azithromycin    Recent Labs     09/11/21  2132 09/11/21  2212 09/12/21  0756 09/13/21  0544   HGB 13.5  --  13.7 12.3   HCT 40.7  --  42.2 37.6   INR  --  5.05* 5.02* 4.77*       Assessment/Plan:    No warfarin tonight due to INR = 4.77. Daily PT/INR until stable within therapeutic range. Thank you for the consult. Will continue to follow.     Edin Flores, [address] [facility], PharmD   9/13/2021 8:14 AM

## 2021-09-13 NOTE — CARE COORDINATION
INITIAL CASE MANAGEMENT ASSESSMENT    Met with patient and daughter, Zunilda Barros, to assess possible discharge needs. Explained Case Management role/services. Living Situation: confirmed address, lives with her spouse (who she provides care for) in a 1 level condo with 2 steps to enter -  is currently inpt and will likely be D/C to SNF care    ADLs: independent     DME: has a walker, but is not using it currently, has a walkin shower with bench, grab bars throughout the house, handicapped toilet seat    PT/OT Recs: not ordered at this time. The patient states she is getting around well in the room with help but hasn't been up much     Active Services: goes to [address]     Transportation:  Has not been released to drive again from last hospital stay, daughter transports     Medications: confirmed Medicare and [place], uses [facility] on [address]    PCP: confirmed Gerald Chicas MD      HD/PD: N/A    PLAN/COMMENTS: Plan is to return home at D/C. She denies any needs. Will monitor for possible therapy needs if it is needed. Provided contact information for patient or family to call with any questions. Will follow and assist as needed.     Kya Woodruff RN, BSN, Case Management  [phone]  Electronically signed by Kya Woodruff RN on 9/13/2021 at 1:05 PM

## 2021-09-13 NOTE — PROGRESS NOTES
Pulmonary Progress Note    Date of Admission: 9/11/2021   LOS: 1 day     CC:  Chief Complaint   Patient presents with    Irregular Heart Beat     elevated HR at home, 's   hx: MI about 7 wks ago           Assessment/Plan       Circulatory shock-resolved  -Off pressors    Acute pulmonary edema with pleural effusions, due to  Acute on chronic systolic heart failure, LVEF 45%  -Shock resolved, would initiate gentle diuretics  -Covid negative  -Mildly elevated pro-Andres    Community-acquired pneumonia  -Ceftriaxone/azithromycin  -Strep/Legionella antigens  -Respiratory culture    CLL  -Line baseline leukocytosis        HPI/Subjective  No acute events overnight, weaned off pressors    ROS:   No nausea  No Vomiting  No chest pain      Intake/Output Summary (Last 24 hours) at 9/13/2021 0738  Last data filed at 9/13/2021 0651  Gross per 24 hour   Intake 858.17 ml   Output 300 ml   Net 558.17 ml         PHYSICAL EXAM:   Blood pressure (!) 130/53, pulse 78, temperature 98.5 °F (36.9 °C), temperature source Oral, resp. rate 22, height 4' 11\" (1.499 m), weight 158 lb 15.2 oz (72.1 kg), SpO2 96 %, not currently breastfeeding.'  Gen:  No acute distress. Eyes: PERRL. Anicteric sclera. No conjunctival injection. ENT: No discharge. Posterior oropharynx clear. External appearance of ears and nose normal.  Neck: Trachea midline. No mass   Resp:  No crackles. No wheezes. No rhonchi. No dullness on percussion. CV: Regular rate. Regular rhythm. No murmur or rub. No edema. GI: Soft, Non-tender. Non-distended. +BS  Skin: Warm, dry, w/o erythema. Lymph: No cervical or supraclavicular LAD. M/S: No cyanosis. No clubbing. Neuro:  no focal neurologic deficit. Moves all extremities  Psych: Awake and alert, Oriented x 3. Judgement and insight appropriate. Mood stable.       Medications:    Scheduled Meds:   amiodarone  200 mg Oral Daily    midodrine  2.5 mg Oral BID WC    sodium chloride flush  5-40 mL IntraVENous 2 times per day    warfarin (COUMADIN) daily dosing (placeholder)   Other RX Placeholder    cefTRIAXone (ROCEPHIN) IV  1,000 mg IntraVENous Q24H    [START ON 9/14/2021] azithromycin  250 mg IntraVENous Q24H       Continuous Infusions:   norepinephrine Stopped (09/12/21 2218)    sodium chloride      sodium chloride Stopped (09/13/21 0650)       PRN Meds:  sodium chloride flush, sodium chloride, acetaminophen **OR** acetaminophen, ondansetron, potassium chloride **OR** potassium alternative oral replacement **OR** potassium chloride    Labs reviewed:  CBC:   Recent Labs     09/11/21 2132 09/12/21 0756 09/13/21  0544   WBC 15.8* 19.6* 17.0*   HGB 13.5 13.7 12.3   HCT 40.7 42.2 37.6   MCV 96.3 94.8 95.9    285 270     BMP:   Recent Labs     09/11/21 2132 09/12/21 0756   * 142   K 3.5 3.4*    101   CO2 20* 26   PHOS  --  3.4   BUN 7 8   CREATININE 0.8 1.0     LIVER PROFILE: No results for input(s): AST, ALT, LIPASE, BILIDIR, BILITOT, ALKPHOS in the last 72 hours. Invalid input(s): AMYLASE,  ALB  PT/INR:   Recent Labs     09/11/21 2212 09/12/21 0756 09/13/21  0544   PROTIME 61.1* 60.7* 57.5*   INR 5.05* 5.02* 4.77*     APTT:   Recent Labs     09/11/21 2212   APTT 53.9*     UA:No results for input(s): NITRITE, COLORU, PHUR, LABCAST, WBCUA, RBCUA, MUCUS, TRICHOMONAS, YEAST, BACTERIA, CLARITYU, SPECGRAV, LEUKOCYTESUR, UROBILINOGEN, BILIRUBINUR, BLOODU, GLUCOSEU, AMORPHOUS in the last 72 hours. Invalid input(s): Celestine Gardner  No results for input(s): PH, PCO2, PO2 in the last 72 hours. Films:  Radiology Review:  Pertinent images / reports were reviewed as a part of this visit. CT CHEST PULMONARY EMBOLISM W CONTRAST  Narrative: EXAMINATION:  CTA OF THE CHEST    9/12/2021 1:43 am    TECHNIQUE:  CTA of the chest was performed after the administration of intravenous  contrast.  Multiplanar reformatted images are provided for review. MIP  images are provided for review.  Dose modulation, iterative reconstruction,  and/or weight based adjustment of the mA/kV was utilized to reduce the  radiation dose to as low as reasonably achievable. COMPARISON:  Chest radiograph 09/11/2021 at 11:33 p.m.; chest, abdomen, and pelvis CTs  12/19/2017 and 11/21/2012    HISTORY:  ORDERING SYSTEM PROVIDED HISTORY: SOB  TECHNOLOGIST PROVIDED HISTORY:  Reason for exam:->SOB  Decision Support Exception - unselect if not a suspected or confirmed  emergency medical condition->Emergency Medical Condition (MA)  Reason for Exam: SOB  Acuity: Acute  Type of Exam: Initial    FINDINGS:  PULMONARY ARTERIES:  Mild dilation of the pulmonary trunk but not out of  proportion with the ascending thoracic aorta. Slight dilation of  intrapulmonary branches out of proportion with accompanying bronchi. Adequately opacified for evaluation. No filling defects consistent with  emboli. MEDIASTINUM:  Right subclavian central venous port catheter with the tip not  well seen but extending at least into the upper superior vena cava. Mild  cardiomegaly. Moderate right ventricular hypertrophy. Suspected moderate  concentric left ventricular hypertrophy. Mitral and aortic valve annular  calcifications. Flattening of the interventricular septum. No pericardial  effusion. Minimal coronary and mild systemic atherosclerotic calcifications. Reflux of the contrast bolus into the azygos vein. No mediastinal nor hilar  lymphadenopathy. Calcified mediastinal and right hilar lymph nodes, likely  sequelae of granulomatous disease. LUNGS/PLEURA:  Patent central airways. Mild bronchial wall thickening. Smooth interlobular septal thickening most prominent near the apices and  bases. Central predominant peribronchovascular groundglass opacities with  superimposed consolidative opacities in the bilateral lower lobes. No  significant change in scattered noncalcified solid nodules bilaterally,  considered benign.   Calcified granuloma in the right middle Stanislaw (Active)   Number of days: 52           CVC                   Thank you for this consult,    Damon Johnson MD  [facility] Pulmonary, Critical Care, and Sleep Medicine

## 2021-09-13 NOTE — PROGRESS NOTES
1125: Report given Jasper Maldonado RN.  Pt to transfer to [address] [place] [address]  Electronically signed by Yaya Edwards RN on 9/13/2021 at 11:29 AM

## 2021-09-14 ENCOUNTER — APPOINTMENT (OUTPATIENT)
Dept: GENERAL RADIOLOGY | Age: 81
DRG: 871 | End: 2021-09-14
Payer: MEDICARE

## 2021-09-14 LAB
A/G RATIO: 1.4 (ref 1.1–2.2)
ALBUMIN SERPL-MCNC: 3.5 G/DL (ref 3.4–5)
ALP BLD-CCNC: 82 U/L (ref 40–129)
ALT SERPL-CCNC: 16 U/L (ref 10–40)
ANION GAP SERPL CALCULATED.3IONS-SCNC: 12 MMOL/L (ref 3–16)
AST SERPL-CCNC: 16 U/L (ref 15–37)
BASOPHILS ABSOLUTE: 0.2 K/UL (ref 0–0.2)
BASOPHILS RELATIVE PERCENT: 1.4 %
BILIRUB SERPL-MCNC: 0.4 MG/DL (ref 0–1)
BUN BLDV-MCNC: 12 MG/DL (ref 7–20)
CALCIUM SERPL-MCNC: 8.9 MG/DL (ref 8.3–10.6)
CHLORIDE BLD-SCNC: 104 MMOL/L (ref 99–110)
CO2: 29 MMOL/L (ref 21–32)
CREAT SERPL-MCNC: 0.8 MG/DL (ref 0.6–1.2)
EOSINOPHILS ABSOLUTE: 0.3 K/UL (ref 0–0.6)
EOSINOPHILS RELATIVE PERCENT: 2 %
GFR AFRICAN AMERICAN: >60
GFR NON-AFRICAN AMERICAN: >60
GLOBULIN: 2.5 G/DL
GLUCOSE BLD-MCNC: 100 MG/DL (ref 70–99)
HCT VFR BLD CALC: 38.6 % (ref 36–48)
HEMATOLOGY PATH CONSULT: NO
HEMOGLOBIN: 12.6 G/DL (ref 12–16)
INR BLD: 3.32 (ref 0.88–1.12)
LYMPHOCYTES ABSOLUTE: 7.7 K/UL (ref 1–5.1)
LYMPHOCYTES RELATIVE PERCENT: 53.6 %
MAGNESIUM: 2.1 MG/DL (ref 1.8–2.4)
MCH RBC QN AUTO: 31 PG (ref 26–34)
MCHC RBC AUTO-ENTMCNC: 32.6 G/DL (ref 31–36)
MCV RBC AUTO: 95.2 FL (ref 80–100)
MONOCYTES ABSOLUTE: 1 K/UL (ref 0–1.3)
MONOCYTES RELATIVE PERCENT: 6.9 %
NEUTROPHILS ABSOLUTE: 5.2 K/UL (ref 1.7–7.7)
NEUTROPHILS RELATIVE PERCENT: 36.1 %
PDW BLD-RTO: 14.9 % (ref 12.4–15.4)
PLATELET # BLD: 293 K/UL (ref 135–450)
PMV BLD AUTO: 9.5 FL (ref 5–10.5)
POTASSIUM SERPL-SCNC: 3.8 MMOL/L (ref 3.5–5.1)
PRO-BNP: ABNORMAL PG/ML (ref 0–449)
PRO-BNP: ABNORMAL PG/ML (ref 0–449)
PROCALCITONIN: 0.29 NG/ML (ref 0–0.15)
PROTHROMBIN TIME: 39.5 SEC (ref 9.9–12.7)
RBC # BLD: 4.06 M/UL (ref 4–5.2)
SODIUM BLD-SCNC: 145 MMOL/L (ref 136–145)
TOTAL PROTEIN: 6 G/DL (ref 6.4–8.2)
WBC # BLD: 14.4 K/UL (ref 4–11)

## 2021-09-14 PROCEDURE — 80053 COMPREHEN METABOLIC PANEL: CPT

## 2021-09-14 PROCEDURE — 99233 SBSQ HOSP IP/OBS HIGH 50: CPT | Performed by: INTERNAL MEDICINE

## 2021-09-14 PROCEDURE — 6360000002 HC RX W HCPCS: Performed by: INTERNAL MEDICINE

## 2021-09-14 PROCEDURE — 6370000000 HC RX 637 (ALT 250 FOR IP): Performed by: STUDENT IN AN ORGANIZED HEALTH CARE EDUCATION/TRAINING PROGRAM

## 2021-09-14 PROCEDURE — 85610 PROTHROMBIN TIME: CPT

## 2021-09-14 PROCEDURE — 84145 PROCALCITONIN (PCT): CPT

## 2021-09-14 PROCEDURE — 97116 GAIT TRAINING THERAPY: CPT

## 2021-09-14 PROCEDURE — 1200000000 HC SEMI PRIVATE

## 2021-09-14 PROCEDURE — 83880 ASSAY OF NATRIURETIC PEPTIDE: CPT

## 2021-09-14 PROCEDURE — 97530 THERAPEUTIC ACTIVITIES: CPT

## 2021-09-14 PROCEDURE — 2700000000 HC OXYGEN THERAPY PER DAY

## 2021-09-14 PROCEDURE — 97165 OT EVAL LOW COMPLEX 30 MIN: CPT

## 2021-09-14 PROCEDURE — 2580000003 HC RX 258: Performed by: INTERNAL MEDICINE

## 2021-09-14 PROCEDURE — 36415 COLL VENOUS BLD VENIPUNCTURE: CPT

## 2021-09-14 PROCEDURE — 6370000000 HC RX 637 (ALT 250 FOR IP): Performed by: INTERNAL MEDICINE

## 2021-09-14 PROCEDURE — 6370000000 HC RX 637 (ALT 250 FOR IP)

## 2021-09-14 PROCEDURE — 71045 X-RAY EXAM CHEST 1 VIEW: CPT

## 2021-09-14 PROCEDURE — 97161 PT EVAL LOW COMPLEX 20 MIN: CPT

## 2021-09-14 PROCEDURE — 2580000003 HC RX 258: Performed by: STUDENT IN AN ORGANIZED HEALTH CARE EDUCATION/TRAINING PROGRAM

## 2021-09-14 PROCEDURE — 36591 DRAW BLOOD OFF VENOUS DEVICE: CPT

## 2021-09-14 PROCEDURE — 94761 N-INVAS EAR/PLS OXIMETRY MLT: CPT

## 2021-09-14 PROCEDURE — 85025 COMPLETE CBC W/AUTO DIFF WBC: CPT

## 2021-09-14 PROCEDURE — 83735 ASSAY OF MAGNESIUM: CPT

## 2021-09-14 RX ORDER — WARFARIN SODIUM 2.5 MG/1
2.5 TABLET ORAL
Status: COMPLETED | OUTPATIENT
Start: 2021-09-14 | End: 2021-09-14

## 2021-09-14 RX ORDER — FUROSEMIDE 10 MG/ML
20 INJECTION INTRAMUSCULAR; INTRAVENOUS ONCE
Status: COMPLETED | OUTPATIENT
Start: 2021-09-14 | End: 2021-09-14

## 2021-09-14 RX ADMIN — SODIUM CHLORIDE 5 ML: 9 INJECTION, SOLUTION INTRAVENOUS at 15:25

## 2021-09-14 RX ADMIN — CEFTRIAXONE 1000 MG: 1 INJECTION, POWDER, FOR SOLUTION INTRAMUSCULAR; INTRAVENOUS at 15:28

## 2021-09-14 RX ADMIN — AMIODARONE HYDROCHLORIDE 200 MG: 200 TABLET ORAL at 09:28

## 2021-09-14 RX ADMIN — Medication 3 MG: at 21:46

## 2021-09-14 RX ADMIN — TORSEMIDE 20 MG: 20 TABLET ORAL at 09:29

## 2021-09-14 RX ADMIN — AZITHROMYCIN MONOHYDRATE 250 MG: 500 INJECTION, POWDER, LYOPHILIZED, FOR SOLUTION INTRAVENOUS at 06:03

## 2021-09-14 RX ADMIN — METOPROLOL SUCCINATE 25 MG: 25 TABLET, EXTENDED RELEASE ORAL at 09:28

## 2021-09-14 RX ADMIN — FUROSEMIDE 20 MG: 10 INJECTION, SOLUTION INTRAMUSCULAR; INTRAVENOUS at 10:57

## 2021-09-14 RX ADMIN — Medication 10 ML: at 21:47

## 2021-09-14 RX ADMIN — WARFARIN SODIUM 2.5 MG: 2.5 TABLET ORAL at 18:43

## 2021-09-14 ASSESSMENT — PAIN SCALES - GENERAL: PAINLEVEL_OUTOF10: 0

## 2021-09-14 NOTE — PROGRESS NOTES
Hospital Medicine Progress Note      Admit Date: 9/11/2021       CC: F/U for irregular HR, elevated 's per ER note, hx: MI 7 wks ago    HPI: The patient is a 78 y.o. female with past medical history of recent acute STEMI but was noted to have negative coronary lesions 7 weeks ago although had severe systolic dysfunction, previous CLL, COPD, hypertension, factor V Leiden mutation on Coumadin currently, hyperlipidemia, and osteoporosis who presents to [facility] with concern for acute onset shortness of breath at home without any provocation as well as some intermittent chest tightness as well as some chills which she could not explain but she noted that the initial symptoms seem similar to her previous STEMI from 7 weeks ago. He denies any other recent symptoms of fever, dizziness, syncope, leg swelling, nausea/vomiting/diarrhea/abdominal pain, blood in urine/stool/sputum. She did check her blood pressure at home and it was noted to be very hypertensive although on presentation to the ED it was only 543 systolic. She is not been in contact with anyone sick. Upon presentation to the ED, patient was initially seen by ED provider and was notably improving on nasal cannula oxygen. It was only after she was found to be ambulating to the bathroom she then became increasingly short of breath and looked very ill-appearing. She required nonrebreather oxygen and then was treated with IV Lasix and improved. ED physician initially suspected flash pulmonary edema although x-ray did not demonstrate much finding of flash pulmonary edema. She felt better after the IV Lasix that was given although then her blood pressure started to become significantly decreased. Interval History/Subjective: on 3L nc this a.m. Cardiology giving IV dose Lasix today on top of demadex and possibly aldactone as well. Repeat CXR today and BNP today - CXR improving.     She states she feels that her breathing is a little better. Review of Systems:     The patient denied headaches, visual changes, LOC, SOB, CP, ABD pain, N/V/D, skin changes, new or worsening weakness or neuromuscular deficits. Comprehensive ROS negative except as mentioned above. Past Medical History:        Diagnosis Date    Acute ST elevation myocardial infarction (STEMI) ([facility] 07/19/2021    Chronic lymphocytic leukemia in remission ([facility]     CLL (chronic lymphocytic leukemia) ([facility] 02/12/2015    COPD (chronic obstructive pulmonary disease) ([facility]     Essential hypertension 01/01/2015    controlled with salt restriction (initially)    Factor V Leiden mutation ([facility]     Goiter 11/01/2011    1.5 cm midline    Hypercholesterolemia     Impaired fasting glucose 11/01/2011    Osteoarthritis     Osteoporosis     Post herpetic neuralgia     Vitamin D deficiency 11/01/2011       Past Surgical History:        Procedure Laterality Date    CATARACT REMOVAL WITH IMPLANT  5/14/12    left eye    JOINT REPLACEMENT      partial knee R and L    KNEE SURGERY  2008    partial replacements, both knees    MOHS SURGERY  03/12/2019    R cheek and R superior temple    SPLENECTOMY      TUNNELED VENOUS PORT PLACEMENT      UPPER GASTROINTESTINAL ENDOSCOPY N/A 2/5/2019    EGD ESOPHAGOGASTRODUODENOSCOPY, WITH ENDOSCOPIC ULTRASOUND OF ESOPHAGUS performed by Ezra Newman MD at [facility]:  Patient has no known allergies. Past medical and surgical history reviewed. Any changes have been noted. PHYSICAL EXAM:  /68   Pulse 66   Temp 98.2 °F (36.8 °C) (Oral)   Resp 15   Ht 4' 11\" (1.499 m)   Wt 156 lb 4.9 oz (70.9 kg)   SpO2 97%   BMI 31.57 kg/m²       Intake/Output Summary (Last 24 hours) at 9/14/2021 0823  Last data filed at 9/14/2021 0656  Gross per 24 hour   Intake 360 ml   Output 1425 ml   Net -1065 ml        General appearance:   No apparent distress, appears stated age. Cooperative. HEENT:  Normocephalic, atraumatic. opacity  - strep/legionella pending  - resp culture pending   - covid neg  - consult to pulmonology     Sepsis, as evidenced by hypoxia, leukocytosis, tachypneia  - off levophed, cont IV antibx (azith + rocephin)  - strep/legionella antigens  - procalcitonin 0.29/ WBC improving    Acute CHF   - restart home meds : bb, entresto, amiodarone, midodrine  - torsemide 20mg daily restarted  - cardiology consult  - CT chest: pulmonary HTN and right heart dysfunction, bilat pleural effusions  - on po demadex and getting additional IV Lasix 20mg by cardiology today  - repeat BNP and CXR today -- CXR improving  - BNP 17,372 down from 21,704 up from adm 5,424    Acute pulmonary edema with moderate left pleural effusions  - Consult Pulmonology  - LVEF 45%; mild apical akinesis  - COVID neg  - on po demadex and getting additional IV Lasix 20mg by cardiology today  - adding aldactone 12.5mg daily per cardiology  -  BNP 17,271 from 21,704        DVT Prophylaxis: ordered  Diet: ADULT DIET; Regular; 4 carb choices (60 gm/meal); Low Sodium (2 gm); 1500 ml  Code Status: Full Code     PT/OT Eval Status: ordered         Continue current regimen/therapies. Monitor. Adjust medical regimen as appropriate. Body mass index is 31.57 kg/m². The patient and / or the family were informed of the results of any tests, a time was given to answer questions, a plan was proposed and they agreed with plan. Diet: ADULT DIET; Regular; 4 carb choices (60 gm/meal);  Low Sodium (2 gm); 1500 ml    Consults:  PHARMACY TO DOSE MEDICATION  IP CONSULT TO CARDIOLOGY    DISPO/placement plan: pending    Code Status: Full Code      FAB Flores - PRATIMA  09/14/21

## 2021-09-14 NOTE — PROGRESS NOTES
Occupational Therapy   Occupational Therapy Initial Assessment  Date: 2021   Patient Name: Manfred Kapadia  MRN: [id number]     : 1940    Date of Service: 2021    Discharge Recommendations:  24 hour supervision or assist  OT Equipment Recommendations  Equipment Needed: No    Manfred Kapadia scored a 20/24 on the AM-Merged with [facility] ADL Inpatient form. At this time, no further OT is recommended upon discharge due to [facility] Recommend patient returns to prior setting with prior services. Assessment   Performance deficits / Impairments: Decreased functional mobility ; Decreased endurance;Decreased ADL status; Decreased high-level IADLs  Assessment: 79 yo female admitted  for acute respiratory failure with hypoxia with Tachycardia. PMH: MI 2021, CLL, COPD, Factor V Leinden Mutation, Post Herpatic Neuralgia, OA, B Partial Knee Replacement. PTA, pt lives with spouse (whom she takes care of) and reports assist for IADLs from family/aid, otherwise independent with ADL. Today, pt functioning just below baseline d/t above deficits. Pt SBA for bed mobility, tx with cues, and fxl mobility within room with RW within room ~50 ft. Pt with no SOB and SpO2 >96% throughout. BUE WFL for self care. Anticipate SBA LB and SUP UB ADL based on balance, endurance, cognition, pain and PLOF. Cont acute OT and rec pt safe to return home if family can provide initial 24 hr supervision and continues IADL assist  Treatment Diagnosis: impaired ADL/fxl mobility  Prognosis: Good  Decision Making: Low Complexity  OT Education: OT Role;Transfer Training;Plan of Care  REQUIRES OT FOLLOW UP: Yes  Activity Tolerance  Activity Tolerance: Patient Tolerated treatment well  Safety Devices  Safety Devices in place: Yes  Type of devices: Nurse notified;Gait belt;Call light within reach; Bed alarm in place; Patient at risk for falls; Left in bed         Patient Diagnosis(es): The primary encounter diagnosis was Respiratory distress. Diagnoses of Acute respiratory failure with hypoxia and hypercapnia ([facility] Anticoagulated, and Acute congestive heart failure, unspecified heart failure type [facility]) were also pertinent to this visit. has a past medical history of Acute ST elevation myocardial infarction (STEMI) ([facility] Chronic lymphocytic leukemia in remission ([facility] COPD (chronic obstructive pulmonary disease) ([facility] Essential hypertension, Factor V Leiden mutation ([facility] Goiter, Hypercholesterolemia, Impaired fasting glucose, Osteoarthritis, Osteoporosis, Post herpetic neuralgia, and Vitamin D deficiency. has a past surgical history that includes Splenectomy; knee surgery (2008); Cataract removal with implant (5/14/12); Tunneled venous port placement; Upper gastrointestinal endoscopy (N/A, 2/5/2019); Mohs surgery (03/12/2019); and joint replacement. Treatment Diagnosis: impaired ADL/fxl mobility      Restrictions  Restrictions/Precautions  Restrictions/Precautions: Fall Risk    Subjective   General  Chart Reviewed: Yes  Patient assessed for rehabilitation services?: Yes  Additional Pertinent Hx: 77 yo female admitted 9/11 for acute respiratory failure with hypoxia with Tachycardia.  PMH: MI July 2021, CLL, COPD, Factor V Leinden Mutation, Post Herpatic Neuralgia, OA, B Partial Knee Replacement  Family / Caregiver Present: No  Referring Practitioner: Jeramy Joshi MD  Diagnosis: Hypoxia  Subjective  Subjective: Pt resting in bed upon arrival and agreeable to OT/PT eval. Pt reporting no pain and no SOB noted  General Comment  Comments: RN ok to see     Social/Functional History  Social/Functional History  Lives With: Spouse ( Deepak Ruby) : amb with walker, requires assist bathing/dressing.))  Type of Home:  (Condo)  Home Layout: Able to Live on Main level with bedroom/bathroom, One level, Laundry in basement  Home Access: Stairs to enter without rails  Entrance Stairs - Number of Steps: 1+1  Bathroom Shower/Tub: Tub/Shower unit, Walk-in shower  Bathroom Toilet: Handicap height  Bathroom Equipment: Grab bars in shower, Shower chair, Grab bars around toilet  Bathroom Accessibility: Accessible  Home Equipment: 4 wheeled walker  ADL Assistance: Independent  Homemaking Assistance: Independent  Ambulation Assistance: Independent  Transfer Assistance: Independent  Active : Yes  Occupation: Retired  Type of occupation: Director of [employer]  Additional Comments: Pt is caregiver for her .   Son has been able to stay with them (works from home); supportive family (5 children in total)       Objective   Vision: Impaired  Vision Exceptions: Wears glasses at all times  Hearing: Exceptions to Snipd  Hearing Exceptions: Hard of hearing/hearing concerns    Orientation  Overall Orientation Status: Within Normal Limits  Observation/Palpation  Posture: Good  Observation: 3 L of O2 via NC  Balance  Sitting Balance: Stand by assistance  Standing Balance: Stand by assistance  Functional Mobility  Functional - Mobility Device: Rolling Walker  Activity:  (EOB>within room x50 feet>EOB)  Assist Level: Stand by assistance  Functional Mobility Comments: no unsteadiness or LOB, pt with no SOB and SpO2 >96% throughout  ADL  Additional Comments: Anticipate SBA LB and SUP UB ADL based on balance, endurance, cognition, pain and PLOF  Tone RUE  RUE Tone: Normotonic  Tone LUE  LUE Tone: Normotonic  Coordination  Movements Are Fluid And Coordinated: Yes     Bed mobility  Supine to Sit: Stand by assistance  Sit to Supine: Stand by assistance  Scooting: Stand by assistance  Comment: HOB elevated  Transfers  Sit to stand: Stand by assistance  Stand to sit: Stand by assistance  Transfer Comments: RW. cues for hand placement     Cognition  Overall Cognitive Status: WNL        Sensation  Overall Sensation Status: WFL        LUE AROM (degrees)  LUE AROM : WFL  RUE AROM (degrees)  RUE AROM : WFL  LUE Strength  Gross LUE Strength: WFL  RUE Strength  Gross RUE Strength: WFL                Plan   Plan  Times per week: 3-5  Current Treatment Recommendations: Strengthening, Endurance Training, Patient/Caregiver Education & Training, ROM, Self-Care / ADL, Balance Training, Pain Management, Functional Mobility Training, Safety Education & Training, Positioning    AM-PAC Score        AM-Merged with [facility] Inpatient Daily Activity Raw Score: 20 (09/14/21 1105)  AM-PAC Inpatient ADL T-Scale Score : 42.03 (09/14/21 1105)  ADL Inpatient CMS 0-100% Score: 38.32 (09/14/21 1105)  ADL Inpatient CMS G-Code Modifier : Timur Marcum (09/14/21 1105)    Goals  Short term goals  Time Frame for Short term goals: prior to d/c  Short term goal 1: LB dressing Mod I  Short term goal 2: UB bathing/dressing mod I  Short term goal 3: tolerate 5 min fxl standing task Mod I  Short term goal 4: BUE exercises in all planes to improve strength and endurance for ADL  Short term goal 5: toileting Mod I  Patient Goals   Patient goals : get home       Therapy Time   Individual Concurrent Group Co-treatment   Time In 1125         Time Out 1155         Minutes 30         Timed Code Treatment Minutes: 15 Minutes (15 eval. 15 TA)       BRETT Phillips, OTR/L

## 2021-09-14 NOTE — PROGRESS NOTES
[facility], [place], operated by [facility]   Daily Progress Note      Admit Date:  9/11/2021    CC: \" I am still not feeling quite well\"  This is an 78-year-old female who has a  history of nonischemic cardiomyopathy probably Takotsubo or  tachycardia-induced cardiomyopathy, COPD, previous CLL, hypertension,  factor V Leiden deficiency, on chronic Coumadin therapy, hyperlipidemia,  presented to the hospital with a sudden increase in the shortness of  breath. She said she was on a significant amount of stress as the   was admitted to the hospital last week and she has been doing  more physical work than she used to. She also admits that she has  stopped taking her water pill for few days. She started experiencing  weak, tired, and suddenly short of breath. She checked the blood  pressure and it was significantly elevated. She presented to the  emergency room and she was given IV Lasix with improvement in her  symptoms. She also at one point had significant shortness of breath and  required to be on BiPAP. Her blood pressure though dropped  significantly and she was placed on pressor and was admitted to ICU. She denies any significant fever, cough, or sputum production. She  feels significantly better this morning. Subjective:  Patient is now transferred out of ICU. Still complaining of vague arm pain cough and was unable to sleep very well last night. She still needs 3 L of oxygen to keep her oxygen saturation above 90%.   Her blood pressure is much better this morning    Objective:   /68   Pulse 66   Temp 98.2 °F (36.8 °C) (Oral)   Resp 15   Ht 4' 11\" (1.499 m)   Wt 156 lb 4.9 oz (70.9 kg)   SpO2 97%   BMI 31.57 kg/m²     Intake/Output Summary (Last 24 hours) at 9/14/2021 0932  Last data filed at 9/14/2021 0656  Gross per 24 hour   Intake 360 ml   Output 1025 ml   Net -665 ml     Wt Readings from Last 3 Encounters:   09/14/21 156 lb 4.9 oz (70.9 kg)   09/01/21 161 lb (73 kg)   08/24/21 161 lb 3.2 oz (73.1 kg)     Telemetry:NSR    Physical Exam:  General:  NAD, Awake, alert and oriented X4  Skin:  Warm and dry  Neck:  Supple, no JVP appreciated, no bruit  Chest: Bibasilar crackles  Cardiovascular:  Regular rate. S1S2  Abdomen:  Soft, nontender, +bowel sounds  Extremities:  No LE edema    Cardiac Diagnosis:  hypertension, hyperlipidemia and CHF    Medications:    metoprolol succinate  25 mg Oral Daily    torsemide  20 mg Oral Daily    amiodarone  200 mg Oral Daily    midodrine  2.5 mg Oral BID WC    sodium chloride flush  5-40 mL IntraVENous 2 times per day    warfarin (COUMADIN) daily dosing (placeholder)   Other RX Placeholder    cefTRIAXone (ROCEPHIN) IV  1,000 mg IntraVENous Q24H    azithromycin  250 mg IntraVENous Q24H      sodium chloride 25 mL (09/13/21 1616)    sodium chloride Stopped (09/13/21 0650)     melatonin, sodium chloride flush, sodium chloride, acetaminophen **OR** acetaminophen, ondansetron, potassium chloride **OR** potassium alternative oral replacement **OR** potassium chloride    Lab Data:  CBC:   Recent Labs     09/12/21  0756 09/13/21  0544 09/14/21 0447   WBC 19.6* 17.0* 14.4*   HGB 13.7 12.3 12.6    270 293     BMP:    Recent Labs     09/12/21  0756 09/13/21  0544 09/14/21 0447    142 145   K 3.4* 3.8 3.8   CO2 26 26 29   BUN 8 13 12   CREATININE 1.0 0.9 0.8     LIVR:   Recent Labs     09/13/21  0544 09/14/21 0447   AST 18 16   ALT 18 16     INR:    Recent Labs     09/12/21  0756 09/13/21  0544 09/14/21  0447   INR 5.02* 4.77* 3.32*     APTT:   Recent Labs     09/11/21  2212   APTT 53.9*     BNP:  No results for input(s): BNP in the last 72 hours. Imaging: Echocardiogram 07/26/2021 ejection fraction is visually estimated to be 45-50%. There is mid to apical akinesis. No evidence of left ventricular mass or thrombus noted. There is a moderate left pleural effusion.    Estimated pulmonary artery systolic pressure is moderately elevated at 63   mmHg assuming a right atrial pressure of 8 mmHg. Assessment:Plan  Acute hypoxic respiratory failure  -Due to acute on chronic systolic heart failure and possible pneumonia  -Overall doing better still needs 3 L of oxygen  -Patient currently on p.o. Demadex but will give her additional 20 mg of IV Lasix  -We will repeat BNP and chest x-ray  : We will continue to wean oxygen off as tolerated and keep her oxygen saturation above 92%    Acute on chronic systolic/diastolic heart failure. NYHA class III/IV on admission  -Patient is showing steady improvement still requires 3 L of oxygen  -Patient currently on p.o. Demadex but will give her additional 20 mg of IV Lasix  -We will repeat BNP and chest x-ray.  -She is now on Toprol-XL and if her blood pressure remained stable, will restart her back on Entresto in next 24 hours.   will also add Aldactone 12.5 mg daily  -No need for ICD since LVEF is improved about 35% by last echocardiogram  -We will discuss about adding SGLT2 inhibitors as an outpatient      Hypotension  -Resolved    Complexity of medical decision making-high  Electronically signed by Elissa Matos MD on 9/14/2021 at 9:32 AM

## 2021-09-14 NOTE — CONSULTS
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 She said she was on a significant amount of stress as the   was admitted to the hospital last week and she has been doing  more physical work than she used to. She also admits that she has  stopped taking her water pill for few days. She started experiencing  weak, tired, and suddenly short of breath. She checked the blood  pressure and it was significantly elevated. She presented to the  emergency room and she was given IV Lasix with improvement in her  symptoms. She also at one point had significant shortness of breath and  required to be on BiPAP. Her blood pressure though dropped  significantly and she was placed on pressor and was admitted to ICU. She denies any significant fever, cough, or sputum production. She  feels significantly better this morning. REVIEW OF SYSTEMS:  Please see HPI. All other systems are reviewed and  they are negative. PAST MEDICAL HISTORY:  1. History of acute ST-elevation treated by Dr. Keke Smith in 2021. Coronary angiography personally reviewed showed no coronary artery  disease and had severe LV dysfunction.   2.  Her cardiomyopathy is considered to be either a Takotsubo or  tachycardia-induced cardiomyopathy. 3.  Chronic lymphocytic leukemia. 4.  COPD.  5.  History of hypertension. 6.  Factor V Leiden deficiency. 7.  History of goiter. 8.  Hypercholesterolemia. 9.  Vitamin D deficiency. PAST SURGICAL HISTORY:  She had a cataract removal, joint replacement  partial knee on the right and the left side, knee surgery, splenectomy,  coronary angiography as mentioned. SOCIAL HISTORY:  Lives at home with her . No history of smoking  or alcohol abuse. FAMILY HISTORY:  Father had diabetes and sister had a stroke. MEDICINES AND ALLERGIES:  Have been reviewed. PHYSICAL EXAMINATION:  VITAL SIGNS:  Her pulse currently is 81 and regular, blood pressure  121/50, respirations are 17, oxygen saturation 93%. CONSTITUTIONAL:  She is alert and oriented, still on few liters of  oxygen. HEENT EXAMINATION:  Neck is supple. I cannot appreciate any jugular  venous distention. No thyromegaly. Eyes show no pale conjunctivae or  icterus. CARDIAC EXAMINATION:  Reveals normal S1 and S2. I am unable to  appreciate any gallop, murmur, or rub. LUNGS:  Revealed bibasilar crackles. ABDOMEN:  Soft, nontender. Bowel sounds are present. EXTREMITIES:  Show trace of edema. NEUROLOGICAL EXAMINATION:  She is alert, oriented. Cranial nerves II  through XII intact. No focal deficit. SKIN:  Shows no rashes. LABORATORY DATA:  Sodium is 142, potassium 3.8, chloride 103, bicarb 28,  BUN is 13, creatinine 0.9. Procalcitonin level was 0.38. ProBNP 5424. The white count was 17,000, hemoglobin 12.3, hematocrit is 37.6. COVID  was negative. CT chest shows pulmonary hypertension with right heart dysfunction as  well as alveolar pulmonary edema and distal splenic artery aneurysm. EKG showed sinus rhythm, incomplete right bundle branch block. No other  acute changes noted.   Coronary angiography personally reviewed showed no  coronary artery disease with severe LV dysfunction. The echocardiogram  from 07/26/2021 showed EF up to 45 to 50%. IMPRESSION:  1. This is an 78-year-old female who has presented with a sudden  shortness of breath with hypoxia and has a hypoxic respiratory failure. This is due to combination of her poor compliance with the diuretic  therapy leading to pulmonary vascular congestion in presence of LV  dysfunction. Certainly, some element of pneumonia is also contributing  to this symptomatology. 2.  Brief hypotension, currently resolved. 3.  Nonischemic cardiomyopathy. 4.  History of hypertension. RECOMMENDATIONS:  1. We will restart the patient back on her beta-blocker. 2.  We will also restart the patient back on her oral diuretic therapy  since her blood pressure is stable. 3.  If her blood pressure remains stable on those medicines, we will  consider adding Entresto back to her regimen in the next 24 hours. 4.  I instructed the patient to work on a low-sodium diet. I appreciate the opportunity to participate in the care of this pleasant  female.         Radha Howard MD    D: 09/13/2021 12:36:43       T: 09/13/2021 16:20:04     [email or web address]  Job#: [id number]     Doc#: [id number]

## 2021-09-14 NOTE — PROGRESS NOTES
AM labs drawn without any difficulty from right chest PAC. Pt states she slept well. VS remain stable. Denies any needs. Call light in reach. Will continue to monitor.

## 2021-09-14 NOTE — PROGRESS NOTES
Physical Therapy    Facility/Department: [facility] 3 ORTHOPEDICS  Initial Assessment    This note serves as patient discharge summary if pt discharges prior to next PT visit      NAME: Ronald Wang  : 1940  MRN: [id number]    Date of Service: 2021    Discharge Recommendations:  Continue to assess pending progress, 24 hour supervision or assist   Ronald Wang scored a 21/24 on the AM-PAC short mobility form. At this time, no further PT is recommended upon discharge due to pts level of indep with functional mobility. Recommend pt to have initial 24 hour assistance/supervision until her medical condition improves. Recommend patient returns to prior setting with prior services. PT Equipment Recommendations  Equipment Needed: No  Other: Will continue to monitor pt needs while in acute care    Assessment   Body structures, Functions, Activity limitations: Decreased functional mobility     Assessment: This is an 26-year-old female who has ahistory of nonischemic cardiomyopathy probably Takotsubo ortachycardia-induced cardiomyopathy, COPD, previous CLL, hypertension,factor V Leiden deficiency, on chronic Coumadin therapy, hyperlipidemia,presented to the hospital with a sudden increase in the shortness ofbreath. She said she was on a significant amount of stress as [facility] was admitted to the hospital last week and she has been doingmore physical work than she used to. She was admitted to ICU on 2021. Kip Parker PTA indep with functional mobility in the home and community distances. She receives help from family for laundry and cleaning, uses delivery services for groceries and transportationStatus: 2021: Pt on 3L of O2 at the start of therapy via NC. Bed Mobility: SBA x1 with HOB elevated, Transfers: SBA x1 sit <> stand with RW. Amb: up to max distance of 50' in hospital room with RW CGA x1 with min cues for assistance to use RW.   Trial ambulation without an AD or SPC if needed to progress pts mobility next therapy visit. She was able to ambulate and demonstrate indep with mobility without SOB. Anticipate pt to d/c to home with initial 24 hour assistance/supervision and no further PT is recommended at this time after d/c while her medical condition continues to improve. Will continue to monitor. Decision Making: Low Complexity  History: see medical chart  Clinical Presentation: stable  PT Education: Goals;PT Role;Plan of Care; Functional Mobility Training  Barriers to Learning: [place]  REQUIRES PT FOLLOW UP: Yes  Activity Tolerance  Activity Tolerance: Patient Tolerated treatment well       Patient Diagnosis(es): The primary encounter diagnosis was Respiratory distress. Diagnoses of Acute respiratory failure with hypoxia and hypercapnia ([facility] [address] Anticoagulated, and Acute congestive heart failure, unspecified heart failure type [facility]) were also pertinent to this visit. has a past medical history of Acute ST elevation myocardial infarction (STEMI) ([facility] [address] Chronic lymphocytic leukemia in remission ([facility] [address] CLL (chronic lymphocytic leukemia) ([facility] [address] COPD (chronic obstructive pulmonary disease) ([facility] [address] Essential hypertension, Factor V Leiden mutation ([facility] [address] Goiter, Hypercholesterolemia, Impaired fasting glucose, Osteoarthritis, Osteoporosis, Post herpetic neuralgia, and Vitamin D deficiency. has a past surgical history that includes Splenectomy; knee surgery (2008); Cataract removal with implant (5/14/12); Tunneled venous port placement; Upper gastrointestinal endoscopy (N/A, 2/5/2019); Mohs surgery (03/12/2019); and joint replacement.     Restrictions  Restrictions/Precautions  Restrictions/Precautions: Fall Risk     Vision/Hearing  Vision: Impaired  Vision Exceptions: Wears glasses at all times  Hearing: Exceptions to [facility]  Hearing Exceptions: Hard of hearing/hearing concerns       Subjective  General  Chart Reviewed: Yes  Patient assessed for rehabilitation services?: Yes  Additional Pertinent Hx: This is an 75-year-old female who has ahistory of nonischemic cardiomyopathy probably Takotsubo ortachycardia-induced cardiomyopathy, COPD, previous CLL, hypertension,factor V Leiden deficiency, on chronic Coumadin therapy, hyperlipidemia,presented to the hospital with a sudden increase in the shortness ofbreath. She said she was on a significant amount of stress as koki was admitted to the hospital last week and she has been doingmore physical work than she used to. She was admitted to ICU on 9-. Response To Previous Treatment: Not applicable  Family / Caregiver Present: No  Referring Practitioner: Enzo Hawkins MD  Referral Date : 09/13/21  Follows Commands: Within Functional Limits  Subjective  Subjective: PT/OT services to pt room, pt in bed awake. She denies pain at this time. Pt is agreeable to therapy services. Pain Screening  Patient Currently in Pain: Denies     Orientation  Orientation  Overall Orientation Status: Within Normal Limits  Social/Functional History  Social/Functional History  Lives With: Spouse  Type of Home:  (Condo)  Home Layout: Able to Live on Main level with bedroom/bathroom, One level, Laundry in basement  Home Access: Stairs to enter without rails  Entrance Stairs - Number of Steps: 1+1  Bathroom Shower/Tub: Tub/Shower unit, Walk-in shower  Bathroom Toilet: Handicap height  Bathroom Equipment: Grab bars in shower, Shower chair, Grab bars around toilet  Bathroom Accessibility: Accessible  Home Equipment: 4 wheeled walker  ADL Assistance: Independent  Homemaking Assistance: Independent  Ambulation Assistance: Independent  Transfer Assistance: Independent  Active : Yes (but does not currently drive, uses a car services ([address]))  Occupation: Retired  Type of occupation: Director of [employer]  Additional Comments: Pt is caregiver for her .   Son has been able to stay with them (works from home); supportive family (5 children in total)     Cognition Cognition  Overall Cognitive Status: WNL    Objective  Observation/Palpation  Posture: Good  Observation: 3 L of O2 via NC  Strength RLE  Strength RLE: WNL  Strength LLE  Strength LLE: WNL  Sensation  Overall Sensation Status: WFL (intact to touch)  Bed mobility  Supine to Sit: Stand by assistance  Sit to Supine: Stand by assistance  Scooting: Stand by assistance  Comment: HOB elevated  Transfers  Sit to Stand: Stand by assistance (to RW)  Stand to sit: Stand by assistance (from RW)  Ambulation  Ambulation?: Yes  WB Status: WBAT  More Ambulation?: No  Ambulation 1  Surface: level tile  Device: Rolling Walker  Assistance: Contact guard assistance  Quality of Gait: Continous step through gait pattern, decreased step length B, no complaints of SOB with ambulation with RW in hospital room  Gait Deviations: Slow Samantha;Decreased step height  Distance: 48' in hopsital room with RW  Comments: Trial next therapy visit without RW  Stairs/Curb  Stairs?: No  Balance  Comments: Able to maintain midline in sitting and standing with supervision, SBA x1 with dynamic movements seated at EOB and with ambulation with the RW      Plan   Plan  Times per week: 1-3x  Current Treatment Recommendations: Functional Mobility Training  Safety Devices  Type of devices: All fall risk precautions in place, Call light within reach, Left in bed, Gait belt, Bed alarm in place, Nurse notified    AM-PAC Score  AM-PAC Inpatient Mobility Raw Score : 21 (09/14/21 1100)  AM-PAC Inpatient T-Scale Score : 50.25 (09/14/21 1100)  Mobility Inpatient CMS 0-100% Score: 28.97 (09/14/21 1100)  Mobility Inpatient CMS G-Code Modifier : CJ (09/14/21 1100)       Goals  Short term goals  Time Frame for Short term goals: Upon acute care d/c  Short term goal 1: Transfers: SBA x1 without AD  Short term goal 2: Amb: 48' without AD CGA x1  Short term goal 3: 2 steps with RW SBA x1  Patient Goals   Patient goals :  \"To Return Home\"       Therapy Time   Individual Concurrent Group Co-treatment   Time In 1030         Time Out 1100         Minutes 30           Ev 15; Gt 15    [place], [place]  I attest that I was present for and made a skilled & mindful clinical judgement during the evaluation and/or treatment of this patient on 9/14/2021  Electronically signed by Antonia Oliveira, [address] [place] [address] Drive [phone])  on 9/14/2021 at 11:13 AM

## 2021-09-15 LAB
A/G RATIO: 1.4 (ref 1.1–2.2)
ALBUMIN SERPL-MCNC: 3.6 G/DL (ref 3.4–5)
ALP BLD-CCNC: 82 U/L (ref 40–129)
ALT SERPL-CCNC: 15 U/L (ref 10–40)
ANION GAP SERPL CALCULATED.3IONS-SCNC: 12 MMOL/L (ref 3–16)
AST SERPL-CCNC: 18 U/L (ref 15–37)
ATYPICAL LYMPHOCYTE RELATIVE PERCENT: 0 % (ref 0–6)
BANDED NEUTROPHILS RELATIVE PERCENT: 1 % (ref 0–7)
BASOPHILS ABSOLUTE: 0 K/UL (ref 0–0.2)
BASOPHILS RELATIVE PERCENT: 0 %
BILIRUB SERPL-MCNC: 0.4 MG/DL (ref 0–1)
BUN BLDV-MCNC: 15 MG/DL (ref 7–20)
CALCIUM SERPL-MCNC: 8.8 MG/DL (ref 8.3–10.6)
CHLORIDE BLD-SCNC: 98 MMOL/L (ref 99–110)
CO2: 31 MMOL/L (ref 21–32)
CREAT SERPL-MCNC: 0.8 MG/DL (ref 0.6–1.2)
EOSINOPHILS ABSOLUTE: 0.1 K/UL (ref 0–0.6)
EOSINOPHILS RELATIVE PERCENT: 1 %
GFR AFRICAN AMERICAN: >60
GFR NON-AFRICAN AMERICAN: >60
GLOBULIN: 2.5 G/DL
GLUCOSE BLD-MCNC: 89 MG/DL (ref 70–99)
HCT VFR BLD CALC: 39.1 % (ref 36–48)
HEMATOLOGY PATH CONSULT: NO
HEMOGLOBIN: 13 G/DL (ref 12–16)
INR BLD: 2.9 (ref 0.88–1.12)
LYMPHOCYTES ABSOLUTE: 7.3 K/UL (ref 1–5.1)
LYMPHOCYTES RELATIVE PERCENT: 51 %
MCH RBC QN AUTO: 31.4 PG (ref 26–34)
MCHC RBC AUTO-ENTMCNC: 33.2 G/DL (ref 31–36)
MCV RBC AUTO: 94.6 FL (ref 80–100)
MONOCYTES ABSOLUTE: 1.6 K/UL (ref 0–1.3)
MONOCYTES RELATIVE PERCENT: 11 %
NEUTROPHILS ABSOLUTE: 5.3 K/UL (ref 1.7–7.7)
NEUTROPHILS RELATIVE PERCENT: 36 %
PDW BLD-RTO: 14.8 % (ref 12.4–15.4)
PLATELET # BLD: 287 K/UL (ref 135–450)
PLATELET SLIDE REVIEW: ADEQUATE
PMV BLD AUTO: 9.4 FL (ref 5–10.5)
POTASSIUM SERPL-SCNC: 3.8 MMOL/L (ref 3.5–5.1)
PRO-BNP: 8757 PG/ML (ref 0–449)
PROCALCITONIN: 0.19 NG/ML (ref 0–0.15)
PROTHROMBIN TIME: 34.2 SEC (ref 9.9–12.7)
RBC # BLD: 4.13 M/UL (ref 4–5.2)
SLIDE REVIEW: ABNORMAL
SODIUM BLD-SCNC: 141 MMOL/L (ref 136–145)
STOMATOCYTES: ABNORMAL
TOTAL PROTEIN: 6.1 G/DL (ref 6.4–8.2)
WBC # BLD: 14.3 K/UL (ref 4–11)

## 2021-09-15 PROCEDURE — 99233 SBSQ HOSP IP/OBS HIGH 50: CPT | Performed by: INTERNAL MEDICINE

## 2021-09-15 PROCEDURE — 85610 PROTHROMBIN TIME: CPT

## 2021-09-15 PROCEDURE — 85025 COMPLETE CBC W/AUTO DIFF WBC: CPT

## 2021-09-15 PROCEDURE — 36591 DRAW BLOOD OFF VENOUS DEVICE: CPT

## 2021-09-15 PROCEDURE — 2580000003 HC RX 258: Performed by: STUDENT IN AN ORGANIZED HEALTH CARE EDUCATION/TRAINING PROGRAM

## 2021-09-15 PROCEDURE — 36415 COLL VENOUS BLD VENIPUNCTURE: CPT

## 2021-09-15 PROCEDURE — 6370000000 HC RX 637 (ALT 250 FOR IP): Performed by: STUDENT IN AN ORGANIZED HEALTH CARE EDUCATION/TRAINING PROGRAM

## 2021-09-15 PROCEDURE — 2580000003 HC RX 258: Performed by: INTERNAL MEDICINE

## 2021-09-15 PROCEDURE — 6370000000 HC RX 637 (ALT 250 FOR IP): Performed by: INTERNAL MEDICINE

## 2021-09-15 PROCEDURE — 84145 PROCALCITONIN (PCT): CPT

## 2021-09-15 PROCEDURE — 97116 GAIT TRAINING THERAPY: CPT

## 2021-09-15 PROCEDURE — 83880 ASSAY OF NATRIURETIC PEPTIDE: CPT

## 2021-09-15 PROCEDURE — 99231 SBSQ HOSP IP/OBS SF/LOW 25: CPT | Performed by: INTERNAL MEDICINE

## 2021-09-15 PROCEDURE — 80053 COMPREHEN METABOLIC PANEL: CPT

## 2021-09-15 PROCEDURE — 94761 N-INVAS EAR/PLS OXIMETRY MLT: CPT

## 2021-09-15 PROCEDURE — 6360000002 HC RX W HCPCS: Performed by: INTERNAL MEDICINE

## 2021-09-15 PROCEDURE — 2700000000 HC OXYGEN THERAPY PER DAY

## 2021-09-15 PROCEDURE — 1200000000 HC SEMI PRIVATE

## 2021-09-15 PROCEDURE — 97530 THERAPEUTIC ACTIVITIES: CPT

## 2021-09-15 RX ORDER — TORSEMIDE 20 MG/1
20 TABLET ORAL ONCE
Status: COMPLETED | OUTPATIENT
Start: 2021-09-15 | End: 2021-09-15

## 2021-09-15 RX ORDER — TORSEMIDE 20 MG/1
40 TABLET ORAL DAILY
Status: DISCONTINUED | OUTPATIENT
Start: 2021-09-16 | End: 2021-09-18

## 2021-09-15 RX ORDER — WARFARIN SODIUM 2.5 MG/1
2.5 TABLET ORAL
Status: COMPLETED | OUTPATIENT
Start: 2021-09-15 | End: 2021-09-15

## 2021-09-15 RX ADMIN — AMIODARONE HYDROCHLORIDE 200 MG: 200 TABLET ORAL at 09:18

## 2021-09-15 RX ADMIN — Medication 3 MG: at 21:49

## 2021-09-15 RX ADMIN — TORSEMIDE 20 MG: 20 TABLET ORAL at 14:30

## 2021-09-15 RX ADMIN — CEFTRIAXONE 1000 MG: 1 INJECTION, POWDER, FOR SOLUTION INTRAMUSCULAR; INTRAVENOUS at 14:31

## 2021-09-15 RX ADMIN — WARFARIN SODIUM 2.5 MG: 2.5 TABLET ORAL at 18:49

## 2021-09-15 RX ADMIN — METOPROLOL SUCCINATE 25 MG: 25 TABLET, EXTENDED RELEASE ORAL at 09:18

## 2021-09-15 RX ADMIN — TORSEMIDE 20 MG: 20 TABLET ORAL at 09:18

## 2021-09-15 RX ADMIN — Medication 10 ML: at 20:15

## 2021-09-15 RX ADMIN — AZITHROMYCIN MONOHYDRATE 250 MG: 500 INJECTION, POWDER, LYOPHILIZED, FOR SOLUTION INTRAVENOUS at 06:38

## 2021-09-15 ASSESSMENT — PAIN SCALES - GENERAL
PAINLEVEL_OUTOF10: 0
PAINLEVEL_OUTOF10: 0

## 2021-09-15 NOTE — PROGRESS NOTES
[facility]   Daily Progress Note      Admit Date:  9/11/2021    CC: \" I am still not feeling quite well\"  This is an 78-year-old female who has a  history of nonischemic cardiomyopathy probably Takotsubo or  tachycardia-induced cardiomyopathy, COPD, previous CLL, hypertension,  factor V Leiden deficiency, on chronic Coumadin therapy, hyperlipidemia,  presented to the hospital with a sudden increase in the shortness of  breath. She said she was on a significant amount of stress as the   was admitted to the hospital last week and she has been doing  more physical work than she used to. She also admits that she has  stopped taking her water pill for few days. She started experiencing  weak, tired, and suddenly short of breath. She checked the blood  pressure and it was significantly elevated. She presented to the  emergency room and she was given IV Lasix with improvement in her  symptoms. She also at one point had significant shortness of breath and  required to be on BiPAP. Her blood pressure though dropped  significantly and she was placed on pressor and was admitted to ICU. She denies any significant fever, cough, or sputum production. She  feels significantly better this morning. Subjective:  Patient is now transferred out of ICU. Still complaining of vague arm pain cough and was unable to sleep very well last night. She still needs 3 L of oxygen to keep her oxygen saturation above 90%.   Her blood pressure is much better this morning    Objective:   /60   Pulse 66   Temp 98.1 °F (36.7 °C) (Oral)   Resp 16   Ht 4' 11\" (1.499 m)   Wt 152 lb 8.9 oz (69.2 kg)   SpO2 98%   BMI 30.81 kg/m²       Intake/Output Summary (Last 24 hours) at 9/15/2021 1134  Last data filed at 9/15/2021 7657  Gross per 24 hour   Intake 360 ml   Output 800 ml   Net -440 ml     Wt Readings from Last 3 Encounters:   09/15/21 152 lb 8.9 oz (69.2 kg)   09/01/21 161 lb (73 kg)   08/24/21 161 lb 3.2 oz (73.1 kg)     Telemetry:NSR    Physical Exam:  General:  NAD, Awake, alert and oriented X4  Skin:  Warm and dry  Neck:  Supple, no JVP appreciated, no bruit  Chest: Bibasilar crackles  Cardiovascular:  Regular rate. S1S2  Abdomen:  Soft, nontender, +bowel sounds  Extremities:  No LE edema    Cardiac Diagnosis:  hypertension, hyperlipidemia and CHF    Medications:    metoprolol succinate  25 mg Oral Daily    torsemide  20 mg Oral Daily    amiodarone  200 mg Oral Daily    sodium chloride flush  5-40 mL IntraVENous 2 times per day    warfarin (COUMADIN) daily dosing (placeholder)   Other RX Placeholder    cefTRIAXone (ROCEPHIN) IV  1,000 mg IntraVENous Q24H    azithromycin  250 mg IntraVENous Q24H      sodium chloride Stopped (09/14/21 1648)     melatonin, sodium chloride flush, sodium chloride, acetaminophen **OR** acetaminophen, ondansetron, potassium chloride **OR** potassium alternative oral replacement **OR** potassium chloride    Lab Data:  CBC:   Recent Labs     09/13/21  0544 09/14/21 0447 09/15/21  0440   WBC 17.0* 14.4* 14.3*   HGB 12.3 12.6 13.0    293 287     BMP:    Recent Labs     09/13/21  0544 09/14/21  0447 09/15/21  0440    145 141   K 3.8 3.8 3.8   CO2 26 29 31   BUN 13 12 15   CREATININE 0.9 0.8 0.8     LIVR:   Recent Labs     09/13/21  0544 09/14/21  0447 09/15/21  0440   AST 18 16 18   ALT 18 16 15     INR:    Recent Labs     09/13/21  0544 09/14/21  0447 09/15/21  0440   INR 4.77* 3.32* 2.90*     APTT:   No results for input(s): APTT in the last 72 hours. BNP:  No results for input(s): BNP in the last 72 hours. Imaging: Echocardiogram 07/26/2021 ejection fraction is visually estimated to be 45-50%. There is mid to apical akinesis. No evidence of left ventricular mass or thrombus noted. There is a moderate left pleural effusion. Estimated pulmonary artery systolic pressure is moderately elevated at 63   mmHg assuming a right atrial pressure of 8 mmHg.

## 2021-09-15 NOTE — PLAN OF CARE
Problem: Falls - Risk of:  Goal: Will remain free from falls  Description: Will remain free from falls  9/15/2021 1113 by Dayan Nicole RN  Outcome: Ongoing  Note: Patient remains free from falls during this shift. Fall precautions remain in place. Patient educated on the need to implement call light use prior to ambulation. Will continue to monitor and assess. 9/15/2021 0053 by Jocelynn Landa RN  Outcome: Ongoing  Goal: Absence of physical injury  Description: Absence of physical injury  9/15/2021 1113 by Dayan Nicole RN  Outcome: Ongoing  Note: Patient remains free from physical harm during this shift. Will continue to monitor and assess patient. 9/15/2021 0053 by Jocelynn Landa RN  Outcome: Ongoing     Problem: OXYGENATION/RESPIRATORY FUNCTION  Goal: Patient will maintain patent airway  9/15/2021 1113 by Dayan Nicole RN  Outcome: Ongoing  Note: Patient's airway remains patent during this shift, will continue to monitor and assess  9/15/2021 0053 by Jocelynn Landa RN  Outcome: Ongoing  Goal: Patient will achieve/maintain normal respiratory rate/effort  Description: Respiratory rate and effort will be within normal limits for the patient  9/15/2021 1113 by Dayan Nicole RN  Outcome: Ongoing  Note: Resp rate/effort remains WNL during this shift.  Oxygenation is supported by 3L of O2 at this time, will attempt to wean as tolerated   9/15/2021 0053 by Jocelynn Landa RN  Outcome: Ongoing     Problem: HEMODYNAMIC STATUS  Goal: Patient has stable vital signs and fluid balance  9/15/2021 1113 by Dayan Nicole RN  Outcome: Ongoing  9/15/2021 0053 by Jocelynn Landa RN  Outcome: Ongoing     Problem: FLUID AND ELECTROLYTE IMBALANCE  Goal: Fluid and electrolyte balance are achieved/maintained  9/15/2021 1113 by Dayan Nicole RN  Outcome: Ongoing  9/15/2021 0053 by Jocelynn Landa RN  Outcome: Ongoing     Problem: ACTIVITY INTOLERANCE/IMPAIRED MOBILITY  Goal: Mobility/activity is maintained at optimum level for patient  9/15/2021 1113 by Griselda Allen RN  Outcome: Ongoing  Note: Patient up x1 w/ a walker during this admission. Will promote ambulation as patient tolerates  9/15/2021 0053 by Mars Hernández RN  Outcome: Ongoing     Problem: Pain:  Goal: Pain level will decrease  Description: Pain level will decrease  9/15/2021 1113 by Griselda Allen RN  Outcome: Ongoing  Note: Pain managed with pharmacologic and non-pharmacologic interventions during this shift. Will continue to monitor and assess for needs and change in patient condition. 9/15/2021 0053 by Mars Heránndez RN  Outcome: Ongoing  Goal: Control of acute pain  Description: Control of acute pain  9/15/2021 1113 by Griselda Allen RN  Outcome: Ongoing  Note: Pain managed with pharmacologic and non-pharmacologic interventions during this shift. Will continue to monitor and assess for needs and change in patient condition. 9/15/2021 0053 by Mars Hernández RN  Outcome: Ongoing  Goal: Control of chronic pain  Description: Control of chronic pain  9/15/2021 1113 by Griselda Allen RN  Outcome: Ongoing  Note: Pain managed with pharmacologic and non-pharmacologic interventions during this shift. Will continue to monitor and assess for needs and change in patient condition. 9/15/2021 0053 by Mars Hernández RN  Outcome: Ongoing     Problem: Skin Integrity:  Goal: Will show no infection signs and symptoms  Description: Will show no infection signs and symptoms  9/15/2021 1113 by Griselda Allen RN  Outcome: Ongoing  Note: Patient remains free from new signs and symptoms of infection during this shift. Infection prevention measures are in place. Will continue to monitor for alterations in patient condition throughout the shift.      9/15/2021 0053 by Mars Hernández RN  Outcome: Ongoing  Goal: Absence of new skin breakdown  Description: Absence of new skin breakdown  9/15/2021 1113 by Rell Barnard RN  Outcome: Ongoing  Note: Patient skin condition and mucus membrane integrity remain unchanged during this shift. Skin breakdown prevention interventions are in place. Will continue to monitor and assess.      9/15/2021 0053 by Drew Matt RN  Outcome: Ongoing

## 2021-09-15 NOTE — PROGRESS NOTES
Pulmonary Progress Note     Patient's name: Madelin [facility] [place] Record Number: [id number]  Patient's account/billing number: [de-identified]  Patient's YOB: 1940  Age: 78 y.o. 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      Plan:  Antibiotics x 5 days  Diuresis per cardiology   IS  I expect her to wean off oxygen soon         Overnight:  No acute event overnight        REVIEW OF SYSTEMS:  Review of Systems -   General ROS: negative  Psychological ROS: negative  Ophthalmic ROS: negative  ENT ROS: negative  Allergy and Immunology ROS: negative  Hematological and Lymphatic ROS: negative  Endocrine ROS: negative  Breast ROS: negative  Respiratory ROS: sob  Cardiovascular ROS: no chest pain or dyspnea on exertion  Gastrointestinal ROS:negative  Genito-Urinary ROS: negative  Musculoskeletal ROS: negative  Neurological ROS: negative  Dermatological ROS: negative        Physical Exam:    Vitals: /60   Pulse 66   Temp 98.1 °F (36.7 °C) (Oral)   Resp 16   Ht 4' 11\" (1.499 m)   Wt 152 lb 8.9 oz (69.2 kg)   SpO2 98%   BMI 30.81 kg/m²     Last Body weight:   Wt Readings from Last 3 Encounters:   09/15/21 152 lb 8.9 oz (69.2 kg)   09/01/21 161 lb (73 kg)   08/24/21 161 lb 3.2 oz (73.1 kg)       Body Mass Index : Body mass index is 30.81 kg/m². Intake and Output summary:     Intake/Output Summary (Last 24 hours) at 9/15/2021 1117  Last data filed at 9/15/2021 2495  Gross per 24 hour   Intake 360 ml   Output 1600 ml   Net -1240 ml       Physical Examination:     Gen:  No acute distress  Eyes: PERRL. Anicteric sclera. No conjunctival injection. ENT: No discharge. Posterior oropharynx clear.  External appearance of ears and nose normal.  Neck: Trachea midline. No mass   Resp:  Clear bilaterally   CV: Regular rate. Regular rhythm. No murmur or rub. No edema. GI: Soft, Non-tender. Non-distended. +BS  Skin: Warm, dry, w/o erythema. Lymph: No cervical or supraclavicular LAD. M/S: No cyanosis. No clubbing. Neuro:  CN 2-12 tested, no focal neurologic deficit. Moves all extremities  Psych: Awake and alert, Oriented x 3. Judgement and insight appropriate. Mood stable. Laboratory findings:-    CBC:   Recent Labs     09/15/21  0440   WBC 14.3*   HGB 13.0        BMP:    Recent Labs     09/13/21  0544 09/13/21  0544 09/14/21  0447 09/14/21  0447 09/15/21  0440      < > 145   < > 141   K 3.8   < > 3.8   < > 3.8      < > 104   < > 98*   CO2 26   < > 29   < > 31   BUN 13   < > 12   < > 15   CREATININE 0.9  --  0.8  --  0.8   GLUCOSE 109*   < > 100*   < > 89    < > = values in this interval not displayed. S. Calcium:  Recent Labs     09/15/21  0440   CALCIUM 8.8     S. Ionized Calcium:No results for input(s): IONCA in the last 72 hours. S. Magnesium:  Recent Labs     09/14/21 0447   MG 2.10     S. Phosphorus:No results for input(s): PHOS in the last 72 hours. S. Glucose:No results for input(s): POCGLU in the last 72 hours.                          Valeria Chen MD, M.INDU.            9/15/2021, 11:17 AM

## 2021-09-15 NOTE — PROGRESS NOTES
Hospital Medicine Progress Note      Admit Date: 9/11/2021       CC: F/U for irregular HR, elevated 's per ER note, hx: MI 7 wks ago    HPI: The patient is a 78 y. o. female with past medical history of recent acute STEMI but was noted to have negative coronary lesions 7 weeks ago although had severe systolic dysfunction, previous CLL, COPD, hypertension, factor V Leiden mutation on Coumadin currently, hyperlipidemia, and osteoporosis who presents to [facility] with concern for acute onset shortness of breath at home without any provocation as well as some intermittent chest tightness as well as some chills which she could not explain but she noted that the initial symptoms seem similar to her previous STEMI from 7 weeks ago. Citlali Holden denies any other recent symptoms of fever, dizziness, syncope, leg swelling, nausea/vomiting/diarrhea/abdominal pain, blood in urine/stool/sputum.  She did check her blood pressure at home and it was noted to be very hypertensive although on presentation to the ED it was only 454 systolic.  She is not been in contact with anyone sick. Amy Alvarez presentation to the ED, patient was initially seen by ED provider and was notably improving on nasal cannula oxygen.  It was only after she was found to be ambulating to the bathroom she then became increasingly short of breath and looked very ill-appearing.  She required nonrebreather oxygen and then was treated with IV Lasix and improved.  ED physician initially suspected flash pulmonary edema although x-ray did not demonstrate much finding of flash pulmonary edema.  She felt better after the IV Lasix that was given although then her blood pressure started to become significantly decreased.     9/14: on 3L nc this a.m. Cardiology giving IV dose Lasix today on top of demadex and possibly aldactone as well.   Repeat CXR today and BNP today - CXR improving.     She states she feels that her breathing is a little better.       Interval History/Subjective: still requiring 2L nc O2. Dr. Jesenia Darby adjusting diuretics. Review of Systems:     The patient denied headaches, visual changes, LOC, SOB, CP, ABD pain, N/V/D, skin changes, new or worsening weakness or neuromuscular deficits. Comprehensive ROS negative except as mentioned above. Past Medical History:        Diagnosis Date    Acute ST elevation myocardial infarction (STEMI) ([facility] 07/19/2021    Chronic lymphocytic leukemia in remission ([facility]     CLL (chronic lymphocytic leukemia) ([facility] 02/12/2015    COPD (chronic obstructive pulmonary disease) ([facility]     Essential hypertension 01/01/2015    controlled with salt restriction (initially)    Factor V Leiden mutation ([facility]     Goiter 11/01/2011    1.5 cm midline    Hypercholesterolemia     Impaired fasting glucose 11/01/2011    Osteoarthritis     Osteoporosis     Post herpetic neuralgia     Vitamin D deficiency 11/01/2011       Past Surgical History:        Procedure Laterality Date    CATARACT REMOVAL WITH IMPLANT  5/14/12    left eye    JOINT REPLACEMENT      partial knee R and L    KNEE SURGERY  2008    partial replacements, both knees    MOHS SURGERY  03/12/2019    R cheek and R superior temple    SPLENECTOMY      TUNNELED VENOUS PORT PLACEMENT      UPPER GASTROINTESTINAL ENDOSCOPY N/A 2/5/2019    EGD ESOPHAGOGASTRODUODENOSCOPY, WITH ENDOSCOPIC ULTRASOUND OF ESOPHAGUS performed by Pat Ruiz MD at [facility]:  Patient has no known allergies. Past medical and surgical history reviewed. Any changes have been noted.      PHYSICAL EXAM:  /60   Pulse 66   Temp 98.1 °F (36.7 °C) (Oral)   Resp 15   Ht 4' 11\" (1.499 m)   Wt 152 lb 8.9 oz (69.2 kg)   SpO2 95%   BMI 30.81 kg/m²       Intake/Output Summary (Last 24 hours) at 9/15/2021 0840  Last data filed at 9/15/2021 1749  Gross per 24 hour   Intake 478 ml   Output 1600 ml   Net -1122 ml        General appearance:   No apparent distress, appears stated age. Cooperative. HEENT:  Normocephalic, atraumatic. PERRLA. EOMi. Conjunctivae/corneas clear, no icterus, non-injected. Neck: Supple, with full range of motion. No jugular venous distention. Trachea midline. Respiratory: 3 L nc,  Normal respiratory effort. Clear to auscultation, bilaterally without Rales/Wheezes/Rhonchi. Cardiovascular:  Regular rate and rhythm without murmurs, rubs or gallops. Abdomen: Soft, non-tender, non-distended, without rebound or guarding. Normal bowel sounds. Musculoskeletal:  No clubbing, cyanosis or edema bilaterally. Full range of motion without deformity. Skin: Skin color, texture, turgor normal.  No rashes or lesions. Neurologic:  Neurovascularly intact without any focal sensory/motor deficits. Cranial nerves: II-XII intact, grossly intact. No facial asymmetry, tongue midline. Psychiatric:  Alert and oriented, thought content appropriate  Capillary Refill: Brisk,< 3 seconds   Peripheral Pulses: +2 palpable, equal bilaterally       LABS:    Lab Results   Component Value Date    WBC 14.3 (H) 09/15/2021    HGB 13.0 09/15/2021    HCT 39.1 09/15/2021    MCV 94.6 09/15/2021     09/15/2021    LABLYMP 3.4 06/23/2017    MID 0.6 06/23/2017    GRAN 4.1 06/23/2017    LYMPHOPCT 53.6 09/14/2021    MIDPERCENT 7.6 06/23/2017    GRANULOCYTES 50.4 06/23/2017    RBC 4.13 09/15/2021    MCH 31.4 09/15/2021    MCHC 33.2 09/15/2021    RDW 14.8 09/15/2021       Lab Results   Component Value Date    CREATININE 0.8 09/15/2021    BUN 15 09/15/2021     09/15/2021    K 3.8 09/15/2021    CL 98 (L) 09/15/2021    CO2 31 09/15/2021       Lab Results   Component Value Date    MG 2.10 09/14/2021       Lab Results   Component Value Date    ALT 15 09/15/2021    AST 18 09/15/2021    ALKPHOS 82 09/15/2021    BILITOT 0.4 09/15/2021        No flowsheet data found.     Lab Results   Component Value Date    LABA1C 6.2 04/20/2021       Imaging:  XR CHEST PORTABLE   Final Result   Minimal dependent opacities at the lung bases improved from prior study. CT CHEST PULMONARY EMBOLISM W CONTRAST   Final Result   1. No findings of pulmonary embolism. 2. Cardiovascular findings suggestive of pulmonary hypertension and right   heart dysfunction. 3. Central predominant interstitial and alveolar pulmonary edema suggestive   of congestive heart failure given mild cardiomegaly and bilateral pleural   effusions. Superimposed pneumonia is not excluded. 4. Mild bronchial wall thickening potentially due to pulmonary vascular   congestion, reactive airways disease, or bronchitis. 5. 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 Recommend   follow-up imaging in 1 year as below. RECOMMENDATIONS:   Incidental Vascular Findings on CT and MRI      - Splenic artery aneurysm, <2 cm:  Follow-up imaging every 1 year      Reference:  Harley et al.  Managing incidental findings on abdominal and   pelvic CT and MRI, part 2: white paper of the ACR Incidental Findings   Committee II on vascular findings. [address] [place] [address];[id number]. XR CHEST PORTABLE   Final Result   Possible right lower lobe opacity. XR CHEST PORTABLE   Final Result   No acute process.              Scheduled and prn Medications:    Scheduled Meds:   metoprolol succinate  25 mg Oral Daily    torsemide  20 mg Oral Daily    amiodarone  200 mg Oral Daily    sodium chloride flush  5-40 mL IntraVENous 2 times per day    warfarin (COUMADIN) daily dosing (placeholder)   Other RX Placeholder    cefTRIAXone (ROCEPHIN) IV  1,000 mg IntraVENous Q24H    azithromycin  250 mg IntraVENous Q24H     Continuous Infusions:   sodium chloride Stopped (09/14/21 8318)     PRN Meds:.melatonin, sodium chloride flush, sodium chloride, acetaminophen **OR** acetaminophen, ondansetron, potassium chloride **OR** potassium alternative oral replacement **OR** potassium chloride    Assessment & Plan:        Acute respiratory failure with hypoxia likely due to community acquired pneumonia  - wean O2 > 92%  - CXR: right lower lobe opacity  - strep/legionella pending  - resp culture pending   - covid neg  - consult to pulmonology      Sepsis, as evidenced by hypoxia, leukocytosis, tachypneia   - off levophed, cont IV antibx (azith + rocephin)  - strep/legionella antigens  - procalcitonin 0.29/ WBC improving     Acute CHF   - restart home meds : bb, entresto, amiodarone, midodrine  - torsemide increased to 40mg daily by cardiology  - cardiology consult  - CT chest: pulmonary HTN and right heart dysfunction, bilat pleural effusions  - on po demadex and getting additional IV Lasix 20mg by cardiology today  - repeat BNP and CXR today -- CXR improving  - BNP 17,372 down from 21,704 up from adm 5,424  - hold off on re-starting entresto since BP on low side  - aldactone 12.5mg daily  - no need AICD since LVEF improved about 35%      Acute pulmonary edema with moderate left pleural effusions  - Consult Pulmonology  - LVEF 45%; mild apical akinesis  - COVID neg  - po demadex + IV lasix doses  - adding aldactone 12.5mg daily per cardiology  -  BNP 17,271 from 21,704          DVT Prophylaxis: ordered  Diet: ADULT DIET; Regular; 4 carb choices (60 gm/meal); Low Sodium (2 gm); 1500 ml  Code Status: Full Code     PT/OT Eval Status: ordered    Continue current regimen/therapies. Monitor. Adjust medical regimen as appropriate. Body mass index is 30.81 kg/m². The patient and / or the family were informed of the results of any tests, a time was given to answer questions, a plan was proposed and they agreed with plan.     dispo: pending    FAB Dominique - PRATIMA  09/15/21

## 2021-09-15 NOTE — PROGRESS NOTES
Pt AAO x4. No c/o pain at this time. Assessment completed and charted. O2 sats 97% on 3L/NC. No c/o SOB. Lungs diminished in bases. Pt requesting purewick tonight so she can get some sleep. Denies any needs. Call light in reach. Will monitor.

## 2021-09-15 NOTE — PROGRESS NOTES
Physical Therapy    Facility/Department: [address] ORTHOPEDICS  Daily Treatment Note    This note serves as patient discharge summary if pt discharges prior to next PT visit      NAME: Nilsa Marroquin  : 1940  MRN: [id number]    Date of Service: 9/15/2021    Discharge Recommendations:  Continue to assess pending progress, 24 hour supervision or assist, S Level Brii Montez scored a 21/24 on the AM-PAC short mobility form. Current research shows that an AM-PAC score of 18 or greater is typically associated with a discharge to the patient's home setting. Based on the patient's AM-PAC score and their current functional mobility deficits, it is recommended that the patient have 2-3 sessions per week of Physical Therapy at d/c to increase the patient's independence. At this time, this patient demonstrates the endurance and safety to discharge home with home therapy services and a follow up treatment frequency of 2-3x/wk. Please see assessment section for further patient specific details. PT Equipment Recommendations  Other: Will continue to monitor pt needs while in acute care    Assessment   Body structures, Functions, Activity limitations: Decreased functional mobility   Assessment: Per H and P:  \"This is an 78-year-old female who has a history of nonischemic cardiomyopathy probably Takotsubo ortachycardia-induced cardiomyopathy, COPD, previous CLL, hypertension,factor V Leiden deficiency, on chronic Coumadin therapy, hyperlipidemia,presented to the hospital with a sudden increase in the shortness ofbreath. She said she was on a significant amount of stress as the  was admitted to the hospital last week and she has been doing more physical work than she used to. She was admitted to ICU on 2021. \"  Transferred to  2021.   Acitve hospital problems: Acute hypoxic respiratory failure, acute on chroninc systolic/diastolic CHFPTA indep with functional mobility in the home and community distances. She receives help from family for laundry and cleaning, uses delivery services for groceries and transportation. Status: 9-: Pt on 2L of O2 during session. Bed Mobility: SBA-Supervision. Transfers: SBA. Amb RW up to 70', SBA. She reports fatigue at this distance. Anticipate pt to d/c to home with initial 24 hour assistance/supervision, and possibly home PT level 1 to assure safe transition to home. Will continue to monitor. Treatment Diagnosis: Impaired functional mobility  Prognosis: Good  History: as noted  PT Education: Goals;PT Role;Plan of Care; Functional Mobility Training  Barriers to Learning: [place]  REQUIRES PT FOLLOW UP: Yes  Activity Tolerance  Activity Tolerance: Patient Tolerated treatment well  Activity Tolerance: 2 L O2 throughout. 98%, 66 bpm resting; 98%, 74 bpm after 70' ambulation. During ambulation, 84%, but likely due to fingers gripping walker handle. Patient talking easily with therapist during gait. RN informed. Patient Diagnosis(es): The primary encounter diagnosis was Respiratory distress. Diagnoses of Acute respiratory failure with hypoxia and hypercapnia ([facility] [address] Anticoagulated, and Acute congestive heart failure, unspecified heart failure type [facility]) were also pertinent to this visit. has a past medical history of Acute ST elevation myocardial infarction (STEMI) ([facility] [address] Chronic lymphocytic leukemia in remission ([facility] [address] CLL (chronic lymphocytic leukemia) ([facility] [address] COPD (chronic obstructive pulmonary disease) ([facility] [address] Essential hypertension, Factor V Leiden mutation ([facility] [address] Goiter, Hypercholesterolemia, Impaired fasting glucose, Osteoarthritis, Osteoporosis, Post herpetic neuralgia, and Vitamin D deficiency. has a past surgical history that includes Splenectomy; knee surgery (2008); Cataract removal with implant (5/14/12); Tunneled venous port placement; Upper gastrointestinal endoscopy (N/A, 2/5/2019);  Mohs surgery (03/12/2019); and joint replacement. Restrictions  Restrictions/Precautions  Restrictions/Precautions: Fall Risk  Position Activity Restriction  Other position/activity restrictions: 9-: 2 L O2     Vision/Hearing  Vision: Impaired  Vision Exceptions: Wears glasses at all times  Hearing: Exceptions to [facility]  Hearing Exceptions: Hard of hearing/hearing concerns (hearing aides at home.)       Subjective  General  Chart Reviewed: Yes  Additional Pertinent Hx: Pull H and P: \"This is an 33-year-old female who has a history of nonischemic cardiomyopathy probably Takotsubo ortachycardia-induced cardiomyopathy, COPD, previous CLL, hypertension,factor V Leiden deficiency, on chronic Coumadin therapy, hyperlipidemia, presented to the hospital with a sudden increase in the shortness ofbreath. She said she was on a significant amount of [facility] and she has been doing more physical work than she used to. She was admitted to ICU on 9-. \"  Transferred to  9-. Acitve hospital problems: Acute hypoxic respiratory failure, acute on chroninc systolic/diastolic CHF  Response To Previous Treatment: Patient with no complaints from previous session. Family / Caregiver Present: No  Referring Practitioner: Pau Richardson MD  Referral Date : 09/13/21  Subjective  Subjective: Patient in bed, lightly sleeping. Awakens easily, and agreeable to therapy. Denies pain. Reports currently feeling more comfortable using wh walker vs no device or cane. States room recliner is uncomfortable to her.   Pain Screening  Patient Currently in Pain: Denies    Orientation   WFLs    Social/Functional History  Social/Functional History  Lives With: Spouse (spouse with declining health, currently in hosp, and will likely DC to facility.)  Type of Home:  (Condo)  Home Layout: Able to Live on Main level with bedroom/bathroom, One level, Laundry in basement  Home Access: Stairs to enter without rails  Entrance Stairs - Number of Steps: 1+1  Bathroom Shower/Tub: Tub/Shower unit, Walk-in shower  Bathroom Toilet: Handicap height  Bathroom Equipment: Grab bars in shower, Shower chair, Grab bars around toilet  Bathroom Accessibility: Accessible  Home Equipment: 4 wheeled walker  ADL Assistance: [address]: Independent  Transfer Assistance: Independent  Active : Yes (but does not currently drive, uses a car services ([address]))  Occupation: Retired  Type of occupation: Director of [employer]  Additional Comments: Pt is caregiver for her . Son has been able to stay with them (works from home); supportive family (5 children in total)     Cognition   Cognition  Overall Cognitive Status: WNL    Objective  Bed mobility  Rolling to Left: Modified independent  Rolling to Right: Modified independent  Supine to Sit: Stand by assistance;Supervision  Sit to Supine: Stand by assistance;Supervision  Transfers  Sit to Stand: Stand by assistance (from EOB to RW, x 2. Cues for hand placement)  Stand to sit: Stand by assistance (from RW to EOB, x 2. Cues for hand placement)  Ambulation  Ambulation?: Yes  WB Status: WBAT  More Ambulation?: No  Ambulation 1  Surface: level tile  Device: Rolling Walker (KELLY HEIGHT)  Assistance: Contact guard assistance;Stand by assistance  Quality of Gait: Continous step through gait pattern, decreased step length B, no complaints of SOB with ambulation with RW in hospital room. Good management of RW. Therapist manages O2 and IV line. Gait Deviations: Slow Samantha;Decreased step height  Distance: 70', 50'. Seated 3 minute rest in between. Comments: Patient declines trial of gait with cane or without device; reports feeling more secure with RW presently. Declines need to use commode.   Stairs/Curb  Stairs?: No  Balance  Posture: Good  Sitting - Static: Good  Sitting - Dynamic: Good  Standing - Static: Good (at RW)  Standing - Dynamic: Good (at [facility])  Exercises  Ankle Pumps: x 10  Comments: Incentive spirometry x 7, with cues for technique. Plan   Plan  Times per week: 3-5  Current Treatment Recommendations: Functional Mobility Training  Safety Devices  Type of devices: All fall risk precautions in place, Call light within reach, Left in bed, Gait belt, Bed alarm in place, Nurse notified (RN [facility] informed)    AM-PAC Score  AM-PAC Inpatient Mobility Raw Score : 21 (09/15/21 1502)  AM-PAC Inpatient T-Scale Score : 50.25 (09/15/21 1502)  Mobility Inpatient CMS 0-100% Score: 28.97 (09/15/21 1502)  Mobility Inpatient CMS G-Code Modifier : Yancy Gaucher (09/15/21 1502)     Goals  Short term goals  Time Frame for Short term goals: Upon acute care d/c.  9-: all goals ongoing  Short term goal 1: Transfers: SBA x1 without AD  Short term goal 2: Amb: 48' without AD CGA x1  Short term goal 3: 2 steps with RW SBA x1  Patient Goals   Patient goals :  \"To Return Home\"       Therapy Time   Individual Concurrent Group Co-treatment   Time In 1320         Time Out 1350         Minutes 30            Gt 15,  [address] [place] Maribell Lindo  Electronically signed by Carlee Najera, [address] (#[phone])  on 9/15/2021 at 3:05 PM

## 2021-09-15 NOTE — PLAN OF CARE
Problem: Falls - Risk of:  Goal: Will remain free from falls  Description: Will remain free from falls  9/15/2021 0053 by Pina Abdul RN  Outcome: Ongoing     Problem: Falls - Risk of:  Goal: Absence of physical injury  Description: Absence of physical injury  9/15/2021 0053 by Pina Abdul RN  Outcome: Ongoing   Fall risk assessment completed every shift. All precautions in place. Pt has call light within reach at all times. Room clear of clutter. Pt aware to call for assistance when getting up. Problem: OXYGENATION/RESPIRATORY FUNCTION  Goal: Patient will maintain patent airway  9/15/2021 0053 by Pina Abdul RN  Outcome: Ongoing   Pt able to maintain a patent airway. Problem: OXYGENATION/RESPIRATORY FUNCTION  Goal: Patient will achieve/maintain normal respiratory rate/effort  Description: Respiratory rate and effort will be within normal limits for the patient  9/15/2021 0053 by Pina Abdul RN  Outcome: Ongoing   O2 sats >92% on 3L/NC-  will monitor and wean as able.    Problem: HEMODYNAMIC STATUS  Goal: Patient has stable vital signs and fluid balance  9/15/2021 0053 by Pina Abdul RN  Outcome: Ongoing     Problem: FLUID AND ELECTROLYTE IMBALANCE  Goal: Fluid and electrolyte balance are achieved/maintained  9/15/2021 0053 by Pina Abdul RN  Outcome: Ongoing     Problem: ACTIVITY INTOLERANCE/IMPAIRED MOBILITY  Goal: Mobility/activity is maintained at optimum level for patient  9/15/2021 0053 by Pina Abdul RN  Outcome: Ongoing     Problem: Pain:  Goal: Pain level will decrease  Description: Pain level will decrease  9/15/2021 0053 by Pina Abdul RN  Outcome: Ongoing     Problem: Pain:  Goal: Control of acute pain  Description: Control of acute pain  9/15/2021 0053 by Pina Abdul RN  Outcome: Ongoing     Problem: Pain:  Goal: Control of chronic pain  Description: Control of chronic pain  9/15/2021 0053 by Pina Abdul RN  Outcome: Ongoing   No c/o pain this shift. Will continue to assess and medicate as needed. Problem: Skin Integrity:  Goal: Will show no infection signs and symptoms  Description: Will show no infection signs and symptoms  9/15/2021 0053 by Zulma Singh RN  Outcome: Ongoing     Problem: Skin Integrity:  Goal: Absence of new skin breakdown  Description: Absence of new skin breakdown  9/15/2021 0053 by Zulma Singh RN  Outcome: Ongoing   Skin assessment completed every shift. Pt assessed for incontinence, appropriate barrier wipes used prn. Pt encouraged to turn/rotate every 2 hours. Assistance provided if pt unable to do so themselves.

## 2021-09-15 NOTE — PROGRESS NOTES
Patient resting in bed this evening w/o complaint. Patient remains on 3L O2 via NC to maintain O2 > 92% per recommendation from cardiology. This RN attempted to wean oxygen this shift, patient unable to tolerate w/ ambulation (dropping to low 80's w/ therapy). Patient denies needs at this time, will continue to monitor and assess. Call light, telephone, and bed side table are within reach. Fall precautions in place. Will continue to monitor and assess.

## 2021-09-15 NOTE — PROGRESS NOTES
Patient resting in bed this morning, denies pain. Scheduled morning medications given per orders. See eMAR for documentation. Patient hopeful for d/c today, no orders noted for discharge at this time. Patient denies physical/emotional needs. Call light, telephone, and bed side table are within reach. Fall precautions in place. Will continue to monitor and assess.

## 2021-09-16 ENCOUNTER — APPOINTMENT (OUTPATIENT)
Dept: CARDIAC CATH/INVASIVE PROCEDURES | Age: 81
DRG: 871 | End: 2021-09-16
Payer: MEDICARE

## 2021-09-16 LAB
A/G RATIO: 1.5 (ref 1.1–2.2)
ALBUMIN SERPL-MCNC: 3.8 G/DL (ref 3.4–5)
ALP BLD-CCNC: 86 U/L (ref 40–129)
ALT SERPL-CCNC: 16 U/L (ref 10–40)
ANION GAP SERPL CALCULATED.3IONS-SCNC: 19 MMOL/L (ref 3–16)
AST SERPL-CCNC: 20 U/L (ref 15–37)
BASOPHILS ABSOLUTE: 0.1 K/UL (ref 0–0.2)
BASOPHILS RELATIVE PERCENT: 1 %
BILIRUB SERPL-MCNC: 0.4 MG/DL (ref 0–1)
BLOOD CULTURE, ROUTINE: NORMAL
BUN BLDV-MCNC: 16 MG/DL (ref 7–20)
CALCIUM SERPL-MCNC: 9.2 MG/DL (ref 8.3–10.6)
CHLORIDE BLD-SCNC: 94 MMOL/L (ref 99–110)
CO2: 28 MMOL/L (ref 21–32)
CREAT SERPL-MCNC: 0.9 MG/DL (ref 0.6–1.2)
CULTURE, BLOOD 2: NORMAL
EOSINOPHILS ABSOLUTE: 0.9 K/UL (ref 0–0.6)
EOSINOPHILS RELATIVE PERCENT: 7 %
GFR AFRICAN AMERICAN: >60
GFR NON-AFRICAN AMERICAN: 60
GLOBULIN: 2.6 G/DL
GLUCOSE BLD-MCNC: 88 MG/DL (ref 70–99)
HCT VFR BLD CALC: 41.4 % (ref 36–48)
HEMATOLOGY PATH CONSULT: NO
HEMOGLOBIN: 13.7 G/DL (ref 12–16)
INR BLD: 2.71 (ref 0.88–1.12)
LYMPHOCYTES ABSOLUTE: 6.2 K/UL (ref 1–5.1)
LYMPHOCYTES RELATIVE PERCENT: 48 %
MCH RBC QN AUTO: 31.2 PG (ref 26–34)
MCHC RBC AUTO-ENTMCNC: 33 G/DL (ref 31–36)
MCV RBC AUTO: 94.5 FL (ref 80–100)
MONOCYTES ABSOLUTE: 0.9 K/UL (ref 0–1.3)
MONOCYTES RELATIVE PERCENT: 7 %
NEUTROPHILS ABSOLUTE: 4.8 K/UL (ref 1.7–7.7)
NEUTROPHILS RELATIVE PERCENT: 37 %
PDW BLD-RTO: 14.3 % (ref 12.4–15.4)
PLATELET # BLD: 292 K/UL (ref 135–450)
PLATELET SLIDE REVIEW: ADEQUATE
PMV BLD AUTO: 8.8 FL (ref 5–10.5)
POTASSIUM SERPL-SCNC: 3.6 MMOL/L (ref 3.5–5.1)
PROCALCITONIN: 0.14 NG/ML (ref 0–0.15)
PROTHROMBIN TIME: 31.9 SEC (ref 9.9–12.7)
RBC # BLD: 4.38 M/UL (ref 4–5.2)
RBC # BLD: NORMAL 10*6/UL
SLIDE REVIEW: ABNORMAL
SMUDGE CELLS: PRESENT
SODIUM BLD-SCNC: 141 MMOL/L (ref 136–145)
TOTAL PROTEIN: 6.4 G/DL (ref 6.4–8.2)
WBC # BLD: 12.9 K/UL (ref 4–11)

## 2021-09-16 PROCEDURE — C1769 GUIDE WIRE: HCPCS

## 2021-09-16 PROCEDURE — C1894 INTRO/SHEATH, NON-LASER: HCPCS

## 2021-09-16 PROCEDURE — 2580000003 HC RX 258: Performed by: INTERNAL MEDICINE

## 2021-09-16 PROCEDURE — 36591 DRAW BLOOD OFF VENOUS DEVICE: CPT

## 2021-09-16 PROCEDURE — 2700000000 HC OXYGEN THERAPY PER DAY

## 2021-09-16 PROCEDURE — 99233 SBSQ HOSP IP/OBS HIGH 50: CPT | Performed by: INTERNAL MEDICINE

## 2021-09-16 PROCEDURE — 1200000000 HC SEMI PRIVATE

## 2021-09-16 PROCEDURE — 93451 RIGHT HEART CATH: CPT

## 2021-09-16 PROCEDURE — 85025 COMPLETE CBC W/AUTO DIFF WBC: CPT

## 2021-09-16 PROCEDURE — 4A1239Z MONITORING OF CARDIAC OUTPUT, PERCUTANEOUS APPROACH: ICD-10-PCS | Performed by: INTERNAL MEDICINE

## 2021-09-16 PROCEDURE — 4A133B3 MONITORING OF ARTERIAL PRESSURE, PULMONARY, PERCUTANEOUS APPROACH: ICD-10-PCS | Performed by: INTERNAL MEDICINE

## 2021-09-16 PROCEDURE — 6370000000 HC RX 637 (ALT 250 FOR IP): Performed by: INTERNAL MEDICINE

## 2021-09-16 PROCEDURE — 99152 MOD SED SAME PHYS/QHP 5/>YRS: CPT

## 2021-09-16 PROCEDURE — 94761 N-INVAS EAR/PLS OXIMETRY MLT: CPT

## 2021-09-16 PROCEDURE — 93451 RIGHT HEART CATH: CPT | Performed by: INTERNAL MEDICINE

## 2021-09-16 PROCEDURE — 85610 PROTHROMBIN TIME: CPT

## 2021-09-16 PROCEDURE — 99152 MOD SED SAME PHYS/QHP 5/>YRS: CPT | Performed by: INTERNAL MEDICINE

## 2021-09-16 PROCEDURE — 97535 SELF CARE MNGMENT TRAINING: CPT

## 2021-09-16 PROCEDURE — 99153 MOD SED SAME PHYS/QHP EA: CPT

## 2021-09-16 PROCEDURE — 84145 PROCALCITONIN (PCT): CPT

## 2021-09-16 PROCEDURE — 80053 COMPREHEN METABOLIC PANEL: CPT

## 2021-09-16 PROCEDURE — 2500000003 HC RX 250 WO HCPCS

## 2021-09-16 PROCEDURE — 6360000002 HC RX W HCPCS: Performed by: INTERNAL MEDICINE

## 2021-09-16 PROCEDURE — C1751 CATH, INF, PER/CENT/MIDLINE: HCPCS

## 2021-09-16 PROCEDURE — 4A023N6 MEASUREMENT OF CARDIAC SAMPLING AND PRESSURE, RIGHT HEART, PERCUTANEOUS APPROACH: ICD-10-PCS | Performed by: INTERNAL MEDICINE

## 2021-09-16 PROCEDURE — 6360000002 HC RX W HCPCS: Performed by: STUDENT IN AN ORGANIZED HEALTH CARE EDUCATION/TRAINING PROGRAM

## 2021-09-16 PROCEDURE — 6360000002 HC RX W HCPCS

## 2021-09-16 RX ORDER — SODIUM CHLORIDE 0.9 % (FLUSH) 0.9 %
5-40 SYRINGE (ML) INJECTION EVERY 12 HOURS SCHEDULED
Status: CANCELLED | OUTPATIENT
Start: 2021-09-16

## 2021-09-16 RX ORDER — SODIUM CHLORIDE 0.9 % (FLUSH) 0.9 %
5-40 SYRINGE (ML) INJECTION PRN
Status: CANCELLED | OUTPATIENT
Start: 2021-09-16

## 2021-09-16 RX ORDER — SODIUM CHLORIDE 9 MG/ML
25 INJECTION, SOLUTION INTRAVENOUS PRN
Status: CANCELLED | OUTPATIENT
Start: 2021-09-16

## 2021-09-16 RX ORDER — WARFARIN SODIUM 2.5 MG/1
2.5 TABLET ORAL
Status: COMPLETED | OUTPATIENT
Start: 2021-09-16 | End: 2021-09-16

## 2021-09-16 RX ADMIN — AZITHROMYCIN MONOHYDRATE 250 MG: 500 INJECTION, POWDER, LYOPHILIZED, FOR SOLUTION INTRAVENOUS at 06:56

## 2021-09-16 RX ADMIN — WARFARIN SODIUM 2.5 MG: 2.5 TABLET ORAL at 17:30

## 2021-09-16 RX ADMIN — Medication 3 MG: at 21:53

## 2021-09-16 RX ADMIN — CEFTRIAXONE 1000 MG: 1 INJECTION, POWDER, FOR SOLUTION INTRAMUSCULAR; INTRAVENOUS at 17:33

## 2021-09-16 RX ADMIN — ONDANSETRON 4 MG: 2 INJECTION INTRAMUSCULAR; INTRAVENOUS at 08:02

## 2021-09-16 NOTE — PROGRESS NOTES
Patient A&O. Tolerating PO. Patient denies pain at this time. Call light within reach. Will continue to monitor and reassess.   Electronically signed by Nneka Tavera RN on 9/15/2021

## 2021-09-16 NOTE — PLAN OF CARE
Problem: Falls - Risk of:  Goal: Will remain free from falls  Description: Will remain free from falls  9/16/2021 0736 by Romaine Gutierrez RN  Outcome: Ongoing  Note: Patient remains free from falls during this shift. Fall precautions remain in place. Patient educated on the need to implement call light use prior to ambulation. Will continue to monitor and assess. 9/15/2021 2218 by Katelin Linares RN  Outcome: Ongoing  9/15/2021 2216 by Katelin Linares RN  Outcome: Ongoing  Goal: Absence of physical injury  Description: Absence of physical injury  9/16/2021 0736 by Romaine Gutierrez RN  Outcome: Ongoing  Note: Patient remains free from physical harm during this shift. Will continue to monitor and assess patient.      9/15/2021 2218 by Katelin Linares RN  Outcome: Ongoing  9/15/2021 2216 by Katelin Linares RN  Outcome: Ongoing     Problem: OXYGENATION/RESPIRATORY FUNCTION  Goal: Patient will maintain patent airway  9/16/2021 0736 by Romaine Gutierrez RN  Outcome: Ongoing  Note: Patient airway remains patent at this time, supported by 3L o2 via [address] [place] [address].   9/15/2021 2218 by Katelin Linares RN  Outcome: Ongoing  9/15/2021 2216 by Katelin Linares RN  Outcome: Ongoing  Goal: Patient will achieve/maintain normal respiratory rate/effort  Description: Respiratory rate and effort will be within normal limits for the patient  9/16/2021 0736 by Romaine Gutierrez RN  Outcome: Ongoing  Note: Resp rate remains WNL during this shift, will continue to monitor and assess   9/15/2021 2218 by Katelin Linares RN  Outcome: Ongoing  9/15/2021 2216 by Katelin Linares RN  Outcome: Ongoing     Problem: HEMODYNAMIC STATUS  Goal: Patient has stable vital signs and fluid balance  9/16/2021 0736 by Romaine Gutierrez RN  Outcome: Ongoing  Note: VSS throughout this shift, O2 low when not supported by O2 via NC, remains > 92% on 3L per orders  9/15/2021 2218 by Katelin Linares RN  Outcome: Ongoing  9/15/2021 2216 by Katelin Linares RN  Outcome: Ongoing Problem: FLUID AND ELECTROLYTE IMBALANCE  Goal: Fluid and electrolyte balance are achieved/maintained  9/16/2021 0736 by Miky Peña RN  Outcome: Ongoing  9/15/2021 2218 by Prashant White RN  Outcome: Ongoing  9/15/2021 2216 by Prashant White RN  Outcome: Ongoing     Problem: ACTIVITY INTOLERANCE/IMPAIRED MOBILITY  Goal: Mobility/activity is maintained at optimum level for patient  9/16/2021 0736 by Miky Peña RN  Outcome: Ongoing  Note: Patient up x1 w/ a walker during this shift, will continue to promote mobility   9/15/2021 2218 by Prashant White RN  Outcome: Ongoing  9/15/2021 2216 by Prashant White RN  Outcome: Ongoing     Problem: Pain:  Goal: Pain level will decrease  Description: Pain level will decrease  9/16/2021 0736 by Miky Peña RN  Outcome: Ongoing  Note: Pain managed with pharmacologic and non-pharmacologic interventions during this shift. Will continue to monitor and assess for needs and change in patient condition. 9/15/2021 2218 by Prashant White RN  Outcome: Ongoing  9/15/2021 2216 by Prashant White RN  Outcome: Ongoing  Goal: Control of acute pain  Description: Control of acute pain  9/16/2021 0736 by Miky Peña RN  Outcome: Ongoing  Note: Pain managed with pharmacologic and non-pharmacologic interventions during this shift. Will continue to monitor and assess for needs and change in patient condition. 9/15/2021 2218 by Prashant White RN  Outcome: Ongoing  9/15/2021 2216 by Prashant White RN  Outcome: Ongoing  Goal: Control of chronic pain  Description: Control of chronic pain  9/16/2021 0736 by Miky Peña RN  Outcome: Ongoing  Note: Pain managed with pharmacologic and non-pharmacologic interventions during this shift. Will continue to monitor and assess for needs and change in patient condition.      9/15/2021 2218 by Prashant White RN  Outcome: Ongoing  9/15/2021 2216 by Prashant White RN  Outcome: Ongoing     Problem: Skin Integrity:  Goal: Will show no infection signs and symptoms  Description: Will show no infection signs and symptoms  9/16/2021 0736 by José Miguel Reagan RN  Outcome: Ongoing  Note: Patient remains free from new signs and symptoms of infection during this shift. Infection prevention measures are in place. Will continue to monitor for alterations in patient condition throughout the shift. 9/15/2021 2218 by Alayna Lundy RN  Outcome: Ongoing  9/15/2021 2216 by Alayna Lundy RN  Outcome: Ongoing  Goal: Absence of new skin breakdown  Description: Absence of new skin breakdown  9/16/2021 0736 by José Miguel Reagan RN  Outcome: Ongoing  Note: Patient skin condition and mucus membrane integrity remain unchanged during this shift. Skin breakdown prevention interventions are in place. Will continue to monitor and assess.      9/15/2021 2218 by Alayna Lundy RN  Outcome: Ongoing  9/15/2021 2216 by Alayna Lundy RN  Outcome: Ongoing

## 2021-09-16 NOTE — PROGRESS NOTES
Patient returned to unit from cath lab and was placed back into room 3105. Patient sleepy, no apparent needs. Resp even and unlabored. VSS. Call light, telephone, and bed side table are within reach. Fall precautions in place. Will continue to monitor and assess.

## 2021-09-16 NOTE — PROGRESS NOTES
Patient to cath lab w/ hospital transport. Informed consent signed for R heart cath by patient and RN Margarita Samuel. Signed document placed into chart. Family sent to cath lab waiting to obtain update from Dr. Alayna Hopkins after heart cath is complete.

## 2021-09-16 NOTE — PLAN OF CARE
Problem: Falls - Risk of:  Goal: Will remain free from falls  Description: Will remain free from falls  9/15/2021 2216 by Roma Jonas RN  Outcome: Ongoing     Problem: Falls - Risk of:  Goal: Absence of physical injury  Description: Absence of physical injury  9/15/2021 2216 by Roma Jonas RN  Outcome: Ongoing     Problem: OXYGENATION/RESPIRATORY FUNCTION  Goal: Patient will maintain patent airway  9/15/2021 2216 by Roma Jonas RN  Outcome: Ongoing     Problem: OXYGENATION/RESPIRATORY FUNCTION  Goal: Patient will achieve/maintain normal respiratory rate/effort  Description: Respiratory rate and effort will be within normal limits for the patient  9/15/2021 2216 by Roma Jonas RN  Outcome: Ongoing     Problem: HEMODYNAMIC STATUS  Goal: Patient has stable vital signs and fluid balance  9/15/2021 2216 by Roma Jonas RN  Outcome: Ongoing     Problem: FLUID AND ELECTROLYTE IMBALANCE  Goal: Fluid and electrolyte balance are achieved/maintained  9/15/2021 2216 by Roma Jonas RN  Outcome: Ongoing     Problem: Pain:  Goal: Pain level will decrease  Description: Pain level will decrease  9/15/2021 2216 by Roma Jonas RN  Outcome: Ongoing     Problem: Pain:  Goal: Control of acute pain  Description: Control of acute pain  9/15/2021 2216 by Roma Jonas RN  Outcome: Ongoing     Problem: Pain:  Goal: Control of chronic pain  Description: Control of chronic pain  9/15/2021 2216 by Roma Jonas RN  Outcome: Ongoing     Problem: Skin Integrity:  Goal: Will show no infection signs and symptoms  Description: Will show no infection signs and symptoms  9/15/2021 2216 by Roma Jonas RN  Outcome: Ongoing     Problem: Skin Integrity:  Goal: Absence of new skin breakdown  Description: Absence of new skin breakdown  9/15/2021 2216 by Roma Jonas RN  Outcome: Ongoing

## 2021-09-16 NOTE — PROGRESS NOTES
Dressing to R AC from R heart cath clean, dry, and intact. Small bruise noted to R FA below puncture site. Puncture site soft. Patient denies needs at this time. Call light, telephone, and bed side table are within reach. Fall precautions in place. Will continue to monitor and assess.

## 2021-09-16 NOTE — PRE SEDATION
Brief Pre-Op Note/Sedation Assessment      Brit Ast  1940  [id number]      [id number]  3:17 PM    Planned Procedure: Cardiac Catheterization Procedure    Post Procedure Plan: Return to same level of care    Consent: I have discussed with the patient and/or the patient representative the indication, alternatives, and the possible risks and/or complications of the planned procedure and the anesthesia methods. The patient and/or patient representative appear to understand and agree to proceed. Chief Complaint: Dyspnea      Indications for Cath Procedure:  Cardiomyopathy  Anginal Classification within 2 weeks:  No symptoms  NYHA Heart Failure Class within 2 weeks: Class III - Symptoms of HF on less-than-ordinary exertion, Newly Diagnosed?  No, Heart Failure Type: Systolic  Is Cath Lab Visit Valve-related?: No  Surgical Risk: Low  Functional Type: Unknown    Anti- Anginal Meds within 2 weeks:   Yes: Beta Blockers    Stress or Imaging Studies Performed:  None     Vital Signs:  /65   Pulse 71   Temp 97.9 °F (36.6 °C) (Oral)   Resp 18   Ht 4' 11\" (1.499 m)   Wt 147 lb 11.3 oz (67 kg)   SpO2 93%   BMI 29.83 kg/m²     Allergies:  No Known Allergies    Past Medical History:  Past Medical History:   Diagnosis Date    Acute ST elevation myocardial infarction (STEMI) ([facility] 07/19/2021    Chronic lymphocytic leukemia in remission ([facility]     CLL (chronic lymphocytic leukemia) ([facility] 02/12/2015    COPD (chronic obstructive pulmonary disease) ([facility]     Essential hypertension 01/01/2015    controlled with salt restriction (initially)    [facility]iden [facility] ([facility]     Goiter 11/01/2011    1.5 cm midline    Hypercholesterolemia     Impaired fasting glucose 11/01/2011    Osteoarthritis     Osteoporosis     Post herpetic neuralgia     Vitamin D deficiency 11/01/2011         Surgical History:  Past Surgical History:   Procedure Laterality Date    CATARACT REMOVAL WITH IMPLANT  5/14/12 left eye    JOINT REPLACEMENT      partial knee R and L    KNEE SURGERY  2008    partial replacements, both knees    MOHS SURGERY  03/12/2019    R cheek and R superior temple    SPLENECTOMY      TUNNELED VENOUS PORT PLACEMENT      UPPER GASTROINTESTINAL ENDOSCOPY N/A 2/5/2019    EGD ESOPHAGOGASTRODUODENOSCOPY, WITH ENDOSCOPIC ULTRASOUND OF ESOPHAGUS performed by Makayla Powell MD at [facility]         Medications:  Current Facility-Administered Medications   Medication Dose Route Frequency Provider Last Rate Last Admin    warfarin (COUMADIN) tablet 2.5 mg  2.5 mg Oral Once Viet Cr MD        torsemide [facility]) tablet 40 mg  40 mg Oral Daily Marcus Villa MD        metoprolol succinate (TOPROL XL) extended release tablet 25 mg  25 mg Oral Daily Marcus Villa MD   25 mg at 09/15/21 0918    melatonin tablet 3 mg  3 mg Oral Nightly PRN Arjun Romo MD   3 mg at 09/15/21 2149    amiodarone (CORDARONE) tablet 200 mg  200 mg Oral Daily Brandon M Anibal, DO   200 mg at 09/15/21 0918    sodium chloride flush 0.9 % injection 5-40 mL  5-40 mL IntraVENous 2 times per day Brandon M Anibal, DO   10 mL at 09/15/21 2015    sodium chloride flush 0.9 % injection 5-40 mL  5-40 mL IntraVENous PRN Brandon M Anibal, DO        0.9 % sodium chloride infusion  25 mL IntraVENous PRN Brandon M Anibal, DO   Stopped at 09/14/21 1648    acetaminophen (TYLENOL) tablet 650 mg  650 mg Oral Q6H PRN Brandon M Anibal, DO   650 mg at 09/13/21 1613    Or    acetaminophen (TYLENOL) suppository 650 mg  650 mg Rectal Q6H PRN Brandon M Anibal, DO        ondansetron (ZOFRAN) injection 4 mg  4 mg IntraVENous Q6H PRN Brandon M Anibal, DO   4 mg at 09/16/21 0802    warfarin (COUMADIN) daily dosing (placeholder)   Other RX Placeholder Brandon Dayan Cannon, DO        potassium chloride (KLOR-CON M) extended release tablet 40 mEq  40 mEq Oral PRN Mimi Avendano MD   40 mEq at 09/12/21 0939    Or    potassium bicarb-citric acid (EFFER-K) effervescent tablet 40 mEq  40 mEq Oral PRN Fortino South MD        Or    potassium chloride 10 mEq/100 mL IVPB (Peripheral Line)  10 mEq IntraVENous PRN Fortino South MD        cefTRIAXone (ROCEPHIN) 1000 mg IVPB in 50 mL D5W minibag  1,000 mg IntraVENous Q24H Fortino South MD   Stopped at 09/15/21 1501           Pre-Sedation:    Pre-Sedation Documentation and Exam:  I have personally completed a history, physical exam & review of systems for this patient (see notes). Prior History of Anesthesia Complications:   none    Modified Mallampati:  I (soft palate, uvula, fauces, tonsillar pillars visible)    ASA Classification:  Class 3 - A patient with severe systemic disease that limits activity but is not incapacitating      Adam Scale: Activity:  2 - Able to move 4 extremities voluntarily on command  Respiration:  2 - Able to breathe deeply and cough freely  Circulation:  2 - BP+/- 20mmHg of normal  Consciousness:  2 - Fully awake  Oxygen Saturation (color):  2 - Able to maintain oxygen saturation >92% on room air    Sedation/Anesthesia Plan:  Guard the patient's safety and welfare. Minimize physical discomfort and pain. Minimize negative psychological responses to treatment by providing sedation and analgesia and maximize the potential amnesia. Patient to meet pre-procedure discharge plan.     Medication Planned:  midazolam intravenously and fentanyl intravenously    Patient is an appropriate candidate for plan of sedation: yes      Electronically signed by Jorge Conenr MD on 9/16/2021 at 3:17 PM

## 2021-09-16 NOTE — PLAN OF CARE
Problem: Falls - Risk of:  Goal: Will remain free from falls  Description: Will remain free from falls  9/15/2021 2218 by Marion Godfrey RN  Outcome: Ongoing     Problem: Falls - Risk of:  Goal: Absence of physical injury  Description: Absence of physical injury  9/15/2021 2218 by Marion Godfrey RN  Outcome: Ongoing     Problem: OXYGENATION/RESPIRATORY FUNCTION  Goal: Patient will maintain patent airway  9/15/2021 2218 by Marion Godfrey RN  Outcome: Ongoing     Problem: OXYGENATION/RESPIRATORY FUNCTION  Goal: Patient will achieve/maintain normal respiratory rate/effort  Description: Respiratory rate and effort will be within normal limits for the patient  9/15/2021 2218 by Marion Godfrey RN  Outcome: Ongoing     Problem: FLUID AND ELECTROLYTE IMBALANCE  Goal: Fluid and electrolyte balance are achieved/maintained  9/15/2021 2218 by Marion Godfrey RN  Outcome: Ongoing     Problem: ACTIVITY INTOLERANCE/IMPAIRED MOBILITY  Goal: Mobility/activity is maintained at optimum level for patient  9/15/2021 2218 by Marion Godfrey RN  Outcome: Ongoing     Problem: Pain:  Goal: Pain level will decrease  Description: Pain level will decrease  9/15/2021 2218 by Marion Godfrey RN  Outcome: Ongoing     Problem: Pain:  Goal: Control of acute pain  Description: Control of acute pain  9/15/2021 2218 by Marion Godfrey RN  Outcome: Ongoing     Problem: Skin Integrity:  Goal: Will show no infection signs and symptoms  Description: Will show no infection signs and symptoms  9/15/2021 2218 by Marion Godfrey RN  Outcome: Ongoing     Problem: Skin Integrity:  Goal: Absence of new skin breakdown  Description: Absence of new skin breakdown  9/15/2021 2218 by Marion Godfrey RN  Outcome: Ongoing

## 2021-09-16 NOTE — BRIEF OP NOTE
Brief Postoperative Note    Ramana Bermudez  YOB: 1940  [id number]    Pre-operative Diagnosis: SOB,Hypoxia    Post-operative Diagnosis: Same    Procedure: RHC,O2 sats    Anesthesia: Moderate Sedation    Surgeons/Assistants: Carol Hartman    Estimated Blood Loss: less than 50     Complications: None    Specimens: Was Not Obtained    Findings:        Chamber     Pressures   O2 sats                            RA             0 mm Hg   61%                            RV            24/-3           61%                            PA          23/0               67%( checked                                                               After stopping O2)                            PCW     Between 1-6 mm HG,    Filling pressures c/w volume depletion. Will need slow hydration. Discussed with FAB Delgado CNP      Moderate Sedation:  Start time:15:36 PM  Stop time:  15:54 PM  *1mg versed   50 mcg fentanyl   An independent trained observer pushed medications at my direction. We monitored the patient's level of consciousness and vital signs/physiologic status throughout the procedure duration (see start and start times above).        Electronically signed by Ashley Nath MD on 9/16/2021 at 4:02 PM

## 2021-09-16 NOTE — PROGRESS NOTES
Hospital Medicine Progress Note      Admit Date: 9/11/2021       CC: F/U for SOB    HPI:  The patient is a 78 y. o. female with past medical history of recent acute STEMI but was noted to have negative coronary lesions 7 weeks ago although had severe systolic dysfunction, previous CLL, COPD, hypertension, factor V Leiden mutation on Coumadin currently, hyperlipidemia, and osteoporosis who presents to [facility] with concern for acute onset shortness of breath at home without any provocation as well as some intermittent chest tightness as well as some chills which she could not explain but she noted that the initial symptoms seem similar to her previous STEMI from 7 weeks ago. Miguelito Trujillo denies any other recent symptoms of fever, dizziness, syncope, leg swelling, nausea/vomiting/diarrhea/abdominal pain, blood in urine/stool/sputum.  She did check her blood pressure at home and it was noted to be very hypertensive although on presentation to the ED it was only 685 systolic.  She is not been in contact with anyone sick. Daquan Butler presentation to the ED, patient was initially seen by ED provider and was notably improving on nasal cannula oxygen.  It was only after she was found to be ambulating to the bathroom she then became increasingly short of breath and looked very ill-appearing.  She required nonrebreather oxygen and then was treated with IV Lasix and improved.  ED physician initially suspected flash pulmonary edema although x-ray did not demonstrate much finding of flash pulmonary edema.  She felt better after the IV Lasix that was given although then her blood pressure started to become significantly decreased.       Interval History/Subjective: hypotensive this am. Given her low EF, will defer bolus to Dr. Fernanda Bassett for cautious hydration in the setting of her current diuresing for CHF and BP med adjustments.      BP meds and torsemide being held this morning   Will do palliative Care consult as this may be beneficial given her chronic condition and advanced age    Will get right heart cath to better eval since BP down with Dr. Anette Landa. Feels a little better each day though. BP back to normal now. Review of Systems:       The patient denied headaches, visual changes, LOC, SOB, CP, ABD pain, N/V/D, skin changes, new or worsening weakness or neuromuscular deficits. Comprehensive ROS negative except as mentioned above. Past Medical History:        Diagnosis Date    Acute ST elevation myocardial infarction (STEMI) ([facility] 07/19/2021    Chronic lymphocytic leukemia in remission ([facility]     CLL (chronic lymphocytic leukemia) ([facility] 02/12/2015    COPD (chronic obstructive pulmonary disease) ([facility]     Essential hypertension 01/01/2015    controlled with salt restriction (initially)    Factor V Leiden mutation ([facility]     Goiter 11/01/2011    1.5 cm midline    Hypercholesterolemia     Impaired fasting glucose 11/01/2011    Osteoarthritis     Osteoporosis     Post herpetic neuralgia     Vitamin D deficiency 11/01/2011       Past Surgical History:        Procedure Laterality Date    CATARACT REMOVAL WITH IMPLANT  5/14/12    left eye    JOINT REPLACEMENT      partial knee R and L    KNEE SURGERY  2008    partial replacements, both knees    MOHS SURGERY  03/12/2019    R cheek and R superior temple    SPLENECTOMY      TUNNELED VENOUS PORT PLACEMENT      UPPER GASTROINTESTINAL ENDOSCOPY N/A 2/5/2019    EGD ESOPHAGOGASTRODUODENOSCOPY, WITH ENDOSCOPIC ULTRASOUND OF ESOPHAGUS performed by Angelic Whaley MD at [facility]:  Patient has no known allergies. Past medical and surgical history reviewed. Any changes have been noted.      PHYSICAL EXAM:  BP (!) 80/48 Comment: MANUAL R ARM   Pulse 69   Temp 97.8 °F (36.6 °C) (Oral)   Resp 16   Ht 4' 11\" (1.499 m)   Wt 147 lb 11.3 oz (67 kg)   SpO2 95%   BMI 29.83 kg/m²       Intake/Output Summary (Last 24 hours) at 9/16/2021 7370  Last data flowsheet data found. Lab Results   Component Value Date    LABA1C 6.2 04/20/2021       Imaging:  XR CHEST PORTABLE   Final Result   Minimal dependent opacities at the lung bases improved from prior study. CT CHEST PULMONARY EMBOLISM W CONTRAST   Final Result   1. No findings of pulmonary embolism. 2. Cardiovascular findings suggestive of pulmonary hypertension and right   heart dysfunction. 3. Central predominant interstitial and alveolar pulmonary edema suggestive   of congestive heart failure given mild cardiomegaly and bilateral pleural   effusions. Superimposed pneumonia is not excluded. 4. Mild bronchial wall thickening potentially due to pulmonary vascular   congestion, reactive airways disease, or bronchitis. 5. 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 Recommend   follow-up imaging in 1 year as below. RECOMMENDATIONS:   Incidental Vascular Findings on CT and MRI      - Splenic artery aneurysm, <2 cm:  Follow-up imaging every 1 year      Reference:  Harley et al.  Managing incidental findings on abdominal and   pelvic CT and MRI, part 2: white paper of the ACR Incidental Findings   Committee II on vascular findings. [address] [place] [address];[id number]. XR CHEST PORTABLE   Final Result   Possible right lower lobe opacity. XR CHEST PORTABLE   Final Result   No acute process.              Scheduled and prn Medications:    Scheduled Meds:   torsemide  40 mg Oral Daily    metoprolol succinate  25 mg Oral Daily    amiodarone  200 mg Oral Daily    sodium chloride flush  5-40 mL IntraVENous 2 times per day    warfarin (COUMADIN) daily dosing (placeholder)   Other RX Placeholder    cefTRIAXone (ROCEPHIN) IV  1,000 mg IntraVENous Q24H     Continuous Infusions:   sodium chloride Stopped (09/14/21 1648)     PRN Meds:.melatonin, sodium chloride flush, sodium chloride, acetaminophen **OR** acetaminophen, ondansetron, potassium chloride **OR** potassium alternative oral replacement **OR** potassium chloride    Assessment & Plan:        Acute respiratory failure with hypoxia likely due to community acquired pneumonia  - wean O2 > 92%  - CXR: right lower lobe opacity  - strep/legionella pending  - resp culture pending   - covid neg  - consult to pulmonology      Sepsis, as evidenced by hypoxia, leukocytosis, tachypneia   - off levophed, cont IV antibx (azith + rocephin)  - strep/legionella antigens  - procalcitonin 0.29/ WBC improving     Acute CHF   - restart home meds : bb, entresto, amiodarone, midodrine  - torsemide increased to 40mg daily by cardiology  - cardiology consult  - CT chest: pulmonary HTN and right heart dysfunction, bilat pleural effusions  - holding po demadex now in setting of dehydration and lower BPs  - got additional IV Lasix 20mg by cardiology   - repeat BNP and CXR -- CXR improving  - BNP improving and down to 8,757 -->17,372 down from 21,704   - hold off on re-starting entresto since BP on low side  - aldactone 12.5mg daily  - no need AICD since LVEF improved about 35%   - left heart cath (9/16) shows dehydration as opposed to fluid overload. Will re-engage pulmonology to further eval her continued hypoxia.     Acute pulmonary edema with moderate left pleural effusions  - Consult Pulmonology - appreciate further recs given for continued hypoxia. Left heart cath shows dehydration vs. Fluid overload now and still with hypoxia.   - LVEF 45%; mild apical akinesis  - COVID neg  - po demadex + IV lasix doses  - adding aldactone 12.5mg daily per cardiology  -  BNP 17,271 from 21,704  - IV antibx-- 9/16 is 5th day        Continue current regimen/therapies. Monitor. Adjust medical regimen as appropriate. Body mass index is 29.83 kg/m². The patient and / or the family were informed of the results of any tests, a time was given to answer questions, a plan was proposed and they agreed with plan.       DVT ppx: coumadin      Diet: ADULT DIET; Regular; 4 carb choices (60 gm/meal);  Low Sodium (2 gm); 1500 ml    Consults:  PHARMACY TO DOSE MEDICATION  IP CONSULT TO CARDIOLOGY    DISPO/placement plan: pending    Code Status: Full Code      FAB Martinez CNP  09/16/21

## 2021-09-16 NOTE — PROGRESS NOTES
Occupational Therapy  Facility/Department: [address] ORTHOPEDICS  Daily Treatment Note  NAME: Jefferson Wolfe  : 1940  MRN: [id number]    Date of Service: 2021    Discharge Recommendations:  24 hour supervision or assist     Jefferson Wolfe scored a 20/24 on the AM-PAC ADL Inpatient form. At this time, no further OT is recommended upon discharge due to pt functioning near her baseline  Recommend patient returns to prior setting with prior services. Assessment   Performance deficits / Impairments: Decreased functional mobility ; Decreased endurance;Decreased ADL status; Decreased high-level IADLs  Assessment: Pt tolerated therapy session well this date. She ambulated to bathroom with RW and SBA and performed grooming tasks sinkside with SBA. Pt's O2 sats 96% after activity in room. Pt denies dizziness or SOB. Pt remains limited by decreased activity tolerance. Continue per POC while in the hospital. Anticipate safe to d/c home if family can provide initial 24 hr supv/assist.  Prognosis: Good  OT Education: OT Role;Transfer Training;Plan of Care  REQUIRES OT FOLLOW UP: Yes  Activity Tolerance  Activity Tolerance: Patient Tolerated treatment well  Activity Tolerance: O2 sats = 96% on 3L O2 after activity  Safety Devices  Safety Devices in place: Yes  Type of devices: Call light within reach; Chair alarm in place;Nurse notified; Left in chair;Gait belt         Patient Diagnosis(es): The primary encounter diagnosis was Respiratory distress. Diagnoses of Acute respiratory failure with hypoxia and hypercapnia ([facility] Anticoagulated, and Acute congestive heart failure, unspecified heart failure type [facility]) were also pertinent to this visit.       has a past medical history of Acute ST elevation myocardial infarction (STEMI) ([address] Chronic lymphocytic leukemia in remission ([facility] CLL (chronic lymphocytic leukemia) ([facility] COPD (chronic obstructive pulmonary disease) ([facility] Essential hypertension, Factor V Leiden mutation ([facility] Goiter, Hypercholesterolemia, Impaired fasting glucose, Osteoarthritis, Osteoporosis, Post herpetic neuralgia, and Vitamin D deficiency. has a past surgical history that includes Splenectomy; knee surgery (2008); Cataract removal with implant (5/14/12); Tunneled venous port placement; Upper gastrointestinal endoscopy (N/A, 2/5/2019); Mohs surgery (03/12/2019); and joint replacement. Restrictions  Restrictions/Precautions  Restrictions/Precautions: Fall Risk  Position Activity Restriction  Other position/activity restrictions: 9-: 3 L O2  Subjective   General  Chart Reviewed: Yes  Patient assessed for rehabilitation services?: Yes  Additional Pertinent Hx: 79 yo female admitted 9/11 for acute respiratory failure with hypoxia with Tachycardia. PMH: MI July 2021, CLL, COPD, Factor V Leinden Mutation, Post Herpatic Neuralgia, OA, B Partial Knee Replacement  Family / Caregiver Present: No  Referring Practitioner: Solitario Bravo MD  Diagnosis: Hypoxia  Subjective  Subjective: Pt met bedside for OT tx. Pt in bed upon entering, agreeable to therapy. Expressing desire to discharge to home ASAP. General Comment  Comments: RN ok to see      Orientation  Orientation  Overall Orientation Status: Within Functional Limits  Objective    ADL  Grooming: Stand by assistance (oral care and brushing hair sinkside)        Balance  Sitting Balance: Stand by assistance  Standing Balance: Stand by assistance  Standing Balance  Time: 4 minutes  Activity: grooming at sink  Comment: SBA  Functional Mobility  Functional - Mobility Device: Rolling Walker  Activity: To/from bathroom  Assist Level: Stand by assistance  Functional Mobility Comments: Pt performed fxl mobility bed > bathroom and bathroom > chair with RW and SBA. Pt steady, no LOB. O2 sats = 96% after activity.   Bed mobility  Supine to Sit: Supervision (bed flat, use of bedrail)  Sit to Supine: Unable to assess (remained in chair at end of session)  Transfers  Sit to stand: Stand by assistance  Stand to sit: Stand by assistance     Cognition  Overall Cognitive Status: Elzbieta 89  Times per week: 3-5  Times per day: Daily  Current Treatment Recommendations: Strengthening, Endurance Training, Patient/Caregiver Education & Training, ROM, Self-Care / ADL, Balance Training, Pain Management, Functional Mobility Training, Safety Education & Training, Positioning    AM-PAC Score        AM-PAC Inpatient Daily Activity Raw Score: 20 (09/16/21 1137)  AM-PAC Inpatient ADL T-Scale Score : 42.03 (09/16/21 1137)  ADL Inpatient CMS 0-100% Score: 38.32 (09/16/21 1137)  ADL Inpatient CMS G-Code Modifier : Sachielygrace Gleason (09/16/21 1137)    Goals  Short term goals  Time Frame for Short term goals: prior to d/c: Goals ongoing  Short term goal 1: LB dressing Mod I  Short term goal 2: UB bathing/dressing mod I  Short term goal 3: tolerate 5 min fxl standing task Mod I  Short term goal 4: BUE exercises in all planes to improve strength and endurance for ADL  Short term goal 5: toileting Mod I  Patient Goals   Patient goals : get home       Therapy Time   Individual Concurrent Group Co-treatment   Time In 1115         Time Out 1144         Minutes 29         Timed Code Treatment Minutes: [facility], OTR/L 1086

## 2021-09-16 NOTE — PROGRESS NOTES
Patient BP noted to be low this morning (80/48, manual). Patient reporting new onset nausea. PRN Zofran administered per orders. Scheduled morning medications held - amiodarone, toporol, and torsemide held r/t BP. Patient instructed to stay in bed at this time until BP improves. Message sent to NP [facility] regarding BP. Waiting for response at this time. Patient denies physical/emotional needs at this time. Call light, telephone, and bed side table are within reach. Fall precautions in place. Will continue to monitor and assess.

## 2021-09-16 NOTE — PROGRESS NOTES
[facility] 79   Daily Progress Note      Admit Date:  9/11/2021    CC: \" I am still not feeling quite well\"  This is an 47-year-old female who has a  history of nonischemic cardiomyopathy probably Takotsubo or  tachycardia-induced cardiomyopathy, COPD, previous CLL, hypertension,  factor V Leiden deficiency, on chronic Coumadin therapy, hyperlipidemia,  presented to the hospital with a sudden increase in the shortness of  breath. She said she was on a significant amount of stress as the   was admitted to the hospital last week and she has been doing  more physical work than she used to. She also admits that she has  stopped taking her water pill for few days. She started experiencing  weak, tired, and suddenly short of breath. She checked the blood  pressure and it was significantly elevated. She presented to the  emergency room and she was given IV Lasix with improvement in her  symptoms. She also at one point had significant shortness of breath and  required to be on BiPAP. Her blood pressure though dropped  significantly and she was placed on pressor and was admitted to ICU. She denies any significant fever, cough, or sputum production. She  feels significantly better this morning. Subjective:  Patient is now transferred out of ICU. Still complaining of vague arm pain cough and was unable to sleep very well last night. She still needs 3 L of oxygen to keep her oxygen saturation above 90%.   Her blood pressure is much better this morning    Objective:   BP (!) 80/48 Comment: MANUAL R ARM   Pulse 69   Temp 97.8 °F (36.6 °C) (Oral)   Resp 16   Ht 4' 11\" (1.499 m)   Wt 147 lb 11.3 oz (67 kg)   SpO2 95%   BMI 29.83 kg/m²       Intake/Output Summary (Last 24 hours) at 9/16/2021 1040  Last data filed at 9/16/2021 0650  Gross per 24 hour   Intake 240 ml   Output 2050 ml   Net -1810 ml     Wt Readings from Last 3 Encounters:   09/16/21 147 lb 11.3 oz (67 kg)   09/01/21 161 lb (73 kg)   08/24/21 161 lb 3.2 oz (73.1 kg)     Telemetry:NSR    Physical Exam:  General:  NAD, Awake, alert and oriented X4  Skin:  Warm and dry  Neck:  Supple, no JVP appreciated, no bruit  Chest: Bibasilar crackles  Cardiovascular:  Regular rate. S1S2  Abdomen:  Soft, nontender, +bowel sounds  Extremities:  No LE edema    Cardiac Diagnosis:  hypertension, hyperlipidemia and CHF    Medications:    warfarin  2.5 mg Oral Once    torsemide  40 mg Oral Daily    metoprolol succinate  25 mg Oral Daily    amiodarone  200 mg Oral Daily    sodium chloride flush  5-40 mL IntraVENous 2 times per day    warfarin (COUMADIN) daily dosing (placeholder)   Other RX Placeholder    cefTRIAXone (ROCEPHIN) IV  1,000 mg IntraVENous Q24H      sodium chloride Stopped (09/14/21 1648)     melatonin, sodium chloride flush, sodium chloride, acetaminophen **OR** acetaminophen, ondansetron, potassium chloride **OR** potassium alternative oral replacement **OR** potassium chloride    Lab Data:  CBC:   Recent Labs     09/14/21  0447 09/15/21  0440 09/16/21  0650   WBC 14.4* 14.3* 12.9*   HGB 12.6 13.0 13.7    287 292     BMP:    Recent Labs     09/14/21  0447 09/15/21  0440 09/16/21  0650    141 141   K 3.8 3.8 3.6   CO2 29 31 28   BUN 12 15 16   CREATININE 0.8 0.8 0.9     LIVR:   Recent Labs     09/14/21  0447 09/15/21  0440 09/16/21  0650   AST 16 18 20   ALT 16 15 16     INR:    Recent Labs     09/14/21  0447 09/15/21  0440 09/16/21  0650   INR 3.32* 2.90* 2.71*     APTT:   No results for input(s): APTT in the last 72 hours. BNP:  No results for input(s): BNP in the last 72 hours. Imaging: Echocardiogram 07/26/2021 ejection fraction is visually estimated to be 45-50%. There is mid to apical akinesis. No evidence of left ventricular mass or thrombus noted. There is a moderate left pleural effusion.    Estimated pulmonary artery systolic pressure is moderately elevated at 63   mmHg assuming a right atrial pressure of 8 mmHg.         Assessment:Plan  Acute hypoxic respiratory failure  -Due to acute on chronic systolic heart failure and possible pneumonia  - still needs 3 L of oxygen  -Blood pressure has dropped this morning  -We will have to consider right heart cath to clearly assess her fluid status since she remains hypoxic and now has evidence of low blood pressure  -Patient currently on p.o. Demadex .  : We will continue to wean oxygen off as tolerated and keep her oxygen saturation above 92%    Acute on chronic systolic/diastolic heart failure. NYHA class III/IV on admission  -still requires 3 L of oxygen  -Patient currently on p.o.  Demadex   - repeat BNP is better but still significantly elevated  -She is now on Toprol-XL but will hold since her blood pressure had dropped this morning  We will have to consider right heart cath to clearly assess her fluid status since she remains hypoxic and now has evidence of low blood pressure  - will hold off on restarting Entresto since her blood pressure still remains on the low side Aldactone 12.5 mg daily  -No need for ICD since LVEF is improved about 35% by last echocardiogram  -We will discuss about adding SGLT2 inhibitors as an outpatient      Hypotension  -BP is low this morning            Complexity of medical decision making-high  Electronically signed by Silvia Sahni MD on 9/16/2021 at 10:40 AM

## 2021-09-16 NOTE — PROGRESS NOTES
Call back received from MD STAR Monroy, cardiology, regarding hypotension this morning. MD states to hold off on giving a bolus for right now r/t pt hx of CHF. Directions provided to hold AM meds and see if patient's BP rebounds independently. BP to be rechecked around 1030/1100 per MD request. This RN will alert MD if BP remains low at that time. Patient updated w/ plan of care and is agreeable. Will continue to monitor and assess.

## 2021-09-17 LAB
A/G RATIO: 1.5 (ref 1.1–2.2)
ALBUMIN SERPL-MCNC: 3.7 G/DL (ref 3.4–5)
ALP BLD-CCNC: 79 U/L (ref 40–129)
ALT SERPL-CCNC: 15 U/L (ref 10–40)
ANION GAP SERPL CALCULATED.3IONS-SCNC: 12 MMOL/L (ref 3–16)
AST SERPL-CCNC: 19 U/L (ref 15–37)
BASOPHILS ABSOLUTE: 0 K/UL (ref 0–0.2)
BASOPHILS RELATIVE PERCENT: 0 %
BILIRUB SERPL-MCNC: 0.4 MG/DL (ref 0–1)
BUN BLDV-MCNC: 15 MG/DL (ref 7–20)
CALCIUM SERPL-MCNC: 9.2 MG/DL (ref 8.3–10.6)
CHLORIDE BLD-SCNC: 95 MMOL/L (ref 99–110)
CO2: 33 MMOL/L (ref 21–32)
CREAT SERPL-MCNC: 0.8 MG/DL (ref 0.6–1.2)
EOSINOPHILS ABSOLUTE: 0.4 K/UL (ref 0–0.6)
EOSINOPHILS RELATIVE PERCENT: 3 %
GFR AFRICAN AMERICAN: >60
GFR NON-AFRICAN AMERICAN: >60
GLOBULIN: 2.5 G/DL
GLUCOSE BLD-MCNC: 87 MG/DL (ref 70–99)
HCT VFR BLD CALC: 39.5 % (ref 36–48)
HEMATOLOGY PATH CONSULT: NO
HEMOGLOBIN: 12.9 G/DL (ref 12–16)
INR BLD: 3.01 (ref 0.88–1.12)
LYMPHOCYTES ABSOLUTE: 7.6 K/UL (ref 1–5.1)
LYMPHOCYTES RELATIVE PERCENT: 64 %
MCH RBC QN AUTO: 30.9 PG (ref 26–34)
MCHC RBC AUTO-ENTMCNC: 32.7 G/DL (ref 31–36)
MCV RBC AUTO: 94.3 FL (ref 80–100)
MONOCYTES ABSOLUTE: 1.5 K/UL (ref 0–1.3)
MONOCYTES RELATIVE PERCENT: 13 %
NEUTROPHILS ABSOLUTE: 2.4 K/UL (ref 1.7–7.7)
NEUTROPHILS RELATIVE PERCENT: 20 %
PDW BLD-RTO: 14.1 % (ref 12.4–15.4)
PLATELET # BLD: 292 K/UL (ref 135–450)
PMV BLD AUTO: 8.9 FL (ref 5–10.5)
POTASSIUM SERPL-SCNC: 3.7 MMOL/L (ref 3.5–5.1)
PRO-BNP: 2742 PG/ML (ref 0–449)
PROCALCITONIN: 0.1 NG/ML (ref 0–0.15)
PROTHROMBIN TIME: 35.6 SEC (ref 9.9–12.7)
RBC # BLD: 4.19 M/UL (ref 4–5.2)
RBC # BLD: NORMAL 10*6/UL
SMUDGE CELLS: PRESENT
SODIUM BLD-SCNC: 140 MMOL/L (ref 136–145)
TOTAL PROTEIN: 6.2 G/DL (ref 6.4–8.2)
WBC # BLD: 11.8 K/UL (ref 4–11)

## 2021-09-17 PROCEDURE — 6370000000 HC RX 637 (ALT 250 FOR IP): Performed by: INTERNAL MEDICINE

## 2021-09-17 PROCEDURE — 2700000000 HC OXYGEN THERAPY PER DAY

## 2021-09-17 PROCEDURE — 97116 GAIT TRAINING THERAPY: CPT

## 2021-09-17 PROCEDURE — 83880 ASSAY OF NATRIURETIC PEPTIDE: CPT

## 2021-09-17 PROCEDURE — 84145 PROCALCITONIN (PCT): CPT

## 2021-09-17 PROCEDURE — 85025 COMPLETE CBC W/AUTO DIFF WBC: CPT

## 2021-09-17 PROCEDURE — 1200000000 HC SEMI PRIVATE

## 2021-09-17 PROCEDURE — 97530 THERAPEUTIC ACTIVITIES: CPT

## 2021-09-17 PROCEDURE — 80053 COMPREHEN METABOLIC PANEL: CPT

## 2021-09-17 PROCEDURE — 94761 N-INVAS EAR/PLS OXIMETRY MLT: CPT

## 2021-09-17 PROCEDURE — 36415 COLL VENOUS BLD VENIPUNCTURE: CPT

## 2021-09-17 PROCEDURE — 94680 O2 UPTK RST&XERS DIR SIMPLE: CPT

## 2021-09-17 PROCEDURE — 85610 PROTHROMBIN TIME: CPT

## 2021-09-17 PROCEDURE — 6370000000 HC RX 637 (ALT 250 FOR IP): Performed by: STUDENT IN AN ORGANIZED HEALTH CARE EDUCATION/TRAINING PROGRAM

## 2021-09-17 PROCEDURE — 2580000003 HC RX 258: Performed by: STUDENT IN AN ORGANIZED HEALTH CARE EDUCATION/TRAINING PROGRAM

## 2021-09-17 PROCEDURE — 6360000002 HC RX W HCPCS: Performed by: STUDENT IN AN ORGANIZED HEALTH CARE EDUCATION/TRAINING PROGRAM

## 2021-09-17 RX ORDER — WARFARIN SODIUM 1 MG/1
1 TABLET ORAL
Status: COMPLETED | OUTPATIENT
Start: 2021-09-17 | End: 2021-09-17

## 2021-09-17 RX ADMIN — Medication 10 ML: at 21:09

## 2021-09-17 RX ADMIN — Medication 10 ML: at 08:07

## 2021-09-17 RX ADMIN — WARFARIN SODIUM 1 MG: 1 TABLET ORAL at 17:33

## 2021-09-17 RX ADMIN — Medication 3 MG: at 23:00

## 2021-09-17 RX ADMIN — METOPROLOL SUCCINATE 25 MG: 25 TABLET, EXTENDED RELEASE ORAL at 08:07

## 2021-09-17 RX ADMIN — ONDANSETRON 4 MG: 2 INJECTION INTRAMUSCULAR; INTRAVENOUS at 21:09

## 2021-09-17 RX ADMIN — TORSEMIDE 40 MG: 20 TABLET ORAL at 08:07

## 2021-09-17 RX ADMIN — AMIODARONE HYDROCHLORIDE 200 MG: 200 TABLET ORAL at 08:07

## 2021-09-17 ASSESSMENT — PAIN SCALES - GENERAL: PAINLEVEL_OUTOF10: 0

## 2021-09-17 NOTE — PLAN OF CARE
Problem: Falls - Risk of:  Goal: Will remain free from falls  Description: Will remain free from falls  9/17/2021 0955 by Oc Arnold RN  Outcome: Ongoing     Problem: Falls - Risk of:  Goal: Absence of physical injury  Description: Absence of physical injury  9/17/2021 0955 by Oc Arnold RN  Outcome: Ongoing     Problem: OXYGENATION/RESPIRATORY FUNCTION  Goal: Patient will maintain patent airway  9/17/2021 0955 by Oc Arnold RN  Outcome: Ongoing     Problem: OXYGENATION/RESPIRATORY FUNCTION  Goal: Patient will achieve/maintain normal respiratory rate/effort  Description: Respiratory rate and effort will be within normal limits for the patient  9/17/2021 0955 by Oc Arnold RN  Outcome: Ongoing     Problem: HEMODYNAMIC STATUS  Goal: Patient has stable vital signs and fluid balance  9/17/2021 0955 by Oc Arnold RN  Outcome: Ongoing     Problem: FLUID AND ELECTROLYTE IMBALANCE  Goal: Fluid and electrolyte balance are achieved/maintained  9/17/2021 0955 by Oc Arnold RN  Outcome: Ongoing     Problem: ACTIVITY INTOLERANCE/IMPAIRED MOBILITY  Goal: Mobility/activity is maintained at optimum level for patient  9/17/2021 0955 by Oc Arnold RN  Outcome: Ongoing     Problem: Pain:  Goal: Pain level will decrease  Description: Pain level will decrease  9/17/2021 0955 by Oc Arnold RN  Outcome: Ongoing     Problem: Pain:  Goal: Control of acute pain  Description: Control of acute pain  9/17/2021 0955 by Oc Arnold RN  Outcome: Ongoing     Problem: Pain:  Goal: Control of chronic pain  Description: Control of chronic pain  9/17/2021 0955 by Oc Arnold RN  Outcome: Ongoing     Problem: Skin Integrity:  Goal: Will show no infection signs and symptoms  Description: Will show no infection signs and symptoms  9/17/2021 0955 by Oc Arnold RN  Outcome: Ongoing     Problem: Skin Integrity:  Goal: Absence of new skin breakdown  Description: Absence of new skin breakdown  9/17/2021 6913 by Comfort Hope RN  Outcome: Ongoing

## 2021-09-17 NOTE — PLAN OF CARE
Problem: Falls - Risk of:  Goal: Will remain free from falls  Description: Will remain free from falls  9/17/2021 1942 by Chiki Moncada RN  Outcome: Ongoing  Note: Fall risk assessment completed. Fall precautions in place. Call light within reach. Pt educated on calling for assistance before getting up. Walkway free of clutter. Will continue to monitor. 9/17/2021 0955 by Deacon Acosta RN  Outcome: Ongoing  Goal: Absence of physical injury  Description: Absence of physical injury  9/17/2021 1942 by Chiki Moncada RN  Outcome: Ongoing  9/17/2021 0955 by Deacon Acosta RN  Outcome: Ongoing     Problem: OXYGENATION/RESPIRATORY FUNCTION  Goal: Patient will maintain patent airway  9/17/2021 1942 by Chiki Moncada RN  Outcome: Ongoing  Note: Will monitor airway. 9/17/2021 0955 by Deacon Acosta RN  Outcome: Ongoing  Goal: Patient will achieve/maintain normal respiratory rate/effort  Description: Respiratory rate and effort will be within normal limits for the patient  9/17/2021 1942 by Chiki Moncada RN  Outcome: Ongoing  9/17/2021 0955 by Deacon Acosta RN  Outcome: Ongoing     Problem: HEMODYNAMIC STATUS  Goal: Patient has stable vital signs and fluid balance  9/17/2021 1942 by Chiki Moncada RN  Outcome: Ongoing  Note: Will monitor VS.  9/17/2021 0955 by Deacon Acosta RN  Outcome: Ongoing     Problem: FLUID AND ELECTROLYTE IMBALANCE  Goal: Fluid and electrolyte balance are achieved/maintained  9/17/2021 1942 by Chiki Moncada RN  Outcome: Ongoing  Note: Will monitor labs. 9/17/2021 0955 by Deacon Acosta RN  Outcome: Ongoing     Problem: ACTIVITY INTOLERANCE/IMPAIRED MOBILITY  Goal: Mobility/activity is maintained at optimum level for patient  9/17/2021 1942 by Chiki Moncada RN  Outcome: Ongoing  Note: Will encourage mobility.   9/17/2021 0955 by Deacon Acosta RN  Outcome: Ongoing     Problem: Pain:  Goal: Pain level will decrease  Description: Pain level will decrease  9/17/2021 1942 by Avelina Kaplan Marilou Donaldson RN  Outcome: Ongoing  Note: Pt assessed for pain. Pt in pain and assessed with 0-10 pain rating scale. Pt given prescribed analgesic for pain. (See eMar) Pt satisfied with pain relief thus far. Will reassess and continue to monitor. 9/17/2021 0955 by Odell Turner RN  Outcome: Ongoing  Goal: Control of acute pain  Description: Control of acute pain  9/17/2021 1942 by Tequila Monroy RN  Outcome: Ongoing  9/17/2021 0955 by Odell Turner RN  Outcome: Ongoing  Goal: Control of chronic pain  Description: Control of chronic pain  9/17/2021 1942 by Tequila Monroy RN  Outcome: Ongoing  9/17/2021 0955 by Odell Turner RN  Outcome: Ongoing     Problem: Skin Integrity:  Goal: Will show no infection signs and symptoms  Description: Will show no infection signs and symptoms  9/17/2021 1942 by Tequila Monroy RN  Outcome: Ongoing  Note: Will monitor skin and mucous members. Will turn patient every 2 hours, monitor for friction and sheering, and change dressings as needed. Will preform skin assessment every shift.       9/17/2021 0955 by Odell Turner RN  Outcome: Ongoing  Goal: Absence of new skin breakdown  Description: Absence of new skin breakdown  9/17/2021 1942 by Tequila Monroy RN  Outcome: Ongoing  9/17/2021 0955 by Odell Turner RN  Outcome: Ongoing

## 2021-09-17 NOTE — PROGRESS NOTES
Patient resting in bed upon assessment, vital signs stable, no complaints at this time. Right arm brachial insertion site from cath done today clean dry and intact, bed alarm on, all needs met, will continue to monitor.

## 2021-09-17 NOTE — PROGRESS NOTES
Hospital Medicine Progress Note      Admit Date: 9/11/2021       CC: F/U for sob    HPI:  The patient is a 78 y. o. female with past medical history of recent acute STEMI but was noted to have negative coronary lesions 7 weeks ago although had severe systolic dysfunction, previous CLL, COPD, hypertension, factor V Leiden mutation on Coumadin currently, hyperlipidemia, and osteoporosis who presents to [facility] with concern for acute onset shortness of breath at home without any provocation as well as some intermittent chest tightness as well as some chills which she could not explain but she noted that the initial symptoms seem similar to her previous STEMI from 7 weeks ago. Amy Coats denies any other recent symptoms of fever, dizziness, syncope, leg swelling, nausea/vomiting/diarrhea/abdominal pain, blood in urine/stool/sputum.  She did check her blood pressure at home and it was noted to be very hypertensive although on presentation to the ED it was only 442 systolic.  She is not been in contact with anyone sick. Edgar Fajardo presentation to the ED, patient was initially seen by ED provider and was notably improving on nasal cannula oxygen.  It was only after she was found to be ambulating to the bathroom she then became increasingly short of breath and looked very ill-appearing.  She required nonrebreather oxygen and then was treated with IV Lasix and improved.  ED physician initially suspected flash pulmonary edema although x-ray did not demonstrate much finding of flash pulmonary edema.  She felt better after the IV Lasix that was given although then her blood pressure started to become significantly decreased.        9/16: hypotensive this am. Given her low EF, will defer bolus to Dr. Naveen Shetty for cautious hydration in the setting of her current diuresing for CHF and BP med adjustments.      BP meds and torsemide being held this morning   Will do palliative Care consult as this may be beneficial given her chronic condition and advanced age     Will get right heart cath to better eval since BP down with Dr. Eyad Christianson a little better each day though. BP back to normal now. Interval History/Subjective:  Patient wants to go home but still on 2L nc. She has completed 5 days of rocephin as previously recommended by pulmonology. Re-engaged pulm for poss further workup. WBC improving. Per cardiology, has been diuresed as much as we can since patient is actually dehydrated per right heart cath and was hypotensive after diuretics. BP now improved. Still holding some of her meds until BP remains stable. She does qualify for home o2, and DME orders placed, however will await pulm final recs. Review of Systems:     The patient denied headaches, visual changes, LOC, SOB, CP, ABD pain, N/V/D, skin changes, new or worsening weakness or neuromuscular deficits. Comprehensive ROS negative except as mentioned above.     Past Medical History:        Diagnosis Date    Acute ST elevation myocardial infarction (STEMI) ([facility] [facility] 07/19/2021    Chronic lymphocytic leukemia in remission ([facility] [facility]     CLL (chronic lymphocytic leukemia) ([facility] [facility] 02/12/2015    COPD (chronic obstructive pulmonary disease) ([facility] [address] [facility]     Essential hypertension 01/01/2015    controlled with salt restriction (initially)    Factor V Leiden mutation ([facility] [address] [facility]     Goiter 11/01/2011    1.5 cm midline    Hypercholesterolemia     Impaired fasting glucose 11/01/2011    Osteoarthritis     Osteoporosis     Post herpetic neuralgia     Vitamin D deficiency 11/01/2011       Past Surgical History:        Procedure Laterality Date    CATARACT REMOVAL WITH IMPLANT  5/14/12    left eye    JOINT REPLACEMENT      partial knee R and L    KNEE SURGERY  2008    partial replacements, both knees    MOHS SURGERY  03/12/2019    R cheek and R superior temple    SPLENECTOMY      TUNNELED VENOUS PORT PLACEMENT      UPPER GASTROINTESTINAL ENDOSCOPY N/A 2/5/2019    EGD ESOPHAGOGASTRODUODENOSCOPY, WITH ENDOSCOPIC ULTRASOUND OF ESOPHAGUS performed by Quiana Carrasco MD at [facility]:  Patient has no known allergies. Past medical and surgical history reviewed. Any changes have been noted. PHYSICAL EXAM:  /69   Pulse 69   Temp 98.2 °F (36.8 °C)   Resp 16   Ht 4' 11\" (1.499 m)   Wt 146 lb 13.2 oz (66.6 kg)   SpO2 96%   BMI 29.66 kg/m²       Intake/Output Summary (Last 24 hours) at 9/17/2021 0859  Last data filed at 9/17/2021 0555  Gross per 24 hour   Intake 596.99 ml   Output 300 ml   Net 296.99 ml        General appearance:   No apparent distress, appears stated age. Cooperative. HEENT:  Normocephalic, atraumatic. PERRLA. EOMi. Conjunctivae/corneas clear, no icterus, non-injected. Neck: Supple, with full range of motion. No jugular venous distention. Trachea midline. Respiratory:  Normal respiratory effort. Clear to auscultation, bilaterally without Rales/Wheezes/Rhonchi. Cardiovascular:  Regular rate and rhythm without murmurs, rubs or gallops. Abdomen: Soft, non-tender, non-distended, without rebound or guarding. Normal bowel sounds. Musculoskeletal:  No clubbing, cyanosis or edema bilaterally. Full range of motion without deformity. Skin: Skin color, texture, turgor normal.  No rashes or lesions. Neurologic:  Neurovascularly intact without any focal sensory/motor deficits. Cranial nerves: II-XII intact, grossly intact. No facial asymmetry, tongue midline.    Psychiatric:  Alert and oriented, thought content appropriate  Capillary Refill: Brisk,< 3 seconds   Peripheral Pulses: +2 palpable, equal bilaterally       LABS:    Lab Results   Component Value Date    WBC 11.8 (H) 09/17/2021    HGB 12.9 09/17/2021    HCT 39.5 09/17/2021    MCV 94.3 09/17/2021     09/17/2021    LABLYMP 3.4 06/23/2017    MID 0.6 06/23/2017    GRAN 4.1 06/23/2017    LYMPHOPCT 48.0 09/16/2021    MIDPERCENT 7.6 06/23/2017    GRANULOCYTES 50.4 06/23/2017 RBC 4.19 09/17/2021    MCH 30.9 09/17/2021    MCHC 32.7 09/17/2021    RDW 14.1 09/17/2021       Lab Results   Component Value Date    CREATININE 0.8 09/17/2021    BUN 15 09/17/2021     09/17/2021    K 3.7 09/17/2021    CL 95 (L) 09/17/2021    CO2 33 (H) 09/17/2021       Lab Results   Component Value Date    MG 2.10 09/14/2021       Lab Results   Component Value Date    ALT 15 09/17/2021    AST 19 09/17/2021    ALKPHOS 79 09/17/2021    BILITOT 0.4 09/17/2021        No flowsheet data found. Lab Results   Component Value Date    LABA1C 6.2 04/20/2021       Imaging:  XR CHEST PORTABLE   Final Result   Minimal dependent opacities at the lung bases improved from prior study. CT CHEST PULMONARY EMBOLISM W CONTRAST   Final Result   1. No findings of pulmonary embolism. 2. Cardiovascular findings suggestive of pulmonary hypertension and right   heart dysfunction. 3. Central predominant interstitial and alveolar pulmonary edema suggestive   of congestive heart failure given mild cardiomegaly and bilateral pleural   effusions. Superimposed pneumonia is not excluded. 4. Mild bronchial wall thickening potentially due to pulmonary vascular   congestion, reactive airways disease, or bronchitis. 5. 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 Recommend   follow-up imaging in 1 year as below. RECOMMENDATIONS:   Incidental Vascular Findings on CT and MRI      - Splenic artery aneurysm, <2 cm:  Follow-up imaging every 1 year      Reference:  Harley et al.  Managing incidental findings on abdominal and   pelvic CT and MRI, part 2: white paper of the ACR Incidental Findings   Committee II on vascular findings. [address] [place] [address];[id number]. XR CHEST PORTABLE   Final Result   Possible right lower lobe opacity. XR CHEST PORTABLE   Final Result   No acute process.              Scheduled and prn Medications:    Scheduled Meds:   warfarin  1 mg Oral Once    torsemide 40 mg Oral Daily    metoprolol succinate  25 mg Oral Daily    amiodarone  200 mg Oral Daily    sodium chloride flush  5-40 mL IntraVENous 2 times per day    warfarin (COUMADIN) daily dosing (placeholder)   Other RX Placeholder     Continuous Infusions:   sodium chloride Stopped (09/14/21 1648)     PRN Meds:.melatonin, sodium chloride flush, sodium chloride, acetaminophen **OR** acetaminophen, ondansetron, potassium chloride **OR** potassium alternative oral replacement **OR** potassium chloride    Assessment & Plan:        Acute respiratory failure with hypoxia likely due to community acquired pneumonia  - treatment x5 days completed with rocephin  - wean O2 > 92%  - CXR: right lower lobe opacity  - strep/legionella pending  - resp culture pending   - covid neg  - consult to pulmonology   - WBC improving      Sepsis, as evidenced by hypoxia, leukocytosis, tachypneia   - off levophed, cont IV antibx (azith + rocephin)  - strep/legionella antigens  - procalcitonin 0.29/ WBC improving     Acute CHF   - restart home meds : bb, entresto, amiodarone, midodrine  - torsemide increased to 40mg daily by cardiology  - cardiology consult  - CT chest: pulmonary HTN and right heart dysfunction, bilat pleural effusions  - back on po demadex now that hyppotension improved  - got  IV Lasix 20mg by cardiology  - repeat BNP and CXR -- CXR improving  - BNP improving and down to 8,757 -->17,372 down from 21,704   - hold off on re-starting entresto and holding aldactone since BP on low side  - no need AICD since LVEF improved about 35%   - right heart cath (9/16) shows dehydration as opposed to fluid overload. Will re-engage pulmonology to further eval her continued hypoxia.     Acute pulmonary edema with moderate left pleural effusions  - Consult Pulmonology - appreciate further recs given for continued hypoxia.  Left heart cath shows dehydration vs. Fluid overload now and still with hypoxia.   - LVEF 45%; mild apical akinesis  -

## 2021-09-17 NOTE — PROGRESS NOTES
Comprehensive Nutrition Assessment    Type and Reason for Visit:  Initial    Nutrition Recommendations/Plan:   Carb Control diet, will change diet to Low Na/1500 ml   Ensure Compact BID  Will monitor nutritional adequacy, nutrition-related labs, weights, BMs, and clinical progress     Nutrition Assessment:  LOS. Pt with hx of severe systolic dysfunction, previous CLL, COPD, hypertension, hyperlipidemia, and osteoporosis. Admitted with acute respiratory failure with hypoxia. Pt with R heart cath yesterday. Per EMR, intake varied this admission. Wt trending down, pt being diuresed. Will trial ONS. Malnutrition Assessment:  Malnutrition Status: At risk for malnutrition (Comment)    Context:  Acute Illness     Findings of the 6 clinical characteristics of malnutrition:  Energy Intake:  1 - 75% or less of estimated energy requirements for 7 or more days  Weight Loss:  Unable to assess (pt being diuresed)     Body Fat Loss:  Unable to assess     Muscle Mass Loss:  Unable to assess    Fluid Accumulation:  No significant fluid accumulation     Strength:  Not Performed    Estimated Daily Nutrient Needs:  Energy (kcal):  5269-0143 kcal (20-25 kcal/kg ABW); Weight Used for Energy Requirements:  Current     Protein (g):  67-80 gm (1-1.2 gm/kg ABW); Weight Used for Protein Requirements:  Current        Fluid (ml/day):  less than 64 oz d/t hx of HF; Method Used for Fluid Requirements:  Other (Comment)      Nutrition Related Findings:  Last BM 9/13      Wounds:  None       Current Nutrition Therapies:    ADULT DIET; Regular; 4 carb choices (60 gm/meal)    Anthropometric Measures:  · Height: 4' 11\" (149.9 cm)  · Current Body Weight: 146 lb (66.2 kg)   · Admission Body Weight: 166 lb (75.3 kg)    · Usual Body Weight: 160 lb (72.6 kg)     · Ideal Body Weight: 95 lbs; % Ideal Body Weight 153.7 %   · BMI: 29.5  · Adjusted Body Weight:  ; No Adjustment    · BMI Categories: Overweight (BMI 25.0-29. 9)       Nutrition Diagnosis:   · Inadequate oral intake related to  (poor appetite) as evidenced by intake 26-50%, intake 51-75%      Nutrition Interventions:   Food and/or Nutrient Delivery:  Continue Current Diet, Start Oral Nutrition Supplement  Nutrition Education/Counseling:  No recommendation at this time   Coordination of Nutrition Care:  Continue to monitor while inpatient    Goals:  Consume greater than 50% of meals and supplements       Nutrition Monitoring and Evaluation:   Behavioral-Environmental Outcomes:  None Identified   Food/Nutrient Intake Outcomes:  Food and Nutrient Intake, Supplement Intake  Physical Signs/Symptoms Outcomes:  Biochemical Data, Nutrition Focused Physical Findings, Skin, Weight     Discharge Planning:    No discharge needs at this time     Electronically signed by Vivian Tiwari RD, LD on 9/17/21 at 9:46 AM EDT    Contact: [phone]

## 2021-09-17 NOTE — PROGRESS NOTES
Clinical Pharmacy Note  Warfarin Consult    Debbie Begr is a 78 y.o. female receiving warfarin managed by pharmacy. Warfarin Indication:  Factor V Leiden mutation  Target INR range: 2-3  Dose prior to admission: 5 mg MWF and 2.5 mg all other days    Current warfarin drug-drug interactions: azithromycin (stop date was 9/16)    Recent Labs     09/15/21  0440 09/16/21  0650 09/17/21  0550   HGB 13.0 13.7 12.9   HCT 39.1 41.4 39.5   INR 2.90* 2.71* 3.01*       Assessment/Plan:    Warfarin 1 mg tonight. Daily PT/INR until stable within therapeutic range. Thank you for the consult. Will continue to follow.     Jensen Fowler, [facility], PharmD, 9/17/2021 7:24 AM

## 2021-09-17 NOTE — PROGRESS NOTES
Physical Therapy    Facility/Department: [address] ORTHOPEDICS  Initial Assessment    NAME: Ramesh Malone  : 1940  MRN: [id number]    Date of Service: 2021    Discharge Recommendations:  Continue to assess pending progress, 24 hour supervision or assist, S Level 1        Assessment   Assessment: Per H and P:  \"This is an 49-year-old female who has a history of nonischemic cardiomyopathy probably Takotsubo ortachycardia-induced cardiomyopathy, COPD, previous CLL, hypertension,factor V Leiden deficiency, on chronic Coumadin therapy, hyperlipidemia,presented to the hospital with a sudden increase in the shortness of breath. She said she was on a significant amount of stress as the  was admitted to the hospital last week and she has been doing more physical work than she used to. She was admitted to ICU on 2021. \"  Transferred to  2021. Underwent R heart cath 2021. Acitve hospital problems: Acute hypoxic respiratory failure, acute on chroninc systolic/diastolic CHF. PTA indep with functional mobility in the home and community distances. She receives help from family for laundry and cleaning, uses delivery services for groceries and transportation. Status: 2021: Pt on 1L of O2 during session. Bed Mobility: SBA-Supervision. Transfers: SBA. Amb RW up to 125', SBA. She reports fatigue at this distance. 6\" platform step as at home entry with RW and CGA. Anticipate pt to d/c to home with initial 24 hour assistance/supervision, and possibly home PT level 1 to assure safe transition to home. Will continue to monitor. PT Education: Goals;PT Role;Plan of Care; Functional Mobility Training  Patient Education: Importance of up in chair. Activity Tolerance  Activity Tolerance: Patient Tolerated treatment well  Activity Tolerance: 1 L O2 throughout. mid 90's O2 sats resting. Following 125' RW ambulation and platform step, 90% HR 80.        Patient Diagnosis(es): The primary encounter diagnosis was Respiratory distress. Diagnoses of Acute respiratory failure with hypoxia and hypercapnia ([facility] Anticoagulated, Acute congestive heart failure, unspecified heart failure type ([address] and Pneumonia of both lower lobes due to infectious organism were also pertinent to this visit. has a past medical history of Acute ST elevation myocardial infarction (STEMI) ([facility] [facility] Chronic lymphocytic leukemia in remission ([facility] [address] [facility] CLL (chronic lymphocytic leukemia) ([facility] [address] [facility] COPD (chronic obstructive pulmonary disease) ([facility] [address] [facility] Essential hypertension, Factor V Leiden mutation ([facility] [facility] Goiter, Hypercholesterolemia, Impaired fasting glucose, Osteoarthritis, Osteoporosis, Post herpetic neuralgia, and Vitamin D deficiency. has a past surgical history that includes Splenectomy; knee surgery (2008); Cataract removal with implant (5/14/12); Tunneled venous port placement; Upper gastrointestinal endoscopy (N/A, 2/5/2019); Mohs surgery (03/12/2019); and joint replacement. Restrictions  Restrictions/Precautions  Restrictions/Precautions: Fall Risk  Position Activity Restriction  Other position/activity restrictions: 9-: 1 L O2  Vision/Hearing  Vision: Impaired  Vision Exceptions: Wears glasses at all times (??)  Hearing: Exceptions to [facility]  Hearing Exceptions: Hard of hearing/hearing concerns (hearing aides at home.)     Subjective  General  Chart Reviewed: Yes  Additional Pertinent Hx: Pull H and P: \"This is an 25-year-old female who has a history of nonischemic cardiomyopathy probably Takotsubo ortachycardia-induced cardiomyopathy, COPD, previous CLL, hypertension,factor V Leiden deficiency, on chronic Coumadin therapy, hyperlipidemia, presented to the hospital with a sudden increase in the shortness ofbreath. She said she was on a significant amount of stress as [facility] was admitted to the hospital last week and she has been doing more physical work than she used to. She was admitted to ICU on 9-. \" Transferred to  9-. Underwent Right heart cath 9-. Acitve hospital problems: Acute hypoxic respiratory failure, acute on chroninc systolic/diastolic CHF  Response To Previous Treatment: Patient with no complaints from previous session. Family / Caregiver Present: No  Referring Practitioner: Migel Campos MD  Referral Date : 09/13/21  Subjective  Subjective: Patient in bed awake. Agreeable to therapy. States BS chair to be comfortable with use of multiple pillows, and agreeable to remaining up in chair at end of session. Denies pain. States primary concern is feeling fatigued. Orientation  Orientation  Overall Orientation Status: Within Normal Limits     Social/Functional History  Social/Functional History  Lives With: Spouse (spouse with declining health, and DC'd to facility 9-.)  Type of Home:  (Condo)  Home Layout: Able to Live on Main level with bedroom/bathroom, One level, Laundry in basement  Home Access: Stairs to enter without rails  Entrance Stairs - Number of Steps: 1+1  Bathroom Shower/Tub: Tub/Shower unit, Walk-in shower  Bathroom Toilet: Handicap height  Bathroom Equipment: Grab bars in shower, Shower chair, Grab bars around toilet  Bathroom Accessibility: Accessible  Home Equipment: 4 wheeled walker  ADL Assistance: Independent  Homemaking Assistance: Independent  Ambulation Assistance: Independent  Transfer Assistance: Independent  Active : Yes (but does not currently drive, uses a car services ([address]))  Occupation: Retired  Type of occupation: Director of [employer]  Additional Comments: Pt is caregiver for her  ([address] to facility from this hospital 9-. Son has been able to stay with them (works from home); supportive family (5 children in total). Patient reports family will able to stay with her initially once home.     Objective  Bed mobility  Supine to Sit: Supervision (HOB up, use of bedrail)  Sit to Supine: Unable to assess (remained in chair at end of session)  Transfers  Sit to Stand: Stand by assistance (from EOB to RW, x 2. Cues for hand placement)  Stand to sit: Stand by assistance (from RW to EOB, x 2. Cues for hand placement)  Ambulation  Ambulation?: Yes  Ambulation 1  Surface: level tile  Device: Rolling Walker (KELLY HEIGHT)  Assistance: Stand by assistance  Quality of Gait: Continous step through gait pattern, decreased step length B, no complaints of SOB with ambulation, but at end gait destination reports fatigue and need to sit and rest.   Good management of RW. Therapist manages O2 line. Gait Deviations: Slow Samantha;Decreased step height  Distance: 10', 125'  Comments: Patient continues to report feeling most secure with RW presently. Patient uses commode and voids bladder. Mod A to remove briefs and thread new. SBA as patient raises new briefs. Patient performs josé care. Stairs/Curb  Stairs?: Yes  Stairs  Curbs: 6\"  Device: Rolling walker  Assistance: Contact guard assistance  Comment: controlled and stable.   Balance  Posture: Good  Sitting - Static: Good  Sitting - Dynamic: Good  Standing - Static: Good (at RW)  Standing - Dynamic: Good (at RW)        Plan   Plan  Times per week: 3-5  Current Treatment Recommendations: Functional Mobility Training  Safety Devices  Type of devices: Call light within reach, Chair alarm in place, Gait belt, Patient at risk for falls, Left in chair, Nurse notified (KERMIT Sanchez informed)    AM-PAC Score  AM-PAC Inpatient Mobility Raw Score : 21 (09/17/21 1549)  -PAC Inpatient T-Scale Score : 50.25 (09/17/21 1549)  Mobility Inpatient CMS 0-100% Score: 28.97 (09/17/21 1549)  Mobility Inpatient CMS G-Code Modifier : General Eye (09/17/21 1549)     Goals  Short term goals  Time Frame for Short term goals: Upon acute care d/c.  9-: all goals ongoing  Short term goal 1: Transfers: SBA x1 without AD  Short term goal 2: Amb: 48' without AD CGA x1.  9-: new goal RW, 100' SBA: goal met and ongoing. Short term goal 3: 2 steps with RW SBA x1.  9-: new goal: platform step with RW and CGA: goal met and ongoing. Patient Goals   Patient goals :  \"To Return Home\"       Therapy Time   Individual Concurrent Group Co-treatment   Time In 3985         Time Out 1515         Minutes 38            Gt 23; TA 15    Alec Prescott PT  Electronically signed by Jessica Delcid, [address] [place] [address] Drive [phone])  on 9/17/2021 at 4:30 PM

## 2021-09-17 NOTE — PROGRESS NOTES
Patient A&O. Tolerating PO. Call light within reach. Will continue to monitor and reassess.   Electronically signed by Judyth Romberg, RN on 9/17/2021

## 2021-09-17 NOTE — PROGRESS NOTES
[facility]    Respiratory Therapy   Home Oxygen Evaluation        Name: Madelin [facility] Record Number: [id number]  Age: 78 y.o. Gender:  female   : 1940  Today's date: 2021  [address]      Assessment        /69   Pulse 69   Temp 98.2 °F (36.8 °C)   Resp 16   Ht 4' 11\" (1.499 m)   Wt 146 lb 13.2 oz (66.6 kg)   SpO2 96%   BMI 29.66 kg/m²     Patient Active Problem List   Diagnosis    Osteoporosis,Dexas:10/18/02 (Lumbar spine -1.52), hip -0.55 (T scores), Actonel, then fosamax;  Dexa 06 (Lumbar spine -1.1 and hip -0.5)    Post herpetic neuralgia    Thrombocytopenia; Splenectomy 06    Factor V Leiden mutation ([facility]    Chronic lymphocytic leukemia in remission    Osteoarthritis    Hypercholesterolemia    Goiter, 1.5 cm midline    Impaired fasting glucose    Vitamin D deficiency    Small cleaved cell (diffuse) NHL (HCC)    B12 deficiency    Abnormal coagulation profile    Encounter for follow-up examination after completed treatment for cancer    Encounter for care related to Port-a-Cath    Hyperuricemia    Arthritis of knee    Neoplasm of uncertain behavior of skin    History of squamous cell carcinoma of skin    Basal cell carcinoma of right medial nasolabial fold    Basal cell carcinoma of left temple region    History of basal cell carcinoma    Seborrheic keratoses, inflamed    Takotsubo cardiomyopathy    Shock, cardiogenic (HCC)    Chronic systolic heart failure (HCC)    Acute ST elevation myocardial infarction (STEMI) (HCC)    Acute respiratory failure with hypoxia (HCC)    Pneumonia    Acute CHF (congestive heart failure) (HCC)    Seps[facility]    Respiratory distress       Social History:  Social History     Tobacco Use    Smoking status: Former Smoker     Packs/day: 0.30     Years: 50.00     Pack years: 15.00     Types: Cigarettes     Quit date: 1995     Years since quittin.8    Smokeless tobacco: Never Used   Vaping Use    Vaping Use: Never used   Substance Use Topics    Alcohol use: No     Alcohol/week: 0.0 standard drinks    Drug use: Not on file       Patient Room Air saturation at rest 86  %    Oxygen saturations of 88% or less on RA qualifies patient for Home Oxygen    Patient resting on 2  lmp  with an oxygen saturation of  94 %  Patient resting on 1LPM with an oxygen saturation of 89-91%     Qualifying patient for home oxygen with ambulation and continuous flow  @ 2 lpm.      In your clinical assessment does the Patient Require Portable Oxygen Tanks?     Yes              Benji Saba from [address] [place] [address] contacted to follow care of patients home oxygen needs on 9/17/2021 at 10:10 AM    Patient/caregiver was educated on Home Oxygen process:  Yes      Level of patient/caregiver understanding able to:   [] Verbalize understanding   [] Demonstrate understanding       [] Teach back        [] Needs reinforcement        []  No available caregiver               []  Other:     Response to education:  Fair     Time Spent with Home O2 Set Up:  15  minutes     Roberto Baugh RCP on 9/17/2021 at 10:10 AM

## 2021-09-17 NOTE — CONSULTS
PALLIATIVE MEDICINE CONSULTATION     Patient name: Kedar Landa   SME:[id number]    :1940  Room/Bed:V5Q-1194/3105-01   LOS: 5 days         Date of consult:2021    Consult Information  Palliative Medicine Consult performed by: FAB Irby CNP      Inpatient consult to Palliative Care  Consult performed by: FAB Irby CNP  Consult ordered by: FAB Pleitez CNP        Reason for consult: Code status, [address]  Number of admissions past 12 months: 1      ASSESSMENT/RECOMMENDATIONS     78 y.o. female with Hypoxia and debility      Symptom Management:  1. Hypoxia- pt on supplemental oxygen improving slowly hopes to go home soon   2. Debility- pt needs assistance with ADLS due to dyspnea  3. Goals of Care- discussed code status pt wants all aggressive care ath this point she thinks she has Living will and HCPOA paperwork at home. Patient/Family Goals of Care :    discussed code status pt wants all aggressive care ath this point she thinks she has Living will and HCPOA paperwork at home. Disposition/Discharge Plan:   pending    Advance Directives:  · Surrogate Decision Maker: Karen Ly  · Code status:  Full Code    Case discussed with: patient, floor RN  Thank you for allowing us to participate in the care of this patient. HISTORY     CC: Hypoxia  HPI: The patient is a 78 y.o. female with history of nonischemic cardiomyopathy probably Takotsubo or tachycardia-induced cardiomyopathy, COPD, previous CLL, hypertension,  factor V Leiden deficiency, on chronic Coumadin therapy, hyperlipidemia,  presented to the hospital with a sudden increase in the shortness of  breath.      Palliative Medicine SymptomScreening/ROS:  Review of Systems -   History obtained from chart review and the patient  General ROS: positive for  - fatigue and sleep disturbance  Respiratory ROS: positive for - shortness of breath  Musculoskeletal ROS: positive for - muscular weakness     A complete 10 count ROS was obtained. Pertinent positives mentioned above in HPI/ROS. All others if not mentioned are negative.       Pain:    Home med list and hospital medications reviewed in chart as of 9/17/2021     EXAM     Vitals:    09/17/21 0911   BP:    Pulse:    Resp:    Temp:    SpO2: 96%       Physical Examination:   General appearance - alert, well appearing, and in no distress and chronically ill appearing  Mental status - alert, oriented to person, place, and time  Neck - supple, no significant adenopathy  Chest - no tachypnea, retractions or cyanosis  Abdomen - soft, nontender, nondistended, no masses or organomegaly  Musculoskeletal - no joint tenderness, deformity or swelling, osteoarthritic changes noted in both hands  Skin - normal coloration and turgor, no rashes, no suspicious skin lesions noted        Current labs in the epic chart reviewed as of 9/17/2021   Review of previous notes, admits, labs, radiology and testing relevant to this consult done in this chart today 9/17/2021      Total time: 75 minutes  >50% of time spent counseling patient at bedside or POA/family member if applicable , reviewing information and discussing care, coordinating with care team  Signed By: Electronically signed by FAB Jauregui CNP on 9/17/2021 at 10:26 AM  Palliative Medicine   [phone]    September 17, 2021

## 2021-09-17 NOTE — PLAN OF CARE
Problem: Falls - Risk of:  Goal: Will remain free from falls  Description: Will remain free from falls  9/16/2021 2054 by Christian London RN  Outcome: Ongoing  Note: Patient will remain free from falls  9/16/2021 0736 by Ute Love RN  Outcome: Ongoing  Note: Patient remains free from falls during this shift. Fall precautions remain in place. Patient educated on the need to implement call light use prior to ambulation. Will continue to monitor and assess. Goal: Absence of physical injury  Description: Absence of physical injury  9/16/2021 2054 by Christian London RN  Outcome: Ongoing  Note: Patient will be absent of physical injury  9/16/2021 0736 by Ute Love RN  Outcome: Ongoing  Note: Patient remains free from physical harm during this shift. Will continue to monitor and assess patient. Problem: OXYGENATION/RESPIRATORY FUNCTION  Goal: Patient will maintain patent airway  9/16/2021 2054 by Christian London RN  Outcome: Ongoing  Note: Patient will maintain a patent airway  9/16/2021 0736 by Ute Love RN  Outcome: Ongoing  Note: Patient airway remains patent at this time, supported by 3L o2 via NC.    Goal: Patient will achieve/maintain normal respiratory rate/effort  Description: Respiratory rate and effort will be within normal limits for the patient  9/16/2021 2054 by Christian London RN  Outcome: Ongoing  Note: Patient will maintain normal respiratory rate/effort  9/16/2021 0736 by Ute Love RN  Outcome: Ongoing  Note: Resp rate remains WNL during this shift, will continue to monitor and assess      Problem: HEMODYNAMIC STATUS  Goal: Patient has stable vital signs and fluid balance  9/16/2021 2054 by Christian London RN  Outcome: Ongoing  Note: Patient will have stable vital signs  9/16/2021 0736 by Ute Love RN  Outcome: Ongoing  Note: VSS throughout this shift, O2 low when not supported by O2 via NC, remains > 92% on 3L per orders     Problem: FLUID AND ELECTROLYTE IMBALANCE  Goal: Fluid and electrolyte balance are achieved/maintained  9/16/2021 2054 by Davidson Choudhary RN  Outcome: Ongoing  Note: Patient will maintain fluid and electrolyte balance  9/16/2021 0736 by Agustín Olivares RN  Outcome: Ongoing     Problem: ACTIVITY INTOLERANCE/IMPAIRED MOBILITY  Goal: Mobility/activity is maintained at optimum level for patient  9/16/2021 2054 by Davidson Choudhary RN  Outcome: Ongoing  Note: Patients mobility will be maintained at an optimum level  9/16/2021 0736 by Agustín Olivares RN  Outcome: Ongoing  Note: Patient up x1 w/ a walker during this shift, will continue to promote mobility      Problem: Pain:  Goal: Pain level will decrease  Description: Pain level will decrease  9/16/2021 2054 by Davidson Choudhary RN  Outcome: Ongoing  Note: Patients pain level will decrease  9/16/2021 0736 by Agustín Olivares RN  Outcome: Ongoing  Note: Pain managed with pharmacologic and non-pharmacologic interventions during this shift. Will continue to monitor and assess for needs and change in patient condition. Goal: Control of acute pain  Description: Control of acute pain  9/16/2021 2054 by Davidson Choudhary RN  Outcome: Ongoing  Note: Patient will have control of acute pain  9/16/2021 0736 by Agustín Olivares RN  Outcome: Ongoing  Note: Pain managed with pharmacologic and non-pharmacologic interventions during this shift. Will continue to monitor and assess for needs and change in patient condition. Goal: Control of chronic pain  Description: Control of chronic pain  9/16/2021 2054 by Davidson Choudhary RN  Outcome: Ongoing  Note: Patient will have control of chronic pain  9/16/2021 0736 by Agustín Olivares RN  Outcome: Ongoing  Note: Pain managed with pharmacologic and non-pharmacologic interventions during this shift. Will continue to monitor and assess for needs and change in patient condition.         Problem: Skin Integrity:  Goal: Will show no infection signs and symptoms  Description: Will show no infection signs and symptoms  9/16/2021 2054 by Yajaira Campo RN  Outcome: Ongoing  Note: Patient will show no signs and symptoms of infection  9/16/2021 0736 by José Miguel Reagan RN  Outcome: Ongoing  Note: Patient remains free from new signs and symptoms of infection during this shift. Infection prevention measures are in place. Will continue to monitor for alterations in patient condition throughout the shift. Goal: Absence of new skin breakdown  Description: Absence of new skin breakdown  9/16/2021 2054 by Yajaira Campo RN  Outcome: Ongoing  Note: Patient will be absent of new skin breakdown  9/16/2021 0736 by José Miguel Reagan RN  Outcome: Ongoing  Note: Patient skin condition and mucus membrane integrity remain unchanged during this shift. Skin breakdown prevention interventions are in place. Will continue to monitor and assess.

## 2021-09-18 VITALS
WEIGHT: 142.2 LBS | HEIGHT: 59 IN | RESPIRATION RATE: 15 BRPM | BODY MASS INDEX: 28.67 KG/M2 | DIASTOLIC BLOOD PRESSURE: 71 MMHG | OXYGEN SATURATION: 92 % | SYSTOLIC BLOOD PRESSURE: 135 MMHG | TEMPERATURE: 98.1 F | HEART RATE: 68 BPM

## 2021-09-18 LAB
A/G RATIO: 1.6 (ref 1.1–2.2)
ALBUMIN SERPL-MCNC: 3.7 G/DL (ref 3.4–5)
ALP BLD-CCNC: 77 U/L (ref 40–129)
ALT SERPL-CCNC: 14 U/L (ref 10–40)
ANION GAP SERPL CALCULATED.3IONS-SCNC: 9 MMOL/L (ref 3–16)
AST SERPL-CCNC: 21 U/L (ref 15–37)
BILIRUB SERPL-MCNC: 0.4 MG/DL (ref 0–1)
BUN BLDV-MCNC: 18 MG/DL (ref 7–20)
CALCIUM SERPL-MCNC: 9 MG/DL (ref 8.3–10.6)
CHLORIDE BLD-SCNC: 95 MMOL/L (ref 99–110)
CO2: 35 MMOL/L (ref 21–32)
CREAT SERPL-MCNC: 1 MG/DL (ref 0.6–1.2)
GFR AFRICAN AMERICAN: >60
GFR NON-AFRICAN AMERICAN: 53
GLOBULIN: 2.3 G/DL
GLUCOSE BLD-MCNC: 87 MG/DL (ref 70–99)
INR BLD: 2.75 (ref 0.88–1.12)
POTASSIUM SERPL-SCNC: 3.7 MMOL/L (ref 3.5–5.1)
PROCALCITONIN: 0.1 NG/ML (ref 0–0.15)
PROTHROMBIN TIME: 32.4 SEC (ref 9.9–12.7)
SODIUM BLD-SCNC: 139 MMOL/L (ref 136–145)
TOTAL PROTEIN: 6 G/DL (ref 6.4–8.2)

## 2021-09-18 PROCEDURE — 2700000000 HC OXYGEN THERAPY PER DAY

## 2021-09-18 PROCEDURE — 84145 PROCALCITONIN (PCT): CPT

## 2021-09-18 PROCEDURE — 6370000000 HC RX 637 (ALT 250 FOR IP): Performed by: INTERNAL MEDICINE

## 2021-09-18 PROCEDURE — 99233 SBSQ HOSP IP/OBS HIGH 50: CPT | Performed by: INTERNAL MEDICINE

## 2021-09-18 PROCEDURE — 85610 PROTHROMBIN TIME: CPT

## 2021-09-18 PROCEDURE — 2580000003 HC RX 258: Performed by: STUDENT IN AN ORGANIZED HEALTH CARE EDUCATION/TRAINING PROGRAM

## 2021-09-18 PROCEDURE — 85025 COMPLETE CBC W/AUTO DIFF WBC: CPT

## 2021-09-18 PROCEDURE — 6370000000 HC RX 637 (ALT 250 FOR IP): Performed by: STUDENT IN AN ORGANIZED HEALTH CARE EDUCATION/TRAINING PROGRAM

## 2021-09-18 PROCEDURE — 94761 N-INVAS EAR/PLS OXIMETRY MLT: CPT

## 2021-09-18 PROCEDURE — 80053 COMPREHEN METABOLIC PANEL: CPT

## 2021-09-18 RX ORDER — METOPROLOL SUCCINATE 25 MG/1
25 TABLET, EXTENDED RELEASE ORAL DAILY
Qty: 30 TABLET | Refills: 3 | Status: SHIPPED | OUTPATIENT
Start: 2021-09-19 | End: 2021-11-15 | Stop reason: DRUGHIGH

## 2021-09-18 RX ORDER — TORSEMIDE 20 MG/1
20 TABLET ORAL DAILY
Status: DISCONTINUED | OUTPATIENT
Start: 2021-09-19 | End: 2021-09-18 | Stop reason: HOSPADM

## 2021-09-18 RX ORDER — TORSEMIDE 20 MG/1
20 TABLET ORAL DAILY
Qty: 30 TABLET | Refills: 3 | Status: SHIPPED | OUTPATIENT
Start: 2021-09-18 | End: 2022-05-16

## 2021-09-18 RX ORDER — WARFARIN SODIUM 2 MG/1
2 TABLET ORAL
Status: DISCONTINUED | OUTPATIENT
Start: 2021-09-18 | End: 2021-09-18 | Stop reason: HOSPADM

## 2021-09-18 RX ADMIN — TORSEMIDE 40 MG: 20 TABLET ORAL at 09:15

## 2021-09-18 RX ADMIN — Medication 10 ML: at 09:16

## 2021-09-18 RX ADMIN — AMIODARONE HYDROCHLORIDE 200 MG: 200 TABLET ORAL at 09:15

## 2021-09-18 RX ADMIN — METOPROLOL SUCCINATE 25 MG: 25 TABLET, EXTENDED RELEASE ORAL at 09:15

## 2021-09-18 ASSESSMENT — PAIN SCALES - GENERAL: PAINLEVEL_OUTOF10: 0

## 2021-09-18 NOTE — PLAN OF CARE
Problem: Falls - Risk of:  Goal: Will remain free from falls  Description: Will remain free from falls  9/18/2021 0805 by Forrest Nails RN  Outcome: Ongoing  Note: Pt free from injury or falls at this time, fall precautions in place, bed in low position, side rail up x2, Warren Fall Risk: High (45 and higher), bed alarm on, reoriented to room and call light, reminded not to get up without assistance, call light in reach, will continue to monitor. Pt verbalizes understanding of fall risk procedures. 9/17/2021 1942 by Neri Lemus RN  Outcome: Ongoing  Note: Fall risk assessment completed. Fall precautions in place. Call light within reach. Pt educated on calling for assistance before getting up. Walkway free of clutter. Will continue to monitor. Goal: Absence of physical injury  Description: Absence of physical injury  9/18/2021 0805 by Forrest Nails RN  Outcome: Ongoing  Note: Pt has not incurred any type of physical injury this shift. 9/17/2021 1942 by Neri Lemus RN  Outcome: Ongoing     Problem: OXYGENATION/RESPIRATORY FUNCTION  Goal: Patient will maintain patent airway  9/18/2021 0805 by Forrest Nails RN  Outcome: Ongoing  Note: Patient has maintained a patent airway, repositions self, lung sounds bilaterally clear/diminished, no cough noted, RT is involved in patient care and is providing care, patient has had adequate fluid intake and is on a 1500 mL fluid restriction, no need to suction airway at this time, educated patient to turn and cough and deep breathe every two hours and as needed, encouraged incentive spirometer and patient has been performing. Will continue to monitor and reassess. 9/17/2021 1942 by Neri Lemus RN  Outcome: Ongoing  Note: Will monitor airway.   Goal: Patient will achieve/maintain normal respiratory rate/effort  Description: Respiratory rate and effort will be within normal limits for the patient  9/18/2021 0805 by Forrest Nails RN  Outcome: Ongoing  Note: Vital signs stable, respiratory rate normal, heart rate is WNLs and regular on cardiac monitor, +2 pulses and less than 3 second cap refill noted in all extremities, body color appropriate for ethnicity and temperature warm, edema noted to BLE, patient repositions  self, and daily weights are ordered and -4 lb change from yesterday's weight. Will continue to monitor and reassess. 9/17/2021 1942 by Little Crockett RN  Outcome: Ongoing     Problem: HEMODYNAMIC STATUS  Goal: Patient has stable vital signs and fluid balance  9/18/2021 0805 by Kuldeep Bhakta RN  Outcome: Ongoing  Note: Vital signs stable, respiratory rate normal, heart rate is WNLs and regular on cardiac monitor, +2 pulses and less than 3 second cap refill noted in all extremities, body color appropriate for ethnicity and temperature warm, edema noted to BLE, patient repositions  self, and daily weights are ordered and -4 lb change from yesterday's weight. Will continue to monitor and reassess. 9/17/2021 1942 by Little Crockett RN  Outcome: Ongoing  Note: Will monitor VS.     Problem: FLUID AND ELECTROLYTE IMBALANCE  Goal: Fluid and electrolyte balance are achieved/maintained  9/18/2021 0805 by Kuldeep Bhakta RN  Outcome: Ongoing  Note: Educated to drink fluids within fluid restriction guidelines and to adequately hydrate, medications administered as ordered, abnormal lab values assessed and reported to physician if critical or pertinent to patient status, skin turgor, mucus membranes, and respiratory status assess this shift and LS clear/diminished. Patient and Family was included in plan of care. Will continue to monitor and reassess. 9/17/2021 1942 by Little Crockett RN  Outcome: Ongoing  Note: Will monitor labs.      Problem: ACTIVITY INTOLERANCE/IMPAIRED MOBILITY  Goal: Mobility/activity is maintained at optimum level for patient  9/18/2021 0805 by Kuldeep Bhakta RN  Outcome: Ongoing  Note: Pt able to ambulate with walker and standby assist, tolerating well, with no c/o SOB or difficulty breathing. Will monitor for changes. 9/17/2021 1942 by Apolinar Lee RN  Outcome: Ongoing  Note: Will encourage mobility. Problem: Pain:  Goal: Pain level will decrease  Description: Pain level will decrease  9/18/2021 0805 by Kadi Richards RN  Outcome: Ongoing  Note: Pt has had no c/o pain so far this shift. Will monitor and medicate as needed. 9/17/2021 1942 by Apolinar Lee RN  Outcome: Ongoing  Note: Pt assessed for pain. Pt in pain and assessed with 0-10 pain rating scale. Pt given prescribed analgesic for pain. (See eMar) Pt satisfied with pain relief thus far. Will reassess and continue to monitor. Goal: Control of acute pain  Description: Control of acute pain  9/18/2021 0805 by Kadi Richards RN  Outcome: Ongoing  Note: Pt has had no c/o pain so far this shift. Will monitor and medicate as needed. 9/17/2021 1942 by Apolinar Lee RN  Outcome: Ongoing  Goal: Control of chronic pain  Description: Control of chronic pain  9/18/2021 0805 by Kadi Richards RN  Outcome: Ongoing  Note: Pt has had no c/o pain so far this shift. Will monitor and medicate as needed. 9/17/2021 1942 by Apolinar Lee RN  Outcome: Ongoing     Problem: Skin Integrity:  Goal: Will show no infection signs and symptoms  Description: Will show no infection signs and symptoms  9/18/2021 0805 by Kadi Richards RN  Outcome: Ongoing  Note: Pt has been afebrile so far this shift. She is receiving abx, as scheduled. Will monitor for changes. 9/17/2021 1942 by Apolinar Lee RN  Outcome: Ongoing  Note: Will monitor skin and mucous members. Will turn patient every 2 hours, monitor for friction and sheering, and change dressings as needed. Will preform skin assessment every shift. Goal: Absence of new skin breakdown  Description: Absence of new skin breakdown  9/18/2021 0805 by Kadi Richards RN  Outcome: Ongoing  Note: No new areas of skin breakdown noted.  Will monitor for changes.   9/17/2021 1942 by Tim Farrar, KERMIT  Outcome: Ongoing

## 2021-09-18 NOTE — PROGRESS NOTES
Clinical Pharmacy Note  Warfarin Consult    Debbie Berg is a 78 y.o. female receiving warfarin managed by pharmacy. Warfarin Indication:  Factor V Leiden mutation  Target INR range: 2-3  Dose prior to admission: 5 mg MWF and 2.5 mg all other days    Current warfarin drug-drug interactions: amiodarone(home med)    Recent Labs     09/16/21  0650 09/17/21  0550 09/18/21  0525   HGB 13.7 12.9 12.9   HCT 41.4 39.5 38.7   INR 2.71* 3.01* 2.75*       Assessment/Plan:  Patient started oral supplement(Ensure Compact) twice daily on 9-17. This contains 20mcg of vitamin K per 118ml serving. This small dose of vitamin K may have a minimal impact on the INR. We will watch for any decrease in the INR. Patient has been requiring less warfarin while inpatient, most likely due to decreased appetite. Warfarin 2 mg tonight. Daily PT/INR until stable within therapeutic range. Thank you for the consult. Will continue to follow.     Lucas Patrick [facility],  9/18/2021 9:28 AM

## 2021-09-18 NOTE — DISCHARGE SUMMARY
Hospital Medicine Discharge Summary    Patient ID: Jules Cornell      Patient's PCP: Lisa Eden MD    Admit Date: 9/11/2021     Discharge Date:   09/18/2021    Admitting Physician: Harshad Vincent DO     Discharge Physician: Valentino Menendez MD     Discharge Diagnoses: Active Hospital Problems    Diagnosis Date Noted    Respiratory distress [R06.03]     Acute respiratory failure with hypoxia ([address] [J96.01] 09/12/2021    Pneumonia [J18.9] 09/12/2021    Acute CHF (congestive heart failure) ([address] [I50.9] 09/12/2021    Sepsis ([address] [A41.9] 09/12/2021       The patient was seen and examined on day of discharge and this discharge summary is in conjunction with any daily progress note from day of discharge. Hospital Course: Jules Cornell 78 y.o. female  - recent acute STEMI but was noted to have negative coronary lesions 7 weeks ago, severe systolic dysfunction, previous CLL, COPD, hypertension, factor V Leiden mutation on Coumadin, hyperlipidemia, and osteoporosis  - p/w acute onset shortness of breath, a/w some intermittent chest tightness, chills, symptoms similar to 7 weeks back when she had STEMI. - checked her blood pressure at home, systolic pressures 281.  - On admission, on ambulation noted to be short of breath, ill appearing  - She required nonrebreather oxygen and then was treated with IV Lasix  -  ED physician initially suspected flash pulmonary edema although x-ray did not demonstrate much finding of flash pulmonary edema.    - With hypoxemia and evaluate fluid status RHC was done on 09/16 and PCW was 1-6 mm not consistent with fluid overload. - CT chest showed predominant interstitial and alveolar edema, with super imposed pneumonia and bronchial wall thickening, pulmonary was following and recommended 5 days of antibiotics which she completed.   - she continues to be on 2L NC, will need outpatient follow up for peristent hypoxemia with pulmonary.      Primary problems addressed  Acute respiratory failure with hypoxia likely due to community acquired pneumonia, underwent rx for 5 days with rocpehin, RHC ruled out underlying fluid overload, continues to be needing O2 and qualified for home oxygen with ambulation and continuous flow  @ 2 lpm.    Micro sauer including Blood Cx, Rapid COVID19 negative  Plan to hold Entresto and dc and only continue Torsemide 20 mg OD  She will need close follow up w/ cardiology as outpatient     Sepsis POA, as evidenced by hypoxia, leukocytosis, tachypneia off levophed, rx with abx, procal 0.33 on admission and trended down with normal during hospitalization     Acute pulmonary edema with moderate left pleural effusions; low dose demadex on dc  Follow up with Cardiology in 1 week     Factor V Leiden; continue coumadin    Physical Exam Performed:     /71   Pulse 68   Temp 98.1 °F (36.7 °C) (Oral)   Resp 15   Ht 4' 11\" (1.499 m)   Wt 142 lb 3.2 oz (64.5 kg)   SpO2 92%   BMI 28.72 kg/m²     General appearance:   No apparent distress, appears stated age. Cooperative. HEENT:  Normocephalic, atraumatic. PERRLA. EOMi. Conjunctivae/corneas clear, no icterus, non-injected. Neck: Supple, with full range of motion. No jugular venous distention. Trachea midline. Respiratory:  Normal respiratory effort. Clear to auscultation, bilaterally without Rales/Wheezes/Rhonchi. Cardiovascular:  Regular rate and rhythm without murmurs, rubs or gallops. Abdomen: Soft, non-tender, non-distended, without rebound or guarding. Normal bowel sounds. Port present Rt sided chest wall  Musculoskeletal:  No clubbing, cyanosis or edema bilaterally. Full range of motion without deformity. Skin: Skin color, texture, turgor normal.  No rashes or lesions. Neurologic:  Neurovascularly intact without any focal sensory/motor deficits. Cranial nerves: II-XII intact, grossly intact. No facial asymmetry, tongue midline.    Psychiatric:  Alert and oriented, thought content TO DOSE MEDICATION  IP CONSULT TO CARDIOLOGY  IP CONSULT TO PALLIATIVE CARE  IP CONSULT TO PULMONOLOGY    Disposition:  Home     Condition at Discharge: Stable    Discharge Instructions/Follow-up:    Follow up with cardiology clinic  Check weights daily    Code Status:  Full Code    Activity: activity as tolerated    Diet: low sodium diet      Discharge Medications:     Current Discharge Medication List           Details   metoprolol succinate (TOPROL XL) 25 MG extended release tablet Take 1 tablet by mouth daily  Qty: 30 tablet, Refills: 3              Details   torsemide (DEMADEX) 20 MG tablet Take 1 tablet by mouth daily  Qty: 30 tablet, Refills: 3              Details   midodrine (PROAMATINE) 5 MG tablet Take 0.5 tablets by mouth 2 times daily  Qty: 30 tablet, Refills: 3    Comments: **Patient requests 90 days supply**      amiodarone (CORDARONE) 200 MG tablet Take 1 tablet by mouth daily  Qty: 90 tablet, Refills: 3      warfarin (COUMADIN) 5 MG tablet TAKE 1 1/2 TABLETS BY MOUTH MONDAY AND FRIDAY, THEN 1 TABLET BY MOUTH DAILY ON THE OTHER DAYS  Qty: 115 tablet, Refills: 1      gabapentin (NEURONTIN) 300 MG capsule TAKE 1 TO 2 CAPSULES BY MOUTH EVERY NIGHT AS DIRECTED  Qty: 60 capsule, Refills: 5      Cyanocobalamin (VITAMIN B 12) 500 MCG TABS Take 1 tablet by mouth daily             Time Spent on discharge is more than 30 minutes in the examination, evaluation, counseling and review of medications and discharge plan. Signed:    Nathan Nunez MD   9/18/2021      Thank you Duarte Farley MD for the opportunity to be involved in this patient's care. If you have any questions or concerns please feel free to contact me at [phone].

## 2021-09-18 NOTE — DISCHARGE INSTR - COC
Continuity of Care Form    Patient Name: Kedar Landa   :  1940  MRN:  [id number]    Admit date:  2021  Discharge date:  21    Code Status Order: Full Code   Advance Directives:   Advance Care Flowsheet Documentation       Date/Time Healthcare Directive Type of Healthcare Directive Copy in [address] Agent's Name Healthcare Agent's Phone Number    [phone]  No, patient does not have an advance directive for healthcare treatment  --  --  --  --  --            Admitting Physician:  Usha Gordillo DO  PCP: Marj Martinez MD    Discharging Nurse: [facility] Unit/Room#: X1M-7396/3106-02  Discharging Unit Phone Number: [phone]    Emergency Contact:   Extended Emergency Contact Information  Primary Emergency Contact: [address] Phone: [phone]  Relation: Child  Secondary Emergency Contact: RonniVilla  Address: [facility] [address] [place] [address] Phone: [phone]  Relation: Spouse    Past Surgical History:  Past Surgical History:   Procedure Laterality Date    CATARACT REMOVAL WITH IMPLANT  12    left eye    JOINT REPLACEMENT      partial knee R and L    KNEE SURGERY      partial replacements, both knees    MOHS SURGERY  2019    R cheek and R superior temple    SPLENECTOMY      TUNNELED VENOUS PORT PLACEMENT      UPPER GASTROINTESTINAL ENDOSCOPY N/A 2019    EGD ESOPHAGOGASTRODUODENOSCOPY, WITH ENDOSCOPIC ULTRASOUND OF ESOPHAGUS performed by Mayank Lehman MD at [facility]       Immunization History:   Immunization History   Administered Date(s) Administered    COVID-19, Agee Peter, PF, 30mcg/0.3mL 2021, 2021    Influenza Virus Vaccine 10/16/2009, 2010, 2010, 10/03/2016    Influenza, High Dose (Fluzone 65 yrs and older) 2011, 10/01/2012, 10/29/2013, 10/23/2014, 2015, 10/23/2018, 12/21/2019    Influenza, Quadv, IM, PF (6 mo and older Fluzone, Flulaval, Fluarix, and 3 yrs and older Afluria) 12/22/2020    Pneumococcal Conjugate 13-valent ([id number]) 07/02/2015    Pneumococcal Polysaccharide ([id number]) 07/01/2006, 11/07/2011    Td, unspecified formulation 10/04/2004    Tdap (Boostrix, Adacel) 09/01/2015       Active Problems:  Patient Active Problem List   Diagnosis Code    Osteoporosis,Dexas:10/18/02 (Lumbar spine -1.52), hip -0.55 (T scores), Actonel, then fosamax;  Dexa 1/25/06 (Lumbar spine -1.1 and hip -0.5) M81.0    Post herpetic neuralgia B02.29    Thrombocytopenia; 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/06 D69.6    Factor V Leiden mutation ([facility] D68.51    Chronic lymphocytic leukemia in remission C91.11    Osteoarthritis M19.90    Hypercholesterolemia E78.00    Goiter, 1.5 cm midline E04.9    Impaired fasting glucose R73.01    Vitamin D deficiency E55.9    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    B12 deficiency E53.8    Abnormal coagulation profile R79.1    Encounter for follow-up examination after completed treatment for cancer Z08    Encounter for care related to Port-a-Cath Z45.2    Hyperuricemia E79.0    Arthritis of knee M17.10    Neoplasm of uncertain behavior of skin D48.5    History of squamous cell carcinoma of skin Z85.828    Basal cell carcinoma of right medial nasolabial fold C44.319    Basal cell carcinoma of left temple region C44.319    History of basal cell carcinoma Z85.828    Seborrheic keratoses, inflamed L82.0    Takotsubo cardiomyopathy I51.81    Shock, cardiogenic (HCC) R57.0    Chronic systolic heart failure (HCC) I50.22    Acute ST elevation myocardial infarction (STEMI) (HCC) I21.3    Acute respiratory failure with hypoxia (HCC) J96.01    Pneumonia J18.9    Acute CHF (congestive heart failure) ([facility]) I50.9    Sepsis ([facility]) A41.9    Respiratory distress R06.03       Isolation/Infection:   Isolation            No Isolation          Patient Infection Status       None to display            Nurse Assessment:  Last Vital Signs: /71   Pulse 68   Temp 98.1 °F (36.7 °C) (Oral)   Resp 15   Ht 4' 11\" (1.499 m)   Wt 142 lb 3.2 oz (64.5 kg)   SpO2 92%   BMI 28.72 kg/m²     Last documented pain score (0-10 scale): Pain Level: 0  Last Weight:   Wt Readings from Last 1 Encounters:   09/18/21 142 lb 3.2 oz (64.5 kg)     Mental Status:  oriented and alert    IV Access:  - Central Venous Catheter  - site  right and subclavian, insertion date: 12/8/2014    Nursing Mobility/ADLs:  Walking   Assisted  Transfer  Independent  Bathing  Independent  Dressing  Independent  [address]  Med Delivery   whole    Wound Care Documentation and Therapy:        Elimination:  Continence:   · Bowel: Yes  · Bladder: Yes  Urinary Catheter: None   Colostomy/Ileostomy/Ileal Conduit: No       Date of Last BM: 9/13/21    Intake/Output Summary (Last 24 hours) at 9/18/2021 1429  Last data filed at 9/18/2021 1235  Gross per 24 hour   Intake 598 ml   Output 400 ml   Net 198 ml     I/O last 3 completed shifts: In: 80 [P.O.:580]  Out: 650 [Urine:650]    Safety Concerns: At Risk for Falls    Impairments/Disabilities:      None    Nutrition Therapy:  Current Nutrition Therapy:   - Oral Diet:  Low Sodium (2gm)    Routes of Feeding: Oral  Liquids: Thin Liquids  Daily Fluid Restriction: yes - amount 1500 mL  Last Modified Barium Swallow with Video (Video Swallowing Test): not done    Treatments at the Time of Hospital Discharge:   Respiratory Treatments: ***  Oxygen Therapy:  is not on home oxygen therapy.   Ventilator:    - No ventilator support    Rehab Therapies: Physical Therapy and Occupational Therapy  Weight Bearing Status/Restrictions: No weight bearing restirctions  Other Medical Equipment (for information only, NOT a DME order):  walker  Other Treatments: ***    Patient's personal belongings (please select all that are sent with patient):  None    RN SIGNATURE:  Electronically signed by Dianna Morrow RN on 9/18/21 at 2:36 PM EDT    CASE MANAGEMENT/SOCIAL WORK SECTION    Inpatient Status Date: 9-    Readmission Risk Assessment Score:  Readmission Risk              Risk of Unplanned Readmission:  17         Discharging to Facility/ Agency   · Name:  [facility]    · Address: [address] [place] [address], [address] [place] [address]., [place] [address]  · Phone: [phone]  · Fax: [phone]  ·     / signature: Lynne Wells [place]     Case Management   [phone]    9/18/2021  2:29 PM      PHYSICIAN SECTION    Prognosis: Fair    Condition at Discharge: Stable    Rehab Potential (if transferring to Rehab): Good    Recommended Labs or Other Treatments After Discharge:   Physical therapy  Occupational therapy  Vitals check  Medications check    Physician Certification: I certify the above information and transfer of Liang Sen  is necessary for the continuing treatment of the diagnosis listed and that she requires Home Care for less 30 days.      Update Admission H&P: No change in H&P    PHYSICIAN SIGNATURE:  Electronically signed by Shanique Donovan MD on 9/18/21 at 2:32 PM EDT

## 2021-09-18 NOTE — PROGRESS NOTES
Patient A&O in the bed. Patient tolerating PO intake well. PM medications given without complications. Patient denies pain. Patient complains of nausea - prn zofran given - see MAR. Call light within reach, able to make needs known, fall precautions in place. Will check on patient Q2 hours.     Electronically signed by Audrey Riddle RN on 9/18/2021 at 12:52 AM

## 2021-09-18 NOTE — CARE COORDINATION
DEVANTE order rec'd. Chart reviewed. Met with patient to discuss. SHe is interested in therapy at home as recommended by therapy Team . Provided her with cms starrated list of agencies. She chose [facility]. Spouse will transport her. Rafael Whitehead     Case Management   [phone]    9/18/2021  2:28 PM  The Plan for Transition of Care is related to the following treatment goals: To continue her progress in personal care and ambulation needs in home setting. The Patient  was provided with a choice of provider and agrees   with the discharge plan. [x] Yes [] No    Freedom of choice list was provided with basic dialogue that supports the patient's individualized plan of care/goals, treatment preferences and shares the quality data associated with the providers.  [x] Yes [] No  aRfael Whitehead     Case Management   [phone]    9/18/2021  2:28 PM

## 2021-09-18 NOTE — CARE COORDINATION
SOCIAL WORK DISCHARGE SUMMARY:      DISCHARGE DATE:                 Saturday 9-      DISCHARGE PLACE:                home        Discharging to Facility/ Agency   · Name:  [facility]    · Address: [address] [place] [address], [address] [place] [address]  · Phone: [phone]  · Fax: [phone]  ·       TRANSPORTATION:                family             TIME:                             [facility]     Case Management   [phone]    9/18/2021  2:30 PM

## 2021-09-18 NOTE — PROGRESS NOTES
[facility] [address]   Daily Progress Note      Admit Date:  9/11/2021    CC: \" I am still not feeling quite well\"  This is an 38-year-old female who has a  history of nonischemic cardiomyopathy probably Takotsubo or  tachycardia-induced cardiomyopathy, COPD, previous CLL, hypertension,  factor V Leiden deficiency, on chronic Coumadin therapy, hyperlipidemia,  presented to the hospital with a sudden increase in the shortness of  breath. She said she was on a significant amount of stress as the   was admitted to the hospital last week and she has been doing  more physical work than she used to. She also admits that she has  stopped taking her water pill for few days. She started experiencing  weak, tired, and suddenly short of breath. She checked the blood  pressure and it was significantly elevated. She presented to the  emergency room and she was given IV Lasix with improvement in her  symptoms. She also at one point had significant shortness of breath and  required to be on BiPAP. Her blood pressure though dropped  significantly and she was placed on pressor and was admitted to ICU. She denies any significant fever, cough, or sputum production. She  feels significantly better this morning. Subjective:  Patient is now transferred out of ICU. Still complaining of vague arm pain cough and was unable to sleep very well last night. She still needs 3 L of oxygen to keep her oxygen saturation above 90%. Her blood pressure is much better this morning        Interval history(9/18/21)  Denies chest pain or SOB, Still needs 2-3 liters of O2.  No arrthymias on monitor        Objective:   /71   Pulse 68   Temp 98.1 °F (36.7 °C) (Oral)   Resp 15   Ht 4' 11\" (1.499 m)   Wt 142 lb 3.2 oz (64.5 kg)   SpO2 92%   BMI 28.72 kg/m²       Intake/Output Summary (Last 24 hours) at 9/18/2021 0810  Last data filed at 9/18/2021 0834  Gross per 24 hour   Intake 698 ml   Output 650 ml   Net 48 ml     Wt Readings from Last 3 Encounters:   09/18/21 142 lb 3.2 oz (64.5 kg)   09/01/21 161 lb (73 kg)   08/24/21 161 lb 3.2 oz (73.1 kg)     Telemetry:NSR    Physical Exam:  General:  NAD, Awake, alert and oriented X4  Skin:  Warm and dry  Neck:  Supple, no JVP appreciated, no bruit  Chest: Bibasilar crackles  Cardiovascular:  Regular rate. S1S2  Abdomen:  Soft, nontender, +bowel sounds  Extremities:  No LE edema    Cardiac Diagnosis:  hypertension, hyperlipidemia and CHF    Medications:    torsemide  40 mg Oral Daily    metoprolol succinate  25 mg Oral Daily    amiodarone  200 mg Oral Daily    sodium chloride flush  5-40 mL IntraVENous 2 times per day    warfarin (COUMADIN) daily dosing (placeholder)   Other RX Placeholder      sodium chloride Stopped (09/14/21 1648)     melatonin, sodium chloride flush, sodium chloride, acetaminophen **OR** acetaminophen, ondansetron, potassium chloride **OR** potassium alternative oral replacement **OR** potassium chloride    Lab Data:  CBC:   Recent Labs     09/16/21  0650 09/17/21  0550 09/18/21  0525   WBC 12.9* 11.8* 12.9*   HGB 13.7 12.9 12.9    292 292     BMP:    Recent Labs     09/16/21  0650 09/17/21  0550 09/18/21  0525    140 139   K 3.6 3.7 3.7   CO2 28 33* 35*   BUN 16 15 18   CREATININE 0.9 0.8 1.0     LIVR:   Recent Labs     09/16/21  0650 09/17/21  0550 09/18/21  0525   AST 20 19 21   ALT 16 15 14     INR:    Recent Labs     09/16/21  0650 09/17/21  0550 09/18/21  0525   INR 2.71* 3.01* 2.75*     APTT:   No results for input(s): APTT in the last 72 hours. BNP:  No results for input(s): BNP in the last 72 hours. Imaging: Echocardiogram 07/26/2021 ejection fraction is visually estimated to be 45-50%. There is mid to apical akinesis. No evidence of left ventricular mass or thrombus noted. There is a moderate left pleural effusion.    Estimated pulmonary artery systolic pressure is moderately elevated at 63   mmHg assuming a right atrial pressure

## 2021-09-20 ENCOUNTER — TELEPHONE (OUTPATIENT)
Dept: PHARMACY | Age: 81
End: 2021-09-20

## 2021-09-20 ENCOUNTER — CARE COORDINATION (OUTPATIENT)
Dept: CASE MANAGEMENT | Age: 81
End: 2021-09-20

## 2021-09-20 LAB
BASOPHILS ABSOLUTE: 0 K/UL (ref 0–0.2)
BASOPHILS RELATIVE PERCENT: 0 %
EOSINOPHILS ABSOLUTE: 0.5 K/UL (ref 0–0.6)
EOSINOPHILS RELATIVE PERCENT: 4 %
HCT VFR BLD CALC: 38.7 % (ref 36–48)
HEMATOLOGY PATH CONSULT: NO
HEMOGLOBIN: 12.9 G/DL (ref 12–16)
LYMPHOCYTES ABSOLUTE: 9.2 K/UL (ref 1–5.1)
LYMPHOCYTES RELATIVE PERCENT: 71 %
MCH RBC QN AUTO: 31.1 PG (ref 26–34)
MCHC RBC AUTO-ENTMCNC: 33.3 G/DL (ref 31–36)
MCV RBC AUTO: 93.1 FL (ref 80–100)
MONOCYTES ABSOLUTE: 1.2 K/UL (ref 0–1.3)
MONOCYTES RELATIVE PERCENT: 9 %
NEUTROPHILS ABSOLUTE: 2.1 K/UL (ref 1.7–7.7)
NEUTROPHILS RELATIVE PERCENT: 16 %
PDW BLD-RTO: 14.4 % (ref 12.4–15.4)
PLATELET # BLD: 292 K/UL (ref 135–450)
PLATELET SLIDE REVIEW: ADEQUATE
PMV BLD AUTO: 8.9 FL (ref 5–10.5)
RBC # BLD: 4.15 M/UL (ref 4–5.2)
RBC # BLD: NORMAL 10*6/UL
SLIDE REVIEW: ABNORMAL
SMUDGE CELLS: PRESENT
WBC # BLD: 12.9 K/UL (ref 4–11)

## 2021-09-20 NOTE — TELEPHONE ENCOUNTER
Recent hospitalization 9/11-9/18. I reviewed the hospital discharge information. INR at discahrge was 2.75. Stopped lopressor and Entresto and started Toprol and adjust torsemide dose. Do not expect to affect INR. Scheduled f/u for 9/28.     Althea Francois, PharmD, [address] [place] [address]  Anticoagulation Service  [phone]

## 2021-09-20 NOTE — CARE COORDINATION
[facility] 43 Transitions Initial Follow Up Call    Call within 2 business days of discharge: Yes    Patient: Vitor Abrams Patient : 1940   MRN: [id number]  Reason for Admission: Resp distress  Discharge Date: 21 RARS: Readmission Risk Score: 17      Last Discharge [facility]       Complaint Diagnosis Description Type Department Provider    21 Irregular Heart Beat Respiratory distress . .. ED to Hosp-Admission (Discharged) (ADMITTED) Aline Munoz MD; Bridget Moran. .. Transitions of Care Initial Call    Was this an external facility discharge? No Discharge Facility: N/A    Challenges to be reviewed by the provider   Additional needs identified to be addressed with provider: No               Method of communication with provider : none      Advance Care Planning:   Does patient have an Advance Directive: Colt Cleary states she has a Living Will and Healthcare POA. She states she will get a copy to . .     Was this a readmission? No  Patient stated reason for admission: SOB & H/A    Care Transition Nurse (CTN) contacted the patient by telephone to perform post hospital discharge assessment. Verified name and  with patient as identifiers. Provided introduction to self, and explanation of the CTN role. CTN reviewed discharge instructions, medical action plan and red flags with patient who verbalized understanding. Patient given an opportunity to ask questions and does not have any further questions or concerns at this time. Were discharge instructions available to patient? Yes. Reviewed appropriate site of care based on symptoms and resources available to patient including: PCP, Specialist and Home health. The patient agrees to contact the PCP office for questions related to their healthcare. Medication reconciliation was performed with no one. Colt Cleary states her daughter is managing her medications at this time and her daughter is not available.      Discussed COVID vaccination status: Yes. Tameka Montemayor states she did receive the COVID vaccine. CTN provided contact information. Non-face-to-face services provided:  Obtained and reviewed discharge summary and/or continuity of care documents    Care Transitions 24 Hour Call    Do you have any ongoing symptoms?: No  Do you have a copy of your discharge instructions?: Yes  Do you have all of your prescriptions and are they filled?: Yes  Have you been contacted by a [address]?: No  Have you scheduled your follow up appointment?: Yes  How are you going to get to your appointment?: Car - family or friend to transport  Were you discharged with any Home Care or Post Acute Services: Yes  Post Acute Services: Home Health (Comment: Faisal Campbell)  Do you feel like you have everything you need to keep you well at home?: Yes  Care Transitions Interventions       Initial attempt at [facility] discharge phone call. Tameka Montemayor states she is doing \"good\". She states [facility] has contacted her - they have not yet started her care. She confirmed her appointment with Sayra Roman on 09/22/2021. She states her daughter will provide her transportation. Tameka Montemayor states she will reach out to  to see if she needs an additional appointment prior to her regularly scheduled appointment later in Oct. Tameka Montemayor states she his currently using home oxygen @ 3lpm at night. She will use it prn during the day. She states she has a pulse oximeter. Her most recent O2 sat = 91% on room air and her HR was 62. Her weight today = 149.4lbs, which is up from 148.4lbs yesterday. Tameka Montemayor denies any SOB, chest pain, or edema at this time. She states she is now following a low sodium, restricted fluid diet daily. She states her daughter cleaned out the pantry over the weekend. Discussed the heart failure zones. Tameka Montemayor states she would be in the \"green\" zone at this time. She states her B/P was 129/? This morning. Tameka Montemayor denies any H/A at this time.  She states for long distances she is using a walker. Madison Bales states her  is currently at [facility] - because she is his primary caregiver. Madison Bales denies any needs at this time.     Follow Up  Future Appointments   Date Time Provider Buck Fany   9/21/2021 12:20 PM [facility] 2 [facility]   9/22/2021  2:20 PM Pura Herrera, APRN - CNP [facility]   9/28/2021  3:00 PM Amando Hendrickson MD [facility]   10/20/2021  1:40 PM Marissa Nino MD [facility]   12/6/2021  9:00 AM Dena Colman Schilder, MD Nest[facility] Fan On license of [facility]       Mary Chiu RN

## 2021-09-21 ENCOUNTER — HOSPITAL ENCOUNTER (OUTPATIENT)
Dept: CT IMAGING | Age: 81
Discharge: HOME | End: 2021-09-21
Payer: MEDICARE

## 2021-09-21 ENCOUNTER — TELEPHONE (OUTPATIENT)
Dept: FAMILY MEDICINE CLINIC | Age: 81
End: 2021-09-21

## 2021-09-21 DIAGNOSIS — C91.11 CHRONIC LYMPHOID LEUKEMIA IN REMISSION (HCC): ICD-10-CM

## 2021-09-21 PROCEDURE — 74177 CT ABD & PELVIS W/CONTRAST: CPT

## 2021-09-21 PROCEDURE — 6360000004 HC RX CONTRAST MEDICATION: Performed by: INTERNAL MEDICINE

## 2021-09-21 RX ADMIN — IOHEXOL 50 ML: 240 INJECTION, SOLUTION INTRATHECAL; INTRAVASCULAR; INTRAVENOUS; ORAL at 11:26

## 2021-09-21 RX ADMIN — IOPAMIDOL 75 ML: 755 INJECTION, SOLUTION INTRAVENOUS at 11:27

## 2021-09-21 NOTE — TELEPHONE ENCOUNTER
Pt was in the hospital  for respiratory and they are providing PT and OT and nursing. Will you sign orders?

## 2021-09-22 ENCOUNTER — OFFICE VISIT (OUTPATIENT)
Dept: CARDIOLOGY CLINIC | Age: 81
End: 2021-09-22
Payer: MEDICARE

## 2021-09-22 VITALS
DIASTOLIC BLOOD PRESSURE: 64 MMHG | OXYGEN SATURATION: 99 % | SYSTOLIC BLOOD PRESSURE: 122 MMHG | WEIGHT: 154 LBS | HEART RATE: 51 BPM | BODY MASS INDEX: 31.1 KG/M2

## 2021-09-22 DIAGNOSIS — I50.42 CHRONIC COMBINED SYSTOLIC (CONGESTIVE) AND DIASTOLIC (CONGESTIVE) HEART FAILURE (HCC): ICD-10-CM

## 2021-09-22 DIAGNOSIS — I50.41 ACUTE COMBINED SYSTOLIC AND DIASTOLIC CONGESTIVE HEART FAILURE (HCC): ICD-10-CM

## 2021-09-22 DIAGNOSIS — I50.22 CHRONIC SYSTOLIC CONGESTIVE HEART FAILURE (HCC): Primary | ICD-10-CM

## 2021-09-22 PROCEDURE — 4040F PNEUMOC VAC/ADMIN/RCVD: CPT | Performed by: NURSE PRACTITIONER

## 2021-09-22 PROCEDURE — 1111F DSCHRG MED/CURRENT MED MERGE: CPT | Performed by: NURSE PRACTITIONER

## 2021-09-22 PROCEDURE — G8400 PT W/DXA NO RESULTS DOC: HCPCS | Performed by: NURSE PRACTITIONER

## 2021-09-22 PROCEDURE — 99214 OFFICE O/P EST MOD 30 MIN: CPT | Performed by: NURSE PRACTITIONER

## 2021-09-22 PROCEDURE — 93000 ELECTROCARDIOGRAM COMPLETE: CPT | Performed by: NURSE PRACTITIONER

## 2021-09-22 PROCEDURE — G8417 CALC BMI ABV UP PARAM F/U: HCPCS | Performed by: NURSE PRACTITIONER

## 2021-09-22 PROCEDURE — 1123F ACP DISCUSS/DSCN MKR DOCD: CPT | Performed by: NURSE PRACTITIONER

## 2021-09-22 PROCEDURE — 1036F TOBACCO NON-USER: CPT | Performed by: NURSE PRACTITIONER

## 2021-09-22 PROCEDURE — G8427 DOCREV CUR MEDS BY ELIG CLIN: HCPCS | Performed by: NURSE PRACTITIONER

## 2021-09-22 PROCEDURE — 1090F PRES/ABSN URINE INCON ASSESS: CPT | Performed by: NURSE PRACTITIONER

## 2021-09-22 NOTE — PATIENT INSTRUCTIONS
Instructions:   1. Medications: Continue current medications  2. Labs: BNP, BMP, mag  3. Follow up: 4 -6 weeks - Dr. Jackie Wolf and 6 months  4. Daily weight: Call for increase 3 lbs/day or 5 lbs/week. 5. 2 gm sodium diet:  6. Fluid Restriction: 64 oz.

## 2021-09-22 NOTE — PROGRESS NOTES
incontinence, polyuria  VASC: Denies claudication, leg cramps, clots  MUSC/SKEL: Denies pain, stiffness, arthritis  PSYCH: Denies anxiety, depression, stress  NEURO: Denies numbness, tingling, weakness,change in mood or memory  HEME: Denies abn bruising, bleeding, anemia  ENDO: Denies intolerance to heat, cold, excessive thirst or hunger, hx thyroid disease    /64   Pulse 51   Wt 154 lb (69.9 kg)   SpO2 99%   BMI 31.10 kg/m²   Wt Readings from Last 3 Encounters:   09/22/21 154 lb (69.9 kg)   09/18/21 142 lb 3.2 oz (64.5 kg)   09/01/21 161 lb (73 kg)     Physical Exam:  GEN: Appears frail, no acute distress  SKIN: Pink, warm, dry. Nails without clubbing. HEENT: PERRLA. Normocephalic, atraumatic. Neck supple. No adenopathy. LUNG: AP diameter normal.  Diminished bilateral. No wheeze, rales, or ronchi. Respiratory effort increased with activity. HEART: S1S2 A/R. No JVD. No carotid bruit. No murmur, rub or gallop. ABD: Soft, nontender. +BS X 4 quads. No hepatomegaly. EXT: Radial and pedal pulses 2+ and symmetric. Without varicosities. Trace pedal/ankle edema. MUSCSKEL: Good ROM X4 extremities. No deformity. NEURO: A/O X3. Calm and cooperative. Past Medical History:   has a past medical history of Acute ST elevation myocardial infarction (STEMI) ([facility] Chronic lymphocytic leukemia in remission ([facility] CLL (chronic lymphocytic leukemia) ([facility] COPD (chronic obstructive pulmonary disease) ([facility] Essential hypertension, Factor V Leiden mutation ([facility] Goiter, Hypercholesterolemia, Impaired fasting glucose, Osteoarthritis, Osteoporosis, Post herpetic neuralgia, and Vitamin D deficiency. Surgical History:   has a past surgical history that includes Splenectomy; knee surgery (2008); Cataract removal with implant (5/14/12); Tunneled venous port placement; Upper gastrointestinal endoscopy (N/A, 2/5/2019); Mohs surgery (03/12/2019); and joint replacement.    Social History:   reports that she quit smoking about 25 years ago. Her smoking use included cigarettes. She has a 15.00 pack-year smoking history. She has never used smokeless tobacco. She reports that she does not drink alcohol. Family History:   Family History   Problem Relation Age of Onset    Diabetes Father     Stroke Sister 50         of a stroke       HomeMedications:  Prior to Admission medications    Medication Sig Start Date End Date Taking? Authorizing Provider   torsemide (DEMADEX) 20 MG tablet Take 1 tablet by mouth daily 21  Yes Douglas Seals MD   metoprolol succinate (TOPROL XL) 25 MG extended release tablet Take 1 tablet by mouth daily 21  Yes Douglas Seals MD   midodrine (PROAMATINE) 5 MG tablet Take 0.5 tablets by mouth 2 times daily 21  Yes FAB Gordon CNP   amiodarone (CORDARONE) 200 MG tablet Take 1 tablet by mouth daily 21  Yes Tamika Lamb MD   warfarin (COUMADIN) 5 MG tablet TAKE 1 1/2 TABLETS BY MOUTH MONDAY AND FRIDAY, THEN 1 TABLET BY MOUTH DAILY ON THE OTHER DAYS  Patient taking differently: Take 2.5 mg by mouth daily EXCEPT 5 mg (one tablet) on , , and  or as directed by [facility] Coumadin Service [phone] 3/8/21  Yes Marialuisa Shankar MD   gabapentin (NEURONTIN) 300 MG capsule TAKE 1 TO 2 CAPSULES BY MOUTH EVERY NIGHT AS DIRECTED  Patient taking differently: TAKE 1 TO 2 CAPSULES BY MOUTH EVERY NIGHT AS DIRECTED  Usually takes 2 nightly 3/1/21 9/22/21 Yes FAB Lopez CNP   Cyanocobalamin (VITAMIN B 12) 500 MCG TABS Take 1 tablet by mouth daily   Yes Historical Provider, MD        Allergies:  Patient has no known allergies. LABS: Results reviewed with patient today.     CBC:   Lab Results   Component Value Date    WBC 12.9 2021    WBC 11.8 2021    WBC 12.9 2021    RBC 4.15 2021    RBC 4.19 2021    RBC 4.38 2021    RBC 4.62 2017    RBC 4.70 2017    RBC 4.97 2016    HGB 12.9 2021    HGB 12.9 09/17/2021    HGB 13.7 09/16/2021    HCT 38.7 09/18/2021    HCT 39.5 09/17/2021    HCT 41.4 09/16/2021    MCV 93.1 09/18/2021    MCV 94.3 09/17/2021    MCV 94.5 09/16/2021    RDW 14.4 09/18/2021    RDW 14.1 09/17/2021    RDW 14.3 09/16/2021     09/18/2021     09/17/2021     09/16/2021     BMP:  Lab Results   Component Value Date     09/18/2021     09/17/2021     09/16/2021    K 3.7 09/18/2021    K 3.7 09/17/2021    K 3.6 09/16/2021    K 3.4 09/12/2021    K 3.5 09/11/2021    K 4.3 07/19/2021    CL 95 09/18/2021    CL 95 09/17/2021    CL 94 09/16/2021    CO2 35 09/18/2021    CO2 33 09/17/2021    CO2 28 09/16/2021    PHOS 3.4 09/12/2021    PHOS 3.1 07/20/2021    BUN 18 09/18/2021    BUN 15 09/17/2021    BUN 16 09/16/2021    CREATININE 1.0 09/18/2021    CREATININE 0.8 09/17/2021    CREATININE 0.9 09/16/2021     BNP:   Lab Results   Component Value Date    PROBNP 2,742 09/17/2021    PROBNP 8,757 09/15/2021    PROBNP 17,372 09/14/2021       Parameters:   > 450 pg/mL under age 48  > 900 pg/mL ages 54-65  > 1800 pg/mL over age 76    Iron Studies:  No results found for: TIBC, FERRITIN  GLUCOSE: No results for input(s): GLUCOSE in the last 72 hours. LIVER PROFILE:   Lab Results   Component Value Date    AST 21 09/18/2021    ALT 14 09/18/2021    LABALBU 3.7 09/18/2021    BILITOT 0.4 09/18/2021    ALKPHOS 77 09/18/2021     PT/INR:   Lab Results   Component Value Date    PROTIME 32.4 09/18/2021    INR 2.75 09/18/2021     Cardiac Enzymes:  Lab Results   Component Value Date    TROPONINI <0.01 09/11/2021     FASTING LIPID PANEL:  Lab Results   Component Value Date    CHOL 259 04/20/2021    HDL 55 04/20/2021    HDL 62 11/08/2011    LDLCALC 178 04/20/2021    TRIG 128 04/20/2021       Cardiac Imaging: Reports reviewed with patient today. EKG: Anterlateral and inferior infarct  Today, continues with inverted T waves anterolateral and inferior leads.     ECHO:     Summary per Dr. Javier Pettit (discussed with [facility] HF team). 2.) Paroxysmal Atrial Fib: NSR/Sinus walt  CHADSVASC- 4    HASBLED-  Thromboembolic risk reduction: warfarin INR goal 2-3 mgmt per coag clinic  Rate Control/ Rhythm Control: Toprol XL 25mg daily, Amiodarone  Thyroid fx, liver fx annually  PFT:  EKG at least annually     3.) Hypotension:   midodrine to 2.5mg BID    4.) Resp failure/PNA: Improving. Instructions:   1. Medications: Continue current medications  2. Labs: BNP, BMP, mag  3. Follow up: 4 -6 weeks - Dr. Javier Pettit and 6 months  4. Daily weight: Call for increase 3 lbs/day or 5 lbs/week. 5. 2 gm sodium diet:  6. Fluid Restriction: 64 oz.        I appreciate the opportunity of cooperating in the care of this individual.    ELIAS Whitehead, FAB - CNP, CNP, 9/22/2021,2:44 PM

## 2021-09-23 NOTE — PROCEDURES
[address] [facility]                            C[facility]IAC CATHETERIZATION    PATIENT NAME: Kiran Malone                    :        1940  MED REC NO:   [id number]                          ROOM:       3105  ACCOUNT NO:   [de-identified]                           ADMIT DATE: 2021  PROVIDER:     Minh Dupont MD    DATE OF PROCEDURE:  2021    PROCEDURE PERFORMED:  Right heart catheterization. PROCEDURE NOTE:  The patient was explained benefits and risks of the  procedure. Informed consent was obtained. The patient's right  antecubital vein was accessed and over a guidewire, changed to a  5-Divehi sheath. Black-Elke catheter was advanced and pressure and  oxygen saturations were obtained in right atrium, right ventricle,  pulmonary artery, and pulmonary capillary wedge positions. Cardiac  output and cardiac indices were not obtained. The estimated blood loss  was less than 10 mL. Pressure of the patient were recorded. All the  wires were removed. The patient left the cath lab in stable condition. HEMODYNAMIC DATA:  As follows, right atrial mean pressure was 0 mmHg,  oxygen saturation was 61%. RV pressure was 24/_____, mean of 0, oxygen  saturation 61%. PA pressure was 23/0, mean of 8, oxygen saturation 58%. Pulmonary capillary wedge pressure was 0. IMPRESSION:  Extremely low normal right heart pressures and pulmonary  capillary wedge pressure consistent with severe volume depletion and not  contributing to patient's hypoxia. We will need IV fluid to improve her  pressures.         Dianna Lee MD    D: 2021 14:59:07       T: 2021 16:42:13     [id number]  Job#: [id number]     Doc#: [id number]    CC:  Minh Dupont MD

## 2021-09-28 ENCOUNTER — TELEPHONE (OUTPATIENT)
Dept: CARDIOLOGY CLINIC | Age: 81
End: 2021-09-28

## 2021-09-28 NOTE — TELEPHONE ENCOUNTER
Received a call from the [facility] stating she was completing a home visit and patient's heart rate was 47. [facility] denies any worsening cardiac symptoms, lightheadedness and the [facility] states her blood pressure is 118/56. Patient stated PT was there yesterday and states they did not mention her HR was low. Reviewed average heart rate 50-60, instructed can reduce Toprol to 12.5 mg. She states she will complete another home visit on Friday and return call with an update.

## 2021-09-29 ENCOUNTER — ANTI-COAG VISIT (OUTPATIENT)
Dept: PHARMACY | Age: 81
End: 2021-09-29
Payer: MEDICARE

## 2021-09-29 ENCOUNTER — CARE COORDINATION (OUTPATIENT)
Dept: CASE MANAGEMENT | Age: 81
End: 2021-09-29

## 2021-09-29 ENCOUNTER — TELEPHONE (OUTPATIENT)
Dept: FAMILY MEDICINE CLINIC | Age: 81
End: 2021-09-29

## 2021-09-29 LAB — INTERNATIONAL NORMALIZATION RATIO, POC: 4.9

## 2021-09-29 PROCEDURE — 85610 PROTHROMBIN TIME: CPT

## 2021-09-29 PROCEDURE — 99211 OFF/OP EST MAY X REQ PHY/QHP: CPT

## 2021-09-29 NOTE — TELEPHONE ENCOUNTER
CARE TRANSITION NURSE - PABLO  CALLING  SHE WAS RELASED FROM THE HOSPITAL ON 18TH -     SHE STATES THAT BEHZAD HAS GAINED 10 LBS IN 2 WEEKS AND 3 LBS SHE GAINED WAS IN THE LAST COUPLE OF DAYS - APPETITE HAS PICKED UP.     SHE HAS CHF    PLEASE CALL PT @  [phone]

## 2021-09-29 NOTE — PROGRESS NOTES
Ms. Herlinda Solitario is a 78 y.o.  female. Ms. Herlinda Solitario had an INR test today. Results were reviewed and appropriate warfarin management was completed. This visit was performed as: An in person visit. Protocols were followed with precautions to reduce the spread of COVID-19. Patient verifies current dosing regimen: Yes     Warfarin medication reviewed and updated on the patient 's home medication list: Yes   All other medications reviewed and updated on the patient 's home medication list: Yes     Lab Results   Component Value Date    INR 4.9 2021    INR 2.75 (H) 2021    INR 3.01 (H) 2021           Anticoagulation Summary  As of 2021    INR goal:  2.0-3.0   TTR:  53.9 % (10.2 y)   INR used for dosin.9 (2021)   Warfarin maintenance plan:  5 mg (5 mg x 1) every Mon; 2.5 mg (5 mg x 0.5) all other days   Weekly warfarin total:  20 mg   Plan last modified:  Destinee Mcdaniels, [facility] (2021)   Next INR check:  10/4/2021   Priority:  Maintenance   Target end date: Indefinite    Indications    Abnormal coagulation profile [R79.1]             Anticoagulation Episode Summary     INR check location:      Preferred lab:      Send INR reminders to:  [facility]    Comments:  EPIC      Factor V Leiden       Anticoagulation Care Providers     Provider Role Specialty Phone number    Evan Baker MD Referring Family Medicine [phone]          Warfarin assessment / plan: Rain Cook was recently in the hospital from  until  for respiratory failure and pneumonia. Her [place] was discontinued during that admission. She continues to take torsemide. Her metoprolol dose was decreased yesterday to 12.5mg daily for bradycardia. She needed a significantly lower dose of wafarin during her last admission. She states that she is eating well and following her salt and fluid restrictions. Her INR is quite elevated today at 4.9.  She denies any bruising or bleeding at this time. I advised her to seek immediate medical attention for any significant bleeding or if she should hit her head. She expressed understanding of this. She will eat some broccoli today and tomorrow. For INR of 4.9, we will hold her warfarin today and tomorrow. She will then reduce her weekly dose by 20%. We will see her in 5 days for her next INR. Description    DO NOT TAKE ANY WARFARIN  today(9-29) or tomorrow(9-30) then start   NEW DOSE: Warfarin 2.5 mg (half a tablet) daily EXCEPT 5 mg (one tablet) on Monday    Keep your green vegetable intake consistent (3-4 salads per week). Please call if this changes. Call [phone] with signs or symptoms of bleeding or ANY medication changes (including over-the-counter medications or herbal supplements). If significant bleeding occurs please seek immediate medical attention. Limit alcohol intake. Please call if this changes. Immunization History   Administered Date(s) Administered    COVID-19, Pfizer, PF, 30mcg/0.3mL 01/29/2021, 02/19/2021    Influenza Virus Vaccine 10/16/2009, 01/26/2010, 09/29/2010, 10/03/2016    Influenza, High Dose (Fluzone 65 yrs and older) 09/27/2011, 10/01/2012, 10/29/2013, 10/23/2014, 11/24/2015, 10/23/2018, 12/21/2019    Influenza, Quadv, IM, PF (6 mo and older Fluzone, Flulaval, Fluarix, and 3 yrs and older Afluria) 12/22/2020    Pneumococcal Conjugate 13-valent ([id number]) 07/02/2015    Pneumococcal Polysaccharide ([id number]) 07/01/2006, 11/07/2011    Td, unspecified formulation 10/04/2004    Tdap (Boostrix, Adacel) 09/01/2015         Reviewed AVS with patient / caregiver.       CLINICAL PHARMACY CONSULT: MED RECONCILIATION/REVIEW ADDENDUM    For Pharmacy Admin Tracking Only     Intervention Detail: Dose Adjustment: 1, reason: Therapy Optimization   Total # of Interventions Recommended: 1   Total # of Interventions Accepted: 1   Time Spent (min): 20

## 2021-09-29 NOTE — TELEPHONE ENCOUNTER
Double up on torsemide dose from 20 to 40 mg daily for next 3 days then resume usual dose. If worsening sob, excessive swelling or continued rise in weight  - needs to be urgently evaluated at ER.   Limit salt intake significantly and limit total fluids to <64oz/ day

## 2021-09-29 NOTE — CARE COORDINATION
Patrick 45 Transitions Follow Up Call    2021    Patient: Susie Love  Patient : 1940   MRN: <[id number]>  Reason for Admission: Resp distress  Discharge Date: 21 RARS: Readmission Risk Score: 17    Spoke with: Susie Love who reports that she is doing good. Patient reports that she is on continuous O2 at 2 liter via NC and O2 sat 95%. /81, HR 61 and Wt 155. Patient report that she has been gaining a little weight over the pass 2 weeks. Patient reports some swelling in BLE also noted by her daughter. She denies any increased SOB, cp, headache, dizziness or n/v. Per her weight log about 10 lb in the pass 2 weeks and 3 lbs since Saturday. Patient takes Torsemide 20 mg daily. Patient states that her appetite has been improving. Writer notified patient PCP of weight gain and left message with Rosie to advise patient. After message left with doctor patient notified that writer that pill bottle instructions says may take another dose in the evening for weight gain, swelling or increased SOB. So patient will take extra dose tonight with evening medications. Patient also has had a medication change Toprol has been decreased to 12.5 mg daily for low BP. Agreeable to f/u calls. .       Care Transitions Subsequent and Final Call    Subsequent and Final Calls  Care Transitions Interventions  Other Interventions:            Follow Up  Future Appointments   Date Time Provider Buck Soares   10/4/2021  9:20 AM [place] [facility]   10/20/2021  1:40 PM Michelle Cornell MD [facility]   10/27/2021  2:40 PM Pura Escobedo APRN - CNP [facility]   2021  9:00 AM MD Lemuel Sparks [facility]       Trung Gomez LPN

## 2021-09-30 ENCOUNTER — TELEPHONE (OUTPATIENT)
Dept: FAMILY MEDICINE CLINIC | Age: 81
End: 2021-09-30

## 2021-09-30 NOTE — TELEPHONE ENCOUNTER
Yes per cardio note 9/22 - wearing 2 liters oxygen via NC at home - may remove for short periods of time at rest as tolerated

## 2021-10-01 ENCOUNTER — TELEPHONE (OUTPATIENT)
Dept: FAMILY MEDICINE CLINIC | Age: 81
End: 2021-10-01

## 2021-10-01 NOTE — TELEPHONE ENCOUNTER
Jennyfer to fax note to [facility] for portable oxygen concentrator - needs for recent hospitalization for respiratory failure w/ copd, pneumonia and flash edema

## 2021-10-04 ENCOUNTER — APPOINTMENT (OUTPATIENT)
Dept: PHARMACY | Age: 81
End: 2021-10-04
Payer: MEDICARE

## 2021-10-04 ENCOUNTER — ANTI-COAG VISIT (OUTPATIENT)
Dept: PHARMACY | Age: 81
End: 2021-10-04
Payer: MEDICARE

## 2021-10-04 DIAGNOSIS — R79.1 ABNORMAL COAGULATION PROFILE: Primary | ICD-10-CM

## 2021-10-04 LAB — INTERNATIONAL NORMALIZATION RATIO, POC: 1.5

## 2021-10-04 PROCEDURE — 85610 PROTHROMBIN TIME: CPT

## 2021-10-04 PROCEDURE — 99211 OFF/OP EST MAY X REQ PHY/QHP: CPT

## 2021-10-04 NOTE — PROGRESS NOTES
Ms. Ronald Wang is a 78 y.o.  female. Ms. Ronald Wang had an INR test today. Results were reviewed and appropriate warfarin management was completed. This visit was performed as: An in person visit. Protocols were followed with precautions to reduce the spread of COVID-19. Patient verifies current dosing regimen: Yes     Warfarin medication reviewed and updated on the patient 's home medication list: Yes   All other medications reviewed and updated on the patient 's home medication list: Yes     Lab Results   Component Value Date    INR 1.5 10/04/2021    INR 4.9 2021    INR 2.75 (H) 2021           Anticoagulation Summary  As of 10/4/2021    INR goal:  2.0-3.0   TTR:  53.9 % (10.2 y)   INR used for dosin.5 (10/4/2021)   Warfarin maintenance plan:  5 mg (5 mg x 1) every Mon; 2.5 mg (5 mg x 0.5) all other days   Weekly warfarin total:  20 mg   Plan last modified:  Terry Flores, [address] [place] [address] (2021)   Next INR check:  2021   Priority:  Maintenance   Target end date: Indefinite    Indications    Abnormal coagulation profile [R79.1]             Anticoagulation Episode Summary     INR check location:      Preferred lab:      Send INR reminders to:  [facility] STAFF    Comments:  EPIC      Factor V Leiden       Anticoagulation Care Providers     Provider Role Specialty Phone number    Annette Lunsford MD Referring Family Medicine [phone]          Warfarin assessment / plan: Will continue same dose since patient dose was held for 2 days and she ate a lot of greens this weekend. She will go back to her former diet and we will recheck her INR in 1 week. Description    Continue  Warfarin 2.5 mg (half a tablet) daily EXCEPT 5 mg (one tablet) on Monday    Keep your green vegetable intake consistent (3-4 salads per week). Please call if this changes.     Call [phone] with signs or symptoms of bleeding or ANY medication changes (including over-the-counter medications or herbal supplements). If significant bleeding occurs please seek immediate medical attention. Limit alcohol intake. Please call if this changes. Immunization History   Administered Date(s) Administered    COVID-19, Pfizer, PF, 30mcg/0.3mL 01/29/2021, 02/19/2021    Influenza Virus Vaccine 10/16/2009, 01/26/2010, 09/29/2010, 10/03/2016    Influenza, High Dose (Fluzone 65 yrs and older) 09/27/2011, 10/01/2012, 10/29/2013, 10/23/2014, 11/24/2015, 10/23/2018, 12/21/2019    Influenza, Quadv, IM, PF (6 mo and older Fluzone, Flulaval, Fluarix, and 3 yrs and older Afluria) 12/22/2020    Pneumococcal Conjugate 13-valent ([id number]) 07/02/2015    Pneumococcal Polysaccharide ([id number]) 07/01/2006, 11/07/2011    Td, unspecified formulation 10/04/2004    Tdap (Boostrix, Adacel) 09/01/2015         Reviewed AVS with patient / caregiver.       CLINICAL PHARMACY CONSULT: MED RECONCILIATION/REVIEW ADDENDUM    For Pharmacy Admin Tracking Only     Intervention Detail: Dose Adjustment: 0, reason: Therapy De-escalation   Total # of Interventions Recommended: 0   Total # of Interventions Accepted: 0   Time Spent (min): 15

## 2021-10-06 NOTE — TELEPHONE ENCOUNTER
Called [facility], spoke with Nick Harden who states the patients heart rate is averaging in the 50-60s. Patient denies any new cardiac complaints. States patient is doing well and will follow up as scheduled.

## 2021-10-11 ENCOUNTER — APPOINTMENT (OUTPATIENT)
Dept: PHARMACY | Age: 81
End: 2021-10-11
Payer: MEDICARE

## 2021-10-11 ENCOUNTER — TELEPHONE (OUTPATIENT)
Dept: PHARMACY | Age: 81
End: 2021-10-11

## 2021-10-11 NOTE — TELEPHONE ENCOUNTER
Josselyn Wolf called and has to cancel her appt for today. She has to take care of a medical matter with her spouse.      Joan Doty, PharmD, [address] [place] [address]  [phone]

## 2021-10-13 ENCOUNTER — CARE COORDINATION (OUTPATIENT)
Dept: CASE MANAGEMENT | Age: 81
End: 2021-10-13

## 2021-10-13 NOTE — CARE COORDINATION
Patrick 45 Transitions Follow Up Call    10/13/2021    Patient: Tino Chance  Patient : 1940   MRN: [id number]  Reason for Admission: Resp distress  Discharge Date: 21 RARS: Readmission Risk Score: 17         Spoke with: [address] Transitions Subsequent and Final Call    Subsequent and Final Calls  Are you currently active with any services?: Home Health  Care Transitions Interventions  Other Interventions: Follow Up  Future Appointments   Date Time Provider Buck Soares   10/20/2021  1:40 PM Gary Costa MD [facility]   10/27/2021  2:40 PM Pura Trivedi, APRN - CNP [facility]   2021  8:00 AM Betsy Silva MD [facility]     CTN called pt and she verified name and . Pt started telephone interview. Pt stated she is on 1.5L of oxygen at this time. She states she drops below 90% when she takes it off. Interview was not continued any further as Jefferson Fam told CTN that her  passed away at home with hospice and her family was all in visiting. Jefferson Fam asked if CTN could call another day. CTN gave Jefferson Fam her condolences and stated she would call another time.      Jacques Kendrick, EVEN, RN   Care Transition Nurse  Mobile: [phone]

## 2021-10-14 ENCOUNTER — TELEPHONE (OUTPATIENT)
Dept: PHARMACY | Age: 81
End: 2021-10-14

## 2021-10-14 ENCOUNTER — TELEPHONE (OUTPATIENT)
Dept: FAMILY MEDICINE CLINIC | Age: 81
End: 2021-10-14

## 2021-10-18 ENCOUNTER — TELEPHONE (OUTPATIENT)
Dept: FAMILY MEDICINE CLINIC | Age: 81
End: 2021-10-18

## 2021-10-18 DIAGNOSIS — F43.22 ADJUSTMENT DISORDER WITH ANXIETY: Primary | ICD-10-CM

## 2021-10-18 RX ORDER — LORAZEPAM 0.5 MG/1
.25-.5 TABLET ORAL EVERY 8 HOURS PRN
Qty: 15 TABLET | Refills: 0 | Status: SHIPPED | OUTPATIENT
Start: 2021-10-18 | End: 2022-01-03

## 2021-10-18 NOTE — TELEPHONE ENCOUNTER
----- Message from Gerard Quinn sent at 10/18/2021  8:08 AM EDT -----  Subject: Message to Provider    QUESTIONS  Information for Provider? Patient would like a prescription to help her   get through the next couple of days since her  recently passed. Would like to speak to her doctor for help.  ---------------------------------------------------------------------------  --------------  [address]  What is the best way for the office to contact you? OK to leave message on   voicemail  Preferred Call Back Phone Number? [phone]  ---------------------------------------------------------------------------  --------------  SCRIPT ANSWERS  Relationship to Patient?  Self

## 2021-10-18 NOTE — TELEPHONE ENCOUNTER
Sent in ativan 0.5mg - take 1/2 tab - can split medicine and can use 2-3 x/day as needed for anxious feelings.  CAN BE SEDATING  If problems, let me know

## 2021-10-19 ENCOUNTER — CARE COORDINATION (OUTPATIENT)
Dept: CASE MANAGEMENT | Age: 81
End: 2021-10-19

## 2021-10-19 NOTE — CARE COORDINATION
Patrick 45 Transitions Follow Up Call    10/19/2021    Patient: Joey Roberts  Patient : 1940   MRN: [id number]  Reason for Admission: Resp distress  Discharge Date: 21 RARS: Readmission Risk Score: 16         Spoke with: NA    Attempted to reach patient via phone for transition call. VM left stating purpose of call along with my contact information requesting a return call. Episode ended d/t end of transition period. Yolie Shetty LPN [address] [place] [address]  Care Transitions  [phone]      Care Transitions Subsequent and Final Call    Subsequent and Final Calls  Do you currently have any active services?: Yes  Are you currently active with any services?: Home Health  Care Transitions Interventions  Other Interventions:            Follow Up  Future Appointments   Date Time Provider Buck Soares   10/20/2021  1:40 PM Hellen Whitten MD [facility]   10/27/2021  2:40 PM FAB Isabel - CNP [facility]   2021  8:00 AM Betsy Black MD [facility]       Yolie Shetty LPN

## 2021-10-20 ENCOUNTER — VIRTUAL VISIT (OUTPATIENT)
Dept: FAMILY MEDICINE CLINIC | Age: 81
End: 2021-10-20
Payer: MEDICARE

## 2021-10-20 DIAGNOSIS — M15.9 PRIMARY OSTEOARTHRITIS INVOLVING MULTIPLE JOINTS: ICD-10-CM

## 2021-10-20 DIAGNOSIS — F43.21 GRIEVING: Primary | ICD-10-CM

## 2021-10-20 DIAGNOSIS — H92.01 OTALGIA, RIGHT EAR: ICD-10-CM

## 2021-10-20 DIAGNOSIS — M81.0 AGE-RELATED OSTEOPOROSIS WITHOUT CURRENT PATHOLOGICAL FRACTURE: ICD-10-CM

## 2021-10-20 DIAGNOSIS — I95.1 ORTHOSTASIS: ICD-10-CM

## 2021-10-20 DIAGNOSIS — D68.51 FACTOR V LEIDEN MUTATION (HCC): ICD-10-CM

## 2021-10-20 DIAGNOSIS — J96.11 CHRONIC RESPIRATORY FAILURE WITH HYPOXIA (HCC): ICD-10-CM

## 2021-10-20 DIAGNOSIS — C91.11 CHRONIC LYMPHOCYTIC LEUKEMIA IN REMISSION (HCC): ICD-10-CM

## 2021-10-20 PROCEDURE — 1090F PRES/ABSN URINE INCON ASSESS: CPT | Performed by: FAMILY MEDICINE

## 2021-10-20 PROCEDURE — 99214 OFFICE O/P EST MOD 30 MIN: CPT | Performed by: FAMILY MEDICINE

## 2021-10-20 PROCEDURE — 1036F TOBACCO NON-USER: CPT | Performed by: FAMILY MEDICINE

## 2021-10-20 PROCEDURE — 4040F PNEUMOC VAC/ADMIN/RCVD: CPT | Performed by: FAMILY MEDICINE

## 2021-10-20 PROCEDURE — G8427 DOCREV CUR MEDS BY ELIG CLIN: HCPCS | Performed by: FAMILY MEDICINE

## 2021-10-20 PROCEDURE — G8417 CALC BMI ABV UP PARAM F/U: HCPCS | Performed by: FAMILY MEDICINE

## 2021-10-20 PROCEDURE — G8484 FLU IMMUNIZE NO ADMIN: HCPCS | Performed by: FAMILY MEDICINE

## 2021-10-20 PROCEDURE — 1123F ACP DISCUSS/DSCN MKR DOCD: CPT | Performed by: FAMILY MEDICINE

## 2021-10-20 PROCEDURE — G8400 PT W/DXA NO RESULTS DOC: HCPCS | Performed by: FAMILY MEDICINE

## 2021-10-20 RX ORDER — NEOMYCIN SULFATE, POLYMYXIN B SULFATE AND HYDROCORTISONE 10; 3.5; 1 MG/ML; MG/ML; [USP'U]/ML
4 SUSPENSION/ DROPS AURICULAR (OTIC) 3 TIMES DAILY
Qty: 1 EACH | Refills: 0 | Status: SHIPPED | OUTPATIENT
Start: 2021-10-20 | End: 2021-10-26 | Stop reason: ALTCHOICE

## 2021-10-20 NOTE — PROGRESS NOTES
10/20/2021    TELEHEALTH EVALUATION -- Audio/Visual (During HQBVJ-14 public health emergency)    HPI:    Mayo Lujan (:  1940) has requested an audio/video evaluation for the following concern(s):    Chief Complaint   Patient presents with    Pain     NEUROPATHY ROUTINE FOLLOW UP   on gabapentin  Right ear pain / drainage and can't hear  Bloody to brownish fluid  No nasal sxs at all  Started day before yesterday - never anything like this  Hearing muffled. Feels closed up. Sore to press on outside of ear  Sees heart doctor next week - had stemi early September followed by worsening sob - presumed PNA  Suspect chronic systolic heart failure - also parox afib on amiodarone  No problems w/ cp/palp  No dizzy/ lightheaded  138/85 today. Hr 60 and pulse 96%. On 1.5 liters of oxygen at home - has portable oxygen at home. No oa pain lately. Warfarin levels up and down - going to pharmacy tomorrow to get inr check     just passed from illness/ in hospice care after hospitalization for acute HFpEF/ copd exacerbation  Review of Systems    Prior to Visit Medications    Medication Sig Taking? Authorizing Provider   LORazepam (ATIVAN) 0.5 MG tablet Take 0.5-1 tablets by mouth every 8 hours as needed for Anxiety for up to 10 days.   Fabienne Newberry MD   torsemide (DEMADEX) 20 MG tablet Take 1 tablet by mouth daily  Marino Cardona MD   metoprolol succinate (TOPROL XL) 25 MG extended release tablet Take 1 tablet by mouth daily  Patient taking differently: Take 12.5 mg by mouth daily   Marino Cardona MD   midodrine (PROAMATINE) 5 MG tablet Take 0.5 tablets by mouth 2 times daily  FAB Mae - CNP   amiodarone (CORDARONE) 200 MG tablet Take 1 tablet by mouth daily  Nestor Quinn MD   warfarin (COUMADIN) 5 MG tablet TAKE 1 1/2 TABLETS BY MOUTH MONDAY AND FRIDAY, THEN 1 TABLET BY MOUTH DAILY ON THE OTHER DAYS  Patient taking differently: Take 2.5 mg by mouth daily EXCEPT 5 mg (one tablet) on , , and  or as directed by [facility] Coumadin Service [phone]  Renetta Reece MD   gabapentin (NEURONTIN) 300 MG capsule TAKE 1 TO 2 CAPSULES BY MOUTH EVERY NIGHT AS DIRECTED  Patient taking differently: TAKE 1 TO 2 CAPSULES BY MOUTH EVERY NIGHT AS DIRECTED  Usually takes 2 nightly  Telma Nilsa Lei APRN - CNP   Cyanocobalamin (VITAMIN B 12) 500 MCG TABS Take 1 tablet by mouth daily  Historical Provider, MD       Social History     Tobacco Use    Smoking status: Former Smoker     Packs/day: 0.30     Years: 50.00     Pack years: 15.00     Types: Cigarettes     Quit date: 1995     Years since quittin.9    Smokeless tobacco: Never Used   Vaping Use    Vaping Use: Never used   Substance Use Topics    Alcohol use: No     Alcohol/week: 0.0 standard drinks    Drug use: Not on file        PHYSICAL EXAMINATION:  [ INSTRUCTIONS:  \"[x]\" Indicates a positive item  \"[]\" Indicates a negative item  -- DELETE ALL ITEMS NOT EXAMINED]  Vital Signs: (As obtained by patient/caregiver or practitioner observation)    Blood pressure-  Heart rate-    Respiratory rate-    Temperature-  Pulse oximetry-     Constitutional: [x] Appears well-developed and well-nourished [] No apparent distress      [] Abnormal-   Mental status  [x] Alert and awake  [] Oriented to person/place/time []Able to follow commands      Eyes:  EOM    []  Normal  [] Abnormal-  Sclera  []  Normal  [] Abnormal -         Discharge []  None visible  [] Abnormal -    HENT:   [x] Normocephalic, atraumatic.   [] Abnormal   [] Mouth/Throat: Mucous membranes are moist.     External Ears [] Normal  [] Abnormal-     Neck: [] No visualized mass     Pulmonary/Chest: [x] Respiratory effort normal.  [] No visualized signs of difficulty breathing or respiratory distress        [] Abnormal-      Musculoskeletal:   [] Normal gait with no signs of ataxia         [] Normal range of motion of neck        [] Abnormal-       Neurological:        [x] No Facial Asymmetry (Cranial nerve 7 motor function) (limited exam to video visit)          [] No gaze palsy        [] Abnormal-         Skin:        [x] No significant exanthematous lesions or discoloration noted on facial skin         [] Abnormal-            Psychiatric:       [x] Normal Affect [] No Hallucinations        [] Abnormal-     Other pertinent observable physical exam findings-     ASSESSMENT/PLAN:   Diagnosis Orders   1. Grieving     2. Primary osteoarthritis involving multiple joints     3. Factor V Leiden mutation ([facility] [address]     4. Age-related osteoporosis without current pathological fracture     5. Chronic lymphocytic leukemia in remission     6. Orthostasis       Good family support - doing well overall - took lorazepam 1/2 tab 3x - feels okay now - pleased w/ hospice experience  F/u cardio - on chronic oxygen - stable  inr check tomorrow  On ca/ vit d for bones  Midodrine for orthostasis - doing well overall  Cont gabapentin for periph neuropathy pain -working well/ tolerated well  F/u q6m/ prn - recent labs reviewed  Cortisporin to right ear o/w keep eac dry - needs ear exam/ ? [place] if not better in couple days    Arita Cushing, was evaluated through a synchronous (real-time) audio-video encounter. The patient (or guardian if applicable) is aware that this is a billable service. Verbal consent to proceed has been obtained within the past 12 months. The visit was conducted pursuant to the emergency declaration under the [address] [place] [address] authority and the [employer] General Act. Patient identification was verified, and a caregiver was present when appropriate. The patient was located in a state where the provider was credentialed to provide care. Total time spent on this encounter: 21 or more minutes were spent on the digital evaluation and management of this patient.       --Migue Braxton MD on 10/20/2021 at 1:17 PM    An electronic signature was used to authenticate this note.

## 2021-10-21 ENCOUNTER — APPOINTMENT (OUTPATIENT)
Dept: GENERAL RADIOLOGY | Age: 81
DRG: 194 | End: 2021-10-21
Payer: MEDICARE

## 2021-10-21 ENCOUNTER — HOSPITAL ENCOUNTER (INPATIENT)
Age: 81
LOS: 1 days | Discharge: HOME | DRG: 194 | End: 2021-10-23
Attending: EMERGENCY MEDICINE | Admitting: STUDENT IN AN ORGANIZED HEALTH CARE EDUCATION/TRAINING PROGRAM
Payer: MEDICARE

## 2021-10-21 ENCOUNTER — ANTI-COAG VISIT (OUTPATIENT)
Dept: PHARMACY | Age: 81
End: 2021-10-21
Payer: MEDICARE

## 2021-10-21 DIAGNOSIS — R79.1 ABNORMAL COAGULATION PROFILE: Primary | ICD-10-CM

## 2021-10-21 DIAGNOSIS — R06.02 SHORTNESS OF BREATH: ICD-10-CM

## 2021-10-21 DIAGNOSIS — J18.9 PNEUMONIA OF RIGHT LOWER LOBE DUE TO INFECTIOUS ORGANISM: Primary | ICD-10-CM

## 2021-10-21 LAB — INTERNATIONAL NORMALIZATION RATIO, POC: 2.8

## 2021-10-21 PROCEDURE — 85025 COMPLETE CBC W/AUTO DIFF WBC: CPT

## 2021-10-21 PROCEDURE — 85610 PROTHROMBIN TIME: CPT

## 2021-10-21 PROCEDURE — 99283 EMERGENCY DEPT VISIT LOW MDM: CPT

## 2021-10-21 PROCEDURE — 80053 COMPREHEN METABOLIC PANEL: CPT

## 2021-10-21 PROCEDURE — 83880 ASSAY OF NATRIURETIC PEPTIDE: CPT

## 2021-10-21 PROCEDURE — 83735 ASSAY OF MAGNESIUM: CPT

## 2021-10-21 PROCEDURE — 84484 ASSAY OF TROPONIN QUANT: CPT

## 2021-10-21 PROCEDURE — 71046 X-RAY EXAM CHEST 2 VIEWS: CPT

## 2021-10-21 PROCEDURE — 99211 OFF/OP EST MAY X REQ PHY/QHP: CPT

## 2021-10-21 PROCEDURE — 83690 ASSAY OF LIPASE: CPT

## 2021-10-21 PROCEDURE — 93005 ELECTROCARDIOGRAM TRACING: CPT | Performed by: EMERGENCY MEDICINE

## 2021-10-21 RX ORDER — ONDANSETRON 2 MG/ML
4 INJECTION INTRAMUSCULAR; INTRAVENOUS ONCE
Status: COMPLETED | OUTPATIENT
Start: 2021-10-22 | End: 2021-10-22

## 2021-10-21 RX ORDER — FENTANYL CITRATE 50 UG/ML
25 INJECTION, SOLUTION INTRAMUSCULAR; INTRAVENOUS ONCE
Status: COMPLETED | OUTPATIENT
Start: 2021-10-22 | End: 2021-10-22

## 2021-10-21 ASSESSMENT — PAIN SCALES - GENERAL: PAINLEVEL_OUTOF10: 6

## 2021-10-21 NOTE — PROGRESS NOTES
Ms. Manfred Kapadia is a 78 y.o.  female. Ms. Manfred Kapadia had an INR test today. Results were reviewed and appropriate warfarin management was completed. This visit was performed as: An in person visit. Protocols were followed with precautions to reduce the spread of COVID-19. Patient verifies current dosing regimen: Yes     Warfarin medication reviewed and updated on the patient 's home medication list: Yes   All other medications reviewed and updated on the patient 's home medication list: No: No Changes     Lab Results   Component Value Date    INR 2.8 10/21/2021    INR 1.5 10/04/2021    INR 4.9 2021       Patient Findings     Negatives:  Signs/symptoms of bleeding, Missed doses, Change in medications, Change in diet/appetite, Bruising          Anticoagulation Summary  As of 10/21/2021    INR goal:  2.0-3.0   TTR:  53.9 % (10.2 y)   INR used for dosin.8 (10/21/2021)   Warfarin maintenance plan:  5 mg (5 mg x 1) every Mon; 2.5 mg (5 mg x 0.5) all other days   Weekly warfarin total:  20 mg   Plan last modified:  [facility] (2021)   Next INR check:  2021   Priority:  Maintenance   Target end date: Indefinite    Indications    Abnormal coagulation profile [R79.1]             Anticoagulation Episode Summary     INR check location:      Preferred lab:      Send INR reminders to:  [facility] STAFF    Comments:  EPIC      Factor V Leiden       Anticoagulation Care Providers     Provider Role Specialty Phone number    Marilee Boykin MD Referring Family Medicine [phone]          Warfarin assessment / plan:     Patient appears well with no complaints. Reports no signs of bleeding, bruising, or missed doses. States no diet changes. Today's INR reading was 2.8. Will continue current warfarin dose and recheck INR in 4 weeks.      Description    Continue  Warfarin 2.5 mg (half a tablet) daily EXCEPT 5 mg (one tablet) on Monday    Keep your green vegetable intake consistent (3-4 salads per week). Please call if this changes. Call [phone] with signs or symptoms of bleeding or ANY medication changes (including over-the-counter medications or herbal supplements). If significant bleeding occurs please seek immediate medical attention. Limit alcohol intake. Please call if this changes. Immunization History   Administered Date(s) Administered    COVID-19, Pfizer, PF, 30mcg/0.3mL 01/29/2021, 02/19/2021    Influenza Virus Vaccine 10/16/2009, 01/26/2010, 09/29/2010, 10/03/2016    Influenza, High Dose (Fluzone 65 yrs and older) 09/27/2011, 10/01/2012, 10/29/2013, 10/23/2014, 11/24/2015, 10/23/2018, 12/21/2019    Influenza, Quadv, IM, PF (6 mo and older Fluzone, Flulaval, Fluarix, and 3 yrs and older Afluria) 12/22/2020    Pneumococcal Conjugate 13-valent ([id number]) 07/02/2015    Pneumococcal Polysaccharide ([id number]) 07/01/2006, 11/07/2011    Td, unspecified formulation 10/04/2004    Tdap (Boostrix, Adacel) 09/01/2015             Reviewed AVS with patient / caregiver.       CLINICAL PHARMACY CONSULT: MED RECONCILIATION/REVIEW ADDENDUM    For Pharmacy Admin Tracking Only     Intervention Detail:    Total # of Interventions Recommended: 0   Total # of Interventions Accepted: 0   Time Spent (min): 20    Interviewed and Reviewed By: Familia Latif (Pharmacy Student)

## 2021-10-22 ENCOUNTER — APPOINTMENT (OUTPATIENT)
Dept: CT IMAGING | Age: 81
DRG: 194 | End: 2021-10-22
Payer: MEDICARE

## 2021-10-22 LAB
A/G RATIO: 1.2 (ref 1.1–2.2)
ALBUMIN SERPL-MCNC: 3.8 G/DL (ref 3.4–5)
ALP BLD-CCNC: 107 U/L (ref 40–129)
ALT SERPL-CCNC: 24 U/L (ref 10–40)
ANION GAP SERPL CALCULATED.3IONS-SCNC: 15 MMOL/L (ref 3–16)
AST SERPL-CCNC: 26 U/L (ref 15–37)
ATYPICAL LYMPHOCYTE RELATIVE PERCENT: 4 % (ref 0–6)
BANDED NEUTROPHILS RELATIVE PERCENT: 1 % (ref 0–7)
BASOPHILS ABSOLUTE: 0 K/UL (ref 0–0.2)
BASOPHILS RELATIVE PERCENT: 0 %
BILIRUB SERPL-MCNC: 0.5 MG/DL (ref 0–1)
BILIRUBIN URINE: NEGATIVE
BLOOD, URINE: NEGATIVE
BUN BLDV-MCNC: 10 MG/DL (ref 7–20)
CALCIUM SERPL-MCNC: 8.9 MG/DL (ref 8.3–10.6)
CHLORIDE BLD-SCNC: 95 MMOL/L (ref 99–110)
CLARITY: CLEAR
CO2: 26 MMOL/L (ref 21–32)
COLOR: YELLOW
CREAT SERPL-MCNC: 0.9 MG/DL (ref 0.6–1.2)
EKG ATRIAL RATE: 75 BPM
EKG DIAGNOSIS: NORMAL
EKG P AXIS: 58 DEGREES
EKG P-R INTERVAL: 154 MS
EKG Q-T INTERVAL: 420 MS
EKG QRS DURATION: 100 MS
EKG QTC CALCULATION (BAZETT): 469 MS
EKG R AXIS: -46 DEGREES
EKG T AXIS: 88 DEGREES
EKG VENTRICULAR RATE: 75 BPM
EOSINOPHILS ABSOLUTE: 0 K/UL (ref 0–0.6)
EOSINOPHILS RELATIVE PERCENT: 0 %
GFR AFRICAN AMERICAN: >60
GFR NON-AFRICAN AMERICAN: 60
GLOBULIN: 3.2 G/DL
GLUCOSE BLD-MCNC: 91 MG/DL (ref 70–99)
GLUCOSE URINE: NEGATIVE MG/DL
HCT VFR BLD CALC: 39.5 % (ref 36–48)
HEMATOLOGY PATH CONSULT: NO
HEMOGLOBIN: 12.7 G/DL (ref 12–16)
INR BLD: 3.36 (ref 0.88–1.12)
KETONES, URINE: 40 MG/DL
LEUKOCYTE ESTERASE, URINE: NEGATIVE
LIPASE: 24 U/L (ref 13–60)
LYMPHOCYTES ABSOLUTE: 9.7 K/UL (ref 1–5.1)
LYMPHOCYTES RELATIVE PERCENT: 56 %
MAGNESIUM: 1.9 MG/DL (ref 1.8–2.4)
MCH RBC QN AUTO: 29.8 PG (ref 26–34)
MCHC RBC AUTO-ENTMCNC: 32.1 G/DL (ref 31–36)
MCV RBC AUTO: 93 FL (ref 80–100)
MICROSCOPIC EXAMINATION: ABNORMAL
MONOCYTES ABSOLUTE: 1.8 K/UL (ref 0–1.3)
MONOCYTES RELATIVE PERCENT: 11 %
NEUTROPHILS ABSOLUTE: 4.7 K/UL (ref 1.7–7.7)
NEUTROPHILS RELATIVE PERCENT: 28 %
NITRITE, URINE: NEGATIVE
PDW BLD-RTO: 14.2 % (ref 12.4–15.4)
PH UA: 6 (ref 5–8)
PLATELET # BLD: 284 K/UL (ref 135–450)
PLATELET SLIDE REVIEW: ADEQUATE
PMV BLD AUTO: 8.9 FL (ref 5–10.5)
POTASSIUM REFLEX MAGNESIUM: 3.4 MMOL/L (ref 3.5–5.1)
PRO-BNP: 1879 PG/ML (ref 0–449)
PROCALCITONIN: 0.16 NG/ML (ref 0–0.15)
PROTEIN UA: NEGATIVE MG/DL
PROTHROMBIN TIME: 39.9 SEC (ref 9.9–12.7)
RBC # BLD: 4.25 M/UL (ref 4–5.2)
SARS-COV-2, NAAT: NOT DETECTED
SLIDE REVIEW: ABNORMAL
SMUDGE CELLS: PRESENT
SODIUM BLD-SCNC: 136 MMOL/L (ref 136–145)
SPECIFIC GRAVITY UA: >1.03 (ref 1–1.03)
TOTAL PROTEIN: 7 G/DL (ref 6.4–8.2)
TROPONIN: <0.01 NG/ML
TROPONIN: <0.01 NG/ML
URINE REFLEX TO CULTURE: ABNORMAL
URINE TYPE: ABNORMAL
UROBILINOGEN, URINE: 0.2 E.U./DL
WBC # BLD: 16.1 K/UL (ref 4–11)

## 2021-10-22 PROCEDURE — 2580000003 HC RX 258: Performed by: EMERGENCY MEDICINE

## 2021-10-22 PROCEDURE — 6370000000 HC RX 637 (ALT 250 FOR IP): Performed by: STUDENT IN AN ORGANIZED HEALTH CARE EDUCATION/TRAINING PROGRAM

## 2021-10-22 PROCEDURE — 6360000002 HC RX W HCPCS: Performed by: EMERGENCY MEDICINE

## 2021-10-22 PROCEDURE — 94760 N-INVAS EAR/PLS OXIMETRY 1: CPT

## 2021-10-22 PROCEDURE — 2700000000 HC OXYGEN THERAPY PER DAY

## 2021-10-22 PROCEDURE — 93010 ELECTROCARDIOGRAM REPORT: CPT | Performed by: INTERNAL MEDICINE

## 2021-10-22 PROCEDURE — 2580000003 HC RX 258: Performed by: STUDENT IN AN ORGANIZED HEALTH CARE EDUCATION/TRAINING PROGRAM

## 2021-10-22 PROCEDURE — 6360000002 HC RX W HCPCS: Performed by: STUDENT IN AN ORGANIZED HEALTH CARE EDUCATION/TRAINING PROGRAM

## 2021-10-22 PROCEDURE — 36415 COLL VENOUS BLD VENIPUNCTURE: CPT

## 2021-10-22 PROCEDURE — 6370000000 HC RX 637 (ALT 250 FOR IP): Performed by: HOSPITALIST

## 2021-10-22 PROCEDURE — 84145 PROCALCITONIN (PCT): CPT

## 2021-10-22 PROCEDURE — 6360000004 HC RX CONTRAST MEDICATION: Performed by: EMERGENCY MEDICINE

## 2021-10-22 PROCEDURE — 85610 PROTHROMBIN TIME: CPT

## 2021-10-22 PROCEDURE — 81003 URINALYSIS AUTO W/O SCOPE: CPT

## 2021-10-22 PROCEDURE — 6370000000 HC RX 637 (ALT 250 FOR IP): Performed by: NURSE PRACTITIONER

## 2021-10-22 PROCEDURE — 96365 THER/PROPH/DIAG IV INF INIT: CPT

## 2021-10-22 PROCEDURE — 96375 TX/PRO/DX INJ NEW DRUG ADDON: CPT

## 2021-10-22 PROCEDURE — 2060000000 HC ICU INTERMEDIATE R&B

## 2021-10-22 PROCEDURE — 71260 CT THORAX DX C+: CPT

## 2021-10-22 PROCEDURE — 84484 ASSAY OF TROPONIN QUANT: CPT

## 2021-10-22 PROCEDURE — 87635 SARS-COV-2 COVID-19 AMP PRB: CPT

## 2021-10-22 RX ORDER — ONDANSETRON 2 MG/ML
4 INJECTION INTRAMUSCULAR; INTRAVENOUS EVERY 6 HOURS PRN
Status: DISCONTINUED | OUTPATIENT
Start: 2021-10-22 | End: 2021-10-23 | Stop reason: HOSPADM

## 2021-10-22 RX ORDER — ACETAMINOPHEN 650 MG/1
650 SUPPOSITORY RECTAL EVERY 6 HOURS PRN
Status: DISCONTINUED | OUTPATIENT
Start: 2021-10-22 | End: 2021-10-23 | Stop reason: HOSPADM

## 2021-10-22 RX ORDER — AMIODARONE HYDROCHLORIDE 200 MG/1
200 TABLET ORAL DAILY
Status: DISCONTINUED | OUTPATIENT
Start: 2021-10-22 | End: 2021-10-23 | Stop reason: HOSPADM

## 2021-10-22 RX ORDER — GABAPENTIN 300 MG/1
300 CAPSULE ORAL NIGHTLY
Status: DISCONTINUED | OUTPATIENT
Start: 2021-10-22 | End: 2021-10-23 | Stop reason: HOSPADM

## 2021-10-22 RX ORDER — SODIUM CHLORIDE 9 MG/ML
25 INJECTION, SOLUTION INTRAVENOUS PRN
Status: DISCONTINUED | OUTPATIENT
Start: 2021-10-22 | End: 2021-10-23 | Stop reason: HOSPADM

## 2021-10-22 RX ORDER — METOPROLOL SUCCINATE 25 MG/1
12.5 TABLET, EXTENDED RELEASE ORAL DAILY
Status: DISCONTINUED | OUTPATIENT
Start: 2021-10-22 | End: 2021-10-23 | Stop reason: HOSPADM

## 2021-10-22 RX ORDER — MIDODRINE HYDROCHLORIDE 5 MG/1
2.5 TABLET ORAL 2 TIMES DAILY WITH MEALS
Status: DISCONTINUED | OUTPATIENT
Start: 2021-10-22 | End: 2021-10-23 | Stop reason: HOSPADM

## 2021-10-22 RX ORDER — ACETAMINOPHEN 325 MG/1
650 TABLET ORAL EVERY 6 HOURS PRN
Status: DISCONTINUED | OUTPATIENT
Start: 2021-10-22 | End: 2021-10-23 | Stop reason: HOSPADM

## 2021-10-22 RX ORDER — LANOLIN ALCOHOL/MO/W.PET/CERES
6 CREAM (GRAM) TOPICAL NIGHTLY PRN
Status: DISCONTINUED | OUTPATIENT
Start: 2021-10-22 | End: 2021-10-23 | Stop reason: HOSPADM

## 2021-10-22 RX ORDER — LANOLIN ALCOHOL/MO/W.PET/CERES
500 CREAM (GRAM) TOPICAL DAILY
Status: DISCONTINUED | OUTPATIENT
Start: 2021-10-22 | End: 2021-10-23 | Stop reason: HOSPADM

## 2021-10-22 RX ORDER — TORSEMIDE 20 MG/1
20 TABLET ORAL DAILY
Status: DISCONTINUED | OUTPATIENT
Start: 2021-10-22 | End: 2021-10-23 | Stop reason: HOSPADM

## 2021-10-22 RX ORDER — SODIUM CHLORIDE 0.9 % (FLUSH) 0.9 %
5-40 SYRINGE (ML) INJECTION EVERY 12 HOURS SCHEDULED
Status: DISCONTINUED | OUTPATIENT
Start: 2021-10-22 | End: 2021-10-23 | Stop reason: HOSPADM

## 2021-10-22 RX ORDER — WARFARIN SODIUM 5 MG/1
2.5 TABLET ORAL DAILY
COMMUNITY
End: 2022-02-04

## 2021-10-22 RX ORDER — SODIUM CHLORIDE 0.9 % (FLUSH) 0.9 %
5-40 SYRINGE (ML) INJECTION PRN
Status: DISCONTINUED | OUTPATIENT
Start: 2021-10-22 | End: 2021-10-23 | Stop reason: HOSPADM

## 2021-10-22 RX ORDER — IPRATROPIUM BROMIDE AND ALBUTEROL SULFATE 2.5; .5 MG/3ML; MG/3ML
1 SOLUTION RESPIRATORY (INHALATION) EVERY 4 HOURS PRN
Status: DISCONTINUED | OUTPATIENT
Start: 2021-10-22 | End: 2021-10-23 | Stop reason: HOSPADM

## 2021-10-22 RX ADMIN — ONDANSETRON 4 MG: 2 INJECTION INTRAMUSCULAR; INTRAVENOUS at 00:27

## 2021-10-22 RX ADMIN — TORSEMIDE 20 MG: 20 TABLET ORAL at 10:51

## 2021-10-22 RX ADMIN — CYANOCOBALAMIN TAB 1000 MCG 500 MCG: 1000 TAB at 17:29

## 2021-10-22 RX ADMIN — METOPROLOL SUCCINATE 12.5 MG: 25 TABLET, EXTENDED RELEASE ORAL at 17:29

## 2021-10-22 RX ADMIN — AMIODARONE HYDROCHLORIDE 200 MG: 200 TABLET ORAL at 10:51

## 2021-10-22 RX ADMIN — Medication 6 MG: at 23:22

## 2021-10-22 RX ADMIN — VANCOMYCIN HYDROCHLORIDE 1000 MG: 1 INJECTION, POWDER, LYOPHILIZED, FOR SOLUTION INTRAVENOUS at 03:33

## 2021-10-22 RX ADMIN — MIDODRINE HYDROCHLORIDE 2.5 MG: 5 TABLET ORAL at 10:51

## 2021-10-22 RX ADMIN — ACETAMINOPHEN 650 MG: 325 TABLET ORAL at 10:51

## 2021-10-22 RX ADMIN — CEFEPIME HYDROCHLORIDE 1000 MG: 1 INJECTION, POWDER, FOR SOLUTION INTRAMUSCULAR; INTRAVENOUS at 02:52

## 2021-10-22 RX ADMIN — PIPERACILLIN AND TAZOBACTAM 3375 MG: 3; .375 INJECTION, POWDER, LYOPHILIZED, FOR SOLUTION INTRAVENOUS at 11:22

## 2021-10-22 RX ADMIN — SODIUM CHLORIDE, PRESERVATIVE FREE 10 ML: 5 INJECTION INTRAVENOUS at 10:51

## 2021-10-22 RX ADMIN — IOPAMIDOL 75 ML: 755 INJECTION, SOLUTION INTRAVENOUS at 01:01

## 2021-10-22 RX ADMIN — MIDODRINE HYDROCHLORIDE 2.5 MG: 5 TABLET ORAL at 17:29

## 2021-10-22 RX ADMIN — PIPERACILLIN AND TAZOBACTAM 3375 MG: 3; .375 INJECTION, POWDER, LYOPHILIZED, FOR SOLUTION INTRAVENOUS at 18:51

## 2021-10-22 RX ADMIN — GABAPENTIN 300 MG: 300 CAPSULE ORAL at 20:38

## 2021-10-22 RX ADMIN — FENTANYL CITRATE 25 MCG: 50 INJECTION INTRAMUSCULAR; INTRAVENOUS at 00:28

## 2021-10-22 ASSESSMENT — ENCOUNTER SYMPTOMS
SHORTNESS OF BREATH: 0
ABDOMINAL PAIN: 0
COLOR CHANGE: 0
CHEST TIGHTNESS: 0
VOMITING: 0
WHEEZING: 0
PHOTOPHOBIA: 0
COUGH: 0
TROUBLE SWALLOWING: 0

## 2021-10-22 ASSESSMENT — PAIN SCALES - GENERAL
PAINLEVEL_OUTOF10: 5
PAINLEVEL_OUTOF10: 0
PAINLEVEL_OUTOF10: 4
PAINLEVEL_OUTOF10: 6
PAINLEVEL_OUTOF10: 0
PAINLEVEL_OUTOF10: 4
PAINLEVEL_OUTOF10: 5

## 2021-10-22 ASSESSMENT — PAIN DESCRIPTION - PAIN TYPE
TYPE: ACUTE PAIN
TYPE: ACUTE PAIN

## 2021-10-22 ASSESSMENT — PAIN DESCRIPTION - LOCATION
LOCATION: CHEST
LOCATION: CHEST

## 2021-10-22 ASSESSMENT — PAIN DESCRIPTION - DIRECTION: RADIATING_TOWARDS: BACK

## 2021-10-22 ASSESSMENT — PAIN DESCRIPTION - PROGRESSION: CLINICAL_PROGRESSION: NOT CHANGED

## 2021-10-22 ASSESSMENT — PAIN DESCRIPTION - ORIENTATION
ORIENTATION: RIGHT
ORIENTATION: RIGHT

## 2021-10-22 ASSESSMENT — PAIN DESCRIPTION - FREQUENCY: FREQUENCY: CONTINUOUS

## 2021-10-22 ASSESSMENT — PAIN DESCRIPTION - DESCRIPTORS
DESCRIPTORS: ACHING
DESCRIPTORS: ACHING

## 2021-10-22 ASSESSMENT — PAIN DESCRIPTION - ONSET: ONSET: ON-GOING

## 2021-10-22 ASSESSMENT — PAIN - FUNCTIONAL ASSESSMENT: PAIN_FUNCTIONAL_ASSESSMENT: ACTIVITIES ARE NOT PREVENTED

## 2021-10-22 NOTE — CONSULTS
Clinical Pharmacy Note  Vancomycin Consult    Pharmacy consult received for one-time dose of vancomycin in the Emergency Department per Dr. Melinda Evangelista. Ht Readings from Last 1 Encounters:   09/17/21 4' 11\" (1.499 m)        Wt Readings from Last 1 Encounters:   09/22/21 154 lb (69.9 kg)         Assessment/Plan:   Vancomycin 1000 mg x 1 in ED.  If Vancomycin is to continue on admission and pharmacy is to manage dosing, please re-consult with admission orders.     Justus Cisneros, GaetanoD

## 2021-10-22 NOTE — ED TRIAGE NOTES
Patient presents to ED for c/o right sided chest pain that radiates to back and when she takes a deep breath it hurts. Patient also states she has a home pulse ox and her o2 sat was good, but her HR was as high as 124. Patient home dependent on oxygen, 1.5-2L. Patient also voicing c/o feeling like she cannot hear as well out of her right ear, was given ear drops by Dr Matti Calderon but states she feels as if the hearing loss got worse instead of better. Patient states she buried her  this week and was given anxiety medication by her doctor and states she has only taken 3 but does not feel great after taking them. Patient reports hx of being admitted in July to the ICU for her AFIB and had to have an angio, patient is on Coumadin.

## 2021-10-22 NOTE — CARE COORDINATION
DISCHARGE PLAN: pt plans to return home with her son upon d/c. Denied any d/c needs at this time. ___________________________________  INITIAL ASSESSMENT  Met w/pt to address barriers to dc. HOME: Pt reported that she resides alone in a condo. There is 1 KORY. Pt stated that her spouse recently passed away and her son is currently staying with her. Pt stated that her son will stay with her for the next week and then her dgtrs live close and are a good support. COVID Vaccination: Yes, overdue for the third vaccination    DME/O2: Pt reported that she has grab bars throughout the home and a handicap shower. Pt stated that most of the medical equipment she has was from her spouse. Pt denied the need for any DME at this time. Pt does have O2 at home but is unable to remember the O2 company that supplies the oxygen. Pt reported that she uses 1.5-2L at all times and has portability. ACTIVE SERVICES: Pt reported that she was independent with all self care PTA. Pt remains active in her [facility] and has good support from her family. Pt denied the need for any additional support upon d/c and is eager to return home. TRANSPORTATION: Pt reported that her son will transport her home from the hospital.     PHARMACY: Denies difficulty obtaining/taking meds. Pt stated that she has all meds filled at [facility]. PCP: Madhuri Presumcassy    DEMOGRAPHICS: Verified address/phone number as correct    INSURANCE:  Medicare  PRESCRIPTION COVERAGE: Medicare    HD/PD: No    THERAPY RECS Not ordered      Discharge planning team will remain available for needs. Please consult for any specifics not addressed in this note.     Sara Scott [place]  [phone]  Electronically signed by Emerita Lacey on 10/22/2021 at 4:09 PM

## 2021-10-22 NOTE — ED NOTES
ED SBAR report provider to [facility]. Patient to be transported to Room 5127 via stretcher by ED tech. Patient transported with bedside cardiac monitor and with IV medications infusing. IV site clean, dry, and intact. MEWS score and pain assessed as 4/10 and documented. Updated patient on plan of care.            Adelia Severe, RN  10/22/21 2425

## 2021-10-22 NOTE — PROGRESS NOTES
Medication Reconciliation    List of medications patient is currently taking is complete. Source of information: 1. Conversation with patient at bedside                                      2. EPIC records      Allergies  Patient has no known allergies. Notes regarding home medications:   1. Patient is on warfarin for factor V Leiden. Goal INR is 2-3. Current dose of warfarin is 5 mg (5 mg x 1) every Mon; 2.5 mg (5 mg x 0.5) all other days.        Eli Maki, [facility], PharmD, 10/22/2021 1:28 PM

## 2021-10-22 NOTE — CONSULTS
Clinical Pharmacy Note  Warfarin Consult    Mayo Lujan is a 78 y.o. female receiving warfarin managed by pharmacy. Warfarin Indication: Factor V Leiden,   Target INR range: 2-3  Dose prior to admission: 5 mg on Mondays, 2.5 mg all other days     Current warfarin drug-drug interactions: Zosyn, amiodarone (home dose)    Recent Labs     10/21/21  1536 10/21/21  2357 10/22/21  0955   HGB  --  12.7  --    HCT  --  39.5  --    INR 2.8  --  3.36*       Assessment/Plan:    Hold warfarin tonight. Daily PT/INR until stable within therapeutic range. Thank you for the consult. Will continue to follow.     Alexsander Wilson [facility], PharmD, 10/22/2021 10:42 AM

## 2021-10-22 NOTE — H&P
HOSPITAL MEDICINE     History & Physical        Patient: Yovanny Romeo  YOB: 1940    MRN: [id number]     Acct: [de-identified]    PCP: Jake Hernandez MD    Date of Admission: 10/21/2021    Date of Service:   Pt seen/examined on 10/21/2021     Admitted to Inpatient with expected LOS greater than two midnights due to medical therapy. History obtained from reviewing the medical record and patient/family Interview. Assessment:     Yovanny Romeo is a 78 y.o. female who presented/brought to  on 10/21/2021  For right-sided chest pain    1. Right-sided chest pain, appear myofascial in the light of the associated tenderness over the anterior axillary fold no skin rash, CTA showed no PE. 2. Right lung right middle lobe pneumonia  3. Leukocytosis with lymphocyte predominance consistent with a CLL history  4. Incidental lung nodules  5. Mild hypokalemia, replete  6. Slightly supratherapeutic INR, pharmacy monitoring  7.         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 Pain control  2. Patient started on antibiotics, will check procalcitonin, if negative will de-escalate antibiotics  3. Check SARS-CoV-2  4. Need repeated chest imaging to ensure resolution of the airspace disease  5.  Continue selected home medication as above    Code Status: Full Code        DVT prophylaxis: [] Lovenox                                 [] SCDs [] SQ Heparin                                 [] Encourage ambulation           [] Already on Anticoagulation     Disposition:    [x] Home       [] TCU       [] Rehab       [] Psych       [] SNF       [] [place]       [] Other-    -------------------------------------------------------------  Chief Complaint: Right-sided chest pain      History Of Present Illness:       78 y.o. female who presented to ED with right-sided chest pain started yesterday, she described it as constant, no clear aggravating or relieving factor, she denied chest injury. She has no cough, fever or chills.     Patient report recent hospitalization for pneumonia, respiratory failure, STEMI, Takotsubo's, she was worried that this pain may be related to her heart (report this pain is more in the right side while the one is more diffuse across her chest.  She denied orthopnea, PND, no palpitation    In the emergency room troponin, EKG and CTA of the chest obtained and revealed right airspace disease patient started broad-spectrum antibiotics for pneumonia                Past Medical History:          Diagnosis Date    [facility] 07/19/2021    [facility]     Chronic lymphocytic leukemia in remission [facility]     CLL (chronic lymphocytic leukemia) ([facility] 02/12/2015    COPD (chronic obstructive pulmonary disease) ([facility]     Essential hypertension 01/01/2015    controlled with salt restriction (initially)    Factor V Leiden mutation ([facility]     Goiter 11/01/2011    1.5 cm midline    Hypercholesterolemia     Impaired fasting glucose 11/01/2011    Osteoarthritis     Osteoporosis     Post herpetic neuralgia     Vitamin D deficiency 11/01/2011       Past Surgical History:          Procedure Laterality Date    CATARACT REMOVAL WITH IMPLANT  5/14/12    left eye    JOINT REPLACEMENT      partial knee R and L    KNEE SURGERY  2008    partial replacements, both knees    MOHS SURGERY  03/12/2019    R cheek and R superior temple    SPLENECTOMY      TUNNELED VENOUS PORT PLACEMENT      UPPER GASTROINTESTINAL ENDOSCOPY N/A 2/5/2019    EGD ESOPHAGOGASTRODUODENOSCOPY, WITH ENDOSCOPIC ULTRASOUND OF ESOPHAGUS performed by Dwayne Obrien MD at [facility]       Medications Prior to Admission:      Prior to Admission medications    Medication Sig Start Date End Date Taking? Authorizing Provider   warfarin (COUMADIN) 5 MG tablet Take by mouth daily 5 mg (5 mg x 1) every Mon; 2.5 mg (5 mg x 0.5) all other days   Yes Historical Provider, MD   neomycin-polymyxin-hydrocortisone (CORTISPORIN) 3.5-40709-8 otic suspension Place 4 drops into the right ear 3 times daily for 10 days 10/20/21 10/30/21 Yes Shine Irwin MD   LORazepam (ATIVAN) 0.5 MG tablet Take 0.5-1 tablets by mouth every 8 hours as needed for Anxiety for up to 10 days. 10/18/21 10/28/21 Yes Shine Irwin MD   torsemide [facility]) 20 MG tablet Take 1 tablet by mouth daily 9/18/21  Yes Christina Alejandra MD   metoprolol succinate (TOPROL XL) 25 MG extended release tablet Take 1 tablet by mouth daily  Patient taking differently: Take 12.5 mg by mouth daily  9/19/21  Yes Christina Alejandra MD   midodrine (PROAMATINE) 5 MG tablet Take 0.5 tablets by mouth 2 times daily 8/18/21  Yes Jeffery MurciaineFAB - CNP   amiodarone (CORDARONE) 200 MG tablet Take 1 tablet by mouth daily 7/27/21  Yes Margarita Werner MD   gabapentin (NEURONTIN) 300 MG capsule TAKE 1 TO 2 CAPSULES BY MOUTH EVERY NIGHT AS DIRECTED  Patient taking differently: Take 600 mg by mouth nightly. 3/1/21 12/31/21 Yes FAB Srinivasan CNP   Cyanocobalamin (VITAMIN B 12) 500 MCG TABS Take 500 mcg by mouth daily    Yes Historical Provider, MD       Allergies:  Patient has no known allergies. Social History:      The patient currently lives by herself    TOBACCO:   reports that she quit smoking about 25 years ago. Her smoking use included cigarettes.  She has a 15.00 pack-year smoking history. She has never used smokeless tobacco.  ETOH:   reports no history of alcohol use. Family History:       Reviewed in detail . Positive as follows:           Problem Relation Age of Onset    Diabetes Father     Stroke Sister 50         of a stroke       Diet:  ADULT DIET; Regular; 4 carb choices (60 gm/meal); Low Sodium (2 gm); 1500 ml    REVIEW OF SYSTEMS:   Pertinent positives as noted in the HPI. All other systems reviewed and negative. PHYSICAL EXAM:    /60   Pulse 59   Temp 98.2 °F (36.8 °C) (Oral)   Resp 18   Wt 157 lb 13.6 oz (71.6 kg)   SpO2 95%   BMI 31.88 kg/m²          Vitals:    10/22/21 1051 10/22/21 1115 10/22/21 1159 10/22/21 1504   BP:  112/62  101/60   Pulse:  73  59   Resp:  16  18   Temp:  98.8 °F (37.1 °C)  98.2 °F (36.8 °C)   TempSrc:  Oral  Oral   SpO2: 94%  96% 95%   Weight:           General appearance:  No apparent distress, appears stated age and cooperative. HEENT:  Normal cephalic, atraumatic without obvious deformity. Pupils equal, round, and reactive to light. Extra ocular muscles intact. Conjunctivae/corneas clear. Neck: Supple, with full range of motion. No jugular venous distention. Trachea midline. Respiratory:  Normal respiratory effort. Clear to auscultation, bilaterally without Rales/Wheezes/Rhonchi. Cardiovascular:  Regular rate and rhythm with normal S1/S2 without murmurs, rubs or gallops. Abdomen: Soft, non-tender, non-distended with normal bowel sounds. Musculoskeletal: Tenderness in the right axillary fold consistent with the patient complained of chest pain. No clubbing, cyanosis or edema bilaterally. Full range of motion without deformity. Skin: Skin color, texture, turgor normal.  No rashes or lesions. Neurologic:  Neurovascularly intact without any focal sensory/motor deficits.  Cranial nerves: II-XII intact, grossly non-focal.  Psychiatric:  Alert and oriented, thought content appropriate, normal insight  Capillary Refill: Brisk,< 3 seconds   Peripheral Pulses: +2 palpable, equal bilaterally       Labs:     Recent Labs     10/21/21  2357   WBC 16.1*   HGB 12.7   HCT 39.5        Recent Labs     10/21/21  2357      K 3.4*   CL 95*   CO2 26   BUN 10   CREATININE 0.9   CALCIUM 8.9     Recent Labs     10/21/21  2357   AST 26   ALT 24   BILITOT 0.5   ALKPHOS 107     Recent Labs     10/21/21  1536 10/22/21  0955   INR 2.8 3.36*     Recent Labs     10/21/21  2357   TROPONINI <0.01       Urinalysis:      Lab Results   Component Value Date    NITRU Negative 10/22/2021    BLOODU Negative 10/22/2021    SPECGRAV >1.030 10/22/2021    GLUCOSEU Negative 10/22/2021       Radiology:     CXR: I have reviewed the CXR with the following interpretation: Airspace disease  EKG:  I have reviewed the EKG with the following interpretation: sinus no acute ischemic changes      CT CHEST PULMONARY EMBOLISM W CONTRAST   Final Result   No evidence of pulmonary embolism. Interval development of pneumonia in the right middle lobe and lingula. Multiple stable small nodules in the periphery of the bilateral upper lobes         XR CHEST (2 VW)   Final Result   1. New left basilar lung infiltrate, concerning for pneumonia. Follow-up   radiographs are recommended to ensure resolution. 2. Pulmonary vascular congestion. Thank you Keren Warren MD for the opportunity to be involved in this patient's care.     Electronically signed by Tien Winn MD on 10/22/2021 at 4:54 PM

## 2021-10-22 NOTE — ACP (ADVANCE CARE PLANNING)
wishes  [] New Advance Directive completed  [] Portable Do Not Rescitate prepared for Provider review and signature  [] POLST/POST/MOLST/MOST prepared for Provider review and signature      Follow-up plan:    [] Schedule follow-up conversation to continue planning  [x] Referred individual to Provider for additional questions/concerns   [] Advised patient/agent/surrogate to review completed ACP document and update if needed with changes in condition, patient preferences or care setting    [x] This note routed to one or more involved healthcare providers    [place], [place]  [phone]  Electronically signed by Lilia Castro on 10/22/2021 at 4:10 PM

## 2021-10-22 NOTE — ED PROVIDER NOTES
[facility]  EMERGENCY DEPARTMENTENCOUNTER      Pt Name: Brit Grewal  MRN: [id number]  [place] 1940  Date ofevaluation: 10/21/2021  Provider: Manjit Ibarra MD    CHIEF COMPLAINT       Chief Complaint   Patient presents with    Chest Pain     pt on home oxygen 2L. hx of afib. pt states she has chest and back pain. she placed a pulse ox on her finger at home and her HR went up to 124.  Back Pain    Shortness of Breath         HISTORY OF PRESENT ILLNESS   (Location/Symptom, Timing/Onset,Context/Setting, Quality, Duration, Modifying Factors, Severity)  Note limiting factors. Brit Grewal is a 78 y.o. female  who  has a past medical history of Acute ST elevation myocardial infarction (STEMI) ([facility] Atrial fibrillation ([address] Chronic lymphocytic leukemia in remission ([address] CLL (chronic lymphocytic leukemia) ([facility] COPD (chronic obstructive pulmonary disease) ([facility] Essential hypertension, Factor V Leiden mutation ([facility] Goiter, Hypercholesterolemia, Impaired fasting glucose, Osteoarthritis, Osteoporosis, Post herpetic neuralgia, and Vitamin D deficiency. who presents to the emergency department for evaluation of right-sided lower chest pain. Patient reports acute onset of chest pain that began spontaneously. She states that is worse with deep breaths and movement. Denies recent injury or trauma. Denies fevers or cough. Patient reports recent admission to the hospital for bilateral pneumonia. She did have a heart attack back in July. Patient denies taking medications for symptoms. She is currently on home O2, but denies having to increase her O2 requirement. She reports that she was using a home pulse ox noticed that her heart rate was elevated. She states that she was previously admitted with atrial fibrillation. She states he has been taking all her medications as prescribed. HPI    NursingNotes were reviewed.     REVIEW OF SYSTEMS    (2-9 systems for level 4, 10 or more for level 5)     Review of Systems   Constitutional: Negative for activity change, fatigue and fever. HENT: Negative for congestion, mouth sores and trouble swallowing. Eyes: Negative for photophobia and visual disturbance. Respiratory: Negative for cough, chest tightness, shortness of breath and wheezing. Cardiovascular: Positive for chest pain and palpitations. Negative for leg swelling. Gastrointestinal: Negative for abdominal pain and vomiting. Genitourinary: Negative for difficulty urinating and frequency. Musculoskeletal: Negative for gait problem and neck pain. Skin: Negative for color change and rash. Neurological: Negative for dizziness, light-headedness and headaches. Psychiatric/Behavioral: Negative for confusion. The patient is not nervous/anxious. All other systems reviewed and are negative. Except as noted above the remainder of the review of systems was reviewed and negative.        PAST MEDICAL HISTORY     Past Medical History:   Diagnosis Date    Acute ST elevation myocardial infarction (STEMI) ([facility] 07/19/2021    Atrial fibrillation (HCC)     Chronic lymphocytic leukemia in remission ([facility]     CLL (chronic lymphocytic leukemia) ([facility] 02/12/2015    COPD (chronic obstructive pulmonary disease) ([facility]     Essential hypertension 01/01/2015    controlled with salt restriction (initially)    Factor V Leiden mutation ([facility]     Goiter 11/01/2011    1.5 cm midline    Hypercholesterolemia     Impaired fasting glucose 11/01/2011    Osteoarthritis     Osteoporosis     Post herpetic neuralgia     Vitamin D deficiency 11/01/2011         SURGICALHISTORY       Past Surgical History:   Procedure Laterality Date    CATARACT REMOVAL WITH IMPLANT  5/14/12    left eye    JOINT REPLACEMENT      partial knee R and L    KNEE SURGERY  2008    partial replacements, both knees    MOHS SURGERY  03/12/2019    R cheek and R superior temple    SPLENECTOMY  TUNNELED VENOUS PORT PLACEMENT      UPPER GASTROINTESTINAL ENDOSCOPY N/A 2019    EGD ESOPHAGOGASTRODUODENOSCOPY, WITH ENDOSCOPIC ULTRASOUND OF ESOPHAGUS performed by Pat Ruiz MD at [facility]       Previous Medications    AMIODARONE (CORDARONE) 200 MG TABLET    Take 1 tablet by mouth daily    CYANOCOBALAMIN (VITAMIN B 12) 500 MCG TABS    Take 1 tablet by mouth daily    GABAPENTIN (NEURONTIN) 300 MG CAPSULE    TAKE 1 TO 2 CAPSULES BY MOUTH EVERY NIGHT AS DIRECTED    LORAZEPAM (ATIVAN) 0.5 MG TABLET    Take 0.5-1 tablets by mouth every 8 hours as needed for Anxiety for up to 10 days. METOPROLOL SUCCINATE (TOPROL XL) 25 MG EXTENDED RELEASE TABLET    Take 1 tablet by mouth daily    MIDODRINE (PROAMATINE) 5 MG TABLET    Take 0.5 tablets by mouth 2 times daily    NEOMYCIN-POLYMYXIN-HYDROCORTISONE (CORTISPORIN) 3.5-89709-0 OTIC SUSPENSION    Place 4 drops into the right ear 3 times daily for 10 days    TORSEMIDE (DEMADEX) 20 MG TABLET    Take 1 tablet by mouth daily    WARFARIN (COUMADIN) 5 MG TABLET    TAKE 1 1/2 TABLETS BY MOUTH MONDAY AND FRIDAY, THEN 1 TABLET BY MOUTH DAILY ON THE OTHER DAYS            Patient has no known allergies.     FAMILY HISTORY       Family History   Problem Relation Age of Onset    Diabetes Father     Stroke Sister 50         of a stroke          SOCIAL HISTORY       Social History     Socioeconomic History    Marital status:      Spouse name: None    Number of children: None    Years of education: None    Highest education level: None   Occupational History    None   Tobacco Use    Smoking status: Former Smoker     Packs/day: 0.30     Years: 50.00     Pack years: 15.00     Types: Cigarettes     Quit date: 1995     Years since quittin.9    Smokeless tobacco: Never Used   Vaping Use    Vaping Use: Never used   Substance and Sexual Activity    Alcohol use: No     Alcohol/week: 0.0 standard drinks    Drug use: None    Sexual activity: None   Other Topics Concern    None   Social History Narrative    None     Social Determinants of Health     Financial Resource Strain: Low Risk     Difficulty of Paying Living Expenses: Not hard at all   Food Insecurity: No Food Insecurity    Worried About Running Out of Food in the Last Year: Never true    Nguyen of Food in the Last Year: Never true   Transportation Needs: No Transportation Needs    Lack of Transportation (Medical): No    Lack of Transportation (Non-Medical): No   Physical Activity:     Days of Exercise per Week:     Minutes of Exercise per Session:    Stress:     Feeling of Stress :    Social Connections:     Frequency of Communication with Friends and Family:     Frequency of Social Gatherings with Friends and Family:     Attends Nondenominational Services:     Active Member of Clubs or Organizations:     Attends Club or Organization Meetings:     Marital Status:    Intimate Partner Violence:     Fear of Current or Ex-Partner:     Emotionally Abused:     Physically Abused:     Sexually Abused:        SCREENINGS             PHYSICAL EXAM    (up to 7 for level 4, 8 or more for level 5)     ED Triage Vitals [10/21/21 2245]   BP Temp Temp Source Pulse Resp SpO2 Height Weight   (!) 175/83 99.3 °F (37.4 °C) Oral 84 18 93 % -- --       Physical Exam  Vitals and nursing note reviewed. Constitutional:       Appearance: She is well-developed. HENT:      Head: Normocephalic and atraumatic. Eyes:      Conjunctiva/sclera: Conjunctivae normal.      Pupils: Pupils are equal, round, and reactive to light. Neck:      Trachea: No tracheal deviation. Cardiovascular:      Rate and Rhythm: Normal rate and regular rhythm. Heart sounds: Normal heart sounds. Pulmonary:      Effort: Pulmonary effort is normal. No tachypnea or accessory muscle usage. Breath sounds: Normal breath sounds. Chest:      Chest wall: Tenderness present.    Abdominal:      General: There is no distension. Palpations: Abdomen is soft. Tenderness: There is no abdominal tenderness. Musculoskeletal:         General: Normal range of motion. Cervical back: Normal range of motion. Right lower leg: No edema. Left lower leg: No edema. Skin:     General: Skin is warm and dry. Neurological:      General: No focal deficit present. Mental Status: She is alert and oriented to person, place, and time. RESULTS     EKG: All EKG's are interpreted by the Emergency Department Physician who either signs or Co-signsthis chart in the absence of a cardiologist.    EKG shows a sinus rhythm with a ventricular of 75 bpm.  VT interval and QTc interval within normal limits. Patient has left axis deviation. There are no significant ST elevations or depressions EKG is nondiagnostic for ACS. Compared EKG from 9/22/2021 ST inversions in the inferior and anterior lateral leads have resolved. RADIOLOGY:   Non-plain filmimages such as CT, Ultrasound and MRI are read by the radiologist. Plain radiographic images are visualized and preliminarily interpreted by the emergency physician with the below findings:      Interpretation per the Radiologist below, if available at the time ofthis note:    XR CHEST (2 VW)   Preliminary Result   1. New left basilar lung infiltrate, concerning for pneumonia. Follow-up   radiographs are recommended to ensure resolution. 2. Pulmonary vascular congestion. ED BEDSIDE ULTRASOUND:   Performed by ED Physician - none    LABS:  Labs Reviewed   CBC WITH AUTO DIFFERENTIAL   COMPREHENSIVE METABOLIC PANEL W/ REFLEX TO MG FOR LOW K   TROPONIN   BRAIN NATRIURETIC PEPTIDE   URINE RT REFLEX TO CULTURE   LIPASE       All other labs were within normal range or not returned as of this dictation.     EMERGENCY DEPARTMENT COURSE and DIFFERENTIAL DIAGNOSIS/MDM:   Vitals:    Vitals:    10/21/21 2245 10/21/21 2345   BP: (!) 175/83 (!) 117/93   Pulse: 84 68   Resp: 18 17   Temp: 99.3 °F (37.4 °C)    TempSrc: Oral    SpO2: 93% 95%       Patient was given thefollowing medications:  Medications   ondansetron (ZOFRAN) injection 4 mg (has no administration in time range)   fentaNYL (SUBLIMAZE) injection 25 mcg (has no administration in time range)       ED COURSE & MEDICAL DECISION MAKING    Pertinent Labs & Imaging studies reviewed. (See chart for details)   -  Patient seen and evaluated in the emergency department. -  Triage and nursing notes reviewed and incorporated. -  Old chart records reviewed and incorporated. -  Differential diagnosis includes: Differential Diagnosis: Acute Coronary Syndrome, Congestive Heart Failure, Myocardial Infarction, Pulmonary Embolus, Pneumonia, Pneumothorax, other    -  Work-up included:  See above  -  ED treatment included: See above  -  Results discussed with patient. Patient presents the ED for evaluation of pleuritic chest pain to the right mid chest.  On presentation vital signs within normal limits and she is satting well on her home oxygen. Labs show leukocytosis elevated compared to previous. Imaging studies show pneumonia in the right lower lobe and lingula. Patient will be admitted for further medical management evaluation. .  Patient feels well on reevaluation. She was admitted in stable condition. The patient is agreeable with plan of care and disposition. REASSESSMENT          CRITICAL CARE TIME   Total Critical Care time was 10 minutes, excluding separately reportable procedures. There was a high probability of clinically significant/life threatening deterioration in the patient's condition which required my urgent intervention. CONSULTS:  None    PROCEDURES:  Unless otherwise noted below, none     Procedures    FINAL IMPRESSION      1. Pneumonia of right lower lobe due to infectious organism    2.  Shortness of breath          DISPOSITION/PLAN   DISPOSITION        PATIENT REFERREDTO:  No follow-up provider specified.     DISCHARGEMEDICATIONS:  New Prescriptions    No medications on file          (Please note that portions of this note were completed with a voice recognition program.  Efforts were made to edit the dictations but occasionally words are mis-transcribed.)    Arely Morales MD (electronically signed)  Attending Emergency Physician          Arely Morales MD  10/22/21 0224

## 2021-10-22 NOTE — PROGRESS NOTES
4 Eyes Skin Assessment     NAME:  Ketan Vázquez  YOB: 1940  MEDICAL RECORD NUMBER:  [id number]    The patient is being assess for  Admission    I agree that 2 RN's have performed a thorough Head to Toe Skin Assessment on the patient. ALL assessment sites listed below have been assessed. Areas assessed by both nurses:    Head, Face, Ears, Shoulders, Back, Chest, Arms, Elbows, Hands, Sacrum. Buttock, Coccyx, Ischium and Legs. Feet and Heels        Does the Patient have a Wound?  No noted wound(s)       Zana Prevention initiated:  NA   Wound Care Orders initiated:  NA    Pressure Injury (Stage 3,4, Unstageable, DTI, NWPT, and Complex wounds) if present place consult order under [de-identified] No    New and Established Ostomies if present place consult order under : NA      Nurse 1 eSignature: Electronically signed by Vania Peterson RN on 10/22/21 at 3:38 PM EDT    **SHARE this note so that the co-signing nurse is able to place an eSignature**    Nurse 2 eSignature: {Esignature:[id number]}

## 2021-10-23 VITALS
TEMPERATURE: 97.4 F | BODY MASS INDEX: 31.21 KG/M2 | HEART RATE: 52 BPM | WEIGHT: 154.54 LBS | SYSTOLIC BLOOD PRESSURE: 108 MMHG | OXYGEN SATURATION: 96 % | RESPIRATION RATE: 16 BRPM | DIASTOLIC BLOOD PRESSURE: 65 MMHG

## 2021-10-23 LAB
ALBUMIN SERPL-MCNC: 3.6 G/DL (ref 3.4–5)
ANION GAP SERPL CALCULATED.3IONS-SCNC: 16 MMOL/L (ref 3–16)
BASOPHILS ABSOLUTE: 0.2 K/UL (ref 0–0.2)
BASOPHILS RELATIVE PERCENT: 1 %
BUN BLDV-MCNC: 12 MG/DL (ref 7–20)
CALCIUM SERPL-MCNC: 8.9 MG/DL (ref 8.3–10.6)
CHLORIDE BLD-SCNC: 97 MMOL/L (ref 99–110)
CO2: 27 MMOL/L (ref 21–32)
CREAT SERPL-MCNC: 1 MG/DL (ref 0.6–1.2)
EOSINOPHILS ABSOLUTE: 0.3 K/UL (ref 0–0.6)
EOSINOPHILS RELATIVE PERCENT: 1.8 %
GFR AFRICAN AMERICAN: >60
GFR NON-AFRICAN AMERICAN: 53
GLUCOSE BLD-MCNC: 95 MG/DL (ref 70–99)
HCT VFR BLD CALC: 37.5 % (ref 36–48)
HEMATOLOGY PATH CONSULT: NO
HEMOGLOBIN: 12 G/DL (ref 12–16)
INR BLD: 3.14 (ref 0.88–1.12)
LYMPHOCYTES ABSOLUTE: 7.6 K/UL (ref 1–5.1)
LYMPHOCYTES RELATIVE PERCENT: 52 %
MAGNESIUM: 2.2 MG/DL (ref 1.8–2.4)
MCH RBC QN AUTO: 29.7 PG (ref 26–34)
MCHC RBC AUTO-ENTMCNC: 32 G/DL (ref 31–36)
MCV RBC AUTO: 92.9 FL (ref 80–100)
MONOCYTES ABSOLUTE: 1.5 K/UL (ref 0–1.3)
MONOCYTES RELATIVE PERCENT: 10.5 %
NEUTROPHILS ABSOLUTE: 5.1 K/UL (ref 1.7–7.7)
NEUTROPHILS RELATIVE PERCENT: 34.7 %
PDW BLD-RTO: 13.8 % (ref 12.4–15.4)
PHOSPHORUS: 2.8 MG/DL (ref 2.5–4.9)
PLATELET # BLD: 300 K/UL (ref 135–450)
PMV BLD AUTO: 8.9 FL (ref 5–10.5)
POTASSIUM SERPL-SCNC: 3.6 MMOL/L (ref 3.5–5.1)
PROCALCITONIN: 0.14 NG/ML (ref 0–0.15)
PROTHROMBIN TIME: 37.2 SEC (ref 9.9–12.7)
RBC # BLD: 4.04 M/UL (ref 4–5.2)
SODIUM BLD-SCNC: 140 MMOL/L (ref 136–145)
WBC # BLD: 14.6 K/UL (ref 4–11)

## 2021-10-23 PROCEDURE — 83735 ASSAY OF MAGNESIUM: CPT

## 2021-10-23 PROCEDURE — G0008 ADMIN INFLUENZA VIRUS VAC: HCPCS | Performed by: STUDENT IN AN ORGANIZED HEALTH CARE EDUCATION/TRAINING PROGRAM

## 2021-10-23 PROCEDURE — 6360000002 HC RX W HCPCS: Performed by: STUDENT IN AN ORGANIZED HEALTH CARE EDUCATION/TRAINING PROGRAM

## 2021-10-23 PROCEDURE — 94761 N-INVAS EAR/PLS OXIMETRY MLT: CPT

## 2021-10-23 PROCEDURE — 6370000000 HC RX 637 (ALT 250 FOR IP): Performed by: HOSPITALIST

## 2021-10-23 PROCEDURE — 85025 COMPLETE CBC W/AUTO DIFF WBC: CPT

## 2021-10-23 PROCEDURE — 2700000000 HC OXYGEN THERAPY PER DAY

## 2021-10-23 PROCEDURE — 85610 PROTHROMBIN TIME: CPT

## 2021-10-23 PROCEDURE — 90686 IIV4 VACC NO PRSV 0.5 ML IM: CPT | Performed by: STUDENT IN AN ORGANIZED HEALTH CARE EDUCATION/TRAINING PROGRAM

## 2021-10-23 PROCEDURE — 6370000000 HC RX 637 (ALT 250 FOR IP): Performed by: STUDENT IN AN ORGANIZED HEALTH CARE EDUCATION/TRAINING PROGRAM

## 2021-10-23 PROCEDURE — 80069 RENAL FUNCTION PANEL: CPT

## 2021-10-23 PROCEDURE — 84145 PROCALCITONIN (PCT): CPT

## 2021-10-23 PROCEDURE — 36415 COLL VENOUS BLD VENIPUNCTURE: CPT

## 2021-10-23 PROCEDURE — 2580000003 HC RX 258: Performed by: STUDENT IN AN ORGANIZED HEALTH CARE EDUCATION/TRAINING PROGRAM

## 2021-10-23 RX ORDER — DOXYCYCLINE HYCLATE 100 MG
100 TABLET ORAL 2 TIMES DAILY
Qty: 14 TABLET | Refills: 0 | Status: SHIPPED | OUTPATIENT
Start: 2021-10-23 | End: 2021-10-30

## 2021-10-23 RX ORDER — AMOXICILLIN AND CLAVULANATE POTASSIUM 875; 125 MG/1; MG/1
1 TABLET, FILM COATED ORAL 2 TIMES DAILY
Qty: 14 TABLET | Refills: 0 | Status: SHIPPED | OUTPATIENT
Start: 2021-10-23 | End: 2021-10-30

## 2021-10-23 RX ADMIN — AMIODARONE HYDROCHLORIDE 200 MG: 200 TABLET ORAL at 09:25

## 2021-10-23 RX ADMIN — CYANOCOBALAMIN TAB 1000 MCG 500 MCG: 1000 TAB at 09:25

## 2021-10-23 RX ADMIN — MIDODRINE HYDROCHLORIDE 2.5 MG: 5 TABLET ORAL at 09:25

## 2021-10-23 RX ADMIN — METOPROLOL SUCCINATE 12.5 MG: 25 TABLET, EXTENDED RELEASE ORAL at 09:26

## 2021-10-23 RX ADMIN — PIPERACILLIN AND TAZOBACTAM 3375 MG: 3; .375 INJECTION, POWDER, LYOPHILIZED, FOR SOLUTION INTRAVENOUS at 02:41

## 2021-10-23 RX ADMIN — TORSEMIDE 20 MG: 20 TABLET ORAL at 09:25

## 2021-10-23 RX ADMIN — INFLUENZA A VIRUS A/VICTORIA/2570/2019 IVR-215 (H1N1) ANTIGEN (PROPIOLACTONE INACTIVATED), INFLUENZA A VIRUS A/CAMBODIA/E0826360/2020 IVR-224 (H3N2) ANTIGEN (PROPIOLACTONE INACTIVATED), INFLUENZA B VIRUS B/VICTORIA/705/2018 BVR-11 ANTIGEN (PROPIOLACTONE INACTIVATED), INFLUENZA B VIRUS B/PHUKET/3073/2013 BVR-1B ANTIGEN (PROPIOLACTONE INACTIVATED) 0.5 ML: 15; 15; 15; 15 INJECTION, SUSPENSION INTRAMUSCULAR at 12:21

## 2021-10-23 ASSESSMENT — PAIN SCALES - GENERAL: PAINLEVEL_OUTOF10: 0

## 2021-10-23 NOTE — PLAN OF CARE
Problem: Pain:  Goal: Pain level will decrease  Description: Pain level will decrease  10/23/2021 0113 by Lluvia Conway RN  Outcome: Ongoing     Problem: Pain:  Goal: Control of acute pain  Description: Control of acute pain  10/23/2021 0113 by Lluvia Conway RN  Outcome: Ongoing     Problem: Pain:  Goal: Control of chronic pain  Description: Control of chronic pain  10/23/2021 0113 by Lluvia Conway RN  Outcome: Ongoing     Problem: Falls - Risk of:  Goal: Will remain free from falls  Description: Will remain free from falls  10/23/2021 0113 by Lluvia Conway RN  Outcome: Ongoing     Problem: Falls - Risk of:  Goal: Absence of physical injury  Description: Absence of physical injury  10/23/2021 0113 by Lluvia Conway RN  Outcome: Ongoing

## 2021-10-23 NOTE — CARE COORDINATION
DISCHARGE SUMMARY     DATE OF DISCHARGE: 10/23/2021    DISCHARGE DESTINATION: Home with family    FACILITY: None    TRANSPORTATION: Family will transport    COMMENTS: SW left message for daughter  Alexei Arroyo via telephone today at [phone] and left a general message. SW with son in law Lisa  Fatmata's ) as he answered and he stated that he was aware that she was a likely discharge and they will pick her up when the nurse calls and tells them that they can be on their way. He denies any needs. No further needs noted at this time. Respectfully submitted,    STEVE Wallace  [facility]   [phone]    Electronically signed by STEVE Velazquez on 10/23/2021 at 10:45 AM

## 2021-10-23 NOTE — DISCHARGE SUMMARY
neurological examination. Patient reached the maximum benefit of this hospitalization. Patient  was hemodynamically stable on discharge     Patient symptoms was controlled and in agreement with discharge planning. Patient Instructions:    Discharge lab/important testing/finding that need follow up :  Recheck CT scan of the chest 6 to 8 weeks  Recheck INR next week with PCP  Outpatient follow-up with ENT    Activity: activity as tolerated    Diet: ADULT DIET; Regular; 4 carb choices (60 gm/meal); Low Sodium (2 gm); 1500 ml      Follow-up visits:   Leticia Hernandez MD  [facility] [address]  [phone]    Schedule an appointment as soon as possible for a visit today  need CT scan of the lungs in 6 weeks to make sure your lungs looks better          Discharge Medications:      Hetty Lesch   Home Medication Instructions [id number]    Printed on:10/23/21 0792   Medication Information                      amiodarone (CORDARONE) 200 MG tablet  Take 1 tablet by mouth daily             amoxicillin-clavulanate (AUGMENTIN) 875-125 MG per tablet  Take 1 tablet by mouth 2 times daily for 7 days             Cyanocobalamin (VITAMIN B 12) 500 MCG TABS  Take 500 mcg by mouth daily              doxycycline hyclate (VIBRA-TABS) 100 MG tablet  Take 1 tablet by mouth 2 times daily for 7 days             gabapentin (NEURONTIN) 300 MG capsule  TAKE 1 TO 2 CAPSULES BY MOUTH EVERY NIGHT AS DIRECTED             LORazepam (ATIVAN) 0.5 MG tablet  Take 0.5-1 tablets by mouth every 8 hours as needed for Anxiety for up to 10 days.              metoprolol succinate (TOPROL XL) 25 MG extended release tablet  Take 1 tablet by mouth daily             midodrine (PROAMATINE) 5 MG tablet  Take 0.5 tablets by mouth 2 times daily             neomycin-polymyxin-hydrocortisone (CORTISPORIN) 3.5-11833-5 otic suspension  Place 4 drops into the right ear 3 times daily for 10 days             torsemide (DEMADEX) 20 MG tablet  Take 1 tablet by mouth daily             warfarin (COUMADIN) 5 MG tablet  Take by mouth daily 5 mg (5 mg x 1) every Mon; 2.5 mg (5 mg x 0.5) all other days                     Labs: For convenience and continuity at follow-up the following most recent labs are provided:      CBC:    Lab Results   Component Value Date    WBC 14.6 10/23/2021    HGB 12.0 10/23/2021    HCT 37.5 10/23/2021     10/23/2021       Renal:    Lab Results   Component Value Date     10/23/2021    K 3.6 10/23/2021    K 3.4 10/21/2021    CL 97 10/23/2021    CO2 27 10/23/2021    BUN 12 10/23/2021    CREATININE 1.0 10/23/2021    CALCIUM 8.9 10/23/2021    PHOS 2.8 10/23/2021         Significant Diagnostic Studies    Radiology:   CT CHEST PULMONARY EMBOLISM W CONTRAST   Final Result   No evidence of pulmonary embolism. Interval development of pneumonia in the right middle lobe and lingula. Multiple stable small nodules in the periphery of the bilateral upper lobes         XR CHEST (2 VW)   Final Result   1. New left basilar lung infiltrate, concerning for pneumonia. Follow-up   radiographs are recommended to ensure resolution. 2. Pulmonary vascular congestion. Time Spent on discharge is 35 in the examination, evaluation, counseling and review of medications and discharge plan. Thank you Shalonda Luna MD for the opportunity to be involved in this patient's care.          Electronically signed by Fany Torres MD on 10/23/2021 at 3:56 PM

## 2021-10-23 NOTE — PROGRESS NOTES
Clinical Pharmacy Note  Warfarin Consult    Brit Grewal is a 78 y.o. female receiving warfarin managed by pharmacy. Warfarin Indication: Factor V Leiden,   Target INR range: 2-3  Dose prior to admission: 5 mg on Mondays, 2.5 mg all other days     Current warfarin drug-drug interactions: Zosyn, amiodarone (home dose)    Recent Labs     10/21/21  1536 10/21/21  2357 10/22/21  0955 10/23/21  0627   HGB  --  12.7  --  12.0   HCT  --  39.5  --  37.5   INR 2.8  --  3.36* 3.14*       Assessment/Plan:    Hold warfarin again tonight - anticipate resuming Warfarin tomorrow as INR trends back into therapeutic range. Will continue daily PT/INRs while patient remains in the hospital.     Thank you for the consult. Will continue to follow.     Alin Freeman [facility], PharmD, 10/23/2021 8:29 AM

## 2021-10-23 NOTE — PROGRESS NOTES
UTILIZATION REVIEW     Myles PENA  10/23/2021,   Gary Costa MD    1940  Chief Complaint   Patient presents with    Chest Pain     pt on home oxygen 2L. hx of afib. pt states she has chest and back pain. she placed a pulse ox on her finger at home and her HR went up to 124.  Back Pain    Shortness of Breath        Active Problems:    Pneumonia  Resolved Problems:    * No resolved hospital problems. *     has a past medical history of Acute ST elevation myocardial infarction (STEMI) ([facility] [address] Atrial fibrillation ([facility] [address] Chronic lymphocytic leukemia in remission ([facility] [address] CLL (chronic lymphocytic leukemia) ([facility] [address] COPD (chronic obstructive pulmonary disease) ([facility] [address] Essential hypertension, Factor V Leiden mutation ([facility] [address] Goiter, Hypercholesterolemia, Impaired fasting glucose, Osteoarthritis, Osteoporosis, Post herpetic neuralgia, and Vitamin D deficiency. Past Surgical History:     has a past surgical history that includes Splenectomy; knee surgery (2008); Cataract removal with implant (5/14/12); Tunneled venous port placement; Upper gastrointestinal endoscopy (N/A, 2/5/2019); Mohs surgery (03/12/2019); and joint replacement. ED REVIEW:    ED COURSE & MEDICAL DECISION [facility]  Pertinent Labs & Imaging studies reviewed. (See chart for details)   -  Patient seen and evaluated in the emergency department. Carole Orantes and nursing notes reviewed and incorporated. -  Old chart records reviewed and incorporated. -  Differential diagnosis includes: Differential Diagnosis: Acute Coronary Syndrome, Congestive Heart Failure, Myocardial Infarction, Pulmonary Embolus, Pneumonia, Pneumothorax, other     -  Work-up included:  See above  -  ED treatment included: See above  -  Results discussed with patient.   Patient presents the ED for evaluation of pleuritic chest pain to the right mid chest.  On presentation vital signs within normal limits and she is satting well on her home oxygen.  Labs show leukocytosis elevated compared to previous. Imaging studies show pneumonia in the right lower lobe and lingula. Patient will be admitted for further medical management evaluation. Jorge Wagoner feels well on reevaluation. She was admitted in stable condition.  The patient is agreeable with plan of care and disposition. CURRENT STATUS:   Grieving      2. Primary osteoarthritis involving multiple joints      3. Factor V Leiden mutation ([address]      4. Age-related osteoporosis without current pathological fracture      5. Chronic lymphocytic leukemia in remission      6. Orthostasis         Good family support - doing well overall - took lorazepam 1/2 tab 3x - feels okay now - pleased w/ hospice experience  F/u cardio - on chronic oxygen - stable  inr check tomorrow  On ca/ vit d for bones  Midodrine for orthostasis - doing well overall  Cont gabapentin for periph neuropathy pain -working well/ tolerated well  F/u q6m/ prn - recent labs reviewed  Cortisporin to right ear o/w keep eac dry - needs ear exam/ ? [place] if not better in couple days      IMAGING-Relevant:  The cardiac silhouette is enlarged.  Right chest MediPort is noted.  Vascular   calcifications are noted along the aortic arch.  There is prominence of the   pulmonary arteries suggesting underlying pulmonary hypertension.  Pulmonary   vascular congestion is noted. There is an area of consolidation noted in the left lung base, more   pronounced, concerning for underlying pneumonia. Degenerative changes are noted along the spine.       Impression:       1. New left basilar lung infiltrate, concerning for pneumonia.  Follow-up   radiographs are recommended to ensure resolution.    2. Pulmonary vascular congestion.      CT CHEST PULMONARY EMBOLISM W CONTRAST       Normal sinus rhythmPossible Left atrial enlargementIncomplete right bundle branch blockLeft anterior fascicular blockConfirmed by SANDRA PENA, Jose Daniel Madison ([id number]) on 10/22/2021 8:14:27 AM    MY REVIEW:    78  female  Multiple comoribidities  Failed outpatient seen virtual by PCP office and then came in  HOME O2  Tachycardia  FEVER   Leucocytosis  Pneumonia suspected  IV ANTIBIOTICS  Hypokalemia  ANTICOAGULATION out of range and concern with new medications  - antibiotics management and control/ for clotting disorder      The Utilization Review Committee members, including its physician members, have reviewed this case and agree that this patient does meet evidence based criteria for inpatient services,  to ADMISSION =\"admit as inpatient\"  Medical care will continue to be provided by Theresa Callaway MD, who concurs with this decision and has documented his/her concurrence in the patient's medical record. Wicho Hurtado MD, [facility] RileyV[facility] [address], [facility], [facility]    Cell [phone]  Office [phone]  10/23/2021  12:12 PM

## 2021-10-23 NOTE — PLAN OF CARE
Problem: Pain:  Goal: Pain level will decrease  Description: Pain level will decrease  10/23/2021 1051 by Darío Mcgee RN  Outcome: Ongoing  10/23/2021 0113 by Lisa Eddy RN  Outcome: Ongoing  Goal: Control of acute pain  Description: Control of acute pain  10/23/2021 1051 by Darío Mcgee RN  Outcome: Ongoing  10/23/2021 0113 by Lisa Eddy RN  Outcome: Ongoing  Goal: Control of chronic pain  Description: Control of chronic pain  10/23/2021 1051 by Darío Mcgee RN  Outcome: Ongoing  10/23/2021 0113 by Lisa Eddy RN  Outcome: Ongoing     Problem: Falls - Risk of:  Goal: Will remain free from falls  Description: Will remain free from falls  10/23/2021 1051 by Darío Mcgee RN  Outcome: Ongoing  10/23/2021 0113 by Lisa Eddy RN  Outcome: Ongoing  Goal: Absence of physical injury  Description: Absence of physical injury  10/23/2021 1051 by Darío Mcgee RN  Outcome: Ongoing  10/23/2021 0113 by Lisa Eddy RN  Outcome: Ongoing

## 2021-10-25 ENCOUNTER — CARE COORDINATION (OUTPATIENT)
Dept: CASE MANAGEMENT | Age: 81
End: 2021-10-25

## 2021-10-25 NOTE — CARE COORDINATION
[facility] Transitions Initial Follow Up Call    Call within 2 business days of discharge: Yes    Patient: Rebekah Méndez Patient : 1940   MRN: [id number]  Reason for Admission: PNA right lower lobe  Discharge Date: 10/23/21 RARS: Readmission Risk Score: 18.3      Last Discharge [facility]       Complaint Diagnosis Description Type Department Provider    10/21/21 Chest Pain; Back Pain; Shortness of Breath Pneumonia of right lower lobe due to infectious organism . .. ED to Hosp-Admission (Discharged) (ADMITTED) Denise Rodriguez MD; Janes Domínguez,... Spoke with: no one    Facility: [facility]    Initial attempt at [employer] discharge phone call. Unable to reach patient. Left message. Contact info provided. Requested return call to CTN.         Follow Up  Future Appointments   Date Time Provider Buck Soares   10/26/2021  2:20 PM FAB Moura CNP [facility]   11/15/2021  8:40 AM FAB Hull CNP [facility]   2021 10:00 AM [place] [facility] [place] [facility]   2021  8:00 AM [facility]       Tere Whiteside RN BSN  Care Transition Nurse  [phone]

## 2021-10-26 ENCOUNTER — CARE COORDINATION (OUTPATIENT)
Dept: CASE MANAGEMENT | Age: 81
End: 2021-10-26

## 2021-10-26 ENCOUNTER — TELEPHONE (OUTPATIENT)
Dept: PHARMACY | Age: 81
End: 2021-10-26

## 2021-10-26 ENCOUNTER — OFFICE VISIT (OUTPATIENT)
Dept: FAMILY MEDICINE CLINIC | Age: 81
End: 2021-10-26
Payer: MEDICARE

## 2021-10-26 VITALS
DIASTOLIC BLOOD PRESSURE: 70 MMHG | SYSTOLIC BLOOD PRESSURE: 126 MMHG | HEIGHT: 59 IN | HEART RATE: 58 BPM | WEIGHT: 153.4 LBS | OXYGEN SATURATION: 98 % | BODY MASS INDEX: 30.92 KG/M2

## 2021-10-26 DIAGNOSIS — Z99.81 OXYGEN DEPENDENT: ICD-10-CM

## 2021-10-26 DIAGNOSIS — H66.91 RIGHT OTITIS MEDIA WITH SPONTANEOUS RUPTURE OF EARDRUM: Primary | ICD-10-CM

## 2021-10-26 DIAGNOSIS — H90.71 MIXED CONDUCTIVE AND SENSORINEURAL HEARING LOSS OF RIGHT EAR, UNSPECIFIED HEARING STATUS ON CONTRALATERAL SIDE: ICD-10-CM

## 2021-10-26 DIAGNOSIS — H61.22 EXCESSIVE CERUMEN IN EAR CANAL, LEFT: ICD-10-CM

## 2021-10-26 DIAGNOSIS — H72.91 RIGHT OTITIS MEDIA WITH SPONTANEOUS RUPTURE OF EARDRUM: Primary | ICD-10-CM

## 2021-10-26 PROCEDURE — G8400 PT W/DXA NO RESULTS DOC: HCPCS | Performed by: NURSE PRACTITIONER

## 2021-10-26 PROCEDURE — G8427 DOCREV CUR MEDS BY ELIG CLIN: HCPCS | Performed by: NURSE PRACTITIONER

## 2021-10-26 PROCEDURE — 1036F TOBACCO NON-USER: CPT | Performed by: NURSE PRACTITIONER

## 2021-10-26 PROCEDURE — 1090F PRES/ABSN URINE INCON ASSESS: CPT | Performed by: NURSE PRACTITIONER

## 2021-10-26 PROCEDURE — G8482 FLU IMMUNIZE ORDER/ADMIN: HCPCS | Performed by: NURSE PRACTITIONER

## 2021-10-26 PROCEDURE — G8417 CALC BMI ABV UP PARAM F/U: HCPCS | Performed by: NURSE PRACTITIONER

## 2021-10-26 PROCEDURE — 1111F DSCHRG MED/CURRENT MED MERGE: CPT | Performed by: NURSE PRACTITIONER

## 2021-10-26 PROCEDURE — 99214 OFFICE O/P EST MOD 30 MIN: CPT | Performed by: NURSE PRACTITIONER

## 2021-10-26 PROCEDURE — 4040F PNEUMOC VAC/ADMIN/RCVD: CPT | Performed by: NURSE PRACTITIONER

## 2021-10-26 PROCEDURE — 69210 REMOVE IMPACTED EAR WAX UNI: CPT | Performed by: NURSE PRACTITIONER

## 2021-10-26 PROCEDURE — 1123F ACP DISCUSS/DSCN MKR DOCD: CPT | Performed by: NURSE PRACTITIONER

## 2021-10-26 RX ORDER — CIPROFLOXACIN AND DEXAMETHASONE 3; 1 MG/ML; MG/ML
4 SUSPENSION/ DROPS AURICULAR (OTIC) 2 TIMES DAILY
Qty: 1 EACH | Refills: 0 | Status: SHIPPED | OUTPATIENT
Start: 2021-10-26 | End: 2021-11-02

## 2021-10-26 ASSESSMENT — ENCOUNTER SYMPTOMS
SINUS PAIN: 0
SHORTNESS OF BREATH: 1
COUGH: 0
ABDOMINAL PAIN: 0
COLOR CHANGE: 0
NAUSEA: 0
DIARRHEA: 0
ABDOMINAL DISTENTION: 0
SINUS PRESSURE: 0
CHEST TIGHTNESS: 0
CONSTIPATION: 0
EYE DISCHARGE: 0
BACK PAIN: 0

## 2021-10-26 NOTE — CARE COORDINATION
[facility] Transitions Initial Follow Up Call    Call within 2 business days of discharge: Yes    Patient: Ronald Wang Patient : 1940   MRN: [id number]  Reason for Admission: PNA - right lower lobe  Discharge Date: 10/23/21 RARS: Readmission Risk Score: 18.3      Last Discharge [facility]       Complaint Diagnosis Description Type Department Provider    10/21/21 Chest Pain; Back Pain; Shortness of Breath Pneumonia of right lower lobe due to infectious organism . .. ED to Hosp-Admission (Discharged) (ADMITTED) Kelley Lacey MD; Tomeka Marie,... Spoke with: Althea Devine - patient    Facility: [facility]    2nd attempt at [employer] discharge phone call. [facility] states she is doing \"fine\". She declines completing the discharge phone call and any additional CT follow up.         Follow Up  Future Appointments   Date Time Provider Buck Soares   10/26/2021  2:20 PM FAB Morrison CNP [facility]   11/15/2021  8:40 AM FAB Canales CNP [facility]   2021 10:00 AM [place] [facility]   2021  8:00 AM [facility]       Zeke Henriquez RN BSN  Care Transition Nurse  [phone]

## 2021-10-26 NOTE — PROGRESS NOTES
Years: 50.00     Pack years: 15.00     Types: Cigarettes     Quit date: 1995     Years since quittin.9    Smokeless tobacco: Never Used   Substance Use Topics    Alcohol use: No     Alcohol/week: 0.0 standard drinks        Vitals:    10/26/21 1411   BP: 126/70   Site: Right Upper Arm   Position: Sitting   Cuff Size: Medium Adult   Pulse: 58   SpO2: 98%  Comment: 2 L   Weight: 153 lb 6.4 oz (69.6 kg)  Comment: SHOES ON   Height: 4' 11\" (1.499 m)     Estimated body mass index is 30.98 kg/m² as calculated from the following:    Height as of this encounter: 4' 11\" (1.499 m). Weight as of this encounter: 153 lb 6.4 oz (69.6 kg). Physical Exam  Vitals reviewed. Constitutional:       General: She is awake. Appearance: Normal appearance. She is well-developed and well-groomed. She is obese. She is not ill-appearing. HENT:      Head: Normocephalic and atraumatic. Right Ear: Ear canal and external ear normal. Decreased hearing (no hearing) noted. Drainage, swelling and tenderness present. Tympanic membrane is perforated. Left Ear: Tympanic membrane, ear canal and external ear normal. Decreased hearing noted. There is impacted cerumen. Nose: Nose normal.      Mouth/Throat:      Lips: Pink. Mouth: Mucous membranes are moist.      Pharynx: Oropharynx is clear. Eyes:      General: Lids are normal.      Extraocular Movements: Extraocular movements intact. Conjunctiva/sclera: Conjunctivae normal.      Pupils: Pupils are equal, round, and reactive to light. Neck:      Thyroid: No thyromegaly. Vascular: No carotid bruit. Cardiovascular:      Rate and Rhythm: Normal rate. Pulses:           Carotid pulses are 2+ on the right side and 2+ on the left side. Radial pulses are 2+ on the right side and 2+ on the left side. Posterior tibial pulses are 2+ on the right side and 2+ on the left side.       Heart sounds: Normal heart sounds, S1 normal and S2 normal. No murmur heard. Pulmonary:      Effort: Pulmonary effort is normal.      Breath sounds: Normal breath sounds. Comments: 2 L NC  Abdominal:      General: Bowel sounds are normal. There is no abdominal bruit. Palpations: Abdomen is soft. Tenderness: There is no abdominal tenderness. Genitourinary:     Comments: Deferred  Musculoskeletal:         General: Normal range of motion. Cervical back: Full passive range of motion without pain, normal range of motion and neck supple. Right lower leg: No edema. Left lower leg: No edema. Lymphadenopathy:      Head:      Right side of head: No submental, submandibular, tonsillar, preauricular, posterior auricular or occipital adenopathy. Left side of head: No submental, submandibular, tonsillar, preauricular, posterior auricular or occipital adenopathy. Cervical: No cervical adenopathy. Right cervical: No superficial, deep or posterior cervical adenopathy. Left cervical: No superficial, deep or posterior cervical adenopathy. Upper Body:      Right upper body: No supraclavicular adenopathy. Left upper body: No supraclavicular adenopathy. Skin:     General: Skin is warm and dry. Capillary Refill: Capillary refill takes less than 2 seconds. Neurological:      General: No focal deficit present. Mental Status: She is alert and oriented to person, place, and time. Mental status is at baseline. Sensory: Sensation is intact. Motor: Motor function is intact. Coordination: Coordination is intact. Gait: Gait is intact. Psychiatric:         Attention and Perception: Attention and perception normal.         Mood and Affect: Mood and affect normal.         Speech: Speech normal.         Behavior: Behavior normal. Behavior is cooperative. Thought Content:  Thought content normal.         Cognition and Memory: Cognition and memory normal.         Judgment: Judgment normal. ASSESSMENT/PLAN:  1. Right otitis media with spontaneous rupture of eardrum  -     ciprofloxacin-dexamethasone (CIPRODEX) 0.3-0.1 % otic suspension; Place 4 drops into the right ear 2 times daily for 7 days, Disp-1 each, R-0Normal   Stop corticosporin   Discussed cost of ciprodex  And use of GoodRx   Continue augmentin   Keep ear dry besides showering  2. Excessive cerumen in ear canal, left   80675 CPT Provider removal impacted cerumen instrumentation unilateral    Left ear canal with impacted cerumen interfering with visualization of TM; cerumen removed using lighted curette instrumentation; cerumen successfully removed. Pt tolerated well. External canal slightly erythematous following procedure. Pearly gray TM WNL after procedure. Avoid use of Qtips   Recommend use of hydrogen peroxide or debrox   Recommend buying Debrox (over the counter ear drop to help soften ear wax for easier or natural removal)  3. Mixed conductive and sensorineural hearing loss of right ear, unspecified hearing status on contralateral side   Follow up with audiologist next week  4. Oxygen dependent    2 L portable   1-1.5 L at home   Consider pulmonology referral to Dr Zeke Roque recommended  COVID vaccine booster recommended      I have reviewed patient's pertinent medical history, relevant laboratory and imaging studies, and past/future health maintenance. Discussed with the patient the importance of adhering to their current medication regimen as directed. Advised the patient that they should continue to work on eating a healthy balanced diet and staying active by exercising within their personal limits. Orders as listed above. Patient was advised to keep future appointments with their respective specialty care team(s). Patient had the opportunity to ask questions, all of which were answered to the best of my ability and with patient satisfaction.  Patient understands and is agreeable with the care plan following today's visit. Patient is to schedule an appointment for any new or worsening symptoms. Go to ER for significant shortness of breath, chest pain, or uncontrolled pain or fever. I discussed with patient the risk and benefits of any medications that were prescribed today. I verified that the patient understands their medications, labs, and/or procedures. The patient is doing well with current medication regimen and does not have any barriers to adherence. The patient's self-management abilities are good. Return in about 2 months (around 12/26/2021) for Oxygen Breathing Follow Up. An electronic signature was used to authenticate this note.     --FAB Cage - CNP on 10/26/2021 at 4:34 PM

## 2021-10-26 NOTE — PATIENT INSTRUCTIONS
Patient Education        Earwax Blockage: Care Instructions  Your Care Instructions     Earwax is a natural substance that protects the ear canal. Normally, earwax drains from the ears and does not cause problems. Sometimes earwax builds up and hardens. Earwax blockage (also called cerumen impaction) can cause some loss of hearing and pain. When wax is tightly packed, you will need to have your doctor remove it. Follow-up care is a key part of your treatment and safety. Be sure to make and go to all appointments, and call your doctor if you are having problems. It's also a good idea to know your test results and keep a list of the medicines you take. How can you care for yourself at home? · Do not try to remove earwax with cotton swabs, fingers, or other objects. This can make the blockage worse and damage the eardrum. · If your doctor recommends that you try to remove earwax at home:  ? Soften and loosen the earwax with warm mineral oil. You also can try hydrogen peroxide mixed with an equal amount of room temperature water. Place 2 drops of the fluid, warmed to body temperature, in the ear two times a day for up to 5 days. ? Once the wax is loose and soft, all that is usually needed to remove it from the ear canal is a gentle, warm shower. Direct the water into the ear, then tip your head to let the earwax drain out. Dry your ear thoroughly with a hair dryer set on low. Hold the dryer several inches from your ear. ? If the warm mineral oil and shower do not work, use an over-the-counter wax softener. Read and follow all instructions on the label. After using the wax softener, use an ear syringe to gently flush the ear. Make sure the flushing solution is body temperature. Cool or hot fluids in the ear can cause dizziness. When should you call for help?    Call your doctor now or seek immediate medical care if:    · Pus or blood drains from your ear.     · Your ears are ringing or feel full.     · You have a loss of hearing. Watch closely for changes in your health, and be sure to contact your doctor if:    · You have pain or reduced hearing after 1 week of home treatment.     · You have any new symptoms, such as nausea or balance problems. Where can you learn more? Go to https://chpebrianeweb.DDStocks. org and sign in to your [email or web address] account. Enter J173 in the [facility] box to learn more about \"Earwax Blockage: Care Instructions. \"     If you do not have an account, please click on the \"Sign Up Now\" link. Current as of: July 1, 2021               Content Version: 13.0  © 9240-6958 bodaplanes. Care instructions adapted under license by [facility] ([facility]). If you have questions about a medical condition or this instruction, always ask your healthcare professional. [address] any warranty or liability for your use of this information. Patient Education        Hearing Loss: Care Instructions  Overview     Hearing loss is a sudden or slow decrease in how well you hear. It can range from mild to severe. Permanent hearing loss can occur with aging. It also can happen when you are exposed long-term to loud noise. Examples include listening to loud music, riding motorcycles, or being around other loud machines. Hearing loss can affect your work and home life. It can make you feel lonely or depressed. You may feel that you have lost your independence. But hearing aids and other devices can help you hear better and feel connected to others. Follow-up care is a key part of your treatment and safety. Be sure to make and go to all appointments, and call your doctor if you are having problems. It's also a good idea to know your test results and keep a list of the medicines you take. How can you care for yourself at home? · Avoid loud noises whenever possible. This helps keep your hearing from getting worse.   · Always wear hearing protection around loud noises. · Wear a hearing aid as directed. See a professional who can help you pick a hearing aid that fits you. · Have hearing tests as your doctor suggests. They can show whether your hearing has changed. Your hearing aid may need to be adjusted. · Use other devices as needed. These may include:  ? Telephone amplifiers and hearing aids that can connect to a television, stereo, radio, or microphone. ? Devices that use lights or vibrations. These alert you to the doorbell, a ringing telephone, or a baby monitor. ? Television closed-captioning. This shows the words at the bottom of the screen. Most new TVs can do this. ? TTY (text telephone). This lets you type messages back and forth on the telephone instead of talking or listening. These devices are also called TDD. When messages are typed on the keyboard, they are sent over the phone line to a receiving TTY. The message is shown on a monitor. · Use text messaging, social media, and email if it is hard for you to communicate by telephone. · Try to learn a listening technique called speechreading. It is not lipreading. You pay attention to people's gestures, expressions, posture, and tone of voice. These clues can help you understand what a person is saying. Face the person you are talking to, and have them face you. Make sure the lighting is good. You need to see the other person's face clearly. · Think about counseling if you need help to adjust to your hearing loss. When should you call for help? Watch closely for changes in your health, and be sure to contact your doctor if:    · You think your hearing is getting worse.     · You have new symptoms, such as dizziness or nausea. Where can you learn more? Go to https://ContentDJmonroeeb.Expert. org and sign in to your Storone account. Enter E908 in the ReSnap box to learn more about \"Hearing Loss: Care Instructions. \"     If you do not have an account, please click on the \"Sign Up Now\" link. Current as of: December 2, 2020               Content Version: 13.0  © 2558-4348 Healthwise, Incorporated. Care instructions adapted under license by [facility] ([facility]). If you have questions about a medical condition or this instruction, always ask your healthcare professional. [address] any warranty or liability for your use of this information. Patient Education        Keeping Ears Dry: Care Instructions  Your Care Instructions  Your doctor wants you to keep water from getting into your ears. You may need to do this because of a ruptured eardrum, an ear infection, or other ear problems. Follow-up care is a key part of your treatment and safety. Be sure to make and go to all appointments, and call your doctor if you are having problems. It's also a good idea to know your test results and keep a list of the medicines you take. How can you care for yourself at home? · Take baths until your doctor says you can take showers again. Avoid getting water in the ear until after the problem clears up. Ask your doctor if you should use earplugs to keep water out of your ears. · Do not swim until your doctor says you can. · If you get water in your ears, turn your head to each side and pull the earlobe in different directions. This will help the water run out. If your ears are still wet, use a hair dryer set on the lowest heat. Hold the dryer several inches from your ear. · Use your medicines exactly as prescribed. Call your doctor if you think you are having a problem with your medicine. Do not put drops in your ears unless your doctor prescribes them. When should you call for help? Watch closely for changes in your health, and be sure to contact your doctor if you have any problems. Where can you learn more? Go to https://mary.Knock Knock. org and sign in to your [employer] Systems account.  Enter V132 in the [employer] box to learn more about \"Keeping Ears Dry: Care Instructions. \"     If you do not have an account, please click on the \"Sign Up Now\" link. Current as of: December 2, 2020               Content Version: 13.0  © 2006-2021 [employer]Cart. Care instructions adapted under license by [facility] ([facility]). If you have questions about a medical condition or this instruction, always ask your healthcare professional. [address] any warranty or liability for your use of this information. Patient Education        Ear Infection (Otitis Media): Care Instructions  Overview     An ear infection may start with a cold and affect the middle ear (otitis media). It can hurt a lot. Most ear infections clear up on their own in a couple of days and do not need antibiotics. Also, antibiotics do not work against viruses, which may be the cause of your infection. Regular doses of pain relievers are the best way to reduce your fever and help you feel better. Follow-up care is a key part of your treatment and safety. Be sure to make and go to all appointments, and call your doctor if you are having problems. It's also a good idea to know your test results and keep a list of the medicines you take. How can you care for yourself at home? · Take pain medicines exactly as directed. ? If the doctor gave you a prescription medicine for pain, take it as prescribed. ? If you are not taking a prescription pain medicine, take an over-the-counter medicine, such as acetaminophen (Tylenol), ibuprofen (Advil, Motrin), or naproxen (Aleve). Read and follow all instructions on the label. ? Do not take two or more pain medicines at the same time unless the doctor told you to. Many pain medicines have acetaminophen, which is Tylenol. Too much acetaminophen (Tylenol) can be harmful. · Plan to take a full dose of pain reliever before bedtime. Getting enough sleep will help you get better. · Try a warm, moist washcloth on the ear.  It may help relieve pain.  · If your doctor prescribed antibiotics, take them as directed. Do not stop taking them just because you feel better. You need to take the full course of antibiotics. When should you call for help? Call your doctor now or seek immediate medical care if:    · You have new or increasing ear pain.     · You have new or increasing pus or blood draining from your ear.     · You have a fever with a stiff neck or a severe headache. Watch closely for changes in your health, and be sure to contact your doctor if:    · You have new or worse symptoms.     · You are not getting better after taking an antibiotic for 2 days. Where can you learn more? Go to https://Zafupepiceweb.FlipGive. org and sign in to your Skim.it account. Enter G964 in the [employer] box to learn more about \"Ear Infection (Otitis Media): Care Instructions. \"     If you do not have an account, please click on the \"Sign Up Now\" link. Current as of: December 2, 2020               Content Version: 13.0  © 2006-2021 [employer]el. Care instructions adapted under license by [facility] ([facility]). If you have questions about a medical condition or this instruction, always ask your healthcare professional. [place] [address] any warranty or liability for your use of this information. Patient Education        Oxygen Therapy: Care Instructions  Your Care Instructions     Oxygen therapy helps you get more oxygen into your lungs and bloodstream. You may use it if you have a disease that makes it hard to breathe, such as COPD, pulmonary fibrosis (scarring of the lungs), or heart failure. Oxygen therapy can make it easier for you to breathe and can reduce your heart's workload. Some people need extra oxygen all the time. Others need it from time to time throughout the day or overnight. A doctor will prescribe how much oxygen you need and how often to use it.   To breathe the oxygen, most people use a nasal cannula (say \"TK-katherineh-elis\"). This is a thin tube with two prongs that fit just inside your nose. People who need a lot of oxygen may need to use a mask that fits over the nose and mouth. Follow-up care is a key part of your treatment and safety. Be sure to make and go to all appointments, and call your doctor if you are having problems. It's also a good idea to know your test results and keep a list of the medicines you take. How can you care for yourself at home? To help yourself  · Using oxygen may dry out your nose or lips. Use water-based lubricants on your lips or nostrils. Do not use an oil-based product like petroleum jelly. · If you use a nasal cannula, the tubing may rub under your nostrils and around your ears. To keep your skin from getting sore, tuck some gauze under the tubing. Use a water-based lotion on rubbed areas. · Do not use alcohol or take drugs that relax you, because they will slow your breathing rate. · Keep track of how much oxygen is in the tank, and reorder before it runs out. If a holiday is coming up or you expect bad weather, order in advance or make your regular order larger. · You may need extra oxygen when you travel to high altitudes or travel by plane. Ask your doctor about this. · If you are getting oxygen directly to your windpipe through an opening in your neck, your doctor will teach you how to care for the equipment. To make sure oxygen is flowing  · Check the flow by holding your mask or cannula up to your ear and listening for the \"hiss\" of airflow. · If you have a nasal cannula, dip the prongs in a glass of water. If you see bubbles, oxygen is coming through. · Check your pressure gauge or contents indicator. · If you use an oxygen concentrator, make sure it is turned on and plugged in. If you use a cylinder, make sure the valve is open. · Look for kinks, blockages, or water in the tubing. Be sure the tubing is connected to the oxygen source.   · Do not change damp cloth daily. Clean the air filter at least 2 times a week. Where can you learn more? Go to https://chpepiceweb.In Loco Media. org and sign in to your [employer] account. Enter B047 in the [facility] box to learn more about \"Oxygen Therapy: Care Instructions. \"     If you do not have an account, please click on the \"Sign Up Now\" link. Current as of: July 6, 2021               Content Version: 13.0  © 2006-2021 [employer]. Care instructions adapted under license by [facility] ([facility]). If you have questions about a medical condition or this instruction, always ask your healthcare professional. [place] [address] any warranty or liability for your use of this information. Patient Education        Perforated Eardrum: Care Instructions  Your Care Instructions     A tear or hole in the membrane of the middle ear is called a perforated or ruptured eardrum. This can happen if an infection builds up inside the ear or if the eardrum gets injured. You may find it hard to hear out of that ear or may hear a buzzing sound. You may have an earache or have fluids that drain from the ear. Your eardrum should heal on its own in a few weeks, and you should hear normally then. If you have an infection, your doctor may prescribe antibiotics. You may need pain relief medicine for your earache. Your doctor will check to see if your eardrum has healed. If not, you may need surgery to repair the eardrum. Follow-up care is a key part of your treatment and safety. Be sure to make and go to all appointments, and call your doctor if you are having problems. It's also a good idea to know your test results and keep a list of the medicines you take. How can you care for yourself at home? · If your doctor prescribed antibiotics, take them as directed. Do not stop taking them just because you feel better. You need to take the full course of antibiotics.   · Take an over-the-counter pain medicine, such as acetaminophen (Tylenol), ibuprofen (Advil, Motrin), or naproxen (Aleve), as needed. Read and follow all instructions on the label. · Do not take two or more pain medicines at the same time unless the doctor told you to. Many pain medicines have acetaminophen, which is Tylenol. Too much acetaminophen (Tylenol) can be harmful. · To ease pain, put a warm washcloth or a heating pad set on low on your ear. You may have some drainage from the ear. · Be careful when taking over-the-counter cold or flu medicines and Tylenol at the same time. Many of these medicines have acetaminophen, which is Tylenol. Read the labels to make sure that you are not taking more than the recommended dose. Too much Tylenol can be harmful. · Keep your ears dry. ? Take baths until your doctor says you can take showers again. ? When you wash your hair, use cotton lightly coated with petroleum jelly as an earplug. Do not use plastic earplugs. ? Do not swim until your doctor says you can.  ? If you get water in your ears, turn your head to each side and pull the earlobe in different directions. This will help the water run out. If your ears are still wet, use a hair dryer set on the lowest heat. Hold the dryer several inches from your ear. · Do not put anything into your ear canal. For example, do not use a cotton swab to clean the inside of your ear. It can damage your ear. If you think you have something inside your ear, ask your doctor to check it. When should you call for help? Call your doctor now or seek immediate medical care if:    · You have signs of infection, such as:  ? Increased pain, swelling, warmth, or redness. ? Pus draining from the ear. ? A fever. Watch closely for changes in your health, and be sure to contact your doctor if:    · You have changes in hearing.     · You do not get better as expected. Where can you learn more? Go to https://chmonroeeb.health-partners. org and sign in to your ITM Power account. Enter W128 in the [facility] box to learn more about \"Perforated Eardrum: Care Instructions. \"     If you do not have an account, please click on the \"Sign Up Now\" link. Current as of: December 2, 2020               Content Version: 13.0  © 2435-3848 Healthwise, Incorporated. Care instructions adapted under license by [facility] ([facility]). If you have questions about a medical condition or this instruction, always ask your healthcare professional. [address] any warranty or liability for your use of this information.

## 2021-11-02 NOTE — PROGRESS NOTES
Physician Progress Note      Hermelinda LUCIO #:                  [id number]  :                       [id number]  ADMIT DATE:       10/21/2021 11:03 PM  [address] [place] DATE:        10/23/2021 1:56 PM  RESPONDING  PROVIDER #:        Deidra Gregory MD          QUERY TEXT:    Pt admitted with SOB/chest pain. Pt noted to have PNA. If possible, please   document in the progress notes and discharge summary if you are evaluating   and/or treating any of the following:    Note: CAP and HCAP indicate where the pneumonia was acquired, not a specific   type. The medical record reflects the following:  Risk Factors: hx of CHF, chronic resp failure and CLL, recent admission for   PNA  Clinical Indicators: cough, SOB, CXR showed- New left basilar lung infiltrate,   concerning for pneumonia  Treatment: Vancomycin, Maxipime, & Zosyn IV, CXR  Options provided:  -- Gram negative pneumonia  -- Other - I will add my own diagnosis  -- Disagree - Not applicable / Not valid  -- Disagree - Clinically unable to determine / Unknown  -- Refer to Clinical Documentation Reviewer    PROVIDER RESPONSE TEXT:    Provider is clinically unable to determine a response to this query.     Query created by: Owen Carnes on 10/29/2021 6:26 AM      Electronically signed by:  Deidra Gregory MD 2021 8:15 AM

## 2021-11-08 NOTE — PROGRESS NOTES
The [address] [facility], [address] [place] [address] [address] [address]  [phone]    PrimaryCare Doctor:  Fabienne Newberry MD  Primary Cardiologist: Dr Arnulfo Lafleur   Patient presents with    Check-Up     No Cardiac symptoms    Other     INR managed by [facility]     History of Present Illness: Mayo Lujan is a 78 y.o. female with PMH SHF, NICM, PAF (warfarin), BRENNAN, CLL, factor V mutation. Former smoker. Patient was admitted to [facility] 7/19-7/26/2021 with CP. EKG indicated inferoanterolateral STEMI. Went for emergent LHC>patent coronaries. Found acute systolic heart failure, EF initially <10%. Unclear etiology. Required impella support and inotropes. Started on GDMT and midodrine for low BP. Hem/Onc consulted with possible concern for hx chemo R/T CM and noted that it was unlikely. DC wt 166lbs. Was doing well as OP until readmission 9/11-9/18/21 with acute resp failure. Underwent RHC and ruled out for fluid overload/pulm edema. Treated for CAP. Held entresto at DC and continued torsemide 20mg daily. DC wt 142lbs. Admitted W 10/21-10/23/2021 with RML PNA. Patient presents to [facility] cardiology for follow up for heart failure. Patient has had ongoing stress at home. Her  passed away ~ 1 month ago. Last week her son in [place] had a stroke and she traveled in a car for 9 hours to see him. Despite this, she is doing OK today. She is feeling some fatigue from the weekend travel. Activity tolerance is baseline for her. Home wt 158lbs - stable. She  intermittently takes extra water pill ~ one time a week if wt goes up a few lbs. Her SOB is chronic but stable. She is on 1.5-2L NC. SOB is mild with exertion. Improves with rest.     Denies  CP, edema, activity intolerance, syncope. She is following 2gm Na diet and 64 fl oz restriction. Review of Systems:   General: Denies fever, chills  Skin: Denies skin changes, rash, itching, lesions.   HEENT: Denies headache, dizziness, vision changes, nosebleeds, sore throat, nasal drainage  RESP: Denies cough, sputum,wheeze , snoring  CARD: Denies palpitations,  murmur  GI:Denies nausea, vomiting, heartburn, loss of appetite, change in bowels  : Denies frequency, pain, incontinence, polyuria  VASC: Denies claudication, leg cramps, clots  MUSC/SKEL: Denies pain, stiffness, arthritis  PSYCH: Denies anxiety, depression, stress  NEURO: Denies numbness, tingling, weakness,change in mood or memory  HEME: Denies abn bruising, bleeding, anemia  ENDO: Denies intolerance to heat, cold, excessive thirst or hunger, hx thyroid disease    /66   Pulse 54   Ht 4' 11\" (1.499 m)   Wt 160 lb (72.6 kg)   SpO2 95%   BMI 32.32 kg/m²   Wt Readings from Last 3 Encounters:   11/15/21 160 lb (72.6 kg)   10/26/21 153 lb 6.4 oz (69.6 kg)   10/23/21 154 lb 8.7 oz (70.1 kg)     Physical Exam:  GEN: Appears frail, no acute distress  SKIN: Pink, warm, dry. Nails without clubbing. HEENT: PERRLA. Normocephalic, atraumatic. Neck supple. No adenopathy. LUNG: AP diameter normal.  Diminished bilateral. No wheeze, rales, or ronchi. Respiratory effort increased with activity. HEART: S1S2 A/R. No JVD. No carotid bruit. No murmur, rub or gallop. ABD: Soft, nontender. +BS X 4 quads. No hepatomegaly. EXT: Radial and pedal pulses 2+ and symmetric. Without varicosities. Trace pedal/ankle edema. MUSCSKEL: Good ROM X4 extremities. No deformity. NEURO: A/O X3. Calm and cooperative.       Past Medical History:   has a past medical history of Acute ST elevation myocardial infarction (STEMI) ([facility] [address] Atrial fibrillation ([facility] [address] Chronic lymphocytic leukemia in remission ([facility] [address] CLL (chronic lymphocytic leukemia) ([facility] [address] COPD [facility]ch[facility] obstructive pulmonary disease) [facility] Essential hypertension, Factor V Leiden mutation ([facility] Goiter, Hypercholesterolemia, Impaired fasting glucose, Osteoarthritis, Osteoporosis, Post herpetic neuralgia, and Vitamin D deficiency. Surgical History:   has a past surgical history that includes Splenectomy; knee surgery (); Cataract removal with implant (12); Tunneled venous port placement; Upper gastrointestinal endoscopy (N/A, 2019); Mohs surgery (2019); and joint replacement. Social History:   reports that she quit smoking about 26 years ago. Her smoking use included cigarettes. She has a 15.00 pack-year smoking history. She has never used smokeless tobacco. She reports that she does not drink alcohol. Family History:   Family History   Problem Relation Age of Onset    Diabetes Father     Stroke Sister 50         of a stroke       HomeMedications:  Prior to Admission medications    Medication Sig Start Date End Date Taking? Authorizing Provider   metoprolol succinate (TOPROL XL) 25 MG extended release tablet Take 12.5 mg by mouth daily   Yes Historical Provider, MD   warfarin (COUMADIN) 5 MG tablet Take by mouth daily 5 mg (5 mg x 1) every Mon; 2.5 mg (5 mg x 0.5) all other days   Yes Historical Provider, MD   torsemide (DEMADEX) 20 MG tablet Take 1 tablet by mouth daily 21  Yes De Summers MD   midodrine (PROAMATINE) 5 MG tablet Take 0.5 tablets by mouth 2 times daily 21  Yes FAB Lopez CNP   amiodarone (CORDARONE) 200 MG tablet Take 1 tablet by mouth daily 21  Yes Damion Stern MD   gabapentin (NEURONTIN) 300 MG capsule TAKE 1 TO 2 CAPSULES BY MOUTH EVERY NIGHT AS DIRECTED  Patient taking differently: Take 600 mg by mouth nightly. 3/1/21 12/31/21 Yes FAB Asher CNP   Cyanocobalamin (VITAMIN B 12) 500 MCG TABS Take 500 mcg by mouth daily    Yes Historical Provider, MD        Allergies:  Patient has no known allergies. LABS: Results reviewed with patient today.     CBC:   Lab Results   Component Value Date    WBC 14.6 10/23/2021    WBC 16.1 10/21/2021    WBC 12.9 2021    RBC 4.04 10/23/2021    RBC 4.25 10/21/2021    RBC 4.15 2021 RBC 4.62 06/23/2017    RBC 4.70 02/03/2017    RBC 4.97 07/29/2016    HGB 12.0 10/23/2021    HGB 12.7 10/21/2021    HGB 12.9 09/18/2021    HCT 37.5 10/23/2021    HCT 39.5 10/21/2021    HCT 38.7 09/18/2021    MCV 92.9 10/23/2021    MCV 93.0 10/21/2021    MCV 93.1 09/18/2021    RDW 13.8 10/23/2021    RDW 14.2 10/21/2021    RDW 14.4 09/18/2021     10/23/2021     10/21/2021     09/18/2021     BMP:  Lab Results   Component Value Date     10/23/2021     10/21/2021     09/22/2021    K 3.6 10/23/2021    K 3.4 10/21/2021    K 4.1 09/22/2021    K 3.7 09/18/2021    K 3.4 09/12/2021    K 3.5 09/11/2021    CL 97 10/23/2021    CL 95 10/21/2021    CL 90 09/22/2021    CO2 27 10/23/2021    CO2 26 10/21/2021    CO2 35 09/22/2021    PHOS 2.8 10/23/2021    PHOS 3.4 09/12/2021    PHOS 3.1 07/20/2021    BUN 12 10/23/2021    BUN 10 10/21/2021    BUN 19 09/22/2021    CREATININE 1.0 10/23/2021    CREATININE 0.9 10/21/2021    CREATININE 1.3 09/22/2021     BNP:   Lab Results   Component Value Date    PROBNP 1,879 10/21/2021    PROBNP 1,223 09/22/2021    PROBNP 2,742 09/17/2021       Parameters:   > 450 pg/mL under age 48  > 900 pg/mL ages 54-65  > 1800 pg/mL over age 76    Iron Studies:  No results found for: TIBC, FERRITIN  GLUCOSE: No results for input(s): GLUCOSE in the last 72 hours. LIVER PROFILE:   Lab Results   Component Value Date    AST 26 10/21/2021    ALT 24 10/21/2021    LIPASE 24.0 10/21/2021    LABALBU 3.6 10/23/2021    BILITOT 0.5 10/21/2021    ALKPHOS 107 10/21/2021     PT/INR:   Lab Results   Component Value Date    PROTIME 37.2 10/23/2021    INR 3.14 10/23/2021     Cardiac Enzymes:  Lab Results   Component Value Date    TROPONINI <0.01 10/22/2021     FASTING LIPID PANEL:  Lab Results   Component Value Date    CHOL 259 04/20/2021    HDL 55 04/20/2021    HDL 62 11/08/2011    LDLCALC 178 04/20/2021    TRIG 128 04/20/2021       Cardiac Imaging: Reports reviewed with patient today.      EKG: SB 53  Anterlateral and inferior infarct  Today, continues with inverted T waves anterolateral and inferior leads. ECHO:     Summary 7/19/2021  Impella well seated LV measuring 3.2cm from inlet to AV. Ejection fraction is visually estimated to be 15-20%. Regional wall motion abnormalities are noted. Summary 7/20/2021  Impella device well seated in LV and Aorta measuring 3.1cm  Ejection fraction is visually estimated to be <10%. , Severe hypokinesis except for the bases  Unchanged from prior study    Summary 7/26/2021  Ejection fraction is visually estimated to be 45-50%. There is mid to apical akinesis. No evidence of left ventricular mass or thrombus noted. There is a moderate left pleural effusion. Estimated pulmonary artery systolic pressure is moderately elevated at 63 mmHg assuming a right atrial pressure of 8 mmHg. CATH:  Cath: 7/19/21  RA 7  RV 22/7  PA 30/17/22  PCWP 20   PA % 52  AO % 97  CO/CI 2.22 L/min 1.3 L/min/m2  SNF 4328 WHBWK . Sec/cm-5  RIS 653 UXCUF . Sec/cm-5  TPG 2   0.4  The left main coronary artery is normal .   The left anterior descending artery is normal .   The left circumflex artery is normal .   The right coronary artery is normal .  Left ventricular angiogram was done in the 30° MARCELO projection and revealed severe LV systolic dysfunction   with an estimated ejection fraction of 15%. Assessment/Plan:    1.) Chronic systolic heart failure EF <10%, improved to 45%: Etiology unclear. Tachycardia induced from AF? Viral? Takotsubo? Appears euvolemic today. SOB is multifactorial with recent PNA. Continue current GDMT.    NYHA Class III   Stage C  Diuretic:  torsemide 20mg daily   Beta Blocker: Toprol XL  ACEi/ARB/ARNI:entresto-held- will restart- repeat labs ~ 2 weeks  Aldosterone Antagonist: held D/T low BP  SGLT2 Inhibitor: consider when HF stable  2gm Na diet, daily weight, 64 oz fluid restriction  Avoid NSAIDS and other nephrotoxic meds  Cardiac Rehab: Not indicated for EF>35%  ICD: Not indicated for EF>35%  [facility] Referral: yes  Not a candidate for advanced HF therapy such as LVAD per Dr. Melina Morse (discussed with [facility] team). 2.) Paroxysmal Atrial Fib: NSR/Sinus walt  CHADSVASC- 4    HASBLED-  Thromboembolic risk reduction: warfarin INR goal 2-3 mgmt per coag clinic  Rate Control/ Rhythm Control: Toprol XL 25mg daily, Amiodarone  Thyroid fx, liver fx annually  PFT:  EKG at least annually     3.) Hypotension:   midodrine to 2.5mg BID    Instructions:   1. Medications: Continue current medications- restart entreto  2. Labs:none  3. Follow up:  Dr. Melina Morse and 6 months  4. Daily weight: Call for increase 3 lbs/day or 5 lbs/week. 5. 2 gm sodium diet:  6. Fluid Restriction: 64 oz.        I appreciate the opportunity of cooperating in the care of this individual.    ELIAS Serna, FAB - CNP, CNP, 11/15/2021,9:00 AM

## 2021-11-15 ENCOUNTER — OFFICE VISIT (OUTPATIENT)
Dept: CARDIOLOGY CLINIC | Age: 81
End: 2021-11-15
Payer: MEDICARE

## 2021-11-15 VITALS
OXYGEN SATURATION: 95 % | BODY MASS INDEX: 32.25 KG/M2 | SYSTOLIC BLOOD PRESSURE: 130 MMHG | HEART RATE: 54 BPM | HEIGHT: 59 IN | WEIGHT: 160 LBS | DIASTOLIC BLOOD PRESSURE: 66 MMHG

## 2021-11-15 DIAGNOSIS — Z79.899 ON AMIODARONE THERAPY: Primary | ICD-10-CM

## 2021-11-15 DIAGNOSIS — I50.22 CHRONIC SYSTOLIC HEART FAILURE (HCC): ICD-10-CM

## 2021-11-15 PROCEDURE — 1111F DSCHRG MED/CURRENT MED MERGE: CPT | Performed by: NURSE PRACTITIONER

## 2021-11-15 PROCEDURE — G8417 CALC BMI ABV UP PARAM F/U: HCPCS | Performed by: NURSE PRACTITIONER

## 2021-11-15 PROCEDURE — G8400 PT W/DXA NO RESULTS DOC: HCPCS | Performed by: NURSE PRACTITIONER

## 2021-11-15 PROCEDURE — G8427 DOCREV CUR MEDS BY ELIG CLIN: HCPCS | Performed by: NURSE PRACTITIONER

## 2021-11-15 PROCEDURE — 1090F PRES/ABSN URINE INCON ASSESS: CPT | Performed by: NURSE PRACTITIONER

## 2021-11-15 PROCEDURE — 1123F ACP DISCUSS/DSCN MKR DOCD: CPT | Performed by: NURSE PRACTITIONER

## 2021-11-15 PROCEDURE — G8482 FLU IMMUNIZE ORDER/ADMIN: HCPCS | Performed by: NURSE PRACTITIONER

## 2021-11-15 PROCEDURE — 1036F TOBACCO NON-USER: CPT | Performed by: NURSE PRACTITIONER

## 2021-11-15 PROCEDURE — 93000 ELECTROCARDIOGRAM COMPLETE: CPT | Performed by: NURSE PRACTITIONER

## 2021-11-15 PROCEDURE — 99214 OFFICE O/P EST MOD 30 MIN: CPT | Performed by: NURSE PRACTITIONER

## 2021-11-15 PROCEDURE — 4040F PNEUMOC VAC/ADMIN/RCVD: CPT | Performed by: NURSE PRACTITIONER

## 2021-11-15 RX ORDER — METOPROLOL SUCCINATE 25 MG/1
12.5 TABLET, EXTENDED RELEASE ORAL DAILY
COMMUNITY
End: 2021-12-15 | Stop reason: SDUPTHER

## 2021-11-15 NOTE — PATIENT INSTRUCTIONS
Instructions:   1. Medications: Continue current medications  2. Labs:none  3. Follow up:  Dr. Alvarez Stable and 6 months  4. Daily weight: Call for increase 3 lbs/day or 5 lbs/week. 5. 2 gm sodium diet:  6. Fluid Restriction: 64 oz.

## 2021-11-18 ENCOUNTER — ANTI-COAG VISIT (OUTPATIENT)
Dept: PHARMACY | Age: 81
End: 2021-11-18
Payer: MEDICARE

## 2021-11-18 DIAGNOSIS — R79.1 ABNORMAL COAGULATION PROFILE: Primary | ICD-10-CM

## 2021-11-18 LAB — INR BLD: 2.8

## 2021-11-18 NOTE — PROGRESS NOTES
Amy Montgomery is a 78 y.o. here for warfarin management. Sia Menjivar had an INR test today. Results were reviewed and appropriate warfarin management was completed. This visit was performed as: An in person visit. Protocols were followed with precautions to reduce the spread of COVID-19. Patient verifies current dosing regimen: Yes     Warfarin medication reviewed and updated on the patient 's home medication list: Yes   All other medications reviewed and updated on the patient 's home medication list: Yes - started Riya Amass    Lab Results   Component Value Date    INR 2.80 2021    INR 3.14 (H) 10/23/2021    INR 3.36 (H) 10/22/2021       Patient Findings     Negatives:  Signs/symptoms of bleeding, Missed doses, Change in medications, Change in diet/appetite, Bruising          Anticoagulation Summary  As of 2021    INR goal:  2.0-3.0   TTR:  53.9 % (10.2 y)   INR used for dosin.80 (2021)   Warfarin maintenance plan:  5 mg (5 mg x 1) every Mon; 2.5 mg (5 mg x 0.5) all other days   Weekly warfarin total:  20 mg   [facility]   Next INR check:  2021   Priority:  Maintenance   Target end date: Indefinite    Indications    Abnormal coagulation profile [R79.1]             Anticoagulation Episode Summary     INR check location:  Anticoagulation Clinic    Preferred lab:      Send INR reminders to:  [facility]    Comments:  EPIC      Factor V Leiden       Anticoagulation Care Providers     Provider Role Specialty Phone number    Marilee Boykin MD Referring Family Medicine [phone]          Warfarin assessment / plan:     Appears well. No acute findings     No change to warfarin therapy today. She recently started Riya Amass which has no interaction with her warfarin. She reports no other changes.     Description    Continue  Warfarin 2.5 mg (half a tablet) daily EXCEPT 5 mg (one tablet) on Monday    Keep your green vegetable intake consistent (3-4 salads per week). Please call if this changes. Call [phone] with signs or symptoms of bleeding or ANY medication changes (including over-the-counter medications or herbal supplements). If significant bleeding occurs please seek immediate medical attention. Limit alcohol intake. Please call if this changes. Immunization History   Administered Date(s) Administered    COVID-19, Pfizer, PF, 30mcg/0.3mL 01/29/2021, 02/19/2021    Influenza Virus Vaccine 10/16/2009, 01/26/2010, 09/29/2010, 10/03/2016    Influenza, High Dose (Fluzone 65 yrs and older) 09/27/2011, 10/01/2012, 10/29/2013, 10/23/2014, 11/24/2015, 10/23/2018, 12/21/2019    Influenza, Quadv, IM, PF (6 mo and older Fluzone, Flulaval, Fluarix, and 3 yrs and older Afluria) 12/22/2020, 10/23/2021    Pneumococcal Conjugate 13-valent ([id number]) 07/02/2015    Pneumococcal Polysaccharide ([id number]) 07/01/2006, 11/07/2011    Td, unspecified formulation 10/04/2004    Tdap (Boostrix, Adacel) 09/01/2015             Reviewed AVS with patient / caregiver.       CLINICAL PHARMACY CONSULT: MED RECONCILIATION/REVIEW ADDENDUM    For Pharmacy Admin Tracking Only     Intervention Detail: Vaccine Recommended/Administered   Total # of Interventions Recommended: 1   Total # of Interventions Accepted: 1   Time Spent (min): 15

## 2021-11-19 PROCEDURE — 99211 OFF/OP EST MAY X REQ PHY/QHP: CPT

## 2021-11-19 PROCEDURE — 85610 PROTHROMBIN TIME: CPT

## 2021-11-19 NOTE — PROGRESS NOTES
A lot going on in her personal life recently. Will address need for Booster and need for HF f/u at her next visit.

## 2021-11-23 ENCOUNTER — TELEPHONE (OUTPATIENT)
Dept: CARDIOLOGY CLINIC | Age: 81
End: 2021-11-23

## 2021-11-23 NOTE — TELEPHONE ENCOUNTER
Spoke with patient clarified medication. Metoprolol succinate 12.5 mg daily. Verbalized understanding.

## 2021-11-23 NOTE — TELEPHONE ENCOUNTER
Juan R Restrepo called in this morning and has a question about the dosage of her Metoprolol. She also has 2 bottles and isn't sure which one she should be taking. Her medication list is still listing as a historical provider.     She can be reached back at [phone]

## 2021-11-30 ENCOUNTER — VIRTUAL VISIT (OUTPATIENT)
Dept: FAMILY MEDICINE CLINIC | Age: 81
End: 2021-11-30
Payer: MEDICARE

## 2021-11-30 DIAGNOSIS — J01.40 ACUTE NON-RECURRENT PANSINUSITIS: ICD-10-CM

## 2021-11-30 DIAGNOSIS — R09.89 CHEST CONGESTION: Primary | ICD-10-CM

## 2021-11-30 PROCEDURE — 4040F PNEUMOC VAC/ADMIN/RCVD: CPT | Performed by: NURSE PRACTITIONER

## 2021-11-30 PROCEDURE — 1036F TOBACCO NON-USER: CPT | Performed by: NURSE PRACTITIONER

## 2021-11-30 PROCEDURE — 99213 OFFICE O/P EST LOW 20 MIN: CPT | Performed by: NURSE PRACTITIONER

## 2021-11-30 PROCEDURE — G8427 DOCREV CUR MEDS BY ELIG CLIN: HCPCS | Performed by: NURSE PRACTITIONER

## 2021-11-30 PROCEDURE — G8482 FLU IMMUNIZE ORDER/ADMIN: HCPCS | Performed by: NURSE PRACTITIONER

## 2021-11-30 PROCEDURE — 1123F ACP DISCUSS/DSCN MKR DOCD: CPT | Performed by: NURSE PRACTITIONER

## 2021-11-30 PROCEDURE — 1090F PRES/ABSN URINE INCON ASSESS: CPT | Performed by: NURSE PRACTITIONER

## 2021-11-30 PROCEDURE — G8400 PT W/DXA NO RESULTS DOC: HCPCS | Performed by: NURSE PRACTITIONER

## 2021-11-30 PROCEDURE — G8417 CALC BMI ABV UP PARAM F/U: HCPCS | Performed by: NURSE PRACTITIONER

## 2021-11-30 RX ORDER — AZITHROMYCIN 250 MG/1
250 TABLET, FILM COATED ORAL SEE ADMIN INSTRUCTIONS
Qty: 6 TABLET | Refills: 0 | Status: SHIPPED | OUTPATIENT
Start: 2021-11-30 | End: 2021-12-05

## 2021-11-30 ASSESSMENT — ENCOUNTER SYMPTOMS
SINUS PAIN: 1
COUGH: 1
CONSTIPATION: 0
CHEST TIGHTNESS: 0
ABDOMINAL PAIN: 0
COLOR CHANGE: 0
SINUS PRESSURE: 1
BACK PAIN: 0
DIARRHEA: 0
NAUSEA: 0
EYE DISCHARGE: 0
SHORTNESS OF BREATH: 1
ABDOMINAL DISTENTION: 0

## 2021-11-30 NOTE — PROGRESS NOTES
Efren Mejia (:  1940) is a 78 y.o. female,Established patient, here for evaluation of the following chief complaint(s): Sinus Problem (PT C/O RUNNY NOSE, CHEST CONGESTION, STUFFY HEAD, SLIGHT COUGH, THICK MUCUS, FATIGUE, AND LACK OF APPETITE SINCE SATURDAY. SHE STATES OVERALL DOING BETTER TODAY, JUST FEELING VERY TIRED)      Patient identification was verified at the start of the visit: Yes    Patient located for today's visit in [place]: Yes    ASSESSMENT/PLAN:  1. Chest congestion  -     azithromycin (ZITHROMAX) 250 MG tablet; Take 1 tablet by mouth See Admin Instructions for 5 days 500mg on day 1 followed by 250mg on days 2 - 5, Disp-6 tablet, R-0Normal   Take medication in its entirety even if feeling better to avoid a resistance to antibiotics   Consider CXR if persists d/t pt lung condition   Currently had to increase her home oxygen to keep oxygen levels above 90%   Mucinex BID   Hot showers  2. Acute non-recurrent pansinusitis  -     azithromycin (ZITHROMAX) 250 MG tablet; Take 1 tablet by mouth See Admin Instructions for 5 days 500mg on day 1 followed by 250mg on days 2 - 5, Disp-6 tablet, R-0Normal   Sinus rinse   Dayquil and Nyquil PRN    Return in about 1 month (around 2021) for Oxygen and Breathing Check In.    SUBJECTIVE/OBJECTIVE:  HPI     Chief Complaint   Patient presents with    Sinus Problem     PT C/O RUNNY NOSE, CHEST CONGESTION, STUFFY HEAD, SLIGHT COUGH, THICK MUCUS, FATIGUE, AND LACK OF APPETITE SINCE SATURDAY. SHE STATES OVERALL DOING BETTER TODAY, JUST FEELING VERY TIRED     Sinus Pain  Patient complains of sinus pressure and itchy eyes, congestion C/O RUNNY NOSE, CHEST CONGESTION, STUFFY HEAD, SLIGHT COUGH, THICK MUCUS, FATIGUE, AND LACK OF APPETITE SINCE SATURDAY. SHE STATES OVERALL DOING BETTER TODAY, JUST FEELING VERY TIRED. Onset of symptoms was 3 days ago. Symptoms have been gradually improving since that time. She is drinking plenty of fluids.   Past history is significant for COPD and pneumonia. Patient is former smoker, quit 26 years ago. Last abx about 5-6 weeks ago- augmentin and doxycycline- had trouble with how large the augmentin was and doxycycline mostly finished but never fully finished the medication. Patient's  passed away 2021, her brother in law  a couple weeks later, and just recently her son had massive stroke while away on business and is in rehab not doing very well per her reports. Pt has been trying dayquil and nyquil and mucus suppressants. Review of Systems   Constitutional: Negative for activity change, appetite change, fatigue, fever and unexpected weight change. HENT: Positive for congestion, postnasal drip, sinus pressure and sinus pain. Negative for ear pain. Eyes: Negative for discharge and visual disturbance. Respiratory: Positive for cough and shortness of breath. Negative for chest tightness. Cardiovascular: Negative for chest pain, palpitations and leg swelling. Gastrointestinal: Negative for abdominal distention, abdominal pain, constipation, diarrhea and nausea. Endocrine: Negative for cold intolerance, heat intolerance, polydipsia, polyphagia and polyuria. Genitourinary: Negative for decreased urine volume, difficulty urinating, dysuria, flank pain, frequency and urgency. Musculoskeletal: Negative for arthralgias, back pain, gait problem, joint swelling, myalgias and neck pain. Skin: Negative for color change, rash and wound. Allergic/Immunologic: Negative for food allergies and immunocompromised state. Neurological: Negative for dizziness, tremors, speech difficulty, weakness, light-headedness, numbness and headaches. Hematological: Negative for adenopathy. Does not bruise/bleed easily. Psychiatric/Behavioral: Negative for confusion, decreased concentration, self-injury, sleep disturbance and suicidal ideas. The patient is not nervous/anxious.         Patient-Reported Vitals 11/30/2021   Patient-Reported Weight 157LBS   Patient-Reported Height 4'11\"   Patient-Reported Pulse 71   Patient-Reported SpO2 93        Physical Exam    [INSTRUCTIONS:  \"[x]\" Indicates a positive item  \"[]\" Indicates a negative item  -- DELETE ALL ITEMS NOT EXAMINED]    Constitutional: [x] Appears well-developed and well-nourished [x] No apparent distress      [] Abnormal -     Mental status: [x] Alert and awake  [x] Oriented to person/place/time [x] Able to follow commands    [] Abnormal -     Eyes:   EOM    [x]  Normal    [] Abnormal -   Sclera  [x]  Normal    [] Abnormal -          Discharge [x]  None visible   [] Abnormal -     HENT: [x] Normocephalic, atraumatic  [] Abnormal -   [x] Mouth/Throat: Mucous membranes are moist    External Ears [x] Normal  [] Abnormal -    Neck: [x] No visualized mass [] Abnormal -     Pulmonary/Chest: [x] Respiratory effort normal   [x] No visualized signs of difficulty breathing or respiratory distress        [] Abnormal -      Musculoskeletal:   [x] Normal gait with no signs of ataxia         [x] Normal range of motion of neck        [] Abnormal -     Neurological:        [x] No Facial Asymmetry (Cranial nerve 7 motor function) (limited exam due to video visit)          [x] No gaze palsy        [] Abnormal -          Skin:        [x] No significant exanthematous lesions or discoloration noted on facial skin         [] Abnormal -            Psychiatric:       [x] Normal Affect [] Abnormal -        [x] No Hallucinations    Other pertinent observable physical exam findings:-          On this date 11/30/2021 I have spent 25 minutes reviewing previous notes, test results and face to face (virtual) with the patient discussing the diagnosis and importance of compliance with the treatment plan as well as documenting on the day of the visit.     Care Gaps Addressed  COVID vaccine booster recommended  Thyroid levels overdue  Call insurance company to discuss coverage for shingles vaccine with patient's (and/or legal guardian's) consent, to reduce the patient's risk of exposure to COVID-19 and provide necessary medical care. The patient (and/or legal guardian) has also been advised to contact this office for worsening conditions or problems, and seek emergency medical treatment and/or call 911 if deemed necessary. Services were provided through a video synchronous discussion virtually to substitute for in-person clinic visit. Patient was located at home and provider was located in office or at home. An electronic signature was used to authenticate this note.     --Cuong Kruger, APRN - CNP

## 2021-11-30 NOTE — PATIENT INSTRUCTIONS
Patient Education        Sinusitis: Care Instructions  Your Care Instructions     Sinusitis is an infection of the lining of the sinus cavities in your head. Sinusitis often follows a cold. It causes pain and pressure in your head and face. In most cases, sinusitis gets better on its own in 1 to 2 weeks. But some mild symptoms may last for several weeks. Sometimes antibiotics are needed. Follow-up care is a key part of your treatment and safety. Be sure to make and go to all appointments, and call your doctor if you are having problems. It's also a good idea to know your test results and keep a list of the medicines you take. How can you care for yourself at home? · Take an over-the-counter pain medicine, such as acetaminophen (Tylenol), ibuprofen (Advil, Motrin), or naproxen (Aleve). Read and follow all instructions on the label. · If the doctor prescribed antibiotics, take them as directed. Do not stop taking them just because you feel better. You need to take the full course of antibiotics. · Be careful when taking over-the-counter cold or flu medicines and Tylenol at the same time. Many of these medicines have acetaminophen, which is Tylenol. Read the labels to make sure that you are not taking more than the recommended dose. Too much acetaminophen (Tylenol) can be harmful. · Breathe warm, moist air from a steamy shower, a hot bath, or a sink filled with hot water. Avoid cold, dry air. Using a humidifier in your home may help. Follow the directions for cleaning the machine. · Use saline (saltwater) nasal washes. This can help keep your nasal passages open and wash out mucus and bacteria. You can buy saline nose drops at a grocery store or drugstore. Or you can make your own at home by adding 1 teaspoon of salt and 1 teaspoon of baking soda to 2 cups of distilled water. If you make your own, fill a bulb syringe with the solution, insert the tip into your nostril, and squeeze gently.  Marcial Jacks your nose.  · Put a hot, wet towel or a warm gel pack on your face 3 or 4 times a day for 5 to 10 minutes each time. · Try a decongestant nasal spray like oxymetazoline (Afrin). Do not use it for more than 3 days in a row. Using it for more than 3 days can make your congestion worse. When should you call for help? Call your doctor now or seek immediate medical care if:    · You have new or worse swelling or redness in your face or around your eyes.     · You have a new or higher fever. Watch closely for changes in your health, and be sure to contact your doctor if:    · You have new or worse facial pain.     · The mucus from your nose becomes thicker (like pus) or has new blood in it.     · You are not getting better as expected. Where can you learn more? Go to https://Traverse E[employer]ypeSgnam.Hatchbuck. org and sign in to your [employer] account. Enter H276 in the [employer] box to learn more about \"Sinusitis: Care Instructions. \"     If you do not have an account, please click on the \"Sign Up Now\" link. Current as of: December 2, 2020               Content Version: 13.0  © 4647-5851 [employer]. Care instructions adapted under license by [facility] ([facility]). If you have questions about a medical condition or this instruction, always ask your healthcare professional. [place] [address] any warranty or liability for your use of this information. Patient Education        Saline Nasal Washes: Care Instructions  Your Care Instructions     Saline nasal washes help keep the nasal passages open by washing out thick or dried mucus. This simple remedy can help relieve symptoms of allergies, sinusitis, and colds. It also can make the nose feel more comfortable by keeping the mucous membranes moist. You may notice a little burning sensation in your nose the first few times you use the solution, but this usually gets better in a few days.   Follow-up care is a key part of your treatment and safety. Be sure to make and go to all appointments, and call your doctor if you are having problems. It's also a good idea to know your test results and keep a list of the medicines you take. How can you care for yourself at home? · You can buy premixed saline solution in a squeeze bottle or other sinus rinse products at a drugstore. Read and follow the instructions on the label. · You also can make your own saline solution by adding 1 teaspoon of salt and 1 teaspoon of baking soda to 2 cups of distilled water. · If you use a homemade solution, pour a small amount into a clean bowl. Using a rubber bulb syringe, squeeze the syringe and place the tip in the salt water. Pull a small amount of the salt water into the syringe by relaxing your hand. · Sit down with your head tilted slightly back. Do not lie down. Put the tip of the bulb syringe or the squeeze bottle a little way into one of your nostrils. Gently drip or squirt a few drops into the nostril. Repeat with the other nostril. Some sneezing and gagging are normal at first.  · Gently blow your nose. · Wipe the syringe or bottle tip clean after each use. · Repeat this 2 or 3 times a day. · Use nasal washes gently if you have nosebleeds often. When should you call for help? Watch closely for changes in your health, and be sure to contact your doctor if:    · You often get nosebleeds.     · You have problems doing the nasal washes. Where can you learn more? Go to https://Alta [employer] Techn[employer]ymathew.Dabble. org and sign in to your [employer] account. Enter [phone] in the [facility] box to learn more about \"Saline Nasal Washes: Care Instructions. \"     If you do not have an account, please click on the \"Sign Up Now\" link. Current as of: December 2, 2020               Content Version: 13.0  © 0856-9586 Healthwise[employer] Incorporated. Care instructions adapted under license by [facility] ([facility]).  If you have questions about a medical condition or this instruction, always ask your healthcare professional. [place] [address] any warranty or liability for your use of this information.

## 2021-12-14 ENCOUNTER — OFFICE VISIT (OUTPATIENT)
Dept: PULMONOLOGY | Age: 81
End: 2021-12-14
Payer: MEDICARE

## 2021-12-14 VITALS
WEIGHT: 153 LBS | HEIGHT: 59 IN | DIASTOLIC BLOOD PRESSURE: 64 MMHG | RESPIRATION RATE: 16 BRPM | BODY MASS INDEX: 30.84 KG/M2 | SYSTOLIC BLOOD PRESSURE: 138 MMHG | HEART RATE: 68 BPM | TEMPERATURE: 97.1 F | OXYGEN SATURATION: 95 %

## 2021-12-14 DIAGNOSIS — R05.9 COUGH: Primary | ICD-10-CM

## 2021-12-14 DIAGNOSIS — R53.83 FATIGUE, UNSPECIFIED TYPE: ICD-10-CM

## 2021-12-14 DIAGNOSIS — J18.9 PNEUMONIA OF BOTH UPPER LOBES DUE TO INFECTIOUS ORGANISM: ICD-10-CM

## 2021-12-14 DIAGNOSIS — J96.11 CHRONIC RESPIRATORY FAILURE WITH HYPOXIA (HCC): ICD-10-CM

## 2021-12-14 PROBLEM — A41.9 SEPSIS (HCC): Status: RESOLVED | Noted: 2021-09-12 | Resolved: 2021-12-14

## 2021-12-14 PROBLEM — R57.0 SHOCK, CARDIOGENIC (HCC): Status: RESOLVED | Noted: 2021-07-19 | Resolved: 2021-12-14

## 2021-12-14 PROBLEM — J96.01 ACUTE RESPIRATORY FAILURE WITH HYPOXIA (HCC): Status: RESOLVED | Noted: 2021-09-12 | Resolved: 2021-12-14

## 2021-12-14 LAB — TSH REFLEX FT4: 2.14 UIU/ML (ref 0.27–4.2)

## 2021-12-14 PROCEDURE — 1123F ACP DISCUSS/DSCN MKR DOCD: CPT | Performed by: INTERNAL MEDICINE

## 2021-12-14 PROCEDURE — G8427 DOCREV CUR MEDS BY ELIG CLIN: HCPCS | Performed by: INTERNAL MEDICINE

## 2021-12-14 PROCEDURE — 1036F TOBACCO NON-USER: CPT | Performed by: INTERNAL MEDICINE

## 2021-12-14 PROCEDURE — 4040F PNEUMOC VAC/ADMIN/RCVD: CPT | Performed by: INTERNAL MEDICINE

## 2021-12-14 PROCEDURE — G8400 PT W/DXA NO RESULTS DOC: HCPCS | Performed by: INTERNAL MEDICINE

## 2021-12-14 PROCEDURE — 99214 OFFICE O/P EST MOD 30 MIN: CPT | Performed by: INTERNAL MEDICINE

## 2021-12-14 PROCEDURE — G8482 FLU IMMUNIZE ORDER/ADMIN: HCPCS | Performed by: INTERNAL MEDICINE

## 2021-12-14 PROCEDURE — G8417 CALC BMI ABV UP PARAM F/U: HCPCS | Performed by: INTERNAL MEDICINE

## 2021-12-14 PROCEDURE — 1090F PRES/ABSN URINE INCON ASSESS: CPT | Performed by: INTERNAL MEDICINE

## 2021-12-14 NOTE — PATIENT INSTRUCTIONS
Sample Stiolto   pulmonary function tests  Please call [phone] to schedule test CT chest, Echocardiogram, etc.       Lab for thyroid today

## 2021-12-14 NOTE — PROGRESS NOTES
REASON FOR CONSULTATION/CC:    Chief Complaint   Patient presents with    Other     DISCUSS O2 USE           PCP: Mita Aguirre MD    HISTORY OF PRESENT ILLNESS: Debbie Berg is a 78y.o. year old female with a history of chronic hypoxemia  who presents      History  She has a history of CLL and was admitted October 2021 for community-acquired pneumonia. Was discharged on oxygen. Extensive history of smoking. Current history  Chronic hypoxemia   With exertion will decrease to 83% on room air     Cough  Tired. No noted apnea. Not gasping for air. 1 nap per day. Hx tobacce abuse           Objective:   PHYSICAL EXAM:  Blood pressure 138/64, pulse 68, temperature 97.1 °F (36.2 °C), temperature source Infrared, resp. rate 16, height 4' 11\" (1.499 m), weight 153 lb (69.4 kg), SpO2 95 %, not currently breastfeeding.'  Gen: No distress. Eyes: PERRL. No sclera icterus. No conjunctival injection. ENT: No discharge. Pharynx clear. External appearance of ears and nose normal.  Neck: Trachea midline. No obvious mass. Resp: No accessory muscle use. No crackles. No wheezes. No rhonchi. CV: Regular rate. Regular rhythm. No murmur or rub. No edema. GI:    Skin: Warm, dry, normal texture and turgor. No nodule on exposed extremities. Lymph: No cervical LAD. No supraclavicular LAD. M/S: No cyanosis. No clubbing. No joint deformity. Neuro: Moves all four extremities. Psych: Oriented x 3. No anxiety. Awake. Alert. Intact judgement and insight. Data Reviewed:          Assessment:     Cough   pneumonia 2021  Hx smoking   Chronic hypoxemia  Fatigue  Copd? Plan:      Problem List Items Addressed This Visit     Pneumonia     She was admitted for community-acquired pneumonia . Will repeat chest X-ray to ensure clearance.           Relevant Medications    tiotropium-olodaterol (STIOLTO RESPIMAT) 2.5-2.5 MCG/ACT AERS    tiotropium-olodaterol (STIOLTO RESPIMAT) 2.5-2.5 MCG/ACT AERS Cough - Primary     She has cough and fatigue. This may be secondary to copd. Trial of Stiolto given. Assess with pulmonary function tests and check TSH. Last CBC normal Oct 2021. Relevant Medications    tiotropium-olodaterol (STIOLTO RESPIMAT) 2.5-2.5 MCG/ACT AERS    tiotropium-olodaterol (STIOLTO RESPIMAT) 2.5-2.5 MCG/ACT AERS    Other Relevant Orders    XR CHEST (2 VW)    Full PFT Study With Bronchodilator    Chronic respiratory failure with hypoxia ([address]     Esdras Lafleur continues to need and benefit from supplemental oxygen. she is using oxygen continuously at 2 L NC. Wean O2 to sat >90% . Able to be off oxygen wean to 1L with . Other Visit Diagnoses     Fatigue, unspecified type        Relevant Orders    TSH WITH REFLEX TO FT4                This note was transcribed using 10852 [employer]med. Please disregard any translational errors.     Joan Gill Pulmonary, Sleep and [facility] [address] [phone]

## 2021-12-14 NOTE — ASSESSMENT & PLAN NOTE
She has cough and fatigue. This may be secondary to copd. Trial of Stiolto given. Assess with pulmonary function tests and check TSH. Last CBC normal Oct 2021.

## 2021-12-15 RX ORDER — METOPROLOL SUCCINATE 25 MG/1
12.5 TABLET, EXTENDED RELEASE ORAL DAILY
Qty: 45 TABLET | Refills: 3 | Status: ON HOLD | OUTPATIENT
Start: 2021-12-15

## 2021-12-15 NOTE — TELEPHONE ENCOUNTER
Telephone encounter from 11/23/2021 states patient is taking 12.5 mg. Called patient to clarify. She v/u.

## 2021-12-15 NOTE — TELEPHONE ENCOUNTER
Medication Refill    Medication needing refilled:  metoprolol succinate (TOPROL XL)     Dosage of the medication:  25 MG extended release tablet     How are you taking this medication (QD, BID, TID, QID, PRN):  Take 12.5 mg by mouth daily    30 or 90 day supply called in:    When will you run out of your medication:    Which Pharmacy are we sending the medication to?:    [address] [place] [address], [address] [place] [address],First Floor Laurette Cheadle [address], [address] [place] [id number]   Phone:  [phone]  Fax:  [phone]

## 2021-12-15 NOTE — TELEPHONE ENCOUNTER
Last OV: 11/15/2021  Next OV: x      Called patient to see how she is taking medication. Last ov from 11/15/2021 states she is taking Toprol XL daily but patient is saying she is taking half a tablet daily. Please advise.

## 2021-12-16 ENCOUNTER — HOSPITAL ENCOUNTER (OUTPATIENT)
Age: 81
Discharge: HOME | End: 2021-12-16
Payer: MEDICARE

## 2021-12-16 ENCOUNTER — HOSPITAL ENCOUNTER (OUTPATIENT)
Dept: GENERAL RADIOLOGY | Age: 81
Discharge: HOME | End: 2021-12-16
Payer: MEDICARE

## 2021-12-16 ENCOUNTER — ANTI-COAG VISIT (OUTPATIENT)
Dept: PHARMACY | Age: 81
End: 2021-12-16
Payer: MEDICARE

## 2021-12-16 DIAGNOSIS — R05.9 COUGH: ICD-10-CM

## 2021-12-16 DIAGNOSIS — R79.1 ABNORMAL COAGULATION PROFILE: Primary | ICD-10-CM

## 2021-12-16 DIAGNOSIS — R05.9 COUGH: Primary | ICD-10-CM

## 2021-12-16 LAB — INR BLD: 5.8

## 2021-12-16 PROCEDURE — 99211 OFF/OP EST MAY X REQ PHY/QHP: CPT

## 2021-12-16 PROCEDURE — 85610 PROTHROMBIN TIME: CPT

## 2021-12-16 PROCEDURE — 71046 X-RAY EXAM CHEST 2 VIEWS: CPT

## 2021-12-16 NOTE — PROGRESS NOTES
Jr Au is a 78 y.o. here for warfarin management. Bárbara Mckeon had an INR test today. Results were reviewed and appropriate warfarin management was completed. This visit was performed as: An in person visit. Protocols were followed with precautions to reduce the spread of COVID-19. Patient verifies current dosing regimen: Yes     Warfarin medication reviewed and updated on the patient 's home medication list: Yes   All other medications reviewed and updated on the patient 's home medication list: No: No changes     Lab Results   Component Value Date    INR 5.80 2021    INR 2.80 2021    INR 3.14 (H) 10/23/2021       Patient Findings     Positives:  Signs/symptoms of bleeding, Change in diet/appetite, Other complaints    Negatives:  Change in medications, Bruising    Comments:  Bumped hand yesterday- trouble getting it to stop bleeding. Unable to hear out of her right ear. Following w a specialist.   Less green vegetables due to changes in eating habits since her spouse passed a way. Anticoagulation Summary  As of 2021    INR goal:  2.0-3.0   TTR:  53.6 % (10.3 y)   INR used for dosin.80 (2021)   Warfarin maintenance plan:  2.5 mg (5 mg x 0.5) every day   Weekly warfarin total:  17.5 mg   Plan last modified:  Charlie Delfina (2021)   Next INR check:  2021   Priority:  Maintenance   Target end date:   Indefinite    Indications    Abnormal coagulation profile [R79.1]             Anticoagulation Episode Summary     INR check location:  Anticoagulation Clinic    Preferred lab:      Send INR reminders to:  [facility]    Comments:  EPIC      Factor V Leiden       Anticoagulation Care Providers     Provider Role Specialty Phone number    Shalonda Luna MD Referring Family Medicine [phone]          Warfarin assessment / plan:     Denies medication changes   Denies extra warfarin doses  Recent decrease in appetite  Decrease in vitamin K intake    Patient looks and feels well today. Her INR was supratherapeutic at 5.8. She reports she has been eating less in general and rarely eats greens as she does not want to cook since her  passed in October. This is likely contributing to her high INR. She was instructed to hold warfarin today and tomorrow, then begin a new dose of warfarin 2.5 mg daily. This is a 12.5% decrease in her dose. Next INR check is on 12/28/21. We discussed the importance of being careful to avoid falls/injury while her INR is high. She received her COVID booster at this appointment      Description    TODAY AND TOMORROW ONLY: hold warfarin then   NEW DOSE: Warfarin 2.5 mg (half a tablet) daily    Keep your green vegetable intake consistent (3-4 salads per week). Please call if this changes. Call [phone] with signs or symptoms of bleeding or ANY medication changes (including over-the-counter medications or herbal supplements). If significant bleeding occurs please seek immediate medical attention. Limit alcohol intake. Please call if this changes. Immunization History   Administered Date(s) Administered    COVID-19, Pfizer, PF, 30mcg/0.3mL 01/29/2021, 02/19/2021    Influenza Virus Vaccine 10/16/2009, 01/26/2010, 09/29/2010, 10/03/2016    Influenza, High Dose (Fluzone 65 yrs and older) 09/27/2011, 10/01/2012, 10/29/2013, 10/23/2014, 11/24/2015, 10/23/2018, 12/21/2019    Influenza, Quadv, IM, PF (6 mo and older Fluzone, Flulaval, Fluarix, and 3 yrs and older Afluria) 12/22/2020, 10/23/2021    Pneumococcal Conjugate 13-valent ([id number]) 07/02/2015    Pneumococcal Polysaccharide ([id number]) 07/01/2006, 11/07/2011    Td, unspecified formulation 10/04/2004    Tdap (Boostrix, Adacel) 09/01/2015             Reviewed AVS with patient / caregiver.       CLINICAL PHARMACY CONSULT: MED RECONCILIATION/REVIEW ADDENDUM    For Pharmacy Admin Tracking Only     Intervention Detail: Dose Adjustment: 1, reason: Therapy Optimization and Vaccine Recommended/Administered   Total # of Interventions Recommended: 2   Total # of Interventions Accepted: 2   Time Spent (min): 20

## 2021-12-17 RX ORDER — UMECLIDINIUM BROMIDE AND VILANTEROL TRIFENATATE 62.5; 25 UG/1; UG/1
1 POWDER RESPIRATORY (INHALATION) DAILY
Qty: 1 EACH | Refills: 11 | Status: SHIPPED | OUTPATIENT
Start: 2021-12-17 | End: 2022-05-16 | Stop reason: ALTCHOICE

## 2022-01-02 DIAGNOSIS — F43.22 ADJUSTMENT DISORDER WITH ANXIETY: ICD-10-CM

## 2022-01-03 ENCOUNTER — HOSPITAL ENCOUNTER (OUTPATIENT)
Dept: PULMONOLOGY | Age: 82
Discharge: HOME | End: 2022-01-03
Payer: MEDICARE

## 2022-01-03 DIAGNOSIS — R05.9 COUGH: ICD-10-CM

## 2022-01-03 LAB
DLCO %PRED: 69 %
DLCO PRED: NORMAL
DLCO/VA %PRED: 106 %
DLCO/VA PRED: NORMAL
DLCO/VA: 4.36 ML/MIN/MMHG
DLCO: 12.11 ML/MIN/MMHG
EXPIRATORY TIME-POST: NORMAL
EXPIRATORY TIME: NORMAL
FEF 25-75% %CHNG: NORMAL
FEF 25-75% %PRED-POST: NORMAL
FEF 25-75% %PRED-PRE: NORMAL
FEF 25-75% PRED: NORMAL
FEF 25-75%-POST: NORMAL
FEF 25-75%-PRE: NORMAL
FEV1 %PRED-POST: 92 %
FEV1 %PRED-PRE: 90 %
FEV1 PRED: NORMAL
FEV1-POST: NORMAL
FEV1-PRE: NORMAL
FEV1/FVC %PRED-POST: NORMAL
FEV1/FVC %PRED-PRE: NORMAL
FEV1/FVC PRED: NORMAL
FEV1/FVC-POST: 80 %
FEV1/FVC-PRE: 79 %
FVC %PRED-POST: NORMAL
FVC %PRED-PRE: NORMAL
FVC PRED: NORMAL
FVC-POST: NORMAL
FVC-PRE: NORMAL
GAW %PRED: NORMAL
GAW PRED: NORMAL
GAW: NORMAL
IC %PRED: NORMAL
IC PRED: NORMAL
IC: NORMAL
MEP: NORMAL
MIP: NORMAL
MVV %PRED-PRE: NORMAL
MVV PRED: NORMAL
MVV-PRE: NORMAL
PEF %PRED-POST: NORMAL
PEF %PRED-PRE: NORMAL
PEF PRED: NORMAL
PEF%CHNG: NORMAL
PEF-POST: NORMAL
PEF-PRE: NORMAL
RAW %PRED: NORMAL
RAW PRED: NORMAL
RAW: NORMAL
RV %PRED: NORMAL
RV PRED: NORMAL
RV: NORMAL
SVC %PRED: NORMAL
SVC PRED: NORMAL
SVC: NORMAL
TLC %PRED: 78 %
TLC PRED: NORMAL
TLC: NORMAL
VA %PRED: 64 %
VA PRED: NORMAL
VA: 2.78 L
VTG %PRED: NORMAL
VTG PRED: NORMAL
VTG: NORMAL

## 2022-01-03 PROCEDURE — 94664 DEMO&/EVAL PT USE INHALER: CPT

## 2022-01-03 PROCEDURE — 94640 AIRWAY INHALATION TREATMENT: CPT

## 2022-01-03 PROCEDURE — 94726 PLETHYSMOGRAPHY LUNG VOLUMES: CPT

## 2022-01-03 PROCEDURE — 94060 EVALUATION OF WHEEZING: CPT

## 2022-01-03 PROCEDURE — 6370000000 HC RX 637 (ALT 250 FOR IP): Performed by: INTERNAL MEDICINE

## 2022-01-03 PROCEDURE — 94729 DIFFUSING CAPACITY: CPT

## 2022-01-03 RX ORDER — ALBUTEROL SULFATE 90 UG/1
4 AEROSOL, METERED RESPIRATORY (INHALATION) ONCE
Status: COMPLETED | OUTPATIENT
Start: 2022-01-03 | End: 2022-01-03

## 2022-01-03 RX ORDER — LORAZEPAM 0.5 MG/1
TABLET ORAL
Qty: 15 TABLET | Refills: 0 | Status: SHIPPED | OUTPATIENT
Start: 2022-01-03 | End: 2022-08-15

## 2022-01-03 RX ADMIN — ALBUTEROL SULFATE 4 PUFF: 90 AEROSOL, METERED RESPIRATORY (INHALATION) at 12:06

## 2022-01-03 ASSESSMENT — PULMONARY FUNCTION TESTS
FEV1/FVC_PRE: 79
FEV1/FVC_POST: 80
FEV1_PERCENT_PREDICTED_POST: 92
FEV1_PERCENT_PREDICTED_PRE: 90

## 2022-01-05 PROCEDURE — 94729 DIFFUSING CAPACITY: CPT | Performed by: INTERNAL MEDICINE

## 2022-01-05 PROCEDURE — 94726 PLETHYSMOGRAPHY LUNG VOLUMES: CPT | Performed by: INTERNAL MEDICINE

## 2022-01-05 PROCEDURE — 94060 EVALUATION OF WHEEZING: CPT | Performed by: INTERNAL MEDICINE

## 2022-01-05 NOTE — PROCEDURES
Spirometry was acceptable and reproducible by ATS standards      Spirometry/Flow volume loop:  Spirometry and flow volume loops do not show airflow obstruction. FEV1 92%. FVC 88%There is no bronchodilator response. Lung volumes:  Lung volumes with mild restriction, total lung capacity 70%    Diffusing capacity:  Diffusing capacity is mildly reduced    Summary:  Very mild restrictive lung disease by criteria, there is also reduction in diffusing capacity though this is not corrected for hemoglobin and patient was unable to meet the [facility] requirement for accurate DLCO interpretation. Clinical correlation advised. FEV1 %Pred-Post   Date Value Ref Range Status   01/03/2022 92 % Final     FEV1/FVC-Post   Date Value Ref Range Status   01/03/2022 80 % Final     TLC %Pred   Date Value Ref Range Status   01/03/2022 78 % Final     DLCO/VA %Pred   Date Value Ref Range Status   01/03/2022 106 % Final           OBSTRUCTION % Predicted FEV1   MILD >70%   MODERATE 60-69%   MODERATELY-SEVERE 50-59%   SEVERE 35-49%   VERY SEVERE <35%         RESTRICTION % Predicted TLC   MILD 66-80%   MODERATE 54-65%   MODERATELY-SEVERE <54%                 DIFFUSION CAPACITY DLCO % Pred   MILD >60% AND < LLN   MODERATE 40-60%   SEVERE <40%       PFT data will be scanned into the media tab under this encounter. Please see the scanned data for numerical values.      Clarke Rae MD  [facility] Pulmonary, Sleep and Critical Care Medicine

## 2022-01-12 ENCOUNTER — NURSE TRIAGE (OUTPATIENT)
Dept: OTHER | Facility: CLINIC | Age: 82
End: 2022-01-12

## 2022-01-12 NOTE — TELEPHONE ENCOUNTER
Received call from Susie Zavala at [facility] with Red Flag Complaint. Subjective: Caller states \" Nausea and sweats daily after eating lunch. She startes feeling bad, and goes to nap and wakes in the night very hungry. \"     Current Symptoms: nausea and sweating at lunch time daily after eating lunch. . Has been happenig for about 1 month. Onset: 4 weeks ago; sudden, unchanged    Associated Symptoms: reduced appetite    Pain Severity: 0/10; N/A; none    Temperature: no n/a    What has been tried: pepto    LMP: NA Pregnant: NA    Recommended disposition: See in office today or tomorrow    Care advice provided, patient verbalizes understanding; denies any other questions or concerns; instructed to call back for any new or worsening symptoms. Writer provided warm transfer to [facility] [place] at [facility] for appointment scheduling     Attention Provider: Thank you for allowing me to participate in the care of your patient. The patient was connected to triage in response to information provided to the ECC/PSC. Please do not respond through this encounter as the response is not directed to a shared pool.             Reason for Disposition   Patient wants to be seen    Protocols used: NAUSEA-ADULT-OH

## 2022-01-13 ENCOUNTER — VIRTUAL VISIT (OUTPATIENT)
Dept: FAMILY MEDICINE CLINIC | Age: 82
End: 2022-01-13
Payer: MEDICARE

## 2022-01-13 DIAGNOSIS — L29.9 PRURITUS: Primary | ICD-10-CM

## 2022-01-13 DIAGNOSIS — I50.22 CHRONIC SYSTOLIC HEART FAILURE (HCC): ICD-10-CM

## 2022-01-13 DIAGNOSIS — I48.91 ATRIAL FIBRILLATION WITH RAPID VENTRICULAR RESPONSE (HCC): ICD-10-CM

## 2022-01-13 DIAGNOSIS — K29.70 GASTRITIS WITHOUT BLEEDING, UNSPECIFIED CHRONICITY, UNSPECIFIED GASTRITIS TYPE: ICD-10-CM

## 2022-01-13 DIAGNOSIS — D68.51 FACTOR V LEIDEN MUTATION (HCC): ICD-10-CM

## 2022-01-13 DIAGNOSIS — C91.11 CHRONIC LYMPHOCYTIC LEUKEMIA IN REMISSION (HCC): ICD-10-CM

## 2022-01-13 PROBLEM — R05.9 COUGH: Status: RESOLVED | Noted: 2021-12-14 | Resolved: 2022-01-13

## 2022-01-13 PROCEDURE — G8417 CALC BMI ABV UP PARAM F/U: HCPCS | Performed by: FAMILY MEDICINE

## 2022-01-13 PROCEDURE — 1036F TOBACCO NON-USER: CPT | Performed by: FAMILY MEDICINE

## 2022-01-13 PROCEDURE — G8482 FLU IMMUNIZE ORDER/ADMIN: HCPCS | Performed by: FAMILY MEDICINE

## 2022-01-13 PROCEDURE — 99214 OFFICE O/P EST MOD 30 MIN: CPT | Performed by: FAMILY MEDICINE

## 2022-01-13 PROCEDURE — 1123F ACP DISCUSS/DSCN MKR DOCD: CPT | Performed by: FAMILY MEDICINE

## 2022-01-13 PROCEDURE — 1090F PRES/ABSN URINE INCON ASSESS: CPT | Performed by: FAMILY MEDICINE

## 2022-01-13 PROCEDURE — G8400 PT W/DXA NO RESULTS DOC: HCPCS | Performed by: FAMILY MEDICINE

## 2022-01-13 PROCEDURE — G8427 DOCREV CUR MEDS BY ELIG CLIN: HCPCS | Performed by: FAMILY MEDICINE

## 2022-01-13 PROCEDURE — 4040F PNEUMOC VAC/ADMIN/RCVD: CPT | Performed by: FAMILY MEDICINE

## 2022-01-13 RX ORDER — PANTOPRAZOLE SODIUM 40 MG/1
TABLET, DELAYED RELEASE ORAL
Qty: 90 TABLET | Refills: 0 | Status: ON HOLD | OUTPATIENT
Start: 2022-01-13 | End: 2022-11-03

## 2022-01-13 RX ORDER — PANTOPRAZOLE SODIUM 40 MG/1
40 TABLET, DELAYED RELEASE ORAL
Qty: 30 TABLET | Refills: 2 | Status: SHIPPED | OUTPATIENT
Start: 2022-01-13 | End: 2022-01-13

## 2022-01-13 RX ORDER — NYSTATIN 100000 [USP'U]/G
POWDER TOPICAL
Qty: 60 G | Refills: 1 | Status: ON HOLD | OUTPATIENT
Start: 2022-01-13 | End: 2022-11-03

## 2022-01-13 RX ORDER — FLUCONAZOLE 150 MG/1
150 TABLET ORAL
Qty: 2 TABLET | Refills: 0 | Status: SHIPPED | OUTPATIENT
Start: 2022-01-13 | End: 2022-01-19

## 2022-01-13 NOTE — PROGRESS NOTES
2022    TELEHEALTH EVALUATION -- Audio/Visual (During [id number] public health emergency)    HPI:    Inez Metz (:  1940) has requested an audio/video evaluation for the following concern(s):    Chief Complaint   Patient presents with    Other     SHE IS ITCHING REALLY BAD ON STOMACH, CANNOT SEE ANY TYPE OF RASH OR REDNESS SHE IS GOING CRAZY ITCHING     Anxiety     HAVING STOMACH ISSUES, NOT SURE IF ITS ANXIETY HAVING TO TAKE CARE OF HER SON WHO HAD A STROKE, IF SHE EATS IN THE MIDDLE OF THE DAY AND STOMACH IS UPSET THEN SHE WILL WAKE UP IN THE MIDDLE OF THE NIGHT STARVING SHE IS USING PEPTO NEVER THROWS UP      Seen by pulm- dr. Campbell Laser  for chronic hypoxemia/ cough - hx of CLL - had PNA 10/21 - given stiolto trial  pft planned  Terrible itch in groin - -scratching from morning to night - using anti itch med which makes it burn. No redness/ rash. Pubic area to rectum. Seen by cnp awhile back  No bumps/ dysuria / d/c - extends back to anus  Oldest son living w/ her - had stroke  Stressful  Every day - eats lunch - hot flashes/ cold - nausea -eating pepto bismol - better after several hours but wakes up middle of night hungry w/o symptoms. Going on for a month or 6 weeks -   pepto helps some  No bowel changes. No vomiting - stomach hurts also  If doesn't eat - no sxs. Small lunch and small breakfast.  No problems w/ evening meal or breakfast.  Doesn't want to use lorazepam unless has to  Breathing better - had pft recently  Sees pulm next week  Only using oxygen if ox sat <90% - which is not too often  Stress of taking care of son easing as son is improving   Hopes to take trip w/ friend soon  Review of Systems    Prior to Visit Medications    Medication Sig Taking?  Authorizing Provider   LORazepam (ATIVAN) 0.5 MG tablet TAKE 1/2 TO 1 TABLET BY MOUTH EVERY 8 HOURS FOR UP TO 10 DAYS AS NEEDED FOR ANXIETY Yes Jordi Maxwell, DO   umeclidinium-vilanterol (ANORO ELLIPTA) 62.5-25 MCG/INH AEPB inhaler Inhale 1 puff into the lungs daily Yes Babak Ma MD   metoprolol succinate (TOPROL XL) 25 MG extended release tablet Take 0.5 tablets by mouth daily Yes FAB Saunders CNP   tiotropium-olodaterol (STIOLTO RESPIMAT) 2.5-2.5 MCG/ACT AERS Inhale 2 puffs into the lungs daily Yes Babak Ma MD   tiotropium-olodaterol (STIOLTO RESPIMAT) 2.5-2.5 MCG/ACT AERS Inhale 2 puffs into the lungs daily Yes Babak Ma MD   sacubitril-valsartan (ENTRESTO) 24-26 MG per tablet Take 0.5 tablets by mouth 2 times daily Yes FAB Park CNP   warfarin (COUMADIN) 5 MG tablet Take 2.5 mg by mouth daily or as directed by [facility]mad[facility] [phone] Yes Historical Provider, MD   torsemide (DEMADEX) 20 MG tablet Take 1 tablet by mouth daily Yes Radha Paula MD   midodrine (PROAMATINE) 5 MG tablet Take 0.5 tablets by mouth 2 times daily Yes FAB Park CNP   amiodarone (CORDARONE) 200 MG tablet Take 1 tablet by mouth daily Yes Keith Jensen MD   gabapentin (NEURONTIN) 300 MG capsule TAKE 1 TO 2 CAPSULES BY MOUTH EVERY NIGHT AS DIRECTED  Patient taking differently: Take 600 mg by mouth nightly.   Yes FAB Kennedy CNP   Cyanocobalamin (VITAMIN B 12) 500 MCG TABS Take 500 mcg by mouth daily  Yes Historical Provider, MD       Social History     Tobacco Use    Smoking status: Former Smoker     Packs/day: 0.30     Years: 50.00     Pack years: 15.00     Types: Cigarettes     Quit date: 1995     Years since quittin.2    Smokeless tobacco: Never Used   Vaping Use    Vaping Use: Never used   Substance Use Topics    Alcohol use: No     Alcohol/week: 0.0 standard drinks    Drug use: Not Currently        PHYSICAL EXAMINATION:  [ INSTRUCTIONS:  \"[x]\" Indicates a positive item  \"[]\" Indicates a negative item  -- DELETE ALL ITEMS NOT EXAMINED]  Vital Signs: (As obtained by patient/caregiver or practitioner observation)    Blood pressure-  Heart rate- stress   ? Stress induced gastritis -still has gb but based on timing, will tx w/ ppi daily and limit/ minimize lunch time eating - eat more at breakfast to get through until evening meal and increase back noon eating as tolerated w/ medicine  Refer to GI for further eval if persists      Tammy Owen, was evaluated through a synchronous (real-time) audio-video encounter. The patient (or guardian if applicable) is aware that this is a billable service. Verbal consent to proceed has been obtained within the past 12 months. The visit was conducted pursuant to the emergency declaration under the [address] [place] [address], [address] [place] [address] authority and the [employer] General Act. Patient identification was verified, and a caregiver was present when appropriate. The patient was located in a state where the provider was credentialed to provide care. Total time spent on this encounter: 21 or more minutes were spent on the digital evaluation and management of this patient. --Suzy Betancur MD on 1/13/2022 at 1:25 PM    An electronic signature was used to authenticate this note.

## 2022-01-17 ENCOUNTER — TELEPHONE (OUTPATIENT)
Dept: PHARMACY | Age: 82
End: 2022-01-17

## 2022-01-17 NOTE — TELEPHONE ENCOUNTER
Needs surgery on ear due to hearing loss  Has to be off warfarin for 3 nights in a row per her ENT    She was calling to ask if she could be off warfarin for this. I advised her to call Dr. Kristen Moore who is the referring doctor, but likely he would say it is fine. She reports no clots, just a history of Factor V which would make her more susceptible to clots    Advised her to specifically ask if she would need to be bridged with Lovenox. Also, given her last INR of 5.8 and missed follow up for various reasons, I advised she come in tomorrow to have her INR checked to see if 3 days would even be sufficient and to come up with a plan of action for this procedure. She was agreeable and will come in tomorrow.

## 2022-01-18 ENCOUNTER — ANTI-COAG VISIT (OUTPATIENT)
Dept: PHARMACY | Age: 82
End: 2022-01-18
Payer: MEDICARE

## 2022-01-18 DIAGNOSIS — R79.1 ABNORMAL COAGULATION PROFILE: Primary | ICD-10-CM

## 2022-01-18 LAB — INTERNATIONAL NORMALIZATION RATIO, POC: 2.5

## 2022-01-18 PROCEDURE — 99211 OFF/OP EST MAY X REQ PHY/QHP: CPT

## 2022-01-18 PROCEDURE — 85610 PROTHROMBIN TIME: CPT

## 2022-01-18 RX ORDER — GABAPENTIN 300 MG/1
CAPSULE ORAL
Qty: 60 CAPSULE | Refills: 5 | Status: SHIPPED | OUTPATIENT
Start: 2022-01-18 | End: 2022-08-04 | Stop reason: SDUPTHER

## 2022-01-18 NOTE — PROGRESS NOTES
Steve Michel is a 78 y.o. here for warfarin management. Gina Wetzel had an INR test today. Results were reviewed and appropriate warfarin management was completed. This visit was performed as: An in person visit. Protocols were followed with precautions to reduce the spread of COVID-19. Patient verifies current dosing regimen: Yes     Warfarin medication reviewed and updated on the patient 's home medication list: Yes   All other medications reviewed and updated on the patient 's home medication list: No: no changes     Lab Results   Component Value Date    INR 2.5 2022    INR 5.80 2021    INR 2.80 2021       Patient Findings     Positives:  Upcoming invasive procedure    Negatives:  Signs/symptoms of bleeding, Change in medications, Change in diet/appetite, Bruising    Comments:  Upcoming ear surgery          Anticoagulation Summary  As of 2022    INR goal:  2.0-3.0   TTR:  53.2 % (10.4 y)   INR used for dosin.5 (2022)   Warfarin maintenance plan:  2.5 mg (5 mg x 0.5) every day   Weekly warfarin total:  17.5 mg   Plan last modified:  Chente Nicolas (2021)   Next INR check:  2/15/2022   Priority:  Maintenance   Target end date: Indefinite    Indications    Abnormal coagulation profile [R79.1]             Anticoagulation Episode Summary     INR check location:  Anticoagulation Clinic    Preferred lab:      Send INR reminders to:  [facility]    Comments:  EPIC      Factor V Leiden       Anticoagulation Care Providers     Provider Role Specialty Phone number    Lavinia Martinez MD Referring Family Medicine [phone]          Warfarin assessment / plan: Gina Wetzel reports that she had bleeding from her ear at some point. She saw a physician for this. They removed the dried blood per Gina Wetzel and now want to do a procedure to drain the fluid. She reports she will need to be off warfarin for 3 days prior.   She will call Dr. Sofia Craft to anne to report her INR and see if it is okay to be off warfarin and if she needs to be bridged w Lovenox. INR in range today. No bleeding at this time. Will continue same warfarin dose and see in one month. She agrees to call if they need an INR sooner. Description    Warfarin 2.5 mg (half a tablet) daily    Keep your green vegetable intake consistent (3-4 salads per week). Please call if this changes. Call [phone] with signs or symptoms of bleeding or ANY medication changes (including over-the-counter medications or herbal supplements). If significant bleeding occurs please seek immediate medical attention. Limit alcohol intake. Please call if this changes. Immunization History   Administered Date(s) Administered    COVID-19Chiki, Primary or Immunocompromised, PF, 100mcg/0.5mL 12/16/2021    COVID-19, Pfizer, PF, 30mcg/0.3mL 01/29/2021, 02/19/2021    Influenza Virus Vaccine 10/16/2009, 01/26/2010, 09/29/2010, 10/03/2016    Influenza, High Dose (Fluzone 65 yrs and older) 09/27/2011, 10/01/2012, 10/29/2013, 10/23/2014, 11/24/2015, 10/23/2018, 12/21/2019    Influenza, Quadv, IM, PF (6 mo and older Fluzone, Flulaval, Fluarix, and 3 yrs and older Afluria) 12/22/2020, 10/23/2021    Pneumococcal Conjugate 13-valent ([id number]) 07/02/2015    Pneumococcal Polysaccharide ([id number]) 07/01/2006, 11/07/2011    Td, unspecified formulation 10/04/2004    Tdap (Boostrix, Adacel) 09/01/2015             Reviewed AVS with patient / caregiver.       CLINICAL PHARMACY CONSULT: MED RECONCILIATION/REVIEW ADDENDUM    For Pharmacy Admin Tracking Only     Intervention Detail:    Total # of Interventions Recommended: 0   Total # of Interventions Accepted: 0   Time Spent (min): 15

## 2022-01-18 NOTE — TELEPHONE ENCOUNTER
LAST VISIT 01/13/2022 WITH DR AUGUSTE VIRTUALLY, NEXT VISIT NONE.   [facility]

## 2022-01-19 ENCOUNTER — TELEPHONE (OUTPATIENT)
Dept: FAMILY MEDICINE CLINIC | Age: 82
End: 2022-01-19

## 2022-01-19 NOTE — TELEPHONE ENCOUNTER
She is having work done on her ear and she is on warfarin and needs Dr. Abigail Peña permission to be off of this for 3 days     Pt @  [phone] - ok to leave a message

## 2022-01-20 ENCOUNTER — OFFICE VISIT (OUTPATIENT)
Dept: PULMONOLOGY | Age: 82
End: 2022-01-20
Payer: MEDICARE

## 2022-01-20 VITALS
WEIGHT: 152.4 LBS | DIASTOLIC BLOOD PRESSURE: 68 MMHG | RESPIRATION RATE: 12 BRPM | HEIGHT: 59 IN | TEMPERATURE: 96.9 F | OXYGEN SATURATION: 95 % | SYSTOLIC BLOOD PRESSURE: 157 MMHG | HEART RATE: 61 BPM | BODY MASS INDEX: 30.72 KG/M2

## 2022-01-20 DIAGNOSIS — J96.11 CHRONIC RESPIRATORY FAILURE WITH HYPOXIA (HCC): ICD-10-CM

## 2022-01-20 DIAGNOSIS — J41.0 SIMPLE CHRONIC BRONCHITIS (HCC): Primary | ICD-10-CM

## 2022-01-20 PROCEDURE — G8428 CUR MEDS NOT DOCUMENT: HCPCS | Performed by: INTERNAL MEDICINE

## 2022-01-20 PROCEDURE — 1123F ACP DISCUSS/DSCN MKR DOCD: CPT | Performed by: INTERNAL MEDICINE

## 2022-01-20 PROCEDURE — G8482 FLU IMMUNIZE ORDER/ADMIN: HCPCS | Performed by: INTERNAL MEDICINE

## 2022-01-20 PROCEDURE — 99214 OFFICE O/P EST MOD 30 MIN: CPT | Performed by: INTERNAL MEDICINE

## 2022-01-20 PROCEDURE — G8400 PT W/DXA NO RESULTS DOC: HCPCS | Performed by: INTERNAL MEDICINE

## 2022-01-20 PROCEDURE — 1090F PRES/ABSN URINE INCON ASSESS: CPT | Performed by: INTERNAL MEDICINE

## 2022-01-20 PROCEDURE — G8417 CALC BMI ABV UP PARAM F/U: HCPCS | Performed by: INTERNAL MEDICINE

## 2022-01-20 PROCEDURE — 1036F TOBACCO NON-USER: CPT | Performed by: INTERNAL MEDICINE

## 2022-01-20 PROCEDURE — 4040F PNEUMOC VAC/ADMIN/RCVD: CPT | Performed by: INTERNAL MEDICINE

## 2022-01-20 PROCEDURE — 3023F SPIROM DOC REV: CPT | Performed by: INTERNAL MEDICINE

## 2022-01-20 ASSESSMENT — COPD QUESTIONNAIRES
QUESTION2_CHESTPHLEGM: 1
QUESTION7_SLEEPQUALITY: 2
QUESTION3_CHESTTIGHTNESS: 0
QUESTION8_ENERGYLEVEL: 1
CAT_TOTALSCORE: 7
QUESTION1_COUGHFREQUENCY: 1
QUESTION5_HOMEACTIVITIES: 0
QUESTION4_WALKINCLINE: 2
QUESTION6_LEAVINGHOUSE: 0

## 2022-01-20 NOTE — PROGRESS NOTES
REASON FOR CONSULTATION/CC:    Chief Complaint   Patient presents with    Follow-up           PCP: Jermaine Renteria MD    HISTORY OF PRESENT ILLNESS: Elie Mays is a 78y.o. year old female with a history of chronic hypoxemia  who presents      History  She has a history of CLL and was admitted October 2021 for community-acquired pneumonia. Was discharged on oxygen. Extensive history of smoking. Current history  Chronic hypoxemia   2L NC        Cough /   [address] working well. complains of taste. No congestion. Hx tobacce abuse          Objective:   PHYSICAL EXAM:  Blood pressure (!) 157/68, pulse 61, temperature 96.9 °F (36.1 °C), temperature source Infrared, resp. rate 12, height 4' 11\" (1.499 m), weight 152 lb 6.4 oz (69.1 kg), SpO2 95 %, not currently breastfeeding.'  Gen: No distress. Eyes: PERRL. No sclera icterus. No conjunctival injection. ENT: No discharge. Pharynx clear. External appearance of ears and nose normal.  Neck: Trachea midline. No obvious mass. Resp: No accessory muscle use. No crackles. No wheezes. No rhonchi. CV: Regular rate. Regular rhythm. No murmur or rub. No edema. GI:    Skin: Warm, dry, normal texture and turgor. No nodule on exposed extremities. Lymph: No cervical LAD. No supraclavicular LAD. M/S: No cyanosis. No clubbing. No joint deformity. Neuro: Moves all four extremities. Psych: Oriented x 3. No anxiety. Awake. Alert. Intact judgement and insight. Data Reviewed:          Assessment:     Cough   pneumonia 2021  Hx smoking   Chronic hypoxemia  Fatigue  Copd? Plan:      Problem List Items Addressed This Visit     Simple chronic bronchitis ([address] - Primary     Stiolto          Chronic respiratory failure with hypoxia ([address]     Continue 2L NC                      This note was transcribed using [address]. Please disregard any translational errors.     [address] [facility]  [phone]

## 2022-01-21 PROBLEM — J18.9 PNEUMONIA: Status: RESOLVED | Noted: 2021-09-12 | Resolved: 2022-01-21

## 2022-01-21 PROBLEM — J41.0 SIMPLE CHRONIC BRONCHITIS (HCC): Status: ACTIVE | Noted: 2022-01-21

## 2022-02-04 RX ORDER — WARFARIN SODIUM 5 MG/1
TABLET ORAL
Qty: 345 TABLET | Refills: 0 | Status: ON HOLD | OUTPATIENT
Start: 2022-02-04

## 2022-02-04 RX ORDER — WARFARIN SODIUM 5 MG/1
TABLET ORAL
Qty: 115 TABLET | Refills: 0 | Status: SHIPPED | OUTPATIENT
Start: 2022-02-04 | End: 2022-02-04

## 2022-02-04 NOTE — TELEPHONE ENCOUNTER
LV 1/13/22 WITH DR AUGUSTE NV NONE    Component 1/18/22 1533   INR 2.5               Specimen Collected: 01/18/22 15:33 Last Resulted: 01/18/22

## 2022-02-04 NOTE — TELEPHONE ENCOUNTER
CALLED PATIENT AND SHE WILL CALL BACK TO [facility]. SON HAD A STROKE AND HAS A LOT GOING ON, SHE IS TAKING CARE OF HIM BUT WILL CALL TO [facility].  ENZO

## 2022-02-07 ENCOUNTER — TELEPHONE (OUTPATIENT)
Dept: FAMILY MEDICINE CLINIC | Age: 82
End: 2022-02-07

## 2022-02-07 NOTE — TELEPHONE ENCOUNTER
DR AUGUSTE', DO YOU HAVE PAPERWORK FOR THIS PT? I CAN'T FIND ANYTHING SCANNED.   [facility]

## 2022-02-07 NOTE — TELEPHONE ENCOUNTER
----- Message from Virgen Burton sent at 2/7/2022 12:15 PM EST -----  Subject: Message to Provider    QUESTIONS  Information for Provider? Shona with [facility], is looking for a follow   up for a form that was faxed over. Faxed over on 2.2.22, third party would   like a call back :)   ---------------------------------------------------------------------------  --------------  [address]  What is the best way for the office to contact you? Do not leave any   message, patient will call back for answer  Preferred Call Back Phone Number? [phone]  ---------------------------------------------------------------------------  --------------  SCRIPT ANSWERS  Relationship to Patient? Third Party  Representative Name?  Corrine with [facility]

## 2022-02-07 NOTE — TELEPHONE ENCOUNTER
If not done in paperwork from 1 week ago, I don't have.  Ask to refax and I will  paperwork in next couple days

## 2022-02-14 ENCOUNTER — TELEPHONE (OUTPATIENT)
Dept: PULMONOLOGY | Age: 82
End: 2022-02-14

## 2022-02-15 ENCOUNTER — TELEPHONE (OUTPATIENT)
Dept: FAMILY MEDICINE CLINIC | Age: 82
End: 2022-02-15

## 2022-02-15 ENCOUNTER — ANTI-COAG VISIT (OUTPATIENT)
Dept: PHARMACY | Age: 82
End: 2022-02-15
Payer: MEDICARE

## 2022-02-15 DIAGNOSIS — R79.1 ABNORMAL COAGULATION PROFILE: Primary | ICD-10-CM

## 2022-02-15 LAB — INR BLD: 1.2

## 2022-02-15 PROCEDURE — 99211 OFF/OP EST MAY X REQ PHY/QHP: CPT

## 2022-02-15 PROCEDURE — 85610 PROTHROMBIN TIME: CPT

## 2022-02-15 NOTE — TELEPHONE ENCOUNTER
----- Message from Amparo Mendoza sent at 2/15/2022  4:39 PM EST -----  Subject: Refill Request    QUESTIONS  Name of Medication? Other - Oxygen  Patient-reported dosage and instructions? daily  How many days do you have left? 0  Preferred Pharmacy? [address] phone number (if available)? [phone]  Additional Information for Provider? Caller is the DME provider and is   following up to see if patient's fax for oxygen was received. [phone]-ext   for phone when calling back  ---------------------------------------------------------------------------  --------------  [address]  What is the best way for the office to contact you? Do not leave any   message, patient will call back for answer  Preferred Call Back Phone Number?  [phone]

## 2022-02-15 NOTE — PROGRESS NOTES
Destiney Rios is a 78 y.o. here for warfarin management. Colin Hong had an INR test today. Results were reviewed and appropriate warfarin management was completed. This visit was performed as: An in person visit. Protocols were followed with precautions to reduce the spread of COVID-19. Patient verifies current dosing regimen: Yes     Warfarin medication reviewed and updated on the patient 's home medication list: Yes   All other medications reviewed and updated on the patient 's home medication list: No: no changes     Lab Results   Component Value Date    INR 1.20 02/15/2022    INR 2.5 2022    INR 5.80 2021       Patient Findings     Positives:  Missed doses    Negatives:  Signs/symptoms of thrombosis, Signs/symptoms of bleeding, Change in health, Change in medications, Change in diet/appetite, Bruising    Comments:  Held warfarin recently for procedure          Anticoagulation Summary  As of 2/15/2022    INR goal:  2.0-3.0   TTR:  53.1 % (10.5 y)   INR used for dosin.20 (2/15/2022)   Warfarin maintenance plan:  2.5 mg (5 mg x 0.5) every day   Weekly warfarin total:  17.5 mg   Plan last modified:  Jessica Zapata (2021)   Next INR check:  3/1/2022   Priority:  Maintenance   Target end date: Indefinite    Indications    Abnormal coagulation profile [R79.1]             Anticoagulation Episode Summary     INR check location:  Anticoagulation Clinic    Preferred lab:      Send INR reminders to:  [facility]    Comments:  EPIC      Factor V Leiden       Anticoagulation Care Providers     Provider Role Specialty Phone number    Simeon Sanderson MD Referring Family Medicine [phone]          Warfarin assessment / plan:     Appears well. Held warfarin - for a procedure 2/3. This was rescheduled due to weather and she held warfarin again - for procedure 2/10. INR is low today at 1.2.  Will take higher warfarin dose today and tomorrow, then continue regular regimen. Follow-up in 2 weeks. Description    TAKE WARFARIN 5 MG (ONE TABLET) TODAY AND TOMORROW. Then  CONTINUE: 2.5 mg (half a tablet) daily    Keep your green vegetable intake consistent (3-4 salads per week). Please call if this changes. Call [phone] with signs or symptoms of bleeding or ANY medication changes (including over-the-counter medications or herbal supplements). If significant bleeding occurs please seek immediate medical attention. Limit alcohol intake. Please call if this changes. Immunization History   Administered Date(s) Administered    COVID-19, Moderna, Primary or Immunocompromised, PF, 100mcg/0.5mL 12/16/2021    COVID-19, Pfizer Purple top, DILUTE for use, 12+ yrs, 30mcg/0.3mL dose 01/29/2021, 02/19/2021    Influenza Virus Vaccine 10/16/2009, 01/26/2010, 09/29/2010, 10/03/2016    Influenza, High Dose (Fluzone 65 yrs and older) 09/27/2011, 10/01/2012, 10/29/2013, 10/23/2014, 11/24/2015, 10/23/2018, 12/21/2019    Influenza, Quadv, IM, PF (6 mo and older Fluzone, Flulaval, Fluarix, and 3 yrs and older Afluria) 12/22/2020, 10/23/2021    Pneumococcal Conjugate 13-valent ([id number]) 07/02/2015    Pneumococcal Polysaccharide ([id number]) 07/01/2006, 11/07/2011    Td, unspecified formulation 10/04/2004    Tdap (Boostrix, Adacel) 09/01/2015             Reviewed AVS with patient / caregiver.     Adjust dosage and/or reconcile meds (fill pill box) </= 5 medications - 2 points      CLINICAL PHARMACY CONSULT: MED RECONCILIATION/REVIEW ADDENDUM    For Pharmacy Admin Tracking Only     Intervention Detail: Dose Adjustment: 1, reason: Therapy Optimization   Total # of Interventions Recommended: 1   Total # of Interventions Accepted: 1   Time Spent (min): 20

## 2022-03-01 ENCOUNTER — ANTI-COAG VISIT (OUTPATIENT)
Dept: PHARMACY | Age: 82
End: 2022-03-01
Payer: MEDICARE

## 2022-03-01 DIAGNOSIS — R79.1 ABNORMAL COAGULATION PROFILE: Primary | ICD-10-CM

## 2022-03-01 LAB — INTERNATIONAL NORMALIZATION RATIO, POC: 2.1

## 2022-03-01 PROCEDURE — 85610 PROTHROMBIN TIME: CPT

## 2022-03-01 PROCEDURE — 99211 OFF/OP EST MAY X REQ PHY/QHP: CPT

## 2022-03-01 NOTE — PROGRESS NOTES
Alla Schaeffer is a 78 y.o. here for warfarin management. Daniel Mendez had an INR test today. Results were reviewed and appropriate warfarin management was completed. This visit was performed as: An in person visit. Protocols were followed with precautions to reduce the spread of COVID-19. Patient verifies current dosing regimen: Yes     Warfarin medication reviewed and updated on the patient 's home medication list: Yes   All other medications reviewed and updated on the patient 's home medication list: No: no changes     Lab Results   Component Value Date    INR 2.1 2022    INR 1.20 02/15/2022    INR 2.5 2022       Patient Findings     Positives:  Upcoming dental procedure    Negatives:  Signs/symptoms of bleeding, Change in medications, Change in diet/appetite, Bruising    Comments:  Tooth extraction 3/29/22. Will likely hold warfarin a few days prior. Anticoagulation Summary  As of 3/1/2022    INR goal:  2.0-3.0   TTR:  53.0 % (10.5 y)   INR used for dosin.1 (3/1/2022)   Warfarin maintenance plan:  2.5 mg (5 mg x 0.5) every day   Weekly warfarin total:  17.5 mg   Plan last modified:  Mary Fonseca (2021)   Next INR check:  2022   Priority:  Maintenance   Target end date: Indefinite    Indications    Abnormal coagulation profile [R79.1]             Anticoagulation Episode Summary     INR check location:  Anticoagulation Clinic    Preferred lab:      Send INR reminders to:  [facility]    Comments:  EPIC      Factor V Leiden       Anticoagulation Care Providers     Provider Role Specialty Phone number    Wilma Henao MD Referring Family Medicine [phone]          Warfarin assessment / plan:     Appears well. No changes. No acute findings. No change to warfarin therapy today. She has a dental procedure 3/29/22. She expects she may hold warfarin a few days prior. We will have her return for an INR 22.      Description CONTINUE: Warfarin 2.5 mg (half a tablet) daily    Keep your green vegetable intake consistent (3-4 salads per week). Please call if this changes. Call [phone] with signs or symptoms of bleeding or ANY medication changes (including over-the-counter medications or herbal supplements). If significant bleeding occurs please seek immediate medical attention. Limit alcohol intake. Please call if this changes. Immunization History   Administered Date(s) Administered    COVID-19, Aurther Dyers, Primary or Immunocompromised, PF, 100mcg/0.5mL 12/16/2021    COVID-19, Pfizer Purple top, DILUTE for use, 12+ yrs, 30mcg/0.3mL dose 01/29/2021, 02/19/2021    Influenza Virus Vaccine 10/16/2009, 01/26/2010, 09/29/2010, 10/03/2016    Influenza, High Dose (Fluzone 65 yrs and older) 09/27/2011, 10/01/2012, 10/29/2013, 10/23/2014, 11/24/2015, 10/23/2018, 12/21/2019    Influenza, Quadv, IM, PF (6 mo and older Fluzone, Flulaval, Fluarix, and 3 yrs and older Afluria) 12/22/2020, 10/23/2021    Pneumococcal Conjugate 13-valent ([id number]) 07/02/2015    Pneumococcal Polysaccharide ([id number]) 07/01/2006, 11/07/2011    Td, unspecified formulation 10/04/2004    Tdap (Boostrix, Adacel) 09/01/2015           Orders Placed This Encounter   Procedures    POCT INR     This external order was created through the results console. No orders of the defined types were placed in this encounter. Reviewed AVS with patient / caregiver.     Billing Points:  0 billing points this visit       CLINICAL PHARMACY CONSULT: MED RECONCILIATION/REVIEW ADDENDUM    For Pharmacy Admin Tracking Only     Intervention Detail:    Total # of Interventions Recommended: 0   Total # of Interventions Accepted: 0   Time Spent (min): 15

## 2022-03-28 ENCOUNTER — TELEPHONE (OUTPATIENT)
Dept: FAMILY MEDICINE CLINIC | Age: 82
End: 2022-03-28

## 2022-03-28 NOTE — TELEPHONE ENCOUNTER
Pt is scheduled to have some teeth extracted for Tomorrow.(they may need to reschedule )  Pt is on Warfarin and has Factor 5. They did not tell pt to be off her medication for three days prior. Is it ok for pt to be off her medication for three ? Or how ever many you feel is right? Anything different they need to do for the Factor 5? Please call .

## 2022-04-05 ENCOUNTER — ANTI-COAG VISIT (OUTPATIENT)
Dept: PHARMACY | Age: 82
End: 2022-04-05
Payer: MEDICARE

## 2022-04-05 DIAGNOSIS — R79.1 ABNORMAL COAGULATION PROFILE: Primary | ICD-10-CM

## 2022-04-05 LAB — INTERNATIONAL NORMALIZATION RATIO, POC: 1.8

## 2022-04-05 PROCEDURE — 85610 PROTHROMBIN TIME: CPT

## 2022-04-05 PROCEDURE — 99211 OFF/OP EST MAY X REQ PHY/QHP: CPT

## 2022-04-05 RX ORDER — MIDODRINE HYDROCHLORIDE 5 MG/1
TABLET ORAL
Qty: 180 TABLET | Refills: 1 | OUTPATIENT
Start: 2022-04-05

## 2022-04-05 NOTE — TELEPHONE ENCOUNTER
[phone] ([place])   Left message on pts machine to call back  Also sent a my chart message to pt

## 2022-04-05 NOTE — PROGRESS NOTES
Luna Mercer is a 78 y.o. here for warfarin management. Javier Singletary had an INR test today. Results were reviewed and appropriate warfarin management was completed. This visit was performed as: An in person visit. Protocols were followed with precautions to reduce the spread of COVID-19. Patient verifies current dosing regimen: Yes     Warfarin medication reviewed and updated on the patient 's home medication list: Yes   All other medications reviewed and updated on the patient 's home medication list: No: no changes     Lab Results   Component Value Date    INR 1.8 2022    INR 2.1 2022    INR 1.20 02/15/2022       Patient Findings     Positives:  Missed doses    Negatives:  Signs/symptoms of thrombosis, Signs/symptoms of bleeding, Change in medications, Change in diet/appetite, Bruising    Comments:  Missed 3/26, 3/27, and 3/28 for tooth extraction 3/29. Anticoagulation Summary  As of 2022    INR goal:  2.0-3.0   TTR:  52.8 % (10.6 y)   INR used for dosin.8 (2022)   Warfarin maintenance plan:  2.5 mg (5 mg x 0.5) every day   Weekly warfarin total:  17.5 mg   Plan last modified:  Mark Abdul (2021)   Next INR check:  5/3/2022   Priority:  Maintenance   Target end date: Indefinite    Indications    Abnormal coagulation profile [R79.1]             Anticoagulation Episode Summary     INR check location:  Anticoagulation Clinic    Preferred lab:      Send INR reminders to:  [facility]    Comments:  EPIC      Factor V Leiden       Anticoagulation Care Providers     Provider Role Specialty Phone number    Abril Henry MD Referring Family Medicine [phone]          Warfarin assessment / plan:     Appears well. No complaints and no medication or diet changes. Tooth extraction 3/29. Held warfarin 3 days prior. Restarted warfarin 3/29. So has been back on warfarin just 7 days. May be why her INR is slightly below range.      Will give higher warfarin dose today then resume same weekly warfarin dose. Description    Take Warfarin 5mg (whole tablet) TODAY ONLY  THEN CONTINUE: Warfarin 2.5 mg (half a tablet) daily    Keep your green vegetable intake consistent (3-4 salads per week). Please call if this changes. Call [phone] with signs or symptoms of bleeding or ANY medication changes (including over-the-counter medications or herbal supplements). If significant bleeding occurs please seek immediate medical attention. Limit alcohol intake. Please call if this changes. Immunization History   Administered Date(s) Administered    COVID-19, Umair Sites, Primary or Immunocompromised, PF, 100mcg/0.5mL 12/16/2021    COVID-19, Pfizer Purple top, DILUTE for use, 12+ yrs, 30mcg/0.3mL dose 01/29/2021, 02/19/2021    Influenza Virus Vaccine 10/16/2009, 01/26/2010, 09/29/2010, 10/03/2016    Influenza, High Dose (Fluzone 65 yrs and older) 09/27/2011, 10/01/2012, 10/29/2013, 10/23/2014, 11/24/2015, 10/23/2018, 12/21/2019    Influenza, Quadv, IM, PF (6 mo and older Fluzone, Flulaval, Fluarix, and 3 yrs and older Afluria) 12/22/2020, 10/23/2021    Pneumococcal Conjugate 13-valent ([id number]) 07/02/2015    Pneumococcal Polysaccharide ([id number]) 07/01/2006, 11/07/2011    Td, unspecified formulation 10/04/2004    Tdap (Boostrix, Adacel) 09/01/2015           Orders Placed This Encounter   Procedures    POCT INR     This external order was created through the results console. No orders of the defined types were placed in this encounter. Reviewed AVS with patient / caregiver.     Billing Points:  Adjust dosage and/or reconcile meds (fill pill box) </= 5 medications - 2 points       CLINICAL PHARMACY CONSULT: MED RECONCILIATION/REVIEW ADDENDUM    For Pharmacy Admin Tracking Only     Intervention Detail: Dose Adjustment: 1, reason: Therapy Optimization   Total # of Interventions Recommended: 1   Total # of Interventions Accepted: 1   Time Spent (min): 15

## 2022-04-05 NOTE — TELEPHONE ENCOUNTER
Please call and verify with patient how much Midodrine she is taking.  Last OV states 2.5 mg BID (half of a 5 mg tablet twice a day)

## 2022-04-07 ENCOUNTER — TELEPHONE (OUTPATIENT)
Dept: CARDIOLOGY CLINIC | Age: 82
End: 2022-04-07

## 2022-04-07 NOTE — TELEPHONE ENCOUNTER
Patient called back stating she is taking Miodrine 5 mg .5in the morning and .5 in the evening. Pt stated at this time she does not need a refill.

## 2022-04-08 NOTE — TELEPHONE ENCOUNTER
[phone] ([place])   Left message on pts machine to call back    Will close message until pt calls back

## 2022-05-05 ENCOUNTER — OFFICE VISIT (OUTPATIENT)
Dept: FAMILY MEDICINE CLINIC | Age: 82
End: 2022-05-05
Payer: MEDICARE

## 2022-05-05 ENCOUNTER — ANTI-COAG VISIT (OUTPATIENT)
Dept: PHARMACY | Age: 82
End: 2022-05-05
Payer: MEDICARE

## 2022-05-05 VITALS
SYSTOLIC BLOOD PRESSURE: 122 MMHG | BODY MASS INDEX: 31.25 KG/M2 | OXYGEN SATURATION: 93 % | HEIGHT: 59 IN | WEIGHT: 155 LBS | DIASTOLIC BLOOD PRESSURE: 70 MMHG | HEART RATE: 56 BPM

## 2022-05-05 DIAGNOSIS — R10.84 DIFFUSE ABDOMINAL PAIN: ICD-10-CM

## 2022-05-05 DIAGNOSIS — R19.7 DIARRHEA OF PRESUMED INFECTIOUS ORIGIN: ICD-10-CM

## 2022-05-05 DIAGNOSIS — K52.9 AGE (ACUTE GASTROENTERITIS): Primary | ICD-10-CM

## 2022-05-05 DIAGNOSIS — K52.9 AGE (ACUTE GASTROENTERITIS): ICD-10-CM

## 2022-05-05 DIAGNOSIS — R79.1 ABNORMAL COAGULATION PROFILE: Primary | ICD-10-CM

## 2022-05-05 DIAGNOSIS — I50.22 CHRONIC SYSTOLIC HEART FAILURE (HCC): ICD-10-CM

## 2022-05-05 LAB
A/G RATIO: 1.4 (ref 1.1–2.2)
ALBUMIN SERPL-MCNC: 3.9 G/DL (ref 3.4–5)
ALP BLD-CCNC: 97 U/L (ref 40–129)
ALT SERPL-CCNC: 12 U/L (ref 10–40)
ANION GAP SERPL CALCULATED.3IONS-SCNC: 15 MMOL/L (ref 3–16)
AST SERPL-CCNC: 20 U/L (ref 15–37)
BILIRUB SERPL-MCNC: 0.4 MG/DL (ref 0–1)
BUN BLDV-MCNC: 9 MG/DL (ref 7–20)
CALCIUM SERPL-MCNC: 9.3 MG/DL (ref 8.3–10.6)
CHLORIDE BLD-SCNC: 102 MMOL/L (ref 99–110)
CO2: 25 MMOL/L (ref 21–32)
CREAT SERPL-MCNC: 1.1 MG/DL (ref 0.6–1.2)
GFR AFRICAN AMERICAN: 57
GFR NON-AFRICAN AMERICAN: 47
GLUCOSE BLD-MCNC: 134 MG/DL (ref 70–99)
INTERNATIONAL NORMALIZATION RATIO, POC: 4.7
POTASSIUM SERPL-SCNC: 4.4 MMOL/L (ref 3.5–5.1)
PRO-BNP: 1215 PG/ML (ref 0–449)
SEDIMENTATION RATE, ERYTHROCYTE: 45 MM/HR (ref 0–30)
SODIUM BLD-SCNC: 142 MMOL/L (ref 136–145)
TOTAL PROTEIN: 6.7 G/DL (ref 6.4–8.2)

## 2022-05-05 PROCEDURE — 4040F PNEUMOC VAC/ADMIN/RCVD: CPT | Performed by: FAMILY MEDICINE

## 2022-05-05 PROCEDURE — 99211 OFF/OP EST MAY X REQ PHY/QHP: CPT

## 2022-05-05 PROCEDURE — 1123F ACP DISCUSS/DSCN MKR DOCD: CPT | Performed by: FAMILY MEDICINE

## 2022-05-05 PROCEDURE — 85610 PROTHROMBIN TIME: CPT

## 2022-05-05 PROCEDURE — G8427 DOCREV CUR MEDS BY ELIG CLIN: HCPCS | Performed by: FAMILY MEDICINE

## 2022-05-05 PROCEDURE — G8400 PT W/DXA NO RESULTS DOC: HCPCS | Performed by: FAMILY MEDICINE

## 2022-05-05 PROCEDURE — 1036F TOBACCO NON-USER: CPT | Performed by: FAMILY MEDICINE

## 2022-05-05 PROCEDURE — 99213 OFFICE O/P EST LOW 20 MIN: CPT | Performed by: FAMILY MEDICINE

## 2022-05-05 PROCEDURE — G8417 CALC BMI ABV UP PARAM F/U: HCPCS | Performed by: FAMILY MEDICINE

## 2022-05-05 PROCEDURE — 1090F PRES/ABSN URINE INCON ASSESS: CPT | Performed by: FAMILY MEDICINE

## 2022-05-05 RX ORDER — DICYCLOMINE HYDROCHLORIDE 10 MG/1
10-20 CAPSULE ORAL EVERY 6 HOURS PRN
Qty: 30 CAPSULE | Refills: 0 | Status: ON HOLD | OUTPATIENT
Start: 2022-05-05 | End: 2022-11-03

## 2022-05-05 SDOH — ECONOMIC STABILITY: FOOD INSECURITY: WITHIN THE PAST 12 MONTHS, YOU WORRIED THAT YOUR FOOD WOULD RUN OUT BEFORE YOU GOT MONEY TO BUY MORE.: NEVER TRUE

## 2022-05-05 SDOH — ECONOMIC STABILITY: FOOD INSECURITY: WITHIN THE PAST 12 MONTHS, THE FOOD YOU BOUGHT JUST DIDN'T LAST AND YOU DIDN'T HAVE MONEY TO GET MORE.: NEVER TRUE

## 2022-05-05 ASSESSMENT — PATIENT HEALTH QUESTIONNAIRE - PHQ9
SUM OF ALL RESPONSES TO PHQ QUESTIONS 1-9: 0
2. FEELING DOWN, DEPRESSED OR HOPELESS: 0
SUM OF ALL RESPONSES TO PHQ QUESTIONS 1-9: 0
SUM OF ALL RESPONSES TO PHQ QUESTIONS 1-9: 0
SUM OF ALL RESPONSES TO PHQ9 QUESTIONS 1 & 2: 0
1. LITTLE INTEREST OR PLEASURE IN DOING THINGS: 0
SUM OF ALL RESPONSES TO PHQ QUESTIONS 1-9: 0

## 2022-05-05 ASSESSMENT — SOCIAL DETERMINANTS OF HEALTH (SDOH): HOW HARD IS IT FOR YOU TO PAY FOR THE VERY BASICS LIKE FOOD, HOUSING, MEDICAL CARE, AND HEATING?: NOT HARD AT ALL

## 2022-05-05 NOTE — PROGRESS NOTES
[address] Note    Date: 5/5/2022                                               Subjective:     Chief Complaint   Patient presents with    Other     STOOL IS SOFT NOT DIARRHEA, SHE HAS STOMACH PAINS ALL DAY LONG, VERY BLOATED A LOT OF GAS STRANGE SMELL A WEEK AGO HAD A BM AND IT WAS COVERED IN MUCUS       HPI  MUCUS IN STOOL -SLIME W/ SOFT STOOLS 1 WEEK AGO - A LITTLE YESTERDAY - NOT NOTED TODAY  Upset stomach all the time - okay when up in am - but once eats - even soda cracker gets nausea/ upper abd pain and bad gas  Taking gas tablets as bad odor gas - no appetite  Feels tired all the time - started nearly 10 days ago - imodium. Son lives w/ her - no problems. No fever/ chills. If lays on couch feels better. Drinks a lot of diet coke -not coffee -not drinking as much as normal.  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 07/24/2006    Osteoporosis,Dexas:10/18/02 (Lumbar spine -1.52), hip -0.55 (T scores), Actonel, then fosamax;  Dexa 1/25/06 (Lumbar spine -1.1 and hip -0.5)     Factor V Leiden mutation ([address]     Impaired fasting glucose 11/01/2011    Vitamin D deficiency 11/01/2011    Post herpetic neuralgia     Osteoarthritis     Goiter, 1.5 cm midline 11/01/2011    Simple chronic bronchitis ([address] 01/21/2022    Atrial fibrillation with rapid ventricular response ([address] 01/13/2022    Chronic respiratory failure with hypoxia ([address] 10/20/2021    Acute CHF (congestive heart failure) ([address] 09/12/2021    Chronic combined systolic (congestive) and diastolic (congestive) heart failure ([address] 08/19/2021    Takotsubo cardiomyopathy 07/19/2021    Acute ST elevation myocardial infarction (STEMI) ([facility] 07/19/2021    History of basal cell carcinoma 08/20/2019    Seborrheic keratoses, inflamed 08/20/2019    Basal cell carcinoma of right medial nasolabial fold 03/12/2019    Basal cell carcinoma of left temple region 03/12/2019    Neoplasm of uncertain behavior of skin 01/21/2019    History of squamous cell carcinoma of skin 01/21/2019    Arthritis of knee 09/02/2016    Hyperuricemia 05/09/2016    Encounter for care related to Port-a-Cath 04/27/2016    Encounter for follow-up examination after completed treatment for cancer 01/15/2016    Abnormal coagulation profile 09/28/2015    B12 deficiency 11/10/2014    Small cleaved cell (diffuse) NHL ([address] 10/17/2014     Past Medical History:   Diagnosis Date    Acute ST elevation myocardial infarction (STEMI) ([address] 07/19/2021    Atrial fibrillation (HCC)     Chronic lymphocytic leukemia in remission ([facility]     CLL (chronic lymphocytic leukemia) ([facility] 02/12/2015    COPD (chronic obstructive pulmonary disease) ([facility]     Essential hypertension 01/01/2015    controlled with salt restriction (initially)    Factor V Leiden mutation ([address]     Goiter 11/01/2011    1.5 cm midline    Hypercholesterolemia     Impaired fasting glucose 11/01/2011    Osteoarthritis     Osteoporosis     Post herpetic neuralgia     Vitamin D deficiency 11/01/2011     Past Surgical History:   Procedure Laterality Date    CATARACT REMOVAL WITH IMPLANT  5/14/12    left eye    JOINT REPLACEMENT      partial knee R and L    KNEE SURGERY  2008    partial replacements, both knees    MOHS SURGERY  03/12/2019    R cheek and R superior temple    SPLENECTOMY      TUNNELED VENOUS PORT PLACEMENT      UPPER GASTROINTESTINAL ENDOSCOPY N/A 2/5/2019    EGD ESOPHAGOGASTRODUODENOSCOPY, WITH ENDOSCOPIC ULTRASOUND OF ESOPHAGUS performed by Jarred Kan MD at [address] [place] [address]     Anti-coag visit on 04/05/2022   Component Date Value Ref Range Status    INR 2022 1.8   Final     Family History   Problem Relation Age of Onset    Diabetes Father     Stroke Sister 50         of a stroke     Current Outpatient Medications   Medication Sig Dispense Refill    warfarin (COUMADIN) 5 MG tablet TAKE 1 1/2 TABLETS BY MOUTH MONDAY THRU FRIDAY, THEN 1 TABLET BY MOUTH DAILY ON THE OTHER DAYS (Patient taking differently: Take 2.5 mg by mouth daily or as directed by [facility] Coumadin Service [phone]) 345 tablet 0    nystatin (MYCOSTATIN) 271795 UNIT/GM powder Apply 3 times daily. 60 g 1    pantoprazole (PROTONIX) 40 MG tablet TAKE 1 TABLET BY MOUTH EVERY MORNING BEFORE BREAKFAST 90 tablet 0    umeclidinium-vilanterol (ANORO ELLIPTA) 62.5-25 MCG/INH AEPB inhaler Inhale 1 puff into the lungs daily 1 each 11    metoprolol succinate (TOPROL XL) 25 MG extended release tablet Take 0.5 tablets by mouth daily 45 tablet 3    tiotropium-olodaterol (STIOLTO RESPIMAT) 2.5-2.5 MCG/ACT AERS Inhale 2 puffs into the lungs daily 1 each 11    tiotropium-olodaterol (STIOLTO RESPIMAT) 2.5-2.5 MCG/ACT AERS Inhale 2 puffs into the lungs daily 1 each 0    sacubitril-valsartan (ENTRESTO) 24-26 MG per tablet Take 0.5 tablets by mouth 2 times daily 90 tablet 3    torsemide (DEMADEX) 20 MG tablet Take 1 tablet by mouth daily 30 tablet 3    midodrine (PROAMATINE) 5 MG tablet Take 0.5 tablets by mouth 2 times daily 30 tablet 3    amiodarone (CORDARONE) 200 MG tablet Take 1 tablet by mouth daily 90 tablet 3    Cyanocobalamin (VITAMIN B 12) 500 MCG TABS Take 500 mcg by mouth daily       gabapentin (NEURONTIN) 300 MG capsule TAKE 1 TO 2 CAPSULES BY MOUTH EVERY NIGHT AS DIRECTED 60 capsule 5     No current facility-administered medications for this visit.      No Known Allergies    Review of Systems    Objective:  /70 (Site: Left Upper Arm, Position: Sitting, Cuff Size: Medium Adult)   Pulse 56   Ht 4' 11\" (1.499 m)   Wt 155 lb (70.3 kg)   LMP  (Exact Date)   SpO2 93%   Breastfeeding No   BMI 31.31 kg/m²     BP Readings from Last 3 Encounters:   05/05/22 122/70   01/20/22 (!) 157/68   12/14/21 138/64       Pulse Readings from Last 3 Encounters:   05/05/22 56   01/20/22 61   12/14/21 68       Wt Readings from Last 3 Encounters:   05/05/22 155 lb (70.3 kg)   01/20/22 152 lb 6.4 oz (69.1 kg)   12/14/21 153 lb (69.4 kg)       Physical Exam  Vitals and nursing note reviewed. Constitutional:       Appearance: She is well-developed. HENT:      Head: Normocephalic and atraumatic. Eyes:      General: No scleral icterus. Conjunctiva/sclera: Conjunctivae normal.   Pulmonary:      Effort: Pulmonary effort is normal.   Abdominal:      Comments: Soft / flat - mild diffuse ttp jason periumbilical   Skin:     General: Skin is warm and dry. Neurological:      Mental Status: She is alert and oriented to person, place, and time. Assessment/Plan:      Diagnosis Orders   1.  AGE (acute gastroenteritis)       Hold ppi at this point  Likely - AGE w/ sudden onset and recent norovirus outbreak  Non-dairy/ bland diet and adat  Hydrate well w/ noncaffeinated fluids  Gas pills otc prn - hold on imodium  Bentyl 10mg 1-2 q6h prn  Would expect fatigue to improve as sxs improve  Consider labs/ ? Imaging vs. Gi eval if continued sxs    Arlyne Gosselin, MD, MD  5/5/2022  12:11 PM

## 2022-05-05 NOTE — PROGRESS NOTES
Delmar Portillo is a 78 y.o. here for warfarin management. Artie Chairez had an INR test today. Results were reviewed and appropriate warfarin management was completed. This visit was performed as: An in person visit. Protocols were followed with precautions to reduce the spread of COVID-19. Patient verifies current dosing regimen: Yes     Warfarin medication reviewed and updated on the patient 's home medication list: Yes   All other medications reviewed and updated on the patient 's home medication list: Yes     Lab Results   Component Value Date    INR 4.7 2022    INR 1.8 2022    INR 2.1 2022       Patient Findings     Positives:  Change in diet/appetite    Comments:  No salads for 1 week,bland diet          Anticoagulation Summary  As of 2022    INR goal:  2.0-3.0   TTR:  52.7 % (10.7 y)   INR used for dosin.7 (2022)   Warfarin maintenance plan:  2.5 mg (5 mg x 0.5) every day   Weekly warfarin total:  17.5 mg   Plan last modified:  Kianna Lopes (2021)   Next INR check:  2022   Priority:  Maintenance   Target end date: Indefinite    Indications    Abnormal coagulation profile [R79.1]             Anticoagulation Episode Summary     INR check location:  Anticoagulation Clinic    Preferred lab:      Send INR reminders to:  [facility]    Comments:  EPIC      Factor V Leiden       Anticoagulation Care Providers     Provider Role Specialty Phone number    Arlyne Gosselin, MD Referring Family Medicine [phone]          Warfarin assessment / plan:       Patient has not been eating well. She is seeing MD about. If not feeling better she will hold next Thursday dose and we will see on     Description    Hold for 3 days  THEN CONTINUE: Warfarin 2.5 mg (half a tablet) daily. If not feeling better skip  dose    Keep your green vegetable intake consistent (3-4 salads per week). Please call if this changes.     Call [phone] with signs or symptoms of bleeding or ANY medication changes (including over-the-counter medications or herbal supplements). If significant bleeding occurs please seek immediate medical attention. Limit alcohol intake. Please call if this changes. Immunization History   Administered Date(s) Administered    COVID-19, [place], Primary or Immunocompromised, PF, 100mcg/0.5mL 12/16/2021    COVID-19, Pfizer Purple top, DILUTE for use, 12+ yrs, 30mcg/0.3mL dose 01/29/2021, 02/19/2021    Influenza Virus Vaccine 10/16/2009, 01/26/2010, 09/29/2010, 10/03/2016    Influenza, High Dose (Fluzone 65 yrs and older) 09/27/2011, 10/01/2012, 10/29/2013, 10/23/2014, 11/24/2015, 10/23/2018, 12/21/2019    Influenza, Quadv, IM, PF (6 mo and older Fluzone, Flulaval, Fluarix, and 3 yrs and older Afluria) 12/22/2020, 10/23/2021    Pneumococcal Conjugate 13-valent ([id number]) 07/02/2015    Pneumococcal Polysaccharide ([id number]) 07/01/2006, 11/07/2011    Td, unspecified formulation 10/04/2004    Tdap (Boostrix, Adacel) 09/01/2015           Orders Placed This Encounter   Procedures    POCT INR     This external order was created through the results console. No orders of the defined types were placed in this encounter. Reviewed AVS with patient / caregiver.     Billing Points:  Adjust dosage and/or reconcile meds (fill pill box) </= 5 medications - 2 points       CLINICAL PHARMACY CONSULT: MED RECONCILIATION/REVIEW ADDENDUM    For Pharmacy Admin Tracking Only     Intervention Detail: Dose Adjustment: 3, reason: Therapy Optimization   Total # of Interventions Recommended: 3   Total # of Interventions Accepted: 3   Time Spent (min): 15

## 2022-05-06 ENCOUNTER — TELEPHONE (OUTPATIENT)
Dept: ADMINISTRATIVE | Age: 82
End: 2022-05-06

## 2022-05-06 LAB
BANDED NEUTROPHILS RELATIVE PERCENT: 1 % (ref 0–7)
BASOPHILS ABSOLUTE: 0 K/UL (ref 0–0.2)
BASOPHILS RELATIVE PERCENT: 0 %
EOSINOPHILS ABSOLUTE: 0.2 K/UL (ref 0–0.6)
EOSINOPHILS RELATIVE PERCENT: 1 %
HCT VFR BLD CALC: 43.3 % (ref 36–48)
HEMATOLOGY PATH CONSULT: NO
HEMOGLOBIN: 14.1 G/DL (ref 12–16)
HYPOCHROMIA: ABNORMAL
LYMPHOCYTES ABSOLUTE: 9.5 K/UL (ref 1–5.1)
LYMPHOCYTES RELATIVE PERCENT: 43 %
MCH RBC QN AUTO: 30.6 PG (ref 26–34)
MCHC RBC AUTO-ENTMCNC: 32.6 G/DL (ref 31–36)
MCV RBC AUTO: 93.8 FL (ref 80–100)
MICROCYTES: ABNORMAL
MONOCYTES ABSOLUTE: 2.4 K/UL (ref 0–1.3)
MONOCYTES RELATIVE PERCENT: 11 %
NEUTROPHILS ABSOLUTE: 9.9 K/UL (ref 1.7–7.7)
NEUTROPHILS RELATIVE PERCENT: 44 %
PDW BLD-RTO: 13.7 % (ref 12.4–15.4)
PLATELET # BLD: 423 K/UL (ref 135–450)
PMV BLD AUTO: 8.4 FL (ref 5–10.5)
RBC # BLD: 4.61 M/UL (ref 4–5.2)
SLIDE REVIEW: ABNORMAL
WBC # BLD: 22.1 K/UL (ref 4–11)

## 2022-05-06 NOTE — TELEPHONE ENCOUNTER
Submitted PA for Dicyclomine HCl  Via CMM  Key: [id number]  STATUS: APPROVED. APPROVAL. If this requires a response please respond to the pool. [address]). Please advise patient thank you.

## 2022-05-12 ENCOUNTER — TELEPHONE (OUTPATIENT)
Dept: FAMILY MEDICINE CLINIC | Age: 82
End: 2022-05-12

## 2022-05-12 DIAGNOSIS — R19.7 DIARRHEA, UNSPECIFIED TYPE: Primary | ICD-10-CM

## 2022-05-12 NOTE — TELEPHONE ENCOUNTER
Continue hydrating well and imodium prn   Schedule CT abd/ pelvis and refer to GI - go ahead and get something scheduled as soon as possible

## 2022-05-12 NOTE — TELEPHONE ENCOUNTER
Would expect fatigue to improve as sxs improve  Consider labs/ ? Imaging vs. Gi eval if continued sxs

## 2022-05-12 NOTE — TELEPHONE ENCOUNTER
----- Message from Mirella Bravo sent at 5/12/2022  2:44 PM EDT -----  Subject: Message to Provider    QUESTIONS  Information for Provider? patient is still experiencing stomach pain and   would like a call back  ---------------------------------------------------------------------------  --------------  CALL BACK INFO  What is the best way for the office to contact you? OK to leave message on   voicemail  Preferred Call Back Phone Number? [phone]  ---------------------------------------------------------------------------  --------------  SCRIPT ANSWERS  Relationship to Patient?  Self

## 2022-05-12 NOTE — TELEPHONE ENCOUNTER
CALLED AND SPOKE TO PATIENT.  SHE IS STILL C/O ABDOMINAL PAIN, AND SHE IS USING THE BATHROOM 3 TIMES AN  HOUR, BUT ITS JUST MUCUS COMING OUT OF HER RECTUM, NOT AN ACTUAL BOWEL MOVEMENT, AND FEELING NAUSEATED, BUT NOT VOMITING, SC

## 2022-05-13 NOTE — TELEPHONE ENCOUNTER
CALLED PT AND ADVISED, SHE SAID SHE SAW PERLA THIS MORNING AND HE ORDERED HER MEDICATION AND SHE THINKS A WHOLE BODY SCAN. SHE WILL FOLLOW UP AFTER. Tahir Donaldson

## 2022-05-16 ENCOUNTER — ANTI-COAG VISIT (OUTPATIENT)
Dept: PHARMACY | Age: 82
End: 2022-05-16
Payer: MEDICARE

## 2022-05-16 ENCOUNTER — OFFICE VISIT (OUTPATIENT)
Dept: CARDIOLOGY CLINIC | Age: 82
End: 2022-05-16
Payer: MEDICARE

## 2022-05-16 VITALS
BODY MASS INDEX: 30.68 KG/M2 | HEART RATE: 55 BPM | WEIGHT: 152.2 LBS | DIASTOLIC BLOOD PRESSURE: 58 MMHG | SYSTOLIC BLOOD PRESSURE: 120 MMHG | OXYGEN SATURATION: 91 % | HEIGHT: 59 IN

## 2022-05-16 DIAGNOSIS — I50.22 CHRONIC SYSTOLIC HEART FAILURE (HCC): Primary | ICD-10-CM

## 2022-05-16 DIAGNOSIS — R79.1 ABNORMAL COAGULATION PROFILE: Primary | ICD-10-CM

## 2022-05-16 DIAGNOSIS — I48.0 PAF (PAROXYSMAL ATRIAL FIBRILLATION) (HCC): ICD-10-CM

## 2022-05-16 LAB — INTERNATIONAL NORMALIZATION RATIO, POC: 2.5

## 2022-05-16 PROCEDURE — 99211 OFF/OP EST MAY X REQ PHY/QHP: CPT

## 2022-05-16 PROCEDURE — 85610 PROTHROMBIN TIME: CPT

## 2022-05-16 PROCEDURE — 1123F ACP DISCUSS/DSCN MKR DOCD: CPT | Performed by: INTERNAL MEDICINE

## 2022-05-16 PROCEDURE — 1036F TOBACCO NON-USER: CPT | Performed by: INTERNAL MEDICINE

## 2022-05-16 PROCEDURE — G8417 CALC BMI ABV UP PARAM F/U: HCPCS | Performed by: INTERNAL MEDICINE

## 2022-05-16 PROCEDURE — G8400 PT W/DXA NO RESULTS DOC: HCPCS | Performed by: INTERNAL MEDICINE

## 2022-05-16 PROCEDURE — 99214 OFFICE O/P EST MOD 30 MIN: CPT | Performed by: INTERNAL MEDICINE

## 2022-05-16 PROCEDURE — 1090F PRES/ABSN URINE INCON ASSESS: CPT | Performed by: INTERNAL MEDICINE

## 2022-05-16 PROCEDURE — G8427 DOCREV CUR MEDS BY ELIG CLIN: HCPCS | Performed by: INTERNAL MEDICINE

## 2022-05-16 PROCEDURE — 93000 ELECTROCARDIOGRAM COMPLETE: CPT | Performed by: INTERNAL MEDICINE

## 2022-05-16 RX ORDER — METRONIDAZOLE 500 MG/1
500 TABLET ORAL EVERY 8 HOURS
Status: ON HOLD | COMMUNITY
Start: 2022-05-13 | End: 2022-11-03

## 2022-05-16 RX ORDER — CIPROFLOXACIN 500 MG/1
500 TABLET, FILM COATED ORAL 2 TIMES DAILY
Status: ON HOLD | COMMUNITY
Start: 2022-05-13 | End: 2022-11-03

## 2022-05-16 NOTE — PATIENT INSTRUCTIONS
Patient Education        Transthoracic Echocardiogram: About This Test  What is it? An echocardiogram (also called an echo) uses sound waves to make an image of your heart. A device called a transducer sends sound waves that echo off your heart and back to the transducer. These echoes are turned into moving picturesof your heart that can be seen on a video screen. In a transthoracic echocardiogram (TTE), the transducer is moved across yourchest or belly. A TTE is the most common type of echocardiogram.  Why is this test done? This test is done to check your heart health. It's used for many reasons. Forexample, it may be done to:   Check a heart murmur.  Look for the cause of shortness of breath or unexplained chest pain or pressure.  Check how well your heart is pumping blood.  Check to see how well your heart valves are working.  Look for blood clots inside your heart.  Measure the size and shape of the heart's chambers.  Measure the blood pressure and speed of blood flow through the heart. How is the test done?  You may be asked to remove your clothes above your waist. You may be given a cloth or paper covering to use during the test.   You will lie on your back or on your left side on a bed or table.  You may receive medicine through a vein (intravenously, or IV). The IV can be used to give you a contrast material. This helps your doctor get good views of your heart.  Small pads or patches (electrodes) will be placed on the skin of your chest to record your heart rate during the test.   A small amount of gel will be rubbed on the side of your chest to help  the sound waves.  The transducer will be pressed firmly against your chest and moved slowly back and forth. It is usually moved to different areas on your chest or belly to get specific views of your heart.    You will be asked to do several things, such as hold very still, breathe in and out very slowly, hold your breath, or lie on your left side.  When the test is over, the gel is wiped off and the electrodes are removed. What are the risks of the test?  There are no known risks from having this test.   No electricity passes through your body during the test. There is no danger of getting an electrical shock.  You do not receive any radiation. What happens after the test?   You will probably be able to go home right away. It depends on the reason for the test.   You can go back to your usual activities right away. Follow-up care is a key part of your treatment and safety. Be sure to make and go to all appointments, and call your doctor if you are having problems. It's also a good idea to keep a list of the medicines youtake. Ask your doctor when you can expect to have your test results. Where can you learn more? Go to [email or web address] and sign in to your [employer] account. Enter T235 in the [employer] box to learn more about \"Transthoracic Echocardiogram: About This Test.\"     If you do not have an account, please click on the \"Sign Up Now\" link. Current as of: January 10, 2022               Content Version: 13.2  © 7896-3088 [employer]. Care instructions adapted under license by [facility] [place] ([facility]). If you have questions about a medical condition or this instruction, always ask your healthcare professional. [address] any warranty or liability for your use of this information.

## 2022-05-16 NOTE — PROGRESS NOTES
Cardiology Follow Up    Patricio Sanchez  1940    PCP: Vanessa Shaw MD  Chief Complaint:   Chief Complaint   Patient presents with    Follow-up     6 m  f/u       Subjective:   History of Present Illness: The patient is 78 y.o. female with a past medical history significant for CLL, factor V mutation and CAD s/p STEMI resulting normal coronary arteries with severe LV systolic function (9/5571). Admitted to [facility] 7/2021 with CP. EKG showed inferior STEMI and she underwent emergent LHC resulting in normal coronaries. Found to be in acute CHF with EF <10%. Required impella support and inotropes. She was started on GDMT with midodrine for hypotension. Re-admitted 9/2021 with acute respiratory failure and underwent RHC. Another admission 10/2021 with PNA. Repeat Echo 7/2021 showed improved EF of 45%. Today, she returns in follow up. States she has been okay. Her  passed away in October and her  had a stroke. She is currently caring for her son states she has been stressed. States she has a stomach problem that is being managed by PCP and Dr. Dereje Mina. She has no new cardiac sounding complaints today. Has not been taking torsemide.        Past Medical History:   Diagnosis Date    Acute ST elevation myocardial infarction (STEMI) ([address] 07/19/2021    Atrial fibrillation (HCC)     Chronic lymphocytic leukemia in remission ([address]     CLL (chronic lymphocytic leukemia) ([address] 02/12/2015    COPD [facility] disease[facility]     Essential hypertension 01/01/2015    controlled with salt restriction (initially)    Factor V Leiden mutation ([address]     Goiter 11/01/2011    1.5 cm midline    Hypercholesterolemia     Impaired fasting glucose 11/01/2011    Osteoarthritis     Osteoporosis     Post herpetic neuralgia     Vitamin D deficiency 11/01/2011     Past Surgical History:   Procedure Laterality Date    CATARACT REMOVAL WITH IMPLANT  5/14/12    left eye    JOINT REPLACEMENT      partial knee R and L    KNEE SURGERY      partial replacements, both knees    MOHS SURGERY  2019    R cheek and R superior temple    SPLENECTOMY      TUNNELED VENOUS PORT PLACEMENT      UPPER GASTROINTESTINAL ENDOSCOPY N/A 2019    EGD ESOPHAGOGASTRODUODENOSCOPY, WITH ENDOSCOPIC ULTRASOUND OF ESOPHAGUS performed by Areli Guy MD at [address] History   Problem Relation Age of Onset    Diabetes Father     Stroke Sister 50         of a stroke     Social History     Tobacco Use    Smoking status: Former Smoker     Packs/day: 0.30     Years: 50.00     Pack years: 15.00     Types: Cigarettes     Quit date: 1995     Years since quittin.5    Smokeless tobacco: Never Used   Vaping Use    Vaping Use: Never used   Substance Use Topics    Alcohol use: No     Alcohol/week: 0.0 standard drinks    Drug use: Not Currently       No Known Allergies  Current Outpatient Medications   Medication Sig Dispense Refill    ciprofloxacin (CIPRO) 500 MG tablet Take 500 mg by mouth 2 times daily For 7 days      metroNIDAZOLE (FLAGYL) 500 MG tablet Take 500 mg by mouth every 8 hours For 7 days      dicyclomine (BENTYL) 10 MG capsule Take 1-2 capsules by mouth every 6 hours as needed (abdominal cramping) 30 capsule 0    warfarin (COUMADIN) 5 MG tablet TAKE 1 1/2 TABLETS BY MOUTH MONDAY THRU FRIDAY, THEN 1 TABLET BY MOUTH DAILY ON THE OTHER DAYS (Patient taking differently: Take 2.5 mg by mouth daily or as directed by [facility] Coumadin Service [phone]) 345 tablet 0    gabapentin (NEURONTIN) 300 MG capsule TAKE 1 TO 2 CAPSULES BY MOUTH EVERY NIGHT AS DIRECTED 60 capsule 5    nystatin (MYCOSTATIN) 507742 UNIT/GM powder Apply 3 times daily.  60 g 1    pantoprazole (PROTONIX) 40 MG tablet TAKE 1 TABLET BY MOUTH EVERY MORNING BEFORE BREAKFAST 90 tablet 0    metoprolol succinate (TOPROL XL) 25 MG extended release tablet Take 0.5 tablets by mouth daily 45 tablet 3    sacubitril-valsartan (ENTRESTO) 24-26 MG per tablet Take 0.5 tablets by mouth 2 times daily 90 tablet 3    amiodarone (CORDARONE) 200 MG tablet Take 1 tablet by mouth daily 90 tablet 3    Cyanocobalamin (VITAMIN B 12) 500 MCG TABS Take 500 mcg by mouth daily        No current facility-administered medications for this visit. Review of Systems:  · Constitutional: No unanticipated weight loss. There's been no change in energy level, sleep pattern, or activity level. No fevers, chills. · Eyes: No visual changes or diplopia. No scleral icterus. · ENT: No Headaches, hearing loss or vertigo. No mouth sores or sore throat. · Cardiovascular: as reviewed in HPI  · Respiratory: No cough or wheezing, no sputum production. No hemoptysis. · Gastrointestinal: No abdominal pain, appetite loss, blood in stools. No change in bowel or bladder habits. · Genitourinary: No dysuria, trouble voiding, or hematuria. · Musculoskeletal:  No gait disturbance, no joint complaints. · Integumentary: No rash or pruritis. · Neurological: No headache, diplopia, change in muscle strength, numbness or tingling. · Psychiatric: No anxiety or depression. · Endocrine: No temperature intolerance. No excessive thirst, fluid intake, or urination. No tremor. · Hematologic/Lymphatic: No abnormal bruising or bleeding, blood clots or swollen lymph nodes. · Allergic/Immunologic: No nasal congestion or hives. Physical Exam:   BP (!) 120/58   Pulse 55   Ht 4' 11\" (1.499 m)   Wt 152 lb 3.2 oz (69 kg)   SpO2 91%   BMI 30.74 kg/m²   Wt Readings from Last 3 Encounters:   05/16/22 152 lb 3.2 oz (69 kg)   05/05/22 155 lb (70.3 kg)   01/20/22 152 lb 6.4 oz (69.1 kg)     Constitutional: She is oriented to person, place, and time. She appears well-developed and well-nourished. In no acute distress. Head: Normocephalic and atraumatic. Pupils equal and round. Neck: Neck supple. No JVP or carotid bruit appreciated.  No mass and no thyromegaly present. No lymphadenopathy present. Cardiovascular: Normal rate. Normal heart sounds. Exam reveals no gallop and no friction rub. No murmur heard. Pulmonary/Chest: Effort normal and breath sounds normal. No respiratory distress. She has no wheezes, rhonchi or rales. Abdominal: Soft, non-tender. Bowel sounds are normal. She exhibits no organomegaly, mass or bruit. Extremities: No edema. No cyanosis or clubbing. Pulses are 2+ radial/carotid bilaterally. Neurological: No gross cranial nerve deficit. Coordination normal.   Skin: Skin is warm and dry. There is no rash or diaphoresis. Psychiatric: She has a normal mood and affect. Her speech is normal and behavior is normal.     Lab Review:   FLP:    Lab Results   Component Value Date    TRIG 128 04/20/2021    HDL 55 04/20/2021    HDL 62 11/08/2011    LDLCALC 178 04/20/2021    LDLDIRECT 117 12/14/2015    LABVLDL 26 04/20/2021     BUN/Creatinine:    Lab Results   Component Value Date    BUN 9 05/05/2022    CREATININE 1.1 05/05/2022     PT/INR, TNI, HGB A1C:   Lab Results   Component Value Date/Time    TROPONINI <0.01 10/22/2021 05:13 PM    LABA1C 6.2 04/20/2021 08:54 AM      No results found for: CBCAUTODIF        Echo: 7/2021     Ejection fraction is visually estimated to be 45-50%. There is mid to apical akinesis. No evidence of left ventricular mass or thrombus noted. There is a moderate left pleural effusion. Estimated pulmonary artery systolic pressure is moderately elevated at 63   mmHg assuming a right atrial pressure of 8 mmHg.        Cath: 7/2021  ANGIOGRAM/CORONARY ARTERIOGRAM:        The left main coronary artery is normal .     The left anterior descending artery is normal .     The left circumflex artery is normal .     The right coronary artery is normal .     LEFT VENTRICULOGRAM:  Left ventricular angiogram was done in the 30° MARCELO projection and revealed severe LV systolic dysfunction   with an estimated ejection fraction of 15%.         INTERVENTION  1. From the right femoral approach to artery was dilated to 12F, followed by placement of 14F impella sheath   2. Impella CP was positioned into the LV apex to assist in cardiac output as patient is in cardiogenic shock   3. After 40 mg brevitol, 2 synchronized cardioversions were performed from afib to NSR     SUMMARY:   Normal coronary arteries  Severe LV systolic dysfunction   Cardiogenic shock   Afib with rapid rate   CT:    Cath: 7/19/21  RA 7  RV 22/7  PA 30/17/22  PCWP 20   PA % 52  AO % 97  CO/CI 2.22 L/min 1.3 L/min/m2  RCQ 9488 VTRHX . Sec/cm-5  GQK 059 CTEMP . Sec/cm-5  TPG 2   0.4  The left main coronary artery is normal .   The left anterior descending artery is normal .   The left circumflex artery is normal .   The right coronary artery is normal .  Left ventricular angiogram was done in the 30° MARCELO projection and revealed severe LV systolic dysfunction   with an estimated ejection fraction of 15%. All above diagnostic testing and laboratory data was independently visualized and reviewed by me (not simply review of report)       Assessment and Plan   1) systolic heart failure  EF  45% (7/2021)  NYHA Class II  Asymptomatic on today's visit  Stop midodrine and torsemide   Repeat limited Echo to assess LV function    2) atrial fibrillation  CHADS 4  On Warfarin for stroke risk reduction. INR managed per anti-coag clinic  Continue Toprol XL and amiodarone     3) hypotension  On midodrine 2.5 mg BID. This can be discontinued. Follow up with Tl Bah in 6 months     Thank you very much for allowing me to participate in the care of your patient. Please do not hesitate to contact me if you have any questions.       Sachin Campa MD [address], Interventional Cardiology, and Peripheral Vascular Disease   [facility]   Ph: [phone]  Fax: [phone]    This note was scribed in the presence of Dr. Sachin Campa MD by Paulette Woods, KERMIT    Physician Attestation:  The scribes documentation has been prepared under my direction and personally reviewed by me in its entirety. I confirm the note above accurately reflects all work, treatment, procedures, and medical decision making performed by me.     Electronically signed by Esmer Whiteside MD on 6/7/2022 at 4:45 PM

## 2022-05-17 ENCOUNTER — HOSPITAL ENCOUNTER (OUTPATIENT)
Dept: CT IMAGING | Age: 82
Discharge: HOME | End: 2022-05-17
Payer: MEDICARE

## 2022-05-17 DIAGNOSIS — C91.11 CHRONIC LYMPHOID LEUKEMIA IN REMISSION (HCC): ICD-10-CM

## 2022-05-17 LAB
GFR AFRICAN AMERICAN: 47
GFR NON-AFRICAN AMERICAN: 39
PERFORMED ON: ABNORMAL
POC CREATININE: 1.3 MG/DL (ref 0.6–1.2)
POC SAMPLE TYPE: ABNORMAL

## 2022-05-17 PROCEDURE — 82565 ASSAY OF CREATININE: CPT

## 2022-05-17 PROCEDURE — 74177 CT ABD & PELVIS W/CONTRAST: CPT

## 2022-05-17 PROCEDURE — 6360000004 HC RX CONTRAST MEDICATION: Performed by: INTERNAL MEDICINE

## 2022-05-17 RX ADMIN — IOHEXOL 50 ML: 240 INJECTION, SOLUTION INTRATHECAL; INTRAVASCULAR; INTRAVENOUS; ORAL at 08:37

## 2022-05-17 RX ADMIN — IOPAMIDOL 75 ML: 755 INJECTION, SOLUTION INTRAVENOUS at 08:37

## 2022-05-23 ENCOUNTER — ANTI-COAG VISIT (OUTPATIENT)
Dept: PHARMACY | Age: 82
End: 2022-05-23
Payer: MEDICARE

## 2022-05-23 DIAGNOSIS — R79.1 ABNORMAL COAGULATION PROFILE: Primary | ICD-10-CM

## 2022-05-23 LAB — INTERNATIONAL NORMALIZATION RATIO, POC: 2.3

## 2022-05-23 PROCEDURE — 99211 OFF/OP EST MAY X REQ PHY/QHP: CPT

## 2022-05-23 PROCEDURE — 85610 PROTHROMBIN TIME: CPT

## 2022-05-23 NOTE — PROGRESS NOTES
Yue Byrne is a 78 y.o. here for warfarin management. Kelley Powell had an INR test today. Results were reviewed and appropriate warfarin management was completed. This visit was performed as: An in person visit. Protocols were followed with precautions to reduce the spread of COVID-19. Patient verifies current dosing regimen: Yes     Warfarin medication reviewed and updated on the patient 's home medication list: Yes   All other medications reviewed and updated on the patient 's home medication list: No: no change     Lab Results   Component Value Date    INR 2.3 2022    INR 2.5 2022    INR 4.7 2022       Patient Findings     Positives:  Missed doses, Change in medications, Change in diet/appetite    Negatives:  Signs/symptoms of bleeding, Change in health, Bruising    Comments:  Recently stopped the torsemide and midodrine. Still not eating as well, due to stomach issues          Anticoagulation Summary  As of 2022    INR goal:  2.0-3.0   TTR:  52.7 % (10.7 y)   INR used for dosin.3 (2022)   Warfarin maintenance plan:  2.5 mg (5 mg x 0.5) every day   Weekly warfarin total:  17.5 mg   Plan last modified:  Ifeoma Barnes (2021)   Next INR check:  2022   Priority:  Maintenance   Target end date: Indefinite    Indications    Abnormal coagulation profile [R79.1]             Anticoagulation Episode Summary     INR check location:  Anticoagulation Clinic    Preferred lab:      Send INR reminders to:  [facility]    Comments:  EPIC      Factor V Leiden       Anticoagulation Care Providers     Provider Role Specialty Phone number    Luz Pritchard MD Referring Family Medicine [phone]          Warfarin assessment / plan:     Appears well. No changes affecting warfarin therapy were noted. No acute findings. INR within goal range. No change to warfarin therapy today. She finished her Cipro/Flagyl regimen 3 days ago.  She states that her stomach feels a bit better but will see a gastroenterologist for a complete work up. Her cardiologist recently discontinued her torsemide and midodrine. These changes should have little impact on her INR. We will see her in 3 weeks for her next INR. Description    Warfarin 2.5 mg (half a tablet) daily. Keep your green vegetable intake consistent (3-4 salads per week). Please call if this changes. Call [phone] with signs or symptoms of bleeding or ANY medication changes (including over-the-counter medications or herbal supplements). If significant bleeding occurs please seek immediate medical attention. Limit alcohol intake. Please call if this changes. Immunization History   Administered Date(s) Administered    COVID-19, Ranjan Baeza, Primary or Immunocompromised, PF, 100mcg/0.5mL 12/16/2021    COVID-19, Pfizer Purple top, DILUTE for use, 12+ yrs, 30mcg/0.3mL dose 01/29/2021, 02/19/2021    Influenza Virus Vaccine 10/16/2009, 01/26/2010, 09/29/2010, 10/03/2016    Influenza, High Dose (Fluzone 65 yrs and older) 09/27/2011, 10/01/2012, 10/29/2013, 10/23/2014, 11/24/2015, 10/23/2018, 12/21/2019    Influenza, Quadv, IM, PF (6 mo and older Fluzone, Flulaval, Fluarix, and 3 yrs and older Afluria) 12/22/2020, 10/23/2021    Pneumococcal Conjugate 13-valent ([id number]) 07/02/2015    Pneumococcal Polysaccharide ([id number]) 07/01/2006, 11/07/2011    Td, unspecified formulation 10/04/2004    Tdap (Boostrix, Adacel) 09/01/2015           Orders Placed This Encounter   Procedures    POCT INR     This external order was created through the results console. No orders of the defined types were placed in this encounter. Reviewed AVS with patient / caregiver.     Billing Points:  0 billing points this visit       CLINICAL PHARMACY CONSULT: MED RECONCILIATION/REVIEW ADDENDUM    For Pharmacy Admin Tracking Only     Intervention Detail:    Total # of Interventions Recommended: 0   Total # of Interventions Accepted: 0   Time Spent (min): 20

## 2022-06-13 ENCOUNTER — APPOINTMENT (OUTPATIENT)
Dept: PHARMACY | Age: 82
End: 2022-06-13
Payer: MEDICARE

## 2022-06-21 ENCOUNTER — TELEPHONE (OUTPATIENT)
Dept: PHARMACY | Age: 82
End: 2022-06-21

## 2022-06-21 NOTE — TELEPHONE ENCOUNTER
Left message on home number to please return my call to reschedule canceled appt from 6/13/22. No VM on cell.     Libia Nur, PharmD, [address] [facility]  [phone]

## 2022-06-24 ENCOUNTER — ANTI-COAG VISIT (OUTPATIENT)
Dept: PHARMACY | Age: 82
End: 2022-06-24
Payer: MEDICARE

## 2022-06-24 DIAGNOSIS — R79.1 ABNORMAL COAGULATION PROFILE: Primary | ICD-10-CM

## 2022-06-24 LAB — INR BLD: 2.6

## 2022-06-24 PROCEDURE — 85610 PROTHROMBIN TIME: CPT

## 2022-06-24 PROCEDURE — 99211 OFF/OP EST MAY X REQ PHY/QHP: CPT

## 2022-06-24 NOTE — PROGRESS NOTES
Callie Ku is a 78 y.o. here for warfarin management. Jayy Esparza had an INR test today. Results were reviewed and appropriate warfarin management was completed. This visit was performed as: An in person visit. Protocols were followed with precautions to reduce the spread of COVID-19. Patient verifies current dosing regimen: Yes     Warfarin medication reviewed and updated on the patient 's home medication list: Yes   All other medications reviewed and updated on the patient 's home medication list: No: No Changes     Lab Results   Component Value Date    INR 2.60 2022    INR 2.3 2022    INR 2.5 2022       Patient Findings     Negatives:  Signs/symptoms of thrombosis, Signs/symptoms of bleeding, Change in health, Change in medications, Change in diet/appetite, Bruising          Anticoagulation Summary  As of 2022    INR goal:  2.0-3.0   TTR:  53.0 % (10.8 y)   INR used for dosin.60 (2022)   Warfarin maintenance plan:  2.5 mg (5 mg x 0.5) every day   Weekly warfarin total:  17.5 mg   Plan last modified:  Chito Hale (2021)   Next INR check:  2022   Priority:  Maintenance   Target end date: Indefinite    Indications    Abnormal coagulation profile [R79.1]             Anticoagulation Episode Summary     INR check location:  Anticoagulation Clinic    Preferred lab:      Send INR reminders to:  [facility]    Comments:  EPIC      Factor V Leiden       Anticoagulation Care Providers     Provider Role Specialty Phone number    Moraima Patel MD Referring Family Medicine [phone]          Warfarin assessment / plan:     Appears well. No changes affecting warfarin therapy were noted. No acute findings. INR within goal range. No change to warfarin therapy today. Description    Warfarin 2.5 mg (half a tablet) daily. Keep your green vegetable intake consistent (3-4 salads per week). Please call if this changes.     Call [phone] with signs or symptoms of bleeding or ANY medication changes (including over-the-counter medications or herbal supplements). If significant bleeding occurs please seek immediate medical attention. Limit alcohol intake. Please call if this changes. Immunization History   Administered Date(s) Administered    COVID-19, Moderna, Primary or Immunocompromised, PF, 100mcg/0.5mL 12/16/2021    COVID-19, Pfizer Purple top, DILUTE for use, 12+ yrs, 30mcg/0.3mL dose 01/29/2021, 02/19/2021    Influenza Virus Vaccine 10/16/2009, 01/26/2010, 09/29/2010, 10/03/2016    Influenza, High Dose (Fluzone 65 yrs and older) 09/27/2011, 10/01/2012, 10/29/2013, 10/23/2014, 11/24/2015, 10/23/2018, 12/21/2019    Influenza, Quadv, IM, PF (6 mo and older Fluzone, Flulaval, Fluarix, and 3 yrs and older Afluria) 12/22/2020, 10/23/2021    Pneumococcal Conjugate 13-valent ([id number]) 07/02/2015    Pneumococcal Polysaccharide ([id number]) 07/01/2006, 11/07/2011    Td, unspecified formulation 10/04/2004    Tdap (Boostrix, Adacel) 09/01/2015           Orders Placed This Encounter   Procedures    Protime-INR     This external order was created through the results console. No orders of the defined types were placed in this encounter. Reviewed AVS with patient / caregiver.     Billing Points:  0 billing points this visit       CLINICAL PHARMACY CONSULT: MED RECONCILIATION/REVIEW ADDENDUM    For Pharmacy Admin Tracking Only     Intervention Detail:    Total # of Interventions Recommended: 0   Total # of Interventions Accepted: 0   Time Spent (min): 20

## 2022-07-21 ENCOUNTER — OFFICE VISIT (OUTPATIENT)
Dept: PULMONOLOGY | Age: 82
End: 2022-07-21
Payer: MEDICARE

## 2022-07-21 VITALS
SYSTOLIC BLOOD PRESSURE: 110 MMHG | DIASTOLIC BLOOD PRESSURE: 60 MMHG | HEIGHT: 60 IN | BODY MASS INDEX: 28.58 KG/M2 | RESPIRATION RATE: 21 BRPM | WEIGHT: 145.6 LBS | OXYGEN SATURATION: 97 % | HEART RATE: 88 BPM | TEMPERATURE: 97.7 F

## 2022-07-21 DIAGNOSIS — J41.0 SIMPLE CHRONIC BRONCHITIS (HCC): Primary | ICD-10-CM

## 2022-07-21 DIAGNOSIS — R05.9 COUGH: ICD-10-CM

## 2022-07-21 PROCEDURE — G8417 CALC BMI ABV UP PARAM F/U: HCPCS | Performed by: INTERNAL MEDICINE

## 2022-07-21 PROCEDURE — 3023F SPIROM DOC REV: CPT | Performed by: INTERNAL MEDICINE

## 2022-07-21 PROCEDURE — 1090F PRES/ABSN URINE INCON ASSESS: CPT | Performed by: INTERNAL MEDICINE

## 2022-07-21 PROCEDURE — G8400 PT W/DXA NO RESULTS DOC: HCPCS | Performed by: INTERNAL MEDICINE

## 2022-07-21 PROCEDURE — 99214 OFFICE O/P EST MOD 30 MIN: CPT | Performed by: INTERNAL MEDICINE

## 2022-07-21 PROCEDURE — 1036F TOBACCO NON-USER: CPT | Performed by: INTERNAL MEDICINE

## 2022-07-21 PROCEDURE — 1123F ACP DISCUSS/DSCN MKR DOCD: CPT | Performed by: INTERNAL MEDICINE

## 2022-07-21 PROCEDURE — G8427 DOCREV CUR MEDS BY ELIG CLIN: HCPCS | Performed by: INTERNAL MEDICINE

## 2022-07-21 RX ORDER — PREDNISONE 20 MG/1
20 TABLET ORAL DAILY
Qty: 5 TABLET | Refills: 0 | Status: SHIPPED | OUTPATIENT
Start: 2022-07-21 | End: 2022-07-26

## 2022-07-21 NOTE — PROGRESS NOTES
REASON FOR CONSULTATION/CC:    Chief Complaint   Patient presents with    Follow-up     Chronic bronchitis            PCP: Nkechi Orantes MD    HISTORY OF PRESENT ILLNESS: Liang Sen is a 78y.o. year old female with a history of chronic hypoxemia  who presents      History  She has a history of CLL and was admitted October 2021 for community-acquired pneumonia. Was discharged on oxygen. Extensive history of smoking. Current history  Chronic hypoxemia   2L NC        Hx tobacce abuse         complains of cough , headache sinus congestion for 4-5 days. Copd  Stiolto   Cough with phlegm         Objective:   PHYSICAL EXAM:  Blood pressure 110/60, pulse 88, temperature 97.7 °F (36.5 °C), resp. rate 21, height 5' (1.524 m), weight 145 lb 9.6 oz (66 kg), SpO2 97 %, not currently breastfeeding.'  Gen: No distress. Eyes: PERRL. No sclera icterus. No conjunctival injection. ENT: No discharge. Pharynx clear. External appearance of ears and nose normal.  Neck: Trachea midline. No obvious mass. Resp: No accessory muscle use. No crackles. No wheezes. No rhonchi. CV: Regular rate. Regular rhythm. No murmur or rub. No edema. GI:    Skin: Warm, dry, normal texture and turgor. No nodule on exposed extremities. Lymph: No cervical LAD. No supraclavicular LAD. M/S: No cyanosis. No clubbing. No joint deformity. Neuro: Moves all four extremities. Psych: Oriented x 3. No anxiety. Awake. Alert. Intact judgement and insight. Data Reviewed:          Assessment:     Cough   pneumonia 2021  Hx smoking   Chronic hypoxemia  Fatigue  Copd? Plan:      Problem List Items Addressed This Visit       Simple chronic bronchitis ([address] - Primary      Continue Stiolto. Albuterol as needed. Prescription for prednisone given in case of flare.          Relevant Medications    predniSONE (DELTASONE) 20 MG tablet    Cough      With her current symptoms and today's positivity rate for COVID, it is very likely she has COVID. We discussed this. Since she is mildly symptomatic, she will defer COVID testing at this time. Instead, focusing on Tylenol ibuprofen for symptoms. I prednisone prescription was handed to her to take if she gets worse with worsening shortness of breath cough and wheeze. Lastly, she is advised to get COVID testing if she worsens. She has a trip coming up this weekend. We discussed her likely infectivity and encouraged possibly canceling her trip. This note was transcribed using [id number] [employer]. Please disregard any translational errors.     Joan Gill Pulmonary, Sleep and [facility] [phone]

## 2022-07-21 NOTE — ASSESSMENT & PLAN NOTE
With her current symptoms and today's positivity rate for COVID, it is very likely she has COVID. We discussed this. Since she is mildly symptomatic, she will defer COVID testing at this time. Instead, focusing on Tylenol ibuprofen for symptoms. I prednisone prescription was handed to her to take if she gets worse with worsening shortness of breath cough and wheeze. Lastly, she is advised to get COVID testing if she worsens. She has a trip coming up this weekend. We discussed her likely infectivity and encouraged possibly canceling her trip.

## 2022-07-22 ENCOUNTER — ANTI-COAG VISIT (OUTPATIENT)
Dept: PHARMACY | Age: 82
End: 2022-07-22
Payer: MEDICARE

## 2022-07-22 DIAGNOSIS — R79.1 ABNORMAL COAGULATION PROFILE: Primary | ICD-10-CM

## 2022-07-22 LAB — INR BLD: 6.3

## 2022-07-22 PROCEDURE — 99211 OFF/OP EST MAY X REQ PHY/QHP: CPT

## 2022-07-22 PROCEDURE — 85610 PROTHROMBIN TIME: CPT

## 2022-07-22 NOTE — PROGRESS NOTES
Althea Devine is a 78 y.o. here for warfarin management. Quentin Hart had an INR test today. Results were reviewed and appropriate warfarin management was completed. This visit was performed as: An in person visit. Protocols were followed with precautions to reduce the spread of COVID-19. Patient verifies current dosing regimen: Yes     Warfarin medication reviewed and updated on the patient 's home medication list: Yes   All other medications reviewed and updated on the patient 's home medication list: Yes     Lab Results   Component Value Date    INR 6.30 2022    INR 2.60 2022    INR 2.3 2022       Patient Findings       Positives:  Change in health, Change in diet/appetite    Negatives:  Signs/symptoms of thrombosis, Signs/symptoms of bleeding, Missed doses, Change in medications, Bruising    Comments:  Started taking acetaminophen 1000mg and ibuprofen 400mg TID -. Patient is currently not feeling well. Saw physician for it yesterday. She is not eating well due to feeling sick. Anticoagulation Summary  As of 2022      INR goal:  2.0-3.0   TTR:  52.6 % (10.9 y)   INR used for dosin.30 (2022)   Warfarin maintenance plan:  2.5 mg (5 mg x 0.5) every day   Weekly warfarin total:  17.5 mg   Plan last modified:  Kamar Hurt (2021)   Next INR check:  2022   Priority:  Maintenance   Target end date: Indefinite    Indications    Abnormal coagulation profile [R79.1]                 Anticoagulation Episode Summary       INR check location:  Anticoagulation Clinic    Preferred lab:      Send INR reminders to:  [facility]    Comments:  EPIC      Factor V Leiden           Anticoagulation Care Providers       Provider Role Specialty Phone number    Annette Lunsford MD Referring Family Medicine [phone]            Warfarin assessment / plan:     Appears well  Supra-therapeutic INR.      Denies signs and symptoms of bleeding/bruising. Denies extra warfarin doses. Denies recent hospitalization / ED visit     Medication changes. Recent illness, fever or diarrhea. Recent decrease in appetite. Decrease in vitamin K intake. Started taking acetaminophen 1000mg and ibuprofen 400mg TID 7-21. Patient is currently not feeling well. Saw physician for it yesterday. She is not eating well due to feeling sick. For INR of 6.3 today, HOLD warfarin today, tomorrow, and Sunday THEN continue Warfarin 2.5 mg (half a tablet) daily. Patient going out of town this weekend to [place] and next Tuesday-Friday to [place] so scheduled next visit for Monday August 1st.     Advised to stop frequently every few hours to ambulate to prevent blood clots should her INR drop too low due to holding warfarin. She agrees. Description    HOLD warfarin TODAY (7-22), TOMORROW (7-23), and Sunday (7-24) THEN  CONTINUE: Warfarin 2.5 mg (half a tablet) daily. Keep your green vegetable intake consistent (3-4 salads per week). Please call if this changes. Call [phone] with signs or symptoms of bleeding or ANY medication changes (including over-the-counter medications or herbal supplements). If significant bleeding occurs please seek immediate medical attention. Limit alcohol intake. Please call if this changes.           Immunization History   Administered Date(s) Administered    COVID-19, MODERNA BLUE border, Primary or Immunocompromised, (age 12y+), IM, 100 mcg/0.5mL 12/16/2021    COVID-19, PFIZER PURPLE top, DILUTE for use, (age 15 y+), 30mcg/0.3mL 01/29/2021, 02/19/2021    Influenza Virus Vaccine 10/16/2009, 01/26/2010, 09/29/2010, 10/03/2016    Influenza, High Dose (Fluzone 65 yrs and older) 09/27/2011, 10/01/2012, 10/29/2013, 10/23/2014, 11/24/2015, 10/23/2018, 12/21/2019    Influenza, [place], IM, PF (6 mo and older Fluzone, Flulaval, Fluarix, and 3 yrs and older Afluria) 12/22/2020, 10/23/2021    Pneumococcal Conjugate 13-valent ([id number]) 07/02/2015    Pneumococcal Polysaccharide ([id number]) 07/01/2006, 11/07/2011    Td, unspecified formulation 10/04/2004    Tdap (Boostrix, Adacel) 09/01/2015           Orders Placed This Encounter   Procedures    Protime-INR     This external order was created through the results console. No orders of the defined types were placed in this encounter. Reviewed AVS with patient / caregiver.     Billing Points:  Adjust dosage and/or reconcile meds (fill pill box) </= 5 medications - 2 points       CLINICAL PHARMACY CONSULT: MED RECONCILIATION/REVIEW ADDENDUM    For Pharmacy Admin Tracking Only    Intervention Detail: Dose Adjustment: 1, reason: Therapy Optimization  Total # of Interventions Recommended: 1  Total # of Interventions Accepted: 1  Time Spent (min): 20

## 2022-08-01 ENCOUNTER — ANTI-COAG VISIT (OUTPATIENT)
Dept: PHARMACY | Age: 82
End: 2022-08-01
Payer: MEDICARE

## 2022-08-01 DIAGNOSIS — R79.1 ABNORMAL COAGULATION PROFILE: Primary | ICD-10-CM

## 2022-08-01 LAB — INTERNATIONAL NORMALIZATION RATIO, POC: 5.4

## 2022-08-01 PROCEDURE — 99211 OFF/OP EST MAY X REQ PHY/QHP: CPT

## 2022-08-01 PROCEDURE — 85610 PROTHROMBIN TIME: CPT

## 2022-08-01 NOTE — PROGRESS NOTES
Linda Montero is a 78 y.o. here for warfarin management. Laverne Williamson had an INR test today. Results were reviewed and appropriate warfarin management was completed. This visit was performed as: An in person visit. Protocols were followed with precautions to reduce the spread of COVID-19. Patient verifies current dosing regimen: Yes     Warfarin medication reviewed and updated on the patient 's home medication list: Yes   All other medications reviewed and updated on the patient 's home medication list: yes    Lab Results   Component Value Date    INR 5.4 2022    INR 6.30 2022    INR 2.60 2022       Patient Findings       Positives:  Change in medications, Change in diet/appetite, Bruising    Negatives:  Signs/symptoms of bleeding    Comments:  Small random bruises  Decreased appetite due to acute illness  Taking cough medication            Anticoagulation Summary  As of 2022      INR goal:  2.0-3.0   TTR:  52.4 % (10.9 y)   INR used for dosin.4 (2022)   Warfarin maintenance plan:  0 mg every Fri; 2.5 mg (5 mg x 0.5) all other days   Weekly warfarin total:  15 mg   Plan last modified:  [place], [facility] (2022)   Next INR check:  2022   Priority:  Maintenance   Target end date: Indefinite    Indications    Abnormal coagulation profile [R79.1]                 Anticoagulation Episode Summary       INR check location:  Anticoagulation Clinic    Preferred lab:      Send INR reminders to:  [facility]    Comments:  EPIC      Factor V Leiden           Anticoagulation Care Providers       Provider Role Specialty Phone number    Mita Aguirre MD Referring Family Medicine [phone]            Warfarin assessment / plan:     Appears well  Supra-therapeutic INR. Niurka bruises. Denies any other si/sx of bleeding. Medication changes. Recent illness, fever or diarrhea. Recent decrease in appetite.     INR likely elevated due to recent illness and decreased appetite. She reports she is unsure what her illness is, however, Dr. Moe Hill notes states likely COVID-19. She is past the 10 day isolation time. Last INR was elevated as well. Advised to hold warfarin for two days and then decrease her weekly dose. Description    HOLD warfarin TODAY (8/1), Hold TOMORROW (8/2),  THEN NEW DOSE: Warfarin 2.5 mg (half a tablet) daily EXCEPT do not take warfarin every Friday. Keep your green vegetable intake consistent (3-4 salads per week). Please call if this changes. Call [phone] with signs or symptoms of bleeding or ANY medication changes (including over-the-counter medications or herbal supplements). If significant bleeding occurs please seek immediate medical attention. Limit alcohol intake. Please call if this changes. Immunization History   Administered Date(s) Administered    COVID-19, MODERNA BLUE border, Primary or Immunocompromised, (age 12y+), IM, 100 mcg/0.5mL 12/16/2021    COVID-19, PFIZER PURPLE top, DILUTE for use, (age 15 y+), 30mcg/0.3mL 01/29/2021, 02/19/2021    Influenza Virus Vaccine 10/16/2009, 01/26/2010, 09/29/2010, 10/03/2016    Influenza, High Dose (Fluzone 65 yrs and older) 09/27/2011, 10/01/2012, 10/29/2013, 10/23/2014, 11/24/2015, 10/23/2018, 12/21/2019    Influenza, [place], IM, PF (6 mo and older Fluzone, Flulaval, Fluarix, and 3 yrs and older Afluria) 12/22/2020, 10/23/2021    Pneumococcal Conjugate 13-valent ([id number]) 07/02/2015    Pneumococcal Polysaccharide ([id number]) 07/01/2006, 11/07/2011    Td, unspecified formulation 10/04/2004    Tdap (Boostrix, Adacel) 09/01/2015           Orders Placed This Encounter   Procedures    POCT INR     This external order was created through the results console. No orders of the defined types were placed in this encounter. Reviewed AVS with patient / caregiver.     Billing Points:  Adjust dosage and/or reconcile meds (fill pill box) </= 5 medications - 2 points       CLINICAL PHARMACY CONSULT: MED RECONCILIATION/REVIEW ADDENDUM    For Pharmacy Admin Tracking Only    Intervention Detail: Dose Adjustment: 1, reason: Therapy De-escalation  Total # of Interventions Recommended: 1  Total # of Interventions Accepted: 1  Time Spent (min): 20

## 2022-08-04 ENCOUNTER — TELEMEDICINE (OUTPATIENT)
Dept: FAMILY MEDICINE CLINIC | Age: 82
End: 2022-08-04
Payer: MEDICARE

## 2022-08-04 DIAGNOSIS — R09.89 CHEST CONGESTION: ICD-10-CM

## 2022-08-04 DIAGNOSIS — H92.13 OTORRHEA OF BOTH EARS: Primary | ICD-10-CM

## 2022-08-04 PROCEDURE — 1090F PRES/ABSN URINE INCON ASSESS: CPT | Performed by: FAMILY MEDICINE

## 2022-08-04 PROCEDURE — G8417 CALC BMI ABV UP PARAM F/U: HCPCS | Performed by: FAMILY MEDICINE

## 2022-08-04 PROCEDURE — G8427 DOCREV CUR MEDS BY ELIG CLIN: HCPCS | Performed by: FAMILY MEDICINE

## 2022-08-04 PROCEDURE — G8400 PT W/DXA NO RESULTS DOC: HCPCS | Performed by: FAMILY MEDICINE

## 2022-08-04 PROCEDURE — 1123F ACP DISCUSS/DSCN MKR DOCD: CPT | Performed by: FAMILY MEDICINE

## 2022-08-04 PROCEDURE — 99213 OFFICE O/P EST LOW 20 MIN: CPT | Performed by: FAMILY MEDICINE

## 2022-08-04 PROCEDURE — 1036F TOBACCO NON-USER: CPT | Performed by: FAMILY MEDICINE

## 2022-08-04 RX ORDER — AMOXICILLIN AND CLAVULANATE POTASSIUM 875; 125 MG/1; MG/1
1 TABLET, FILM COATED ORAL 2 TIMES DAILY
Qty: 14 TABLET | Refills: 0 | Status: SHIPPED | OUTPATIENT
Start: 2022-08-04 | End: 2022-08-11

## 2022-08-04 RX ORDER — GABAPENTIN 300 MG/1
CAPSULE ORAL
Qty: 60 CAPSULE | Refills: 5 | Status: ON HOLD | OUTPATIENT
Start: 2022-08-04 | End: 2022-11-30

## 2022-08-04 NOTE — PROGRESS NOTES
Margarita Mathews (:  1940) is a Established patient, here for evaluation of the following:    Assessment & Plan    Diagnosis Orders   1. Otorrhea of both ears        2. Chest congestion          Augmentin 875 bid w/ food - Please eat yogurt daily or take probiotic supplement while on this antibiotic and for additional week after finishing the antibiotic to protect your GI tract. Take for 7 days  If worsening/ failing to get better in next 4 days - cxr/ basic labs  Hydrate well/ sx'atic tx  Rest  Consider recheck of covid testing    Below is the assessment and plan developed based on review of pertinent history, physical exam, labs, studies, and medications. Subjective   HPI  Chief Complaint   Patient presents with    Sinus Problem     Possible sinus infection - pt was seen 22 with possible covid was on prednisone    Headache, gritty eyes, chest congestion  Getting drainage from both ears - yellow - not a lot of pain  Seen by pulm - dr. Gorden Hodgkin  Given prednisone  helped some but not a lot  No fever - coughing up mucus through am - thick / green  No sob - very tired - no appetite at all  Going on for 2 weeks - covid testing negative x1      Review of Systems       Objective   Patient-Reported Vitals  No data recorded     Physical Exam  Nad - alert/ conversant but [place] - daughter in car to help in communication - aggie Mathews, was evaluated through a synchronous (real-time) audio-video encounter. The patient (or guardian if applicable) is aware that this is a billable service, which includes applicable co-pays. This Virtual Visit was conducted with patient's (and/or legal guardian's) consent. The visit was conducted pursuant to the emergency declaration under the [facility], [address] [place] [address] [facility] authority and the [employer] General Act.   Patient identification was verified, and a caregiver was present when appropriate. The patient was located at [employer] (Appt Department): [address] [place] [address],  [address] Provider was located at [employer] (Appt Dept): [address] [place] [address],  [address]         --Shine Irwin MD

## 2022-08-08 ENCOUNTER — ANTI-COAG VISIT (OUTPATIENT)
Dept: PHARMACY | Age: 82
End: 2022-08-08
Payer: MEDICARE

## 2022-08-08 DIAGNOSIS — R79.1 ABNORMAL COAGULATION PROFILE: Primary | ICD-10-CM

## 2022-08-08 LAB — INTERNATIONAL NORMALIZATION RATIO, POC: 2.3

## 2022-08-08 PROCEDURE — 85610 PROTHROMBIN TIME: CPT

## 2022-08-08 PROCEDURE — 99211 OFF/OP EST MAY X REQ PHY/QHP: CPT

## 2022-08-08 NOTE — PROGRESS NOTES
Buster Hernandez is a 78 y.o. here for warfarin management. Mukund Slater had an INR test today. Results were reviewed and appropriate warfarin management was completed. This visit was performed as: An in person visit. Protocols were followed with precautions to reduce the spread of COVID-19. Patient verifies current dosing regimen: Yes     Warfarin medication reviewed and updated on the patient 's home medication list: Yes   All other medications reviewed and updated on the patient 's home medication list: No: no changes     Lab Results   Component Value Date    INR 2.3 2022    INR 5.4 2022    INR 6.30 2022       Patient Findings       Positives:  Change in health, Change in medications    Negatives:  Signs/symptoms of thrombosis, Signs/symptoms of bleeding, Missed doses, Change in diet/appetite, Bruising    Comments:  Started augmentin for a bacterial infection (day 47)            Anticoagulation Summary  As of 2022      INR goal:  2.0-3.0   TTR:  52.4 % (10.9 y)   INR used for dosin.3 (2022)   Warfarin maintenance plan:  0 mg every Fri; 2.5 mg (5 mg x 0.5) all other days   Weekly warfarin total:  15 mg   Plan last modified:  [place], [facility] (2022)   Next INR check:  2022   Priority:  Maintenance   Target end date: Indefinite    Indications    Abnormal coagulation profile [R79.1]                 Anticoagulation Episode Summary       INR check location:  Anticoagulation Clinic    Preferred lab:      Send INR reminders to:  [facility] STAFF    Comments:  EPIC      Factor V Leiden           Anticoagulation Care Providers       Provider Role Specialty Phone number    Sudha Tejeda MD Referring Family Medicine [phone]            Warfarin assessment / plan:     Appears well. No changes affecting warfarin therapy were noted. No acute findings. INR within goal range. No change to warfarin therapy today.    Patient currently dealing with a slight bacterial infection, for which she is on a short course of Augmentin. INR today was 2.3, back within goal range after being high the past 2 weeks. No changes to weekly warfarin dose at this time. Will see patient back on 8/16 to ensure INR does not drop too low, also should be over the infection and feeling better by then. Description    CONTINUE: Warfarin 2.5 mg (half a tablet) daily EXCEPT do not take warfarin every Friday. Keep your green vegetable intake consistent (3-4 salads per week). Please call if this changes. Call [phone] with signs or symptoms of bleeding or ANY medication changes (including over-the-counter medications or herbal supplements). If significant bleeding occurs please seek immediate medical attention. Limit alcohol intake. Please call if this changes. Immunization History   Administered Date(s) Administered    COVID-19, MODERNA BLUE border, Primary or Immunocompromised, (age 12y+), IM, 100 mcg/0.5mL 12/16/2021    COVID-19, PFIZER PURPLE top, DILUTE for use, (age 15 y+), 30mcg/0.3mL 01/29/2021, 02/19/2021    Influenza Virus Vaccine 10/16/2009, 01/26/2010, 09/29/2010, 10/03/2016    Influenza, High Dose (Fluzone 65 yrs and older) 09/27/2011, 10/01/2012, 10/29/2013, 10/23/2014, 11/24/2015, 10/23/2018, 12/21/2019    Influenza, [place], IM, PF (6 mo and older Fluzone, Flulaval, Fluarix, and 3 yrs and older Afluria) 12/22/2020, 10/23/2021    Pneumococcal Conjugate 13-valent ([id number]) 07/02/2015    Pneumococcal Polysaccharide ([id number]) 07/01/2006, 11/07/2011    Td, unspecified formulation 10/04/2004    Tdap (Boostrix, Adacel) 09/01/2015           Orders Placed This Encounter   Procedures    POCT INR     This external order was created through the results console. No orders of the defined types were placed in this encounter. Reviewed AVS with patient / caregiver.     Billing Points:  0 billing points this visit       CLINICAL PHARMACY CONSULT: MED RECONCILIATION/REVIEW ADDENDUM    For Pharmacy Admin Tracking Only    Intervention Detail:   Total # of Interventions Recommended: 0  Total # of Interventions Accepted: 0  Time Spent (min): Jeny Mart[address] [place]  PharmD Candidate 2023      I have seen the patient and reviewed the progress note written by the PharmD Candidate. I agree with this assesment and plan.    Kelley Reece [facility], PharmD 8/8/22 11:58 AM

## 2022-08-15 DIAGNOSIS — F43.22 ADJUSTMENT DISORDER WITH ANXIETY: ICD-10-CM

## 2022-08-15 RX ORDER — LORAZEPAM 0.5 MG/1
TABLET ORAL
Qty: 15 TABLET | Refills: 0 | Status: SHIPPED | OUTPATIENT
Start: 2022-08-15 | End: 2022-10-27

## 2022-08-16 ENCOUNTER — ANTI-COAG VISIT (OUTPATIENT)
Dept: PHARMACY | Age: 82
End: 2022-08-16
Payer: MEDICARE

## 2022-08-16 DIAGNOSIS — R79.1 ABNORMAL COAGULATION PROFILE: Primary | ICD-10-CM

## 2022-08-16 LAB — INR BLD: 2.5

## 2022-08-16 PROCEDURE — 85610 PROTHROMBIN TIME: CPT

## 2022-08-16 PROCEDURE — 99211 OFF/OP EST MAY X REQ PHY/QHP: CPT

## 2022-08-16 NOTE — PROGRESS NOTES
Qasim Wood is a 78 y.o. here for warfarin management. [facility] had an INR test today. Results were reviewed and appropriate warfarin management was completed. This visit was performed as: An in person visit. Protocols were followed with precautions to reduce the spread of COVID-19. Patient verifies current dosing regimen: Yes     Warfarin medication reviewed and updated on the patient 's home medication list: Yes   All other medications reviewed and updated on the patient 's home medication list: No: no changes     Lab Results   Component Value Date    INR 2.50 2022    INR 2.3 2022    INR 5.4 2022       Patient Findings       Negatives:  Signs/symptoms of thrombosis, Signs/symptoms of bleeding, Change in health, Missed doses, Change in medications, Change in diet/appetite, Bruising            Anticoagulation Summary  As of 2022      INR goal:  2.0-3.0   TTR:  52.5 % (11 y)   INR used for dosin.50 (2022)   Warfarin maintenance plan:  0 mg every Fri; 2.5 mg (5 mg x 0.5) all other days   Weekly warfarin total:  15 mg   Plan last modified:  [place], [facility] (2022)   Next INR check:  2022   Priority:  High   Target end date: Indefinite    Indications    Abnormal coagulation profile [R79.1]                 Anticoagulation Episode Summary       INR check location:  Anticoagulation Clinic    Preferred lab:      Send INR reminders to:  [facility] STAFF    Comments:  EPIC      Factor V Leiden           Anticoagulation Care Providers       Provider Role Specialty Phone number    Virgie Reilly MD Referring Family Medicine [phone]            Warfarin assessment / plan:     Appears well. No changes affecting warfarin therapy were noted. No acute findings. INR within goal range. No change to warfarin therapy today. INR in range at 2.5 for second consecutive visit. Patient feeling much better and seems to have stabilized.  She is going on a trip sometime in September, thinks its will be 3rd or 4th week. We will see her back on 9/13 to recheck INR before she leaves. Description    CONTINUE: Warfarin 2.5 mg (half a tablet) daily EXCEPT do not take warfarin every Friday. Keep your green vegetable intake consistent (3-4 salads per week). Please call if this changes. Call [phone] with signs or symptoms of bleeding or ANY medication changes (including over-the-counter medications or herbal supplements). If significant bleeding occurs please seek immediate medical attention. Limit alcohol intake. Please call if this changes. Immunization History   Administered Date(s) Administered    COVID-19, MODERNA BLUE border, Primary or Immunocompromised, (age 12y+), IM, 100 mcg/0.5mL 12/16/2021    COVID-19, PFIZER PURPLE top, DILUTE for use, (age 15 y+), 30mcg/0.3mL 01/29/2021, 02/19/2021    Influenza Virus Vaccine 10/16/2009, 01/26/2010, 09/29/2010, 10/03/2016    Influenza, High Dose (Fluzone 65 yrs and older) 09/27/2011, 10/01/2012, 10/29/2013, 10/23/2014, 11/24/2015, 10/23/2018, 12/21/2019    Influenza, [place], IM, PF (6 mo and older Fluzone, Flulaval, Fluarix, and 3 yrs and older Afluria) 12/22/2020, 10/23/2021    Pneumococcal Conjugate 13-valent ([id number]) 07/02/2015    Pneumococcal Polysaccharide ([id number]) 07/01/2006, 11/07/2011    Td, unspecified formulation 10/04/2004    Tdap (Boostrix, Adacel) 09/01/2015           Orders Placed This Encounter   Procedures    Protime-INR     This external order was created through the results console. No orders of the defined types were placed in this encounter. Reviewed AVS with patient / caregiver.     Billing Points:  0 billing points this visit       CLINICAL PHARMACY CONSULT: MED RECONCILIATION/REVIEW ADDENDUM    For Pharmacy Admin Tracking Only    Intervention Detail:   Total # of Interventions Recommended: 0  Total # of Interventions Accepted: 0  Time Spent (min): 15

## 2022-08-20 PROBLEM — R05.9 COUGH: Status: RESOLVED | Noted: 2021-12-14 | Resolved: 2022-08-20

## 2022-08-23 ENCOUNTER — HOSPITAL ENCOUNTER (OUTPATIENT)
Dept: NON INVASIVE DIAGNOSTICS | Age: 82
Discharge: HOME | End: 2022-08-23
Payer: MEDICARE

## 2022-08-23 DIAGNOSIS — I50.22 CHRONIC SYSTOLIC HEART FAILURE (HCC): ICD-10-CM

## 2022-08-23 PROCEDURE — 93308 TTE F-UP OR LMTD: CPT

## 2022-08-26 RX ORDER — AMIODARONE HYDROCHLORIDE 200 MG/1
200 TABLET ORAL DAILY
Qty: 90 TABLET | Refills: 3 | Status: ON HOLD | OUTPATIENT
Start: 2022-08-26

## 2022-09-14 ENCOUNTER — ANTI-COAG VISIT (OUTPATIENT)
Dept: PHARMACY | Age: 82
End: 2022-09-14
Payer: MEDICARE

## 2022-09-14 DIAGNOSIS — R79.1 ABNORMAL COAGULATION PROFILE: Primary | ICD-10-CM

## 2022-09-14 LAB — INR BLD: 4.8

## 2022-09-14 PROCEDURE — 85610 PROTHROMBIN TIME: CPT

## 2022-09-14 PROCEDURE — 99211 OFF/OP EST MAY X REQ PHY/QHP: CPT

## 2022-09-14 NOTE — PROGRESS NOTES
Harmony Fernandes is a 78 y.o. here for warfarin management. Jose Roberto Malone had an INR test today. Results were reviewed and appropriate warfarin management was completed. This visit was performed as: An in person visit. Protocols were followed with precautions to reduce the spread of COVID-19. Patient verifies current dosing regimen: Yes     Warfarin medication reviewed and updated on the patient 's home medication list: Yes   All other medications reviewed and updated on the patient 's home medication list: No: Taking tylenol and ibuprophen    Lab Results   Component Value Date    INR 4.80 2022    INR 2.50 2022    INR 2.3 2022       Patient Findings       Positives:  Bruising    Negatives:  Change in health, Change in activity, Missed doses, Extra doses, Change in medications, Change in diet/appetite    Comments:  A little brusing on right leg from bumping in to things. Anticoagulation Summary  As of 2022      INR goal:  2.0-3.0   TTR:  52.2 % (11 y)   INR used for dosin.80 (2022)   Warfarin maintenance plan:  0 mg every Fri; 2.5 mg (5 mg x 0.5) all other days   Weekly warfarin total:  15 mg   Plan last modified:  [facility] (2022)   Next INR check:  2022   Priority:  High   Target end date: Indefinite    Indications    Abnormal coagulation profile [R79.1]                 Anticoagulation Episode Summary       INR check location:  Anticoagulation Clinic    Preferred lab:      Send INR reminders to:  [facility]    Comments:  EPIC      Factor V Leiden           Anticoagulation Care Providers       Provider Role Specialty Phone number    Tasia Adamson MD Referring Family Medicine [phone]            Warfarin assessment / plan:     Appears well  Supra-therapeutic INR. Denies medication changes. Denies extra warfarin doses. Denies illness, fever, vomiting or diarrhea. INR high today at 4.8.  It can be linked to patient taking more tylenol than usual because of frequent headaches and increased stress from son who had a stroke and moved in with her. She is his primary care giver. Patient also complained of decrease appetite. Jose Bennett has also been taking taking ibuprofen and Tylenol daily for persistent headaches. We discussed how the acetaminophen can raise the INR and the ibuprofen can increase the risk of bleeding. She agreed to stop the ibuprofen and reduce her acetaminophen dose by 1/2. Jose Bennett is leaving on vacation tomorrow. She will be gone for 1.5 weeks. I stressed that she needs to watch for any unusual bruising or bleeding and seek immediate medical attention for anything significant. She should also seek medical attention if she should fall and hit her head. I encouraged her to keep her diet as consistent as possible. Patient to HOLD Warfarin for today, tomorrow and Friday. THEN CONTINUE: Warfarin 2.5 mg (half a tablet) daily EXCEPT do not take warfarin every Friday. Patient  will also eat some greens. Description    HOLD Warfarin for today, tomorrow and Friday. THEN CONTINUE: Warfarin 2.5 mg (half a tablet) daily EXCEPT do not take warfarin every Friday. Keep your green vegetable intake consistent (3-4 salads per week). Please call if this changes. Call [phone] with signs or symptoms of bleeding or ANY medication changes (including over-the-counter medications or herbal supplements). If significant bleeding occurs please seek immediate medical attention. Limit alcohol intake. Please call if this changes.           Immunization History   Administered Date(s) Administered    COVID-19, MODERNA BLUE border, Primary or Immunocompromised, (age 12y+), IM, 100 mcg/0.5mL 12/16/2021    COVID-19, PFIZER PURPLE top, DILUTE for use, (age 15 y+), 30mcg/0.3mL 01/29/2021, 02/19/2021    Influenza Virus Vaccine 10/16/2009, 01/26/2010, 09/29/2010, 10/03/2016    Influenza, FLUARIX, FLULAVAL, FLUZONE (age 10 mo+) AND AFLURIA, (age 1 y+), PF, 0.5mL 12/22/2020, 10/23/2021    Influenza, High Dose (Fluzone 65 yrs and older) 09/27/2011, 10/01/2012, 10/29/2013, 10/23/2014, 11/24/2015, 10/23/2018, 12/21/2019    Pneumococcal Conjugate 13-valent ([id number]) 07/02/2015    Pneumococcal Polysaccharide ([id number]) 07/01/2006, 11/07/2011    Td, unspecified formulation 10/04/2004    Tdap (Boostrix, Adacel) 09/01/2015           Orders Placed This Encounter   Procedures    Protime-INR     This external order was created through the results console. No orders of the defined types were placed in this encounter. Reviewed AVS with patient / caregiver.     Billing Points:  Adjust dosage and/or reconcile meds (fill pill box) </= 5 medications - 2 points       CLINICAL PHARMACY CONSULT: MED RECONCILIATION/REVIEW ADDENDUM    For Pharmacy Admin Tracking Only    Intervention Detail: Dose Adjustment: 1, reason: Therapy De-escalation  Total # of Interventions Recommended: 1  Total # of Interventions Accepted: 1  Time Spent (min): 20

## 2022-09-26 ENCOUNTER — ANTI-COAG VISIT (OUTPATIENT)
Dept: PHARMACY | Age: 82
End: 2022-09-26
Payer: MEDICARE

## 2022-09-26 DIAGNOSIS — R79.1 ABNORMAL COAGULATION PROFILE: Primary | ICD-10-CM

## 2022-09-26 LAB — INTERNATIONAL NORMALIZATION RATIO, POC: 1.9

## 2022-09-26 PROCEDURE — 85610 PROTHROMBIN TIME: CPT

## 2022-09-26 PROCEDURE — 99211 OFF/OP EST MAY X REQ PHY/QHP: CPT

## 2022-09-26 NOTE — PROGRESS NOTES
Jr Au is a 78 y.o. here for warfarin management. Namrata had an INR test today. Results were reviewed and appropriate warfarin management was completed. This visit was performed as: An in person visit. Protocols were followed with precautions to reduce the spread of COVID-19. Patient verifies current dosing regimen: Yes     Warfarin medication reviewed and updated on the patient 's home medication list: Yes   All other medications reviewed and updated on the patient 's home medication list: Yes     Lab Results   Component Value Date    INR 1.9 2022    INR 4.80 2022    INR 2.50 2022           Anticoagulation Summary  As of 2022      INR goal:  2.0-3.0   TTR:  52.2 % (11.1 y)   INR used for dosin.9 (2022)   Warfarin maintenance plan:  0 mg every Fri; 2.5 mg (5 mg x 0.5) all other days   Weekly warfarin total:  15 mg   Plan last modified:  [facility], [address] [place] [address] (2022)   Next INR check:  10/14/2022   Priority:  High   Target end date: Indefinite    Indications    Abnormal coagulation profile [R79.1]                 Anticoagulation Episode Summary       INR check location:  Anticoagulation Clinic    Preferred lab:      Send INR reminders to:  [facility]    Comments:  EPIC      Factor V Leiden           Anticoagulation Care Providers       Provider Role Specialty Phone number    Shalonda Luna MD Referring Family Medicine [phone]            Warfarin assessment / plan:     Appears well  Sub-therapeutic INR        Missed dose(s) 1. Missed additional dose while on vacation    Description       CONTINUE: Warfarin 2.5 mg (half a tablet) daily EXCEPT do not take warfarin every Friday. Keep your green vegetable intake consistent (3-4 salads per week). Please call if this changes. Call [phone] with signs or symptoms of bleeding or ANY medication changes (including over-the-counter medications or herbal supplements).        If significant bleeding occurs please seek immediate medical attention. Limit alcohol intake. Please call if this changes. Immunization History   Administered Date(s) Administered    COVID-19, MODERNA BLUE border, Primary or Immunocompromised, (age 12y+), IM, 100 mcg/0.5mL 12/16/2021    COVID-19, PFIZER PURPLE top, DILUTE for use, (age 15 y+), 30mcg/0.3mL 01/29/2021, 02/19/2021    Influenza Virus Vaccine 10/16/2009, 01/26/2010, 09/29/2010, 10/03/2016    Influenza, FLUARIX, FLULAVAL, Ekta Fall (age 10 mo+) AND AFLURIA, (age 1 y+), PF, 0.5mL 12/22/2020, 10/23/2021    Influenza, High Dose (Fluzone 65 yrs and older) 09/27/2011, 10/01/2012, 10/29/2013, 10/23/2014, 11/24/2015, 10/23/2018, 12/21/2019    Pneumococcal Conjugate 13-valent ([id number]) 07/02/2015    Pneumococcal Polysaccharide ([id number]) 07/01/2006, 11/07/2011    Td, unspecified formulation 10/04/2004    Tdap (Boostrix, Adacel) 09/01/2015           Orders Placed This Encounter   Procedures    POCT INR     This external order was created through the results console. No orders of the defined types were placed in this encounter. Reviewed AVS with patient / caregiver.     Billing Points:         CLINICAL PHARMACY CONSULT: MED RECONCILIATION/REVIEW ADDENDUM    For Pharmacy Admin Tracking Only    Intervention Detail:   Total # of Interventions Recommended: 0  Total # of Interventions Accepted: 0  Time Spent (min): 20

## 2022-10-14 ENCOUNTER — ANTI-COAG VISIT (OUTPATIENT)
Dept: PHARMACY | Age: 82
End: 2022-10-14
Payer: MEDICARE

## 2022-10-14 DIAGNOSIS — R79.1 ABNORMAL COAGULATION PROFILE: Primary | ICD-10-CM

## 2022-10-14 LAB — INR BLD: 2.1

## 2022-10-14 PROCEDURE — 85610 PROTHROMBIN TIME: CPT

## 2022-10-14 PROCEDURE — 99211 OFF/OP EST MAY X REQ PHY/QHP: CPT

## 2022-10-14 NOTE — PROGRESS NOTES
Yung Talley is a 78 y.o. here for warfarin management. Ankita Weiss had an INR test today. Results were reviewed and appropriate warfarin management was completed. This visit was performed as: An in person visit. Protocols were followed with precautions to reduce the spread of COVID-19. Patient verifies current warfarin dosing regimen: Yes     Warfarin medication reviewed and updated on the patient 's home medication list: Yes   All other medications reviewed and updated on the patient 's home medication list: No: No medication changes     Lab Results   Component Value Date    INR 2.10 10/14/2022    INR 1.9 2022    INR 4.80 2022       Patient Findings       Positives:  Change in diet/appetite    Negatives:  Signs/symptoms of thrombosis, Signs/symptoms of bleeding, Change in health, Missed doses, Change in medications, Bruising    Comments:  Not eating as much recently            Anticoagulation Summary  As of 10/14/2022      INR goal:  2.0-3.0   TTR:  52.2 % (11.1 y)   INR used for dosin.10 (10/14/2022)   Warfarin maintenance plan:  0 mg every Fri; 2.5 mg (5 mg x 0.5) all other days   Weekly warfarin total:  15 mg   Plan last modified:  [place] (2022)   Next INR check:     Priority:  High   Target end date: Indefinite    Indications    Abnormal coagulation profile [R79.1]                 Anticoagulation Episode Summary       INR check location:  Anticoagulation Clinic    Preferred lab:      Send INR reminders to:  [facility]    Comments:  EPIC      Factor V Leiden           Anticoagulation Care Providers       Provider Role Specialty Phone number    Mony Quiles MD Referring Family Medicine [phone]            Warfarin assessment / plan:     Appears well. No changes affecting warfarin therapy were noted. No acute findings. INR within goal range. No change to warfarin therapy today. INR 2.1 today.  No changes to warfarin dosing today. Will return in 4 weeks. Patient noted that she has been eating less, but is still eating salads ~3 times per week. Description       CONTINUE: Warfarin 2.5 mg (half a tablet) daily EXCEPT do not take warfarin every Friday. Keep your green vegetable intake consistent (3-4 salads per week). Please call if this changes. Call [phone] with signs or symptoms of bleeding or ANY medication changes (including over-the-counter medications or herbal supplements). If significant bleeding occurs please seek immediate medical attention. Limit alcohol intake. Please call if this changes. Immunization History   Administered Date(s) Administered    COVID-19, MODERNA BLUE border, Primary or Immunocompromised, (age 12y+), IM, 100 mcg/0.5mL 12/16/2021    COVID-19, PFIZER PURPLE top, DILUTE for use, (age 15 y+), 30mcg/0.3mL 01/29/2021, 02/19/2021    Influenza Virus Vaccine 10/16/2009, 01/26/2010, 09/29/2010, 10/03/2016    Influenza, FLUARIX, FLULAVAL, Radhatta Chelsea (age 10 mo+) AND AFLURIA, (age 1 y+), PF, 0.5mL 12/22/2020, 10/23/2021    Influenza, High Dose (Fluzone 65 yrs and older) 09/27/2011, 10/01/2012, 10/29/2013, 10/23/2014, 11/24/2015, 10/23/2018, 12/21/2019    Pneumococcal Conjugate 13-valent ([id number]) 07/02/2015    Pneumococcal Polysaccharide ([id number]) 07/01/2006, 11/07/2011    Td, unspecified formulation 10/04/2004    Tdap (Boostrix, Adacel) 09/01/2015           Orders Placed This Encounter   Procedures    Protime-INR     This external order was created through the results console. No orders of the defined types were placed in this encounter. Reviewed AVS with patient / caregiver.     Billing Points:  0 billing points this visit       CLINICAL PHARMACY CONSULT: MED RECONCILIATION/REVIEW ADDENDUM    For Pharmacy Admin Tracking Only    Intervention Detail:   Total # of Interventions Recommended: 0  Total # of Interventions Accepted: 0  Time Spent (min): 20    Jose Sanchez Shirin Muro Hagaman  PharmD Candidate [id number]

## 2022-10-27 DIAGNOSIS — F43.22 ADJUSTMENT DISORDER WITH ANXIETY: ICD-10-CM

## 2022-10-27 RX ORDER — LORAZEPAM 0.5 MG/1
TABLET ORAL
Qty: 15 TABLET | Refills: 0 | Status: ON HOLD | OUTPATIENT
Start: 2022-10-27 | End: 2022-11-12 | Stop reason: HOSPADM

## 2022-10-27 NOTE — TELEPHONE ENCOUNTER
Medication:   Requested Prescriptions     Pending Prescriptions Disp Refills    LORazepam (ATIVAN) 0.5 MG tablet [Pharmacy Med Name: LORAZEPAM 0.5MG TABLETS] 15 tablet      Sig: TAKE 1/2 TO 1 TABLET BY MOUTH EVERY 8 HOURS FOR UP TO 10 DAYS AS NEEDED FOR ANXIETY       Last Filled:  8/15/22    Patient Phone Number: [phone] (home)     Last appt: 8/4/2022   Next appt: Visit date not found

## 2022-11-02 ENCOUNTER — HOSPITAL ENCOUNTER (INPATIENT)
Age: 82
LOS: 9 days | Discharge: HOME | DRG: 372 | End: 2022-11-12
Attending: EMERGENCY MEDICINE | Admitting: STUDENT IN AN ORGANIZED HEALTH CARE EDUCATION/TRAINING PROGRAM
Payer: MEDICARE

## 2022-11-02 DIAGNOSIS — S93.402A SPRAIN OF LEFT ANKLE, UNSPECIFIED LIGAMENT, INITIAL ENCOUNTER: ICD-10-CM

## 2022-11-02 DIAGNOSIS — K52.9 COLITIS: Primary | ICD-10-CM

## 2022-11-02 DIAGNOSIS — R55 SYNCOPE AND COLLAPSE: ICD-10-CM

## 2022-11-02 DIAGNOSIS — R19.7 DIARRHEA, UNSPECIFIED TYPE: ICD-10-CM

## 2022-11-02 DIAGNOSIS — S00.93XA CONTUSION OF HEAD, UNSPECIFIED PART OF HEAD, INITIAL ENCOUNTER: ICD-10-CM

## 2022-11-02 DIAGNOSIS — A04.72 C. DIFFICILE COLITIS: ICD-10-CM

## 2022-11-02 PROCEDURE — 99285 EMERGENCY DEPT VISIT HI MDM: CPT

## 2022-11-02 ASSESSMENT — PAIN - FUNCTIONAL ASSESSMENT: PAIN_FUNCTIONAL_ASSESSMENT: 0-10

## 2022-11-02 ASSESSMENT — PAIN DESCRIPTION - PAIN TYPE: TYPE: ACUTE PAIN

## 2022-11-02 ASSESSMENT — PAIN DESCRIPTION - FREQUENCY: FREQUENCY: CONTINUOUS

## 2022-11-02 ASSESSMENT — PAIN DESCRIPTION - LOCATION: LOCATION: LEG;ANKLE

## 2022-11-02 ASSESSMENT — PAIN DESCRIPTION - DESCRIPTORS: DESCRIPTORS: ACHING

## 2022-11-02 ASSESSMENT — PAIN SCALES - GENERAL: PAINLEVEL_OUTOF10: 3

## 2022-11-02 ASSESSMENT — PAIN DESCRIPTION - ORIENTATION: ORIENTATION: RIGHT;LEFT

## 2022-11-03 ENCOUNTER — APPOINTMENT (OUTPATIENT)
Dept: GENERAL RADIOLOGY | Age: 82
DRG: 372 | End: 2022-11-03
Payer: MEDICARE

## 2022-11-03 ENCOUNTER — APPOINTMENT (OUTPATIENT)
Dept: CT IMAGING | Age: 82
DRG: 372 | End: 2022-11-03
Payer: MEDICARE

## 2022-11-03 PROBLEM — K52.9 COLITIS: Status: ACTIVE | Noted: 2022-11-03

## 2022-11-03 PROBLEM — R55 SYNCOPE: Status: ACTIVE | Noted: 2022-11-03

## 2022-11-03 LAB
A/G RATIO: 1.3 (ref 1.1–2.2)
ALBUMIN SERPL-MCNC: 3.3 G/DL (ref 3.4–5)
ALBUMIN SERPL-MCNC: 3.7 G/DL (ref 3.4–5)
ALP BLD-CCNC: 86 U/L (ref 40–129)
ALT SERPL-CCNC: 11 U/L (ref 10–40)
ANION GAP SERPL CALCULATED.3IONS-SCNC: 14 MMOL/L (ref 3–16)
ANION GAP SERPL CALCULATED.3IONS-SCNC: 14 MMOL/L (ref 3–16)
AST SERPL-CCNC: 15 U/L (ref 15–37)
BACTERIA: NORMAL /HPF
BANDED NEUTROPHILS RELATIVE PERCENT: 2 % (ref 0–7)
BANDED NEUTROPHILS RELATIVE PERCENT: 3 % (ref 0–7)
BASE EXCESS VENOUS: 3.8 MMOL/L
BASOPHILS ABSOLUTE: 0 K/UL (ref 0–0.2)
BASOPHILS ABSOLUTE: 0 K/UL (ref 0–0.2)
BASOPHILS RELATIVE PERCENT: 0 %
BASOPHILS RELATIVE PERCENT: 0 %
BILIRUB SERPL-MCNC: 0.7 MG/DL (ref 0–1)
BILIRUBIN URINE: NEGATIVE
BLOOD, URINE: ABNORMAL
BUN BLDV-MCNC: 11 MG/DL (ref 7–20)
BUN BLDV-MCNC: 12 MG/DL (ref 7–20)
C DIFF TOXIN/ANTIGEN: NORMAL
CALCIUM SERPL-MCNC: 8.2 MG/DL (ref 8.3–10.6)
CALCIUM SERPL-MCNC: 8.6 MG/DL (ref 8.3–10.6)
CARBOXYHEMOGLOBIN: 1.7 %
CHLORIDE BLD-SCNC: 97 MMOL/L (ref 99–110)
CHLORIDE BLD-SCNC: 98 MMOL/L (ref 99–110)
CLARITY: CLEAR
CO2: 26 MMOL/L (ref 21–32)
CO2: 26 MMOL/L (ref 21–32)
COLOR: YELLOW
CREAT SERPL-MCNC: 0.9 MG/DL (ref 0.6–1.2)
CREAT SERPL-MCNC: 0.9 MG/DL (ref 0.6–1.2)
EKG ATRIAL RATE: 83 BPM
EKG DIAGNOSIS: NORMAL
EKG P AXIS: 66 DEGREES
EKG P-R INTERVAL: 164 MS
EKG Q-T INTERVAL: 544 MS
EKG QRS DURATION: 102 MS
EKG QTC CALCULATION (BAZETT): 639 MS
EKG R AXIS: -59 DEGREES
EKG T AXIS: 80 DEGREES
EKG VENTRICULAR RATE: 83 BPM
EOSINOPHILS ABSOLUTE: 0 K/UL (ref 0–0.6)
EOSINOPHILS ABSOLUTE: 0 K/UL (ref 0–0.6)
EOSINOPHILS RELATIVE PERCENT: 0 %
EOSINOPHILS RELATIVE PERCENT: 0 %
EPITHELIAL CELLS, UA: 2 /HPF (ref 0–5)
GFR SERPL CREATININE-BSD FRML MDRD: >60 ML/MIN/{1.73_M2}
GFR SERPL CREATININE-BSD FRML MDRD: >60 ML/MIN/{1.73_M2}
GLUCOSE BLD-MCNC: 108 MG/DL (ref 70–99)
GLUCOSE BLD-MCNC: 121 MG/DL (ref 70–99)
GLUCOSE URINE: NEGATIVE MG/DL
HCO3 VENOUS: 30 MMOL/L (ref 23–29)
HCT VFR BLD CALC: 39.1 % (ref 36–48)
HCT VFR BLD CALC: 40.3 % (ref 36–48)
HEMATOLOGY PATH CONSULT: NO
HEMOGLOBIN: 12.8 G/DL (ref 12–16)
HEMOGLOBIN: 13.6 G/DL (ref 12–16)
HYALINE CASTS: 0 /LPF (ref 0–8)
INR BLD: 3.63 (ref 0.87–1.14)
KETONES, URINE: 80 MG/DL
LACTIC ACID, SEPSIS: 1.4 MMOL/L (ref 0.4–1.9)
LEUKOCYTE ESTERASE, URINE: NEGATIVE
LIPASE: 9 U/L (ref 13–60)
LYMPHOCYTES ABSOLUTE: 2.5 K/UL (ref 1–5.1)
LYMPHOCYTES ABSOLUTE: 5.1 K/UL (ref 1–5.1)
LYMPHOCYTES RELATIVE PERCENT: 19 %
LYMPHOCYTES RELATIVE PERCENT: 9 %
MAGNESIUM: 1.9 MG/DL (ref 1.8–2.4)
MAGNESIUM: 2 MG/DL (ref 1.8–2.4)
MCH RBC QN AUTO: 30.8 PG (ref 26–34)
MCH RBC QN AUTO: 31.1 PG (ref 26–34)
MCHC RBC AUTO-ENTMCNC: 32.6 G/DL (ref 31–36)
MCHC RBC AUTO-ENTMCNC: 33.8 G/DL (ref 31–36)
MCV RBC AUTO: 92 FL (ref 80–100)
MCV RBC AUTO: 94.4 FL (ref 80–100)
METHEMOGLOBIN VENOUS: 0.4 %
MICROSCOPIC EXAMINATION: YES
MONOCYTES ABSOLUTE: 2.2 K/UL (ref 0–1.3)
MONOCYTES ABSOLUTE: 2.5 K/UL (ref 0–1.3)
MONOCYTES RELATIVE PERCENT: 8 %
MONOCYTES RELATIVE PERCENT: 9 %
NEUTROPHILS ABSOLUTE: 19.7 K/UL (ref 1.7–7.7)
NEUTROPHILS ABSOLUTE: 22.6 K/UL (ref 1.7–7.7)
NEUTROPHILS RELATIVE PERCENT: 70 %
NEUTROPHILS RELATIVE PERCENT: 80 %
NITRITE, URINE: NEGATIVE
O2 SAT, VEN: 64 %
O2 THERAPY: ABNORMAL
OCCULT BLOOD DIAGNOSTIC: NORMAL
PCO2, VEN: 50.7 MMHG (ref 40–50)
PDW BLD-RTO: 14.8 % (ref 12.4–15.4)
PDW BLD-RTO: 15.1 % (ref 12.4–15.4)
PH UA: 5 (ref 5–8)
PH VENOUS: 7.38 (ref 7.35–7.45)
PHOSPHORUS: 2.5 MG/DL (ref 2.5–4.9)
PLATELET # BLD: 284 K/UL (ref 135–450)
PLATELET # BLD: 310 K/UL (ref 135–450)
PLATELET SLIDE REVIEW: ADEQUATE
PLATELET SLIDE REVIEW: ADEQUATE
PMV BLD AUTO: 8.7 FL (ref 5–10.5)
PMV BLD AUTO: 8.9 FL (ref 5–10.5)
PO2, VEN: 34 MMHG
POTASSIUM REFLEX MAGNESIUM: 3.3 MMOL/L (ref 3.5–5.1)
POTASSIUM SERPL-SCNC: 3.2 MMOL/L (ref 3.5–5.1)
PRO-BNP: 463 PG/ML (ref 0–449)
PROTEIN UA: ABNORMAL MG/DL
PROTHROMBIN TIME: 36.4 SEC (ref 11.7–14.5)
RAPID INFLUENZA  B AGN: NEGATIVE
RAPID INFLUENZA A AGN: NEGATIVE
RBC # BLD: 4.15 M/UL (ref 4–5.2)
RBC # BLD: 4.38 M/UL (ref 4–5.2)
RBC UA: 3 /HPF (ref 0–4)
SARS-COV-2, NAAT: NOT DETECTED
SLIDE REVIEW: ABNORMAL
SMUDGE CELLS: PRESENT
SMUDGE CELLS: PRESENT
SODIUM BLD-SCNC: 137 MMOL/L (ref 136–145)
SODIUM BLD-SCNC: 138 MMOL/L (ref 136–145)
SPECIFIC GRAVITY UA: 1.07 (ref 1–1.03)
TCO2 CALC VENOUS: 32 MMOL/L
TOTAL PROTEIN: 6.5 G/DL (ref 6.4–8.2)
TROPONIN: <0.01 NG/ML
URINE REFLEX TO CULTURE: ABNORMAL
URINE TYPE: ABNORMAL
UROBILINOGEN, URINE: 0.2 E.U./DL
VACUOLATED NEUTROPHILS: PRESENT
WBC # BLD: 27 K/UL (ref 4–11)
WBC # BLD: 27.6 K/UL (ref 4–11)
WBC UA: 2 /HPF (ref 0–5)
WHITE BLOOD CELLS (WBC), STOOL: ABNORMAL

## 2022-11-03 PROCEDURE — 6360000002 HC RX W HCPCS: Performed by: STUDENT IN AN ORGANIZED HEALTH CARE EDUCATION/TRAINING PROGRAM

## 2022-11-03 PROCEDURE — 6360000002 HC RX W HCPCS: Performed by: EMERGENCY MEDICINE

## 2022-11-03 PROCEDURE — 87506 IADNA-DNA/RNA PROBE TQ 6-11: CPT

## 2022-11-03 PROCEDURE — 80053 COMPREHEN METABOLIC PANEL: CPT

## 2022-11-03 PROCEDURE — 6370000000 HC RX 637 (ALT 250 FOR IP): Performed by: STUDENT IN AN ORGANIZED HEALTH CARE EDUCATION/TRAINING PROGRAM

## 2022-11-03 PROCEDURE — 2060000000 HC ICU INTERMEDIATE R&B

## 2022-11-03 PROCEDURE — 82803 BLOOD GASES ANY COMBINATION: CPT

## 2022-11-03 PROCEDURE — 2580000003 HC RX 258: Performed by: STUDENT IN AN ORGANIZED HEALTH CARE EDUCATION/TRAINING PROGRAM

## 2022-11-03 PROCEDURE — 87449 NOS EACH ORGANISM AG IA: CPT

## 2022-11-03 PROCEDURE — 87040 BLOOD CULTURE FOR BACTERIA: CPT

## 2022-11-03 PROCEDURE — 83690 ASSAY OF LIPASE: CPT

## 2022-11-03 PROCEDURE — 36415 COLL VENOUS BLD VENIPUNCTURE: CPT

## 2022-11-03 PROCEDURE — 83630 LACTOFERRIN FECAL (QUAL): CPT

## 2022-11-03 PROCEDURE — 87324 CLOSTRIDIUM AG IA: CPT

## 2022-11-03 PROCEDURE — 71260 CT THORAX DX C+: CPT | Performed by: EMERGENCY MEDICINE

## 2022-11-03 PROCEDURE — 71045 X-RAY EXAM CHEST 1 VIEW: CPT

## 2022-11-03 PROCEDURE — 85025 COMPLETE CBC W/AUTO DIFF WBC: CPT

## 2022-11-03 PROCEDURE — 84484 ASSAY OF TROPONIN QUANT: CPT

## 2022-11-03 PROCEDURE — 2580000003 HC RX 258: Performed by: EMERGENCY MEDICINE

## 2022-11-03 PROCEDURE — 85610 PROTHROMBIN TIME: CPT

## 2022-11-03 PROCEDURE — 73610 X-RAY EXAM OF ANKLE: CPT

## 2022-11-03 PROCEDURE — 6360000004 HC RX CONTRAST MEDICATION: Performed by: EMERGENCY MEDICINE

## 2022-11-03 PROCEDURE — 6370000000 HC RX 637 (ALT 250 FOR IP): Performed by: HOSPITALIST

## 2022-11-03 PROCEDURE — 87493 C DIFF AMPLIFIED PROBE: CPT

## 2022-11-03 PROCEDURE — 2500000003 HC RX 250 WO HCPCS: Performed by: HOSPITALIST

## 2022-11-03 PROCEDURE — 83735 ASSAY OF MAGNESIUM: CPT

## 2022-11-03 PROCEDURE — 82270 OCCULT BLOOD FECES: CPT

## 2022-11-03 PROCEDURE — 93005 ELECTROCARDIOGRAM TRACING: CPT | Performed by: EMERGENCY MEDICINE

## 2022-11-03 PROCEDURE — 73630 X-RAY EXAM OF FOOT: CPT

## 2022-11-03 PROCEDURE — 70450 CT HEAD/BRAIN W/O DYE: CPT

## 2022-11-03 PROCEDURE — 74177 CT ABD & PELVIS W/CONTRAST: CPT

## 2022-11-03 PROCEDURE — 93010 ELECTROCARDIOGRAM REPORT: CPT | Performed by: INTERNAL MEDICINE

## 2022-11-03 PROCEDURE — 83605 ASSAY OF LACTIC ACID: CPT

## 2022-11-03 PROCEDURE — 87635 SARS-COV-2 COVID-19 AMP PRB: CPT

## 2022-11-03 PROCEDURE — 87804 INFLUENZA ASSAY W/OPTIC: CPT

## 2022-11-03 PROCEDURE — 2500000003 HC RX 250 WO HCPCS: Performed by: EMERGENCY MEDICINE

## 2022-11-03 PROCEDURE — 83880 ASSAY OF NATRIURETIC PEPTIDE: CPT

## 2022-11-03 PROCEDURE — 81001 URINALYSIS AUTO W/SCOPE: CPT

## 2022-11-03 RX ORDER — ACETAMINOPHEN 325 MG/1
650 TABLET ORAL EVERY 6 HOURS PRN
Status: DISCONTINUED | OUTPATIENT
Start: 2022-11-03 | End: 2022-11-12 | Stop reason: HOSPADM

## 2022-11-03 RX ORDER — SODIUM CHLORIDE 0.9 % (FLUSH) 0.9 %
5-40 SYRINGE (ML) INJECTION PRN
Status: DISCONTINUED | OUTPATIENT
Start: 2022-11-03 | End: 2022-11-12 | Stop reason: HOSPADM

## 2022-11-03 RX ORDER — SODIUM CHLORIDE 0.9 % (FLUSH) 0.9 %
5-40 SYRINGE (ML) INJECTION EVERY 12 HOURS SCHEDULED
Status: DISCONTINUED | OUTPATIENT
Start: 2022-11-03 | End: 2022-11-12 | Stop reason: HOSPADM

## 2022-11-03 RX ORDER — METRONIDAZOLE 500 MG/100ML
500 INJECTION, SOLUTION INTRAVENOUS EVERY 8 HOURS
Status: DISCONTINUED | OUTPATIENT
Start: 2022-11-03 | End: 2022-11-04

## 2022-11-03 RX ORDER — POTASSIUM CHLORIDE 7.45 MG/ML
10 INJECTION INTRAVENOUS PRN
Status: DISCONTINUED | OUTPATIENT
Start: 2022-11-03 | End: 2022-11-12 | Stop reason: HOSPADM

## 2022-11-03 RX ORDER — AMIODARONE HYDROCHLORIDE 200 MG/1
200 TABLET ORAL DAILY
Status: DISCONTINUED | OUTPATIENT
Start: 2022-11-03 | End: 2022-11-12 | Stop reason: HOSPADM

## 2022-11-03 RX ORDER — METRONIDAZOLE 500 MG/100ML
500 INJECTION, SOLUTION INTRAVENOUS ONCE
Status: COMPLETED | OUTPATIENT
Start: 2022-11-03 | End: 2022-11-03

## 2022-11-03 RX ORDER — POTASSIUM CHLORIDE 20 MEQ/1
40 TABLET, EXTENDED RELEASE ORAL PRN
Status: DISCONTINUED | OUTPATIENT
Start: 2022-11-03 | End: 2022-11-12 | Stop reason: HOSPADM

## 2022-11-03 RX ORDER — GABAPENTIN 300 MG/1
300 CAPSULE ORAL NIGHTLY
Status: DISCONTINUED | OUTPATIENT
Start: 2022-11-03 | End: 2022-11-12 | Stop reason: HOSPADM

## 2022-11-03 RX ORDER — METOPROLOL SUCCINATE 25 MG/1
12.5 TABLET, EXTENDED RELEASE ORAL DAILY
Status: DISCONTINUED | OUTPATIENT
Start: 2022-11-03 | End: 2022-11-12 | Stop reason: HOSPADM

## 2022-11-03 RX ORDER — PANTOPRAZOLE SODIUM 40 MG/1
40 TABLET, DELAYED RELEASE ORAL
Status: DISCONTINUED | OUTPATIENT
Start: 2022-11-03 | End: 2022-11-07

## 2022-11-03 RX ORDER — SODIUM CHLORIDE 9 MG/ML
INJECTION, SOLUTION INTRAVENOUS PRN
Status: DISCONTINUED | OUTPATIENT
Start: 2022-11-03 | End: 2022-11-12 | Stop reason: HOSPADM

## 2022-11-03 RX ORDER — ACETAMINOPHEN 650 MG/1
650 SUPPOSITORY RECTAL EVERY 6 HOURS PRN
Status: DISCONTINUED | OUTPATIENT
Start: 2022-11-03 | End: 2022-11-12 | Stop reason: HOSPADM

## 2022-11-03 RX ADMIN — PIPERACILLIN AND TAZOBACTAM 3375 MG: 3; .375 INJECTION, POWDER, FOR SOLUTION INTRAVENOUS at 03:31

## 2022-11-03 RX ADMIN — ACETAMINOPHEN 650 MG: 325 TABLET ORAL at 20:38

## 2022-11-03 RX ADMIN — METOPROLOL SUCCINATE 12.5 MG: 25 TABLET, FILM COATED, EXTENDED RELEASE ORAL at 09:47

## 2022-11-03 RX ADMIN — PIPERACILLIN AND TAZOBACTAM 3375 MG: 3; .375 INJECTION, POWDER, FOR SOLUTION INTRAVENOUS at 09:54

## 2022-11-03 RX ADMIN — AMIODARONE HYDROCHLORIDE 200 MG: 200 TABLET ORAL at 09:47

## 2022-11-03 RX ADMIN — METRONIDAZOLE 500 MG: 500 INJECTION, SOLUTION INTRAVENOUS at 18:05

## 2022-11-03 RX ADMIN — ACETAMINOPHEN 650 MG: 325 TABLET ORAL at 04:32

## 2022-11-03 RX ADMIN — SODIUM CHLORIDE: 9 INJECTION, SOLUTION INTRAVENOUS at 19:06

## 2022-11-03 RX ADMIN — SODIUM CHLORIDE: 9 INJECTION, SOLUTION INTRAVENOUS at 09:53

## 2022-11-03 RX ADMIN — Medication 10 ML: at 09:52

## 2022-11-03 RX ADMIN — METRONIDAZOLE 500 MG: 500 INJECTION, SOLUTION INTRAVENOUS at 04:39

## 2022-11-03 RX ADMIN — PANTOPRAZOLE SODIUM 40 MG: 40 TABLET, DELAYED RELEASE ORAL at 06:42

## 2022-11-03 RX ADMIN — PIPERACILLIN AND TAZOBACTAM 3375 MG: 3; .375 INJECTION, POWDER, FOR SOLUTION INTRAVENOUS at 19:07

## 2022-11-03 RX ADMIN — GABAPENTIN 300 MG: 300 CAPSULE ORAL at 20:38

## 2022-11-03 RX ADMIN — IOPAMIDOL 75 ML: 755 INJECTION, SOLUTION INTRAVENOUS at 01:18

## 2022-11-03 RX ADMIN — SODIUM CHLORIDE: 9 INJECTION, SOLUTION INTRAVENOUS at 04:39

## 2022-11-03 RX ADMIN — POTASSIUM CHLORIDE 40 MEQ: 1500 TABLET, EXTENDED RELEASE ORAL at 13:46

## 2022-11-03 ASSESSMENT — PAIN DESCRIPTION - DESCRIPTORS: DESCRIPTORS: ACHING

## 2022-11-03 ASSESSMENT — PAIN SCALES - GENERAL
PAINLEVEL_OUTOF10: 4
PAINLEVEL_OUTOF10: 0

## 2022-11-03 ASSESSMENT — LIFESTYLE VARIABLES: HOW OFTEN DO YOU HAVE A DRINK CONTAINING ALCOHOL: NEVER

## 2022-11-03 ASSESSMENT — PAIN DESCRIPTION - ORIENTATION: ORIENTATION: MID

## 2022-11-03 ASSESSMENT — PAIN DESCRIPTION - LOCATION: LOCATION: HEAD

## 2022-11-03 NOTE — PROGRESS NOTES
Medication Reconciliation    List of medications patient is currently taking is complete. Source of information: 1. Conversation with patient at bedside                                      2. EPIC records      Allergies  Patient has no known allergies. Notes regarding home medications:   1. Patient has not taken cipro/flagyl for \"quite some time\". Denies any recent antibiotic use. Removed from list  2. Patient is on warfarin for Factor V Leiden and Afib. Current regimen is 2.5 mg daily except NONE on Friday. Follows with [facility]  3. Patient no longer taking pantoprazole, nystatin, or dicyclomine.  Removed from list

## 2022-11-03 NOTE — PLAN OF CARE
Problem: Discharge Planning  Goal: Discharge to home or other facility with appropriate resources  Outcome: Progressing  Flowsheets (Taken 11/3/2022 0517 by Boyd Dawkins RN)  Discharge to home or other facility with appropriate resources:   Identify barriers to discharge with patient and caregiver   Identify discharge learning needs (meds, wound care, etc)     Problem: Pain  Goal: Verbalizes/displays adequate comfort level or baseline comfort level  Outcome: Progressing     Problem: Safety - Adult  Goal: Free from fall injury  Outcome: Progressing     Problem: ABCDS Injury Assessment  Goal: Absence of physical injury  Outcome: Progressing     Problem: Cardiovascular - Adult  Goal: Maintains optimal cardiac output and hemodynamic stability  Outcome: Progressing  Goal: Absence of cardiac dysrhythmias or at baseline  Outcome: Progressing     Problem: Gastrointestinal - Adult  Goal: Maintains or returns to baseline bowel function  Outcome: Progressing     Problem: Infection - Adult  Goal: Absence of infection at discharge  Outcome: Progressing     Problem: Metabolic/Fluid and Electrolytes - Adult  Goal: Electrolytes maintained within normal limits  Outcome: Progressing  Goal: Hemodynamic stability and optimal renal function maintained  Outcome: Progressing

## 2022-11-03 NOTE — ED NOTES
Report called to [facility] RN on 5w and patient taken to room 5122 via ER staff     Mary Staples RN  11/03/22 [id number]

## 2022-11-03 NOTE — ED TRIAGE NOTES
Murl Rung today hurting bilateral lower extremities and left ankle having diarrhea and vomiting earlier in week unable to keep much down noted to have low o2 sats on arrivial

## 2022-11-03 NOTE — PROGRESS NOTES
Admitted to [facility] from the ED. Alert and oriented. Placed on tele monitor and verified with [facility]. Oriented to room and call light. See admission assessment. Safety precautions in place.

## 2022-11-03 NOTE — CONSULTS
Clinical Pharmacy Note  Warfarin Consult    Pa Lilly is a 78 y.o. female receiving warfarin managed by pharmacy. Warfarin Indication: Factor V Leiden deficiency, h/o PE, Afib  Target INR range: 2-3   Dose prior to admission: 2.5 mg daily except NONE on Friday    Current warfarin drug-drug interactions: Zosyn    Recent Labs     11/03/22  0034 11/03/22  0035 11/03/22  0506   HGB  --  13.6 12.8   HCT  --  40.3 39.1   INR 3.63*  --   --        Assessment/Plan:    Hold warfarin tonight. Daily PT/INR until stable within therapeutic range. Thank you for the consult. Will continue to follow. Sonam Smith PharmD.   11/3/2022  9:00 AM

## 2022-11-03 NOTE — H&P
Hospitalist  History and Physical    Patient: Madison Valladares  MRN: [id number]  PCP: Gerry Moreno MD    CHIEF COMPLAINT:  Leg Pain      HISTORY OF PRESENT ILLNESS:   The patient Madison Valladares is a 78 y. o.female with medical history significant for coronary artery disease atrial fibrillation, chronic lymphocytic leukemia, COPD hypertension and vitamin D deficiency. Patient presented to the emergency room after a fall at home. Patient reports that she was having nausea and diarrhea for the last 2 to 3 days or appetite has been poor that made her weak. Patient denies any blood in the stools or vomitus. Patient denies any fever chills and she denies using any antibiotics in the last few weeks.     Past Medical History:        Diagnosis Date    Acute ST elevation myocardial infarction (STEMI) ([facility] 07/19/2021    Atrial fibrillation (HCC)     Chronic lymphocytic leukemia in remission ([facility]     C[facility] (chronic lymphocytic [facility]) [facility] 02/12/2015    COPD (chronic obstructive pulmonary disease) ([facility]     Essential hypertension 01/01/2015    controlled with salt restriction (initially)    Factor V Leiden mutation ([facility]     Goiter 11/01/2011    1.5 cm midline    Hypercholesterolemia     Impaired fasting glucose 11/01/2011    Osteoarthritis     Osteoporosis     Post herpetic neuralgia     Vitamin D deficiency 11/01/2011       Past Surgical History:        Procedure Laterality Date    CATARACT REMOVAL WITH IMPLANT  5/14/12    left eye    JOINT REPLACEMENT      partial knee R and L    KNEE SURGERY  2008    partial replacements, both knees    MOHS SURGERY  03/12/2019    R cheek and R superior temple    SPLENECTOMY      TUNNELED VENOUS PORT PLACEMENT      UPPER GASTROINTESTINAL ENDOSCOPY N/A 2/5/2019    EGD ESOPHAGOGASTRODUODENOSCOPY, WITH ENDOSCOPIC ULTRASOUND OF ESOPHAGUS performed by Jerome Gracia MD at [facility]       Medications Prior to Admission:    Prior to Admission medications    Medication Sig Start Date End Date Taking? Authorizing Provider   LORazepam (ATIVAN) 0.5 MG tablet TAKE 1/2 TO 1 TABLET BY MOUTH EVERY 8 HOURS FOR UP TO 10 DAYS AS NEEDED FOR ANXIETY 10/27/22 11/6/22  FAB Newton CNP   amiodarone (CORDARONE) 200 MG tablet TAKE 1 TABLET BY MOUTH DAILY 8/26/22   Adolfo Connell MD   gabapentin (NEURONTIN) 300 MG capsule 2 caps po nightly 8/4/22 11/30/22  Destin Garcia MD   ciprofloxacin (CIPRO) 500 MG tablet Take 500 mg by mouth in the morning and 500 mg before bedtime. For 7 days. 5/13/22   Historical Provider, MD   metroNIDAZOLE (FLAGYL) 500 MG tablet Take 500 mg by mouth every 8 hours For 7 days 5/13/22   Historical Provider, MD   dicyclomine (BENTYL) 10 MG capsule Take 1-2 capsules by mouth every 6 hours as needed (abdominal cramping) 5/5/22   Destin Garcia MD   warfarin (COUMADIN) 5 MG tablet TAKE 1 1/2 TABLETS BY MOUTH MONDAY THRU FRIDAY, THEN 1 TABLET BY MOUTH DAILY ON THE OTHER DAYS  Patient taking differently: Take 2.5 mg by mouth in the morning. Except for Fridays or as directed by [facility]mad[facility] [phone]. 2/4/22   FAB Pena CNP   nystatin (MYCOSTATIN) 406420 UNIT/GM powder Apply 3 times daily. 1/13/22   Destin Garcia MD   pantoprazole (PROTONIX) 40 MG tablet TAKE 1 TABLET BY MOUTH EVERY MORNING BEFORE BREAKFAST 1/13/22   Destin Garcia MD   metoprolol succinate (TOPROL XL) 25 MG extended release tablet Take 0.5 tablets by mouth daily 12/15/21   FAB Garner CNP   sacubitril-valsartan (ENTRESTO) 24-26 MG per tablet Take 0.5 tablets by mouth 2 times daily 11/15/21   FAB Garner CNP   Cyanocobalamin (VITAMIN B 12) 500 MCG TABS Take 500 mcg by mouth daily     Historical Provider, MD       Allergies:  Patient has no known allergies. Social History:   TOBACCO:   reports that she quit smoking about 27 years ago. Her smoking use included cigarettes. She has a 15.00 pack-year smoking history.  She has never used smokeless tobacco.  ETOH:   reports no history of alcohol use. Family History:       Problem Relation Age of Onset    Diabetes Father     Stroke Sister 50         of a stroke           REVIEW OF SYSTEMS:     patients reported symptoms are in BOLD all other symptoms are negative. CONSTITUTIONAL:      fatigue, fever, chills or night sweats, recent weight gain, recent wt loss, insomnia,  General weakness, poor appetite, muscle aches and pains    HEAD: headache, dizziness    EYES:      blurriness,  double vision, dryness,  discharge, irritation,diplopia    EARS:      hearing loss, vertigo, ear discharge,  Earache. Ringing in the ears. NOSE:      Rhinorrhea, sneezing, epistaxis. Discharge, sinusitis,     MOUTH/THROAT:         sore throat, mouth ulcers, Hoarseness    RESPIRATORY:        Shortness of breath, wheezing,  cough, sputum, hemoptysis, obstructive sleep apnea,    CARDIOVASCULAR :      chest pain, palpitations, dyspnea on exercise, Lower extrimity edema (swelling), Leg Pain    GASTROINTESTINAL:       Dysphagia, Poor appetite,  Nausea, Vomiting, diarrhea, heartburn, abdominal pain. Blood in the stools, hematemesis. Pain with swallowing, constipation    GENITOURINARY:       Urinary frequency, hesitancy,  urgency, Dysuria, hematuria,  Urinary Incontinence. Urinary Retention. GYNECOLOGICAL: vaginal bleeding , vaginal discharge, menopause    MUSCULOSKELETAL:       joint swelling or stiffness, joint pain, muscle pain, balance problems, low back pain. NEUROLOGICAL:      Gait problems. Tremor. Dizziness. Pain and paresthesias, weakness in extremities.  Seizures, memory loss    PSYCHLOGICAL:        Anxiety, depression    SKIN :      Rashes ulcers, skin color changes, easy bruisability, lymphadenopathy      Physical Exam:      Vitals: BP (!) 139/59   Pulse 86   Temp 99.6 °F (37.6 °C) (Oral)   Resp 14   Ht 5' (1.524 m)   Wt 137 lb 12.6 oz (62.5 kg)   SpO2 93%   BMI 26.91 kg/m²     Gen:          Alert and oriented x 3  Eyes: PERRL. No sclera icterus. No conjunctival injection. ENT: No discharge. Pharynx clear. External appearance of ears and nose normal.  Neck: Trachea midline. No obvious mass. Resp: No accessory muscle use. No crackles. No wheezes. No rhonchi. CV: Regular rate. Regular rhythm. No murmur or rub. No edema. GI: Abdomen is soft diffusely tender to palpation. Skin: Warm, dry, normal texture and turgor. Lymph: No cervical LAD. No supraclavicular LAD. M/S: / Ext. No cyanosis. No clubbing. No joint deformity. Neuro: Moves all four extremities. CN 2-12 tested, no deficits noted. Peripheral pulses and capillary refill is intact. CBC:   Recent Labs     11/03/22 0035 11/03/22  0506   WBC 27.6* 27.0*   HGB 13.6 12.8    284     BMP:    Recent Labs     11/03/22 0035 11/03/22  0506    138   K 3.3* 3.2*   CL 97* 98*   CO2 26 26   BUN 12 11   CREATININE 0.9 0.9   GLUCOSE 121* 108*     Hepatic:   Recent Labs     11/03/22 0035   AST 15   ALT 11   BILITOT 0.7   ALKPHOS 86     Troponin:   Recent Labs     11/03/22 0035   TROPONINI <0.01     BNP: No results for input(s): BNP in the last 72 hours. INR:   Recent Labs     11/03/22 0034   INR 3.63*     Lab Results   Component Value Date/Time    LABA1C 6.2 04/20/2021 08:54 AM           No results for input(s): CKTOTAL in the last 72 hours. -----------------------------------------------------------------    XR chest  Cardiomegaly with mild vascular congestion that is stable. XR FOOT LEFT  Ankle mortise intact. No acute fracture or dislocation of the ankle. No   acute fracture or dislocation of the foot. Degenerative changes are present   at the interphalangeal joints and there is deformity of the 5th PIP joint   which may be sequela of a remote injury. No soft tissue swelling of the   foot. Moderate soft tissue swelling/edema over the medial malleolus of the   ankle. XR ANKLE LEFT  Ankle mortise intact.   No acute fracture or dislocation of the ankle. No   acute fracture or dislocation of the foot. Degenerative changes are present   at the interphalangeal joints and there is deformity of the 5th PIP joint   which may be sequela of a remote injury. No soft tissue swelling of the   foot. Moderate soft tissue swelling/edema over the medial malleolus of the   ankle. CT HEAD WO CONTRAST  No acute intracranial abnormality. CT CHEST PULMONARY EMBOLISM W CONTRAST  1. No pulmonary embolism. 2. Tiny subpleural nodules in the periphery of the lungs may represent an   underlying infectious or inflammatory process. Follow-up recommendations are   provided below. 3. Diffuse mucosal thickening of the colon suggesting underlying pattern of   infectious colitis including C difficile. 4. Incidental cholelithiasis and stable right adrenal nodule suspected to   represent an adenoma. Assessment / Plan     Acute colitis  Diffuse mucosal thickening of the colon noted on CT scan  Continue with IV fluids  Stool studies  GI consult if no improvement      Atrial fibrillation  Continue on amiodarone  Pharmacy to dose Coumadin      Hypertension  Continue on home medication      DVT and GI prophylaxis      Full Code      Nicole Molina MD M.D    This note was transcribed using [id number] [employer]. Please disregard any translational errors.

## 2022-11-03 NOTE — PROGRESS NOTES
4 Eyes Skin Assessment     NAME:  Lissette Aguilar  YOB: 1940  MEDICAL RECORD NUMBER:  [id number]    The patient is being assess for  Admission    I agree that 2 RN's have performed a thorough Head to Toe Skin Assessment on the patient. ALL assessment sites listed below have been assessed. Areas assessed by both nurses:    Head, Face, Ears, Shoulders, Back, Chest, Arms, Elbows, Hands, Sacrum. Buttock, Coccyx, Ischium, and Legs. Feet and Heels        Does the Patient have a Wound?  No noted wound(s)       Zana Prevention initiated:  No   Wound Care Orders initiated:  No    Pressure Injury (Stage 3,4, Unstageable, DTI, NWPT, and Complex wounds) if present place referral/consult order under [de-identified] No    New and Established Ostomies if present place consult order under : No      Nurse 1 eSignature: Electronically signed by Geni Fitzpatrick RN on 11/3/22 at 7:07 AM EDT    **SHARE this note so that the co-signing nurse is able to place an eSignature**    Nurse 2 eSignature: Electronically signed by Tamiko Reyna RN on 11/4/22 at 6:15 AM EDT

## 2022-11-04 LAB
ALBUMIN SERPL-MCNC: 3.2 G/DL (ref 3.4–5)
ANION GAP SERPL CALCULATED.3IONS-SCNC: 11 MMOL/L (ref 3–16)
BANDED NEUTROPHILS RELATIVE PERCENT: 14 % (ref 0–7)
BASOPHILS ABSOLUTE: 0 K/UL (ref 0–0.2)
BASOPHILS RELATIVE PERCENT: 0 %
BUN BLDV-MCNC: 18 MG/DL (ref 7–20)
C. DIFFICILE TOXIN MOLECULAR: ABNORMAL
CALCIUM SERPL-MCNC: 8.4 MG/DL (ref 8.3–10.6)
CHLORIDE BLD-SCNC: 97 MMOL/L (ref 99–110)
CO2: 28 MMOL/L (ref 21–32)
CREAT SERPL-MCNC: 0.9 MG/DL (ref 0.6–1.2)
EOSINOPHILS ABSOLUTE: 0.3 K/UL (ref 0–0.6)
EOSINOPHILS RELATIVE PERCENT: 1 %
GFR SERPL CREATININE-BSD FRML MDRD: >60 ML/MIN/{1.73_M2}
GI BACTERIAL PATHOGENS BY PCR: NORMAL
GIARDIA ANTIGEN STOOL: NORMAL
GLUCOSE BLD-MCNC: 90 MG/DL (ref 70–99)
HCT VFR BLD CALC: 37.8 % (ref 36–48)
HEMATOLOGY PATH CONSULT: NO
HEMOGLOBIN: 12.6 G/DL (ref 12–16)
INR BLD: 5.2 (ref 0.87–1.14)
LYMPHOCYTES ABSOLUTE: 4.8 K/UL (ref 1–5.1)
LYMPHOCYTES RELATIVE PERCENT: 14 %
MAGNESIUM: 2.1 MG/DL (ref 1.8–2.4)
MCH RBC QN AUTO: 30.9 PG (ref 26–34)
MCHC RBC AUTO-ENTMCNC: 33.4 G/DL (ref 31–36)
MCV RBC AUTO: 92.3 FL (ref 80–100)
METAMYELOCYTES RELATIVE PERCENT: 4 %
MONOCYTES ABSOLUTE: 2.8 K/UL (ref 0–1.3)
MONOCYTES RELATIVE PERCENT: 8 %
NEUTROPHILS ABSOLUTE: 26.6 K/UL (ref 1.7–7.7)
NEUTROPHILS RELATIVE PERCENT: 59 %
ORGANISM: ABNORMAL
PDW BLD-RTO: 15.2 % (ref 12.4–15.4)
PHOSPHORUS: 2.6 MG/DL (ref 2.5–4.9)
PLATELET # BLD: 298 K/UL (ref 135–450)
PLATELET SLIDE REVIEW: ADEQUATE
PMV BLD AUTO: 8.7 FL (ref 5–10.5)
POTASSIUM SERPL-SCNC: 3.9 MMOL/L (ref 3.5–5.1)
PROTHROMBIN TIME: 48.5 SEC (ref 11.7–14.5)
RBC # BLD: 4.09 M/UL (ref 4–5.2)
SLIDE REVIEW: ABNORMAL
SMUDGE CELLS: PRESENT
SODIUM BLD-SCNC: 136 MMOL/L (ref 136–145)
VACUOLATED NEUTROPHILS: PRESENT
WBC # BLD: 34.5 K/UL (ref 4–11)

## 2022-11-04 PROCEDURE — 80069 RENAL FUNCTION PANEL: CPT

## 2022-11-04 PROCEDURE — 2700000000 HC OXYGEN THERAPY PER DAY

## 2022-11-04 PROCEDURE — 6370000000 HC RX 637 (ALT 250 FOR IP): Performed by: STUDENT IN AN ORGANIZED HEALTH CARE EDUCATION/TRAINING PROGRAM

## 2022-11-04 PROCEDURE — 2580000003 HC RX 258: Performed by: STUDENT IN AN ORGANIZED HEALTH CARE EDUCATION/TRAINING PROGRAM

## 2022-11-04 PROCEDURE — 85025 COMPLETE CBC W/AUTO DIFF WBC: CPT

## 2022-11-04 PROCEDURE — 83735 ASSAY OF MAGNESIUM: CPT

## 2022-11-04 PROCEDURE — 85610 PROTHROMBIN TIME: CPT

## 2022-11-04 PROCEDURE — 6360000002 HC RX W HCPCS: Performed by: STUDENT IN AN ORGANIZED HEALTH CARE EDUCATION/TRAINING PROGRAM

## 2022-11-04 PROCEDURE — 6370000000 HC RX 637 (ALT 250 FOR IP): Performed by: HOSPITALIST

## 2022-11-04 PROCEDURE — 94761 N-INVAS EAR/PLS OXIMETRY MLT: CPT

## 2022-11-04 PROCEDURE — 2060000000 HC ICU INTERMEDIATE R&B

## 2022-11-04 PROCEDURE — 2500000003 HC RX 250 WO HCPCS: Performed by: PHYSICIAN ASSISTANT

## 2022-11-04 PROCEDURE — 2500000003 HC RX 250 WO HCPCS: Performed by: HOSPITALIST

## 2022-11-04 PROCEDURE — 6370000000 HC RX 637 (ALT 250 FOR IP): Performed by: PHYSICIAN ASSISTANT

## 2022-11-04 PROCEDURE — 36415 COLL VENOUS BLD VENIPUNCTURE: CPT

## 2022-11-04 RX ORDER — METRONIDAZOLE 500 MG/100ML
500 INJECTION, SOLUTION INTRAVENOUS EVERY 8 HOURS
Status: DISCONTINUED | OUTPATIENT
Start: 2022-11-04 | End: 2022-11-10

## 2022-11-04 RX ORDER — VANCOMYCIN HYDROCHLORIDE 125 MG/1
125 CAPSULE ORAL EVERY 6 HOURS SCHEDULED
Status: DISCONTINUED | OUTPATIENT
Start: 2022-11-04 | End: 2022-11-04

## 2022-11-04 RX ADMIN — METRONIDAZOLE 500 MG: 500 INJECTION, SOLUTION INTRAVENOUS at 02:36

## 2022-11-04 RX ADMIN — VANCOMYCIN HYDROCHLORIDE 125 MG: 125 CAPSULE ORAL at 09:37

## 2022-11-04 RX ADMIN — METRONIDAZOLE 500 MG: 500 INJECTION, SOLUTION INTRAVENOUS at 22:05

## 2022-11-04 RX ADMIN — AMIODARONE HYDROCHLORIDE 200 MG: 200 TABLET ORAL at 09:19

## 2022-11-04 RX ADMIN — PANTOPRAZOLE SODIUM 40 MG: 40 TABLET, DELAYED RELEASE ORAL at 05:34

## 2022-11-04 RX ADMIN — SODIUM CHLORIDE 30 ML: 9 INJECTION, SOLUTION INTRAVENOUS at 22:04

## 2022-11-04 RX ADMIN — FIDAXOMICIN 200 MG: 200 TABLET, FILM COATED ORAL at 15:25

## 2022-11-04 RX ADMIN — Medication 10 ML: at 22:00

## 2022-11-04 RX ADMIN — METRONIDAZOLE 500 MG: 500 INJECTION, SOLUTION INTRAVENOUS at 15:25

## 2022-11-04 RX ADMIN — GABAPENTIN 300 MG: 300 CAPSULE ORAL at 22:00

## 2022-11-04 RX ADMIN — METOPROLOL SUCCINATE 12.5 MG: 25 TABLET, FILM COATED, EXTENDED RELEASE ORAL at 09:19

## 2022-11-04 RX ADMIN — PIPERACILLIN AND TAZOBACTAM 3375 MG: 3; .375 INJECTION, POWDER, FOR SOLUTION INTRAVENOUS at 03:41

## 2022-11-04 RX ADMIN — METRONIDAZOLE 500 MG: 500 INJECTION, SOLUTION INTRAVENOUS at 09:24

## 2022-11-04 RX ADMIN — ACETAMINOPHEN 650 MG: 325 TABLET ORAL at 13:35

## 2022-11-04 RX ADMIN — ACETAMINOPHEN 650 MG: 325 TABLET ORAL at 22:07

## 2022-11-04 RX ADMIN — FIDAXOMICIN 200 MG: 200 TABLET, FILM COATED ORAL at 21:59

## 2022-11-04 ASSESSMENT — PAIN - FUNCTIONAL ASSESSMENT
PAIN_FUNCTIONAL_ASSESSMENT: ACTIVITIES ARE NOT PREVENTED
PAIN_FUNCTIONAL_ASSESSMENT: ACTIVITIES ARE NOT PREVENTED

## 2022-11-04 ASSESSMENT — PAIN SCALES - GENERAL
PAINLEVEL_OUTOF10: 4
PAINLEVEL_OUTOF10: 4
PAINLEVEL_OUTOF10: 0
PAINLEVEL_OUTOF10: 0

## 2022-11-04 ASSESSMENT — PAIN DESCRIPTION - PAIN TYPE: TYPE: ACUTE PAIN

## 2022-11-04 ASSESSMENT — PAIN DESCRIPTION - ONSET: ONSET: ON-GOING

## 2022-11-04 ASSESSMENT — PAIN DESCRIPTION - DESCRIPTORS
DESCRIPTORS: ACHING
DESCRIPTORS: ACHING

## 2022-11-04 ASSESSMENT — PAIN DESCRIPTION - FREQUENCY: FREQUENCY: INTERMITTENT

## 2022-11-04 ASSESSMENT — PAIN SCALES - WONG BAKER: WONGBAKER_NUMERICALRESPONSE: 0

## 2022-11-04 ASSESSMENT — PAIN DESCRIPTION - LOCATION
LOCATION: GENERALIZED
LOCATION: GENERALIZED

## 2022-11-04 NOTE — PROGRESS NOTES
11/04/22 0955   Encounter Summary   Encounter Overview/Reason  Initial Encounter;Spiritual/Emotional Needs; Rituals, Rites and Sacraments   Service Provided For: Patient   Referral/Consult From: Volunteer   Last Encounter  11/04/22  (sos, fr solares)   Complexity of Encounter Low   Begin Time 0935   End Time  0957   Total Time Calculated 22 min   Encounter    Type Initial Screen/Assessment   Spiritual/Emotional needs   Type Spiritual Support   Rituals, Rites and Sacraments   Type Sacrament of Sick; Anointing;Islam Communion   Grief, Loss, and Adjustments   Type Adjustment to illness   Assessment/Intervention/Outcome   Assessment Calm;Coping; Hopeful   Intervention Active listening;Explored/Affirmed feelings, thoughts, concerns   Outcome Comfort;Coping;Encouraged;Engaged in conversation;Expressed feelings, needs, and concerns;Expressed Gratitude   Electronically signed by Charlie Rodriguez on 11/4/2022 at 9:59 AM

## 2022-11-04 NOTE — CARE COORDINATION
Case Management Assessment  Initial Evaluation    Date/Time of Evaluation: 11/4/2022 1:03 PM  Assessment Completed by: Vinny Pettit RN    If patient is discharged prior to next notation, then this note serves as note for discharge by case management. Patient Name: Alecia Atkins                   YOB: 1940  Diagnosis: Syncope and collapse [R55]  Colitis [K52.9]  Sepsis ([facility] [address] [A41.9]  Contusion of head, unspecified part of head, initial encounter [S00.93XA]  Sprain of left ankle, unspecified ligament, initial encounter [S93.402A]  Diarrhea, unspecified type [R19.7]                   Date / Time: 11/2/2022 11:52 PM    Patient Admission Status: Inpatient   Readmission Risk (Low < 19, Mod (19-27), High > 27): Readmission Risk Score: 14.7    Current PCP: Karl Kline MD  PCP verified by CM? Yes    Chart Reviewed: Yes      History Provided by: Patient  Patient Orientation: Alert and Oriented, Person, Place, Situation    Patient Cognition: Alert    Hospitalization in the last 30 days (Readmission):  No    If yes, Readmission Assessment in  Navigator will be completed. Advance Directives:      Code Status: Full Code   Patient's Primary Decision Maker is: Legal Next of Kin    Primary Decision MakerCynthia Rose Child - [phone]    Secondary Decision Maker: Malena Peacock Child - [phone]    Discharge Planning:    Patient lives with: Alone Type of Home: Other (Comment) (condo)  Primary Care Giver: Self  Patient Support Systems include: Children, Family Members   Current services prior to admission: Oxygen Therapy            Current DME:  walker            Type of Home Care services:  None    ADLS  Prior functional level: Assistance with the following:, Mobility  Current functional level: Assistance with the following:, Other (see comment)    Family can provide assistance at DC:  Yes  Would you like Case Management to discuss the discharge plan with any other family members/significant others, and if so, who? No  Plans to Return to Present Housing: Yes  Other Identified Issues/Barriers to RETURNING to current housing: N/A    Potential Assistance needed at discharge: N/A            Potential DME:  None  Patient expects to discharge to: Other (comment) (condo)  Plan for transportation at discharge: Family    Financial    Payor: MEDICARE / Plan: MEDICARE PART A AND B / Product Type: *No Product type* /     Does insurance require precert for SNF: Yes    Potential assistance Purchasing Medications: No  Meds-to-Beds request:  No      [address], [address] [place] [address]  Phone: [phone] Fax: [phone]      Notes:    Factors facilitating achievement of predicted outcomes: Family support, Cooperative, Pleasant, and Has needed Durable Medical Equipment at home    Barriers to discharge: Limited family support    Additional Case Management Notes: Patient plans to return home with family support. Family can transport at discharge. Denied any needs. Patient active with [employer] in the past.    The Plan for Transition of Care is related to the following treatment goals of Syncope and collapse [R55]  Colitis [K52.9]  Sepsis ([facility] [address] [A41.9]  Contusion of head, unspecified part of head, initial encounter [S00.93XA]  Sprain of left ankle, unspecified ligament, initial encounter [S93.402A]  Diarrhea, unspecified type [R19.7]    The Patient and/or Patient Representative Agree with the Discharge Plan?  Yes    Sun El RN  Case Management Department  Ph: [phone]

## 2022-11-04 NOTE — PROGRESS NOTES
Pt with no complaints or bowel movements overnight. C-Diff result came back positive. Pt's INR resulted at a critical level of 5.2 this AM. MD notified via [employer]. No orders received at this time.

## 2022-11-04 NOTE — PROGRESS NOTES
Clinical Pharmacy Note  Warfarin Consult    Del Warren is a 78 y.o. female receiving warfarin managed by pharmacy. Warfarin Indication: Factor V Leiden deficiency, h/o PE, Afib  Target INR range: 2-3   Dose prior to admission: 2.5 mg daily except NONE on Friday    Current warfarin drug-drug interactions: Zosyn, metronidazole    Recent Labs     11/03/22  0034 11/03/22  0035 11/03/22  0506 11/04/22  0529   HGB  --  13.6 12.8 12.6   HCT  --  40.3 39.1 37.8   INR 3.63*  --   --  5.20*         Assessment/Plan:    Hold warfarin tonight. Daily PT/INR until stable within therapeutic range. Thank you for the consult. Will continue to follow. Mariah Mix PharmD.   11/4/2022  7:24 AM

## 2022-11-04 NOTE — CONSULTS
GASTROENTEROLOGY INPATIENT CONSULTATION        IDENTIFYING DATA/REASON FOR CONSULTATION   PATIENT:  Lacey Gleason  MRN:  [id number]  ADMIT DATE: 11/2/2022  TIME OF EVALUATION: 11/4/2022 11:22 AM  HOSPITAL STAY:   LOS: 1 day     REASON FOR CONSULTATION:  CDI    HISTORY OF PRESENT ILLNESS   Lacey Gleason is a 78 y.o. female with a PMH of CAD, A. fib, COPD, HTN, factor V Leiden, CLL, splenectomy who presented on 11/2/2022 with diarrhea, nausea, poor appetite starting 3 days ago resulting in weakness and a fall yesterday prompting her to come to ED. CT A/P showed diffuse mucosal thickening of the colon suggestive of colitis. Blood work noted elevated white count 34.5. Stool studies positive for C. difficile. She has been placed on IV vancomycin 125 mg 4 times daily. She denies any recent antibiotics. She denies any prior episodes of Cdiff. She has never had a colonoscopy      Prior Endoscopic Evaluations:  EGD/EUS 2/2019 with Dr. Akanksha Narvaez  1.  6/9 criteria for chronic pancreatitis. Asymptomatic. 2.  Normal gallbladder. Mild dilation of the bile duct to 8.9mm.  3.  Normal EGD.       PAST MEDICAL, SURGICAL, FAMILY, and SOCIAL HISTORY     Past Medical History:   Diagnosis Date    Acute ST elevation myocardial infarction (STEMI) ([facility] 07/19/2021    Atrial fibrillation (HCC)     Chronic lymphocytic leukemia in remission ([facility] [facility]     CLL (chronic lymphocytic leukemia) ([facility] [address] 02/12/2015    COPD (chronic obstructive pulmonary disease) ([facility] [address] [facility]     Essential hypertension 01/01/2015    controlled with salt restriction (initially)    Factor V Leiden mutation ([facility] [facility]     Goiter 11/01/2011    1.5 cm midline    Hypercholesterolemia     Impaired fasting glucose 11/01/2011    Osteoarthritis     Osteoporosis     Post herpetic neuralgia     Vitamin D deficiency 11/01/2011     Past Surgical History:   Procedure Laterality Date    CATARACT REMOVAL WITH IMPLANT  5/14/12    left eye    JOINT REPLACEMENT      partial knee R and L KNEE SURGERY  2008    partial replacements, both knees    MOHS SURGERY  2019    R cheek and R superior temple    SPLENECTOMY      TUNNELED VENOUS PORT PLACEMENT      UPPER GASTROINTESTINAL ENDOSCOPY N/A 2019    EGD ESOPHAGOGASTRODUODENOSCOPY, WITH ENDOSCOPIC ULTRASOUND OF ESOPHAGUS performed by Gwen Woo MD at [address] History   Problem Relation Age of Onset    Diabetes Father     Stroke Sister 50         of a stroke     Social History     Socioeconomic History    Marital status:       Spouse name: None    Number of children: None    Years of education: None    Highest education level: None   Tobacco Use    Smoking status: Former     Packs/day: 0.30     Years: 50.00     Pack years: 15.00     Types: Cigarettes     Quit date: 1995     Years since quittin.0    Smokeless tobacco: Never   Vaping Use    Vaping Use: Never used   Substance and Sexual Activity    Alcohol use: No     Alcohol/week: 0.0 standard drinks    Drug use: Not Currently    Sexual activity: Not Currently     Social Determinants of Health     Financial Resource Strain: Low Risk     Difficulty of Paying Living Expenses: Not hard at all   Food Insecurity: No Food Insecurity    Worried About Running Out of Food in the Last Year: Never true    Ran Out of Food in the Last Year: Never true   Transportation Needs: No Transportation Needs    Lack of Transportation (Medical): No    Lack of Transportation (Non-Medical): No       MEDICATIONS   SCHEDULED:  vancomycin, 125 mg, 4 times per day  amiodarone, 200 mg, Daily  gabapentin, 300 mg, Nightly  metoprolol succinate, 12.5 mg, Daily  pantoprazole, 40 mg, QAM AC  sodium chloride flush, 5-40 mL, 2 times per day  warfarin placeholder: dosing by pharmacy, , RX Placeholder      FLUIDS/DRIPS:     sodium chloride 5 mL/hr at 22 1906     PRNs: sodium chloride flush, 5-40 mL, PRN  sodium chloride, , PRN  acetaminophen, 650 mg, Q6H PRN   Or  acetaminophen, 650 mg, Q6H PRN  potassium chloride, 40 mEq, PRN   Or  potassium alternative oral replacement, 40 mEq, PRN   Or  potassium chloride, 10 mEq, PRN      ALLERGIES:  She No Known Allergies    REVIEW OF SYSTEMS   Pertinent ROS noted in HPI    PHYSICAL EXAM     Vitals:    11/04/22 0515 11/04/22 0537 11/04/22 0811 11/04/22 0900   BP: (!) 116/53   (!) 109/48   Pulse: 68  72 80   Resp: 19  18 16   Temp: 98.6 °F (37 °C)   98 °F (36.7 °C)   TempSrc: Oral   Oral   SpO2: 95%  95% 92%   Weight:  145 lb 15.1 oz (66.2 kg)     Height:           I/O last 3 completed shifts: In: 56 [P.O.:340; IV Piggyback:150]  Out: -       Physical Exam:  General appearance: alert, cooperative, no distress, appears stated age  Eyes: Anicteric  Head: Normocephalic, without obvious abnormality  Lungs: clear to auscultation bilaterally, Normal Effort  Heart: regular rate and rhythm, normal S1 and S2, no murmurs or rubs  Abdomen: soft, non-distended, non-tender. Bowel sounds normal. No masses,  no organomegaly. Extremities: atraumatic, no cyanosis or edema  Skin: warm and dry, no jaundice  Neuro: Grossly intact, A&OX3      LABS AND IMAGING   Laboratory   Recent Labs     11/03/22 0035 11/03/22  0506 11/04/22  0529   WBC 27.6* 27.0* 34.5*   HGB 13.6 12.8 12.6   HCT 40.3 39.1 37.8   MCV 92.0 94.4 92.3    284 298     Recent Labs     11/03/22 0035 11/03/22  0506 11/04/22  0529    138 136   K 3.3* 3.2* 3.9   CL 97* 98* 97*   CO2 26 26 28   PHOS  --  2.5 2.6   BUN 12 11 18   CREATININE 0.9 0.9 0.9     Recent Labs     11/03/22 0035   AST 15   ALT 11   BILITOT 0.7   ALKPHOS 86     Recent Labs     11/03/22 0035   LIPASE 9.0*     Recent Labs     11/03/22 0034 11/04/22  0529   PROTIME 36.4* 48.5*   INR 3.63* 5.20*       Imaging  CT ABDOMEN PELVIS W IV CONTRAST Additional Contrast? None   Final Result   1. No pulmonary embolism. 2. Tiny subpleural nodules in the periphery of the lungs may represent an   underlying infectious or inflammatory process. Follow-up recommendations are   provided below. 3. Diffuse mucosal thickening of the colon suggesting underlying pattern of   infectious colitis including C difficile. 4. Incidental cholelithiasis and stable right adrenal nodule suspected to   represent an adenoma. RECOMMENDATIONS:   Multiple pulmonary nodules. Most severe: 3 mm solid pulmonary nodule within   the upper lobe. If patient is low risk for malignancy, no routine follow-up   imaging is recommended; if patient is high risk for malignancy, a   non-contrast Chest CT at 12 months is optional. If performed and the nodule   is stable at 12 months, no further follow-up is recommended. These guidelines do not apply to immunocompromised patients and patients with   cancer. Follow up in patients with significant comorbidities as clinically   warranted. For lung cancer screening, adhere to Lung-RADS guidelines. Reference: Radiology. 2017; 284(1):228-43. CT CHEST PULMONARY EMBOLISM W CONTRAST   Final Result   1. No pulmonary embolism. 2. Tiny subpleural nodules in the periphery of the lungs may represent an   underlying infectious or inflammatory process. Follow-up recommendations are   provided below. 3. Diffuse mucosal thickening of the colon suggesting underlying pattern of   infectious colitis including C difficile. 4. Incidental cholelithiasis and stable right adrenal nodule suspected to   represent an adenoma. RECOMMENDATIONS:   Multiple pulmonary nodules. Most severe: 3 mm solid pulmonary nodule within   the upper lobe. If patient is low risk for malignancy, no routine follow-up   imaging is recommended; if patient is high risk for malignancy, a   non-contrast Chest CT at 12 months is optional. If performed and the nodule   is stable at 12 months, no further follow-up is recommended. These guidelines do not apply to immunocompromised patients and patients with   cancer.  Follow up in patients with significant comorbidities as clinically   warranted. For lung cancer screening, adhere to Lung-RADS guidelines. Reference: Radiology. 2017; 284(1):228-43. CT HEAD WO CONTRAST   Final Result   No acute intracranial abnormality. XR ANKLE LEFT (MIN 3 VIEWS)   Final Result   Ankle mortise intact. No acute fracture or dislocation of the ankle. No   acute fracture or dislocation of the foot. Degenerative changes are present   at the interphalangeal joints and there is deformity of the 5th PIP joint   which may be sequela of a remote injury. No soft tissue swelling of the   foot. Moderate soft tissue swelling/edema over the medial malleolus of the   ankle. RECOMMENDATION:   Medial soft tissue swelling about the left ankle with no underlying fracture. Degenerative changes in the left foot with no acute abnormality. XR FOOT LEFT (MIN 3 VIEWS)   Final Result   Ankle mortise intact. No acute fracture or dislocation of the ankle. No   acute fracture or dislocation of the foot. Degenerative changes are present   at the interphalangeal joints and there is deformity of the 5th PIP joint   which may be sequela of a remote injury. No soft tissue swelling of the   foot. Moderate soft tissue swelling/edema over the medial malleolus of the   ankle. RECOMMENDATION:   Medial soft tissue swelling about the left ankle with no underlying fracture. Degenerative changes in the left foot with no acute abnormality. XR CHEST PORTABLE   Final Result   Cardiomegaly with mild vascular congestion that is stable. ASSESSMENT AND RECOMMENDATIONS   78 y.o. female with a PMH of CAD, A. fib, COPD, HTN, factor V Leiden, CLL, splenectomy who presented on 11/2/2022 with diarrhea, nausea, poor appetite    IMPRESSION:  Cdiff colitis. RECOMMENDATIONS:    We will switch antibiotics to Dificid 200 mg twice daily. Will also add IV Flagyl given worsening white count and colitis on CT.    Monitor stool output      If you have any questions or need any further information, please feel free to contact our consult team.  Thank you for allowing us to participate in the care of William Marvin. The note was completed using Dragon voice recognition transcription. Every effort was made to ensure accuracy; however, inadvertent transcription errors may be present despite my best efforts to edit errors. Tri Zayas PA-C    Attending physician addendum:      I have personally seen and examined the patient, reviewed the patient's medical record and pertinent labs and clinical imaging. I have personally staffed the case with KENA Ding. I agree with her consultation note, exam findings, assessment and plans  as written above. I have made appropriate modifications and edited her assessment and plan where needed to reflect my impression and plans for this patient. 78-year-old  female with a past medical history of CAD, A. fib, COPD, hypertension, factor V Leiden deficiency, CLL, previous splenectomy, who presented with a 3-day history of nausea, poor p.o. intake, diarrhea. She has had resultant weakness and had a fall yesterday which prompted her to come to the ER. She had a CT of the abdomen pelvis showing diffuse mucosal wall thickening of the colon suggestive of colitis. She had a white count of 34.5 thousand. Stool studies were positive for C. difficile colitis. She was placed on oral vancomycin 125 mg 4 times daily. She denies any hospitalizations recently or any recent antibiotic exposures. She denies any sick contacts or any travel to nursing homes. She has never had previous episodes of C. difficile. She has never had a previous colonoscopy.   She was admitted for further work-up of these issues    /66   Pulse 72   Temp 99.5 °F (37.5 °C) (Oral)   Resp 16   Ht 5' (1.524 m)   Wt 145 lb 15.1 oz (66.2 kg)   SpO2 93%   BMI 28.50 kg/m²      Gen- NAD  CV- RRR  Abd- soft/NT/ND  Ext- bruising noted anteriorly. Slight edema    Labs- reviewed  Stool - Cdiff positive. Impression:     1) Severe Clostridium colitis-  WBC 34.5. This is initial diagnosis. Prior splenectomy and history of CLL with baseline WBC of 12-16K. CT with colitis. Exam benign. Symptoms improving  2) CLL  3) Prior splenectomy  4) Hx of Afib  5) Hx of CAD  6) Hx of COPD  7) Hx of Factor V Leiden- on warfarin at home . 8) Supratherapeutic INR-   9) Hx of hypokalemia    Plan  Dificid 200mg Po bid  IV flagyl tid  Monitor CBC and INR  Diet as tolerated. Will follow. Dosing of coumadin per pharmacy. Thank you for allowing me to participate in this patient's care. If there are any questions or concerns regarding this patient, or the plan we have set in place, please feel free to contact me at [phone].      Rj James, DO

## 2022-11-04 NOTE — PLAN OF CARE
Problem: Discharge Planning  Goal: Discharge to home or other facility with appropriate resources  Outcome: Progressing     Problem: Pain  Goal: Verbalizes/displays adequate comfort level or baseline comfort level  Outcome: Progressing     Problem: Safety - Adult  Goal: Free from fall injury  Outcome: Progressing     Problem: ABCDS Injury Assessment  Goal: Absence of physical injury  Outcome: Progressing     Problem: Cardiovascular - Adult  Goal: Maintains optimal cardiac output and hemodynamic stability  Outcome: Progressing  Goal: Absence of cardiac dysrhythmias or at baseline  Outcome: Progressing     Problem: Gastrointestinal - Adult  Goal: Maintains or returns to baseline bowel function  Outcome: Progressing     Problem: Infection - Adult  Goal: Absence of infection at discharge  Outcome: Progressing     Problem: Metabolic/Fluid and Electrolytes - Adult  Goal: Electrolytes maintained within normal limits  Outcome: Progressing  Goal: Hemodynamic stability and optimal renal function maintained  Outcome: Progressing     Problem: Skin/Tissue Integrity  Goal: Absence of new skin breakdown  Description: 1. Monitor for areas of redness and/or skin breakdown  2. Assess vascular access sites hourly  3. Every 4-6 hours minimum:  Change oxygen saturation probe site  4. Every 4-6 hours:  If on nasal continuous positive airway pressure, respiratory therapy assess nares and determine need for appliance change or resting period.   Outcome: Progressing

## 2022-11-04 NOTE — PROGRESS NOTES
Hospitalist Progress Note  11/4/2022 9:27 AM    PCP: Cecil Wynn MD    [phone]     Date of Admission: 11/2/2022                                                                                                                     HOSPITAL COURSE    Patient demographics:  The patient  Calvin Brown is a 78 y.o. 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/06    Factor V Leiden mutation ([facility]    Chronic lymphocytic leukemia in remission    Osteoarthritis    Hypercholesterolemia    Goiter, 1.5 cm midline    Impaired fasting glucose    Vitamin D deficiency    Small cleaved cell (diffuse) NHL (HCC)    B12 deficiency    Abnormal coagulation profile    Encounter for follow-up examination after completed treatment for cancer    Encounter for care related to Port-a-Cath    Hyperuricemia    Arthritis of knee    Neoplasm of uncertain behavior of skin    History of squamous cell carcinoma of skin    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    History of basal cell carcinoma    Seborrheic keratoses, inflamed    Takotsubo cardiomyopathy    Chronic combined systolic (congestive) and diastolic (congestive) heart failure (HCC)    Acute ST elevation myocardial infarction (STEMI) ([address]    Acute CHF (congestive heart failure) (HCC)    Sepsis (HCC)    Chronic respiratory failure with hypoxia (HCC)    Atrial fibrillation with rapid ventricular response (HCC)    Simple chronic bronchitis ([address]    Syncope    Colitis         Presenting symptoms:  Leg Pain    Diagnostic workup:      CONSULTS DURING ADMISSION :   PHARMACY TO DOSE MEDICATION  PHARMACY TO DOSE WARFARIN      Patient was diagnosed with:  Acute colitis  Atrial fibrillation  Hypertension      Treatment while inpatient:  78years old female with medical history significant for CLL progress sclera ectomy, atrial fibrillation, coronary artery disease COPD history of factor V Leyden on chronic anticoagulation    Patient presented to the emergency room after a fall at home after having      Diarrhea nausea and poor oral intake.                                                                                 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/06    Factor V Leiden mutation ([address]    Chronic lymphocytic leukemia in remission    Osteoarthritis    Hypercholesterolemia    Goiter, 1.5 cm midline    Impaired fasting glucose    Vitamin D deficiency    Small cleaved cell (diffuse) NHL (HCC)    B12 deficiency    Abnormal coagulation profile    Encounter for follow-up examination after completed treatment for cancer    Encounter for care related to Port-a-Cath    Hyperuricemia    Arthritis of knee    Neoplasm of uncertain behavior of skin    History of squamous cell carcinoma of skin    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    History of basal cell carcinoma    Seborrheic keratoses, inflamed    Takotsubo cardiomyopathy    Chronic combined systolic (congestive) and diastolic (congestive) heart failure (HCC)    Acute ST elevation myocardial infarction (STEMI) ([address]    Acute CHF (congestive heart failure) ([address]    Sepsis ([address]    Chronic respiratory failure with hypoxia (HCC)    Atrial fibrillation with rapid ventricular response ([address] Simple chronic bronchitis (HCC)    Syncope    Colitis       Allergies  Patient has no known allergies. Medications    Scheduled Meds:   amiodarone  200 mg Oral Daily    gabapentin  300 mg Oral Nightly    metoprolol succinate  12.5 mg Oral Daily    pantoprazole  40 mg Oral QAM AC    sodium chloride flush  5-40 mL IntraVENous 2 times per day    piperacillin-tazobactam  3,375 mg IntraVENous Q8H    warfarin placeholder: dosing by pharmacy   Other RX Placeholder    metroNIDAZOLE  500 mg IntraVENous Q8H     Continuous Infusions:   sodium chloride 5 mL/hr at 11/03/22 1906     PRN Meds:  sodium chloride flush, sodium chloride, acetaminophen **OR** acetaminophen, potassium chloride **OR** potassium alternative oral replacement **OR** potassium chloride    Vitals   Vitals /wt Patient Vitals for the past 8 hrs:   BP Temp Temp src Pulse Resp SpO2 Weight   11/04/22 0900 (!) 109/48 98 °F (36.7 °C) Oral 80 16 92 % --   11/04/22 0811 -- -- -- 72 18 95 % --   11/04/22 0537 -- -- -- -- -- -- 145 lb 15.1 oz (66.2 kg)   11/04/22 0515 (!) 116/53 98.6 °F (37 °C) Oral 68 19 95 % --        72HR INTAKE/OUTPUT:    Intake/Output Summary (Last 24 hours) at 11/4/2022 0927  Last data filed at 11/4/2022 0917  Gross per 24 hour   Intake 460 ml   Output --   Net 460 ml       Exam:    Gen:   Alert and oriented ×3    Eyes: PERRL. No sclera icterus. No conjunctival injection. ENT: No discharge. Pharynx clear. External appearance of ears and nose normal.  Neck: Trachea midline. No obvious mass. Resp: No accessory muscle use. No crackles. No wheezes. No rhonchi. CV: Regular rate. Regular rhythm. No murmur or rub. No edema. GI: Non-tender. Non-distended. No hernia. Skin: Warm, dry, normal texture and turgor. Lymph: No cervical LAD. No supraclavicular LAD. M/S: / Ext. No cyanosis. No clubbing. No joint deformity. Neuro: CN 2-12 are intact,  no neurologic deficits noted.     PT/INR:   Recent Labs     11/03/22  0034 11/04/22  5252 PROTIME 36.4* 48.5*   INR 3.63* 5.20*     APTT: No results for input(s): APTT in the last 72 hours. CBC:   Recent Labs     11/03/22 0035 11/03/22 0506 11/04/22  0529   WBC 27.6* 27.0* 34.5*   HGB 13.6 12.8 12.6   HCT 40.3 39.1 37.8   MCV 92.0 94.4 92.3    284 298       BMP:   Recent Labs     11/03/22 0035 11/03/22 0506 11/04/22  0529    138 136   K 3.3* 3.2* 3.9   CL 97* 98* 97*   CO2 26 26 28   PHOS  --  2.5 2.6   BUN 12 11 18   CREATININE 0.9 0.9 0.9       LIVER PROFILE:   Recent Labs     11/03/22 0035   ALKPHOS 86   AST 15   ALT 11   BILITOT 0.7     No results for input(s): AMYLASE in the last 72 hours. Recent Labs     11/03/22 0035   LIPASE 9.0*       UA:  Recent Labs     11/03/22  0334   WBCUA 2   RBCUA 3       TROPONIN:   Recent Labs     11/03/22 0035   TROPONINI <0.01       Lab Results   Component Value Date/Time    URRFLXCULT Not Indicated 11/03/2022 03:34 AM       No results for input(s): TSHREFLEX in the last 72 hours. No components found for: [id number]  POC GLUCOSE:  No results for input(s): POCGLU in the last 72 hours. No results for input(s): LABA1C in the last 72 hours. Lab Results   Component Value Date/Time    LABA1C 6.2 04/20/2021 08:54 AM     .Ejection fraction is visually estimated to be 55-60 %. (8/23/2022)    ASSESSMENT AND PLAN  C. difficile colitis  Leukocytosis  Diffuse mucosal thickening of the colon noted on CT scan  Continue with IV fluids  C. difficile toxin is positive  Flagyl IV every 8 hours  Dificid 200 mg twice daily  GI consult is appreciated     Atrial fibrillation  Continue on amiodarone  Pharmacy to dose Coumadin        Hypertension  Continue on home medication        DVT and GI prophylaxis           Code Status: Full Code        Dispo - cc        The patient and / or the family were informed of the results of any tests, a time was given to answer questions, a plan was proposed and they agreed with plan.     Geovanni Simon MD    This note was transcribed using [id number]. Please disregard any translational errors.

## 2022-11-04 NOTE — PLAN OF CARE
Problem: Discharge Planning  Goal: Discharge to home or other facility with appropriate resources  11/3/2022 2238 by Elizabeth Jane RN  Outcome: Progressing  11/3/2022 1844 by Tanner Burch RN  Outcome: Progressing  Flowsheets (Taken 11/3/2022 0517 by Homar Barillas RN)  Discharge to home or other facility with appropriate resources:   Identify barriers to discharge with patient and caregiver   Identify discharge learning needs (meds, wound care, etc)     Problem: Pain  Goal: Verbalizes/displays adequate comfort level or baseline comfort level  11/3/2022 2238 by Elizabeth Jane RN  Outcome: Progressing  11/3/2022 1844 by Tanner Burch RN  Outcome: Progressing     Problem: Safety - Adult  Goal: Free from fall injury  11/3/2022 2238 by Elizabeth Jane RN  Outcome: Progressing  11/3/2022 1844 by Tanner Burch RN  Outcome: Progressing     Problem: ABCDS Injury Assessment  Goal: Absence of physical injury  11/3/2022 2238 by Elizabeth Jane RN  Outcome: Progressing  11/3/2022 1844 by Tanner Burch RN  Outcome: Progressing     Problem: Cardiovascular - Adult  Goal: Maintains optimal cardiac output and hemodynamic stability  11/3/2022 2238 by Elizabeth Jane RN  Outcome: Progressing  11/3/2022 1844 by Tanner Burch RN  Outcome: Progressing  Goal: Absence of cardiac dysrhythmias or at baseline  11/3/2022 2238 by Elizabeth Jane RN  Outcome: Progressing  11/3/2022 1844 by Tanner Burch RN  Outcome: Progressing     Problem: Gastrointestinal - Adult  Goal: Maintains or returns to baseline bowel function  11/3/2022 2238 by Elizabeth Jane RN  Outcome: Progressing  11/3/2022 1844 by Tanner Burch RN  Outcome: Progressing     Problem: Infection - Adult  Goal: Absence of infection at discharge  11/3/2022 2238 by Elizabeth Jane RN  Outcome: Progressing  11/3/2022 1844 by Tanner Burch RN  Outcome: Progressing     Problem: Metabolic/Fluid and Electrolytes - Adult  Goal: Electrolytes maintained within normal limits  11/3/2022 2238 by Mary Madrigal RN  Outcome: Progressing  11/3/2022 1844 by Tamiko Burks RN  Outcome: Progressing  Goal: Hemodynamic stability and optimal renal function maintained  11/3/2022 2238 by Mary Madrigal RN  Outcome: Progressing  11/3/2022 1844 by Tamiko Burks RN  Outcome: Progressing

## 2022-11-04 NOTE — ACP (ADVANCE CARE PLANNING)
Advance Care Planning     Advance Care Planning Activator (Inpatient)  Conversation Note      Date of ACP Conversation: 11/4/2022     Conversation Conducted with: Patient with Decision Making Capacity    ACP Activator: Trae Quiles RN    Health Care Decision Maker:     Current Designated Health Care Decision Maker:     Primary Decision Maker: Isela Barillas Child - [phone]    Secondary Decision Maker: Zohreh Moeller Child - [phone]    Today we documented Decision Maker(s) consistent with Legal Next of Kin hierarchy. Care Preferences    Ventilation: \"If you were in your present state of health and suddenly became very ill and were unable to breathe on your own, what would your preference be about the use of a ventilator (breathing machine) if it were available to you? \"      Would the patient desire the use of ventilator (breathing machine)?: yes    \"If your health worsens and it becomes clear that your chance of recovery is unlikely, what would your preference be about the use of a ventilator (breathing machine) if it were available to you? \"     Would the patient desire the use of ventilator (breathing machine)?: No      Resuscitation  \"CPR works best to restart the heart when there is a sudden event, like a heart attack, in someone who is otherwise healthy. Unfortunately, CPR does not typically restart the heart for people who have serious health conditions or who are very sick. \"    \"In the event your heart stopped as a result of an underlying serious health condition, would you want attempts to be made to restart your heart (answer \"yes\" for attempt to resuscitate) or would you prefer a natural death (answer \"no\" for do not attempt to resuscitate)? \" yes       [] Yes   [x] No   Educated Patient / Rosaura Alpers regarding differences between Advance Directives and portable DNR orders.     Length of ACP Conversation in minutes:      Conversation Outcomes:  [x] ACP discussion completed  [] Existing advance directive reviewed with patient; no changes to patient's previously recorded wishes  [] New Advance Directive completed  [] Portable Do Not Rescitate prepared for Provider review and signature  [] POLST/POST/MOLST/MOST prepared for Provider review and signature      Follow-up plan:    [] Schedule follow-up conversation to continue planning  [] Referred individual to Provider for additional questions/concerns   [] Advised patient/agent/surrogate to review completed ACP document and update if needed with changes in condition, patient preferences or care setting    [x] This note routed to one or more involved healthcare providers    #[phone]  Electronically signed by Ele Cox RN on 11/4/2022 at 12:57 PM

## 2022-11-05 ENCOUNTER — APPOINTMENT (OUTPATIENT)
Dept: CT IMAGING | Age: 82
DRG: 372 | End: 2022-11-05
Payer: MEDICARE

## 2022-11-05 PROBLEM — R55 SYNCOPE: Status: RESOLVED | Noted: 2022-11-03 | Resolved: 2022-11-05

## 2022-11-05 PROBLEM — A04.72 CLOSTRIDIUM DIFFICILE COLITIS: Status: ACTIVE | Noted: 2022-11-05

## 2022-11-05 LAB
ALBUMIN SERPL-MCNC: 2.6 G/DL (ref 3.4–5)
ANION GAP SERPL CALCULATED.3IONS-SCNC: 10 MMOL/L (ref 3–16)
ANISOCYTOSIS: ABNORMAL
BANDED NEUTROPHILS RELATIVE PERCENT: 3 % (ref 0–7)
BASOPHILS ABSOLUTE: 0 K/UL (ref 0–0.2)
BASOPHILS RELATIVE PERCENT: 0 %
BUN BLDV-MCNC: 20 MG/DL (ref 7–20)
CALCIUM SERPL-MCNC: 8.2 MG/DL (ref 8.3–10.6)
CHLORIDE BLD-SCNC: 98 MMOL/L (ref 99–110)
CO2: 27 MMOL/L (ref 21–32)
CREAT SERPL-MCNC: 1 MG/DL (ref 0.6–1.2)
EOSINOPHILS ABSOLUTE: 0 K/UL (ref 0–0.6)
EOSINOPHILS RELATIVE PERCENT: 0 %
GFR SERPL CREATININE-BSD FRML MDRD: 56 ML/MIN/{1.73_M2}
GLUCOSE BLD-MCNC: 86 MG/DL (ref 70–99)
HCT VFR BLD CALC: 37.8 % (ref 36–48)
HEMATOLOGY PATH CONSULT: NO
HEMOGLOBIN: 12 G/DL (ref 12–16)
INR BLD: 6.41 (ref 0.87–1.14)
LYMPHOCYTES ABSOLUTE: 8.8 K/UL (ref 1–5.1)
LYMPHOCYTES RELATIVE PERCENT: 23 %
MAGNESIUM: 2.1 MG/DL (ref 1.8–2.4)
MCH RBC QN AUTO: 30 PG (ref 26–34)
MCHC RBC AUTO-ENTMCNC: 31.7 G/DL (ref 31–36)
MCV RBC AUTO: 94.6 FL (ref 80–100)
MONOCYTES ABSOLUTE: 0.8 K/UL (ref 0–1.3)
MONOCYTES RELATIVE PERCENT: 2 %
NEUTROPHILS ABSOLUTE: 28.8 K/UL (ref 1.7–7.7)
NEUTROPHILS RELATIVE PERCENT: 72 %
PDW BLD-RTO: 15.1 % (ref 12.4–15.4)
PHOSPHORUS: 2.5 MG/DL (ref 2.5–4.9)
PLATELET # BLD: 298 K/UL (ref 135–450)
PLATELET SLIDE REVIEW: ADEQUATE
PMV BLD AUTO: 8.2 FL (ref 5–10.5)
POTASSIUM SERPL-SCNC: 3.5 MMOL/L (ref 3.5–5.1)
PROTHROMBIN TIME: 57.3 SEC (ref 11.7–14.5)
RBC # BLD: 3.99 M/UL (ref 4–5.2)
SLIDE REVIEW: ABNORMAL
SMUDGE CELLS: PRESENT
SODIUM BLD-SCNC: 135 MMOL/L (ref 136–145)
VACUOLATED NEUTROPHILS: PRESENT
WBC # BLD: 38.4 K/UL (ref 4–11)

## 2022-11-05 PROCEDURE — 6370000000 HC RX 637 (ALT 250 FOR IP): Performed by: PHYSICIAN ASSISTANT

## 2022-11-05 PROCEDURE — 6370000000 HC RX 637 (ALT 250 FOR IP): Performed by: STUDENT IN AN ORGANIZED HEALTH CARE EDUCATION/TRAINING PROGRAM

## 2022-11-05 PROCEDURE — 85025 COMPLETE CBC W/AUTO DIFF WBC: CPT

## 2022-11-05 PROCEDURE — 36415 COLL VENOUS BLD VENIPUNCTURE: CPT

## 2022-11-05 PROCEDURE — 2700000000 HC OXYGEN THERAPY PER DAY

## 2022-11-05 PROCEDURE — 85610 PROTHROMBIN TIME: CPT

## 2022-11-05 PROCEDURE — 2580000003 HC RX 258: Performed by: INTERNAL MEDICINE

## 2022-11-05 PROCEDURE — 70450 CT HEAD/BRAIN W/O DYE: CPT

## 2022-11-05 PROCEDURE — 2060000000 HC ICU INTERMEDIATE R&B

## 2022-11-05 PROCEDURE — 80069 RENAL FUNCTION PANEL: CPT

## 2022-11-05 PROCEDURE — 6370000000 HC RX 637 (ALT 250 FOR IP): Performed by: INTERNAL MEDICINE

## 2022-11-05 PROCEDURE — 2500000003 HC RX 250 WO HCPCS: Performed by: PHYSICIAN ASSISTANT

## 2022-11-05 PROCEDURE — 83735 ASSAY OF MAGNESIUM: CPT

## 2022-11-05 PROCEDURE — 93005 ELECTROCARDIOGRAM TRACING: CPT | Performed by: INTERNAL MEDICINE

## 2022-11-05 PROCEDURE — 94761 N-INVAS EAR/PLS OXIMETRY MLT: CPT

## 2022-11-05 RX ORDER — MECLIZINE HCL 12.5 MG/1
12.5 TABLET ORAL 3 TIMES DAILY PRN
Status: DISCONTINUED | OUTPATIENT
Start: 2022-11-05 | End: 2022-11-12 | Stop reason: HOSPADM

## 2022-11-05 RX ORDER — SODIUM CHLORIDE 9 MG/ML
INJECTION, SOLUTION INTRAVENOUS CONTINUOUS
Status: DISCONTINUED | OUTPATIENT
Start: 2022-11-05 | End: 2022-11-06

## 2022-11-05 RX ORDER — BUTALBITAL, ACETAMINOPHEN AND CAFFEINE 50; 325; 40 MG/1; MG/1; MG/1
1 TABLET ORAL EVERY 6 HOURS PRN
Status: DISCONTINUED | OUTPATIENT
Start: 2022-11-05 | End: 2022-11-12 | Stop reason: HOSPADM

## 2022-11-05 RX ORDER — OXYCODONE HYDROCHLORIDE 5 MG/1
5 TABLET ORAL EVERY 6 HOURS PRN
Status: DISCONTINUED | OUTPATIENT
Start: 2022-11-05 | End: 2022-11-12 | Stop reason: HOSPADM

## 2022-11-05 RX ADMIN — METRONIDAZOLE 500 MG: 500 INJECTION, SOLUTION INTRAVENOUS at 05:29

## 2022-11-05 RX ADMIN — SODIUM CHLORIDE: 9 INJECTION, SOLUTION INTRAVENOUS at 10:24

## 2022-11-05 RX ADMIN — FIDAXOMICIN 200 MG: 200 TABLET, FILM COATED ORAL at 22:28

## 2022-11-05 RX ADMIN — METRONIDAZOLE 500 MG: 500 INJECTION, SOLUTION INTRAVENOUS at 14:14

## 2022-11-05 RX ADMIN — AMIODARONE HYDROCHLORIDE 200 MG: 200 TABLET ORAL at 09:28

## 2022-11-05 RX ADMIN — ACETAMINOPHEN 650 MG: 325 TABLET ORAL at 09:28

## 2022-11-05 RX ADMIN — ACETAMINOPHEN 650 MG: 325 TABLET ORAL at 22:21

## 2022-11-05 RX ADMIN — METRONIDAZOLE 500 MG: 500 INJECTION, SOLUTION INTRAVENOUS at 22:16

## 2022-11-05 RX ADMIN — ACETAMINOPHEN 650 MG: 325 TABLET ORAL at 03:13

## 2022-11-05 RX ADMIN — METOPROLOL SUCCINATE 12.5 MG: 25 TABLET, FILM COATED, EXTENDED RELEASE ORAL at 09:28

## 2022-11-05 RX ADMIN — GABAPENTIN 300 MG: 300 CAPSULE ORAL at 22:18

## 2022-11-05 RX ADMIN — ACETAMINOPHEN 650 MG: 325 TABLET ORAL at 15:27

## 2022-11-05 RX ADMIN — FIDAXOMICIN 200 MG: 200 TABLET, FILM COATED ORAL at 09:30

## 2022-11-05 RX ADMIN — PANTOPRAZOLE SODIUM 40 MG: 40 TABLET, DELAYED RELEASE ORAL at 05:26

## 2022-11-05 RX ADMIN — SODIUM CHLORIDE: 9 INJECTION, SOLUTION INTRAVENOUS at 14:02

## 2022-11-05 RX ADMIN — BUTALBITAL, ACETAMINOPHEN, AND CAFFEINE 1 TABLET: 50; 325; 40 TABLET ORAL at 18:20

## 2022-11-05 ASSESSMENT — PAIN SCALES - GENERAL
PAINLEVEL_OUTOF10: 8
PAINLEVEL_OUTOF10: 5
PAINLEVEL_OUTOF10: 4
PAINLEVEL_OUTOF10: 4
PAINLEVEL_OUTOF10: 8
PAINLEVEL_OUTOF10: 4

## 2022-11-05 ASSESSMENT — PAIN SCALES - WONG BAKER
WONGBAKER_NUMERICALRESPONSE: 0
WONGBAKER_NUMERICALRESPONSE: 0

## 2022-11-05 ASSESSMENT — PAIN DESCRIPTION - LOCATION
LOCATION: ABDOMEN;HEAD
LOCATION: HEAD
LOCATION: HEAD
LOCATION: ABDOMEN
LOCATION: HEAD

## 2022-11-05 ASSESSMENT — PAIN DESCRIPTION - ORIENTATION
ORIENTATION: MID
ORIENTATION: LOWER
ORIENTATION: MID

## 2022-11-05 ASSESSMENT — PAIN - FUNCTIONAL ASSESSMENT
PAIN_FUNCTIONAL_ASSESSMENT: ACTIVITIES ARE NOT PREVENTED

## 2022-11-05 ASSESSMENT — PAIN DESCRIPTION - ONSET: ONSET: ON-GOING

## 2022-11-05 ASSESSMENT — PAIN DESCRIPTION - DESCRIPTORS
DESCRIPTORS: ACHING
DESCRIPTORS: SORE

## 2022-11-05 ASSESSMENT — PAIN DESCRIPTION - PAIN TYPE: TYPE: ACUTE PAIN

## 2022-11-05 NOTE — PROGRESS NOTES
Gastroenterology Progress Note    Lissette Aguilar is a 78 y.o. female patient. Hospitalization Day:2    SUBJECTIVE: Patient reports still feeling nauseated. She has had less and diarrhea. She reports approximately 3-4 bowel movements in the past 24 hours. She did not have any nocturnal diarrhea. She has not seen any rectal bleeding. She has had no fevers recorded. She denies any abdominal distention    ROS:  Gastrointestinal ROS: positive for - change in bowel habits, diarrhea, and nausea/vomiting    Physical    VITALS:  BP (!) 93/48   Pulse 59   Temp 98.2 °F (36.8 °C) (Oral)   Resp 21   Ht 5' (1.524 m)   Wt 151 lb 10.8 oz (68.8 kg)   SpO2 93%   BMI 29.62 kg/m²   TEMPERATURE:  Current - Temp: 98.2 °F (36.8 °C); Max - Temp  Av.5 °F (36.9 °C)  Min: 98 °F (36.7 °C)  Max: 99.4 °F (37.4 °C)    General:  Alert and oriented,  No apparent distress  Skin- without jaundice  Eyes: anicteric sclera  Cardiac: RRR, Nl s1s2, without murmurs  Lungs CTA Bilaterally, normal effort  Abdomen soft, ND, NT, no HSM, Bowel sounds normal  Ext: without edema  Neuro: no asterixis     Data    Data Review:    Recent Labs     22   WBC 27.0* 34.5* 38.4*   HGB 12.8 12.6 12.0   HCT 39.1 37.8 37.8   MCV 94.4 92.3 94.6    298 298     Recent Labs     22  0506 22    136 135*   K 3.2* 3.9 3.5   CL 98* 97* 98*   CO2 26 28 27   PHOS 2.5 2.6 2.5   BUN 11 18 20   CREATININE 0.9 0.9 1.0     Recent Labs     22   AST 15   ALT 11   BILITOT 0.7   ALKPHOS 86     Recent Labs     22   LIPASE 9.0*     Recent Labs     224 22   PROTIME 36.4* 48.5* 57.3*   INR 3.63* 5.20* 6.41*       Radiology Review:    CT ABDOMEN PELVIS W IV CONTRAST Additional Contrast? None   Final Result   1. No pulmonary embolism.    2. Tiny subpleural nodules in the periphery of the lungs may represent an   underlying infectious or inflammatory process. Follow-up recommendations are   provided below. 3. Diffuse mucosal thickening of the colon suggesting underlying pattern of   infectious colitis including C difficile. 4. Incidental cholelithiasis and stable right adrenal nodule suspected to   represent an adenoma. RECOMMENDATIONS:   Multiple pulmonary nodules. Most severe: 3 mm solid pulmonary nodule within   the upper lobe. If patient is low risk for malignancy, no routine follow-up   imaging is recommended; if patient is high risk for malignancy, a   non-contrast Chest CT at 12 months is optional. If performed and the nodule   is stable at 12 months, no further follow-up is recommended. These guidelines do not apply to immunocompromised patients and patients with   cancer. Follow up in patients with significant comorbidities as clinically   warranted. For lung cancer screening, adhere to Lung-RADS guidelines. Reference: Radiology. 2017; 284(1):228-43. CT CHEST PULMONARY EMBOLISM W CONTRAST   Final Result   1. No pulmonary embolism. 2. Tiny subpleural nodules in the periphery of the lungs may represent an   underlying infectious or inflammatory process. Follow-up recommendations are   provided below. 3. Diffuse mucosal thickening of the colon suggesting underlying pattern of   infectious colitis including C difficile. 4. Incidental cholelithiasis and stable right adrenal nodule suspected to   represent an adenoma. RECOMMENDATIONS:   Multiple pulmonary nodules. Most severe: 3 mm solid pulmonary nodule within   the upper lobe. If patient is low risk for malignancy, no routine follow-up   imaging is recommended; if patient is high risk for malignancy, a   non-contrast Chest CT at 12 months is optional. If performed and the nodule   is stable at 12 months, no further follow-up is recommended. These guidelines do not apply to immunocompromised patients and patients with   cancer.  Follow up in patients with significant comorbidities as clinically   warranted. For lung cancer screening, adhere to Lung-RADS guidelines. Reference: Radiology. 2017; 284(1):228-43. CT HEAD WO CONTRAST   Final Result   No acute intracranial abnormality. XR ANKLE LEFT (MIN 3 VIEWS)   Final Result   Ankle mortise intact. No acute fracture or dislocation of the ankle. No   acute fracture or dislocation of the foot. Degenerative changes are present   at the interphalangeal joints and there is deformity of the 5th PIP joint   which may be sequela of a remote injury. No soft tissue swelling of the   foot. Moderate soft tissue swelling/edema over the medial malleolus of the   ankle. RECOMMENDATION:   Medial soft tissue swelling about the left ankle with no underlying fracture. Degenerative changes in the left foot with no acute abnormality. XR FOOT LEFT (MIN 3 VIEWS)   Final Result   Ankle mortise intact. No acute fracture or dislocation of the ankle. No   acute fracture or dislocation of the foot. Degenerative changes are present   at the interphalangeal joints and there is deformity of the 5th PIP joint   which may be sequela of a remote injury. No soft tissue swelling of the   foot. Moderate soft tissue swelling/edema over the medial malleolus of the   ankle. RECOMMENDATION:   Medial soft tissue swelling about the left ankle with no underlying fracture. Degenerative changes in the left foot with no acute abnormality. XR CHEST PORTABLE   Final Result   Cardiomegaly with mild vascular congestion that is stable. ASSESSMENT:78year-old  female with a past medical history of CAD, A. fib, COPD, hypertension, factor V Leiden deficiency, CLL, previous splenectomy, who presented with a 3-day history of nausea, poor p.o. intake, diarrhea  1) Severe Clostridium colitis-  WBC increased to 38.4. On Dificid (Day 2) and IV Flagyl (Day 2) .  This is her initial diagnosis. Prior splenectomy and history of CLL with baseline WBC of 12-16K. CT with colitis. Exam benign. Symptoms improving  2) CLL  3) Prior splenectomy  4) Hx of Afib  5) Hx of CAD  6) Hx of COPD  7) Hx of Factor V Leiden- on warfarin at home . 8) Supratherapeutic INR-   9) Hx of hypokalemia     Plan  Dificid 200mg Po bid  IV flagyl tid  Anti-emetics prn  Monitor CBC and INR  Diet as tolerated. Will follow. Dosing of coumadin per pharmacy. Thank you for allowing me to participate in the care of your patient. Please feel free to contact me with any concerns.   [address] [facility], DO

## 2022-11-05 NOTE — PROGRESS NOTES
Hospital Medicine Progress Note     Date:  11/5/2022    PCP: Izabel Hampton MD (Tel: [phone])    Date of Admission: 11/2/2022    Chief complaint:   Chief Complaint   Patient presents with    Leg Pain     Fall leg and ankle pain       Brief admission history: 24-year-old male with history of paroxysmal atrial fibrillation, history of CHF (previously systolic dysfunction, although recent echocardiogram with preserved ejection fraction, diastolic function not measured), CLL, s/p splenectomy, COPD, essential hypertension, factor V Leiden mutation, chronic Coumadin use, osteoporosis, hyperlipidemia, who presented to the emergency room on November 3, 2022 with complaints of generalized weakness, fall, recent nausea, vomiting and diarrhea, with associated decreased oral intake. Stool study was positive for C. difficile infection. She was noted to be hypoxic on presentation. Assessment/plan:  C. difficile colitis, severe. Continue Dificid, IV Flagyl. Continue intravenous fluid. GI on board and assisting with management. Nausea and vomiting, likely secondary to C. difficile infection/colitis. Antiemetics in place. Generalized weakness. Likely secondary to underlying medical conditions. Fall precautions. Therapy evaluation in place. Recent fall at home, likely secondary to generalized weakness. Fall precautions. History of factor V Leiden, on chronic anticoagulation with Coumadin. INR supratherapeutic due to antibiotic therapy. Coumadin dosing per pharmacy, hold when appropriate. Monitor closely for bleed. History of atrial fibrillation. Continue amiodarone. On Coumadin as noted above. Prolonged QTc noted on admission EKG. Avoid QT prolonging medications. Currently on amiodarone for history of A. fib. Recent telemetry strip with improved QT. Repeat EKG on November 5, 2022 with resolution of QTc, 390. Patient had documented oxygen saturation of 88% on room air, qualifying as hypoxia. However, I am unsure as to the validity of these pulse oximeter reading. She has since been on 2 L/min supplemental oxygen with saturations well above 92%. \"Discussed with nursing staff about weaning off supplemental oxygen, as it is possible that the previously documented oxygen saturation of 88% was inaccurate. CT-PE protocol was obtained on admission and no significant findings noted. Other comorbidities: history of paroxysmal atrial fibrillation, history of CHF (previously systolic dysfunction, although recent echocardiogram with preserved ejection fraction, diastolic function not measured), CLL, s/p splenectomy, COPD, essential hypertension, factor V Leiden mutation, chronic Coumadin use, osteoporosis, hyperlipidemia. Diet: ADULT DIET; Regular; 4 carb choices (60 gm/meal); Low Sodium (2 gm)    Code status: Full Code   ----------  Subjective  Diarrhea improved but still quite a lot. No other complaints. Objective  Physical exam:  Vitals: BP (!) 93/48   Pulse 65   Temp 98.2 °F (36.8 °C) (Oral)   Resp 18   Ht 5' (1.524 m)   Wt 151 lb 10.8 oz (68.8 kg)   SpO2 93%   BMI 29.62 kg/m²   Gen/overall appearance: Not in acute distress. Alert. Oriented x3. Head: Normocephalic, atraumatic  Eyes: EOMI, good acuity  ENT: Oral mucosa moist  Neck: No JVD, thyromegaly  CVS: Nml S1S2, no MRG, RRR  Pulm: Clear bilaterally. No crackles/wheezes  Gastrointestinal: Mild diffuse tenderness to palpation. Soft, ND, +BS  Musculoskeletal: No edema. Warm  Neuro: No focal deficit. Moves extremity spontaneously. Psychiatry: Appropriate affect. Not agitated. Skin: Warm, dry with normal turgor. No rash  Capillary refill: Brisk,< 3 seconds   Peripheral Pulses: +2 palpable, equal bilaterally      24HR INTAKE/OUTPUT:    Intake/Output Summary (Last 24 hours) at 11/5/2022 1003  Last data filed at 11/5/2022 0940  Gross per 24 hour   Intake 1620.77 ml   Output --   Net 1620.77 ml     I/O last 3 completed shifts:   In: 1620.8 [P.O.:720;  I.V.:124.9; IV Piggyback:775.9]  Out: -   I/O this shift:  In: 240 [P.O.:240]  Out: -   Meds:    Fidaxomicin  200 mg Oral BID    metroNIDAZOLE  500 mg IntraVENous Q8H    amiodarone  200 mg Oral Daily    gabapentin  300 mg Oral Nightly    metoprolol succinate  12.5 mg Oral Daily    pantoprazole  40 mg Oral QAM AC    sodium chloride flush  5-40 mL IntraVENous 2 times per day    warfarin placeholder: dosing by pharmacy   Other RX Placeholder     Infusions:    sodium chloride Stopped (11/05/22 0700)     PRN Meds: trimethobenzamide, meclizine, sodium chloride flush, sodium chloride, acetaminophen **OR** acetaminophen, potassium chloride **OR** potassium alternative oral replacement **OR** potassium chloride    Labs/imaging:  CBC:   Recent Labs     11/03/22  0506 11/04/22  0529 11/05/22  0533   WBC 27.0* 34.5* 38.4*   HGB 12.8 12.6 12.0    298 298     BMP:    Recent Labs     11/03/22  0506 11/04/22  0529 11/05/22  0533    136 135*   K 3.2* 3.9 3.5   CL 98* 97* 98*   CO2 26 28 27   BUN 11 18 20   CREATININE 0.9 0.9 1.0   GLUCOSE 108* 90 86     Hepatic:   Recent Labs     11/03/22  0035   AST 15   ALT 11   BILITOT 0.7   ALKPHOS 86       Anna Waever MD  -------------------------------  Rounding hospitalist

## 2022-11-05 NOTE — PROGRESS NOTES
Dr Emily Krishna notified of INR of 6.41 this morning. New orders received to hold todays dose of coumadin.     Electronically signed by Shannan Thompson RN on 11/5/2022 at 9:40 AM

## 2022-11-05 NOTE — PROGRESS NOTES
Clinical Pharmacy Note  Warfarin Consult    Elba Olivas is a 78 y.o. female receiving warfarin managed by pharmacy. Warfarin Indication: Factor V Leiden deficiency, h/o PE, Afib  Target INR range: 2-3   Dose prior to admission: 2.5 mg daily except NONE on Friday    Current warfarin drug-drug interactions: Zosyn, metronidazole    Recent Labs     11/03/22  0034 11/03/22  0035 11/03/22  0506 11/04/22  0529 11/05/22  0533   HGB  --    < > 12.8 12.6 12.0   HCT  --    < > 39.1 37.8 37.8   INR 3.63*  --   --  5.20* 6.41*    < > = values in this interval not displayed. Assessment/Plan:  Hold warfarin tonight. Daily PT/INR until stable within therapeutic range. Thank you for the consult. Will continue to follow.     Luis Whitt, [facility], PharmD, BCPS  11/5/2022 9:24 AM

## 2022-11-05 NOTE — PLAN OF CARE
Problem: Discharge Planning  Goal: Discharge to home or other facility with appropriate resources  11/5/2022 0551 by Mago Pritchard RN  Outcome: Not Progressing  Note: Pt on IV ABX and has elevated WBC. Problem: Gastrointestinal - Adult  Goal: Maintains or returns to baseline bowel function  11/5/2022 0551 by Mago Pritchard RN  Outcome: Not Progressing  Note: Pt continues to have ABD tenderness and loose BMs with flatus. She has a poor PO intake as well. Problem: Infection - Adult  Goal: Absence of infection at discharge  11/5/2022 0551 by Mago Pritchard RN  Outcome: Not Progressing    Problem: Pain  Goal: Verbalizes/displays adequate comfort level or baseline comfort level  11/5/2022 0551 by Mago Pritchard RN  Outcome: Progressing  Note: Pt medicated with prn tylenol for pain and noted to have relief. Problem: Safety - Adult  Goal: Free from fall injury  11/5/2022 0551 by Mago Pritchard RN  Outcome: Progressing  Note: Pt is a high fall risk. Bed in locked, low position with side rails up X 2 and an active bed alarm. Pt is a standby assist with a walker when ambulating. Fall risk sign and socks in place. Pt is oriented to her abilities and compliant with the use of her call light for needs.

## 2022-11-05 NOTE — PLAN OF CARE
Problem: Discharge Planning  Goal: Discharge to home or other facility with appropriate resources  11/5/2022 1020 by Mirella Davidson RN  Outcome: Progressing  Flowsheets (Taken 11/5/2022 5753)  Discharge to home or other facility with appropriate resources:   Identify barriers to discharge with patient and caregiver   Arrange for needed discharge resources and transportation as appropriate   Identify discharge learning needs (meds, wound care, etc)     Problem: Pain  Goal: Verbalizes/displays adequate comfort level or baseline comfort level  11/5/2022 1020 by Mirella Davidson RN  Outcome: Progressing     Problem: Safety - Adult  Goal: Free from fall injury  11/5/2022 1020 by Mirella Davidson RN  Outcome: Progressing     Problem: ABCDS Injury Assessment  Goal: Absence of physical injury  11/5/2022 1020 by Mirella Davidson RN  Outcome: Progressing  Flowsheets (Taken 11/5/2022 1017)  Absence of Physical Injury: Implement safety measures based on patient assessment     Problem: Cardiovascular - Adult  Goal: Maintains optimal cardiac output and hemodynamic stability  11/5/2022 1020 by Mirella Davidson RN  Outcome: Progressing  Flowsheets (Taken 11/5/2022 6682)  Maintains optimal cardiac output and hemodynamic stability:   Monitor blood pressure and heart rate   Monitor urine output and notify Licensed Independent Practitioner for values outside of normal range   Assess for signs of decreased cardiac output     Problem: Cardiovascular - Adult  Goal: Absence of cardiac dysrhythmias or at baseline  11/5/2022 1020 by Mirella Davidson RN  Outcome: Progressing  Flowsheets (Taken 11/5/2022 8101)  Absence of cardiac dysrhythmias or at baseline:   Monitor cardiac rate and rhythm   Assess for signs of decreased cardiac output   Administer antiarrhythmia medication and electrolyte replacement as ordered     Problem: Gastrointestinal - Adult  Goal: Maintains or returns to baseline bowel function  11/5/2022 1020 by Mirella Davidson RN  Outcome: Progressing  Flowsheets (Taken 11/5/2022 0315)  Maintains or returns to baseline bowel function:   Assess bowel function   Encourage oral fluids to ensure adequate hydration   Administer IV fluids as ordered to ensure adequate hydration   Administer ordered medications as needed   Encourage mobilization and activity     Problem: Infection - Adult  Goal: Absence of infection at discharge  11/5/2022 1020 by Homar Durán RN  Outcome: Progressing  Flowsheets (Taken 11/5/2022 4420)  Absence of infection at discharge: Assess and monitor for signs and symptoms of infection     Problem: Metabolic/Fluid and Electrolytes - Adult  Goal: Electrolytes maintained within normal limits  11/5/2022 1020 by Homar Durán RN  Outcome: Progressing  Flowsheets (Taken 11/5/2022 1368)  Electrolytes maintained within normal limits:   Monitor labs and assess patient for signs and symptoms of electrolyte imbalances   Administer electrolyte replacement as ordered     Problem: Metabolic/Fluid and Electrolytes - Adult  Goal: Hemodynamic stability and optimal renal function maintained  11/5/2022 1020 by Hoamr Durán RN  Outcome: Progressing  Flowsheets (Taken 11/5/2022 9043)  Hemodynamic stability and optimal renal function maintained:   Monitor labs and assess for signs and symptoms of volume excess or deficit   Monitor intake, output and patient weight   Monitor urine specific gravity, serum osmolarity and serum sodium as indicated or ordered   Monitor response to interventions for patient's volume status, including labs, urine output, blood pressure (other measures as available)   Encourage oral intake as appropriate     Problem: Neurosensory - Adult  Goal: Achieves stable or improved neurological status  Outcome: Progressing     Problem: Neurosensory - Adult  Goal: Achieves maximal functionality and self care  Outcome: Progressing     Problem: Skin/Tissue Integrity - Adult  Goal: Skin integrity remains intact  Outcome: Progressing  Flowsheets  Taken 11/5/2022 1017 by Noemi Aguayo RN  Skin Integrity Remains Intact: Monitor for areas of redness and/or skin breakdown  Taken 11/5/2022 4507 by Noemi Aguayo RN  Skin Integrity Remains Intact: Monitor for areas of redness and/or skin breakdown  Taken 11/4/2022 2145 by Azam Gutierrez RN  Skin Integrity Remains Intact: Monitor for areas of redness and/or skin breakdown     Problem: Skin/Tissue Integrity - Adult  Goal: Oral mucous membranes remain intact  Outcome: Progressing     Problem: Musculoskeletal - Adult  Goal: Return mobility to safest level of function  Outcome: Progressing     Problem: Musculoskeletal - Adult  Goal: Return ADL status to a safe level of function  Outcome: Progressing     Problem: Skin/Tissue Integrity  Goal: Absence of new skin breakdown  Description: 1. Monitor for areas of redness and/or skin breakdown  2. Assess vascular access sites hourly  3. Every 4-6 hours minimum:  Change oxygen saturation probe site  4. Every 4-6 hours:  If on nasal continuous positive airway pressure, respiratory therapy assess nares and determine need for appliance change or resting period. 11/5/2022 1020 by Noemi Aguayo RN  Outcome: Progressing     Problem: Discharge Planning  Goal: Discharge to home or other facility with appropriate resources  11/5/2022 1020 by Noemi Aguayo RN  Outcome: Progressing  [address] [place] (Taken 11/5/2022 2013)  Discharge to home or other facility with appropriate resources:   Identify barriers to discharge with patient and caregiver   Arrange for needed discharge resources and transportation as appropriate   Identify discharge learning needs (meds, wound care, etc)  11/5/2022 0551 by Azam Gutierrez RN  Outcome: Not Progressing  Flowsheets (Taken 11/4/2022 2145)  Discharge to home or other facility with appropriate resources: Identify barriers to discharge with patient and caregiver  Note: Pt on IV ABX and has elevated WBC.       Problem: Gastrointestinal - Adult  Goal: Maintains or returns to baseline bowel function  11/5/2022 1020 by Donnie Day RN  Outcome: Progressing  [address] (Taken 11/5/2022 9952)  Maintains or returns to baseline bowel function:   Assess bowel function   Encourage oral fluids to ensure adequate hydration   Administer IV fluids as ordered to ensure adequate hydration   Administer ordered medications as needed   Encourage mobilization and activity  11/5/2022 0551 by Stephen Levin RN  Outcome: Not Progressing  Flowsheets (Taken 11/4/2022 2145)  Maintains or returns to baseline bowel function:   Assess bowel function   Encourage oral fluids to ensure adequate hydration   Administer ordered medications as needed   Encourage mobilization and activity  Note: Pt continues to have ABD tenderness and loose BMs with flatus. She has a poor PO intake as well.       Problem: Infection - Adult  Goal: Absence of infection at discharge  11/5/2022 1020 by Donnie Day RN  Outcome: Progressing  Flowsheets (Taken 11/5/2022 7571)  Absence of infection at discharge: Assess and monitor for signs and symptoms of infection  11/5/2022 0551 by Stephen Levin RN  Outcome: Not Progressing  Flowsheets (Taken 11/4/2022 2145)  Absence of infection at discharge:   Assess and monitor for signs and symptoms of infection   Monitor lab/diagnostic results   Administer medications as ordered   Instruct and encourage patient and family to use good hand hygiene technique   Identify and instruct in appropriate isolation precautions for identified infection/condition   Monitor all insertion sites i.e., indwelling lines, tubes and drains

## 2022-11-06 PROBLEM — R19.7 DIARRHEA: Status: ACTIVE | Noted: 2022-11-06

## 2022-11-06 PROBLEM — S00.93XA HEAD CONTUSION: Status: ACTIVE | Noted: 2022-11-06

## 2022-11-06 PROBLEM — R91.8 LUNG NODULES: Status: ACTIVE | Noted: 2022-11-06

## 2022-11-06 PROBLEM — Q89.01 ASPLENIA: Status: ACTIVE | Noted: 2022-11-06

## 2022-11-06 PROBLEM — E66.3 OVERWEIGHT (BMI 25.0-29.9): Status: ACTIVE | Noted: 2022-11-06

## 2022-11-06 PROBLEM — I48.20 CHRONIC ATRIAL FIBRILLATION (HCC): Status: ACTIVE | Noted: 2022-11-06

## 2022-11-06 PROBLEM — K80.20 CALCULUS OF GALLBLADDER WITHOUT CHOLECYSTITIS WITHOUT OBSTRUCTION: Status: ACTIVE | Noted: 2022-11-06

## 2022-11-06 PROBLEM — D72.825 BANDEMIA: Status: ACTIVE | Noted: 2022-11-06

## 2022-11-06 PROBLEM — S93.402A SPRAIN OF LEFT ANKLE: Status: ACTIVE | Noted: 2022-11-06

## 2022-11-06 LAB
ALBUMIN SERPL-MCNC: 2.5 G/DL (ref 3.4–5)
ALP BLD-CCNC: 73 U/L (ref 40–129)
ALT SERPL-CCNC: 8 U/L (ref 10–40)
ANION GAP SERPL CALCULATED.3IONS-SCNC: 9 MMOL/L (ref 3–16)
AST SERPL-CCNC: 11 U/L (ref 15–37)
BILIRUB SERPL-MCNC: 0.5 MG/DL (ref 0–1)
BILIRUBIN DIRECT: 0.3 MG/DL (ref 0–0.3)
BILIRUBIN, INDIRECT: 0.2 MG/DL (ref 0–1)
BUN BLDV-MCNC: 20 MG/DL (ref 7–20)
CALCIUM SERPL-MCNC: 7.8 MG/DL (ref 8.3–10.6)
CHLORIDE BLD-SCNC: 99 MMOL/L (ref 99–110)
CO2: 28 MMOL/L (ref 21–32)
CREAT SERPL-MCNC: 0.9 MG/DL (ref 0.6–1.2)
EKG ATRIAL RATE: 59 BPM
EKG DIAGNOSIS: NORMAL
EKG P AXIS: 66 DEGREES
EKG P-R INTERVAL: 168 MS
EKG Q-T INTERVAL: 394 MS
EKG QRS DURATION: 106 MS
EKG QTC CALCULATION (BAZETT): 390 MS
EKG R AXIS: -35 DEGREES
EKG T AXIS: 93 DEGREES
EKG VENTRICULAR RATE: 59 BPM
GFR SERPL CREATININE-BSD FRML MDRD: >60 ML/MIN/{1.73_M2}
GLUCOSE BLD-MCNC: 76 MG/DL (ref 70–99)
GLUCOSE BLD-MCNC: 87 MG/DL (ref 70–99)
INR BLD: 6.67 (ref 0.87–1.14)
MAGNESIUM: 2.6 MG/DL (ref 1.8–2.4)
PERFORMED ON: NORMAL
PHOSPHORUS: 2.7 MG/DL (ref 2.5–4.9)
POTASSIUM SERPL-SCNC: 3.3 MMOL/L (ref 3.5–5.1)
PROTHROMBIN TIME: 59.1 SEC (ref 11.7–14.5)
SODIUM BLD-SCNC: 136 MMOL/L (ref 136–145)
TOTAL PROTEIN: 4.1 G/DL (ref 6.4–8.2)

## 2022-11-06 PROCEDURE — 93010 ELECTROCARDIOGRAM REPORT: CPT | Performed by: INTERNAL MEDICINE

## 2022-11-06 PROCEDURE — 6370000000 HC RX 637 (ALT 250 FOR IP): Performed by: INTERNAL MEDICINE

## 2022-11-06 PROCEDURE — 80069 RENAL FUNCTION PANEL: CPT

## 2022-11-06 PROCEDURE — 2060000000 HC ICU INTERMEDIATE R&B

## 2022-11-06 PROCEDURE — 85610 PROTHROMBIN TIME: CPT

## 2022-11-06 PROCEDURE — 83735 ASSAY OF MAGNESIUM: CPT

## 2022-11-06 PROCEDURE — 80076 HEPATIC FUNCTION PANEL: CPT

## 2022-11-06 PROCEDURE — 6370000000 HC RX 637 (ALT 250 FOR IP): Performed by: PHYSICIAN ASSISTANT

## 2022-11-06 PROCEDURE — 99223 1ST HOSP IP/OBS HIGH 75: CPT | Performed by: INTERNAL MEDICINE

## 2022-11-06 PROCEDURE — 2500000003 HC RX 250 WO HCPCS: Performed by: PHYSICIAN ASSISTANT

## 2022-11-06 PROCEDURE — 6370000000 HC RX 637 (ALT 250 FOR IP): Performed by: STUDENT IN AN ORGANIZED HEALTH CARE EDUCATION/TRAINING PROGRAM

## 2022-11-06 PROCEDURE — 85025 COMPLETE CBC W/AUTO DIFF WBC: CPT

## 2022-11-06 PROCEDURE — 6360000002 HC RX W HCPCS: Performed by: INTERNAL MEDICINE

## 2022-11-06 RX ORDER — POTASSIUM CHLORIDE 20 MEQ/1
40 TABLET, EXTENDED RELEASE ORAL ONCE
Status: COMPLETED | OUTPATIENT
Start: 2022-11-06 | End: 2022-11-06

## 2022-11-06 RX ORDER — SODIUM CHLORIDE AND POTASSIUM CHLORIDE .9; .15 G/100ML; G/100ML
SOLUTION INTRAVENOUS CONTINUOUS
Status: DISPENSED | OUTPATIENT
Start: 2022-11-06 | End: 2022-11-08

## 2022-11-06 RX ADMIN — FIDAXOMICIN 200 MG: 200 TABLET, FILM COATED ORAL at 09:23

## 2022-11-06 RX ADMIN — ACETAMINOPHEN 650 MG: 325 TABLET ORAL at 04:33

## 2022-11-06 RX ADMIN — MECLIZINE 12.5 MG: 12.5 TABLET ORAL at 13:34

## 2022-11-06 RX ADMIN — MECLIZINE 12.5 MG: 12.5 TABLET ORAL at 20:01

## 2022-11-06 RX ADMIN — BUTALBITAL, ACETAMINOPHEN, AND CAFFEINE 1 TABLET: 50; 325; 40 TABLET ORAL at 00:12

## 2022-11-06 RX ADMIN — AMIODARONE HYDROCHLORIDE 200 MG: 200 TABLET ORAL at 09:23

## 2022-11-06 RX ADMIN — BUTALBITAL, ACETAMINOPHEN, AND CAFFEINE 1 TABLET: 50; 325; 40 TABLET ORAL at 20:01

## 2022-11-06 RX ADMIN — POTASSIUM CHLORIDE AND SODIUM CHLORIDE: 900; 150 INJECTION, SOLUTION INTRAVENOUS at 15:02

## 2022-11-06 RX ADMIN — FIDAXOMICIN 200 MG: 200 TABLET, FILM COATED ORAL at 21:28

## 2022-11-06 RX ADMIN — POTASSIUM CHLORIDE 40 MEQ: 1500 TABLET, EXTENDED RELEASE ORAL at 13:34

## 2022-11-06 RX ADMIN — GABAPENTIN 300 MG: 300 CAPSULE ORAL at 20:01

## 2022-11-06 RX ADMIN — METOPROLOL SUCCINATE 12.5 MG: 25 TABLET, FILM COATED, EXTENDED RELEASE ORAL at 09:23

## 2022-11-06 RX ADMIN — METRONIDAZOLE 500 MG: 500 INJECTION, SOLUTION INTRAVENOUS at 21:31

## 2022-11-06 RX ADMIN — METRONIDAZOLE 500 MG: 500 INJECTION, SOLUTION INTRAVENOUS at 13:38

## 2022-11-06 RX ADMIN — MECLIZINE 12.5 MG: 12.5 TABLET ORAL at 00:12

## 2022-11-06 RX ADMIN — PANTOPRAZOLE SODIUM 40 MG: 40 TABLET, DELAYED RELEASE ORAL at 06:50

## 2022-11-06 RX ADMIN — METRONIDAZOLE 500 MG: 500 INJECTION, SOLUTION INTRAVENOUS at 06:49

## 2022-11-06 ASSESSMENT — ENCOUNTER SYMPTOMS
WHEEZING: 0
SINUS PAIN: 0
SHORTNESS OF BREATH: 0
SORE THROAT: 0
ABDOMINAL PAIN: 1
DIARRHEA: 1
CONSTIPATION: 0
EYE REDNESS: 0
NAUSEA: 0
BACK PAIN: 0
SINUS PRESSURE: 0
RHINORRHEA: 0
COUGH: 0
EYE DISCHARGE: 0

## 2022-11-06 ASSESSMENT — PAIN SCALES - WONG BAKER
WONGBAKER_NUMERICALRESPONSE: 0
WONGBAKER_NUMERICALRESPONSE: 2

## 2022-11-06 ASSESSMENT — PAIN DESCRIPTION - DESCRIPTORS
DESCRIPTORS: ACHING

## 2022-11-06 ASSESSMENT — PAIN SCALES - GENERAL
PAINLEVEL_OUTOF10: 4
PAINLEVEL_OUTOF10: 4
PAINLEVEL_OUTOF10: 6

## 2022-11-06 ASSESSMENT — PAIN DESCRIPTION - LOCATION
LOCATION: HEAD

## 2022-11-06 ASSESSMENT — PAIN - FUNCTIONAL ASSESSMENT
PAIN_FUNCTIONAL_ASSESSMENT: ACTIVITIES ARE NOT PREVENTED

## 2022-11-06 NOTE — PROGRESS NOTES
Hospital Medicine Progress Note     Date:  11/6/2022    PCP: Denzel Reyna MD (Tel: [phone])    Date of Admission: 11/2/2022    Chief complaint:   Chief Complaint   Patient presents with    Leg Pain     Fall leg and ankle pain       Brief admission history: 78-year-old male with history of paroxysmal atrial fibrillation, history of CHF (previously systolic dysfunction, although recent echocardiogram with preserved ejection fraction, diastolic function not measured), CLL, s/p splenectomy, COPD, essential hypertension, factor V Leiden mutation, chronic Coumadin use, osteoporosis, hyperlipidemia, who presented to the emergency room on November 3, 2022 with complaints of generalized weakness, fall, recent nausea, vomiting and diarrhea, with associated decreased oral intake. Stool study was positive for C. difficile infection. She was noted to be hypoxic on presentation. Assessment/plan:  C. difficile colitis. Continue Dificid, IV Flagyl. Continue intravenous fluid. GI on board and assisting with management. Leukocytosis appears to have worsened despite treatment; will consult infectious disease for further recommendations. Nausea and vomiting, likely secondary to C. difficile infection/colitis. Much improved. Tolerating diet. Antiemetics in place. Generalized weakness. Likely secondary to underlying medical conditions. Fall precautions. Therapy evaluation in place. Recent fall at home, likely secondary to generalized weakness. Fall precautions. History of factor V Leiden, on chronic anticoagulation with Coumadin. INR supratherapeutic due to antibiotic therapy. Coumadin dosing per pharmacy, hold when appropriate. Monitor closely for bleed. History of atrial fibrillation. Continue amiodarone. On Coumadin as noted above. Prolonged QTc noted on admission EKG, resolved. Avoid QT prolonging medications. Patient is not hypoxic.   She is on supplemental oxygen for comfort only.  Other comorbidities: history of paroxysmal atrial fibrillation, history of CHF (previously systolic dysfunction, although recent echocardiogram with preserved ejection fraction, diastolic function not measured), CLL, s/p splenectomy, COPD, essential hypertension, factor V Leiden mutation, chronic Coumadin use, osteoporosis, hyperlipidemia. Diet: ADULT DIET; Regular    Code status: Full Code   ----------  Subjective  Diarrhea a bit better. Abdominal discomfort improved. Headache improved. Objective  Physical exam:  Vitals: BP (!) 102/54   Pulse 54   Temp 98 °F (36.7 °C) (Oral)   Resp 20   Ht 5' (1.524 m)   Wt 150 lb 9.2 oz (68.3 kg)   SpO2 97%   BMI 29.41 kg/m²   Gen/overall appearance: Not in acute distress. Alert. Oriented x3. Head: Normocephalic, atraumatic  Eyes: EOMI, good acuity  ENT: Oral mucosa moist  Neck: No JVD, thyromegaly  CVS: Nml S1S2, no MRG, RRR  Pulm: Clear bilaterally. No crackles/wheezes  Gastrointestinal: Nontender to palpation. Soft, ND, +BS  Musculoskeletal: No edema. Warm  Neuro: No focal deficit. Moves extremity spontaneously. Psychiatry: Appropriate affect. Not agitated. Skin: Warm, dry with normal turgor. No rash  Capillary refill: Brisk,< 3 seconds   Peripheral Pulses: +2 palpable, equal bilaterally      24HR INTAKE/OUTPUT:    Intake/Output Summary (Last 24 hours) at 11/6/2022 1326  Last data filed at 11/6/2022 0723  Gross per 24 hour   Intake 1797.42 ml   Output 350 ml   Net 1447.42 ml       I/O last 3 completed shifts: In: 2575.5 [P.O.:600; I.V.:891.1; IV Piggyback:1084.4]  Out: 350 [Urine:350]  I/O this shift:   In: 482.7 [I.V.:426; IV Piggyback:56.7]  Out: -   Meds:    potassium chloride  40 mEq Oral Once    Fidaxomicin  200 mg Oral BID    metroNIDAZOLE  500 mg IntraVENous Q8H    amiodarone  200 mg Oral Daily    gabapentin  300 mg Oral Nightly    metoprolol succinate  12.5 mg Oral Daily    pantoprazole  40 mg Oral QAM AC    sodium chloride flush  5-40 mL IntraVENous 2 times per day    warfarin placeholder: dosing by pharmacy   Other RX Placeholder     Infusions:    0.9% NaCl with KCl 20 mEq      sodium chloride Stopped (11/05/22 0700)     PRN Meds: trimethobenzamide, meclizine, butalbital-acetaminophen-caffeine, oxyCODONE, sodium chloride flush, sodium chloride, acetaminophen **OR** acetaminophen, potassium chloride **OR** potassium alternative oral replacement **OR** potassium chloride    Labs/imaging:  CBC:   Recent Labs     11/04/22 0529 11/05/22 0533 11/06/22 0725   WBC 34.5* 38.4* 44.4*   HGB 12.6 12.0 11.1*    298 328       BMP:    Recent Labs     11/04/22 0529 11/05/22 0533 11/06/22 0725    135* 136   K 3.9 3.5 3.3*   CL 97* 98* 99   CO2 28 27 28   BUN 18 20 20   CREATININE 0.9 1.0 0.9   GLUCOSE 90 86 76       Hepatic:   Recent Labs     11/06/22 0725   AST 11*   ALT 8*   BILITOT 0.5   ALKPHOS 73         Pao Moran MD  -------------------------------  [facility] hospitalist

## 2022-11-06 NOTE — PROGRESS NOTES
recommendations are   provided below. 3. Diffuse mucosal thickening of the colon suggesting underlying pattern of   infectious colitis including C difficile. 4. Incidental cholelithiasis and stable right adrenal nodule suspected to   represent an adenoma. RECOMMENDATIONS:   Multiple pulmonary nodules. Most severe: 3 mm solid pulmonary nodule within   the upper lobe. If patient is low risk for malignancy, no routine follow-up   imaging is recommended; if patient is high risk for malignancy, a   non-contrast Chest CT at 12 months is optional. If performed and the nodule   is stable at 12 months, no further follow-up is recommended. These guidelines do not apply to immunocompromised patients and patients with   cancer. Follow up in patients with significant comorbidities as clinically   warranted. For lung cancer screening, adhere to Lung-RADS guidelines. Reference: Radiology. 2017; 284(1):228-43. CT CHEST PULMONARY EMBOLISM W CONTRAST   Final Result   1. No pulmonary embolism. 2. Tiny subpleural nodules in the periphery of the lungs may represent an   underlying infectious or inflammatory process. Follow-up recommendations are   provided below. 3. Diffuse mucosal thickening of the colon suggesting underlying pattern of   infectious colitis including C difficile. 4. Incidental cholelithiasis and stable right adrenal nodule suspected to   represent an adenoma. RECOMMENDATIONS:   Multiple pulmonary nodules. Most severe: 3 mm solid pulmonary nodule within   the upper lobe. If patient is low risk for malignancy, no routine follow-up   imaging is recommended; if patient is high risk for malignancy, a   non-contrast Chest CT at 12 months is optional. If performed and the nodule   is stable at 12 months, no further follow-up is recommended. These guidelines do not apply to immunocompromised patients and patients with   cancer.  Follow up in patients with significant comorbidities as clinically   warranted. For lung cancer screening, adhere to Lung-RADS guidelines. Reference: Radiology. 2017; 284(1):228-43. CT HEAD WO CONTRAST   Final Result   No acute intracranial abnormality. XR ANKLE LEFT (MIN 3 VIEWS)   Final Result   Ankle mortise intact. No acute fracture or dislocation of the ankle. No   acute fracture or dislocation of the foot. Degenerative changes are present   at the interphalangeal joints and there is deformity of the 5th PIP joint   which may be sequela of a remote injury. No soft tissue swelling of the   foot. Moderate soft tissue swelling/edema over the medial malleolus of the   ankle. RECOMMENDATION:   Medial soft tissue swelling about the left ankle with no underlying fracture. Degenerative changes in the left foot with no acute abnormality. XR FOOT LEFT (MIN 3 VIEWS)   Final Result   Ankle mortise intact. No acute fracture or dislocation of the ankle. No   acute fracture or dislocation of the foot. Degenerative changes are present   at the interphalangeal joints and there is deformity of the 5th PIP joint   which may be sequela of a remote injury. No soft tissue swelling of the   foot. Moderate soft tissue swelling/edema over the medial malleolus of the   ankle. RECOMMENDATION:   Medial soft tissue swelling about the left ankle with no underlying fracture. Degenerative changes in the left foot with no acute abnormality. XR CHEST PORTABLE   Final Result   Cardiomegaly with mild vascular congestion that is stable. ASSESSMENT:78year-old  female with a past medical history of CAD, A. fib, COPD, hypertension, factor V Leiden deficiency, CLL, previous splenectomy, who presented with a 3-day history of nausea, poor p.o. intake, diarrhea    1) Severe Clostridium colitis-  clinically improved but  WBC increased to 44   On Dificid (Day 3) and IV Flagyl (Day 3) . This is her initial diagnosis.   Prior splenectomy and history of CLL with baseline WBC of 12-16K. CT with colitis but no megacolon. Exam benign. Symptoms improving. Possible leukemoid reaction may be exacerbated by her prior splenectomy and CLL diagnosis. 2) CLL-   3) Prior splenectomy  4) Hx of Afib  5) Hx of CAD  6) Hx of COPD  7) Hx of Factor V Leiden- on warfarin at home . 8) Supratherapeutic INR- INR 6.67.    9) Hx of hypokalemia- replace. Plan  Continue Dificid 200mg Po bid and IV flagyl tid   Day 3 of treatment  Anti-emetics prn  Monitor CBC and INR  May need ID input and hematology input if leukemoid reaction worsened. Diet as tolerated. Will follow. Dosing of coumadin per pharmacy. Thank you for allowing me to participate in the care of your patient. Please feel free to contact me with any concerns.   [address] [place] [address] [place], DO

## 2022-11-06 NOTE — PLAN OF CARE
Problem: Discharge Planning  Goal: Discharge to home or other facility with appropriate resources  Outcome: Not Progressing  Note: Pt continuing to have increasing WBC, diarrhea, ABD pain, and HA. Problem: Gastrointestinal - Adult  Goal: Maintains or returns to baseline bowel function  Outcome: Not Progressing  Note: Pt continues to have diarrhea and/or flatus when up to the toilet. She c/o nausea once and was medicated with Antivert which she said helped. She continues to have a poor PO intake. Problem: Infection - Adult  Goal: Absence of infection at discharge  Outcome: Not Progressing     Problem: Pain  Goal: Verbalizes/displays adequate comfort level or baseline comfort level  Outcome: Progressing  Note: Pt medicated with prn Fioricet for HA and noted to be resting in bed with eyes closed upon reassessment. Pt also received a dose of Tylenol prior to the Fioricet because she wasn't due. She said her HA decreased between the tylenol and the Fioricet but did not go away completely. Problem: Safety - Adult  Goal: Free from fall injury  Outcome: Progressing  Note: Pt is a high fall risk. Bed in locked, low position with side rails up X 2 and an active bed alarm. Fall risk sign, socks, and bracelet in place.  Pt is up with a walker and standby assist.

## 2022-11-06 NOTE — PROGRESS NOTES
Pt with 72 hour telemetry orders that  at 0430. RN called [facility] and pt has not had any significant telemetry alarms in the last 24 hours and her VS have been stable on 3 L NC. Rn discontinued telemetry when order  because there is no indication to continue. [facility] and PCA made aware that pt no longer requiring telemetry.

## 2022-11-06 NOTE — PROGRESS NOTES
Clinical Pharmacy Note  Warfarin Consult    Pee Love is a 78 y.o. female receiving warfarin managed by pharmacy. Warfarin Indication: Factor V Leiden deficiency, h/o PE, Afib  Target INR range: 2-3   Dose prior to admission: 2.5 mg daily except NONE on Friday    Current warfarin drug-drug interactions: Metronidazole    Recent Labs     11/04/22  0529 11/05/22  0533 11/06/22  0725   HGB 12.6 12.0 11.1*   HCT 37.8 37.8 35.3*   INR 5.20* 6.41* 6.67*         Assessment/Plan:  Hold warfarin tonight. Daily PT/INR until stable within therapeutic range. Thank you for the consult. Will continue to follow.     Raji Doe, [address] [place] [address], PharmD, BCPS  11/6/2022 8:49 AM

## 2022-11-07 ENCOUNTER — APPOINTMENT (OUTPATIENT)
Dept: CT IMAGING | Age: 82
DRG: 372 | End: 2022-11-07
Payer: MEDICARE

## 2022-11-07 LAB
ALBUMIN SERPL-MCNC: 2.6 G/DL (ref 3.4–5)
ALP BLD-CCNC: 81 U/L (ref 40–129)
ALT SERPL-CCNC: 7 U/L (ref 10–40)
ANION GAP SERPL CALCULATED.3IONS-SCNC: 11 MMOL/L (ref 3–16)
ANISOCYTOSIS: ABNORMAL
ANISOCYTOSIS: ABNORMAL
AST SERPL-CCNC: 13 U/L (ref 15–37)
BANDED NEUTROPHILS RELATIVE PERCENT: 1 % (ref 0–7)
BANDED NEUTROPHILS RELATIVE PERCENT: 10 % (ref 0–7)
BASOPHILS ABSOLUTE: 0 K/UL (ref 0–0.2)
BASOPHILS ABSOLUTE: 0 K/UL (ref 0–0.2)
BASOPHILS RELATIVE PERCENT: 0 %
BASOPHILS RELATIVE PERCENT: 0 %
BILIRUB SERPL-MCNC: 0.5 MG/DL (ref 0–1)
BILIRUBIN DIRECT: <0.2 MG/DL (ref 0–0.3)
BILIRUBIN, INDIRECT: ABNORMAL MG/DL (ref 0–1)
BLOOD CULTURE, ROUTINE: NORMAL
BUN BLDV-MCNC: 11 MG/DL (ref 7–20)
CALCIUM SERPL-MCNC: 7.8 MG/DL (ref 8.3–10.6)
CHLORIDE BLD-SCNC: 107 MMOL/L (ref 99–110)
CO2: 24 MMOL/L (ref 21–32)
CREAT SERPL-MCNC: 0.7 MG/DL (ref 0.6–1.2)
CULTURE, BLOOD 2: NORMAL
EOSINOPHILS ABSOLUTE: 0 K/UL (ref 0–0.6)
EOSINOPHILS ABSOLUTE: 0.5 K/UL (ref 0–0.6)
EOSINOPHILS RELATIVE PERCENT: 0 %
EOSINOPHILS RELATIVE PERCENT: 1 %
GFR SERPL CREATININE-BSD FRML MDRD: >60 ML/MIN/{1.73_M2}
GLUCOSE BLD-MCNC: 82 MG/DL (ref 70–99)
HCT VFR BLD CALC: 35.3 % (ref 36–48)
HCT VFR BLD CALC: 35.5 % (ref 36–48)
HEMATOLOGY PATH CONSULT: NO
HEMATOLOGY PATH CONSULT: NO
HEMOGLOBIN: 11.1 G/DL (ref 12–16)
HEMOGLOBIN: 11.5 G/DL (ref 12–16)
INR BLD: 5.88 (ref 0.87–1.14)
LYMPHOCYTES ABSOLUTE: 1.8 K/UL (ref 1–5.1)
LYMPHOCYTES ABSOLUTE: 5.8 K/UL (ref 1–5.1)
LYMPHOCYTES RELATIVE PERCENT: 13 %
LYMPHOCYTES RELATIVE PERCENT: 4 %
MAGNESIUM: 2 MG/DL (ref 1.8–2.4)
MCH RBC QN AUTO: 29.9 PG (ref 26–34)
MCH RBC QN AUTO: 30 PG (ref 26–34)
MCHC RBC AUTO-ENTMCNC: 31.6 G/DL (ref 31–36)
MCHC RBC AUTO-ENTMCNC: 32.5 G/DL (ref 31–36)
MCV RBC AUTO: 92.3 FL (ref 80–100)
MCV RBC AUTO: 94.8 FL (ref 80–100)
METAMYELOCYTES RELATIVE PERCENT: 2 %
MONOCYTES ABSOLUTE: 1.3 K/UL (ref 0–1.3)
MONOCYTES ABSOLUTE: 3.2 K/UL (ref 0–1.3)
MONOCYTES RELATIVE PERCENT: 3 %
MONOCYTES RELATIVE PERCENT: 7 %
NEUTROPHILS ABSOLUTE: 37.3 K/UL (ref 1.7–7.7)
NEUTROPHILS ABSOLUTE: 40 K/UL (ref 1.7–7.7)
NEUTROPHILS RELATIVE PERCENT: 78 %
NEUTROPHILS RELATIVE PERCENT: 81 %
PDW BLD-RTO: 15.3 % (ref 12.4–15.4)
PDW BLD-RTO: 15.4 % (ref 12.4–15.4)
PHOSPHORUS: 2.2 MG/DL (ref 2.5–4.9)
PLATELET # BLD: 328 K/UL (ref 135–450)
PLATELET # BLD: 381 K/UL (ref 135–450)
PLATELET SLIDE REVIEW: ADEQUATE
PMV BLD AUTO: 8.6 FL (ref 5–10.5)
PMV BLD AUTO: 8.7 FL (ref 5–10.5)
POTASSIUM SERPL-SCNC: 3.8 MMOL/L (ref 3.5–5.1)
PROTHROMBIN TIME: 53.4 SEC (ref 11.7–14.5)
RBC # BLD: 3.72 M/UL (ref 4–5.2)
RBC # BLD: 3.84 M/UL (ref 4–5.2)
SLIDE REVIEW: ABNORMAL
SMUDGE CELLS: PRESENT
SMUDGE CELLS: PRESENT
SODIUM BLD-SCNC: 142 MMOL/L (ref 136–145)
TOTAL PROTEIN: 4.3 G/DL (ref 6.4–8.2)
TOXIC GRANULATION: PRESENT
TOXIC GRANULATION: PRESENT
VACUOLATED NEUTROPHILS: PRESENT
VACUOLATED NEUTROPHILS: PRESENT
WBC # BLD: 44.4 K/UL (ref 4–11)
WBC # BLD: 45.5 K/UL (ref 4–11)

## 2022-11-07 PROCEDURE — 2700000000 HC OXYGEN THERAPY PER DAY

## 2022-11-07 PROCEDURE — 74177 CT ABD & PELVIS W/CONTRAST: CPT

## 2022-11-07 PROCEDURE — 2060000000 HC ICU INTERMEDIATE R&B

## 2022-11-07 PROCEDURE — 85025 COMPLETE CBC W/AUTO DIFF WBC: CPT

## 2022-11-07 PROCEDURE — 85610 PROTHROMBIN TIME: CPT

## 2022-11-07 PROCEDURE — 80076 HEPATIC FUNCTION PANEL: CPT

## 2022-11-07 PROCEDURE — 80069 RENAL FUNCTION PANEL: CPT

## 2022-11-07 PROCEDURE — 2500000003 HC RX 250 WO HCPCS: Performed by: PHYSICIAN ASSISTANT

## 2022-11-07 PROCEDURE — 99233 SBSQ HOSP IP/OBS HIGH 50: CPT | Performed by: INTERNAL MEDICINE

## 2022-11-07 PROCEDURE — 6360000004 HC RX CONTRAST MEDICATION: Performed by: INTERNAL MEDICINE

## 2022-11-07 PROCEDURE — 6370000000 HC RX 637 (ALT 250 FOR IP): Performed by: PHYSICIAN ASSISTANT

## 2022-11-07 PROCEDURE — 6370000000 HC RX 637 (ALT 250 FOR IP): Performed by: INTERNAL MEDICINE

## 2022-11-07 PROCEDURE — 6360000002 HC RX W HCPCS: Performed by: INTERNAL MEDICINE

## 2022-11-07 PROCEDURE — 94761 N-INVAS EAR/PLS OXIMETRY MLT: CPT

## 2022-11-07 PROCEDURE — 6370000000 HC RX 637 (ALT 250 FOR IP): Performed by: STUDENT IN AN ORGANIZED HEALTH CARE EDUCATION/TRAINING PROGRAM

## 2022-11-07 PROCEDURE — 83735 ASSAY OF MAGNESIUM: CPT

## 2022-11-07 RX ORDER — VANCOMYCIN HYDROCHLORIDE 250 MG/1
250 CAPSULE ORAL 4 TIMES DAILY
Status: DISCONTINUED | OUTPATIENT
Start: 2022-11-07 | End: 2022-11-12

## 2022-11-07 RX ORDER — CALCIUM CARBONATE 200(500)MG
500 TABLET,CHEWABLE ORAL 3 TIMES DAILY PRN
Status: DISCONTINUED | OUTPATIENT
Start: 2022-11-07 | End: 2022-11-12 | Stop reason: HOSPADM

## 2022-11-07 RX ADMIN — IOPAMIDOL 75 ML: 755 INJECTION, SOLUTION INTRAVENOUS at 10:51

## 2022-11-07 RX ADMIN — FIDAXOMICIN 200 MG: 200 TABLET, FILM COATED ORAL at 20:39

## 2022-11-07 RX ADMIN — MECLIZINE 12.5 MG: 12.5 TABLET ORAL at 04:24

## 2022-11-07 RX ADMIN — PANTOPRAZOLE SODIUM 40 MG: 40 TABLET, DELAYED RELEASE ORAL at 06:07

## 2022-11-07 RX ADMIN — METRONIDAZOLE 500 MG: 500 INJECTION, SOLUTION INTRAVENOUS at 06:07

## 2022-11-07 RX ADMIN — IOPAMIDOL 50 ML: 612 INJECTION, SOLUTION INTRAVENOUS at 10:51

## 2022-11-07 RX ADMIN — METOPROLOL SUCCINATE 12.5 MG: 25 TABLET, FILM COATED, EXTENDED RELEASE ORAL at 09:44

## 2022-11-07 RX ADMIN — MECLIZINE 12.5 MG: 12.5 TABLET ORAL at 20:32

## 2022-11-07 RX ADMIN — POTASSIUM CHLORIDE AND SODIUM CHLORIDE: 900; 150 INJECTION, SOLUTION INTRAVENOUS at 22:27

## 2022-11-07 RX ADMIN — VANCOMYCIN HYDROCHLORIDE 250 MG: 250 CAPSULE ORAL at 20:31

## 2022-11-07 RX ADMIN — GABAPENTIN 300 MG: 300 CAPSULE ORAL at 20:32

## 2022-11-07 RX ADMIN — POTASSIUM CHLORIDE AND SODIUM CHLORIDE: 900; 150 INJECTION, SOLUTION INTRAVENOUS at 06:06

## 2022-11-07 RX ADMIN — METRONIDAZOLE 500 MG: 500 INJECTION, SOLUTION INTRAVENOUS at 14:54

## 2022-11-07 RX ADMIN — OXYCODONE 5 MG: 5 TABLET ORAL at 22:49

## 2022-11-07 RX ADMIN — AMIODARONE HYDROCHLORIDE 200 MG: 200 TABLET ORAL at 09:44

## 2022-11-07 RX ADMIN — METRONIDAZOLE 500 MG: 500 INJECTION, SOLUTION INTRAVENOUS at 22:49

## 2022-11-07 RX ADMIN — FIDAXOMICIN 200 MG: 200 TABLET, FILM COATED ORAL at 09:44

## 2022-11-07 RX ADMIN — VANCOMYCIN HYDROCHLORIDE 250 MG: 250 CAPSULE ORAL at 17:37

## 2022-11-07 ASSESSMENT — PAIN DESCRIPTION - LOCATION
LOCATION: ABDOMEN;HEAD
LOCATION: ABDOMEN

## 2022-11-07 ASSESSMENT — PAIN SCALES - GENERAL
PAINLEVEL_OUTOF10: 8
PAINLEVEL_OUTOF10: 5

## 2022-11-07 ASSESSMENT — PAIN DESCRIPTION - DESCRIPTORS
DESCRIPTORS: ACHING
DESCRIPTORS: BURNING

## 2022-11-07 ASSESSMENT — PAIN SCALES - WONG BAKER
WONGBAKER_NUMERICALRESPONSE: 0
WONGBAKER_NUMERICALRESPONSE: 0

## 2022-11-07 ASSESSMENT — PAIN DESCRIPTION - ORIENTATION
ORIENTATION: LEFT;UPPER
ORIENTATION: MID

## 2022-11-07 ASSESSMENT — PAIN DESCRIPTION - PAIN TYPE: TYPE: ACUTE PAIN

## 2022-11-07 ASSESSMENT — PAIN DESCRIPTION - DIRECTION: RADIATING_TOWARDS: BACK

## 2022-11-07 ASSESSMENT — PAIN DESCRIPTION - ONSET: ONSET: ON-GOING

## 2022-11-07 NOTE — PROGRESS NOTES
INPATIENT CONSULTATION:    IDENTIFYING DATA/REASON FOR CONSULTATION   PATIENT:  Calvin Brown  MRN:  [id number]  ADMIT DATE: 11/2/2022  TIME OF EVALUATION: 11/7/2022 10:13 AM  HOSPITAL STAY:   LOS: 4 days   CONSULTING PHYSICIAN: Shila Osman MD   REASON FOR CONSULTATION: CDI    Subjective:    Patient seen and examined in follow-up. Patient reports ongoing, worsening epigastric abdominal pain, radiating to her back. She reports this discomfort is worse with eating, and she reports early satiety and fullness. She reports her diarrhea is getting better, estimating 3-4 loose most consistency brown stools yesterday. MEDICATIONS   SCHEDULED:  Fidaxomicin, 200 mg, BID  metroNIDAZOLE, 500 mg, Q8H  amiodarone, 200 mg, Daily  gabapentin, 300 mg, Nightly  metoprolol succinate, 12.5 mg, Daily  pantoprazole, 40 mg, QAM AC  sodium chloride flush, 5-40 mL, 2 times per day  warfarin placeholder: dosing by pharmacy, , RX Placeholder    FLUIDS/DRIPS:     0.9% NaCl with KCl 20 mEq 75 mL/hr at 11/07/22 0606    sodium chloride Stopped (11/05/22 0700)     PRNs: trimethobenzamide, 200 mg, Q8H PRN  meclizine, 12.5 mg, TID PRN  butalbital-acetaminophen-caffeine, 1 tablet, Q6H PRN  oxyCODONE, 5 mg, Q6H PRN  sodium chloride flush, 5-40 mL, PRN  sodium chloride, , PRN  acetaminophen, 650 mg, Q6H PRN   Or  acetaminophen, 650 mg, Q6H PRN  potassium chloride, 40 mEq, PRN   Or  potassium alternative oral replacement, 40 mEq, PRN   Or  potassium chloride, 10 mEq, PRN    ALLERGIES:  No Known Allergies      PHYSICAL EXAM     Vitals:    11/06/22 2356 11/07/22 0341 11/07/22 0413 11/07/22 0700   BP: 107/64 118/64     Pulse: 60 62     Resp: 16 14     Temp: 97.8 °F (36.6 °C) 97.9 °F (36.6 °C)     TempSrc: Oral Oral     SpO2: 95% 95%  94%   Weight:   146 lb 13.2 oz (66.6 kg)    Height:           I/O last 3 completed shifts:   In: 2688.5 [P.O.:480; I.V.:1703.2; IV Piggyback:505.3]  Out: -     Physical Exam:  Gen: Resting in bed, NAD   HEENT: normocephalic, atraumatic. No scleral icterus.    CV: RRR no MRG   Pul: CTAB, normal work of breathing without wheezing  Abd: Good bowel sounds throughout, soft, tender to palpation epigastrium and right upper quadrant, ND, no masses, no HSM   Ext: No edema, atraumatic  Neuro: No gross deficits, moves all 4 extremities, follows commands  Skin: No jaundice, spider angiomas, rodrigues erythema     LABS AND IMAGING     Recent Results (from the past 24 hour(s))   CBC with Auto Differential    Collection Time: 11/07/22  4:30 AM   Result Value Ref Range    WBC 45.5 (HH) 4.0 - 11.0 K/uL    RBC 3.84 (L) 4.00 - 5.20 M/uL    Hemoglobin 11.5 (L) 12.0 - 16.0 g/dL    Hematocrit 35.5 (L) 36.0 - 48.0 %    MCV 92.3 80.0 - 100.0 fL    MCH 30.0 26.0 - 34.0 pg    MCHC 32.5 31.0 - 36.0 g/dL    RDW 15.3 12.4 - 15.4 %    Platelets 250 551 - 562 K/uL    MPV 8.7 5.0 - 10.5 fL    PLATELET SLIDE REVIEW Adequate     SLIDE REVIEW see below     Path Consult No     Neutrophils % 78.0 %    Lymphocytes % 4.0 %    Monocytes % 7.0 %    Eosinophils % 1.0 %    Basophils % 0.0 %    Neutrophils Absolute 40.0 (H) 1.7 - 7.7 K/uL    Lymphocytes Absolute 1.8 1.0 - 5.1 K/uL    Monocytes Absolute 3.2 (H) 0.0 - 1.3 K/uL    Eosinophils Absolute 0.5 0.0 - 0.6 K/uL    Basophils Absolute 0.0 0.0 - 0.2 K/uL    Bands Relative 10 (H) 0 - 7 %    Toxic Granulation Present (A)     Smudge Cells Present (A)     Vacuolated Neutrophils Present (A)     Anisocytosis Occasional (A)    Renal Function Panel    Collection Time: 11/07/22  4:30 AM   Result Value Ref Range    Sodium 142 136 - 145 mmol/L    Potassium 3.8 3.5 - 5.1 mmol/L    Chloride 107 99 - 110 mmol/L    CO2 24 21 - 32 mmol/L    Anion Gap 11 3 - 16    Glucose 82 70 - 99 mg/dL    BUN 11 7 - 20 mg/dL    Creatinine 0.7 0.6 - 1.2 mg/dL    Est, Glom Filt Rate >60 >60    Calcium 7.8 (L) 8.3 - 10.6 mg/dL    Phosphorus 2.2 (L) 2.5 - 4.9 mg/dL    Albumin 2.6 (L) 3.4 - 5.0 g/dL   Magnesium    Collection Time: 11/07/22  4:30 AM Result Value Ref Range    Magnesium 2.00 1.80 - 2.40 mg/dL   Protime-INR    Collection Time: 11/07/22  4:30 AM   Result Value Ref Range    Protime 53.4 (H) 11.7 - 14.5 sec    INR 5.88 (HH) 0.87 - 1.14   Hepatic Function Panel    Collection Time: 11/07/22  4:30 AM   Result Value Ref Range    Total Protein 4.3 (L) 6.4 - 8.2 g/dL    Alkaline Phosphatase 81 40 - 129 U/L    ALT 7 (L) 10 - 40 U/L    AST 13 (L) 15 - 37 U/L    Total Bilirubin 0.5 0.0 - 1.0 mg/dL    Bilirubin, Direct <0.2 0.0 - 0.3 mg/dL    Bilirubin, Indirect see below 0.0 - 1.0 mg/dL     Other Labs    Imaging:    CT HEAD WO CONTRAST   Preliminary Result   No acute intracranial abnormality. CT ABDOMEN PELVIS W IV CONTRAST Additional Contrast? None   Final Result   1. No pulmonary embolism. 2. Tiny subpleural nodules in the periphery of the lungs may represent an   underlying infectious or inflammatory process. Follow-up recommendations are   provided below. 3. Diffuse mucosal thickening of the colon suggesting underlying pattern of   infectious colitis including C difficile. 4. Incidental cholelithiasis and stable right adrenal nodule suspected to   represent an adenoma. RECOMMENDATIONS:   Multiple pulmonary nodules. Most severe: 3 mm solid pulmonary nodule within   the upper lobe. If patient is low risk for malignancy, no routine follow-up   imaging is recommended; if patient is high risk for malignancy, a   non-contrast Chest CT at 12 months is optional. If performed and the nodule   is stable at 12 months, no further follow-up is recommended. These guidelines do not apply to immunocompromised patients and patients with   cancer. Follow up in patients with significant comorbidities as clinically   warranted. For lung cancer screening, adhere to Lung-RADS guidelines. Reference: Radiology. 2017; 284(1):228-43. CT CHEST PULMONARY EMBOLISM W CONTRAST   Final Result   1. No pulmonary embolism.    2. Tiny subpleural nodules in the periphery of the lungs may represent an   underlying infectious or inflammatory process. Follow-up recommendations are   provided below. 3. Diffuse mucosal thickening of the colon suggesting underlying pattern of   infectious colitis including C difficile. 4. Incidental cholelithiasis and stable right adrenal nodule suspected to   represent an adenoma. RECOMMENDATIONS:   Multiple pulmonary nodules. Most severe: 3 mm solid pulmonary nodule within   the upper lobe. If patient is low risk for malignancy, no routine follow-up   imaging is recommended; if patient is high risk for malignancy, a   non-contrast Chest CT at 12 months is optional. If performed and the nodule   is stable at 12 months, no further follow-up is recommended. These guidelines do not apply to immunocompromised patients and patients with   cancer. Follow up in patients with significant comorbidities as clinically   warranted. For lung cancer screening, adhere to Lung-RADS guidelines. Reference: Radiology. 2017; 284(1):228-43. CT HEAD WO CONTRAST   Final Result   No acute intracranial abnormality. XR ANKLE LEFT (MIN 3 VIEWS)   Final Result   Ankle mortise intact. No acute fracture or dislocation of the ankle. No   acute fracture or dislocation of the foot. Degenerative changes are present   at the interphalangeal joints and there is deformity of the 5th PIP joint   which may be sequela of a remote injury. No soft tissue swelling of the   foot. Moderate soft tissue swelling/edema over the medial malleolus of the   ankle. RECOMMENDATION:   Medial soft tissue swelling about the left ankle with no underlying fracture. Degenerative changes in the left foot with no acute abnormality. XR FOOT LEFT (MIN 3 VIEWS)   Final Result   Ankle mortise intact. No acute fracture or dislocation of the ankle. No   acute fracture or dislocation of the foot.   Degenerative changes are present   at the interphalangeal joints and there is deformity of the 5th PIP joint   which may be sequela of a remote injury. No soft tissue swelling of the   foot. Moderate soft tissue swelling/edema over the medial malleolus of the   ankle. RECOMMENDATION:   Medial soft tissue swelling about the left ankle with no underlying fracture. Degenerative changes in the left foot with no acute abnormality. XR CHEST PORTABLE   Final Result   Cardiomegaly with mild vascular congestion that is stable. ASSESSMENT AND RECOMMENDATIONS   Lissette Aguilar is a 78 y.o. female with history of CLL, CAD, atrial fibrillation, COPD, hypertension, factor V Leiden deficiency, history of splenectomy who presented to [facility] [place] with diarrhea, nausea, vomiting and anorexia. CDI: Clinically improving on p.o. Dificid and IV Flagyl, with decreased stool output, but persistent severe leukocytosis. This is likely in part due to underlying CLL and history of splenectomy. Continue current therapy. Abdominal pain: Patient reports her epigastric abdominal pain radiating to her back is new in onset, and was not present at admission. I will obtain lipase given concern for pancreatitis given her description, as well as CT abdomen/pelvis. Atrial fibrillation: On Coumadin, with supratherapeutic INR. Factor V Leiden: On Coumadin, supratherapeutic INR    RECOMMENDATIONS:    -Lipase  -CT A/P with IV contrast  -Continue Dificid and IV Flagyl    If you have any questions or need any further information, please feel free to contact anyone on our consult team.  Thank you for allowing us to participate in the care of Lissette Aguilar. Adelso Wright.  [address] [place]

## 2022-11-07 NOTE — PROGRESS NOTES
Infectious Disease Follow up Notes  Admit Date: 11/2/2022  Hospital Day: 6    Antibiotics :   Metronidazole  Fidaxomicin      CHIEF COMPLAINT:     Severe C diff colitis  WBC elevation  Diarrhea    Subjective interval History :  78 y.o. woman with h/o  A fib admitted with a fall and diarrhea now tested +ve for C diff also has CLL and chest port in place prior Splenectomy - has on going diarrhea and WBC worsening noted  d/w sister at bed side with pts permission         Past Medical History:    Past Medical History:   Diagnosis Date    Acute ST elevation myocardial infarction (STEMI) ([facility] 07/19/2021    Atrial fibrillation (HCC)     Chronic lymphocytic leukemia in remission ([facility]     CLL (chronic lymphocytic leukemia) ([facility] 02/12/2015    COPD (chronic obstructive pulmonary disease) ([facility]     Essential hypertension 01/01/2015    controlled with salt restriction (initially)    Factor V Leiden mutation ([facility]     Goiter 11/01/2011    1.5 cm midline    Hypercholesterolemia     Impaired fasting glucose 11/01/2011    Osteoarthritis     Osteoporosis     Post herpetic neuralgia     Vitamin D deficiency 11/01/2011       Past Surgical History:    Past Surgical History:   Procedure Laterality Date    CATARACT REMOVAL WITH IMPLANT  5/14/12    left eye    JOINT REPLACEMENT      partial knee R and L    KNEE SURGERY  2008    partial replacements, both knees    MOHS SURGERY  03/12/2019    R cheek and R superior temple    SPLENECTOMY      TUNNELED VENOUS PORT PLACEMENT      UPPER GASTROINTESTINAL ENDOSCOPY N/A 2/5/2019    EGD ESOPHAGOGASTRODUODENOSCOPY, WITH ENDOSCOPIC ULTRASOUND OF ESOPHAGUS performed by Shuan Crespo MD at [address]       Current Medications:    No outpatient medications have been marked as taking for the 11/2/22 encounter [facility] Encounter). Allergies:  Patient has no known allergies.     Immunizations :   Immunization History   Administered Date(s) Administered    COVID-19, MODERNA BLUE border, Primary or Immunocompromised, (age 12y+), IM, 100 mcg/0.5mL 2021    COVID-19, PFIZER PURPLE top, DILUTE for use, (age 15 y+), 30mcg/0.3mL 2021, 2021    Influenza Virus Vaccine 10/16/2009, 2010, 2010, 10/03/2016    Influenza, Apoorva Woodruff Ink (age 10 mo+) AND AFLURIA, (age 1 y+), PF, 0.5mL 2020, 10/23/2021    Influenza, High Dose (Fluzone 65 yrs and older) 2011, 10/01/2012, 10/29/2013, 10/23/2014, 2015, 10/23/2018, 2019    Pneumococcal Conjugate 13-valent ([id number]) 2015    Pneumococcal Polysaccharide ([id number]) 2006, 2011    Td, unspecified formulation 10/04/2004    Tdap (Boostrix, Adacel) 2015       Social History:     Social History     Tobacco Use    Smoking status: Former     Packs/day: 0.30     Years: 50.00     Pack years: 15.00     Types: Cigarettes     Quit date: 1995     Years since quittin.0    Smokeless tobacco: Never   Vaping Use    Vaping Use: Never used   Substance Use Topics    Alcohol use: No     Alcohol/week: 0.0 standard drinks    Drug use: Not Currently     Social History     Tobacco Use   Smoking Status Former    Packs/day: 0.30    Years: 50.00    Pack years: 15.00    Types: Cigarettes    Quit date: 1995    Years since quittin.0   Smokeless Tobacco Never      Family History   Problem Relation Age of Onset    Diabetes Father     Stroke Sister 50         of a stroke          REVIEW OF SYSTEMS:      Constitutional:  negative for fevers, chills, night sweats  Eyes:  negative for blurred vision, eye discharge, visual disturbance   HEENT:  negative for hearing loss, ear drainage,nasal congestion  Respiratory:  negative for cough, shortness of breath or hemoptysis   Cardiovascular:  negative for chest pain, palpitations, syncope  Gastrointestinal:  negative for nausea, vomiting, diarrhea,+  constipation, abdominal pain +   Genitourinary:  negative for frequency, dysuria, urinary incontinence, hematuria  Hematologic/Lymphatic:  negative for easy bruising, bleeding and lymphadenopathy  Allergic/Immunologic:  negative for recurrent infections, angioedema, anaphylaxis   Endocrine:  negative for weight changes, polyuria, polydipsia and polyphagia  Musculoskeletal:  negative for joint  pain, swelling, decreased range of motion  Integumentary: No rashes, skin lesions  Neurological:  negative for headaches, slurred speech, unilateral weakness  Psychiatric: negative for hallucinations,confusion,agitation.                 PHYSICAL EXAM:      Vitals:  t MAX 99.6   /64   Pulse 62   Temp 97.9 °F (36.6 °C) (Oral)   Resp 14   Ht 5' (1.524 m)   Wt 146 lb 13.2 oz (66.6 kg)   SpO2 94%   BMI 28.68 kg/m²     General Appearance: alert,in some acute distress, ++ pallor, no icterus  on nasal cannula   Skin: warm and dry, no rash or erythema  Head: normocephalic and atraumatic  Eyes: pupils equal, round, and reactive to light, conjunctivae normal  ENT: tympanic membrane, external ear and ear canal normal bilaterally, nose without deformity, nasal mucosa and turbinates normal without polyps  Neck: supple and non-tender without mass, no thyromegaly  no cervical lymphadenopathy  Pulmonary/Chest: clear to auscultation bilaterally- no wheezes, rales or rhonchi, normal air movement, no respiratory distress  Cardiovascular: normal rate, regular rhythm, normal S1 and S2, no murmurs, rubs, clicks, or gallops, no carotid bruits  Abdomen: soft, + -tender, + -distended, normal bowel sounds, no masses or organomegaly  Extremities: no cyanosis, clubbing or edema  Musculoskeletal: normal range of motion, no joint swelling, deformity or tenderness  Integumentary: No rashes, no abnormal skin lesions, no petechiae  Neurologic: reflexes normal and symmetric, no cranial nerve deficit  Psych:  Orientation, sensorium, mood normal  Lines: chest port + Data Review:    CBC:   Lab Results   Component Value Date    WBC 45.5 (HH) 11/07/2022    HGB 11.5 (L) 11/07/2022    HCT 35.5 (L) 11/07/2022    MCV 92.3 11/07/2022     11/07/2022     RENAL:   Lab Results   Component Value Date    CREATININE 0.7 11/07/2022    BUN 11 11/07/2022     11/07/2022    K 3.8 11/07/2022     11/07/2022    CO2 24 11/07/2022     SED RATE:   Lab Results   Component Value Date/Time    SEDRATE 45 05/05/2022 04:01 PM     CK: No results found for: CKTOTAL  CRP: No results found for: CRP  Hepatic Function Panel:   Lab Results   Component Value Date/Time    ALKPHOS 81 11/07/2022 04:30 AM    ALT 7 11/07/2022 04:30 AM    AST 13 11/07/2022 04:30 AM    PROT 4.3 11/07/2022 04:30 AM    PROT 6.2 06/23/2017 12:10 PM    BILITOT 0.5 11/07/2022 04:30 AM    BILIDIR <0.2 11/07/2022 04:30 AM    IBILI see below 11/07/2022 04:30 AM    LABALBU 2.6 11/07/2022 04:30 AM     UA:  Lab Results   Component Value Date/Time    COLORU Yellow 11/03/2022 03:34 AM    CLARITYU Clear 11/03/2022 03:34 AM    GLUCOSEU Negative 11/03/2022 03:34 AM    BILIRUBINUR Negative 11/03/2022 03:34 AM    BILIRUBINUR neg 04/20/2021 08:53 AM    KETUA 80 11/03/2022 03:34 AM    SPECGRAV 1.075 11/03/2022 03:34 AM    BLOODU MODERATE 11/03/2022 03:34 AM    PHUR 5.0 11/03/2022 03:34 AM    PROTEINU TRACE 11/03/2022 03:34 AM    UROBILINOGEN 0.2 11/03/2022 03:34 AM    NITRU Negative 11/03/2022 03:34 AM    LEUKOCYTESUR Negative 11/03/2022 03:34 AM    LABMICR YES 11/03/2022 03:34 AM    URINETYPE NotGiven 11/03/2022 03:34 AM      Urine Microscopic:   Lab Results   Component Value Date/Time    BACTERIA None Seen 11/03/2022 03:34 AM    HYALCAST 0 11/03/2022 03:34 AM    WBCUA 2 11/03/2022 03:34 AM    RBCUA 3 11/03/2022 03:34 AM    EPIU 2 11/03/2022 03:34 AM     Urine Reflex to Culture:   Lab Results   Component Value Date/Time    URRFLXCULT Not Indicated 11/03/2022 03:34 AM         MICRO: cultures reviewed and updated by me   Blood Culture: Lab Results   Component Value Date/Time    BC No Growth after 4 days of incubation. 11/03/2022 12:34 AM    BLOODCULT2 No Growth after 4 days of incubation. 11/03/2022 12:34 AM       Respiratory Culture:  No results found for: Duane Tan  AFB:No results found for: [facility]  Viral Culture:  Lab Results   Component Value Date/Time    COVID19 Not Detected 11/03/2022 12:35 AM     Urine Culture: No results for input(s): LABURIN in the last 72 hours. Culture, Blood 2 [[id number]] Collected: 11/03/22 0034   Order Status: Completed Specimen: Blood Updated: 11/07/22 0315    Culture, Blood 2 No Growth after 4 days of incubation. Narrative:     ORDER#: [id number]                          ORDERED BY: MATT FANG   SOURCE: Blood                              COLLECTED:  11/03/22 00:34   ANTIBIOTICS AT ARMINDA.:                      RECEIVED :  11/03/22 03:04   If child <=2 yrs old please draw pediatric bottle. ~Blood Culture #2   Blood Culture 1 [[id number]] Collected: 11/03/22 0034   Order Status: Completed Specimen: Blood Updated: 11/07/22 0315    Blood Culture, Routine No Growth after 4 days of incubation. Narrative:     ORDER#: [id number]                          ORDERED BY: MATT FANG   SOURCE: Blood                              COLLECTED:  11/03/22 00:34   ANTIBIOTICS AT ARMINDA.:                      RECEIVED :  11/03/22 03:04   If child <=2 yrs old please draw pediatric bottle. ~Blood Culture 1   Giardia antigen [[id number]] Collected: 11/03/22 1005   Order Status: Completed Specimen: Stool Updated: 11/04/22 1016    Giardia Ag, Stl --    Negative   Normal Range: Negative   This stool specimen has been tested for the above parasites   by enzyme immunoassay. A preserved specimen is held for one   week after the results are reported. If clinically indicated,   a comprehensive microscopic examination can be performed by   calling the Microbiology Lab.     Narrative:     ORDER#: [id number] ORDERED BY: Saida Santana   SOURCE: Stool                              COLLECTED:  11/03/22 10:05   ANTIBIOTICS AT ARMINDA.:                      RECEIVED :  11/03/22 15:25   C. difficile toxin Molecular [[id number]] (Abnormal) Collected: 11/03/22 1005   Order Status: Completed Specimen: Stool Updated: 11/04/22 0615    Organism C. difficile toxin B gene detected Abnormal     C. difficile toxin Molecular -- Abnormal     POSITIVE FOR   Normal Range: Not detected   CONTACT PRECAUTIONS INDICATED    Abnormal    Narrative:     ORDER#: [id number]                          ORDERED BY: MATT FANG   SOURCE: Stool                              COLLECTED:  11/03/22 10:05   ANTIBIOTICS AT ARMINDA.:                      RECEIVED :  11/03/22 10:34   CALL  Nguyễn  [id number] tel. [phone],   Microbiology results called to and read back by Sofya Reilly RN, 11/04/2022   06:14, by [facility]   Gastrointestinal Panel by DNA [[id number]] Collected: 11/03/22 1005   Order Status: Completed Specimen: Stool Updated: 11/04/22 0608    GI Bacterial Pathogens By PCR --    No Shigella spp/EIEC DNA detected   No Shiga toxin-producing gene(s) detected   No Campylobacter spp. (jejuni and coli)DNA detected   No Salmonella spp.  DNA detected   No Vibrio vulnificus/parahaemolyticus/cholerae DNA detected   No Plesiomonas shigelloides DNA detected   No Enterotoxigenic E. coli (ETEC) DNA detected   No Yersinia enterocolitica DNA detected   Normal Range:  None detected    Narrative:     ORDER#: [id number]                          ORDERED BY: MATT FANG   SOURCE: Stool                              COLLECTED:  11/03/22 10:05   ANTIBIOTICS AT [place]MINDA.:                      RECEIVED :  11/03/22 10:37   Fecal leukocytes [[id number]] (Abnormal) Collected: 11/03/22 1005   Order Status: Completed Specimen: Stool Updated: 11/03/22 1632    White Blood Cells (WBC), Stool -- Abnormal     POSITIVE   Normal Range:Negative    Abnormal    Narrative:     ORDER#: [id number] ORDERED BY: Tarah Arriola   SOURCE: Stool                              COLLECTED:  11/03/22 10:05   ANTIBIOTICS AT [place]MIN[place].:                      RECEIVED :  11/03/22 15:23   Clostridium Difficile Toxin/Antigen [[id number]] Collected: 11/03/22 1005   Order Status: Completed Specimen: Stool Updated: 11/03/22 1131    C.diff Toxin/Antigen --    Indeterminate see results of C. difficile amplification(PCR)   Normal Range: Negative    Narrative:     ORDER#: [id number]                          ORDERED BY: MATT FANG   SOURCE: Stool                              COLLECTED:  11/03/22 10:05   ANTIBIOTICS AT [place]MINDA.:                      RECEIVED :  11/03/22 10:34   Collect White vial (sterile container)   COVID-19, Rapid [[id number]] Collected: 11/03/22 0035   Order Status: Completed Specimen: Nasopharyngeal Swab Updated: 11/03/22 0112    SARS-CoV-2, NAAT Not Detected    Comment: Rapid NAAT:   Negative results should be treated as presumptive and,   if inconsistent with clinical signs and symptoms or necessary for   patient management, should be tested with an alternative molecular   assay. Negative results do not preclude SARS-CoV-2 infection and   should not be used as the sole basis for patient management decisions. This test has been authorized by the FDA under an Emergency Use   Authorization (EUA) for use by authorized laboratories.      Fact sheet for Healthcare Providers:   http://www.aristeo.aria/   Fact sheet for Patients: http://www.jin-erik.aria/     METHODOLOGY: Isothermal Nucleic Acid Amplification       Rapid influenza A/B antigens [[id number]] Collected: 11/03/22 0035   Order Status: Completed Specimen: Nasopharyngeal Updated: 11/03/22 0111    Rapid Influenza A Ag Negative    Rapid Influenza B Ag Negative   Gastrointestinal Panel, Molecular [[id number]] Collected: 11/03/22 0000   Order Status: Canceled Specimen: Stool    O&P PANEL ([employer]) #1 [[id number]] Collected: 11/03/22 0000   Order Status: Canceled Specimen: Stool      IMAGING:    CT HEAD WO CONTRAST   Preliminary Result   No acute intracranial abnormality. CT ABDOMEN PELVIS W IV CONTRAST Additional Contrast? None   Final Result   1. No pulmonary embolism. 2. Tiny subpleural nodules in the periphery of the lungs may represent an   underlying infectious or inflammatory process. Follow-up recommendations are   provided below. 3. Diffuse mucosal thickening of the colon suggesting underlying pattern of   infectious colitis including C difficile. 4. Incidental cholelithiasis and stable right adrenal nodule suspected to   represent an adenoma. RECOMMENDATIONS:   Multiple pulmonary nodules. Most severe: 3 mm solid pulmonary nodule within   the upper lobe. If patient is low risk for malignancy, no routine follow-up   imaging is recommended; if patient is high risk for malignancy, a   non-contrast Chest CT at 12 months is optional. If performed and the nodule   is stable at 12 months, no further follow-up is recommended. These guidelines do not apply to immunocompromised patients and patients with   cancer. Follow up in patients with significant comorbidities as clinically   warranted. For lung cancer screening, adhere to Lung-RADS guidelines. Reference: Radiology. 2017; 284(1):228-43. CT CHEST PULMONARY EMBOLISM W CONTRAST   Final Result   1. No pulmonary embolism. 2. Tiny subpleural nodules in the periphery of the lungs may represent an   underlying infectious or inflammatory process. Follow-up recommendations are   provided below. 3. Diffuse mucosal thickening of the colon suggesting underlying pattern of   infectious colitis including C difficile. 4. Incidental cholelithiasis and stable right adrenal nodule suspected to   represent an adenoma. RECOMMENDATIONS:   Multiple pulmonary nodules. Most severe: 3 mm solid pulmonary nodule within   the upper lobe.  If patient is low risk for malignancy, no routine follow-up   imaging is recommended; if patient is high risk for malignancy, a   non-contrast Chest CT at 12 months is optional. If performed and the nodule   is stable at 12 months, no further follow-up is recommended. These guidelines do not apply to immunocompromised patients and patients with   cancer. Follow up in patients with significant comorbidities as clinically   warranted. For lung cancer screening, adhere to Lung-RADS guidelines. Reference: Radiology. 2017; 284(1):228-43. CT HEAD WO CONTRAST   Final Result   No acute intracranial abnormality. XR ANKLE LEFT (MIN 3 VIEWS)   Final Result   Ankle mortise intact. No acute fracture or dislocation of the ankle. No   acute fracture or dislocation of the foot. Degenerative changes are present   at the interphalangeal joints and there is deformity of the 5th PIP joint   which may be sequela of a remote injury. No soft tissue swelling of the   foot. Moderate soft tissue swelling/edema over the medial malleolus of the   ankle. RECOMMENDATION:   Medial soft tissue swelling about the left ankle with no underlying fracture. Degenerative changes in the left foot with no acute abnormality. XR FOOT LEFT (MIN 3 VIEWS)   Final Result   Ankle mortise intact. No acute fracture or dislocation of the ankle. No   acute fracture or dislocation of the foot. Degenerative changes are present   at the interphalangeal joints and there is deformity of the 5th PIP joint   which may be sequela of a remote injury. No soft tissue swelling of the   foot. Moderate soft tissue swelling/edema over the medial malleolus of the   ankle. RECOMMENDATION:   Medial soft tissue swelling about the left ankle with no underlying fracture. Degenerative changes in the left foot with no acute abnormality. XR CHEST PORTABLE   Final Result   Cardiomegaly with mild vascular congestion that is stable.                All the pertinent images and reports for the current Hospitalization were reviewed by me     Scheduled Meds:   Fidaxomicin  200 mg Oral BID    metroNIDAZOLE  500 mg IntraVENous Q8H    amiodarone  200 mg Oral Daily    gabapentin  300 mg Oral Nightly    metoprolol succinate  12.5 mg Oral Daily    pantoprazole  40 mg Oral QAM AC    sodium chloride flush  5-40 mL IntraVENous 2 times per day    warfarin placeholder: dosing by pharmacy   Other RX Placeholder       Continuous Infusions:   0.9% NaCl with KCl 20 mEq 75 mL/hr at 11/07/22 0606    sodium chloride Stopped (11/05/22 0700)       PRN Meds:  trimethobenzamide, meclizine, butalbital-acetaminophen-caffeine, oxyCODONE, sodium chloride flush, sodium chloride, acetaminophen **OR** acetaminophen, potassium chloride **OR** potassium alternative oral replacement **OR** potassium chloride      Assessment:     Patient Active Problem List   Diagnosis    Osteoporosis,Dexas:10/18/02 (Lumbar spine -1.52), hip -0.55 (T scores), Actonel, then fosamax;  Dexa 1/25/06 (Lumbar spine -1.1 and hip -0.5)    Post herpetic neuralgia    Thrombocytopenia; 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/06    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 ([facility]    CLL (chronic lymphocytic leukemia) (HCC)    Osteoarthritis    Hypercholesterolemia    Goiter, 1.5 cm midline    Impaired fasting glucose    Vitamin D deficiency    Small cleaved cell (diffuse) NHL (HCC)    B12 deficiency    Abnormal coagulation profile    Essential hypertension    Encounter for follow-up examination after completed treatment for cancer    Encounter for care related to Port-a-Cath    Hyperuricemia    Arthritis of knee    Neoplasm of uncertain behavior of skin    History of squamous cell carcinoma of skin    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    History of basal cell carcinoma    Seborrheic keratoses, inflamed    Takotsubo cardiomyopathy    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

## 2022-11-07 NOTE — PROGRESS NOTES
Pt has a critically high INR of 5.88. It is trending down from previous morning. RN notified Dr. Lv Alejandre. He acknowledged notification and stated to pass it on to day shift. soft/nontender/bowel sounds normal

## 2022-11-07 NOTE — CARE COORDINATION
11/07/22 1212   IMM Letter   IMM Letter given to Patient/Family/Significant other/Guardian/POA/by: Provided to patient at bedside by Roosevelt Barros RN. Education provided to patient, patient reported no questions and verbalized understanding. Patient aware of 4 hours allotted time to determine if they choose to pursue Medicare appeal process. Copy left with patient at bedside. No further questions.    IMM Letter date given: 11/07/22   IMM Letter time given: 1115     #[phone]  Electronically signed by Roosevelt Barros RN on 11/7/2022 at 12:12 PM

## 2022-11-07 NOTE — PROGRESS NOTES
[facility] Medicine Progress Note     Date:  11/7/2022    PCP: Meaghan Pike MD (Tel: [phone])    Date of Admission: 11/2/2022    Chief complaint:   Chief Complaint   Patient presents with    Leg Pain     Fall leg and ankle pain       Brief admission history: 22-year-old male with history of paroxysmal atrial fibrillation, history of CHF (previously systolic dysfunction, although recent echocardiogram with preserved ejection fraction, diastolic function not measured), CLL, s/p splenectomy, COPD, essential hypertension, factor V Leiden mutation, chronic Coumadin use, osteoporosis, hyperlipidemia, who presented to the emergency room on November 3, 2022 with complaints of generalized weakness, fall, recent nausea, vomiting and diarrhea, with associated decreased oral intake. Stool study was positive for C. difficile infection. She was noted to be hypoxic on presentation. Assessment/plan:  C. difficile colitis. Continue Dificid, IV Flagyl. Continue intravenous fluid. GI on board and assisting with management. Infectious disease on board and assisting with management. Nausea and vomiting, likely secondary to C. difficile infection/colitis. Much improved. Tolerating diet. Antiemetics in place. Generalized weakness. Likely secondary to underlying medical conditions. Fall precautions. Therapy evaluation in place. Recent fall at home, likely secondary to generalized weakness. Fall precautions. History of factor V Leiden, on chronic anticoagulation with Coumadin. INR supratherapeutic due to antibiotic therapy. Coumadin dosing per pharmacy, hold when appropriate. Monitor closely for bleed. History of atrial fibrillation. Continue amiodarone. On Coumadin as noted above. Prolonged QTc noted on admission EKG, resolved. Avoid QT prolonging medications. Gastroesophageal reflux disease. Continue PPI. Added as needed Tums. Patient is not hypoxic.   She is on supplemental oxygen for comfort only. Other comorbidities: history of paroxysmal atrial fibrillation, history of CHF (previously systolic dysfunction, although recent echocardiogram with preserved ejection fraction, diastolic function not measured), CLL, s/p splenectomy, COPD, essential hypertension, factor V Leiden mutation, chronic Coumadin use, osteoporosis, hyperlipidemia. Diet: ADULT DIET; Regular    Code status: Full Code   ----------  Subjective  Headache is better. Still with diarrhea. Reports epigastric burning sensation today    Objective  Physical exam:  Vitals: /64   Pulse 62   Temp 97.9 °F (36.6 °C) (Oral)   Resp 14   Ht 5' (1.524 m)   Wt 146 lb 13.2 oz (66.6 kg)   SpO2 94%   BMI 28.68 kg/m²   Gen/overall appearance: Not in acute distress. Alert. Oriented x3. Head: Normocephalic, atraumatic  Eyes: EOMI, good acuity  ENT: Oral mucosa moist  Neck: No JVD, thyromegaly  CVS: Nml S1S2, no MRG, RRR  Pulm: Clear bilaterally. No crackles/wheezes  Gastrointestinal: Nontender to palpation. Soft, ND, +BS  Musculoskeletal: No edema. Warm  Neuro: No focal deficit. Moves extremity spontaneously. Psychiatry: Appropriate affect. Not agitated. Skin: Warm, dry with normal turgor. No rash  Capillary refill: Brisk,< 3 seconds   Peripheral Pulses: +2 palpable, equal bilaterally      24HR INTAKE/OUTPUT:    Intake/Output Summary (Last 24 hours) at 11/7/2022 1258  Last data filed at 11/6/2022 2132  Gross per 24 hour   Intake 1131.05 ml   Output --   Net 1131.05 ml       I/O last 3 completed shifts: In: 2688.5 [P.O.:480; I.V.:1703.2; IV Piggyback:505.3]  Out: -   No intake/output data recorded.   Meds:    Fidaxomicin  200 mg Oral BID    metroNIDAZOLE  500 mg IntraVENous Q8H    amiodarone  200 mg Oral Daily    gabapentin  300 mg Oral Nightly    metoprolol succinate  12.5 mg Oral Daily    pantoprazole  40 mg Oral QAM AC    sodium chloride flush  5-40 mL IntraVENous 2 times per day    warfarin placeholder: dosing by pharmacy

## 2022-11-07 NOTE — PROGRESS NOTES
Clinical Pharmacy Note  Warfarin Consult    Zay Bro is a 78 y.o. female receiving warfarin managed by pharmacy. Warfarin Indication: Factor V Leiden deficiency, h/o PE, Afib  Target INR range: 2-3   Dose prior to admission: 2.5 mg daily except NONE on Friday    Current warfarin drug-drug interactions:     Recent Labs     11/05/22  0533 11/06/22  0725 11/07/22  0430   HGB 12.0 11.1* 11.5*   HCT 37.8 35.3* 35.5*   INR 6.41* 6.67* 5.88*         Assessment/Plan:  Hold warfarin tonight. Daily PT/INR until stable within therapeutic range. Thank you for the consult. Will continue to follow.     [facility]  11/7/2022 11:33 AM

## 2022-11-08 PROBLEM — Z90.81 ASPLENIA AFTER SURGICAL PROCEDURE: Status: ACTIVE | Noted: 2022-11-06

## 2022-11-08 PROBLEM — D72.823 LEUKEMOID REACTION: Status: ACTIVE | Noted: 2022-11-08

## 2022-11-08 LAB
ANISOCYTOSIS: ABNORMAL
BANDED NEUTROPHILS RELATIVE PERCENT: 6 % (ref 0–7)
BASOPHILS ABSOLUTE: 0 K/UL (ref 0–0.2)
BASOPHILS RELATIVE PERCENT: 0 %
CRENATED RBC'S: ABNORMAL
EOSINOPHILS ABSOLUTE: 0.7 K/UL (ref 0–0.6)
EOSINOPHILS RELATIVE PERCENT: 2 %
HCT VFR BLD CALC: 33.5 % (ref 36–48)
HEMOGLOBIN: 11.1 G/DL (ref 12–16)
LYMPHOCYTES ABSOLUTE: 2.1 K/UL (ref 1–5.1)
LYMPHOCYTES RELATIVE PERCENT: 6 %
MCH RBC QN AUTO: 30.7 PG (ref 26–34)
MCHC RBC AUTO-ENTMCNC: 33.1 G/DL (ref 31–36)
MCV RBC AUTO: 92.6 FL (ref 80–100)
METAMYELOCYTES RELATIVE PERCENT: 1 %
MONOCYTES ABSOLUTE: 2.8 K/UL (ref 0–1.3)
MONOCYTES RELATIVE PERCENT: 8 %
MYELOCYTE PERCENT: 1 %
NEUTROPHILS ABSOLUTE: 29.5 K/UL (ref 1.7–7.7)
NEUTROPHILS RELATIVE PERCENT: 76 %
OVALOCYTES: ABNORMAL
PDW BLD-RTO: 15.6 % (ref 12.4–15.4)
PLATELET # BLD: 379 K/UL (ref 135–450)
PLATELET SLIDE REVIEW: ADEQUATE
PMV BLD AUTO: 8.6 FL (ref 5–10.5)
POIKILOCYTES: ABNORMAL
RBC # BLD: 3.62 M/UL (ref 4–5.2)
SLIDE REVIEW: ABNORMAL
SMUDGE CELLS: PRESENT
TARGET CELLS: ABNORMAL
VACUOLATED NEUTROPHILS: PRESENT
WBC # BLD: 35.1 K/UL (ref 4–11)

## 2022-11-08 PROCEDURE — 85025 COMPLETE CBC W/AUTO DIFF WBC: CPT

## 2022-11-08 PROCEDURE — 94760 N-INVAS EAR/PLS OXIMETRY 1: CPT

## 2022-11-08 PROCEDURE — 2500000003 HC RX 250 WO HCPCS: Performed by: PHYSICIAN ASSISTANT

## 2022-11-08 PROCEDURE — 2580000003 HC RX 258: Performed by: STUDENT IN AN ORGANIZED HEALTH CARE EDUCATION/TRAINING PROGRAM

## 2022-11-08 PROCEDURE — 2060000000 HC ICU INTERMEDIATE R&B

## 2022-11-08 PROCEDURE — 6370000000 HC RX 637 (ALT 250 FOR IP): Performed by: STUDENT IN AN ORGANIZED HEALTH CARE EDUCATION/TRAINING PROGRAM

## 2022-11-08 PROCEDURE — 6370000000 HC RX 637 (ALT 250 FOR IP): Performed by: PHYSICIAN ASSISTANT

## 2022-11-08 PROCEDURE — 6370000000 HC RX 637 (ALT 250 FOR IP): Performed by: INTERNAL MEDICINE

## 2022-11-08 PROCEDURE — 99233 SBSQ HOSP IP/OBS HIGH 50: CPT | Performed by: INTERNAL MEDICINE

## 2022-11-08 PROCEDURE — 36415 COLL VENOUS BLD VENIPUNCTURE: CPT

## 2022-11-08 RX ADMIN — METRONIDAZOLE 500 MG: 500 INJECTION, SOLUTION INTRAVENOUS at 20:54

## 2022-11-08 RX ADMIN — METRONIDAZOLE 500 MG: 500 INJECTION, SOLUTION INTRAVENOUS at 14:42

## 2022-11-08 RX ADMIN — FIDAXOMICIN 200 MG: 200 TABLET, FILM COATED ORAL at 20:26

## 2022-11-08 RX ADMIN — VANCOMYCIN HYDROCHLORIDE 250 MG: 250 CAPSULE ORAL at 09:39

## 2022-11-08 RX ADMIN — FIDAXOMICIN 200 MG: 200 TABLET, FILM COATED ORAL at 09:39

## 2022-11-08 RX ADMIN — GABAPENTIN 300 MG: 300 CAPSULE ORAL at 20:26

## 2022-11-08 RX ADMIN — BUTALBITAL, ACETAMINOPHEN, AND CAFFEINE 1 TABLET: 50; 325; 40 TABLET ORAL at 20:26

## 2022-11-08 RX ADMIN — VANCOMYCIN HYDROCHLORIDE 250 MG: 250 CAPSULE ORAL at 20:26

## 2022-11-08 RX ADMIN — VANCOMYCIN HYDROCHLORIDE 250 MG: 250 CAPSULE ORAL at 13:06

## 2022-11-08 RX ADMIN — Medication 10 ML: at 09:42

## 2022-11-08 RX ADMIN — VANCOMYCIN HYDROCHLORIDE 250 MG: 250 CAPSULE ORAL at 17:09

## 2022-11-08 RX ADMIN — METRONIDAZOLE 500 MG: 500 INJECTION, SOLUTION INTRAVENOUS at 06:56

## 2022-11-08 RX ADMIN — METOPROLOL SUCCINATE 12.5 MG: 25 TABLET, FILM COATED, EXTENDED RELEASE ORAL at 09:39

## 2022-11-08 RX ADMIN — OXYCODONE 5 MG: 5 TABLET ORAL at 20:51

## 2022-11-08 RX ADMIN — AMIODARONE HYDROCHLORIDE 200 MG: 200 TABLET ORAL at 09:39

## 2022-11-08 RX ADMIN — Medication 10 ML: at 20:29

## 2022-11-08 ASSESSMENT — PAIN DESCRIPTION - DESCRIPTORS: DESCRIPTORS: ACHING

## 2022-11-08 ASSESSMENT — PAIN DESCRIPTION - LOCATION: LOCATION: ABDOMEN

## 2022-11-08 ASSESSMENT — PAIN SCALES - GENERAL
PAINLEVEL_OUTOF10: 0
PAINLEVEL_OUTOF10: 6
PAINLEVEL_OUTOF10: 0

## 2022-11-08 NOTE — PROGRESS NOTES
Clinical Pharmacy Note  Warfarin Consult    Kenneth Clark is a 78 y.o. female receiving warfarin managed by pharmacy. Warfarin Indication: Factor V Leiden deficiency, h/o PE, Afib  Target INR range: 2-3   Dose prior to admission: 2.5 mg daily except NONE on Friday    Current warfarin drug-drug interactions:     Recent Labs     11/06/22  0725 11/07/22  0430   HGB 11.1* 11.5*   HCT 35.3* 35.5*   INR 6.67* 5.88*         Assessment/Plan:  Hold warfarin tonight. Daily PT/INR until stable within therapeutic range. Thank you for the consult. Will continue to follow.     Imani Blank [facility]  11/8/2022 1:01 PM

## 2022-11-08 NOTE — PROGRESS NOTES
Hospital Medicine Progress Note     Date:  11/8/2022    PCP: Chiquis Kaplan MD (Tel: [phone])    Date of Admission: 11/2/2022    Chief complaint:   Chief Complaint   Patient presents with    Leg Pain     Fall leg and ankle pain       Brief admission history: 49-year-old male with history of paroxysmal atrial fibrillation, history of CHF (previously systolic dysfunction, although recent echocardiogram with preserved ejection fraction, diastolic function not measured), CLL, s/p splenectomy, COPD, essential hypertension, factor V Leiden mutation, chronic Coumadin use, osteoporosis, hyperlipidemia, who presented to the emergency room on November 3, 2022 with complaints of generalized weakness, fall, recent nausea, vomiting and diarrhea, with associated decreased oral intake. Stool study was positive for C. difficile infection. She was noted to be hypoxic on presentation. Assessment/plan:  C. difficile colitis. Continue Dificid, IV Flagyl. Continue intravenous fluid. GI on board and assisting with management. Infectious disease on board and assisting with management. Nausea and vomiting, likely secondary to C. difficile infection/colitis. Much improved. Tolerating diet. Antiemetics in place. Generalized weakness. Likely secondary to underlying medical conditions. Fall precautions. Therapy evaluation in place. Recent fall at home, likely secondary to generalized weakness. Fall precautions. History of factor V Leiden, on chronic anticoagulation with Coumadin. INR supratherapeutic due to antibiotic therapy; continue holding Coumadin. Coumadin dosing per pharmacy, hold when appropriate. Monitor closely for bleed. Will need vitamin K and FFP only if patient develops bleed. History of atrial fibrillation. Continue amiodarone. On Coumadin as noted above. Prolonged QTc noted on admission EKG, resolved. Avoid QT prolonging medications. Gastroesophageal reflux disease. Continue PPI. Added as needed Tums. Patient is not hypoxic. She is on supplemental oxygen for comfort only. Other comorbidities: history of paroxysmal atrial fibrillation, history of CHF (previously systolic dysfunction, although recent echocardiogram with preserved ejection fraction, diastolic function not measured), CLL, s/p splenectomy, COPD, essential hypertension, factor V Leiden mutation, chronic Coumadin use, osteoporosis, hyperlipidemia. Diet: ADULT DIET; Regular    Code status: Full Code   ----------  Subjective  Diarrhea improved. Mild abdominal pain. Objective  Physical exam:  Vitals: BP (!) 149/84   Pulse 66   Temp 98.6 °F (37 °C) (Axillary)   Resp 18   Ht 5' (1.524 m)   Wt 140 lb 6.9 oz (63.7 kg)   SpO2 90%   BMI 27.43 kg/m²   Gen/overall appearance: Not in acute distress. Alert. Oriented x3. Head: Normocephalic, atraumatic  Eyes: EOMI, good acuity  ENT: Oral mucosa moist  Neck: No JVD, thyromegaly  CVS: Nml S1S2, no MRG, RRR  Pulm: Clear bilaterally. No crackles/wheezes  Gastrointestinal: Mild diffuse tenderness to palpation. Soft, ND, +BS  Musculoskeletal: No edema. Warm  Neuro: No focal deficit. Moves extremity spontaneously. Psychiatry: Appropriate affect. Not agitated. Skin: Warm, dry with normal turgor. No rash  Capillary refill: Brisk,< 3 seconds   Peripheral Pulses: +2 palpable, equal bilaterally      24HR INTAKE/OUTPUT:    Intake/Output Summary (Last 24 hours) at 11/8/2022 1320  Last data filed at 11/7/2022 2259  Gross per 24 hour   Intake 300 ml   Output --   Net 300 ml       I/O last 3 completed shifts: In: 1671.1 [P.O.:1020; I.V.:511; IV Piggyback:140]  Out: -   No intake/output data recorded.   Meds:    vancomycin  250 mg Oral 4x Daily    Fidaxomicin  200 mg Oral BID    metroNIDAZOLE  500 mg IntraVENous Q8H    amiodarone  200 mg Oral Daily    gabapentin  300 mg Oral Nightly    metoprolol succinate  12.5 mg Oral Daily    sodium chloride flush  5-40 mL IntraVENous 2 times per day warfarin placeholder: dosing by pharmacy   Other RX Placeholder     Infusions:    0.9% NaCl with KCl 20 mEq 75 mL/hr at 11/07/22 2227    sodium chloride Stopped (11/05/22 0700)     PRN Meds: calcium carbonate, trimethobenzamide, meclizine, butalbital-acetaminophen-caffeine, oxyCODONE, sodium chloride flush, sodium chloride, acetaminophen **OR** acetaminophen, potassium chloride **OR** potassium alternative oral replacement **OR** potassium chloride    Labs/imaging:  CBC:   Recent Labs     11/06/22 0725 11/07/22 0430   WBC 44.4* 45.5*   HGB 11.1* 11.5*    381       BMP:    Recent Labs     11/06/22 0725 11/07/22 0430    142   K 3.3* 3.8   CL 99 107   CO2 28 24   BUN 20 11   CREATININE 0.9 0.7   GLUCOSE 76 82       Hepatic:   Recent Labs     11/06/22 0725 11/07/22 0430   AST 11* 13*   ALT 8* 7*   BILITOT 0.5 0.5   ALKPHOS 73 81         Kelley Foley MD  -------------------------------  Rounding hospitalist

## 2022-11-08 NOTE — PROGRESS NOTES
INPATIENT CONSULTATION:    IDENTIFYING DATA/REASON FOR CONSULTATION   PATIENT:  Saundra Garza  MRN:  [id number]  ADMIT DATE: 11/2/2022  TIME OF EVALUATION: 11/8/2022 7:48 AM  HOSPITAL STAY:   LOS: 5 days   CONSULTING PHYSICIAN: Tennille Torres MD   REASON FOR CONSULTATION: CDI    Subjective:    Patient seen and examined in follow-up. Patient reports abdominal pain is improved and nearly resolved. She denies any epigastric abdominal pain with eating, and reports eating multiple meals yesterday. She reports her diarrhea is improved. She produced 4 semiloose brown nonbloody bowel movements yesterday. She reports going to the bathroom overnight to urinate without having a bowel movement. MEDICATIONS   SCHEDULED:  vancomycin, 250 mg, 4x Daily  Fidaxomicin, 200 mg, BID  metroNIDAZOLE, 500 mg, Q8H  amiodarone, 200 mg, Daily  gabapentin, 300 mg, Nightly  metoprolol succinate, 12.5 mg, Daily  sodium chloride flush, 5-40 mL, 2 times per day  warfarin placeholder: dosing by pharmacy, , RX Placeholder    FLUIDS/DRIPS:     0.9% NaCl with KCl 20 mEq 75 mL/hr at 11/07/22 2227    sodium chloride Stopped (11/05/22 0700)     PRNs: calcium carbonate, 500 mg, TID PRN  trimethobenzamide, 200 mg, Q8H PRN  meclizine, 12.5 mg, TID PRN  butalbital-acetaminophen-caffeine, 1 tablet, Q6H PRN  oxyCODONE, 5 mg, Q6H PRN  sodium chloride flush, 5-40 mL, PRN  sodium chloride, , PRN  acetaminophen, 650 mg, Q6H PRN   Or  acetaminophen, 650 mg, Q6H PRN  potassium chloride, 40 mEq, PRN   Or  potassium alternative oral replacement, 40 mEq, PRN   Or  potassium chloride, 10 mEq, PRN    ALLERGIES:  No Known Allergies      PHYSICAL EXAM     Vitals:    11/07/22 2249 11/07/22 2251 11/07/22 2319 11/08/22 0327   BP:  (!) 153/59  (!) 145/56   Pulse:  63  62   Resp: 18 18 16 18   Temp:  98.4 °F (36.9 °C)  98.6 °F (37 °C)   TempSrc:  Oral  Oral   SpO2:  93%  97%   Weight:    140 lb 6.9 oz (63.7 kg)   Height:           I/O last 3 completed shifts:   In: 1671.1 [P.O.:1020; I.V.:511; IV Piggyback:140]  Out: -     Physical Exam:  Gen: Resting in bed, NAD   HEENT: normocephalic, atraumatic. No scleral icterus. CV: RRR no MRG   Pul: CTAB, normal work of breathing without wheezing  Abd: Good bowel sounds throughout, soft, tender to palpation in LLQ, ND, no masses, no HSM   Ext: No edema, atraumatic  Neuro: No gross deficits, moves all 4 extremities, follows commands  Skin: No jaundice, spider angiomas, rodrigues erythema     LABS AND IMAGING     No results found for this or any previous visit (from the past 24 hour(s)). Other Labs    Imaging:    CT ABDOMEN PELVIS W IV CONTRAST Additional Contrast? Radiologist Recommendation   Final Result   1. Worsening mucosal thickening of the colon in a patient with a known   history of C difficile colitis. No pattern of dilation/megacolon. 2. Cholelithiasis with increasing gallbladder mucosal thickening and mild   dilation of the common bile duct. Current changes felt to be secondary to   more systemic findings above. Underlying pattern of cholecystitis cannot be   excluded. 3. No CT evidence of pancreatitis. Stable pattern of mild pancreatic ductal   dilatation. CT HEAD WO CONTRAST   Preliminary Result   No acute intracranial abnormality. CT ABDOMEN PELVIS W IV CONTRAST Additional Contrast? None   Final Result   1. No pulmonary embolism. 2. Tiny subpleural nodules in the periphery of the lungs may represent an   underlying infectious or inflammatory process. Follow-up recommendations are   provided below. 3. Diffuse mucosal thickening of the colon suggesting underlying pattern of   infectious colitis including C difficile. 4. Incidental cholelithiasis and stable right adrenal nodule suspected to   represent an adenoma. RECOMMENDATIONS:   Multiple pulmonary nodules. Most severe: 3 mm solid pulmonary nodule within   the upper lobe.  If patient is low risk for malignancy, no routine follow-up imaging is recommended; if patient is high risk for malignancy, a   non-contrast Chest CT at 12 months is optional. If performed and the nodule   is stable at 12 months, no further follow-up is recommended. These guidelines do not apply to immunocompromised patients and patients with   cancer. Follow up in patients with significant comorbidities as clinically   warranted. For lung cancer screening, adhere to Lung-RADS guidelines. Reference: Radiology. 2017; 284(1):228-43. CT CHEST PULMONARY EMBOLISM W CONTRAST   Final Result   1. No pulmonary embolism. 2. Tiny subpleural nodules in the periphery of the lungs may represent an   underlying infectious or inflammatory process. Follow-up recommendations are   provided below. 3. Diffuse mucosal thickening of the colon suggesting underlying pattern of   infectious colitis including C difficile. 4. Incidental cholelithiasis and stable right adrenal nodule suspected to   represent an adenoma. RECOMMENDATIONS:   Multiple pulmonary nodules. Most severe: 3 mm solid pulmonary nodule within   the upper lobe. If patient is low risk for malignancy, no routine follow-up   imaging is recommended; if patient is high risk for malignancy, a   non-contrast Chest CT at 12 months is optional. If performed and the nodule   is stable at 12 months, no further follow-up is recommended. These guidelines do not apply to immunocompromised patients and patients with   cancer. Follow up in patients with significant comorbidities as clinically   warranted. For lung cancer screening, adhere to Lung-RADS guidelines. Reference: Radiology. 2017; 284(1):228-43. CT HEAD WO CONTRAST   Final Result   No acute intracranial abnormality. XR ANKLE LEFT (MIN 3 VIEWS)   Final Result   Ankle mortise intact. No acute fracture or dislocation of the ankle. No   acute fracture or dislocation of the foot.   Degenerative changes are present   at the interphalangeal joints and there is deformity of the 5th PIP joint   which may be sequela of a remote injury. No soft tissue swelling of the   foot. Moderate soft tissue swelling/edema over the medial malleolus of the   ankle. RECOMMENDATION:   Medial soft tissue swelling about the left ankle with no underlying fracture. Degenerative changes in the left foot with no acute abnormality. XR FOOT LEFT (MIN 3 VIEWS)   Final Result   Ankle mortise intact. No acute fracture or dislocation of the ankle. No   acute fracture or dislocation of the foot. Degenerative changes are present   at the interphalangeal joints and there is deformity of the 5th PIP joint   which may be sequela of a remote injury. No soft tissue swelling of the   foot. Moderate soft tissue swelling/edema over the medial malleolus of the   ankle. RECOMMENDATION:   Medial soft tissue swelling about the left ankle with no underlying fracture. Degenerative changes in the left foot with no acute abnormality. XR CHEST PORTABLE   Final Result   Cardiomegaly with mild vascular congestion that is stable. ASSESSMENT AND RECOMMENDATIONS   Annelise Stout is a 78 y.o. female with history of CLL, CAD, atrial fibrillation, COPD, hypertension, factor V Leiden deficiency, history of splenectomy who presented to [facility] with diarrhea, nausea, vomiting and anorexia. CDI: Clinically improving on p.o. Dificid and IV Flagyl, with decreased stool output, but persistent severe leukocytosis. This is likely in part due to underlying CLL and history of splenectomy. Continue current therapy. CT A/P 11/7/22 without megacolon. Defer addition of PO Vanc to ID. Atrial fibrillation: On Coumadin, with supratherapeutic INR.   Factor V Leiden: On Coumadin, supratherapeutic INR    RECOMMENDATIONS:    -Lipase  -Continue Dificid and IV Flagyl    If you have any questions or need any further information, please feel free to contact anyone on our consult team.  Thank you for allowing us to participate in the care of Lacey Gleason. Dmitry Hussein.  [address]

## 2022-11-08 NOTE — PLAN OF CARE
Problem: Discharge Planning  Goal: Discharge to home or other facility with appropriate resources  Outcome: Progressing     Problem: Pain  Goal: Verbalizes/displays adequate comfort level or baseline comfort level  Outcome: Progressing     Problem: Safety - Adult  Goal: Free from fall injury  Outcome: Progressing     Problem: ABCDS Injury Assessment  Goal: Absence of physical injury  Outcome: Progressing     Problem: Cardiovascular - Adult  Goal: Maintains optimal cardiac output and hemodynamic stability  Outcome: Progressing  Goal: Absence of cardiac dysrhythmias or at baseline  Outcome: Progressing     Problem: Gastrointestinal - Adult  Goal: Maintains or returns to baseline bowel function  Outcome: Progressing     Problem: Infection - Adult  Goal: Absence of infection at discharge  Outcome: Progressing     Problem: Metabolic/Fluid and Electrolytes - Adult  Goal: Electrolytes maintained within normal limits  Outcome: Progressing  Goal: Hemodynamic stability and optimal renal function maintained  Outcome: Progressing     Problem: Skin/Tissue Integrity  Goal: Absence of new skin breakdown  Description: 1. Monitor for areas of redness and/or skin breakdown  2. Assess vascular access sites hourly  3. Every 4-6 hours minimum:  Change oxygen saturation probe site  4. Every 4-6 hours:  If on nasal continuous positive airway pressure, respiratory therapy assess nares and determine need for appliance change or resting period.   Outcome: Progressing     Problem: Neurosensory - Adult  Goal: Achieves stable or improved neurological status  Outcome: Progressing  Goal: Achieves maximal functionality and self care  Outcome: Progressing     Problem: Skin/Tissue Integrity - Adult  Goal: Skin integrity remains intact  Outcome: Progressing  Flowsheets (Taken 11/7/2022 1533 by Elizabeth Celestin RN)  Skin Integrity Remains Intact: Monitor for areas of redness and/or skin breakdown  Goal: Oral mucous membranes remain intact  Outcome: Progressing  Flowsheets (Taken 11/7/2022 1533 by Dulce Vega RN)  Oral Mucous Membranes Remain Intact: Assess oral mucosa and hygiene practices     Problem: Musculoskeletal - Adult  Goal: Return mobility to safest level of function  Outcome: Progressing  Goal: Return ADL status to a safe level of function  Outcome: Progressing

## 2022-11-08 NOTE — PLAN OF CARE
Problem: Discharge Planning  Goal: Discharge to home or other facility with appropriate resources  11/8/2022 1554 by Terese Shoemaker RN  Outcome: Progressing  Flowsheets (Taken 11/8/2022 4603)  Discharge to home or other facility with appropriate resources:   Identify barriers to discharge with patient and caregiver   Arrange for needed discharge resources and transportation as appropriate   Identify discharge learning needs (meds, wound care, etc)     Problem: Pain  Goal: Verbalizes/displays adequate comfort level or baseline comfort level  11/8/2022 1554 by Terese Shoemaker RN  Outcome: Progressing     Problem: Safety - Adult  Goal: Free from fall injury  11/8/2022 1554 by Terese Shoemaker RN  Outcome: Progressing     Problem: ABCDS Injury Assessment  Goal: Absence of physical injury  11/8/2022 1554 by Terese Shoemaker RN  Outcome: Progressing  Flowsheets (Taken 11/8/2022 1552)  Absence of Physical Injury: Implement safety measures based on patient assessment     Problem: Cardiovascular - Adult  Goal: Maintains optimal cardiac output and hemodynamic stability  11/8/2022 1554 by Terese Shoemaker RN  Outcome: Progressing     Problem: Cardiovascular - Adult  Goal: Absence of cardiac dysrhythmias or at baseline  11/8/2022 1554 by Terese Shoemaker RN  Outcome: Progressing     Problem: Gastrointestinal - Adult  Goal: Maintains or returns to baseline bowel function  11/8/2022 1554 by Terese Shoemaker RN  Outcome: Progressing  [address] (Taken 11/8/2022 0834)  Maintains or returns to baseline bowel function:   Assess bowel function   Encourage oral fluids to ensure adequate hydration   Encourage mobilization and activity   Administer ordered medications as needed   Administer IV fluids as ordered to ensure adequate hydration     Problem: Infection - Adult  Goal: Absence of infection at discharge  11/8/2022 1554 by Terese Shoemaker RN  Outcome: Progressing  Flowsheets (Taken 11/8/2022 0834)  Absence of infection at discharge: Assess and monitor for signs and symptoms of infection     Problem: Metabolic/Fluid and Electrolytes - Adult  Goal: Electrolytes maintained within normal limits  11/8/2022 1554 by Lionel Mehta RN  Outcome: Progressing  Flowsheets (Taken 11/8/2022 5645)  Electrolytes maintained within normal limits:   Monitor labs and assess patient for signs and symptoms of electrolyte imbalances   Administer electrolyte replacement as ordered   Monitor response to electrolyte replacements, including repeat lab results as appropriate     Problem: Metabolic/Fluid and Electrolytes - Adult  Goal: Hemodynamic stability and optimal renal function maintained  11/8/2022 1554 by Lionel Mehta RN  Outcome: Progressing  Flowsheets (Taken 11/8/2022 0834)  Hemodynamic stability and optimal renal function maintained: Monitor labs and assess for signs and symptoms of volume excess or deficit     Problem: Neurosensory - Adult  Goal: Achieves stable or improved neurological status  11/8/2022 1554 by Lionel Mehta RN  Outcome: Progressing  [address] (Taken 11/8/2022 0834)  Achieves stable or improved neurological status:   Assess for and report changes in neurological status   Initiate measures to prevent increased intracranial pressure   Maintain blood pressure and fluid volume within ordered parameters to optimize cerebral perfusion and minimize risk of hemorrhage   Monitor temperature, glucose, and sodium.  Initiate appropriate interventions as ordered     Problem: Neurosensory - Adult  Goal: Achieves maximal functionality and self care  11/8/2022 1554 by Lionel Mehta RN  Outcome: Progressing  Flowsheets (Taken 11/8/2022 0834)  Achieves maximal functionality and self care:   Monitor swallowing and airway patency with patient fatigue and changes in neurological status   Encourage and assist patient to increase activity and self care with guidance from physical therapy/occupational therapy     Problem: Skin/Tissue Integrity - Adult  Goal: Skin integrity remains intact  11/8/2022 1554 by Terese Shoemaker RN  Outcome: Progressing  Flowsheets  Taken 11/8/2022 1552  Skin Integrity Remains Intact: Monitor for areas of redness and/or skin breakdown  Taken 11/8/2022 0834  Skin Integrity Remains Intact: Monitor for areas of redness and/or skin breakdown     Problem: Skin/Tissue Integrity - Adult  Goal: Oral mucous membranes remain intact  11/8/2022 1554 by Terese Shoemaker RN  Outcome: Progressing  Flowsheets  Taken 11/8/2022 1552  Oral Mucous Membranes Remain Intact: Assess oral mucosa and hygiene practices  Taken 11/8/2022 0834  Oral Mucous Membranes Remain Intact: Assess oral mucosa and hygiene practices     Problem: Musculoskeletal - Adult  Goal: Return mobility to safest level of function  11/8/2022 1554 by Terese Shoemaker RN  Outcome: Progressing  Flowsheets (Taken 11/8/2022 0834)  Return Mobility to Safest Level of Function:   Assess patient stability and activity tolerance for standing, transferring and ambulating with or without assistive devices   Assist with transfers and ambulation using safe patient handling equipment as needed   Ensure adequate protection for wounds/incisions during mobilization   Apply continuous passive motion per provider or physical therapy orders to increase flexion toward goal     Problem: Musculoskeletal - Adult  Goal: Return ADL status to a safe level of function  11/8/2022 1554 by Terese Shoemaker RN  Outcome: Progressing  Flowsheets (Taken 11/8/2022 0834)  Return ADL Status to a Safe Level of Function: Administer medication as ordered     Problem: Skin/Tissue Integrity  Goal: Absence of new skin breakdown  Description: 1. Monitor for areas of redness and/or skin breakdown  2. Assess vascular access sites hourly  3. Every 4-6 hours minimum:  Change oxygen saturation probe site  4.   Every 4-6 hours:  If on nasal continuous positive airway pressure, respiratory therapy assess nares and determine need for appliance change or resting period.   11/8/2022 1554 by Tameka Leone RN  Outcome: Progressing

## 2022-11-08 NOTE — PROGRESS NOTES
Infectious Disease Follow up Notes  Admit Date: 11/2/2022  Hospital Day: 7    Antibiotics :   Metronidazole  Fidaxomicin    Oral Vancomycin      CHIEF COMPLAINT:     Severe C diff colitis  WBC elevation  Diarrhea    Subjective interval History :  78 y.o. woman with h/o  A fib admitted with a fall and diarrhea now tested +ve for C diff also has CLL and chest port in place prior Splenectomy -    Abdominal distention pain slowly improving the episodes of bowel movement somewhat loose tolerating antibiotic therapy okay WBC elevated    Past Medical History:    Past Medical History:   Diagnosis Date    Acute ST elevation myocardial infarction (STEMI) ([facility] [address] 07/19/2021    Atrial fibrillation (HCC)     Chronic lymphocytic leukemia in remission ([facility] [address]     CLL (chronic lymphocytic leukemia) ([facility] [address] 02/12/2015    COPD (chronic obstructive pulmonary disease) ([facility] [address]     Essential hypertension 01/01/2015    controlled with salt restriction (initially)    Factor V Leiden mutation ([facility] [address]     Goiter 11/01/2011    1.5 cm midline    Hypercholesterolemia     Impaired fasting glucose 11/01/2011    Osteoarthritis     Osteoporosis     Post herpetic neuralgia     Vitamin D deficiency 11/01/2011       Past Surgical History:    Past Surgical History:   Procedure Laterality Date    CATARACT REMOVAL WITH IMPLANT  5/14/12    left eye    JOINT REPLACEMENT      partial knee R and L    KNEE SURGERY  2008    partial replacements, both knees    MOHS SURGERY  03/12/2019    R cheek and R superior temple    SPLENECTOMY      TUNNELED VENOUS PORT PLACEMENT      UPPER GASTROINTESTINAL ENDOSCOPY N/A 2/5/2019    EGD ESOPHAGOGASTRODUODENOSCOPY, WITH ENDOSCOPIC ULTRASOUND OF ESOPHAGUS performed by Kash Saez MD at [address]       Current Medications:    No outpatient medications have been marked as taking for the 11/2/22 encounter [facility] Encounter).        Allergies: Patient has no known allergies.     Immunizations :   Immunization History   Administered Date(s) Administered    COVID-19, MODERNA BLUE border, Primary or Immunocompromised, (age 12y+), IM, 100 mcg/0.5mL 2021    COVID-19, PFIZER PURPLE top, DILUTE for use, (age 15 y+), 30mcg/0.3mL 2021, 2021    Influenza Virus Vaccine 10/16/2009, 2010, 2010, 10/03/2016    Influenza, Tati Aldana, Lindsay Jesús (age 10 mo+) AND AFLURIA, (age 1 y+), PF, 0.5mL 2020, 10/23/2021    Influenza, High Dose (Fluzone 65 yrs and older) 2011, 10/01/2012, 10/29/2013, 10/23/2014, 2015, 10/23/2018, 2019    Pneumococcal Conjugate 13-valent ([id number]) 2015    Pneumococcal Polysaccharide ([id number]) 2006, 2011    Td, unspecified formulation 10/04/2004    Tdap (Boostrix, Adacel) 2015       Social History:     Social History     Tobacco Use    Smoking status: Former     Packs/day: 0.30     Years: 50.00     Pack years: 15.00     Types: Cigarettes     Quit date: 1995     Years since quittin.0    Smokeless tobacco: Never   Vaping Use    Vaping Use: Never used   Substance Use Topics    Alcohol use: No     Alcohol/week: 0.0 standard drinks    Drug use: Not Currently     Social History     Tobacco Use   Smoking Status Former    Packs/day: 0.30    Years: 50.00    Pack years: 15.00    Types: Cigarettes    Quit date: 1995    Years since quittin.0   Smokeless Tobacco Never      Family History   Problem Relation Age of Onset    Diabetes Father     Stroke Sister 50         of a stroke          REVIEW OF SYSTEMS:      Constitutional:  negative for fevers, chills, night sweats  Eyes:  negative for blurred vision, eye discharge, visual disturbance   HEENT:  negative for hearing loss, ear drainage,nasal congestion  Respiratory:  negative for cough, shortness of breath or hemoptysis   Cardiovascular:  negative for chest pain, palpitations, syncope  Gastrointestinal: negative for nausea, vomiting, diarrhea,+  constipation, abdominal pain +   Genitourinary:  negative for frequency, dysuria, urinary incontinence, hematuria  Hematologic/Lymphatic:  negative for easy bruising, bleeding and lymphadenopathy  Allergic/Immunologic:  negative for recurrent infections, angioedema, anaphylaxis   Endocrine:  negative for weight changes, polyuria, polydipsia and polyphagia  Musculoskeletal:  negative for joint  pain, swelling, decreased range of motion  Integumentary: No rashes, skin lesions  Neurological:  negative for headaches, slurred speech, unilateral weakness  Psychiatric: negative for hallucinations,confusion,agitation.                 PHYSICAL EXAM:      Vitals:  t MAX 99.6   BP (!) 157/64   Pulse 62   Temp 98.4 °F (36.9 °C) (Oral)   Resp 16   Ht 5' (1.524 m)   Wt 140 lb 6.9 oz (63.7 kg)   SpO2 92%   BMI 27.43 kg/m²     General Appearance: alert,in some acute distress, ++ pallor, no icterus  on nasal cannula   Skin: warm and dry, no rash or erythema  Head: normocephalic and atraumatic  Eyes: pupils equal, round, and reactive to light, conjunctivae normal  ENT: tympanic membrane, external ear and ear canal normal bilaterally, nose without deformity, nasal mucosa and turbinates normal without polyps  Neck: supple and non-tender without mass, no thyromegaly  no cervical lymphadenopathy  Pulmonary/Chest: clear to auscultation bilaterally- no wheezes, rales or rhonchi, normal air movement, no respiratory distress  Cardiovascular: normal rate, regular rhythm, normal S1 and S2, no murmurs, rubs, clicks, or gallops, no carotid bruits  Abdomen: soft, + -tender, + -distended, normal bowel sounds, no masses or organomegaly  Extremities: no cyanosis, clubbing or edema  Musculoskeletal: normal range of motion, no joint swelling, deformity or tenderness  Integumentary: No rashes, no abnormal skin lesions, no petechiae  Neurologic: reflexes normal and symmetric, no cranial nerve deficit  Psych:  Orientation, sensorium, mood normal  Lines: chest port +        Data Review:    CBC:   Lab Results   Component Value Date    WBC 45.5 (HH) 11/07/2022    HGB 11.5 (L) 11/07/2022    HCT 35.5 (L) 11/07/2022    MCV 92.3 11/07/2022     11/07/2022     RENAL:   Lab Results   Component Value Date    CREATININE 0.7 11/07/2022    BUN 11 11/07/2022     11/07/2022    K 3.8 11/07/2022     11/07/2022    CO2 24 11/07/2022     SED RATE:   Lab Results   Component Value Date/Time    SEDRATE 45 05/05/2022 04:01 PM     CK: No results found for: CKTOTAL  CRP: No results found for: CRP  Hepatic Function Panel:   Lab Results   Component Value Date/Time    ALKPHOS 81 11/07/2022 04:30 AM    ALT 7 11/07/2022 04:30 AM    AST 13 11/07/2022 04:30 AM    PROT 4.3 11/07/2022 04:30 AM    PROT 6.2 06/23/2017 12:10 PM    BILITOT 0.5 11/07/2022 04:30 AM    BILIDIR <0.2 11/07/2022 04:30 AM    IBILI see below 11/07/2022 04:30 AM    LABALBU 2.6 11/07/2022 04:30 AM     UA:  Lab Results   Component Value Date/Time    COLORU Yellow 11/03/2022 03:34 AM    CLARITYU Clear 11/03/2022 03:34 AM    GLUCOSEU Negative 11/03/2022 03:34 AM    BILIRUBINUR Negative 11/03/2022 03:34 AM    BILIRUBINUR neg 04/20/2021 08:53 AM    KETUA 80 11/03/2022 03:34 AM    SPECGRAV 1.075 11/03/2022 03:34 AM    BLOODU MODERATE 11/03/2022 03:34 AM    PHUR 5.0 11/03/2022 03:34 AM    PROTEINU TRACE 11/03/2022 03:34 AM    UROBILINOGEN 0.2 11/03/2022 03:34 AM    NITRU Negative 11/03/2022 03:34 AM    LEUKOCYTESUR Negative 11/03/2022 03:34 AM    LABMICR YES 11/03/2022 03:34 AM    URINETYPE NotGiven 11/03/2022 03:34 AM      Urine Microscopic:   Lab Results   Component Value Date/Time    BACTERIA None Seen 11/03/2022 03:34 AM    HYALCAST 0 11/03/2022 03:34 AM    WBCUA 2 11/03/2022 03:34 AM    RBCUA 3 11/03/2022 03:34 AM    EPIU 2 11/03/2022 03:34 AM     Urine Reflex to Culture:   Lab Results   Component Value Date/Time    URRFLXCULT Not Indicated 11/03/2022 03:34 AM         MICRO: cultures reviewed and updated by me   Blood Culture:   Lab Results   Component Value Date/Time    BC No Growth after 4 days of incubation. 11/03/2022 12:34 AM    BLOODCULT2 No Growth after 4 days of incubation. 11/03/2022 12:34 AM       Respiratory Culture:  No results found for: Jessica Porch  AFB:No results found for: [facility]  Viral Culture:  Lab Results   Component Value Date/Time    COVID19 Not Detected 11/03/2022 12:35 AM     Urine Culture: No results for input(s): LABURIN in the last 72 hours. Culture, Blood 2 [[id number]] Collected: 11/03/22 0034   Order Status: Completed Specimen: Blood Updated: 11/07/22 0315    Culture, Blood 2 No Growth after 4 days of incubation. Narrative:     ORDER#: [id number]                          ORDERED BY: MATT FANG   SOURCE: Blood                              COLLECTED:  11/03/22 00:34   ANTIBIOTICS AT [place].:                      RECEIVED :  11/03/22 03:04   If child <=2 yrs old please draw pediatric bottle. ~Blood Culture #2   Blood Culture 1 [[id number]] Collected: 11/03/22 0034   Order Status: Completed Specimen: Blood Updated: 11/07/22 0315    Blood Culture, Routine No Growth after 4 days of incubation. Narrative:     ORDER#: [id number]                          ORDERED BY: MATT FANG   SOURCE: Blood                              COLLECTED:  11/03/22 00:34   ANTIBIOTICS AT [place].:                      RECEIVED :  11/03/22 03:04   If child <=2 yrs old please draw pediatric bottle. ~Blood Culture 1   Giardia antigen [[id number]] Collected: 11/03/22 1005   Order Status: Completed Specimen: Stool Updated: 11/04/22 1016    Giardia Ag, Stl --    Negative   Normal Range: Negative   This stool specimen has been tested for the above parasites   by enzyme immunoassay. A preserved specimen is held for one   week after the results are reported.  If clinically indicated,   a comprehensive microscopic examination can be performed by   calling the Microbiology Lab. Narrative:     ORDER#: [id number]                          ORDERED BY: Tomasa Uribe   SOURCE: Stool                              COLLECTED:  11/03/22 10:05   ANTIBIOTICS AT [place].:                      RECEIVED :  11/03/22 15:25   C. difficile toxin Molecular [[id number]] (Abnormal) Collected: 11/03/22 1005   Order Status: Completed Specimen: Stool Updated: 11/04/22 0615    Organism C. difficile toxin B gene detected Abnormal     C. difficile toxin Molecular -- Abnormal     POSITIVE FOR   Normal Range: Not detected   CONTACT PRECAUTIONS INDICATED    Abnormal    Narrative:     ORDER#: [id number]                          ORDERED BY: MATT FANG   SOURCE: Stool                              COLLECTED:  11/03/22 10:05   ANTIBIOTICS AT [place].:                      RECEIVED :  11/03/22 10:34   CALL  Nguyễn  [id number] tel. [phone],   Microbiology results called to and read back by Louetta Cushing RN, 11/04/2022   06:14, by [facility] [place]   Gastrointestinal Panel by DNA [[id number]] Collected: 11/03/22 1005   Order Status: Completed Specimen: Stool Updated: 11/04/22 0608    GI Bacterial Pathogens By PCR --    No Shigella spp/EIEC DNA detected   No Shiga toxin-producing gene(s) detected   No Campylobacter spp. (jejuni and coli)DNA detected   No Salmonella spp.  DNA detected   No Vibrio vulnificus/parahaemolyticus/cholerae DNA detected   No Plesiomonas shigelloides DNA detected   No Enterotoxigenic E. coli (ETEC) DNA detected   No Yersinia enterocolitica DNA detected   Normal Range:  None detected    Narrative:     ORDER#: [id number]                          ORDERED BY: MATT FANG   SOURCE: Stool                              COLLECTED:  11/03/22 10:05   ANTIBIOTICS AT [place].:                      RECEIVED :  11/03/22 10:37   Fecal leukocytes [[id number]] (Abnormal) Collected: 11/03/22 1005   Order Status: Completed Specimen: Stool Updated: 11/03/22 1632    White Blood Cells (WBC), Stool -- Abnormal     POSITIVE   Normal Range:Negative    Abnormal    Narrative:     ORDER#: [id number]                          ORDERED BY: Bradford Schwartz   SOURCE: Stool                              COLLECTED:  11/03/22 10:05   ANTIBIOTICS AT [place].:                      RECEIVED :  11/03/22 15:23   Clostridium Difficile Toxin/Antigen [[id number]] Collected: 11/03/22 1005   Order Status: Completed Specimen: Stool Updated: 11/03/22 1131    C.diff Toxin/Antigen --    Indeterminate see results of C. difficile amplification(PCR)   Normal Range: Negative    Narrative:     ORDER#: [id number]                          ORDERED BY: MATT FANG   SOURCE: Stool                              COLLECTED:  11/03/22 10:05   ANTIBIOTICS AT [place].:                      RECEIVED :  11/03/22 10:34   Collect White vial (sterile container)   COVID-19, Rapid [[id number]] Collected: 11/03/22 0035   Order Status: Completed Specimen: Nasopharyngeal Swab Updated: 11/03/22 0112    SARS-CoV-2, NAAT Not Detected    Comment: Rapid NAAT:   Negative results should be treated as presumptive and,   if inconsistent with clinical signs and symptoms or necessary for   patient management, should be tested with an alternative molecular   assay. Negative results do not preclude SARS-CoV-2 infection and   should not be used as the sole basis for patient management decisions. This test has been authorized by the FDA under an Emergency Use   Authorization (EUA) for use by authorized laboratories.      Fact sheet for Healthcare Providers:   http://www.aristeo.aria/   Fact sheet for Patients: http://www.jin-erik.aria/     METHODOLOGY: Isothermal Nucleic Acid Amplification       Rapid influenza A/B antigens [[id number]] Collected: 11/03/22 0035   Order Status: Completed Specimen: Nasopharyngeal Updated: 11/03/22 0111    Rapid Influenza A Ag Negative    Rapid Influenza B Ag Negative   Gastrointestinal Panel, Molecular [[id number]] Collected: 11/03/22 0000   Order Status: Canceled Specimen: Stool    O&P PANEL ([employer]) #1 [[id number]] Collected: 11/03/22 0000   Order Status: Canceled Specimen: Stool      IMAGING:    CT ABDOMEN PELVIS W IV CONTRAST Additional Contrast? Radiologist Recommendation   Final Result   1. Worsening mucosal thickening of the colon in a patient with a known   history of C difficile colitis. No pattern of dilation/megacolon. 2. Cholelithiasis with increasing gallbladder mucosal thickening and mild   dilation of the common bile duct. Current changes felt to be secondary to   more systemic findings above. Underlying pattern of cholecystitis cannot be   excluded. 3. No CT evidence of pancreatitis. Stable pattern of mild pancreatic ductal   dilatation. CT HEAD WO CONTRAST   Preliminary Result   No acute intracranial abnormality. CT ABDOMEN PELVIS W IV CONTRAST Additional Contrast? None   Final Result   1. No pulmonary embolism. 2. Tiny subpleural nodules in the periphery of the lungs may represent an   underlying infectious or inflammatory process. Follow-up recommendations are   provided below. 3. Diffuse mucosal thickening of the colon suggesting underlying pattern of   infectious colitis including C difficile. 4. Incidental cholelithiasis and stable right adrenal nodule suspected to   represent an adenoma. RECOMMENDATIONS:   Multiple pulmonary nodules. Most severe: 3 mm solid pulmonary nodule within   the upper lobe. If patient is low risk for malignancy, no routine follow-up   imaging is recommended; if patient is high risk for malignancy, a   non-contrast Chest CT at 12 months is optional. If performed and the nodule   is stable at 12 months, no further follow-up is recommended. These guidelines do not apply to immunocompromised patients and patients with   cancer. Follow up in patients with significant comorbidities as clinically   warranted. For lung cancer screening, adhere to Lung-RADS guidelines. Reference: Radiology. 2017; 284(1):228-43. CT CHEST PULMONARY EMBOLISM W CONTRAST   Final Result   1. No pulmonary embolism. 2. Tiny subpleural nodules in the periphery of the lungs may represent an   underlying infectious or inflammatory process. Follow-up recommendations are   provided below. 3. Diffuse mucosal thickening of the colon suggesting underlying pattern of   infectious colitis including C difficile. 4. Incidental cholelithiasis and stable right adrenal nodule suspected to   represent an adenoma. RECOMMENDATIONS:   Multiple pulmonary nodules. Most severe: 3 mm solid pulmonary nodule within   the upper lobe. If patient is low risk for malignancy, no routine follow-up   imaging is recommended; if patient is high risk for malignancy, a   non-contrast Chest CT at 12 months is optional. If performed and the nodule   is stable at 12 months, no further follow-up is recommended. These guidelines do not apply to immunocompromised patients and patients with   cancer. Follow up in patients with significant comorbidities as clinically   warranted. For lung cancer screening, adhere to Lung-RADS guidelines. Reference: Radiology. 2017; 284(1):228-43. CT HEAD WO CONTRAST   Final Result   No acute intracranial abnormality. XR ANKLE LEFT (MIN 3 VIEWS)   Final Result   Ankle mortise intact. No acute fracture or dislocation of the ankle. No   acute fracture or dislocation of the foot. Degenerative changes are present   at the interphalangeal joints and there is deformity of the 5th PIP joint   which may be sequela of a remote injury. No soft tissue swelling of the   foot. Moderate soft tissue swelling/edema over the medial malleolus of the   ankle. RECOMMENDATION:   Medial soft tissue swelling about the left ankle with no underlying fracture. Degenerative changes in the left foot with no acute abnormality.          XR FOOT LEFT (MIN 3 VIEWS)   Final Result   Ankle mortise intact. No acute fracture or dislocation of the ankle. No   acute fracture or dislocation of the foot. Degenerative changes are present   at the interphalangeal joints and there is deformity of the 5th PIP joint   which may be sequela of a remote injury. No soft tissue swelling of the   foot. Moderate soft tissue swelling/edema over the medial malleolus of the   ankle. RECOMMENDATION:   Medial soft tissue swelling about the left ankle with no underlying fracture. Degenerative changes in the left foot with no acute abnormality. XR CHEST PORTABLE   Final Result   Cardiomegaly with mild vascular congestion that is stable. All the pertinent images and reports for the current Hospitalization were reviewed by me     Scheduled Meds:   vancomycin  250 mg Oral 4x Daily    Fidaxomicin  200 mg Oral BID    metroNIDAZOLE  500 mg IntraVENous Q8H    amiodarone  200 mg Oral Daily    gabapentin  300 mg Oral Nightly    metoprolol succinate  12.5 mg Oral Daily    sodium chloride flush  5-40 mL IntraVENous 2 times per day    warfarin placeholder: dosing by pharmacy   Other RX Placeholder       Continuous Infusions:   0.9% NaCl with KCl 20 mEq 75 mL/hr at 11/07/22 2227    sodium chloride Stopped (11/05/22 0700)       PRN Meds:  calcium carbonate, trimethobenzamide, meclizine, butalbital-acetaminophen-caffeine, oxyCODONE, sodium chloride flush, sodium chloride, acetaminophen **OR** acetaminophen, potassium chloride **OR** potassium alternative oral replacement **OR** potassium chloride      Assessment:     Patient Active Problem List   Diagnosis    Osteoporosis,Dexas:10/18/02 (Lumbar spine -1.52), hip -0.55 (T scores), Actonel, then fosamax;  Dexa 1/25/06 (Lumbar spine -1.1 and hip -0.5)    Post herpetic neuralgia    Thrombocytopenia; 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/06    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 ([facility] [address]    CLL (chronic lymphocytic leukemia) (HCC)    Osteoarthritis    Hypercholesterolemia    Goiter, 1.5 cm midline Impaired fasting glucose    Vitamin D deficiency    Small cleaved cell (diffuse) NHL (HCC)    B12 deficiency    Abnormal coagulation profile    Essential hypertension    Encounter for follow-up examination after completed treatment for cancer    Encounter for care related to Port-a-Cath    Hyperuricemia    Arthritis of knee    Neoplasm of uncertain behavior of skin    History of squamous cell carcinoma of skin    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    History of basal cell carcinoma    Seborrheic keratoses, inflamed    Takotsubo cardiomyopathy    Chronic combined systolic (congestive) and diastolic (congestive) heart failure (HCC)    Acute ST elevation myocardial infarction (STEMI) (HCC)    Acute CHF (congestive heart failure) (HCC)    Sepsis (HCC)    Chronic respiratory failure with hypoxia (HCC)    Atrial fibrillation with rapid ventricular response (HCC)    Simple chronic bronchitis (HCC)    Syncope and collapse    Colitis    C. difficile colitis    Overweight (BMI 25.0-29. 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 procedure    Calculus of gallbladder without cholecystitis without obstruction    Bandemia    Head contusion    Diarrhea    Sprain of left ankle    Leukemoid reaction     Severe C diff diarrhea  WBC elevation  Leukemoid reaction  Colitis on the CT scan   Splenectomy in the past  CLL history   Chest port in place  Lung nodules  Left ankle bruise  Factor V leiden on chronic anticoagulation      Unfortunately worsening Leukocytosis with on going diarrhea CT abd/pelvis from 11/7 with colitis noted and no  Toxic megacolon will  need C diff therapy risk for relapse given the severity of the illness  - will add Oral Vancomycin to the regimen - watch for complications      WBC slowly improving still has ongoing loose stools will need to watch for any complications      Labs, Microbiology, Radiology and all the pertinent results from current hospitalization and  care every where were reviewed  by me as a part of the evaluation   Plan:   Cont IV Flagyl x   500 mg q 8 HRS  Cont oral Fidaxomicin x 200 mg Q 12 hrs   Oral Vancomycin x 250 mg q 6 hrs  Trend WBC  WBC likely exacerbated from Asplenia and CLL   Watch for TOXIC megacolon   D/C PPI for now    Would like to see WBC trend clinical improvement with oral care and antibiotic therapy  Will likely need to have a course of oral vancomycin at discharge    Discussed with patient/Family and Nursing staff   Risk of Complications/Morbidity: High      Illness(es)/ Infection present that pose threat to bodily function. There is potential for severe exacerbation of infection/side effects of treatment. Therapy requires intensive monitoring for antimicrobial agent toxicity. Thanks for allowing me to participate in your patient's care and please call me with any questions or concerns.     Ishmael West MD  Infectious Disease  [facility] Physician  Phone: [phone]   Fax : [phone]

## 2022-11-09 LAB
ALBUMIN SERPL-MCNC: 2.6 G/DL (ref 3.4–5)
ANION GAP SERPL CALCULATED.3IONS-SCNC: 6 MMOL/L (ref 3–16)
BUN BLDV-MCNC: 5 MG/DL (ref 7–20)
CALCIUM SERPL-MCNC: 7.5 MG/DL (ref 8.3–10.6)
CHLORIDE BLD-SCNC: 104 MMOL/L (ref 99–110)
CO2: 30 MMOL/L (ref 21–32)
CREAT SERPL-MCNC: 0.6 MG/DL (ref 0.6–1.2)
GFR SERPL CREATININE-BSD FRML MDRD: >60 ML/MIN/{1.73_M2}
GLUCOSE BLD-MCNC: 116 MG/DL (ref 70–99)
INR BLD: 6.29 (ref 0.87–1.14)
MAGNESIUM: 1.9 MG/DL (ref 1.8–2.4)
PHOSPHORUS: 2.5 MG/DL (ref 2.5–4.9)
POTASSIUM SERPL-SCNC: 3.4 MMOL/L (ref 3.5–5.1)
PROTHROMBIN TIME: 56.4 SEC (ref 11.7–14.5)
SODIUM BLD-SCNC: 140 MMOL/L (ref 136–145)

## 2022-11-09 PROCEDURE — 2580000003 HC RX 258: Performed by: STUDENT IN AN ORGANIZED HEALTH CARE EDUCATION/TRAINING PROGRAM

## 2022-11-09 PROCEDURE — 6370000000 HC RX 637 (ALT 250 FOR IP): Performed by: INTERNAL MEDICINE

## 2022-11-09 PROCEDURE — 2500000003 HC RX 250 WO HCPCS: Performed by: PHYSICIAN ASSISTANT

## 2022-11-09 PROCEDURE — 6370000000 HC RX 637 (ALT 250 FOR IP): Performed by: PHYSICIAN ASSISTANT

## 2022-11-09 PROCEDURE — 94761 N-INVAS EAR/PLS OXIMETRY MLT: CPT

## 2022-11-09 PROCEDURE — 99232 SBSQ HOSP IP/OBS MODERATE 35: CPT | Performed by: INTERNAL MEDICINE

## 2022-11-09 PROCEDURE — 80069 RENAL FUNCTION PANEL: CPT

## 2022-11-09 PROCEDURE — 6370000000 HC RX 637 (ALT 250 FOR IP): Performed by: STUDENT IN AN ORGANIZED HEALTH CARE EDUCATION/TRAINING PROGRAM

## 2022-11-09 PROCEDURE — 2060000000 HC ICU INTERMEDIATE R&B

## 2022-11-09 PROCEDURE — 85025 COMPLETE CBC W/AUTO DIFF WBC: CPT

## 2022-11-09 PROCEDURE — 85610 PROTHROMBIN TIME: CPT

## 2022-11-09 PROCEDURE — 83735 ASSAY OF MAGNESIUM: CPT

## 2022-11-09 PROCEDURE — 2700000000 HC OXYGEN THERAPY PER DAY

## 2022-11-09 RX ORDER — POTASSIUM CHLORIDE 20 MEQ/1
40 TABLET, EXTENDED RELEASE ORAL ONCE
Status: COMPLETED | OUTPATIENT
Start: 2022-11-09 | End: 2022-11-09

## 2022-11-09 RX ADMIN — VANCOMYCIN HYDROCHLORIDE 250 MG: 250 CAPSULE ORAL at 17:06

## 2022-11-09 RX ADMIN — Medication 10 ML: at 20:22

## 2022-11-09 RX ADMIN — GABAPENTIN 300 MG: 300 CAPSULE ORAL at 20:22

## 2022-11-09 RX ADMIN — AMIODARONE HYDROCHLORIDE 200 MG: 200 TABLET ORAL at 08:29

## 2022-11-09 RX ADMIN — OXYCODONE 5 MG: 5 TABLET ORAL at 09:10

## 2022-11-09 RX ADMIN — Medication 10 ML: at 08:31

## 2022-11-09 RX ADMIN — SODIUM CHLORIDE: 9 INJECTION, SOLUTION INTRAVENOUS at 21:31

## 2022-11-09 RX ADMIN — METRONIDAZOLE 500 MG: 500 INJECTION, SOLUTION INTRAVENOUS at 21:31

## 2022-11-09 RX ADMIN — VANCOMYCIN HYDROCHLORIDE 250 MG: 250 CAPSULE ORAL at 20:22

## 2022-11-09 RX ADMIN — ACETAMINOPHEN 650 MG: 325 TABLET ORAL at 18:27

## 2022-11-09 RX ADMIN — OXYCODONE 5 MG: 5 TABLET ORAL at 21:31

## 2022-11-09 RX ADMIN — METOPROLOL SUCCINATE 12.5 MG: 25 TABLET, FILM COATED, EXTENDED RELEASE ORAL at 08:28

## 2022-11-09 RX ADMIN — VANCOMYCIN HYDROCHLORIDE 250 MG: 250 CAPSULE ORAL at 13:22

## 2022-11-09 RX ADMIN — FIDAXOMICIN 200 MG: 200 TABLET, FILM COATED ORAL at 20:21

## 2022-11-09 RX ADMIN — OXYCODONE 5 MG: 5 TABLET ORAL at 15:12

## 2022-11-09 RX ADMIN — POTASSIUM CHLORIDE 40 MEQ: 1500 TABLET, EXTENDED RELEASE ORAL at 10:31

## 2022-11-09 RX ADMIN — METRONIDAZOLE 500 MG: 500 INJECTION, SOLUTION INTRAVENOUS at 05:49

## 2022-11-09 RX ADMIN — VANCOMYCIN HYDROCHLORIDE 250 MG: 250 CAPSULE ORAL at 08:30

## 2022-11-09 RX ADMIN — METRONIDAZOLE 500 MG: 500 INJECTION, SOLUTION INTRAVENOUS at 13:22

## 2022-11-09 RX ADMIN — FIDAXOMICIN 200 MG: 200 TABLET, FILM COATED ORAL at 08:30

## 2022-11-09 ASSESSMENT — PAIN SCALES - GENERAL
PAINLEVEL_OUTOF10: 7
PAINLEVEL_OUTOF10: 3
PAINLEVEL_OUTOF10: 7
PAINLEVEL_OUTOF10: 9
PAINLEVEL_OUTOF10: 5

## 2022-11-09 ASSESSMENT — PAIN - FUNCTIONAL ASSESSMENT: PAIN_FUNCTIONAL_ASSESSMENT: PREVENTS OR INTERFERES SOME ACTIVE ACTIVITIES AND ADLS

## 2022-11-09 ASSESSMENT — PAIN DESCRIPTION - DESCRIPTORS: DESCRIPTORS: BURNING

## 2022-11-09 ASSESSMENT — PAIN SCALES - WONG BAKER: WONGBAKER_NUMERICALRESPONSE: 2

## 2022-11-09 ASSESSMENT — PAIN DESCRIPTION - LOCATION: LOCATION: ABDOMEN

## 2022-11-09 ASSESSMENT — PAIN DESCRIPTION - ORIENTATION: ORIENTATION: LEFT;UPPER

## 2022-11-09 NOTE — PROGRESS NOTES
[facility] Medicine Progress Note     Date:  11/9/2022    PCP: Niecy Rudolph MD (Tel: [phone])    Date of Admission: 11/2/2022    Chief complaint:   Chief Complaint   Patient presents with    Leg Pain     Fall leg and ankle pain       Brief admission history: 25-year-old male with history of paroxysmal atrial fibrillation, history of CHF (previously systolic dysfunction, although recent echocardiogram with preserved ejection fraction, diastolic function not measured), CLL, s/p splenectomy, COPD, essential hypertension, factor V Leiden mutation, chronic Coumadin use, osteoporosis, hyperlipidemia, who presented to the emergency room on November 3, 2022 with complaints of generalized weakness, fall, recent nausea, vomiting and diarrhea, with associated decreased oral intake. Stool study was positive for C. difficile infection. She was noted to be hypoxic on presentation. Assessment/plan:  C. difficile colitis. Continue Dificid, IV Flagyl, oral vancomycin. GI on board and assisting with management. Infectious disease on board and assisting with management. Nausea and vomiting, likely secondary to C. difficile infection/colitis. Much improved. Tolerating diet. Antiemetics in place. Generalized weakness. Likely secondary to underlying medical conditions. Fall precautions. Therapy evaluation in place. Recent fall at home, likely secondary to generalized weakness. Fall precautions. History of factor V Leiden, on chronic anticoagulation with Coumadin. INR supratherapeutic due to antibiotic therapy; continue holding Coumadin. Coumadin dosing per pharmacy, hold when appropriate. Monitor closely for bleed. Will need vitamin K and FFP only if patient develops bleed. History of atrial fibrillation. Continue amiodarone. On Coumadin as noted above. Prolonged QTc noted on admission EKG, resolved. Avoid QT prolonging medications. Gastroesophageal reflux disease. Continue PPI.   Added as needed Tums. Patient is not hypoxic. She is on supplemental oxygen for comfort only. Other comorbidities: history of paroxysmal atrial fibrillation, history of CHF (previously systolic dysfunction, although recent echocardiogram with preserved ejection fraction, diastolic function not measured), CLL, s/p splenectomy, COPD, essential hypertension, factor V Leiden mutation, chronic Coumadin use, osteoporosis, hyperlipidemia. Diet: ADULT DIET; Regular; Low Sodium (2 gm); 2000 ml    Code status: Full Code   ----------  Subjective  Diarrhea improving. Objective  Physical exam:  Vitals: BP (!) 143/60   Pulse 62   Temp 98.1 °F (36.7 °C) (Oral)   Resp 18   Ht 5' (1.524 m)   Wt 153 lb 7 oz (69.6 kg)   SpO2 96%   BMI 29.97 kg/m²   Gen/overall appearance: Not in acute distress. Alert. Oriented x3. Head: Normocephalic, atraumatic  Eyes: EOMI, good acuity  ENT: Oral mucosa moist  Neck: No JVD, thyromegaly  CVS: Nml S1S2, no MRG, RRR  Pulm: Clear bilaterally. No crackles/wheezes  Gastrointestinal: Mild diffuse tenderness to palpation. Soft, ND, +BS  Musculoskeletal: No edema. Warm  Neuro: No focal deficit. Moves extremity spontaneously. Psychiatry: Appropriate affect. Not agitated. Skin: Warm, dry with normal turgor. No rash  Capillary refill: Brisk,< 3 seconds   Peripheral Pulses: +2 palpable, equal bilaterally      24HR INTAKE/OUTPUT:    Intake/Output Summary (Last 24 hours) at 11/9/2022 1013  Last data filed at 11/9/2022 0600  Gross per 24 hour   Intake 940 ml   Output 1 ml   Net 939 ml       I/O last 3 completed shifts: In: 18 [P.O.:1380; IV Piggyback:100]  Out: 1 [Urine:1]  No intake/output data recorded.   Meds:    potassium chloride  40 mEq Oral Once    vancomycin  250 mg Oral 4x Daily    Fidaxomicin  200 mg Oral BID    metroNIDAZOLE  500 mg IntraVENous Q8H    amiodarone  200 mg Oral Daily    gabapentin  300 mg Oral Nightly    metoprolol succinate  12.5 mg Oral Daily    sodium chloride flush  5-40 mL IntraVENous 2 times per day    warfarin placeholder: dosing by pharmacy   Other RX Placeholder     Infusions:    sodium chloride Stopped (11/05/22 0700)     PRN Meds: calcium carbonate, trimethobenzamide, meclizine, butalbital-acetaminophen-caffeine, oxyCODONE, sodium chloride flush, sodium chloride, acetaminophen **OR** acetaminophen, potassium chloride **OR** potassium alternative oral replacement **OR** potassium chloride    Labs/imaging:  CBC:   Recent Labs     11/07/22 0430 11/08/22  0645 11/09/22  0530   WBC 45.5* 35.1* 31.5*   HGB 11.5* 11.1* 11.1*    379 351       BMP:    Recent Labs     11/07/22 0430 11/09/22  0530    140   K 3.8 3.4*    104   CO2 24 30   BUN 11 5*   CREATININE 0.7 0.6   GLUCOSE 82 116*       Hepatic:   Recent Labs     11/07/22 0430   AST 13*   ALT 7*   BILITOT 0.5   ALKPHOS 81         Gabriel Florentino MD  -------------------------------  [facility] hospitalist

## 2022-11-09 NOTE — PROGRESS NOTES
INPATIENT CONSULTATION:    IDENTIFYING DATA/REASON FOR CONSULTATION   PATIENT:  Ce Gomez  MRN:  [id number]  ADMIT DATE: 11/2/2022  TIME OF EVALUATION: 11/9/2022 7:04 AM  HOSPITAL STAY:   LOS: 6 days   CONSULTING PHYSICIAN: Caesar Larson MD   REASON FOR CONSULTATION: CDI    Subjective:    Patient seen and examined in follow-up. Patient reports abdominal pain is improved and nearly resolved. She is tolerating diet. She had 1 soft, semiformed brown bowel movement yesterday. MEDICATIONS   SCHEDULED:  vancomycin, 250 mg, 4x Daily  Fidaxomicin, 200 mg, BID  metroNIDAZOLE, 500 mg, Q8H  amiodarone, 200 mg, Daily  gabapentin, 300 mg, Nightly  metoprolol succinate, 12.5 mg, Daily  sodium chloride flush, 5-40 mL, 2 times per day  warfarin placeholder: dosing by pharmacy, , RX Placeholder    FLUIDS/DRIPS:     sodium chloride Stopped (11/05/22 0700)     PRNs: calcium carbonate, 500 mg, TID PRN  trimethobenzamide, 200 mg, Q8H PRN  meclizine, 12.5 mg, TID PRN  butalbital-acetaminophen-caffeine, 1 tablet, Q6H PRN  oxyCODONE, 5 mg, Q6H PRN  sodium chloride flush, 5-40 mL, PRN  sodium chloride, , PRN  acetaminophen, 650 mg, Q6H PRN   Or  acetaminophen, 650 mg, Q6H PRN  potassium chloride, 40 mEq, PRN   Or  potassium alternative oral replacement, 40 mEq, PRN   Or  potassium chloride, 10 mEq, PRN    ALLERGIES:  No Known Allergies      PHYSICAL EXAM     Vitals:    11/08/22 1539 11/08/22 2000 11/08/22 2322 11/09/22 0354   BP: (!) 147/61 (!) 169/72 (!) 152/64 (!) 123/53   Pulse: 64 70 62 66   Resp: 18 16 18 18   Temp: 98.3 °F (36.8 °C) 98.1 °F (36.7 °C) 98.1 °F (36.7 °C) 98.3 °F (36.8 °C)   TempSrc: Oral Oral Oral Oral   SpO2: 93% 90% 95% 95%   Weight:    153 lb 7 oz (69.6 kg)   Height:           I/O last 3 completed shifts: In: 18 [P.O.:1380; IV Piggyback:100]  Out: 1 [Urine:1]    Physical Exam:  Gen: Resting in bed, NAD   HEENT: normocephalic, atraumatic. No scleral icterus.    CV: RRR no MRG   Pul: CTAB, normal work of breathing without wheezing  Abd: Good bowel sounds throughout, soft, minimally tender to palpation in LLQ, ND, no masses, no HSM   Ext: No edema, atraumatic  Neuro: No gross deficits, moves all 4 extremities, follows commands  Skin: No jaundice, spider angiomas, rodrigues erythema     LABS AND IMAGING     Recent Results (from the past 24 hour(s))   Renal Function Panel    Collection Time: 11/09/22  5:30 AM   Result Value Ref Range    Sodium 140 136 - 145 mmol/L    Potassium 3.4 (L) 3.5 - 5.1 mmol/L    Chloride 104 99 - 110 mmol/L    CO2 30 21 - 32 mmol/L    Anion Gap 6 3 - 16    Glucose 116 (H) 70 - 99 mg/dL    BUN 5 (L) 7 - 20 mg/dL    Creatinine 0.6 0.6 - 1.2 mg/dL    Est, Glom Filt Rate >60 >60    Calcium 7.5 (L) 8.3 - 10.6 mg/dL    Phosphorus 2.5 2.5 - 4.9 mg/dL    Albumin 2.6 (L) 3.4 - 5.0 g/dL   Magnesium    Collection Time: 11/09/22  5:30 AM   Result Value Ref Range    Magnesium 1.90 1.80 - 2.40 mg/dL   Protime-INR    Collection Time: 11/09/22  5:30 AM   Result Value Ref Range    Protime 56.4 (H) 11.7 - 14.5 sec    INR 6.29 (HH) 0.87 - 1.14   CBC with Auto Differential    Collection Time: 11/09/22  5:30 AM   Result Value Ref Range    WBC 31.5 (H) 4.0 - 11.0 K/uL    RBC 3.58 (L) 4.00 - 5.20 M/uL    Hemoglobin 11.1 (L) 12.0 - 16.0 g/dL    Hematocrit 33.0 (L) 36.0 - 48.0 %    MCV 92.3 80.0 - 100.0 fL    MCH 31.1 26.0 - 34.0 pg    MCHC 33.7 31.0 - 36.0 g/dL    RDW 15.6 (H) 12.4 - 15.4 %    Platelets 821 718 - 457 K/uL    MPV 8.6 5.0 - 10.5 fL       Other Labs    Imaging:    CT ABDOMEN PELVIS W IV CONTRAST Additional Contrast? Radiologist Recommendation   Final Result   1. Worsening mucosal thickening of the colon in a patient with a known   history of C difficile colitis. No pattern of dilation/megacolon. 2. Cholelithiasis with increasing gallbladder mucosal thickening and mild   dilation of the common bile duct. Current changes felt to be secondary to   more systemic findings above.   Underlying pattern of cholecystitis cannot be   excluded. 3. No CT evidence of pancreatitis. Stable pattern of mild pancreatic ductal   dilatation. CT HEAD WO CONTRAST   Final Result   No acute intracranial abnormality. CT ABDOMEN PELVIS W IV CONTRAST Additional Contrast? None   Final Result   1. No pulmonary embolism. 2. Tiny subpleural nodules in the periphery of the lungs may represent an   underlying infectious or inflammatory process. Follow-up recommendations are   provided below. 3. Diffuse mucosal thickening of the colon suggesting underlying pattern of   infectious colitis including C difficile. 4. Incidental cholelithiasis and stable right adrenal nodule suspected to   represent an adenoma. RECOMMENDATIONS:   Multiple pulmonary nodules. Most severe: 3 mm solid pulmonary nodule within   the upper lobe. If patient is low risk for malignancy, no routine follow-up   imaging is recommended; if patient is high risk for malignancy, a   non-contrast Chest CT at 12 months is optional. If performed and the nodule   is stable at 12 months, no further follow-up is recommended. These guidelines do not apply to immunocompromised patients and patients with   cancer. Follow up in patients with significant comorbidities as clinically   warranted. For lung cancer screening, adhere to Lung-RADS guidelines. Reference: Radiology. 2017; 284(1):228-43. CT CHEST PULMONARY EMBOLISM W CONTRAST   Final Result   1. No pulmonary embolism. 2. Tiny subpleural nodules in the periphery of the lungs may represent an   underlying infectious or inflammatory process. Follow-up recommendations are   provided below. 3. Diffuse mucosal thickening of the colon suggesting underlying pattern of   infectious colitis including C difficile. 4. Incidental cholelithiasis and stable right adrenal nodule suspected to   represent an adenoma. RECOMMENDATIONS:   Multiple pulmonary nodules.  Most severe: 3 mm solid pulmonary nodule within   the upper lobe. If patient is low risk for malignancy, no routine follow-up   imaging is recommended; if patient is high risk for malignancy, a   non-contrast Chest CT at 12 months is optional. If performed and the nodule   is stable at 12 months, no further follow-up is recommended. These guidelines do not apply to immunocompromised patients and patients with   cancer. Follow up in patients with significant comorbidities as clinically   warranted. For lung cancer screening, adhere to Lung-RADS guidelines. Reference: Radiology. 2017; 284(1):228-43. CT HEAD WO CONTRAST   Final Result   No acute intracranial abnormality. XR ANKLE LEFT (MIN 3 VIEWS)   Final Result   Ankle mortise intact. No acute fracture or dislocation of the ankle. No   acute fracture or dislocation of the foot. Degenerative changes are present   at the interphalangeal joints and there is deformity of the 5th PIP joint   which may be sequela of a remote injury. No soft tissue swelling of the   foot. Moderate soft tissue swelling/edema over the medial malleolus of the   ankle. RECOMMENDATION:   Medial soft tissue swelling about the left ankle with no underlying fracture. Degenerative changes in the left foot with no acute abnormality. XR FOOT LEFT (MIN 3 VIEWS)   Final Result   Ankle mortise intact. No acute fracture or dislocation of the ankle. No   acute fracture or dislocation of the foot. Degenerative changes are present   at the interphalangeal joints and there is deformity of the 5th PIP joint   which may be sequela of a remote injury. No soft tissue swelling of the   foot. Moderate soft tissue swelling/edema over the medial malleolus of the   ankle. RECOMMENDATION:   Medial soft tissue swelling about the left ankle with no underlying fracture. Degenerative changes in the left foot with no acute abnormality.          XR CHEST PORTABLE   Final Result   Cardiomegaly with mild vascular congestion that is stable. ASSESSMENT AND RECOMMENDATIONS   Mary Prince is a 78 y.o. female with history of CLL, CAD, atrial fibrillation, COPD, hypertension, factor V Leiden deficiency, history of splenectomy who presented [facility] [place] with diarrhea, nausea, vomiting and anorexia. CDI: Clinically improving on p.o. Dificid and IV Flagyl, with decreased stool output, but persistent severe leukocytosis. This is likely in part due to underlying CLL and history of splenectomy. Continue current therapy. CT A/P 11/7/22 without megacolon. Defer addition of PO Vanc to ID. Atrial fibrillation: On Coumadin, with supratherapeutic INR. Factor V Leiden: On Coumadin, supratherapeutic INR. Given persistent supratherapeutic INR, with Coumadin held throughout this admission, would consider vitamin K and recommend hematology oncology consult for management. RECOMMENDATIONS:    -Hem/Onc consult  -Continue Dificid and IV Flagyl    If you have any questions or need any further information, please feel free to contact anyone on our consult team.  Thank you for allowing us to participate in the care of Mary Prince. Nicolle Page.  [address]

## 2022-11-09 NOTE — PROGRESS NOTES
Clinical Pharmacy Note  Warfarin Consult    Latonia Casey is a 78 y.o. female receiving warfarin managed by pharmacy. Warfarin Indication: Factor V Leiden deficiency, h/o PE, Afib  Target INR range: 2-3   Dose prior to admission: 2.5 mg daily except NONE on Friday    Current warfarin drug-drug interactions: flagyl    Recent Labs     11/07/22  0430 11/08/22  0645 11/09/22  0530   HGB 11.5* 11.1* 11.1*   HCT 35.5* 33.5* 33.0*   INR 5.88*  --  6.29*         Assessment/Plan:  Hold warfarin tonight. Daily PT/INR until stable within therapeutic range. Thank you for the consult. Will continue to follow.     Jovi Nathan, [facility]  11/9/2022 12:45 PM

## 2022-11-09 NOTE — PROGRESS NOTES
Patient had Pain medication earlier this evening for headache & abdominal pain. Slept well after medications. Labs drawn this morning from port & INR came back at 6.29.

## 2022-11-09 NOTE — PROGRESS NOTES
Infectious Disease Follow up Notes  Admit Date: 11/2/2022  Hospital Day: 8    Antibiotics :   Fidaxomicin    Oral Vancomycin      CHIEF COMPLAINT:     Severe C diff colitis  WBC elevation  Diarrhea    Subjective interval History :  78 y.o. woman with h/o  A fib admitted with a fall and diarrhea now tested +ve for C diff also has CLL and chest port in place prior Splenectomy -    Abdominal distention pain slowly improving diarrhea x 2 episodes today and WBC still very high and d/w daughter at bed side     Past Medical History:    Past Medical History:   Diagnosis Date    Acute ST elevation myocardial infarction (STEMI) ([facility] 07/19/2021    Atrial fibrillation (HCC)     Chronic lymphocytic leukemia in remission ([facility]     CLL (chronic lymphocytic leukemia) ([facility] 02/12/2015    COPD (chronic obstructive pulmonary disease) ([facility]     Essential hypertension 01/01/2015    controlled with salt restriction (initially)    Factor V Leiden mutation ([facility]     Goiter 11/01/2011    1.5 cm midline    Hypercholesterolemia     Impaired fasting glucose 11/01/2011    Osteoarthritis     Osteoporosis     Post herpetic neuralgia     Vitamin D deficiency 11/01/2011       Past Surgical History:    Past Surgical History:   Procedure Laterality Date    CATARACT REMOVAL WITH IMPLANT  5/14/12    left eye    JOINT REPLACEMENT      partial knee R and L    KNEE SURGERY  2008    partial replacements, both knees    MOHS SURGERY  03/12/2019    R cheek and R superior temple    SPLENECTOMY      TUNNELED VENOUS PORT PLACEMENT      UPPER GASTROINTESTINAL ENDOSCOPY N/A 2/5/2019    EGD ESOPHAGOGASTRODUODENOSCOPY, WITH ENDOSCOPIC ULTRASOUND OF ESOPHAGUS performed by Enzo Schulz MD at [address]       Current Medications:    No outpatient medications have been marked as taking for the 11/2/22 encounter [facility] Encounter).        Allergies:  Patient has no known allergies.     Immunizations :   Immunization History   Administered Date(s) Administered    COVID-19, MODERNA BLUE border, Primary or Immunocompromised, (age 12y+), IM, 100 mcg/0.5mL 2021    COVID-19, PFIZER PURPLE top, DILUTE for use, (age 15 y+), 30mcg/0.3mL 2021, 2021    Influenza Virus Vaccine 10/16/2009, 2010, 2010, 10/03/2016    Influenza, Lana Angely, Clint Starks (age 10 mo+) AND AFLURIA, (age 1 y+), PF, 0.5mL 2020, 10/23/2021    Influenza, High Dose (Fluzone 65 yrs and older) 2011, 10/01/2012, 10/29/2013, 10/23/2014, 2015, 10/23/2018, 2019    Pneumococcal Conjugate 13-valent ([id number]) 2015    Pneumococcal Polysaccharide ([id number]) 2006, 2011    Td, unspecified formulation 10/04/2004    Tdap (Boostrix, Adacel) 2015       Social History:     Social History     Tobacco Use    Smoking status: Former     Packs/day: 0.30     Years: 50.00     Pack years: 15.00     Types: Cigarettes     Quit date: 1995     Years since quittin.0    Smokeless tobacco: Never   Vaping Use    Vaping Use: Never used   Substance Use Topics    Alcohol use: No     Alcohol/week: 0.0 standard drinks    Drug use: Not Currently     Social History     Tobacco Use   Smoking Status Former    Packs/day: 0.30    Years: 50.00    Pack years: 15.00    Types: Cigarettes    Quit date: 1995    Years since quittin.0   Smokeless Tobacco Never      Family History   Problem Relation Age of Onset    Diabetes Father     Stroke Sister 50         of a stroke          REVIEW OF SYSTEMS:      Constitutional:  negative for fevers, chills, night sweats  Eyes:  negative for blurred vision, eye discharge, visual disturbance   HEENT:  negative for hearing loss, ear drainage,nasal congestion  Respiratory:  negative for cough, shortness of breath or hemoptysis   Cardiovascular:  negative for chest pain, palpitations, syncope  Gastrointestinal:  negative for nausea, vomiting, diarrhea,+  constipation, abdominal pain +   Genitourinary:  negative for frequency, dysuria, urinary incontinence, hematuria  Hematologic/Lymphatic:  negative for easy bruising, bleeding and lymphadenopathy  Allergic/Immunologic:  negative for recurrent infections, angioedema, anaphylaxis   Endocrine:  negative for weight changes, polyuria, polydipsia and polyphagia  Musculoskeletal:  negative for joint  pain, swelling, decreased range of motion  Integumentary: No rashes, skin lesions  Neurological:  negative for headaches, slurred speech, unilateral weakness  Psychiatric: negative for hallucinations,confusion,agitation.                 PHYSICAL EXAM:      Vitals:  t MAX 99.6   BP (!) 145/75   Pulse 67   Temp 97.8 °F (36.6 °C) (Axillary)   Resp 16   Ht 5' (1.524 m)   Wt 153 lb 7 oz (69.6 kg)   SpO2 96%   BMI 29.97 kg/m²     General Appearance: alert,in some acute distress, ++ pallor, no icterus  on nasal cannula   Skin: warm and dry, no rash or erythema  Head: normocephalic and atraumatic  Eyes: pupils equal, round, and reactive to light, conjunctivae normal  ENT: tympanic membrane, external ear and ear canal normal bilaterally, nose without deformity, nasal mucosa and turbinates normal without polyps  Neck: supple and non-tender without mass, no thyromegaly  no cervical lymphadenopathy  Pulmonary/Chest: clear to auscultation bilaterally- no wheezes, rales or rhonchi, normal air movement, no respiratory distress  Cardiovascular: normal rate, regular rhythm, normal S1 and S2, no murmurs, rubs, clicks, or gallops, no carotid bruits  Abdomen: soft, + -tender, + -distended, normal bowel sounds, no masses or organomegaly  Extremities: no cyanosis, clubbing or edema  Musculoskeletal: normal range of motion, no joint swelling, deformity or tenderness  Integumentary: No rashes, no abnormal skin lesions, no petechiae  Neurologic: reflexes normal and symmetric, no cranial nerve deficit  Psych:  Orientation, sensorium, mood normal  Lines: chest port +        Data Review:    CBC:   Lab Results   Component Value Date    WBC 31.5 (H) 11/09/2022    HGB 11.1 (L) 11/09/2022    HCT 33.0 (L) 11/09/2022    MCV 92.3 11/09/2022     11/09/2022     RENAL:   Lab Results   Component Value Date    CREATININE 0.6 11/09/2022    BUN 5 (L) 11/09/2022     11/09/2022    K 3.4 (L) 11/09/2022     11/09/2022    CO2 30 11/09/2022     SED RATE:   Lab Results   Component Value Date/Time    SEDRATE 45 05/05/2022 04:01 PM     CK: No results found for: CKTOTAL  CRP: No results found for: CRP  Hepatic Function Panel:   Lab Results   Component Value Date/Time    ALKPHOS 81 11/07/2022 04:30 AM    ALT 7 11/07/2022 04:30 AM    AST 13 11/07/2022 04:30 AM    PROT 4.3 11/07/2022 04:30 AM    PROT 6.2 06/23/2017 12:10 PM    BILITOT 0.5 11/07/2022 04:30 AM    BILIDIR <0.2 11/07/2022 04:30 AM    IBILI see below 11/07/2022 04:30 AM    LABALBU 2.6 11/09/2022 05:30 AM     UA:  Lab Results   Component Value Date/Time    COLORU Yellow 11/03/2022 03:34 AM    CLARITYU Clear 11/03/2022 03:34 AM    GLUCOSEU Negative 11/03/2022 03:34 AM    BILIRUBINUR Negative 11/03/2022 03:34 AM    BILIRUBINUR neg 04/20/2021 08:53 AM    KETUA 80 11/03/2022 03:34 AM    SPECGRAV 1.075 11/03/2022 03:34 AM    BLOODU MODERATE 11/03/2022 03:34 AM    PHUR 5.0 11/03/2022 03:34 AM    PROTEINU TRACE 11/03/2022 03:34 AM    UROBILINOGEN 0.2 11/03/2022 03:34 AM    NITRU Negative 11/03/2022 03:34 AM    LEUKOCYTESUR Negative 11/03/2022 03:34 AM    LABMICR YES 11/03/2022 03:34 AM    URINETYPE NotGiven 11/03/2022 03:34 AM      Urine Microscopic:   Lab Results   Component Value Date/Time    BACTERIA None Seen 11/03/2022 03:34 AM    HYALCAST 0 11/03/2022 03:34 AM    WBCUA 2 11/03/2022 03:34 AM    RBCUA 3 11/03/2022 03:34 AM    EPIU 2 11/03/2022 03:34 AM     Urine Reflex to Culture:   Lab Results   Component Value Date/Time    URRFLXCULT Not Indicated 11/03/2022 03:34 AM         MICRO: cultures reviewed and updated by me   Blood Culture:   Lab Results   Component Value Date/Time    BC No Growth after 4 days of incubation. 11/03/2022 12:34 AM    BLOODCULT2 No Growth after 4 days of incubation. 11/03/2022 12:34 AM       Respiratory Culture:  No results found for: Belkis Rico  AFB:No results found for: [facility]  Viral Culture:  Lab Results   Component Value Date/Time    COVID19 Not Detected 11/03/2022 12:35 AM     Urine Culture: No results for input(s): LABURIN in the last 72 hours. Culture, Blood 2 [[id number]] Collected: 11/03/22 0034   Order Status: Completed Specimen: Blood Updated: 11/07/22 0315    Culture, Blood 2 No Growth after 4 days of incubation. Narrative:     ORDER#: [id number]                          ORDERED BY: MATT FANG   SOURCE: Blood                              COLLECTED:  11/03/22 00:34   ANTIBIOTICS AT [place].:                      RECEIVED :  11/03/22 03:04   If child <=2 yrs old please draw pediatric bottle. ~Blood Culture #2   Blood Culture 1 [[id number]] Collected: 11/03/22 0034   Order Status: Completed Specimen: Blood Updated: 11/07/22 0315    Blood Culture, Routine No Growth after 4 days of incubation. Narrative:     ORDER#: [id number]                          ORDERED BY: MATT FANG   SOURCE: Blood                              COLLECTED:  11/03/22 00:34   ANTIBIOTICS AT [place].:                      RECEIVED :  11/03/22 03:04   If child <=2 yrs old please draw pediatric bottle. ~Blood Culture 1   Giardia antigen [[id number]] Collected: 11/03/22 1005   Order Status: Completed Specimen: Stool Updated: 11/04/22 1016    Giardia Ag, Stl --    Negative   Normal Range: Negative   This stool specimen has been tested for the above parasites   by enzyme immunoassay. A preserved specimen is held for one   week after the results are reported. If clinically indicated,   a comprehensive microscopic examination can be performed by   calling the Microbiology Lab. Narrative:     ORDER#: [id number]                          ORDERED BY: Swapna Pritchard   SOURCE: Stool                              COLLECTED:  11/03/22 10:05   ANTIBIOTICS AT [place].:                      RECEIVED :  11/03/22 15:25   C. difficile toxin Molecular [[id number]] (Abnormal) Collected: 11/03/22 1005   Order Status: Completed Specimen: Stool Updated: 11/04/22 0615    Organism C. difficile toxin B gene detected Abnormal     C. difficile toxin Molecular -- Abnormal     POSITIVE FOR   Normal Range: Not detected   CONTACT PRECAUTIONS INDICATED    Abnormal    Narrative:     ORDER#: [id number]                          ORDERED BY: MATT FANG   SOURCE: Stool                              COLLECTED:  11/03/22 10:05   ANTIBIOTICS AT [place].:                      RECEIVED :  11/03/22 10:34   CALL  Nguyễn  [id number] tel. [phone],   Microbiology results called to and read back by Christina Gordillo RN, 11/04/2022   06:14, by [facility] [place]   Gastrointestinal Panel by DNA [[id number]] Collected: 11/03/22 1005   Order Status: Completed Specimen: Stool Updated: 11/04/22 0608    GI Bacterial Pathogens By PCR --    No Shigella spp/EIEC DNA detected   No Shiga toxin-producing gene(s) detected   No Campylobacter spp. (jejuni and coli)DNA detected   No Salmonella spp.  DNA detected   No Vibrio vulnificus/parahaemolyticus/cholerae DNA detected   No Plesiomonas shigelloides DNA detected   No Enterotoxigenic E. coli (ETEC) DNA detected   No Yersinia enterocolitica DNA detected   Normal Range:  None detected    Narrative:     ORDER#: [id number]                          ORDERED BY: MATT FANG   SOURCE: Stool                              COLLECTED:  11/03/22 10:05   ANTIBIOTICS AT [place].:                      RECEIVED :  11/03/22 10:37   Fecal leukocytes [[id number]] (Abnormal) Collected: 11/03/22 1005   Order Status: Completed Specimen: Stool Updated: 11/03/22 1632    White Blood Cells (WBC), Stool -- Abnormal     POSITIVE   Normal Range:Negative Abnormal    Narrative:     ORDER#: [id number]                          ORDERED BY: Coco Alexander   SOURCE: Stool                              COLLECTED:  11/03/22 10:05   ANTIBIOTICS AT [place].:                      RECEIVED :  11/03/22 15:23   Clostridium Difficile Toxin/Antigen [[id number]] Collected: 11/03/22 1005   Order Status: Completed Specimen: Stool Updated: 11/03/22 1131    C.diff Toxin/Antigen --    Indeterminate see results of C. difficile amplification(PCR)   Normal Range: Negative    Narrative:     ORDER#: [id number]                          ORDERED BY: MATT FANG   SOURCE: Stool                              COLLECTED:  11/03/22 10:05   ANTIBIOTICS AT [place].:                      RECEIVED :  11/03/22 10:34   Collect White vial (sterile container)   COVID-19, Rapid [[id number]] Collected: 11/03/22 0035   Order Status: Completed Specimen: Nasopharyngeal Swab Updated: 11/03/22 0112    SARS-CoV-2, NAAT Not Detected    Comment: Rapid NAAT:   Negative results should be treated as presumptive and,   if inconsistent with clinical signs and symptoms or necessary for   patient management, should be tested with an alternative molecular   assay. Negative results do not preclude SARS-CoV-2 infection and   should not be used as the sole basis for patient management decisions. This test has been authorized by the FDA under an Emergency Use   Authorization (EUA) for use by authorized laboratories.      Fact sheet for Healthcare Providers:   http://www.aristeo.aria/   Fact sheet for Patients: http://www.jin-erik.biscooter/     METHODOLOGY: Isothermal Nucleic Acid Amplification       Rapid influenza A/B antigens [[id number]] Collected: 11/03/22 0035   Order Status: Completed Specimen: Nasopharyngeal Updated: 11/03/22 0111    Rapid Influenza A Ag Negative    Rapid Influenza B Ag Negative   Gastrointestinal Panel, Molecular [[id number]] Collected: 11/03/22 0000   Order Status: Canceled Specimen: Stool    O&P PANEL ([employer]) #1 [[id number]] Collected: 11/03/22 0000   Order Status: Canceled Specimen: Stool      IMAGING:    CT ABDOMEN PELVIS W IV CONTRAST Additional Contrast? Radiologist Recommendation   Final Result   1. Worsening mucosal thickening of the colon in a patient with a known   history of C difficile colitis. No pattern of dilation/megacolon. 2. Cholelithiasis with increasing gallbladder mucosal thickening and mild   dilation of the common bile duct. Current changes felt to be secondary to   more systemic findings above. Underlying pattern of cholecystitis cannot be   excluded. 3. No CT evidence of pancreatitis. Stable pattern of mild pancreatic ductal   dilatation. CT HEAD WO CONTRAST   Final Result   No acute intracranial abnormality. CT ABDOMEN PELVIS W IV CONTRAST Additional Contrast? None   Final Result   1. No pulmonary embolism. 2. Tiny subpleural nodules in the periphery of the lungs may represent an   underlying infectious or inflammatory process. Follow-up recommendations are   provided below. 3. Diffuse mucosal thickening of the colon suggesting underlying pattern of   infectious colitis including C difficile. 4. Incidental cholelithiasis and stable right adrenal nodule suspected to   represent an adenoma. RECOMMENDATIONS:   Multiple pulmonary nodules. Most severe: 3 mm solid pulmonary nodule within   the upper lobe. If patient is low risk for malignancy, no routine follow-up   imaging is recommended; if patient is high risk for malignancy, a   non-contrast Chest CT at 12 months is optional. If performed and the nodule   is stable at 12 months, no further follow-up is recommended. These guidelines do not apply to immunocompromised patients and patients with   cancer. Follow up in patients with significant comorbidities as clinically   warranted. For lung cancer screening, adhere to Lung-RADS guidelines. Reference: Radiology. 2017; 284(1):228-43. CT CHEST PULMONARY EMBOLISM W CONTRAST   Final Result   1. No pulmonary embolism. 2. Tiny subpleural nodules in the periphery of the lungs may represent an   underlying infectious or inflammatory process. Follow-up recommendations are   provided below. 3. Diffuse mucosal thickening of the colon suggesting underlying pattern of   infectious colitis including C difficile. 4. Incidental cholelithiasis and stable right adrenal nodule suspected to   represent an adenoma. RECOMMENDATIONS:   Multiple pulmonary nodules. Most severe: 3 mm solid pulmonary nodule within   the upper lobe. If patient is low risk for malignancy, no routine follow-up   imaging is recommended; if patient is high risk for malignancy, a   non-contrast Chest CT at 12 months is optional. If performed and the nodule   is stable at 12 months, no further follow-up is recommended. These guidelines do not apply to immunocompromised patients and patients with   cancer. Follow up in patients with significant comorbidities as clinically   warranted. For lung cancer screening, adhere to Lung-RADS guidelines. Reference: Radiology. 2017; 284(1):228-43. CT HEAD WO CONTRAST   Final Result   No acute intracranial abnormality. XR ANKLE LEFT (MIN 3 VIEWS)   Final Result   Ankle mortise intact. No acute fracture or dislocation of the ankle. No   acute fracture or dislocation of the foot. Degenerative changes are present   at the interphalangeal joints and there is deformity of the 5th PIP joint   which may be sequela of a remote injury. No soft tissue swelling of the   foot. Moderate soft tissue swelling/edema over the medial malleolus of the   ankle. RECOMMENDATION:   Medial soft tissue swelling about the left ankle with no underlying fracture. Degenerative changes in the left foot with no acute abnormality. XR FOOT LEFT (MIN 3 VIEWS)   Final Result   Ankle mortise intact.   No acute fracture or dislocation of the ankle. No   acute fracture or dislocation of the foot. Degenerative changes are present   at the interphalangeal joints and there is deformity of the 5th PIP joint   which may be sequela of a remote injury. No soft tissue swelling of the   foot. Moderate soft tissue swelling/edema over the medial malleolus of the   ankle. RECOMMENDATION:   Medial soft tissue swelling about the left ankle with no underlying fracture. Degenerative changes in the left foot with no acute abnormality. XR CHEST PORTABLE   Final Result   Cardiomegaly with mild vascular congestion that is stable. All the pertinent images and reports for the current Hospitalization were reviewed by me     Scheduled Meds:   vancomycin  250 mg Oral 4x Daily    Fidaxomicin  200 mg Oral BID    metroNIDAZOLE  500 mg IntraVENous Q8H    amiodarone  200 mg Oral Daily    gabapentin  300 mg Oral Nightly    metoprolol succinate  12.5 mg Oral Daily    sodium chloride flush  5-40 mL IntraVENous 2 times per day    warfarin placeholder: dosing by pharmacy   Other RX Placeholder       Continuous Infusions:   sodium chloride Stopped (11/05/22 0700)       PRN Meds:  calcium carbonate, trimethobenzamide, meclizine, butalbital-acetaminophen-caffeine, oxyCODONE, sodium chloride flush, sodium chloride, acetaminophen **OR** acetaminophen, potassium chloride **OR** potassium alternative oral replacement **OR** potassium chloride      Assessment:     Patient Active Problem List   Diagnosis    Osteoporosis,Dexas:10/18/02 (Lumbar spine -1.52), hip -0.55 (T scores), Actonel, then fosamax;  Dexa 1/25/06 (Lumbar spine -1.1 and hip -0.5)    Post herpetic neuralgia    Thrombocytopenia; 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/06    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 ([facility]    CLL (chronic lymphocytic leukemia) (HCC)    Osteoarthritis    Hypercholesterolemia    Goiter, 1.5 cm midline    Impaired fasting glucose    Vitamin D deficiency    Small cleaved cell (diffuse) NHL ([facility]    B12 deficiency    Abnormal coagulation profile    Essential hypertension    Encounter for follow-up examination after completed treatment for cancer    Encounter for care related to Port-a-Cath    Hyperuricemia    Arthritis of knee    Neoplasm of uncertain behavior of skin    History of squamous cell carcinoma of skin    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    History of basal cell carcinoma    Seborrheic keratoses, inflamed    Takotsubo cardiomyopathy    Chronic combined systolic (congestive) and diastolic (congestive) heart failure (HCC)    Acute ST elevation myocardial infarction (STEMI) (HCC)    Acute CHF (congestive heart failure) (HCC)    Sepsis (HCC)    Chronic respiratory failure with hypoxia (HCC)    Atrial fibrillation with rapid ventricular response (HCC)    Simple chronic bronchitis (HCC)    Syncope and collapse    Colitis    C. difficile colitis    Overweight (BMI 25.0-29. 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 procedure    Calculus of gallbladder without cholecystitis without obstruction    Bandemia    Head contusion    Diarrhea    Sprain of left ankle    Leukemoid reaction     Severe C diff diarrhea  WBC elevation  Leukemoid reaction  Colitis on the CT scan   Splenectomy in the past  CLL history   Chest port in place  Lung nodules  Left ankle bruise  Factor V leiden on chronic anticoagulation      Unfortunately worsening Leukocytosis with on going diarrhea CT abd/pelvis from 11/7 with colitis noted and no  Toxic megacolon will  need C diff therapy risk for relapse given the severity of the illness  - will add Oral Vancomycin to the regimen - watch for complications      WBC slowly improving still has ongoing loose stools will need to watch for any complications      Labs, Microbiology, Radiology and all the pertinent results from current hospitalization and care every where were reviewed  by me as a part of the evaluation   Plan:   Cont IV Flagyl x   500 mg q 8 HRS will d/c tomorrow   Cont oral Fidaxomicin x 200 mg Q 12 hrs   Oral Vancomycin x 250 mg q 6 hrs  Trend WBC  WBC likely exacerbated from Asplenia and CLL   Watch for TOXIC megacolon   D/C PPI for now    Would like to see WBC trend clinical improvement with oral care and antibiotic therapy  Will likely need to have a course of oral vancomycin at discharge    Discussed with patient/Family and Nursing staff   Risk of Complications/Morbidity: High      Illness(es)/ Infection present that pose threat to bodily function. There is potential for severe exacerbation of infection/side effects of treatment. Therapy requires intensive monitoring for antimicrobial agent toxicity. Thanks for allowing me to participate in your patient's care and please call me with any questions or concerns.     Kati Mueller MD  Infectious Disease  [facility] ([facility]) Physician  Phone: [phone]   Fax : [phone]

## 2022-11-10 ENCOUNTER — APPOINTMENT (OUTPATIENT)
Dept: GENERAL RADIOLOGY | Age: 82
DRG: 372 | End: 2022-11-10
Payer: MEDICARE

## 2022-11-10 LAB
ALBUMIN SERPL-MCNC: 2.9 G/DL (ref 3.4–5)
ANION GAP SERPL CALCULATED.3IONS-SCNC: 8 MMOL/L (ref 3–16)
ANISOCYTOSIS: ABNORMAL
BANDED NEUTROPHILS RELATIVE PERCENT: 1 % (ref 0–7)
BASOPHILS ABSOLUTE: 0 K/UL (ref 0–0.2)
BASOPHILS RELATIVE PERCENT: 0 %
BUN BLDV-MCNC: 5 MG/DL (ref 7–20)
CALCIUM SERPL-MCNC: 7.9 MG/DL (ref 8.3–10.6)
CHLORIDE BLD-SCNC: 100 MMOL/L (ref 99–110)
CO2: 30 MMOL/L (ref 21–32)
CREAT SERPL-MCNC: 0.6 MG/DL (ref 0.6–1.2)
EOSINOPHILS ABSOLUTE: 0.3 K/UL (ref 0–0.6)
EOSINOPHILS RELATIVE PERCENT: 1 %
GFR SERPL CREATININE-BSD FRML MDRD: >60 ML/MIN/{1.73_M2}
GLUCOSE BLD-MCNC: 125 MG/DL (ref 70–99)
HCT VFR BLD CALC: 33 % (ref 36–48)
HEMATOLOGY PATH CONSULT: NO
HEMOGLOBIN: 11.1 G/DL (ref 12–16)
INR BLD: 5.26 (ref 0.87–1.14)
LYMPHOCYTES ABSOLUTE: 17.3 K/UL (ref 1–5.1)
LYMPHOCYTES RELATIVE PERCENT: 55 %
MAGNESIUM: 1.8 MG/DL (ref 1.8–2.4)
MCH RBC QN AUTO: 31.1 PG (ref 26–34)
MCHC RBC AUTO-ENTMCNC: 33.7 G/DL (ref 31–36)
MCV RBC AUTO: 92.3 FL (ref 80–100)
METAMYELOCYTES RELATIVE PERCENT: 1 %
MICROCYTES: ABNORMAL
MONOCYTES ABSOLUTE: 2.8 K/UL (ref 0–1.3)
MONOCYTES RELATIVE PERCENT: 9 %
NEUTROPHILS ABSOLUTE: 11 K/UL (ref 1.7–7.7)
NEUTROPHILS RELATIVE PERCENT: 33 %
NUCLEATED RED BLOOD CELLS: 1 /100 WBC
PDW BLD-RTO: 15.6 % (ref 12.4–15.4)
PHOSPHORUS: 3.1 MG/DL (ref 2.5–4.9)
PLATELET # BLD: 351 K/UL (ref 135–450)
PMV BLD AUTO: 8.6 FL (ref 5–10.5)
POTASSIUM SERPL-SCNC: 3.6 MMOL/L (ref 3.5–5.1)
PROTHROMBIN TIME: 48.9 SEC (ref 11.7–14.5)
RBC # BLD: 3.58 M/UL (ref 4–5.2)
SMUDGE CELLS: PRESENT
SODIUM BLD-SCNC: 138 MMOL/L (ref 136–145)
WBC # BLD: 31.5 K/UL (ref 4–11)

## 2022-11-10 PROCEDURE — 2500000003 HC RX 250 WO HCPCS: Performed by: PHYSICIAN ASSISTANT

## 2022-11-10 PROCEDURE — 85025 COMPLETE CBC W/AUTO DIFF WBC: CPT

## 2022-11-10 PROCEDURE — 2580000003 HC RX 258: Performed by: STUDENT IN AN ORGANIZED HEALTH CARE EDUCATION/TRAINING PROGRAM

## 2022-11-10 PROCEDURE — 80069 RENAL FUNCTION PANEL: CPT

## 2022-11-10 PROCEDURE — 6370000000 HC RX 637 (ALT 250 FOR IP): Performed by: PHYSICIAN ASSISTANT

## 2022-11-10 PROCEDURE — 83735 ASSAY OF MAGNESIUM: CPT

## 2022-11-10 PROCEDURE — 6370000000 HC RX 637 (ALT 250 FOR IP): Performed by: INTERNAL MEDICINE

## 2022-11-10 PROCEDURE — 99232 SBSQ HOSP IP/OBS MODERATE 35: CPT | Performed by: INTERNAL MEDICINE

## 2022-11-10 PROCEDURE — 6370000000 HC RX 637 (ALT 250 FOR IP): Performed by: STUDENT IN AN ORGANIZED HEALTH CARE EDUCATION/TRAINING PROGRAM

## 2022-11-10 PROCEDURE — 74018 RADEX ABDOMEN 1 VIEW: CPT

## 2022-11-10 PROCEDURE — 85610 PROTHROMBIN TIME: CPT

## 2022-11-10 PROCEDURE — 6370000000 HC RX 637 (ALT 250 FOR IP): Performed by: NURSE PRACTITIONER

## 2022-11-10 PROCEDURE — 2060000000 HC ICU INTERMEDIATE R&B

## 2022-11-10 RX ORDER — LANOLIN ALCOHOL/MO/W.PET/CERES
6 CREAM (GRAM) TOPICAL NIGHTLY PRN
Status: DISCONTINUED | OUTPATIENT
Start: 2022-11-10 | End: 2022-11-12 | Stop reason: HOSPADM

## 2022-11-10 RX ADMIN — FIDAXOMICIN 200 MG: 200 TABLET, FILM COATED ORAL at 23:08

## 2022-11-10 RX ADMIN — FIDAXOMICIN 200 MG: 200 TABLET, FILM COATED ORAL at 09:17

## 2022-11-10 RX ADMIN — METOPROLOL SUCCINATE 12.5 MG: 25 TABLET, FILM COATED, EXTENDED RELEASE ORAL at 09:17

## 2022-11-10 RX ADMIN — Medication 6 MG: at 23:07

## 2022-11-10 RX ADMIN — METRONIDAZOLE 500 MG: 500 INJECTION, SOLUTION INTRAVENOUS at 05:54

## 2022-11-10 RX ADMIN — VANCOMYCIN HYDROCHLORIDE 250 MG: 250 CAPSULE ORAL at 23:08

## 2022-11-10 RX ADMIN — Medication 10 ML: at 09:19

## 2022-11-10 RX ADMIN — OXYCODONE 5 MG: 5 TABLET ORAL at 19:09

## 2022-11-10 RX ADMIN — OXYCODONE 5 MG: 5 TABLET ORAL at 05:19

## 2022-11-10 RX ADMIN — AMIODARONE HYDROCHLORIDE 200 MG: 200 TABLET ORAL at 09:19

## 2022-11-10 RX ADMIN — GABAPENTIN 300 MG: 300 CAPSULE ORAL at 23:08

## 2022-11-10 RX ADMIN — VANCOMYCIN HYDROCHLORIDE 250 MG: 250 CAPSULE ORAL at 09:19

## 2022-11-10 RX ADMIN — OXYCODONE 5 MG: 5 TABLET ORAL at 13:04

## 2022-11-10 RX ADMIN — VANCOMYCIN HYDROCHLORIDE 250 MG: 250 CAPSULE ORAL at 13:04

## 2022-11-10 RX ADMIN — Medication 10 ML: at 21:48

## 2022-11-10 RX ADMIN — ACETAMINOPHEN 650 MG: 325 TABLET ORAL at 23:08

## 2022-11-10 RX ADMIN — VANCOMYCIN HYDROCHLORIDE 250 MG: 250 CAPSULE ORAL at 17:57

## 2022-11-10 ASSESSMENT — PAIN DESCRIPTION - FREQUENCY: FREQUENCY: CONTINUOUS

## 2022-11-10 ASSESSMENT — PAIN DESCRIPTION - ORIENTATION
ORIENTATION: LEFT;UPPER
ORIENTATION: LEFT;UPPER

## 2022-11-10 ASSESSMENT — PAIN DESCRIPTION - DIRECTION: RADIATING_TOWARDS: UPPER ABD

## 2022-11-10 ASSESSMENT — PAIN DESCRIPTION - DESCRIPTORS
DESCRIPTORS: DISCOMFORT
DESCRIPTORS: ACHING;BURNING

## 2022-11-10 ASSESSMENT — PAIN SCALES - GENERAL
PAINLEVEL_OUTOF10: 7
PAINLEVEL_OUTOF10: 9
PAINLEVEL_OUTOF10: 7
PAINLEVEL_OUTOF10: 5
PAINLEVEL_OUTOF10: 7

## 2022-11-10 ASSESSMENT — PAIN DESCRIPTION - LOCATION
LOCATION: ABDOMEN
LOCATION: ABDOMEN

## 2022-11-10 ASSESSMENT — PAIN - FUNCTIONAL ASSESSMENT: PAIN_FUNCTIONAL_ASSESSMENT: ACTIVITIES ARE NOT PREVENTED

## 2022-11-10 ASSESSMENT — PAIN DESCRIPTION - ONSET: ONSET: ON-GOING

## 2022-11-10 NOTE — PROGRESS NOTES
Clinical Pharmacy Note  Warfarin Consult    Junella Blizzard is a 78 y.o. female receiving warfarin managed by pharmacy. Warfarin Indication: Factor V Leiden deficiency, h/o PE, Afib  Target INR range: 2-3   Dose prior to admission: 2.5 mg daily except NONE on Friday    Current warfarin drug-drug interactions: flagyl    Recent Labs     11/08/22  0645 11/09/22  0530 11/10/22  0532   HGB 11.1* 11.1* 11.6*   HCT 33.5* 33.0* 35.0*   INR  --  6.29* 5.26*         Assessment/Plan:    Hold warfarin tonight. Daily PT/INR until stable within therapeutic range. Thank you for the consult. Will continue to follow. Rigoberto Hamilton PharmD.   11/10/2022  9:37 AM

## 2022-11-10 NOTE — PROGRESS NOTES
Infectious Disease Follow up Notes  Admit Date: 11/2/2022  Hospital Day: 9    Antibiotics :   Fidaxomicin    Oral Vancomycin      CHIEF COMPLAINT:     Severe C diff colitis  WBC elevation  Diarrhea    Subjective interval History :  78 y.o. woman with h/o  A fib admitted with a fall and diarrhea now tested +ve for C diff also has CLL and chest port in place prior Splenectomy -    Diarrhea x 2 episodes and abd pain better appetite improving no fevers no chills     Past Medical History:    Past Medical History:   Diagnosis Date    Acute ST elevation myocardial infarction (STEMI) ([facility] [address] 07/19/2021    Atrial fibrillation (HCC)     Chronic lymphocytic leukemia in remission ([facility] [address]     CLL (chronic lymphocytic leukemia) ([facility] [address] 02/12/2015    COPD (chronic obstructive pulmonary disease) ([facility] [address]     Essential hypertension 01/01/2015    controlled with salt restriction (initially)    Factor V Leiden mutation ([facility] [address]     Goiter 11/01/2011    1.5 cm midline    Hypercholesterolemia     Impaired fasting glucose 11/01/2011    Osteoarthritis     Osteoporosis     Post herpetic neuralgia     Vitamin D deficiency 11/01/2011       Past Surgical History:    Past Surgical History:   Procedure Laterality Date    CATARACT REMOVAL WITH IMPLANT  5/14/12    left eye    JOINT REPLACEMENT      partial knee R and L    KNEE SURGERY  2008    partial replacements, both knees    MOHS SURGERY  03/12/2019    R cheek and R superior temple    SPLENECTOMY      TUNNELED VENOUS PORT PLACEMENT      UPPER GASTROINTESTINAL ENDOSCOPY N/A 2/5/2019    EGD ESOPHAGOGASTRODUODENOSCOPY, WITH ENDOSCOPIC ULTRASOUND OF ESOPHAGUS performed by Kash Saez MD at [address]       Current Medications:    No outpatient medications have been marked as taking for the 11/2/22 encounter [facility] Encounter). Allergies:  Patient has no known allergies.     Immunizations :   Immunization History Administered Date(s) Administered    COVID-19, MODERNA BLUE border, Primary or Immunocompromised, (age 12y+), IM, 100 mcg/0.5mL 2021    COVID-19, PFIZER PURPLE top, DILUTE for use, (age 15 y+), 30mcg/0.3mL 2021, 2021    Influenza Virus Vaccine 10/16/2009, 2010, 2010, 10/03/2016    Influenza, Apoorva Woodruff Ink (age 10 mo+) AND AFLURIA, (age 1 y+), PF, 0.5mL 2020, 10/23/2021    Influenza, High Dose (Fluzone 65 yrs and older) 2011, 10/01/2012, 10/29/2013, 10/23/2014, 2015, 10/23/2018, 2019    Pneumococcal Conjugate 13-valent ([id number]) 2015    Pneumococcal Polysaccharide ([id number]) 2006, 2011    Td, unspecified formulation 10/04/2004    Tdap (Boostrix, Adacel) 2015       Social History:     Social History     Tobacco Use    Smoking status: Former     Packs/day: 0.30     Years: 50.00     Pack years: 15.00     Types: Cigarettes     Quit date: 1995     Years since quittin.0    Smokeless tobacco: Never   Vaping Use    Vaping Use: Never used   Substance Use Topics    Alcohol use: No     Alcohol/week: 0.0 standard drinks    Drug use: Not Currently     Social History     Tobacco Use   Smoking Status Former    Packs/day: 0.30    Years: 50.00    Pack years: 15.00    Types: Cigarettes    Quit date: 1995    Years since quittin.0   Smokeless Tobacco Never      Family History   Problem Relation Age of Onset    Diabetes Father     Stroke Sister 50         of a stroke          REVIEW OF SYSTEMS:      Constitutional:  negative for fevers, chills, night sweats  Eyes:  negative for blurred vision, eye discharge, visual disturbance   HEENT:  negative for hearing loss, ear drainage,nasal congestion  Respiratory:  negative for cough, shortness of breath or hemoptysis   Cardiovascular:  negative for chest pain, palpitations, syncope  Gastrointestinal:  negative for nausea, vomiting, diarrhea,+  constipation, abdominal pain + Genitourinary:  negative for frequency, dysuria, urinary incontinence, hematuria  Hematologic/Lymphatic:  negative for easy bruising, bleeding and lymphadenopathy  Allergic/Immunologic:  negative for recurrent infections, angioedema, anaphylaxis   Endocrine:  negative for weight changes, polyuria, polydipsia and polyphagia  Musculoskeletal:  negative for joint  pain, swelling, decreased range of motion  Integumentary: No rashes, skin lesions  Neurological:  negative for headaches, slurred speech, unilateral weakness  Psychiatric: negative for hallucinations,confusion,agitation.                 PHYSICAL EXAM:      Vitals:  t MAX 99.6   BP (!) 126/59   Pulse 58   Temp 97.9 °F (36.6 °C) (Oral)   Resp 16   Ht 5' (1.524 m)   Wt 154 lb 5.2 oz (70 kg)   SpO2 94%   BMI 30.14 kg/m²     General Appearance: alert,in some acute distress, ++ pallor, no icterus  on nasal cannula   Skin: warm and dry, no rash or erythema  Head: normocephalic and atraumatic  Eyes: pupils equal, round, and reactive to light, conjunctivae normal  ENT: tympanic membrane, external ear and ear canal normal bilaterally, nose without deformity, nasal mucosa and turbinates normal without polyps  Neck: supple and non-tender without mass, no thyromegaly  no cervical lymphadenopathy  Pulmonary/Chest: clear to auscultation bilaterally- no wheezes, rales or rhonchi, normal air movement, no respiratory distress  Cardiovascular: normal rate, regular rhythm, normal S1 and S2, no murmurs, rubs, clicks, or gallops, no carotid bruits  Abdomen: soft, + -tender, + -distended, normal bowel sounds, no masses or organomegaly  Extremities: no cyanosis, clubbing or edema  Musculoskeletal: normal range of motion, no joint swelling, deformity or tenderness  Integumentary: No rashes, no abnormal skin lesions, no petechiae  Neurologic: reflexes normal and symmetric, no cranial nerve deficit  Psych:  Orientation, sensorium, mood normal  Lines: chest port +        Data Results   Component Value Date/Time    BC No Growth after 4 days of incubation. 11/03/2022 12:34 AM    BLOODCULT2 No Growth after 4 days of incubation. 11/03/2022 12:34 AM       Respiratory Culture:  No results found for: Izabel Peat  AFB:No results found for: [facility]  Viral Culture:  Lab Results   Component Value Date/Time    COVID19 Not Detected 11/03/2022 12:35 AM     Urine Culture: No results for input(s): LABURIN in the last 72 hours. Culture, Blood 2 [[id number]] Collected: 11/03/22 0034   Order Status: Completed Specimen: Blood Updated: 11/07/22 0315    Culture, Blood 2 No Growth after 4 days of incubation. Narrative:     ORDER#: [id number]                          ORDERED BY: MATT FANG   SOURCE: Blood                              COLLECTED:  11/03/22 00:34   ANTIBIOTICS AT [place].:                      RECEIVED :  11/03/22 03:04   If child <=2 yrs old please draw pediatric bottle. ~Blood Culture #2   Blood Culture 1 [[id number]] Collected: 11/03/22 0034   Order Status: Completed Specimen: Blood Updated: 11/07/22 0315    Blood Culture, Routine No Growth after 4 days of incubation. Narrative:     ORDER#: [id number]                          ORDERED BY: MATT FANG   SOURCE: Blood                              COLLECTED:  11/03/22 00:34   ANTIBIOTICS AT [place].:                      RECEIVED :  11/03/22 03:04   If child <=2 yrs old please draw pediatric bottle. ~Blood Culture 1   Giardia antigen [[id number]] Collected: 11/03/22 1005   Order Status: Completed Specimen: Stool Updated: 11/04/22 1016    Giardia Ag, Stl --    Negative   Normal Range: Negative   This stool specimen has been tested for the above parasites   by enzyme immunoassay. A preserved specimen is held for one   week after the results are reported. If clinically indicated,   a comprehensive microscopic examination can be performed by   calling the Microbiology Lab.     Narrative:     ORDER#: [id number] ORDERED BY: Moy Hairston   SOURCE: Stool                              COLLECTED:  11/03/22 10:05   ANTIBIOTICS AT [place].:                      RECEIVED :  11/03/22 15:25   C. difficile toxin Molecular [[id number]] (Abnormal) Collected: 11/03/22 1005   Order Status: Completed Specimen: Stool Updated: 11/04/22 0615    Organism C. difficile toxin B gene detected Abnormal     C. difficile toxin Molecular -- Abnormal     POSITIVE FOR   Normal Range: Not detected   CONTACT PRECAUTIONS INDICATED    Abnormal    Narrative:     ORDER#: [id number]                          ORDERED BY: MATT FANG   SOURCE: Stool                              COLLECTED:  11/03/22 10:05   ANTIBIOTICS AT [place].:                      RECEIVED :  11/03/22 10:34   CALL  Nguyễn  CYV2A tel. [phone],   Microbiology results called to and read back by Zaida Leon RN, 11/04/2022   06:14, by [facility] [place]   Gastrointestinal Panel by DNA [[id number]] Collected: 11/03/22 1005   Order Status: Completed Specimen: Stool Updated: 11/04/22 0608    GI Bacterial Pathogens By PCR --    No Shigella spp/EIEC DNA detected   No Shiga toxin-producing gene(s) detected   No Campylobacter spp. (jejuni and coli)DNA detected   No Salmonella spp.  DNA detected   No Vibrio vulnificus/parahaemolyticus/cholerae DNA detected   No Plesiomonas shigelloides DNA detected   No Enterotoxigenic E. coli (ETEC) DNA detected   No Yersinia enterocolitica DNA detected   Normal Range:  None detected    Narrative:     ORDER#: [id number]                          ORDERED BY: MATT FANG   SOURCE: Stool                              COLLECTED:  11/03/22 10:05   ANTIBIOTICS AT [place].:                      RECEIVED :  11/03/22 10:37   Fecal leukocytes [[id number]] (Abnormal) Collected: 11/03/22 1005   Order Status: Completed Specimen: Stool Updated: 11/03/22 1632    White Blood Cells (WBC), Stool -- Abnormal     POSITIVE   Normal Range:Negative    Abnormal    Narrative:     ORDER#: [id number] ORDERED BY: Swapna Pritchard   SOURCE: Stool                              COLLECTED:  11/03/22 10:05   ANTIBIOTICS AT [place].:                      RECEIVED :  11/03/22 15:23   Clostridium Difficile Toxin/Antigen [[id number]] Collected: 11/03/22 1005   Order Status: Completed Specimen: Stool Updated: 11/03/22 1131    C.diff Toxin/Antigen --    Indeterminate see results of C. difficile amplification(PCR)   Normal Range: Negative    Narrative:     ORDER#: [id number]                          ORDERED BY: MATT FANG   SOURCE: Stool                              COLLECTED:  11/03/22 10:05   ANTIBIOTICS AT [place].:                      RECEIVED :  11/03/22 10:34   Collect White vial (sterile container)   COVID-19, Rapid [[id number]] Collected: 11/03/22 0035   Order Status: Completed Specimen: Nasopharyngeal Swab Updated: 11/03/22 0112    SARS-CoV-2, NAAT Not Detected    Comment: Rapid NAAT:   Negative results should be treated as presumptive and,   if inconsistent with clinical signs and symptoms or necessary for   patient management, should be tested with an alternative molecular   assay. Negative results do not preclude SARS-CoV-2 infection and   should not be used as the sole basis for patient management decisions. This test has been authorized by the FDA under an Emergency Use   Authorization (EUA) for use by authorized laboratories.      Fact sheet for Healthcare Providers:   http://www.aristeo.aria/   Fact sheet for Patients: http://www.jin-erik.aria/     METHODOLOGY: Isothermal Nucleic Acid Amplification       Rapid influenza A/B antigens [[id number]] Collected: 11/03/22 0035   Order Status: Completed Specimen: Nasopharyngeal Updated: 11/03/22 0111    Rapid Influenza A Ag Negative    Rapid Influenza B Ag Negative   Gastrointestinal Panel, Molecular [[id number]] Collected: 11/03/22 0000   Order Status: Canceled Specimen: Stool    O&P PANEL ([employer]) #1 [[id number]] Collected: 11/03/22 0000   Order Status: Canceled Specimen: Stool      IMAGING:    XR ABDOMEN (KUB) (SINGLE AP VIEW)   Final Result   Known history of C difficile colitis. Relatively unremarkable bowel gas   pattern. Suspected mild pattern of ileus of small bowel. CT ABDOMEN PELVIS W IV CONTRAST Additional Contrast? Radiologist Recommendation   Final Result   1. Worsening mucosal thickening of the colon in a patient with a known   history of C difficile colitis. No pattern of dilation/megacolon. 2. Cholelithiasis with increasing gallbladder mucosal thickening and mild   dilation of the common bile duct. Current changes felt to be secondary to   more systemic findings above. Underlying pattern of cholecystitis cannot be   excluded. 3. No CT evidence of pancreatitis. Stable pattern of mild pancreatic ductal   dilatation. CT HEAD WO CONTRAST   Final Result   No acute intracranial abnormality. CT ABDOMEN PELVIS W IV CONTRAST Additional Contrast? None   Final Result   1. No pulmonary embolism. 2. Tiny subpleural nodules in the periphery of the lungs may represent an   underlying infectious or inflammatory process. Follow-up recommendations are   provided below. 3. Diffuse mucosal thickening of the colon suggesting underlying pattern of   infectious colitis including C difficile. 4. Incidental cholelithiasis and stable right adrenal nodule suspected to   represent an adenoma. RECOMMENDATIONS:   Multiple pulmonary nodules. Most severe: 3 mm solid pulmonary nodule within   the upper lobe. If patient is low risk for malignancy, no routine follow-up   imaging is recommended; if patient is high risk for malignancy, a   non-contrast Chest CT at 12 months is optional. If performed and the nodule   is stable at 12 months, no further follow-up is recommended. These guidelines do not apply to immunocompromised patients and patients with   cancer.  Follow up in patients with significant comorbidities as clinically   warranted. For lung cancer screening, adhere to Lung-RADS guidelines. Reference: Radiology. 2017; 284(1):228-43. CT CHEST PULMONARY EMBOLISM W CONTRAST   Final Result   1. No pulmonary embolism. 2. Tiny subpleural nodules in the periphery of the lungs may represent an   underlying infectious or inflammatory process. Follow-up recommendations are   provided below. 3. Diffuse mucosal thickening of the colon suggesting underlying pattern of   infectious colitis including C difficile. 4. Incidental cholelithiasis and stable right adrenal nodule suspected to   represent an adenoma. RECOMMENDATIONS:   Multiple pulmonary nodules. Most severe: 3 mm solid pulmonary nodule within   the upper lobe. If patient is low risk for malignancy, no routine follow-up   imaging is recommended; if patient is high risk for malignancy, a   non-contrast Chest CT at 12 months is optional. If performed and the nodule   is stable at 12 months, no further follow-up is recommended. These guidelines do not apply to immunocompromised patients and patients with   cancer. Follow up in patients with significant comorbidities as clinically   warranted. For lung cancer screening, adhere to Lung-RADS guidelines. Reference: Radiology. 2017; 284(1):228-43. CT HEAD WO CONTRAST   Final Result   No acute intracranial abnormality. XR ANKLE LEFT (MIN 3 VIEWS)   Final Result   Ankle mortise intact. No acute fracture or dislocation of the ankle. No   acute fracture or dislocation of the foot. Degenerative changes are present   at the interphalangeal joints and there is deformity of the 5th PIP joint   which may be sequela of a remote injury. No soft tissue swelling of the   foot. Moderate soft tissue swelling/edema over the medial malleolus of the   ankle. RECOMMENDATION:   Medial soft tissue swelling about the left ankle with no underlying fracture.    Degenerative changes in the left foot with no acute abnormality. XR FOOT LEFT (MIN 3 VIEWS)   Final Result   Ankle mortise intact. No acute fracture or dislocation of the ankle. No   acute fracture or dislocation of the foot. Degenerative changes are present   at the interphalangeal joints and there is deformity of the 5th PIP joint   which may be sequela of a remote injury. No soft tissue swelling of the   foot. Moderate soft tissue swelling/edema over the medial malleolus of the   ankle. RECOMMENDATION:   Medial soft tissue swelling about the left ankle with no underlying fracture. Degenerative changes in the left foot with no acute abnormality. XR CHEST PORTABLE   Final Result   Cardiomegaly with mild vascular congestion that is stable. All the pertinent images and reports for the current Hospitalization were reviewed by me     Scheduled Meds:   vancomycin  250 mg Oral 4x Daily    Fidaxomicin  200 mg Oral BID    amiodarone  200 mg Oral Daily    gabapentin  300 mg Oral Nightly    metoprolol succinate  12.5 mg Oral Daily    sodium chloride flush  5-40 mL IntraVENous 2 times per day    warfarin placeholder: dosing by pharmacy   Other RX Placeholder       Continuous Infusions:   sodium chloride 10 mL/hr at 11/09/22 2131       PRN Meds:  calcium carbonate, trimethobenzamide, meclizine, butalbital-acetaminophen-caffeine, oxyCODONE, sodium chloride flush, sodium chloride, acetaminophen **OR** acetaminophen, potassium chloride **OR** potassium alternative oral replacement **OR** potassium chloride      Assessment:     Patient Active Problem List   Diagnosis    Osteoporosis,Dexas:10/18/02 (Lumbar spine -1.52), hip -0.55 (T scores), Actonel, then fosamax;  Dexa 1/25/06 (Lumbar spine -1.1 and hip -0.5)    Post herpetic neuralgia    Thrombocytopenia; 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/06    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 ([facility] [address]    CLL (chronic lymphocytic leukemia) (HCC)    Osteoarthritis    Hypercholesterolemia    Goiter, 1.5 cm midline Impaired fasting glucose    Vitamin D deficiency    Small cleaved cell (diffuse) NHL (HCC)    B12 deficiency    Abnormal coagulation profile    Essential hypertension    Encounter for follow-up examination after completed treatment for cancer    Encounter for care related to Port-a-Cath    Hyperuricemia    Arthritis of knee    Neoplasm of uncertain behavior of skin    History of squamous cell carcinoma of skin    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    History of basal cell carcinoma    Seborrheic keratoses, inflamed    Takotsubo cardiomyopathy    Chronic combined systolic (congestive) and diastolic (congestive) heart failure (HCC)    Acute ST elevation myocardial infarction (STEMI) (HCC)    Acute CHF (congestive heart failure) (HCC)    Sepsis (HCC)    Chronic respiratory failure with hypoxia (HCC)    Atrial fibrillation with rapid ventricular response (HCC)    Simple chronic bronchitis (HCC)    Syncope and collapse    Colitis    C. difficile colitis    Overweight (BMI 25.0-29. 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 procedure    Calculus of gallbladder without cholecystitis without obstruction    Bandemia    Head contusion    Diarrhea    Sprain of left ankle    Leukemoid reaction     Severe C diff diarrhea  WBC elevation  Leukemoid reaction  Colitis on the CT scan   Splenectomy in the past  CLL history   Chest port in place  Lung nodules  Left ankle bruise  Factor V leiden on chronic anticoagulation      Unfortunately worsening Leukocytosis with on going diarrhea CT abd/pelvis from 11/7 with colitis noted and no  Toxic megacolon will  need C diff therapy risk for relapse given the severity of the illness  - will add Oral Vancomycin to the regimen - watch for complications      WBC slowly improving Diarrhea better will be able to do Taper dose when ready    Labs, Microbiology, Radiology and all

## 2022-11-10 NOTE — PROGRESS NOTES
Hospital Medicine Progress Note     Date:  11/10/2022    PCP: Gerry Moreno MD (Tel: [phone])    Date of Admission: 11/2/2022    Chief complaint:   Chief Complaint   Patient presents with    Leg Pain     Fall leg and ankle pain       Brief admission history: 49-year-old male with history of paroxysmal atrial fibrillation, history of CHF (previously systolic dysfunction, although recent echocardiogram with preserved ejection fraction, diastolic function not measured), CLL, s/p splenectomy, COPD, essential hypertension, factor V Leiden mutation, chronic Coumadin use, osteoporosis, hyperlipidemia, who presented to the emergency room on November 3, 2022 with complaints of generalized weakness, fall, recent nausea, vomiting and diarrhea, with associated decreased oral intake. Stool study was positive for C. difficile infection. She was noted to be hypoxic on presentation. Assessment/plan:  C. difficile colitis. Continue Dificid, IV Flagyl, oral vancomycin. GI on board and assisting with management. Infectious disease on board and assisting with management. Nausea and vomiting, likely secondary to C. difficile infection/colitis, resolved. Tolerating diet. Antiemetics in place. Generalized weakness. Likely secondary to underlying medical conditions. Fall precautions. Therapy evaluation in place. Recent fall at home, likely secondary to generalized weakness. Fall precautions. History of factor V Leiden, on chronic anticoagulation with Coumadin. INR supratherapeutic due to antibiotic therapy; continue holding Coumadin. Coumadin dosing per pharmacy, hold when appropriate. Monitor closely for bleed. History of atrial fibrillation. Continue amiodarone. On Coumadin as noted above. Prolonged QTc noted on admission EKG, resolved. Avoid QT prolonging medications. Gastroesophageal reflux disease. Continue PPI. Added as needed Tums. Patient is not hypoxic.   She is on supplemental oxygen for comfort only. Other comorbidities: history of paroxysmal atrial fibrillation, history of CHF (previously systolic dysfunction, although recent echocardiogram with preserved ejection fraction, diastolic function not measured), CLL, s/p splenectomy, COPD, essential hypertension, factor V Leiden mutation, chronic Coumadin use, osteoporosis, hyperlipidemia. Disposition. Likely discharge home in 1-2 days. Diet: ADULT DIET; Regular; Low Sodium (2 gm); 2000 ml    Code status: Full Code   ----------  Subjective  Diarrhea much improved. Objective  Physical exam:  Vitals: /70   Pulse 75   Temp 97.6 °F (36.4 °C) (Oral)   Resp 18   Ht 5' (1.524 m)   Wt 154 lb 5.2 oz (70 kg)   SpO2 95%   BMI 30.14 kg/m²   Gen/overall appearance: Not in acute distress. Alert. Oriented x3. Head: Normocephalic, atraumatic  Eyes: EOMI, good acuity  ENT: Oral mucosa moist  Neck: No JVD, thyromegaly  CVS: Nml S1S2, no MRG, RRR  Pulm: Clear bilaterally. No crackles/wheezes  Gastrointestinal: Mild diffuse tenderness to palpation. Soft, ND, +BS  Musculoskeletal: No edema. Warm  Neuro: No focal deficit. Moves extremity spontaneously. Psychiatry: Appropriate affect. Not agitated. Skin: Warm, dry with normal turgor. No rash  Capillary refill: Brisk,< 3 seconds   Peripheral Pulses: +2 palpable, equal bilaterally      24HR INTAKE/OUTPUT:    Intake/Output Summary (Last 24 hours) at 11/10/2022 1100  Last data filed at 11/10/2022 0956  Gross per 24 hour   Intake 1020 ml   Output --   Net 1020 ml       I/O last 3 completed shifts:   In: 1360 [P.O.:1260; IV Piggyback:100]  Out: -   I/O this shift:  In: 120 [P.O.:120]  Out: -   Meds:    vancomycin  250 mg Oral 4x Daily    Fidaxomicin  200 mg Oral BID    amiodarone  200 mg Oral Daily    gabapentin  300 mg Oral Nightly    metoprolol succinate  12.5 mg Oral Daily    sodium chloride flush  5-40 mL IntraVENous 2 times per day    warfarin placeholder: dosing by pharmacy   Other Neida Aguilar Infusions:    sodium chloride 10 mL/hr at 11/09/22 2131     PRN Meds: calcium carbonate, trimethobenzamide, meclizine, butalbital-acetaminophen-caffeine, oxyCODONE, sodium chloride flush, sodium chloride, acetaminophen **OR** acetaminophen, potassium chloride **OR** potassium alternative oral replacement **OR** potassium chloride    Labs/imaging:  CBC:   Recent Labs     11/08/22  0645 11/09/22  0530 11/10/22  0532   WBC 35.1* 31.5* 31.8*   HGB 11.1* 11.1* 11.6*    351 397       BMP:    Recent Labs     11/09/22 0530 11/10/22  0532    138   K 3.4* 3.6    100   CO2 30 30   BUN 5* 5*   CREATININE 0.6 0.6   GLUCOSE 116* 125*       Hepatic:   No results for input(s): AST, ALT, ALB, BILITOT, ALKPHOS in the last 72 hours.       Konrad Souza MD  -------------------------------  Rounding hospitalist

## 2022-11-10 NOTE — CARE COORDINATION
11/10/22 1040   IMM Letter   IMM Letter given to Patient/Family/Significant other/Guardian/POA/by: Provided to patient at bedside by Vinny Pettit RN. Education provided to patient, patient reported no questions and verbalized understanding. Patient aware of 4 hours allotted time to determine if they choose to pursue Medicare appeal process. Copy left with patient at bedside, no further questions or concerns. IMM Letter date given: 11/10/22   IMM Letter time given: 0945     Spoke with patient at bedside. Potential discharge Friday or Saturday. KUB order d/t abdominal pain. Discharge plan remains to home.       #[phone]  Electronically signed by Vinny Pettit RN on 11/10/2022 at 10:42 AM

## 2022-11-10 NOTE — PLAN OF CARE
Problem: Discharge Planning  Goal: Discharge to home or other facility with appropriate resources  Outcome: Progressing     Problem: Pain  Goal: Verbalizes/displays adequate comfort level or baseline comfort level  Outcome: Progressing     Problem: Safety - Adult  Goal: Free from fall injury  Outcome: Progressing     Problem: ABCDS Injury Assessment  Goal: Absence of physical injury  Outcome: Progressing     Problem: Cardiovascular - Adult  Goal: Maintains optimal cardiac output and hemodynamic stability  Outcome: Progressing  Goal: Absence of cardiac dysrhythmias or at baseline  Outcome: Progressing     Problem: Gastrointestinal - Adult  Goal: Maintains or returns to baseline bowel function  Outcome: Progressing     Problem: Infection - Adult  Goal: Absence of infection at discharge  Outcome: Progressing     Problem: Metabolic/Fluid and Electrolytes - Adult  Goal: Electrolytes maintained within normal limits  Outcome: Progressing  Goal: Hemodynamic stability and optimal renal function maintained  Outcome: Progressing     Problem: Skin/Tissue Integrity  Goal: Absence of new skin breakdown  Description: 1. Monitor for areas of redness and/or skin breakdown  2. Assess vascular access sites hourly  3. Every 4-6 hours minimum:  Change oxygen saturation probe site  4. Every 4-6 hours:  If on nasal continuous positive airway pressure, respiratory therapy assess nares and determine need for appliance change or resting period.   Outcome: Progressing     Problem: Neurosensory - Adult  Goal: Achieves stable or improved neurological status  Outcome: Progressing  Goal: Achieves maximal functionality and self care  Outcome: Progressing     Problem: Skin/Tissue Integrity - Adult  Goal: Skin integrity remains intact  Outcome: Progressing  Goal: Oral mucous membranes remain intact  Outcome: Progressing     Problem: Musculoskeletal - Adult  Goal: Return mobility to safest level of function  Outcome: Progressing  Goal: Return ADL status to a safe level of function  Outcome: Progressing

## 2022-11-10 NOTE — PROGRESS NOTES
INPATIENT CONSULTATION:    IDENTIFYING DATA/REASON FOR CONSULTATION   PATIENT:  Marcela Sylvester  MRN:  [id number]  ADMIT DATE: 11/2/2022  TIME OF EVALUATION: 11/10/2022 1:19 PM  HOSPITAL STAY:   LOS: 7 days   CONSULTING PHYSICIAN: Ange Saeed MD   REASON FOR CONSULTATION: CDI    Subjective:    Patient seen and examined in follow-up. Patient reports abdominal pain is improved and but she had significant abdominal pain in her left lower quadrant overnight. She denies any bowel movements yesterday. She is passing flatus. She is tolerating diet. MEDICATIONS   SCHEDULED:  vancomycin, 250 mg, 4x Daily  Fidaxomicin, 200 mg, BID  amiodarone, 200 mg, Daily  gabapentin, 300 mg, Nightly  metoprolol succinate, 12.5 mg, Daily  sodium chloride flush, 5-40 mL, 2 times per day  warfarin placeholder: dosing by pharmacy, , RX Placeholder    FLUIDS/DRIPS:     sodium chloride 10 mL/hr at 11/09/22 2131     PRNs: calcium carbonate, 500 mg, TID PRN  trimethobenzamide, 200 mg, Q8H PRN  meclizine, 12.5 mg, TID PRN  butalbital-acetaminophen-caffeine, 1 tablet, Q6H PRN  oxyCODONE, 5 mg, Q6H PRN  sodium chloride flush, 5-40 mL, PRN  sodium chloride, , PRN  acetaminophen, 650 mg, Q6H PRN   Or  acetaminophen, 650 mg, Q6H PRN  potassium chloride, 40 mEq, PRN   Or  potassium alternative oral replacement, 40 mEq, PRN   Or  potassium chloride, 10 mEq, PRN    ALLERGIES:  No Known Allergies      PHYSICAL EXAM     Vitals:    11/10/22 0432 11/10/22 0519 11/10/22 0736 11/10/22 1131   BP: (!) 140/66  134/70 (!) 126/59   Pulse: 74  75 58   Resp: 18 18 18 16   Temp: 98.3 °F (36.8 °C)  97.6 °F (36.4 °C) 97.9 °F (36.6 °C)   TempSrc: Oral  Oral Oral   SpO2: 92%  95% 94%   Weight:       Height:           I/O last 3 completed shifts: In: 1360 [P.O.:1260; IV Piggyback:100]  Out: -     Physical Exam:  Gen: Resting in bed, NAD   HEENT: normocephalic, atraumatic. No scleral icterus.    CV: RRR no MRG   Pul: CTAB, normal work of breathing without wheezing  Abd: Good bowel sounds throughout, soft, minimally tender to palpation in LLQ, ND, no masses, no HSM   Ext: No edema, atraumatic  Neuro: No gross deficits, moves all 4 extremities, follows commands  Skin: No jaundice, spider angiomas, rodrigues erythema     LABS AND IMAGING     Recent Results (from the past 24 hour(s))   Renal Function Panel    Collection Time: 11/10/22  5:32 AM   Result Value Ref Range    Sodium 138 136 - 145 mmol/L    Potassium 3.6 3.5 - 5.1 mmol/L    Chloride 100 99 - 110 mmol/L    CO2 30 21 - 32 mmol/L    Anion Gap 8 3 - 16    Glucose 125 (H) 70 - 99 mg/dL    BUN 5 (L) 7 - 20 mg/dL    Creatinine 0.6 0.6 - 1.2 mg/dL    Est, Glom Filt Rate >60 >60    Calcium 7.9 (L) 8.3 - 10.6 mg/dL    Phosphorus 3.1 2.5 - 4.9 mg/dL    Albumin 2.9 (L) 3.4 - 5.0 g/dL   Magnesium    Collection Time: 11/10/22  5:32 AM   Result Value Ref Range    Magnesium 1.80 1.80 - 2.40 mg/dL   Protime-INR    Collection Time: 11/10/22  5:32 AM   Result Value Ref Range    Protime 48.9 (H) 11.7 - 14.5 sec    INR 5.26 (HH) 0.87 - 1.14   CBC with Auto Differential    Collection Time: 11/10/22  5:32 AM   Result Value Ref Range    WBC 31.8 (H) 4.0 - 11.0 K/uL    RBC 3.77 (L) 4.00 - 5.20 M/uL    Hemoglobin 11.6 (L) 12.0 - 16.0 g/dL    Hematocrit 35.0 (L) 36.0 - 48.0 %    MCV 92.9 80.0 - 100.0 fL    MCH 30.8 26.0 - 34.0 pg    MCHC 33.2 31.0 - 36.0 g/dL    RDW 15.6 (H) 12.4 - 15.4 %    Platelets 656 537 - 738 K/uL    MPV 8.9 5.0 - 10.5 fL    PLATELET SLIDE REVIEW Adequate     Neutrophils % 47.0 %    Lymphocytes % 39.0 %    Monocytes % 4.0 %    Eosinophils % 4.0 %    Basophils % 0.0 %    Neutrophils Absolute 16.9 (H) 1.7 - 7.7 K/uL    Lymphocytes Absolute 12.4 (H) 1.0 - 5.1 K/uL    Monocytes Absolute 1.3 0.0 - 1.3 K/uL    Eosinophils Absolute 1.3 (H) 0.0 - 0.6 K/uL    Basophils Absolute 0.0 0.0 - 0.2 K/uL    Bands Relative 4 0 - 7 %    Metamyelocytes Relative 2 (A) %    nRBC 1 (A) /100 WBC    Smudge Cells Present (A)     Anisocytosis 1+ (A)        Other Labs    Imaging:    XR ABDOMEN (KUB) (SINGLE AP VIEW)   Final Result   Known history of C difficile colitis. Relatively unremarkable bowel gas   pattern. Suspected mild pattern of ileus of small bowel. CT ABDOMEN PELVIS W IV CONTRAST Additional Contrast? Radiologist Recommendation   Final Result   1. Worsening mucosal thickening of the colon in a patient with a known   history of C difficile colitis. No pattern of dilation/megacolon. 2. Cholelithiasis with increasing gallbladder mucosal thickening and mild   dilation of the common bile duct. Current changes felt to be secondary to   more systemic findings above. Underlying pattern of cholecystitis cannot be   excluded. 3. No CT evidence of pancreatitis. Stable pattern of mild pancreatic ductal   dilatation. CT HEAD WO CONTRAST   Final Result   No acute intracranial abnormality. CT ABDOMEN PELVIS W IV CONTRAST Additional Contrast? None   Final Result   1. No pulmonary embolism. 2. Tiny subpleural nodules in the periphery of the lungs may represent an   underlying infectious or inflammatory process. Follow-up recommendations are   provided below. 3. Diffuse mucosal thickening of the colon suggesting underlying pattern of   infectious colitis including C difficile. 4. Incidental cholelithiasis and stable right adrenal nodule suspected to   represent an adenoma. RECOMMENDATIONS:   Multiple pulmonary nodules. Most severe: 3 mm solid pulmonary nodule within   the upper lobe. If patient is low risk for malignancy, no routine follow-up   imaging is recommended; if patient is high risk for malignancy, a   non-contrast Chest CT at 12 months is optional. If performed and the nodule   is stable at 12 months, no further follow-up is recommended. These guidelines do not apply to immunocompromised patients and patients with   cancer. Follow up in patients with significant comorbidities as clinically   warranted. For lung cancer screening, adhere to Lung-RADS guidelines. Reference: Radiology. 2017; 284(1):228-43. CT CHEST PULMONARY EMBOLISM W CONTRAST   Final Result   1. No pulmonary embolism. 2. Tiny subpleural nodules in the periphery of the lungs may represent an   underlying infectious or inflammatory process. Follow-up recommendations are   provided below. 3. Diffuse mucosal thickening of the colon suggesting underlying pattern of   infectious colitis including C difficile. 4. Incidental cholelithiasis and stable right adrenal nodule suspected to   represent an adenoma. RECOMMENDATIONS:   Multiple pulmonary nodules. Most severe: 3 mm solid pulmonary nodule within   the upper lobe. If patient is low risk for malignancy, no routine follow-up   imaging is recommended; if patient is high risk for malignancy, a   non-contrast Chest CT at 12 months is optional. If performed and the nodule   is stable at 12 months, no further follow-up is recommended. These guidelines do not apply to immunocompromised patients and patients with   cancer. Follow up in patients with significant comorbidities as clinically   warranted. For lung cancer screening, adhere to Lung-RADS guidelines. Reference: Radiology. 2017; 284(1):228-43. CT HEAD WO CONTRAST   Final Result   No acute intracranial abnormality. XR ANKLE LEFT (MIN 3 VIEWS)   Final Result   Ankle mortise intact. No acute fracture or dislocation of the ankle. No   acute fracture or dislocation of the foot. Degenerative changes are present   at the interphalangeal joints and there is deformity of the 5th PIP joint   which may be sequela of a remote injury. No soft tissue swelling of the   foot. Moderate soft tissue swelling/edema over the medial malleolus of the   ankle. RECOMMENDATION:   Medial soft tissue swelling about the left ankle with no underlying fracture. Degenerative changes in the left foot with no acute abnormality. XR FOOT LEFT (MIN 3 VIEWS)   Final Result   Ankle mortise intact. No acute fracture or dislocation of the ankle. No   acute fracture or dislocation of the foot. Degenerative changes are present   at the interphalangeal joints and there is deformity of the 5th PIP joint   which may be sequela of a remote injury. No soft tissue swelling of the   foot. Moderate soft tissue swelling/edema over the medial malleolus of the   ankle. RECOMMENDATION:   Medial soft tissue swelling about the left ankle with no underlying fracture. Degenerative changes in the left foot with no acute abnormality. XR CHEST PORTABLE   Final Result   Cardiomegaly with mild vascular congestion that is stable. ASSESSMENT AND RECOMMENDATIONS   Crystal Salazar is a 78 y.o. female with history of CLL, CAD, atrial fibrillation, COPD, hypertension, factor V Leiden deficiency, history of splenectomy who presented [facility] [place] with diarrhea, nausea, vomiting and anorexia. CDI: Clinically improving on p.o. Dificid and IV Flagyl, with decreased stool output, but persistent severe leukocytosis. This is likely in part due to underlying CLL and history of splenectomy. Continue current therapy. CT A/P 11/7/22 without megacolon. Defer addition of PO Vanc to ID. Atrial fibrillation: On Coumadin, with supratherapeutic INR. Factor V Leiden: On Coumadin, supratherapeutic INR. Given persistent supratherapeutic INR, with Coumadin held throughout this admission, would consider vitamin K and recommend hematology oncology consult for management. RECOMMENDATIONS:    -KUB  -Hem/onc consult  -Continue Dificid and IV Flagyl    If you have any questions or need any further information, please feel free to contact anyone on our consult team.  Thank you for allowing us to participate in the care of Crystal Salazar. Alexa Oviedo.  [address] [facility]

## 2022-11-10 NOTE — PROGRESS NOTES
Comprehensive Nutrition Assessment    Type and Reason for Visit:  Initial    Nutrition Recommendations/Plan:   Low Na/2000 ml diet   Will continue to monitor need for ONS     Malnutrition Assessment:  Malnutrition Status:  No malnutrition (11/10/22 1420)      Nutrition Assessment:    LOS. Pt with hx of paroxysmal atrial fibrillation, history of CHF, CLL, COPD, hypertension, factor V Leiden mutation, osteoporosis, hyperlipidemia. Pt with c diff, n/v, weakness. Pt with staff during visit. Per EMR, pt with improving diarrhea. Also noted improving meal intake. Eating greater than 50% of meals lately. Did notice decreased po intake earlier in admission. Wt stable per our records. Will continue to monitor intakes and need for ONS. Nutrition Related Findings:    Reviewed labs Wound Type: None       Current Nutrition Intake & Therapies:    Average Meal Intake: %, 51-75%     ADULT DIET; Regular; Low Sodium (2 gm); 2000 ml    Anthropometric Measures:  Height: 5' (152.4 cm)  Ideal Body Weight (IBW): 100 lbs (45 kg)       Current Body Weight: 154 lb (69.9 kg), 154 % IBW. Current BMI (kg/m2): 30.1                          BMI Categories: Obese Class 1 (BMI 30.0-34. 9)    Estimated Daily Nutrient Needs:  Energy Requirements Based On: Kcal/kg  Weight Used for Energy Requirements: Current  Energy (kcal/day): 4450-2211 kcal (20-22 kcal/kg ABW)  Weight Used for Protein Requirements: Current  Protein (g/day): 56-70 gm (0.8-1 gm/kg ABW)  Method Used for Fluid Requirements: 1 ml/kcal  Fluid (ml/day):      Nutrition Diagnosis:   No nutrition diagnosis at this time    Nutrition Interventions:   Food and/or Nutrient Delivery: Continue Current Diet  Nutrition Education/Counseling: No recommendation at this time  Coordination of Nutrition Care: Continue to monitor while inpatient       Goals:     Goals: PO intake 50% or greater, prior to discharge       Nutrition Monitoring and Evaluation:   Behavioral-Environmental Outcomes: None Identified  Food/Nutrient Intake Outcomes: Food and Nutrient Intake, Supplement Intake  Physical Signs/Symptoms Outcomes: Biochemical Data, Nutrition Focused Physical Findings, Skin, Weight    Discharge Planning:    No discharge needs at this time     Armando Nicholas, [address] [place] [address],   Contact: [phone]

## 2022-11-11 LAB
ALBUMIN SERPL-MCNC: 2.9 G/DL (ref 3.4–5)
ANION GAP SERPL CALCULATED.3IONS-SCNC: 8 MMOL/L (ref 3–16)
ANISOCYTOSIS: ABNORMAL
ANISOCYTOSIS: ABNORMAL
BANDED NEUTROPHILS RELATIVE PERCENT: 4 % (ref 0–7)
BASOPHILS ABSOLUTE: 0 K/UL (ref 0–0.2)
BASOPHILS ABSOLUTE: 0.1 K/UL (ref 0–0.2)
BASOPHILS RELATIVE PERCENT: 0 %
BASOPHILS RELATIVE PERCENT: 0.4 %
BUN BLDV-MCNC: 5 MG/DL (ref 7–20)
CALCIUM SERPL-MCNC: 7.9 MG/DL (ref 8.3–10.6)
CHLORIDE BLD-SCNC: 103 MMOL/L (ref 99–110)
CO2: 32 MMOL/L (ref 21–32)
CREAT SERPL-MCNC: 0.5 MG/DL (ref 0.6–1.2)
EOSINOPHILS ABSOLUTE: 0.5 K/UL (ref 0–0.6)
EOSINOPHILS ABSOLUTE: 1.3 K/UL (ref 0–0.6)
EOSINOPHILS RELATIVE PERCENT: 1.8 %
EOSINOPHILS RELATIVE PERCENT: 4 %
GFR SERPL CREATININE-BSD FRML MDRD: >60 ML/MIN/{1.73_M2}
GLUCOSE BLD-MCNC: 163 MG/DL (ref 70–99)
GLUCOSE BLD-MCNC: 87 MG/DL (ref 70–99)
HCT VFR BLD CALC: 32.7 % (ref 36–48)
HCT VFR BLD CALC: 35 % (ref 36–48)
HEMATOLOGY PATH CONSULT: NO
HEMATOLOGY PATH CONSULT: NO
HEMOGLOBIN: 10.6 G/DL (ref 12–16)
HEMOGLOBIN: 11.6 G/DL (ref 12–16)
INR BLD: 3.75 (ref 0.87–1.14)
LYMPHOCYTES ABSOLUTE: 12.4 K/UL (ref 1–5.1)
LYMPHOCYTES ABSOLUTE: 16.3 K/UL (ref 1–5.1)
LYMPHOCYTES RELATIVE PERCENT: 39 %
LYMPHOCYTES RELATIVE PERCENT: 60 %
MAGNESIUM: 2 MG/DL (ref 1.8–2.4)
MCH RBC QN AUTO: 30.3 PG (ref 26–34)
MCH RBC QN AUTO: 30.8 PG (ref 26–34)
MCHC RBC AUTO-ENTMCNC: 32.3 G/DL (ref 31–36)
MCHC RBC AUTO-ENTMCNC: 33.2 G/DL (ref 31–36)
MCV RBC AUTO: 92.9 FL (ref 80–100)
MCV RBC AUTO: 93.9 FL (ref 80–100)
METAMYELOCYTES RELATIVE PERCENT: 2 %
MONOCYTES ABSOLUTE: 1.3 K/UL (ref 0–1.3)
MONOCYTES ABSOLUTE: 1.4 K/UL (ref 0–1.3)
MONOCYTES RELATIVE PERCENT: 4 %
MONOCYTES RELATIVE PERCENT: 5.3 %
NEUTROPHILS ABSOLUTE: 16.9 K/UL (ref 1.7–7.7)
NEUTROPHILS ABSOLUTE: 8.8 K/UL (ref 1.7–7.7)
NEUTROPHILS RELATIVE PERCENT: 32.5 %
NEUTROPHILS RELATIVE PERCENT: 47 %
NUCLEATED RED BLOOD CELLS: 1 /100 WBC
PDW BLD-RTO: 15.3 % (ref 12.4–15.4)
PDW BLD-RTO: 15.6 % (ref 12.4–15.4)
PERFORMED ON: ABNORMAL
PHOSPHORUS: 3.7 MG/DL (ref 2.5–4.9)
PLATELET # BLD: 380 K/UL (ref 135–450)
PLATELET # BLD: 397 K/UL (ref 135–450)
PLATELET SLIDE REVIEW: ADEQUATE
PLATELET SLIDE REVIEW: ADEQUATE
PMV BLD AUTO: 8.2 FL (ref 5–10.5)
PMV BLD AUTO: 8.9 FL (ref 5–10.5)
POTASSIUM SERPL-SCNC: 3.7 MMOL/L (ref 3.5–5.1)
PROTHROMBIN TIME: 37.4 SEC (ref 11.7–14.5)
RBC # BLD: 3.48 M/UL (ref 4–5.2)
RBC # BLD: 3.77 M/UL (ref 4–5.2)
SLIDE REVIEW: ABNORMAL
SMUDGE CELLS: PRESENT
SODIUM BLD-SCNC: 143 MMOL/L (ref 136–145)
WBC # BLD: 27.1 K/UL (ref 4–11)
WBC # BLD: 31.8 K/UL (ref 4–11)

## 2022-11-11 PROCEDURE — 6370000000 HC RX 637 (ALT 250 FOR IP): Performed by: INTERNAL MEDICINE

## 2022-11-11 PROCEDURE — 6370000000 HC RX 637 (ALT 250 FOR IP): Performed by: PHYSICIAN ASSISTANT

## 2022-11-11 PROCEDURE — 2580000003 HC RX 258: Performed by: STUDENT IN AN ORGANIZED HEALTH CARE EDUCATION/TRAINING PROGRAM

## 2022-11-11 PROCEDURE — 6370000000 HC RX 637 (ALT 250 FOR IP): Performed by: NURSE PRACTITIONER

## 2022-11-11 PROCEDURE — 80069 RENAL FUNCTION PANEL: CPT

## 2022-11-11 PROCEDURE — 2700000000 HC OXYGEN THERAPY PER DAY

## 2022-11-11 PROCEDURE — 85610 PROTHROMBIN TIME: CPT

## 2022-11-11 PROCEDURE — 2060000000 HC ICU INTERMEDIATE R&B

## 2022-11-11 PROCEDURE — 94761 N-INVAS EAR/PLS OXIMETRY MLT: CPT

## 2022-11-11 PROCEDURE — 83735 ASSAY OF MAGNESIUM: CPT

## 2022-11-11 PROCEDURE — 85025 COMPLETE CBC W/AUTO DIFF WBC: CPT

## 2022-11-11 PROCEDURE — 99232 SBSQ HOSP IP/OBS MODERATE 35: CPT | Performed by: INTERNAL MEDICINE

## 2022-11-11 PROCEDURE — 36591 DRAW BLOOD OFF VENOUS DEVICE: CPT

## 2022-11-11 PROCEDURE — 6370000000 HC RX 637 (ALT 250 FOR IP): Performed by: STUDENT IN AN ORGANIZED HEALTH CARE EDUCATION/TRAINING PROGRAM

## 2022-11-11 RX ORDER — PHYTONADIONE 5 MG/1
5 TABLET ORAL ONCE
Status: COMPLETED | OUTPATIENT
Start: 2022-11-11 | End: 2022-11-11

## 2022-11-11 RX ORDER — GABAPENTIN 300 MG/1
300 CAPSULE ORAL NIGHTLY
Qty: 30 CAPSULE | Refills: 0
Start: 2022-11-11 | End: 2022-12-11

## 2022-11-11 RX ORDER — OXYCODONE HYDROCHLORIDE 5 MG/1
5 TABLET ORAL EVERY 6 HOURS PRN
Qty: 15 TABLET | Refills: 0 | Status: SHIPPED | OUTPATIENT
Start: 2022-11-11 | End: 2022-11-12 | Stop reason: HOSPADM

## 2022-11-11 RX ORDER — WARFARIN SODIUM 1 MG/1
1 TABLET ORAL
Status: COMPLETED | OUTPATIENT
Start: 2022-11-11 | End: 2022-11-11

## 2022-11-11 RX ORDER — VANCOMYCIN HYDROCHLORIDE 250 MG/1
250 CAPSULE ORAL 4 TIMES DAILY
Qty: 56 CAPSULE | Refills: 0 | Status: SHIPPED | OUTPATIENT
Start: 2022-11-11 | End: 2022-11-12 | Stop reason: HOSPADM

## 2022-11-11 RX ORDER — BUTALBITAL, ACETAMINOPHEN AND CAFFEINE 50; 325; 40 MG/1; MG/1; MG/1
1 TABLET ORAL EVERY 6 HOURS PRN
Qty: 30 TABLET | Refills: 0 | Status: SHIPPED | OUTPATIENT
Start: 2022-11-11 | End: 2022-11-12 | Stop reason: SDUPTHER

## 2022-11-11 RX ORDER — CALCIUM CARBONATE 200(500)MG
500 TABLET,CHEWABLE ORAL 3 TIMES DAILY PRN
Qty: 90 TABLET | Refills: 0 | Status: SHIPPED | OUTPATIENT
Start: 2022-11-11 | End: 2022-11-12 | Stop reason: SDUPTHER

## 2022-11-11 RX ADMIN — AMIODARONE HYDROCHLORIDE 200 MG: 200 TABLET ORAL at 08:39

## 2022-11-11 RX ADMIN — OXYCODONE 5 MG: 5 TABLET ORAL at 01:13

## 2022-11-11 RX ADMIN — GABAPENTIN 300 MG: 300 CAPSULE ORAL at 22:13

## 2022-11-11 RX ADMIN — VANCOMYCIN HYDROCHLORIDE 250 MG: 250 CAPSULE ORAL at 22:13

## 2022-11-11 RX ADMIN — Medication 6 MG: at 22:13

## 2022-11-11 RX ADMIN — Medication 10 ML: at 08:39

## 2022-11-11 RX ADMIN — VANCOMYCIN HYDROCHLORIDE 250 MG: 250 CAPSULE ORAL at 08:38

## 2022-11-11 RX ADMIN — VANCOMYCIN HYDROCHLORIDE 250 MG: 250 CAPSULE ORAL at 13:15

## 2022-11-11 RX ADMIN — METOPROLOL SUCCINATE 12.5 MG: 25 TABLET, FILM COATED, EXTENDED RELEASE ORAL at 08:38

## 2022-11-11 RX ADMIN — OXYCODONE 5 MG: 5 TABLET ORAL at 22:13

## 2022-11-11 RX ADMIN — VANCOMYCIN HYDROCHLORIDE 250 MG: 250 CAPSULE ORAL at 16:51

## 2022-11-11 RX ADMIN — FIDAXOMICIN 200 MG: 200 TABLET, FILM COATED ORAL at 08:39

## 2022-11-11 RX ADMIN — WARFARIN SODIUM 1 MG: 1 TABLET ORAL at 18:06

## 2022-11-11 RX ADMIN — Medication 10 ML: at 22:13

## 2022-11-11 RX ADMIN — FIDAXOMICIN 200 MG: 200 TABLET, FILM COATED ORAL at 22:13

## 2022-11-11 RX ADMIN — OXYCODONE 5 MG: 5 TABLET ORAL at 16:56

## 2022-11-11 RX ADMIN — OXYCODONE 5 MG: 5 TABLET ORAL at 08:42

## 2022-11-11 RX ADMIN — PHYTONADIONE 5 MG: 5 TABLET ORAL at 16:51

## 2022-11-11 ASSESSMENT — PAIN DESCRIPTION - DESCRIPTORS
DESCRIPTORS: BURNING
DESCRIPTORS: BURNING

## 2022-11-11 ASSESSMENT — PAIN SCALES - GENERAL
PAINLEVEL_OUTOF10: 6
PAINLEVEL_OUTOF10: 7
PAINLEVEL_OUTOF10: 6

## 2022-11-11 ASSESSMENT — PAIN DESCRIPTION - LOCATION
LOCATION: ABDOMEN
LOCATION: ABDOMEN

## 2022-11-11 ASSESSMENT — PAIN DESCRIPTION - ORIENTATION
ORIENTATION: LEFT;UPPER
ORIENTATION: LEFT

## 2022-11-11 ASSESSMENT — PAIN DESCRIPTION - ONSET: ONSET: ON-GOING

## 2022-11-11 ASSESSMENT — PAIN - FUNCTIONAL ASSESSMENT
PAIN_FUNCTIONAL_ASSESSMENT: ACTIVITIES ARE NOT PREVENTED
PAIN_FUNCTIONAL_ASSESSMENT: PREVENTS OR INTERFERES SOME ACTIVE ACTIVITIES AND ADLS

## 2022-11-11 ASSESSMENT — PAIN DESCRIPTION - PAIN TYPE: TYPE: ACUTE PAIN

## 2022-11-11 ASSESSMENT — PAIN DESCRIPTION - FREQUENCY: FREQUENCY: CONTINUOUS

## 2022-11-11 NOTE — CONSULTS
Hematology Consult    See dictation    A/P:  Supratherapeutic INR/arm hematoma. Her supratherapeutic INR is due to vitamin K deficiency from antibiotics and vitamin K deficiency from decreased oral intake. Due to the fact that she has developed a right arm hematoma, I will give her low-dose vitamin K. She is on anticoagulation for A. fib. I am not sure why she is not on Eliquis or Xarelto. She will need a repeat INR on Monday if she goes home over the weekend and perhaps restarted on a lower dose of Coumadin at that time. Thank you for the consultation. I will follow closely.     Tiffany Ma MD

## 2022-11-11 NOTE — PROGRESS NOTES
Clinical Pharmacy Note  Warfarin Consult    Sayra Mayo is a 78 y.o. female receiving warfarin managed by pharmacy. Warfarin Indication: Factor V Leiden deficiency, h/o PE, Afib  Target INR range: 2-3   Dose prior to admission: 2.5 mg daily except NONE on Friday    Current warfarin drug-drug interactions: f  Recent Labs     11/09/22  0530 11/10/22  0532 11/11/22  0614   HGB 11.1* 11.6* 10.6*   HCT 33.0* 35.0* 32.7*   INR 6.29* 5.26* 3.75*         Assessment/Plan:  Flagyl d/c'd yesterday  INR 5.26--> 3.75    Warfarin  1mg tonight. Daily PT/INR until stable within therapeutic range. Thank you for the consult. Will continue to follow.     Electronically signed by Suzan Pearce [facility] on 11/11/2022 at 12:58 PM

## 2022-11-11 NOTE — PROGRESS NOTES
Infectious Disease Follow up Notes  Admit Date: 11/2/2022  Hospital Day: 10    Antibiotics :   Fidaxomicin   stopped     Oral Vancomycin      CHIEF COMPLAINT:     Severe C diff colitis  WBC elevation  Diarrhea    Subjective interval History :  78 y.o. woman with h/o  A fib admitted with a fall and diarrhea now tested +ve for C diff also has CLL and chest port in place prior Splenectomy -    ONE loose stool today and abd pain is better no nausea no vomiting no chills    Past Medical History:    Past Medical History:   Diagnosis Date    Acute ST elevation myocardial infarction (STEMI) ([facility] [address] 07/19/2021    Atrial fibrillation (HCC)     Chronic lymphocytic leukemia in remission ([facility] [address]     CLL (chronic lymphocytic leukemia) ([facility] [address] 02/12/2015    COPD (chronic obstructive pulmonary disease) ([facility] [address]     Essential hypertension 01/01/2015    controlled with salt restriction (initially)    Factor V Leiden mutation ([facility] [address]     Goiter 11/01/2011    1.5 cm midline    Hypercholesterolemia     Impaired fasting glucose 11/01/2011    Osteoarthritis     Osteoporosis     Post herpetic neuralgia     Vitamin D deficiency 11/01/2011       Past Surgical History:    Past Surgical History:   Procedure Laterality Date    CATARACT REMOVAL WITH IMPLANT  5/14/12    left eye    JOINT REPLACEMENT      partial knee R and L    KNEE SURGERY  2008    partial replacements, both knees    MOHS SURGERY  03/12/2019    R cheek and R superior temple    SPLENECTOMY      TUNNELED VENOUS PORT PLACEMENT      UPPER GASTROINTESTINAL ENDOSCOPY N/A 2/5/2019    EGD ESOPHAGOGASTRODUODENOSCOPY, WITH ENDOSCOPIC ULTRASOUND OF ESOPHAGUS performed by Jerome Gracia MD at [address]       Current Medications:    No outpatient medications have been marked as taking for the 11/2/22 encounter [facility] Encounter). Allergies:  Patient has no known allergies.     Immunizations :   Immunization History   Administered Date(s) Administered    COVID-19, MODERNA BLUE border, Primary or Immunocompromised, (age 12y+), IM, 100 mcg/0.5mL 2021    COVID-19, PFIZER PURPLE top, DILUTE for use, (age 15 y+), 30mcg/0.3mL 2021, 2021    Influenza Virus Vaccine 10/16/2009, 2010, 2010, 10/03/2016    Influenza, Vergie , Sneha Petty (age 10 mo+) AND AFLURIA, (age 1 y+), PF, 0.5mL 2020, 10/23/2021    Influenza, High Dose (Fluzone 65 yrs and older) 2011, 10/01/2012, 10/29/2013, 10/23/2014, 2015, 10/23/2018, 2019    Pneumococcal Conjugate 13-valent ([id number]) 2015    Pneumococcal Polysaccharide ([id number]) 2006, 2011    Td, unspecified formulation 10/04/2004    Tdap (Boostrix, Adacel) 2015       Social History:     Social History     Tobacco Use    Smoking status: Former     Packs/day: 0.30     Years: 50.00     Pack years: 15.00     Types: Cigarettes     Quit date: 1995     Years since quittin.0    Smokeless tobacco: Never   Vaping Use    Vaping Use: Never used   Substance Use Topics    Alcohol use: No     Alcohol/week: 0.0 standard drinks    Drug use: Not Currently     Social History     Tobacco Use   Smoking Status Former    Packs/day: 0.30    Years: 50.00    Pack years: 15.00    Types: Cigarettes    Quit date: 1995    Years since quittin.0   Smokeless Tobacco Never      Family History   Problem Relation Age of Onset    Diabetes Father     Stroke Sister 50         of a stroke          REVIEW OF SYSTEMS:      Constitutional:  negative for fevers, chills, night sweats  Eyes:  negative for blurred vision, eye discharge, visual disturbance   HEENT:  negative for hearing loss, ear drainage,nasal congestion  Respiratory:  negative for cough, shortness of breath or hemoptysis   Cardiovascular:  negative for chest pain, palpitations, syncope  Gastrointestinal:  negative for nausea, vomiting, diarrhea,+  constipation, abdominal pain +   Genitourinary:  negative for frequency, dysuria, urinary incontinence, hematuria  Hematologic/Lymphatic:  negative for easy bruising, bleeding and lymphadenopathy  Allergic/Immunologic:  negative for recurrent infections, angioedema, anaphylaxis   Endocrine:  negative for weight changes, polyuria, polydipsia and polyphagia  Musculoskeletal:  negative for joint  pain, swelling, decreased range of motion  Integumentary: No rashes, skin lesions  Neurological:  negative for headaches, slurred speech, unilateral weakness  Psychiatric: negative for hallucinations,confusion,agitation.                 PHYSICAL EXAM:      Vitals:  t MAX 99.6   BP (!) 158/62   Pulse 61   Temp 98.1 °F (36.7 °C) (Oral)   Resp 18   Ht 5' (1.524 m)   Wt 154 lb 5.2 oz (70 kg)   SpO2 93%   BMI 30.14 kg/m²     General Appearance: alert,in some acute distress, ++ pallor, no icterus  on nasal cannula   Skin: warm and dry, no rash or erythema  Head: normocephalic and atraumatic  Eyes: pupils equal, round, and reactive to light, conjunctivae normal  ENT: tympanic membrane, external ear and ear canal normal bilaterally, nose without deformity, nasal mucosa and turbinates normal without polyps  Neck: supple and non-tender without mass, no thyromegaly  no cervical lymphadenopathy  Pulmonary/Chest: clear to auscultation bilaterally- no wheezes, rales or rhonchi, normal air movement, no respiratory distress  Cardiovascular: normal rate, regular rhythm, normal S1 and S2, no murmurs, rubs, clicks, or gallops, no carotid bruits  Abdomen: soft, + -tender, + -distended, normal bowel sounds, no masses or organomegaly  Extremities: no cyanosis, clubbing or edema  Musculoskeletal: normal range of motion, no joint swelling, deformity or tenderness  Integumentary: No rashes, no abnormal skin lesions, no petechiae  Neurologic: reflexes normal and symmetric, no cranial nerve deficit  Psych:  Orientation, sensorium, mood normal  Lines: chest port + Data Review:    CBC:   Lab Results   Component Value Date    WBC 27.1 (H) 11/11/2022    HGB 10.6 (L) 11/11/2022    HCT 32.7 (L) 11/11/2022    MCV 93.9 11/11/2022     11/11/2022     RENAL:   Lab Results   Component Value Date    CREATININE 0.5 (L) 11/11/2022    BUN 5 (L) 11/11/2022     11/11/2022    K 3.7 11/11/2022     11/11/2022    CO2 32 11/11/2022     SED RATE:   Lab Results   Component Value Date/Time    SEDRATE 45 05/05/2022 04:01 PM     CK: No results found for: CKTOTAL  CRP: No results found for: CRP  Hepatic Function Panel:   Lab Results   Component Value Date/Time    ALKPHOS 81 11/07/2022 04:30 AM    ALT 7 11/07/2022 04:30 AM    AST 13 11/07/2022 04:30 AM    PROT 4.3 11/07/2022 04:30 AM    PROT 6.2 06/23/2017 12:10 PM    BILITOT 0.5 11/07/2022 04:30 AM    BILIDIR <0.2 11/07/2022 04:30 AM    IBILI see below 11/07/2022 04:30 AM    LABALBU 2.9 11/11/2022 06:14 AM     UA:  Lab Results   Component Value Date/Time    COLORU Yellow 11/03/2022 03:34 AM    CLARITYU Clear 11/03/2022 03:34 AM    GLUCOSEU Negative 11/03/2022 03:34 AM    BILIRUBINUR Negative 11/03/2022 03:34 AM    BILIRUBINUR neg 04/20/2021 08:53 AM    KETUA 80 11/03/2022 03:34 AM    SPECGRAV 1.075 11/03/2022 03:34 AM    BLOODU MODERATE 11/03/2022 03:34 AM    PHUR 5.0 11/03/2022 03:34 AM    PROTEINU TRACE 11/03/2022 03:34 AM    UROBILINOGEN 0.2 11/03/2022 03:34 AM    NITRU Negative 11/03/2022 03:34 AM    LEUKOCYTESUR Negative 11/03/2022 03:34 AM    LABMICR YES 11/03/2022 03:34 AM    URINETYPE NotGiven 11/03/2022 03:34 AM      Urine Microscopic:   Lab Results   Component Value Date/Time    BACTERIA None Seen 11/03/2022 03:34 AM    HYALCAST 0 11/03/2022 03:34 AM    WBCUA 2 11/03/2022 03:34 AM    RBCUA 3 11/03/2022 03:34 AM    EPIU 2 11/03/2022 03:34 AM     Urine Reflex to Culture:   Lab Results   Component Value Date/Time    URRFLXCULT Not Indicated 11/03/2022 03:34 AM         MICRO: cultures reviewed and updated by me   Blood Culture:   Lab Results   Component Value Date/Time    BC No Growth after 4 days of incubation. 11/03/2022 12:34 AM    BLOODCULT2 No Growth after 4 days of incubation. 11/03/2022 12:34 AM       Respiratory Culture:  No results found for: Isamar Hernandez  AFB:No results found for: [facility]  Viral Culture:  Lab Results   Component Value Date/Time    COVID19 Not Detected 11/03/2022 12:35 AM     Urine Culture: No results for input(s): LABURIN in the last 72 hours. Culture, Blood 2 [[id number]] Collected: 11/03/22 0034   Order Status: Completed Specimen: Blood Updated: 11/07/22 0315    Culture, Blood 2 No Growth after 4 days of incubation. Narrative:     ORDER#: [id number]                          ORDERED BY: MATT FANG   SOURCE: Blood                              COLLECTED:  11/03/22 00:34   ANTIBIOTICS AT ARMINDA.:                      RECEIVED :  11/03/22 03:04   If child <=2 yrs old please draw pediatric bottle. ~Blood Culture #2   Blood Culture 1 [[id number]] Collected: 11/03/22 0034   Order Status: Completed Specimen: Blood Updated: 11/07/22 0315    Blood Culture, Routine No Growth after 4 days of incubation. Narrative:     ORDER#: [id number]                          ORDERED BY: MATT FANG   SOURCE: Blood                              COLLECTED:  11/03/22 00:34   ANTIBIOTICS AT ARMINDA.:                      RECEIVED :  11/03/22 03:04   If child <=2 yrs old please draw pediatric bottle. ~Blood Culture 1   Giardia antigen [[id number]] Collected: 11/03/22 1005   Order Status: Completed Specimen: Stool Updated: 11/04/22 1016    Giardia Ag, Stl --    Negative   Normal Range: Negative   This stool specimen has been tested for the above parasites   by enzyme immunoassay. A preserved specimen is held for one   week after the results are reported. If clinically indicated,   a comprehensive microscopic examination can be performed by   calling the Microbiology Lab.     Narrative:     ORDER#: [id number] ORDERED BY: Isaac Burger   SOURCE: Stool                              COLLECTED:  11/03/22 10:05   ANTIBIOTICS AT ARMINDA.:                      RECEIVED :  11/03/22 15:25   C. difficile toxin Molecular [[id number]] (Abnormal) Collected: 11/03/22 1005   Order Status: Completed Specimen: Stool Updated: 11/04/22 0615    Organism C. difficile toxin B gene detected Abnormal     C. difficile toxin Molecular -- Abnormal     POSITIVE FOR   Normal Range: Not detected   CONTACT PRECAUTIONS INDICATED    Abnormal    Narrative:     ORDER#: [id number]                          ORDERED BY: MATT FANG   SOURCE: Stool                              COLLECTED:  11/03/22 10:05   ANTIBIOTICS AT ARMINDA.:                      RECEIVED :  11/03/22 10:34   CALL  Nguyễn  [id number] tel. [phone],   Microbiology results called to and read back by Julia Bradley RN, 11/04/2022   06:14, by [facility]   Gastrointestinal Panel by DNA [[id number]] Collected: 11/03/22 1005   Order Status: Completed Specimen: Stool Updated: 11/04/22 0608    GI Bacterial Pathogens By PCR --    No Shigella spp/EIEC DNA detected   No Shiga toxin-producing gene(s) detected   No Campylobacter spp. (jejuni and coli)DNA detected   No Salmonella spp.  DNA detected   No Vibrio vulnificus/parahaemolyticus/cholerae DNA detected   No Plesiomonas shigelloides DNA detected   No Enterotoxigenic E. coli (ETEC) DNA detected   No Yersinia enterocolitica DNA detected   Normal Range:  None detected    Narrative:     ORDER#: [id number]                          ORDERED BY: MATT FANG   SOURCE: Stool                              COLLECTED:  11/03/22 10:05   ANTIBIOTICS AT ARMINDA.:                      RECEIVED :  11/03/22 10:37   Fecal leukocytes [[id number]] (Abnormal) Collected: 11/03/22 1005   Order Status: Completed Specimen: Stool Updated: 11/03/22 1632    White Blood Cells (WBC), Stool -- Abnormal     POSITIVE   Normal Range:Negative    Abnormal    Narrative:     ORDER#: [id number] ORDERED BY: Duncan Fernandez   SOURCE: Stool                              COLLECTED:  11/03/22 10:05   ANTIBIOTICS AT ARMINDA.:                      RECEIVED :  11/03/22 15:23   Clostridium Difficile Toxin/Antigen [[id number]] Collected: 11/03/22 1005   Order Status: Completed Specimen: Stool Updated: 11/03/22 1131    C.diff Toxin/Antigen --    Indeterminate see results of C. difficile amplification(PCR)   Normal Range: Negative    Narrative:     ORDER#: [id number]                          ORDERED BY: MATT FANG   SOURCE: Stool                              COLLECTED:  11/03/22 10:05   ANTIBIOTICS AT ARMINDA.:                      RECEIVED :  11/03/22 10:34   Collect White vial (sterile container)   COVID-19, Rapid [[id number]] Collected: 11/03/22 0035   Order Status: Completed Specimen: Nasopharyngeal Swab Updated: 11/03/22 0112    SARS-CoV-2, NAAT Not Detected    Comment: Rapid NAAT:   Negative results should be treated as presumptive and,   if inconsistent with clinical signs and symptoms or necessary for   patient management, should be tested with an alternative molecular   assay. Negative results do not preclude SARS-CoV-2 infection and   should not be used as the sole basis for patient management decisions. This test has been authorized by the FDA under an Emergency Use   Authorization (EUA) for use by authorized laboratories.      Fact sheet for Healthcare Providers:   http://www.aristeo.aria/   Fact sheet for Patients: http://www.jin-erik.aria/     METHODOLOGY: Isothermal Nucleic Acid Amplification       Rapid influenza A/B antigens [[id number]] Collected: 11/03/22 0035   Order Status: Completed Specimen: Nasopharyngeal Updated: 11/03/22 0111    Rapid Influenza A Ag Negative    Rapid Influenza B Ag Negative   Gastrointestinal Panel, Molecular [[id number]] Collected: 11/03/22 0000   Order Status: Canceled Specimen: Stool    O&P PANEL ([employer]) #1 [[id number]] Collected: 11/03/22 0000   Order Status: Canceled Specimen: Stool      IMAGING:    XR ABDOMEN (KUB) (SINGLE AP VIEW)   Final Result   Known history of C difficile colitis. Relatively unremarkable bowel gas   pattern. Suspected mild pattern of ileus of small bowel. CT ABDOMEN PELVIS W IV CONTRAST Additional Contrast? Radiologist Recommendation   Final Result   1. Worsening mucosal thickening of the colon in a patient with a known   history of C difficile colitis. No pattern of dilation/megacolon. 2. Cholelithiasis with increasing gallbladder mucosal thickening and mild   dilation of the common bile duct. Current changes felt to be secondary to   more systemic findings above. Underlying pattern of cholecystitis cannot be   excluded. 3. No CT evidence of pancreatitis. Stable pattern of mild pancreatic ductal   dilatation. CT HEAD WO CONTRAST   Final Result   No acute intracranial abnormality. CT ABDOMEN PELVIS W IV CONTRAST Additional Contrast? None   Final Result   1. No pulmonary embolism. 2. Tiny subpleural nodules in the periphery of the lungs may represent an   underlying infectious or inflammatory process. Follow-up recommendations are   provided below. 3. Diffuse mucosal thickening of the colon suggesting underlying pattern of   infectious colitis including C difficile. 4. Incidental cholelithiasis and stable right adrenal nodule suspected to   represent an adenoma. RECOMMENDATIONS:   Multiple pulmonary nodules. Most severe: 3 mm solid pulmonary nodule within   the upper lobe. If patient is low risk for malignancy, no routine follow-up   imaging is recommended; if patient is high risk for malignancy, a   non-contrast Chest CT at 12 months is optional. If performed and the nodule   is stable at 12 months, no further follow-up is recommended. These guidelines do not apply to immunocompromised patients and patients with   cancer.  Follow up in patients with significant comorbidities as clinically   warranted. For lung cancer screening, adhere to Lung-RADS guidelines. Reference: Radiology. 2017; 284(1):228-43. CT CHEST PULMONARY EMBOLISM W CONTRAST   Final Result   1. No pulmonary embolism. 2. Tiny subpleural nodules in the periphery of the lungs may represent an   underlying infectious or inflammatory process. Follow-up recommendations are   provided below. 3. Diffuse mucosal thickening of the colon suggesting underlying pattern of   infectious colitis including C difficile. 4. Incidental cholelithiasis and stable right adrenal nodule suspected to   represent an adenoma. RECOMMENDATIONS:   Multiple pulmonary nodules. Most severe: 3 mm solid pulmonary nodule within   the upper lobe. If patient is low risk for malignancy, no routine follow-up   imaging is recommended; if patient is high risk for malignancy, a   non-contrast Chest CT at 12 months is optional. If performed and the nodule   is stable at 12 months, no further follow-up is recommended. These guidelines do not apply to immunocompromised patients and patients with   cancer. Follow up in patients with significant comorbidities as clinically   warranted. For lung cancer screening, adhere to Lung-RADS guidelines. Reference: Radiology. 2017; 284(1):228-43. CT HEAD WO CONTRAST   Final Result   No acute intracranial abnormality. XR ANKLE LEFT (MIN 3 VIEWS)   Final Result   Ankle mortise intact. No acute fracture or dislocation of the ankle. No   acute fracture or dislocation of the foot. Degenerative changes are present   at the interphalangeal joints and there is deformity of the 5th PIP joint   which may be sequela of a remote injury. No soft tissue swelling of the   foot. Moderate soft tissue swelling/edema over the medial malleolus of the   ankle. RECOMMENDATION:   Medial soft tissue swelling about the left ankle with no underlying fracture.    Degenerative changes in the left foot with no acute abnormality. XR FOOT LEFT (MIN 3 VIEWS)   Final Result   Ankle mortise intact. No acute fracture or dislocation of the ankle. No   acute fracture or dislocation of the foot. Degenerative changes are present   at the interphalangeal joints and there is deformity of the 5th PIP joint   which may be sequela of a remote injury. No soft tissue swelling of the   foot. Moderate soft tissue swelling/edema over the medial malleolus of the   ankle. RECOMMENDATION:   Medial soft tissue swelling about the left ankle with no underlying fracture. Degenerative changes in the left foot with no acute abnormality. XR CHEST PORTABLE   Final Result   Cardiomegaly with mild vascular congestion that is stable. All the pertinent images and reports for the current Hospitalization were reviewed by me     Scheduled Meds:   vancomycin  250 mg Oral 4x Daily    Fidaxomicin  200 mg Oral BID    amiodarone  200 mg Oral Daily    gabapentin  300 mg Oral Nightly    metoprolol succinate  12.5 mg Oral Daily    sodium chloride flush  5-40 mL IntraVENous 2 times per day    warfarin placeholder: dosing by pharmacy   Other RX Placeholder       Continuous Infusions:   sodium chloride 10 mL/hr at 11/09/22 2131       PRN Meds:  melatonin, calcium carbonate, trimethobenzamide, meclizine, butalbital-acetaminophen-caffeine, oxyCODONE, sodium chloride flush, sodium chloride, acetaminophen **OR** acetaminophen, potassium chloride **OR** potassium alternative oral replacement **OR** potassium chloride      Assessment:     Patient Active Problem List   Diagnosis    Osteoporosis,Dexas:10/18/02 (Lumbar spine -1.52), hip -0.55 (T scores), Actonel, then fosamax;  Dexa 1/25/06 (Lumbar spine -1.1 and hip -0.5)    Post herpetic neuralgia    Thrombocytopenia; 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/06    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 ([facility] [address]    CLL (chronic lymphocytic leukemia) (HCC)    Osteoarthritis    Hypercholesterolemia Goiter, 1.5 cm midline    Impaired fasting glucose    Vitamin D deficiency    Small cleaved cell (diffuse) NHL (HCC)    B12 deficiency    Abnormal coagulation profile    Essential hypertension    Encounter for follow-up examination after completed treatment for cancer    Encounter for care related to Port-a-Cath    Hyperuricemia    Arthritis of knee    Neoplasm of uncertain behavior of skin    History of squamous cell carcinoma of skin    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    History of basal cell carcinoma    Seborrheic keratoses, inflamed    Takotsubo cardiomyopathy    Chronic combined systolic (congestive) and diastolic (congestive) heart failure (HCC)    Acute ST elevation myocardial infarction (STEMI) (HCC)    Acute CHF (congestive heart failure) (HCC)    Sepsis (HCC)    Chronic respiratory failure with hypoxia (HCC)    Atrial fibrillation with rapid ventricular response (HCC)    Simple chronic bronchitis (HCC)    Syncope and collapse    Colitis    C. difficile colitis    Overweight (BMI 25.0-29. 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 procedure    Calculus of gallbladder without cholecystitis without obstruction    Bandemia    Head contusion    Diarrhea    Sprain of left ankle    Leukemoid reaction     Severe C diff diarrhea  WBC elevation  Leukemoid reaction  Colitis on the CT scan   Splenectomy in the past  CLL history   Chest port in place  Lung nodules  Left ankle bruise  Factor V leiden on chronic anticoagulation      Unfortunately worsening Leukocytosis with on going diarrhea CT abd/pelvis from 11/7 with colitis noted and no  Toxic megacolon will  need C diff therapy risk for relapse given the severity of the illness  - will add Oral Vancomycin to the regimen - watch for complications      WBC slowly improving Diarrhea better will be able to do Taper dose when ready    Labs, Microbiology, Radiology and all the pertinent results from current hospitalization and  care every where were reviewed  by me as a part of the evaluation   Plan:   D/c  Fidaxomicin    Oral Vancomycin x 250 mg q 6 hrs   Taper doses  x  125 mg x Q 6  hr x 7 days                                125 mg xq 8 hr, x 7 days                                   125 mg x  q12 hr  x 7 days                                     125 mg x q day x 7 days                                      125 mg every other day and stop   Trend WBC  WBC likely exacerbated from Asplenia and CLL   Watch for TOXIC megacolon   D/C PPI for now    [id number] [address] for d/c tomorrow if no new issues    Discussed with patient/Family and Nursing staff   Risk of Complications/Morbidity: High      Illness(es)/ Infection present that pose threat to bodily function. There is potential for severe exacerbation of infection/side effects of treatment. Therapy requires intensive monitoring for antimicrobial agent toxicity. Thanks for allowing me to participate in your patient's care and please call me with any questions or concerns.     Ricky Pang MD  Infectious Disease  [facility]) Physician  Phone: [phone]   Fax : [phone]

## 2022-11-11 NOTE — PLAN OF CARE
Problem: Discharge Planning  Goal: Discharge to home or other facility with appropriate resources  Outcome: Progressing     Problem: Pain  Goal: Verbalizes/displays adequate comfort level or baseline comfort level  Outcome: Progressing     Problem: Safety - Adult  Goal: Free from fall injury  Outcome: Progressing     Problem: ABCDS Injury Assessment  Goal: Absence of physical injury  Outcome: Progressing     Problem: Cardiovascular - Adult  Goal: Maintains optimal cardiac output and hemodynamic stability  Outcome: Progressing     Problem: Cardiovascular - Adult  Goal: Absence of cardiac dysrhythmias or at baseline  Outcome: Progressing     Problem: Infection - Adult  Goal: Absence of infection at discharge  Outcome: Progressing     Problem: Neurosensory - Adult  Goal: Achieves maximal functionality and self care  Outcome: Progressing     Problem: Musculoskeletal - Adult  Goal: Return mobility to safest level of function  Outcome: Progressing     Problem: Skin/Tissue Integrity  Goal: Absence of new skin breakdown  Description: 1. Monitor for areas of redness and/or skin breakdown  2. Assess vascular access sites hourly  3. Every 4-6 hours minimum:  Change oxygen saturation probe site  4. Every 4-6 hours:  If on nasal continuous positive airway pressure, respiratory therapy assess nares and determine need for appliance change or resting period.   Outcome: Progressing

## 2022-11-11 NOTE — PROGRESS NOTES
Hospital Medicine Progress Note     Date:  11/11/2022    PCP: Raghavendra Dockery MD (Tel: [phone])    Date of Admission: 11/2/2022    Chief complaint:   Chief Complaint   Patient presents with    Leg Pain     Fall leg and ankle pain       Brief admission history: 22-year-old male with history of paroxysmal atrial fibrillation, history of CHF (previously systolic dysfunction, although recent echocardiogram with preserved ejection fraction, diastolic function not measured), CLL, s/p splenectomy, COPD, essential hypertension, factor V Leiden mutation, chronic Coumadin use, osteoporosis, hyperlipidemia, who presented to the emergency room on November 3, 2022 with complaints of generalized weakness, fall, recent nausea, vomiting and diarrhea, with associated decreased oral intake. Stool study was positive for C. difficile infection. She was noted to be hypoxic on presentation. Assessment/plan:  C. difficile colitis. Continue Dificid and oral vancomycin. IV flagyl discontinued on 11/10/2022. Generalized weakness. Likely secondary to underlying medical conditions. Fall precautions. Recent fall at home, likely secondary to generalized weakness. Fall precautions. History of factor V Leiden, on chronic anticoagulation with Coumadin. INR supratherapeutic due to antibiotic therapy; coumadin has been on hold since admission. Consulted heme/onc on Nov 11, 2022 per GI recommendation. Perhaps, we could transition to alternate anticoagulant. History of atrial fibrillation. Continue amiodarone. Gastroesophageal reflux disease. Continue PPI. Added as needed Tums.   Other comorbidities: history of paroxysmal atrial fibrillation, history of CHF (previously systolic dysfunction, although recent echocardiogram with preserved ejection fraction, diastolic function not measured), CLL, s/p splenectomy, COPD, essential hypertension, factor V Leiden mutation, chronic Coumadin use, osteoporosis, hyperlipidemia. Disposition. Likely discharge home in 1-2 days. Diet: ADULT DIET; Regular; Low Sodium (2 gm); 2000 ml    Code status: Full Code   ----------  Subjective  Diarrhea much improved. Objective  Physical exam:  Vitals: /77   Pulse 58   Temp 97.4 °F (36.3 °C) (Axillary)   Resp 18   Ht 5' (1.524 m)   Wt 154 lb 5.2 oz (70 kg)   SpO2 95%   BMI 30.14 kg/m²   Gen/overall appearance: Not in acute distress. Alert. Oriented x3. Head: Normocephalic, atraumatic  Eyes: EOMI, good acuity  ENT: Oral mucosa moist  Neck: No JVD, thyromegaly  CVS: Nml S1S2, no MRG, RRR  Pulm: Clear bilaterally. No crackles/wheezes  Gastrointestinal: Mild diffuse tenderness to palpation. Soft, ND, +BS  Musculoskeletal: No edema. Warm  Neuro: No focal deficit. Moves extremity spontaneously. Psychiatry: Appropriate affect. Not agitated. Skin: Warm, dry with normal turgor. No rash  Capillary refill: Brisk,< 3 seconds   Peripheral Pulses: +2 palpable, equal bilaterally      24HR INTAKE/OUTPUT:  No intake or output data in the 24 hours ending 11/11/22 1003    I/O last 3 completed shifts: In: 5 [P.O.:420]  Out: -   No intake/output data recorded.   Meds:    vancomycin  250 mg Oral 4x Daily    Fidaxomicin  200 mg Oral BID    amiodarone  200 mg Oral Daily    gabapentin  300 mg Oral Nightly    metoprolol succinate  12.5 mg Oral Daily    sodium chloride flush  5-40 mL IntraVENous 2 times per day    warfarin placeholder: dosing by pharmacy   Other RX Placeholder     Infusions:    sodium chloride 10 mL/hr at 11/09/22 2131     PRN Meds: melatonin, calcium carbonate, trimethobenzamide, meclizine, butalbital-acetaminophen-caffeine, oxyCODONE, sodium chloride flush, sodium chloride, acetaminophen **OR** acetaminophen, potassium chloride **OR** potassium alternative oral replacement **OR** potassium chloride    Labs/imaging:  CBC:   Recent Labs     11/09/22  0530 11/10/22  0532 11/11/22  0614   WBC 31.5* 31.8* 27.1*   HGB 11.1* 11.6* 10.6*    397 380       BMP:    Recent Labs     11/09/22  0530 11/10/22  0532 11/11/22  0614    138 143   K 3.4* 3.6 3.7    100 103   CO2 30 30 32   BUN 5* 5* 5*   CREATININE 0.6 0.6 0.5*   GLUCOSE 116* 125* 87       Hepatic:   No results for input(s): AST, ALT, ALB, BILITOT, ALKPHOS in the last 72 hours.       Konrad Souza MD  -------------------------------  Rounding hospitalist

## 2022-11-11 NOTE — DISCHARGE INSTR - COC
Continuity of Care Form    Patient Name: Pee Love   :  1940  MRN:  [id number]    Admit date:  2022  Discharge date:  ***    Code Status Order: Full Code   Advance Directives:     Admitting Physician:  Lainey Kaur DO  PCP: Marge Iniguez MD    Discharging Nurse: [facility] Unit/Room#: R4Z-9828/5122-01  Discharging Unit Phone Number: ***    Emergency Contact:   Extended Emergency Contact Information  Primary Emergency Contact: Nicole Patel [address] Phone: [phone]  Relation: Child  Secondary Emergency Contact: [address] [place] [address] Phone: [phone]  Relation: Child    Past Surgical History:  Past Surgical History:   Procedure Laterality Date    CATARACT REMOVAL WITH IMPLANT  12    left eye    JOINT REPLACEMENT      partial knee R and L    KNEE SURGERY      partial replacements, both knees    MOHS SURGERY  2019    R cheek and R superior temple    SPLENECTOMY      TUNNELED VENOUS PORT PLACEMENT      UPPER GASTROINTESTINAL ENDOSCOPY N/A 2019    EGD ESOPHAGOGASTRODUODENOSCOPY, WITH ENDOSCOPIC ULTRASOUND OF ESOPHAGUS performed by Shaun Crespo MD at [facility] END[facility]COPY       Immunization History:   Immunization History   Administered Date(s) Administered    COVID-19, MODERNA BLUE border, Primary or Immunocompromised, (age 12y+), IM, 100 mcg/0.5mL 2021    COVID-19, PFIZER PURPLE top, DILUTE for use, (age 15 y+), 30mcg/0.3mL 2021, 2021    Influenza Virus Vaccine 10/16/2009, 2010, 2010, 10/03/2016    Influenza, FLUARIX, FLULAVAL, [address] (age 10 mo+) AND AFLURIA, (age 1 y+), PF, 0.5mL 2020, 10/23/2021    Influenza, High Dose (Fluzone 65 yrs and older) 2011, 10/01/2012, 10/29/2013, 10/23/2014, 2015, 10/23/2018, 2019    Pneumococcal Conjugate 13-valent (Vsfsqgo13) 2015    Pneumococcal Polysaccharide (Javchkeby52) 2006, 2011    Td, unspecified formulation 10/04/2004 Tdap (Boostrix, Adacel) 09/01/2015       Active Problems:  Patient Active Problem List   Diagnosis Code    Osteoporosis,Dexas:10/18/02 (Lumbar spine -1.52), hip -0.55 (T scores), Actonel, then fosamax; Dexa 1/25/06 (Lumbar spine -1.1 and hip -0.5) M81.0    Post herpetic neuralgia B02.29    Thrombocytopenia; 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/06 D69.6    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 ([facility] [address] D68.51    CLL (chronic lymphocytic leukemia) ([facility]) C91.10    Osteoarthritis M19.90    Hypercholesterolemia E78.00    Goiter, 1.5 cm midline E04.9    Impaired fasting glucose R73.01    Vitamin D deficiency E55.9    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    B12 deficiency E53.8    Abnormal coagulation profile R79.1    Essential hypertension I10    Encounter for follow-up examination after completed treatment for cancer Z08    Encounter for care related to Port-a-Cath Z45.2    Hyperuricemia E79.0    Arthritis of knee M17.10    Neoplasm of uncertain behavior of skin D48.5    History of squamous cell carcinoma of skin Z85.828    Basal cell carcinoma of right medial nasolabial fold C44.319    Basal cell carcinoma of left temple region C44.319    History of basal cell carcinoma Z85.828    Seborrheic keratoses, inflamed L82.0    Takotsubo cardiomyopathy I51.81    Chronic combined systolic (congestive) and diastolic (congestive) heart failure (HCC) I50.42    Acute ST elevation myocardial infarction (STEMI) (HCC) I21.3    Acute CHF (congestive heart failure) (HCC) I50.9    Sepsis (HCC) A41.9    Chronic respiratory failure with hypoxia ([facility]) J96.11    Atrial fibrillation with rapid ventricular response ([facility]) I48.91    Simple chronic bronchitis (HCC) J41.0    Syncope and collapse R55    Colitis K52.9    C. difficile colitis A04.72    Overweight (BMI 25.0-29. 9) E66.3    Chronic atrial fibrillation (HCC) I48.20    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 procedure Z90.81    Calculus of gallbladder without cholecystitis without obstruction K80.20    Bandemia D72.825    Head contusion S00.93XA    Diarrhea R19.7    Sprain of left ankle S93.402A    Leukemoid reaction D72.823       Isolation/Infection:   Isolation            C Diff Contact          Patient Infection Status       Infection Onset Added Last Indicated Last Indicated By Review Planned Expiration Resolved Resolved By    C-diff (Clostridium difficile) 22 C. difficile toxin Molecular 22      Resolved    C-diff Rule Out 22 C. difficile toxin Molecular (Ordered)   22 Rule-Out Test Resulted    C-diff Rule Out 22 Clostridium Difficile Toxin/Antigen (Ordered)   22 Rule-Out Test Resulted    COVID-19 (Rule Out) 22 COVID-19, Rapid (Ordered)   22 Rule-Out Test Resulted    COVID-19 (Rule Out) 10/22/21 10/22/21 10/22/21 COVID-19, Rapid (Ordered)   10/22/21 Rule-Out Test Resulted            Nurse Assessment:  Last Vital Signs: /61   Pulse 65   Temp 97.8 °F (36.6 °C) (Oral)   Resp 18   Ht 5' (1.524 m)   Wt 154 lb 5.2 oz (70 kg)   SpO2 94%   BMI 30.14 kg/m²     Last documented pain score (0-10 scale): Pain Level: 6  Last Weight:   Wt Readings from Last 1 Encounters:   22 154 lb 5.2 oz (70 kg)     Mental Status:  {IP PT MENTAL STATUS:89969}    IV Access:  { AMAIRANI IV ACCESS:811189730}    Nursing Mobility/ADLs:  Walking   {P DME EYMX:631461989}  Transfer  {[facility] DME VQNA:382227136}  Bathing  {P DME IJYO:985358748}  Dressing  {P DME ZCY}  Toileting  {P DME MNBI:136359390}  Feeding  {[facility] DME ZAXM:352201559}  Med Admin  {[facility] DME CSYJ:105879182}  Med Delivery   {MH AMAIRANI MED Delivery:934174932}    Wound Care Documentation and Therapy:        Elimination:  Continence:    Bowel: {YES / AV:98063}  Bladder: {YES / JT:92029}  Urinary Catheter: {Urinary Catheter:661604577}   Colostomy/Ileostomy/Ileal Conduit: {YES / WE:43827}       Date of Last BM: ***    Intake/Output Summary (Last 24 hours) at 2022 0755  Last data filed at 11/10/2022 0956  Gross per 24 hour   Intake 120 ml   Output --   Net 120 ml     I/O last 3 completed shifts:   In: 5 [P.O.:420]  Out: -     Safety Concerns:     [address] Safety Concerns:294342228}    Impairments/Disabilities:      [address] Impairments/Disabilities:590694320}    Nutrition Therapy:  Current Nutrition Therapy:   [address] AMAIRANI Diet List:357952661}    Routes of Feeding: {CHP DME Other Feedings:352410890}  Liquids: {Slp liquid thickness:46548}  Daily Fluid Restriction: {CHP DME Yes amt example:237180662}  Last Modified Barium Swallow with Video (Video Swallowing Test): {Done Not Done NOLM:994795443}    Treatments at the Time of Hospital Discharge:   Respiratory Treatments: ***  Oxygen Therapy:  {Therapy; copd oxygen:56350}  Ventilator:    { CC Vent JAEM:900316479}    Rehab Therapies: {THERAPEUTIC INTERVENTION:4297235572}  Weight Bearing Status/Restrictions: [address]  Weight Bearin}  Other Medical Equipment (for information only, NOT a DME order):  {EQUIPMENT:428706695}  Other Treatments: ***    Patient's personal belongings (please select all that are sent with patient):  {CHP DME Belongings:557535215}    RN SIGNATURE:  {Esignature:161663249}    CASE MANAGEMENT/SOCIAL WORK SECTION    Inpatient Status Date: ***    Readmission Risk Assessment Score:  Readmission Risk              Risk of Unplanned Readmission:  21           Discharging to Facility/ Agency   Name:   Address:  Phone:  Fax:    Dialysis Facility (if applicable)   Name:  Address:  Dialysis Schedule:  Phone:  Fax:    / signature: {Esignature:913245555}    PHYSICIAN SECTION    Prognosis: {Prognosis:6999090555}    Condition at Discharge: [address] Patient Condition:029515346}    Rehab Potential (if transferring to Rehab): {Prognosis:2145535816}    Recommended Labs or Other Treatments After Discharge: ***    Physician Certification: I certify the above information and transfer of Wing Castillo  is necessary for the continuing treatment of the diagnosis listed and that she requires {Admit to Appropriate Level of Care:51724} for {GREATER/LESS:705814904} 30 days.      Update Admission H&P: {JONAH DME Changes in RIJDR:419286860}    PHYSICIAN SIGNATURE:  {Esignature:937239340}

## 2022-11-11 NOTE — PROGRESS NOTES
INPATIENT CONSULTATION:    IDENTIFYING DATA/REASON FOR CONSULTATION   PATIENT:  Olimpia Martell  MRN:  [id number]  ADMIT DATE: 11/2/2022  TIME OF EVALUATION: 11/11/2022 7:06 AM  HOSPITAL STAY:   LOS: 8 days   CONSULTING PHYSICIAN: Sabine Hubbard MD   REASON FOR CONSULTATION: CDI    Subjective:    Patient seen and examined in follow-up. Patient reports abdominal pain is improved but she continues to have left lower quadrant cramping. She had 1 semi-formed brown non-bloody bowel movements yesterday. She is passing flatus. She is tolerating diet. MEDICATIONS   SCHEDULED:  vancomycin, 250 mg, 4x Daily  Fidaxomicin, 200 mg, BID  amiodarone, 200 mg, Daily  gabapentin, 300 mg, Nightly  metoprolol succinate, 12.5 mg, Daily  sodium chloride flush, 5-40 mL, 2 times per day  warfarin placeholder: dosing by pharmacy, , RX Placeholder    FLUIDS/DRIPS:     sodium chloride 10 mL/hr at 11/09/22 2131     PRNs: melatonin, 6 mg, Nightly PRN  calcium carbonate, 500 mg, TID PRN  trimethobenzamide, 200 mg, Q8H PRN  meclizine, 12.5 mg, TID PRN  butalbital-acetaminophen-caffeine, 1 tablet, Q6H PRN  oxyCODONE, 5 mg, Q6H PRN  sodium chloride flush, 5-40 mL, PRN  sodium chloride, , PRN  acetaminophen, 650 mg, Q6H PRN   Or  acetaminophen, 650 mg, Q6H PRN  potassium chloride, 40 mEq, PRN   Or  potassium alternative oral replacement, 40 mEq, PRN   Or  potassium chloride, 10 mEq, PRN    ALLERGIES:  No Known Allergies      PHYSICAL EXAM     Vitals:    11/10/22 2330 11/11/22 0143 11/11/22 0500 11/11/22 0615   BP: (!) 157/63   133/61   Pulse: 63   65   Resp: 15 15  18   Temp: 97.8 °F (36.6 °C)   97.8 °F (36.6 °C)   TempSrc: Oral   Oral   SpO2: 93%   94%   Weight:   154 lb 5.2 oz (70 kg)    Height:           I/O last 3 completed shifts: In: 5 [P.O.:420]  Out: -     Physical Exam:  Gen: Resting in bed, NAD   HEENT: normocephalic, atraumatic. No scleral icterus.    CV: RRR no MRG   Pul: CTAB, normal work of breathing without wheezing  Abd: Good bowel sounds throughout, soft, minimally tender to palpation in LLQ, ND, no masses, no HSM   Ext: No edema, atraumatic  Neuro: No gross deficits, moves all 4 extremities, follows commands  Skin: No jaundice, spider angiomas, rodrigues erythema     LABS AND IMAGING     Recent Results (from the past 24 hour(s))   Protime-INR    Collection Time: 11/11/22  6:14 AM   Result Value Ref Range    Protime 37.4 (H) 11.7 - 14.5 sec    INR 3.75 (H) 0.87 - 1.14       Other Labs    Imaging:    XR ABDOMEN (KUB) (SINGLE AP VIEW)   Final Result   Known history of C difficile colitis. Relatively unremarkable bowel gas   pattern. Suspected mild pattern of ileus of small bowel. CT ABDOMEN PELVIS W IV CONTRAST Additional Contrast? Radiologist Recommendation   Final Result   1. Worsening mucosal thickening of the colon in a patient with a known   history of C difficile colitis. No pattern of dilation/megacolon. 2. Cholelithiasis with increasing gallbladder mucosal thickening and mild   dilation of the common bile duct. Current changes felt to be secondary to   more systemic findings above. Underlying pattern of cholecystitis cannot be   excluded. 3. No CT evidence of pancreatitis. Stable pattern of mild pancreatic ductal   dilatation. CT HEAD WO CONTRAST   Final Result   No acute intracranial abnormality. CT ABDOMEN PELVIS W IV CONTRAST Additional Contrast? None   Final Result   1. No pulmonary embolism. 2. Tiny subpleural nodules in the periphery of the lungs may represent an   underlying infectious or inflammatory process. Follow-up recommendations are   provided below. 3. Diffuse mucosal thickening of the colon suggesting underlying pattern of   infectious colitis including C difficile. 4. Incidental cholelithiasis and stable right adrenal nodule suspected to   represent an adenoma. RECOMMENDATIONS:   Multiple pulmonary nodules.  Most severe: 3 mm solid pulmonary nodule within   the upper lobe. If patient is low risk for malignancy, no routine follow-up   imaging is recommended; if patient is high risk for malignancy, a   non-contrast Chest CT at 12 months is optional. If performed and the nodule   is stable at 12 months, no further follow-up is recommended. These guidelines do not apply to immunocompromised patients and patients with   cancer. Follow up in patients with significant comorbidities as clinically   warranted. For lung cancer screening, adhere to Lung-RADS guidelines. Reference: Radiology. 2017; 284(1):228-43. CT CHEST PULMONARY EMBOLISM W CONTRAST   Final Result   1. No pulmonary embolism. 2. Tiny subpleural nodules in the periphery of the lungs may represent an   underlying infectious or inflammatory process. Follow-up recommendations are   provided below. 3. Diffuse mucosal thickening of the colon suggesting underlying pattern of   infectious colitis including C difficile. 4. Incidental cholelithiasis and stable right adrenal nodule suspected to   represent an adenoma. RECOMMENDATIONS:   Multiple pulmonary nodules. Most severe: 3 mm solid pulmonary nodule within   the upper lobe. If patient is low risk for malignancy, no routine follow-up   imaging is recommended; if patient is high risk for malignancy, a   non-contrast Chest CT at 12 months is optional. If performed and the nodule   is stable at 12 months, no further follow-up is recommended. These guidelines do not apply to immunocompromised patients and patients with   cancer. Follow up in patients with significant comorbidities as clinically   warranted. For lung cancer screening, adhere to Lung-RADS guidelines. Reference: Radiology. 2017; 284(1):228-43. CT HEAD WO CONTRAST   Final Result   No acute intracranial abnormality. XR ANKLE LEFT (MIN 3 VIEWS)   Final Result   Ankle mortise intact. No acute fracture or dislocation of the ankle. No   acute fracture or dislocation of the foot. Degenerative changes are present   at the interphalangeal joints and there is deformity of the 5th PIP joint   which may be sequela of a remote injury. No soft tissue swelling of the   foot. Moderate soft tissue swelling/edema over the medial malleolus of the   ankle. RECOMMENDATION:   Medial soft tissue swelling about the left ankle with no underlying fracture. Degenerative changes in the left foot with no acute abnormality. XR FOOT LEFT (MIN 3 VIEWS)   Final Result   Ankle mortise intact. No acute fracture or dislocation of the ankle. No   acute fracture or dislocation of the foot. Degenerative changes are present   at the interphalangeal joints and there is deformity of the 5th PIP joint   which may be sequela of a remote injury. No soft tissue swelling of the   foot. Moderate soft tissue swelling/edema over the medial malleolus of the   ankle. RECOMMENDATION:   Medial soft tissue swelling about the left ankle with no underlying fracture. Degenerative changes in the left foot with no acute abnormality. XR CHEST PORTABLE   Final Result   Cardiomegaly with mild vascular congestion that is stable. ASSESSMENT AND RECOMMENDATIONS   Marcela Sylvester is a 78 y.o. female with history of CLL, CAD, atrial fibrillation, COPD, hypertension, factor V Leiden deficiency, history of splenectomy who presented to [facility] [place] with diarrhea, nausea, vomiting and anorexia. CDI: Clinically improving on p.o. Dificid and Vanc, with decreased stool output, but persistent severe leukocytosis. This is likely in part due to underlying CLL and history of splenectomy. Continue current therapy. CT A/P 11/7/22 without megacolon. Defer further management to ID. Atrial fibrillation: On Coumadin, with supratherapeutic INR. Factor V Leiden: On Coumadin, supratherapeutic INR on admission. Defer Coumadin dosing to primary team and pharmacy.       RECOMMENDATIONS:    -Continue Dificid and

## 2022-11-11 NOTE — CONSULTS
[address]                                  CONSULTATION    PATIENT NAME: Cintia Kent                    :        1940  MED REC NO:   [id number]                          ROOM:       8221  ACCOUNT NO:   [de-identified]                           ADMIT DATE: 2022  PROVIDER:     Joey Wadsworth MD    CONSULT DATE:  2022    HEMATOLOGY CONSULTATION    CONSULTING PROVIDER:  Jerome Honeycutt MD    REASON FOR CONSULTATION:  Coagulopathy. HISTORY OF PRESENT ILLNESS:  The patient is an 64-year-old female that I  followed for CLL who also has atrial fibrillation and multiple other  conditions, who presented to the hospital with diarrhea and abdominal  pain. She was diagnosed with colitis and has been on multiple  antibiotics for that. She is on Coumadin for her atrial fibrillation  and her INR has been quite elevated. She developed a right hematoma  today and her INR remains at 3.75 despite not getting Coumadin in  several days. Therefore, Hematology was consulted. Her colitis is  getting better. PAST MEDICAL HISTORY:  1.  CLL. She is status post lobectomy as well as fludarabine and  Rituxan. Her treatment for CLL completed in 10/2015.  2.  COPD. 3.  Factor V Leiden mutation. 4.  Atrial fibrillation. PAST SURGICAL HISTORY:  As above. FAMILY HISTORY:  Noncontributory. SOCIAL HISTORY:  She does not drink or smoke. She is retired. MEDICATIONS LIST:  Please see list in the chart.     REVIEW OF SYSTEMS:  She denies any recent fever, chills, sweats, nausea,  vomiting, abdominal pain, chest pain, headaches, any new bone aches,  dysphagia, odynophagia, diarrhea, constipation, hemoptysis, hematemesis,  change in vision/hearing/smell/taste, neuropathy, skin rashes,  productive cough, urinary or bowel prolapse or incontinence, petechiae,  purpura, skin rashes, vaginal bleed, pruritus, hallucinations, nasal  congestion or drainage, seizures, strokes, syncope, depression, anxiety,  suicidal ideations, melena, or hematochezia. She has mild-to-moderate  fatigue. Her 10-system review of systems is otherwise negative. PHYSICAL EXAMINATION:  GENERAL:  She is in no acute distress. VITAL SIGNS:  She is afebrile with normal vital signs. HEENT:  Her pupils are round and reactive to light and accommodation. Extraocular muscles are intact. NECK:  She has no jugular venous distention. No thyromegaly. Oropharynx is clear. She has no carotid bruits. She has no palpable  lymphadenopathy. CHEST:  Lungs are clear to auscultation bilaterally. CARDIOVASCULAR:  Heart is regular rate and rhythm. ABDOMEN:  Nondistended, nontender with bowel sounds x4. EXTREMITIES:  She has 1+ lower extremity edema bilaterally. NEUROLOGIC:  Significant for generalized weakness. Her right arm,  forearm has a small hematoma. LABORATORY DATA:  Her white blood cell count is 27.1, hemoglobin _____,  platelets are 736. In INR is 3.75.    ASSESSMENT AND PLAN:  1. History of CLL. She is now on observation. She no indications for  treatment at this time. Her white count is up from baseline due to her  colitis. 2.  Supra-therapeutic INR. This is due to vitamin K deficiency from the  antibiotic as well as vitamin K deficiency from her decreased oral  intake. I will give a low dose of vitamin K since he has developed a  spontaneous right hematoma. I am sure why she is on Eliquis or Xarelto  for atrial fibrillation. She is a poor historian. This could be  explored. She will need an INR Monday if she does home tomorrow. Thank you for the consultation. I will follow closely.         Demetri Kearns MD    D: 11/11/2022 15:05:01       T: 11/11/2022 16:49:43     [id number]  Job#: [id number]     Doc#: [id number]    CC:  Nancy Ba MD

## 2022-11-11 NOTE — PLAN OF CARE
Problem: Discharge Planning  Goal: Discharge to home or other facility with appropriate resources  11/11/2022 1257 by Pau Fischer RN  Outcome: Progressing  Flowsheets (Taken 11/11/2022 0827)  Discharge to home or other facility with appropriate resources: Identify barriers to discharge with patient and caregiver     Problem: Pain  Goal: Verbalizes/displays adequate comfort level or baseline comfort level  11/11/2022 1257 by Pau Fischer RN  Outcome: Progressing       Problem: Safety - Adult  Goal: Free from fall injury  11/11/2022 1257 by Pau Fischer RN  Outcome: Progressing       Problem: ABCDS Injury Assessment  Goal: Absence of physical injury  11/11/2022 1257 by Pau Fischer RN  Outcome: Progressing       Problem: Cardiovascular - Adult  Goal: Maintains optimal cardiac output and hemodynamic stability  11/11/2022 1257 by Pau Fischer RN  Outcome: Progressing    Goal: Absence of cardiac dysrhythmias or at baseline  11/11/2022 1257 by Pau Fischer RN  Outcome: Progressing       Problem: Gastrointestinal - Adult  Goal: Maintains or returns to baseline bowel function  11/11/2022 1257 by Pau Fischer RN  Outcome: Progressing  [address] (Taken 11/11/2022 0827)  Maintains or returns to baseline bowel function: Assess bowel function       Problem: Infection - Adult  Goal: Absence of infection at discharge  11/11/2022 1257 by Pau Fischer RN  Outcome: Progressing       Problem: Metabolic/Fluid and Electrolytes - Adult  Goal: Electrolytes maintained within normal limits  11/11/2022 1257 by Pau Fischer RN  Outcome: Progressing  Flowsheets (Taken 11/11/2022 0827)  Electrolytes maintained within normal limits: Monitor labs and assess patient for signs and symptoms of electrolyte imbalances    Goal: Hemodynamic stability and optimal renal function maintained  11/11/2022 1257 by Pau Fischer RN  Outcome: Progressing  11/11/2022 0408 by Neri Linton RN  Outcome: Progressing     Problem: Skin/Tissue Integrity  Goal: Absence of new skin breakdown  Description: 1. Monitor for areas of redness and/or skin breakdown  2. Assess vascular access sites hourly  3. Every 4-6 hours minimum:  Change oxygen saturation probe site  4. Every 4-6 hours:  If on nasal continuous positive airway pressure, respiratory therapy assess nares and determine need for appliance change or resting period.   11/11/2022 1257 by Suzy Machuca RN  Outcome: Progressing       Problem: Neurosensory - Adult  Goal: Achieves stable or improved neurological status  11/11/2022 1257 by Suzy Machuca RN  Outcome: Progressing  Flowsheets (Taken 11/11/2022 0827)  Achieves stable or improved neurological status: Assess for and report changes in neurological status    Goal: Achieves maximal functionality and self care  11/11/2022 1257 by Suzy Machuca RN  Outcome: Progressing       Problem: Skin/Tissue Integrity - Adult  Goal: Skin integrity remains intact  11/11/2022 1257 by Suzy Machuca RN  Outcome: Progressing  Flowsheets (Taken 11/11/2022 0827)  Skin Integrity Remains Intact: Monitor for areas of redness and/or skin breakdown    Goal: Oral mucous membranes remain intact  11/11/2022 1257 by Suzy Machuca RN  Outcome: Progressing       Problem: Musculoskeletal - Adult  Goal: Return mobility to safest level of function  11/11/2022 1257 by Suzy Machuca RN  Outcome: Doris Rasmussen (Taken 11/11/2022 0827)  Return Mobility to Safest Level of Function: Assess patient stability and activity tolerance for standing, transferring and ambulating with or without assistive devices    Goal: Return ADL status to a safe level of function  11/11/2022 1257 by Suzy Machuca RN  Outcome: Progressing

## 2022-11-12 VITALS
BODY MASS INDEX: 30.17 KG/M2 | HEART RATE: 87 BPM | WEIGHT: 153.66 LBS | OXYGEN SATURATION: 90 % | DIASTOLIC BLOOD PRESSURE: 73 MMHG | RESPIRATION RATE: 20 BRPM | SYSTOLIC BLOOD PRESSURE: 156 MMHG | HEIGHT: 60 IN | TEMPERATURE: 98.1 F

## 2022-11-12 PROCEDURE — 6370000000 HC RX 637 (ALT 250 FOR IP): Performed by: INTERNAL MEDICINE

## 2022-11-12 PROCEDURE — 2580000003 HC RX 258: Performed by: STUDENT IN AN ORGANIZED HEALTH CARE EDUCATION/TRAINING PROGRAM

## 2022-11-12 PROCEDURE — 6370000000 HC RX 637 (ALT 250 FOR IP): Performed by: STUDENT IN AN ORGANIZED HEALTH CARE EDUCATION/TRAINING PROGRAM

## 2022-11-12 PROCEDURE — 94680 O2 UPTK RST&XERS DIR SIMPLE: CPT

## 2022-11-12 RX ORDER — OXYCODONE HYDROCHLORIDE 5 MG/1
5 TABLET ORAL EVERY 6 HOURS PRN
Qty: 20 TABLET | Refills: 0 | Status: SHIPPED | OUTPATIENT
Start: 2022-11-12 | End: 2022-11-19

## 2022-11-12 RX ORDER — FAMOTIDINE 20 MG/1
20 TABLET, FILM COATED ORAL 2 TIMES DAILY
Qty: 60 TABLET | Refills: 3 | Status: SHIPPED | OUTPATIENT
Start: 2022-11-12

## 2022-11-12 RX ORDER — VANCOMYCIN HYDROCHLORIDE 125 MG/1
125 CAPSULE ORAL DAILY
Status: DISCONTINUED | OUTPATIENT
Start: 2022-11-20 | End: 2022-11-12 | Stop reason: HOSPADM

## 2022-11-12 RX ORDER — VANCOMYCIN HYDROCHLORIDE 125 MG/1
125 CAPSULE ORAL EVERY 12 HOURS
Status: DISCONTINUED | OUTPATIENT
Start: 2022-11-16 | End: 2022-11-12 | Stop reason: HOSPADM

## 2022-11-12 RX ORDER — VANCOMYCIN HYDROCHLORIDE 125 MG/1
125 CAPSULE ORAL 4 TIMES DAILY
Status: DISCONTINUED | OUTPATIENT
Start: 2022-11-12 | End: 2022-11-12 | Stop reason: HOSPADM

## 2022-11-12 RX ORDER — VANCOMYCIN HYDROCHLORIDE 125 MG/1
125 CAPSULE ORAL EVERY 8 HOURS SCHEDULED
Status: DISCONTINUED | OUTPATIENT
Start: 2022-11-14 | End: 2022-11-12 | Stop reason: HOSPADM

## 2022-11-12 RX ORDER — VANCOMYCIN HYDROCHLORIDE 125 MG/1
125 CAPSULE ORAL DAILY
Qty: 7 CAPSULE | Refills: 0 | Status: SHIPPED | OUTPATIENT
Start: 2022-11-20 | End: 2022-11-27

## 2022-11-12 RX ORDER — OXYCODONE HYDROCHLORIDE 5 MG/1
5 TABLET ORAL EVERY 6 HOURS PRN
Qty: 20 TABLET | Refills: 0 | Status: SHIPPED | OUTPATIENT
Start: 2022-11-12 | End: 2022-11-12 | Stop reason: SDUPTHER

## 2022-11-12 RX ORDER — VANCOMYCIN HYDROCHLORIDE 125 MG/1
125 CAPSULE ORAL EVERY 8 HOURS
Qty: 7 CAPSULE | Refills: 0 | Status: SHIPPED | OUTPATIENT
Start: 2022-11-14 | End: 2022-11-17

## 2022-11-12 RX ORDER — BUTALBITAL, ACETAMINOPHEN AND CAFFEINE 50; 325; 40 MG/1; MG/1; MG/1
1 TABLET ORAL EVERY 6 HOURS PRN
Qty: 30 TABLET | Refills: 0 | Status: SHIPPED | OUTPATIENT
Start: 2022-11-12

## 2022-11-12 RX ORDER — VANCOMYCIN HYDROCHLORIDE 125 MG/1
125 CAPSULE ORAL 4 TIMES DAILY
Qty: 7 CAPSULE | Refills: 0 | Status: SHIPPED | OUTPATIENT
Start: 2022-11-12 | End: 2022-11-14

## 2022-11-12 RX ORDER — VANCOMYCIN HYDROCHLORIDE 125 MG/1
125 CAPSULE ORAL EVERY 12 HOURS
Qty: 7 CAPSULE | Refills: 0 | Status: SHIPPED | OUTPATIENT
Start: 2022-11-16 | End: 2022-11-20

## 2022-11-12 RX ORDER — CALCIUM CARBONATE 200(500)MG
500 TABLET,CHEWABLE ORAL 3 TIMES DAILY PRN
Qty: 90 TABLET | Refills: 0 | Status: SHIPPED | OUTPATIENT
Start: 2022-11-12 | End: 2022-12-12

## 2022-11-12 RX ADMIN — OXYCODONE 5 MG: 5 TABLET ORAL at 15:38

## 2022-11-12 RX ADMIN — VANCOMYCIN HYDROCHLORIDE 125 MG: 125 CAPSULE ORAL at 09:30

## 2022-11-12 RX ADMIN — VANCOMYCIN HYDROCHLORIDE 125 MG: 125 CAPSULE ORAL at 17:43

## 2022-11-12 RX ADMIN — Medication 10 ML: at 15:27

## 2022-11-12 RX ADMIN — SACUBITRIL AND VALSARTAN 0.5 TABLET: 24; 26 TABLET, FILM COATED ORAL at 09:29

## 2022-11-12 RX ADMIN — METOPROLOL SUCCINATE 12.5 MG: 25 TABLET, FILM COATED, EXTENDED RELEASE ORAL at 09:28

## 2022-11-12 RX ADMIN — AMIODARONE HYDROCHLORIDE 200 MG: 200 TABLET ORAL at 09:28

## 2022-11-12 ASSESSMENT — PAIN SCALES - GENERAL
PAINLEVEL_OUTOF10: 4
PAINLEVEL_OUTOF10: 7
PAINLEVEL_OUTOF10: 0

## 2022-11-12 ASSESSMENT — PAIN DESCRIPTION - PAIN TYPE: TYPE: ACUTE PAIN

## 2022-11-12 ASSESSMENT — PAIN DESCRIPTION - DESCRIPTORS: DESCRIPTORS: BURNING

## 2022-11-12 ASSESSMENT — PAIN - FUNCTIONAL ASSESSMENT: PAIN_FUNCTIONAL_ASSESSMENT: ACTIVITIES ARE NOT PREVENTED

## 2022-11-12 ASSESSMENT — PAIN DESCRIPTION - FREQUENCY: FREQUENCY: INTERMITTENT

## 2022-11-12 ASSESSMENT — PAIN DESCRIPTION - LOCATION: LOCATION: ABDOMEN

## 2022-11-12 NOTE — DISCHARGE SUMMARY
been changed to H2 blocker due to Clostridium difficile infection. Other comorbidities: history of paroxysmal atrial fibrillation, history of CHF (previously systolic dysfunction, although recent echocardiogram with preserved ejection fraction, diastolic function not measured), CLL, s/p splenectomy, COPD, essential hypertension, factor V Leiden mutation, chronic Coumadin use, osteoporosis, hyperlipidemia. Disposition. Patient is stable for discharge at this time. Invasive procedures:  None. Discharge Diagnoses:   See above. Physical Exam: BP (!) 150/62   Pulse 70   Temp 98.9 °F (37.2 °C) (Oral)   Resp 18   Ht 5' (1.524 m)   Wt 153 lb 10.6 oz (69.7 kg)   SpO2 93%   BMI 30.01 kg/m²   Gen/overall appearance: Not in acute distress. Alert. Oriented x3. Head: Normocephalic, atraumatic  Eyes: EOMI, good acuity  ENT: Oral mucosa moist  Neck: No JVD, thyromegaly  CVS: Nml S1S2, no MRG, RRR  Pulm: Clear bilaterally. No crackles/wheezes  Gastrointestinal: Soft, NT/ND, +BS  Musculoskeletal: Trace bilateral lower extremity edema. Warm  Neuro: No focal deficit. Moves extremity spontaneously. Psychiatry: Appropriate affect. Not agitated. Skin: Warm, dry with normal turgor. Capillary refill: Brisk,< 3 seconds   Peripheral Pulses: +2 palpable, equal bilaterally     Significant diagnostic studies that may require follow up:  XR ANKLE LEFT (MIN 3 VIEWS)    Result Date: 11/3/2022  EXAMINATION: THREE XRAY VIEWS OF THE LEFT ANKLE; THREE XRAY VIEWS OF THE LEFT FOOT 11/3/2022 12:52 am COMPARISON: None. HISTORY: ORDERING SYSTEM PROVIDED HISTORY: fall pain TECHNOLOGIST PROVIDED HISTORY: Reason for exam:->fall pain Reason for Exam: pain fall; ORDERING SYSTEM PROVIDED HISTORY: pain fall TECHNOLOGIST PROVIDED HISTORY: Reason for exam:->pain fall Reason for Exam: pain fall     Ankle mortise intact. No acute fracture or dislocation of the ankle. No acute fracture or dislocation of the foot.   Degenerative changes are present at the interphalangeal joints and there is deformity of the 5th PIP joint which may be sequela of a remote injury. No soft tissue swelling of the foot. Moderate soft tissue swelling/edema over the medial malleolus of the ankle. RECOMMENDATION: Medial soft tissue swelling about the left ankle with no underlying fracture. Degenerative changes in the left foot with no acute abnormality. XR FOOT LEFT (MIN 3 VIEWS)    Result Date: 11/3/2022  EXAMINATION: THREE XRAY VIEWS OF THE LEFT ANKLE; THREE XRAY VIEWS OF THE LEFT FOOT 11/3/2022 12:52 am COMPARISON: None. HISTORY: ORDERING SYSTEM PROVIDED HISTORY: fall pain TECHNOLOGIST PROVIDED HISTORY: Reason for exam:->fall pain Reason for Exam: pain fall; ORDERING SYSTEM PROVIDED HISTORY: pain fall TECHNOLOGIST PROVIDED HISTORY: Reason for exam:->pain fall Reason for Exam: pain fall     Ankle mortise intact. No acute fracture or dislocation of the ankle. No acute fracture or dislocation of the foot. Degenerative changes are present at the interphalangeal joints and there is deformity of the 5th PIP joint which may be sequela of a remote injury. No soft tissue swelling of the foot. Moderate soft tissue swelling/edema over the medial malleolus of the ankle. RECOMMENDATION: Medial soft tissue swelling about the left ankle with no underlying fracture. Degenerative changes in the left foot with no acute abnormality. XR ABDOMEN (KUB) (SINGLE AP VIEW)    Result Date: 11/10/2022  EXAMINATION: ONE SUPINE XRAY VIEW(S) OF THE ABDOMEN 11/10/2022 7:45 am COMPARISON: None. HISTORY: ORDERING SYSTEM PROVIDED HISTORY: Worsening Abd pain. Cdiff positive TECHNOLOGIST PROVIDED HISTORY: Reason for exam:->Worsening Abd pain. Cdiff positive FINDINGS: No evidence of pneumoperitoneum.   Known history of C difficile colitis with no significant abnormality of the colon on current exam.  At least 1 loop of prominent air-filled small bowel in the left lower quadrant likely representing changes of ileus in association with above. No abnormal soft tissue mass. Vascular calcifications in the pelvis. Degenerative changes in the lumbar spine. Known history of C difficile colitis. Relatively unremarkable bowel gas pattern. Suspected mild pattern of ileus of small bowel. CT HEAD WO CONTRAST    Result Date: 11/8/2022  EXAMINATION: CT OF THE HEAD WITHOUT CONTRAST  11/5/2022 3:36 pm TECHNIQUE: CT of the head was performed without the administration of intravenous contrast. Automated exposure control, iterative reconstruction, and/or weight based adjustment of the mA/kV was utilized to reduce the radiation dose to as low as reasonably achievable. COMPARISON: 11/03/2022 HISTORY: ORDERING SYSTEM PROVIDED HISTORY: headaches TECHNOLOGIST PROVIDED HISTORY: Reason for exam:->headaches Has a \"code stroke\" or \"stroke alert\" been called? ->No Reason for Exam: headaches FINDINGS: BRAIN/VENTRICLES: There is no acute hemorrhage, herniation, or hydrocephalus. There is generalized brain parenchymal volume loss. Areas of white matter hypodensity are likely related to chronic microvascular ischemia. ORBITS: The visualized portion of the orbits demonstrate no acute abnormality. SINUSES: The visualized paranasal sinuses and mastoid air cells demonstrate no acute abnormality. SOFT TISSUES/SKULL:  No acute abnormality of the visualized skull or soft tissues. No acute intracranial abnormality. CT HEAD WO CONTRAST    Result Date: 11/3/2022  EXAMINATION: CT OF THE HEAD WITHOUT CONTRAST  11/3/2022 1:08 am TECHNIQUE: CT of the head was performed without the administration of intravenous contrast. Automated exposure control, iterative reconstruction, and/or weight based adjustment of the mA/kV was utilized to reduce the radiation dose to as low as reasonably achievable.  COMPARISON: 10/09/2014 CT HISTORY: ORDERING SYSTEM PROVIDED HISTORY: syncope head inj on coumadin TECHNOLOGIST PROVIDED HISTORY: Reason for exam:->syncope head inj on coumadin Has a \"code stroke\" or \"stroke alert\" been called? ->No Decision Support Exception - unselect if not a suspected or confirmed emergency medical condition->Emergency Medical Condition (MA) Reason for Exam: syncope head inj on coumadin FINDINGS: BRAIN/VENTRICLES: The ventricles and sulci are prominent. Pattern is consistent with age-related atrophy. No extra-axial fluid collections, and no sign of recent intracranial hemorrhage. Decreased attenuation is noted within the periventricular white matter. Pattern is consistent with chronic small vessel ischemic change. No acute edema or mass effect. No mass lesions are detected. ORBITS: The visualized portion of the orbits demonstrate no acute abnormality. SINUSES: Paranasal sinuses are clear. Chronic simple effusion in the mastoid air cells on the right. Left mastoid air cells are clear. SOFT TISSUES/SKULL:  No acute abnormality of the visualized skull or soft tissues. No acute intracranial abnormality. CT ABDOMEN PELVIS W IV CONTRAST Additional Contrast? Radiologist Recommendation    Result Date: 11/7/2022  EXAMINATION: CT OF THE ABDOMEN AND PELVIS WITH CONTRAST 11/7/2022 10:52 am TECHNIQUE: CT of the abdomen and pelvis was performed with the administration of intravenous contrast. Multiplanar reformatted images are provided for review. Automated exposure control, iterative reconstruction, and/or weight based adjustment of the mA/kV was utilized to reduce the radiation dose to as low as reasonably achievable.  COMPARISON: 11/03/2022 CT HISTORY: ORDERING SYSTEM PROVIDED HISTORY: [place] C. diff, with epigastric abdominal pain radiating to back, evaluate for pancreatitis, and rule out megacolon TECHNOLOGIST PROVIDED HISTORY: Reason for exam:->[place] C. diff, with epigastric abdominal pain radiating to back, evaluate for pancreatitis, and rule out megacolon Additional Contrast?->Radiologist Recommendation Reason for Exam: C. diff, with epigastric abdominal pain radiating to back, evaluate for pancreatitis, and rule out megacolon FINDINGS: Lower Chest:  Scattered mild interstitial changes in the lung bases. Base of the heart is mildly enlarged. Organs: Cholelithiasis is noted. The mild pattern of gallbladder mucosal thickening. Dilation of the common bile duct measuring up to 15 mm. This pattern has slightly progressed from prior recent CT. The liver is normal. The spleen is absent. There is mild dilation of the pancreatic duct centrally, stable. No surrounding inflammatory change. 1.8 cm right adrenal nodule better characterized on current exam but indeterminate. Left adrenal gland and kidneys normal. GI/Bowel: The stomach, duodenum and small bowel are normal. A normal appendix is visualized. There is a diffuse pattern of mucosal thickening throughout the colon showing moderate progression from prior exam.  Scattered diverticular changes are also noted in the sigmoid colon with no focal inflammation. No pattern of dilation. Pelvis: The bladder is unremarkable. The uterus and adnexa are normal. Peritoneum/Retroperitoneum: The aorta tapers normally. No lymph node enlargement. Bones/Soft Tissues: No significant skeletal abnormalities. 1. Worsening mucosal thickening of the colon in a patient with a known history of C difficile colitis. No pattern of dilation/megacolon. 2. Cholelithiasis with increasing gallbladder mucosal thickening and mild dilation of the common bile duct. Current changes felt to be secondary to more systemic findings above. Underlying pattern of cholecystitis cannot be excluded. 3. No CT evidence of pancreatitis. Stable pattern of mild pancreatic ductal dilatation.      CT ABDOMEN PELVIS W IV CONTRAST Additional Contrast? None    Result Date: 11/3/2022  EXAMINATION: CTA OF THE CHEST; CT OF THE ABDOMEN AND PELVIS WITH CONTRAST 11/3/2022 1:09 am; 11/3/2022 1:08 am TECHNIQUE: CTA of the chest was performed after the administration of intravenous contrast.  Multiplanar reformatted images are provided for review. MIP images are provided for review. Automated exposure control, iterative reconstruction, and/or weight based adjustment of the mA/kV was utilized to reduce the radiation dose to as low as reasonably achievable.; CT of the abdomen and pelvis was performed with the administration of intravenous contrast. Multiplanar reformatted images are provided for review. Automated exposure control, iterative reconstruction, and/or weight based adjustment of the mA/kV was utilized to reduce the radiation dose to as low as reasonably achievable. COMPARISON: 05/17/2022 CT HISTORY: ORDERING SYSTEM PROVIDED HISTORY: rule out pe hypoxia syncope TECHNOLOGIST PROVIDED HISTORY: Reason for exam:->rule out pe hypoxia syncope Decision Support Exception - unselect if not a suspected or confirmed emergency medical condition->Emergency Medical Condition (MA) Reason for Exam: rule out pe hypoxia syncope; ORDERING SYSTEM PROVIDED HISTORY: abdominal pain diarrhea TECHNOLOGIST PROVIDED HISTORY: Reason for exam:->abdominal pain diarrhea Additional Contrast?->None Decision Support Exception - unselect if not a suspected or confirmed emergency medical condition->Emergency Medical Condition (MA) Reason for Exam: abdominal pain diarrhea FINDINGS: Chest: Pulmonary arteries: Study is of good technical quality for evaluation of pulmonary embolism. There are no filling defects to suggest pulmonary embolism. Main pulmonary artery is normal in caliber. No evidence of right ventricular strain. Mediastinum: The heart is normal.  Incidental 9 mm right thyroid nodule. Prominence of the hemiazygous vein on the left. There is no lymph node enlargement. Normal esophagus. Lungs/pleura: The airways are patent. No pleural fluid or pneumothorax. Calcified granuloma in the right middle lobe.   Tiny subpleural nodules are scattered in the periphery of both upper lobes measuring no greater than 2 mm. No focal consolidation. Soft Tissues/Bones: No skeletal abnormality. Right port catheter. Abdomen/Pelvis: Organs: Cholelithiasis without inflammation or biliary dilatation. The liver, pancreas are normal.  The spleen is absent. Stable right adrenal nodule that is indeterminate. Adenoma may be favored due to lack of change, measuring 1.4 cm. Left adrenal gland is normal.  Kidneys are unremarkable. GI/Bowel: The stomach, duodenum and small bowel are normal. A normal appendix is visualized. Moderate mucosal thickening is observed diffusely throughout the colon. No focal stricture. Incidental diverticuli in the sigmoid colon. Pelvis: The bladder is unremarkable. The uterus and adnexa are normal. Peritoneum/Retroperitoneum: The aorta tapers normally. No lymph node enlargement. Bones/Soft Tissues: No significant skeletal abnormalities. 1. No pulmonary embolism. 2. Tiny subpleural nodules in the periphery of the lungs may represent an underlying infectious or inflammatory process. Follow-up recommendations are provided below. 3. Diffuse mucosal thickening of the colon suggesting underlying pattern of infectious colitis including C difficile. 4. Incidental cholelithiasis and stable right adrenal nodule suspected to represent an adenoma. RECOMMENDATIONS: Multiple pulmonary nodules. Most severe: 3 mm solid pulmonary nodule within the upper lobe. If patient is low risk for malignancy, no routine follow-up imaging is recommended; if patient is high risk for malignancy, a non-contrast Chest CT at 12 months is optional. If performed and the nodule is stable at 12 months, no further follow-up is recommended. These guidelines do not apply to immunocompromised patients and patients with cancer. Follow up in patients with significant comorbidities as clinically warranted. For lung cancer screening, adhere to Lung-RADS guidelines. Reference: Radiology. 2017; 284(1):228-43.      XR CHEST PORTABLE    Result Date: 11/3/2022  EXAMINATION: ONE XRAY VIEW OF THE CHEST 11/3/2022 12:51 am COMPARISON: 12/16/2021 radiograph HISTORY: ORDERING SYSTEM PROVIDED HISTORY: syncope chf TECHNOLOGIST PROVIDED HISTORY: Reason for exam:->syncope chf Reason for Exam: fall FINDINGS: Mild cardiomegaly. Prominence of pulmonary vascular markings centrally. Right port catheter stable. The lungs appear clear otherwise. No significant skeletal finding. Cardiomegaly with mild vascular congestion that is stable. CT CHEST PULMONARY EMBOLISM W CONTRAST    Result Date: 11/3/2022  EXAMINATION: CTA OF THE CHEST; CT OF THE ABDOMEN AND PELVIS WITH CONTRAST 11/3/2022 1:09 am; 11/3/2022 1:08 am TECHNIQUE: CTA of the chest was performed after the administration of intravenous contrast.  Multiplanar reformatted images are provided for review. MIP images are provided for review. Automated exposure control, iterative reconstruction, and/or weight based adjustment of the mA/kV was utilized to reduce the radiation dose to as low as reasonably achievable.; CT of the abdomen and pelvis was performed with the administration of intravenous contrast. Multiplanar reformatted images are provided for review. Automated exposure control, iterative reconstruction, and/or weight based adjustment of the mA/kV was utilized to reduce the radiation dose to as low as reasonably achievable.  COMPARISON: 05/17/2022 CT HISTORY: ORDERING SYSTEM PROVIDED HISTORY: rule out pe hypoxia syncope TECHNOLOGIST PROVIDED HISTORY: Reason for exam:->rule out pe hypoxia syncope Decision Support Exception - unselect if not a suspected or confirmed emergency medical condition->Emergency Medical Condition (MA) Reason for Exam: rule out pe hypoxia syncope; ORDERING SYSTEM PROVIDED HISTORY: abdominal pain diarrhea TECHNOLOGIST PROVIDED HISTORY: Reason for exam:->abdominal pain diarrhea Additional Contrast?->None Decision Support Exception - unselect if not a suspected or confirmed emergency medical condition->Emergency Medical Condition (MA) Reason for Exam: abdominal pain diarrhea FINDINGS: Chest: Pulmonary arteries: Study is of good technical quality for evaluation of pulmonary embolism. There are no filling defects to suggest pulmonary embolism. Main pulmonary artery is normal in caliber. No evidence of right ventricular strain. Mediastinum: The heart is normal.  Incidental 9 mm right thyroid nodule. Prominence of the hemiazygous vein on the left. There is no lymph node enlargement. Normal esophagus. Lungs/pleura: The airways are patent. No pleural fluid or pneumothorax. Calcified granuloma in the right middle lobe. Tiny subpleural nodules are scattered in the periphery of both upper lobes measuring no greater than 2 mm. No focal consolidation. Soft Tissues/Bones: No skeletal abnormality. Right port catheter. Abdomen/Pelvis: Organs: Cholelithiasis without inflammation or biliary dilatation. The liver, pancreas are normal.  The spleen is absent. Stable right adrenal nodule that is indeterminate. Adenoma may be favored due to lack of change, measuring 1.4 cm. Left adrenal gland is normal.  Kidneys are unremarkable. GI/Bowel: The stomach, duodenum and small bowel are normal. A normal appendix is visualized. Moderate mucosal thickening is observed diffusely throughout the colon. No focal stricture. Incidental diverticuli in the sigmoid colon. Pelvis: The bladder is unremarkable. The uterus and adnexa are normal. Peritoneum/Retroperitoneum: The aorta tapers normally. No lymph node enlargement. Bones/Soft Tissues: No significant skeletal abnormalities. 1. No pulmonary embolism. 2. Tiny subpleural nodules in the periphery of the lungs may represent an underlying infectious or inflammatory process. Follow-up recommendations are provided below.  3. Diffuse mucosal thickening of the colon suggesting underlying pattern of infectious colitis including C difficile. 4. Incidental cholelithiasis and stable right adrenal nodule suspected to represent an adenoma. RECOMMENDATIONS: Multiple pulmonary nodules. Most severe: 3 mm solid pulmonary nodule within the upper lobe. If patient is low risk for malignancy, no routine follow-up imaging is recommended; if patient is high risk for malignancy, a non-contrast Chest CT at 12 months is optional. If performed and the nodule is stable at 12 months, no further follow-up is recommended. These guidelines do not apply to immunocompromised patients and patients with cancer. Follow up in patients with significant comorbidities as clinically warranted. For lung cancer screening, adhere to Lung-RADS guidelines. Reference: Radiology. 2017; 284(1):228-43. Treatments: As above. Discharge Medications:     Medication List        START taking these medications      apixaban 5 MG Tabs tablet  Commonly known as: Eliquis  Take 1 tablet by mouth 2 times daily     butalbital-acetaminophen-caffeine -40 MG per tablet  Commonly known as: FIORICET, ESGIC  Take 1 tablet by mouth every 6 hours as needed for Headaches     calcium carbonate 500 MG chewable tablet  Commonly known as: TUMS  Take 1 tablet by mouth 3 times daily as needed for Heartburn     famotidine 20 MG tablet  Commonly known as: PEPCID  Take 1 tablet by mouth 2 times daily     oxyCODONE 5 MG immediate release tablet  Commonly known as: Roxicodone  Take 1 tablet by mouth every 6 hours as needed for Pain for up to 7 days. Intended supply: 5 days.  Take lowest dose possible to manage pain     * vancomycin 125 MG capsule  Commonly known as: VANCOCIN  Take 1 capsule by mouth 4 times daily for 7 doses     * vancomycin 125 MG capsule  Commonly known as: VANCOCIN  Take 1 capsule by mouth every 8 (eight) hours for 7 doses  Start taking on: November 14, 2022     * vancomycin 125 MG capsule  Commonly known as: VANCOCIN  Take 1 capsule by mouth in the morning and 1 capsule in the evening. Do all this for 7 doses. Start taking on: November 16, 2022     * vancomycin 125 MG capsule  Commonly known as: VANCOCIN  Take 1 capsule by mouth daily for 7 doses  Start taking on: November 20, 2022           * This list has 4 medication(s) that are the same as other medications prescribed for you. Read the directions carefully, and ask your doctor or other care provider to review them with you. CHANGE how you take these medications      gabapentin 300 MG capsule  Commonly known as: NEURONTIN  Take 1 capsule by mouth at bedtime for 30 days.  2 caps po nightly  What changed:   how much to take  how to take this  when to take this            CONTINUE taking these medications      amiodarone 200 MG tablet  Commonly known as: CORDARONE  TAKE 1 TABLET BY MOUTH DAILY     metoprolol succinate 25 MG extended release tablet  Commonly known as: TOPROL XL  Take 0.5 tablets by mouth daily     sacubitril-valsartan 24-26 MG per tablet  Commonly known as: ENTRESTO  Take 0.5 tablets by mouth 2 times daily     Vitamin B 12 500 MCG Tabs            STOP taking these medications      LORazepam 0.5 MG tablet  Commonly known as: ATIVAN     warfarin 5 MG tablet  Commonly known as: COUMADIN               Where to Get Your Medications        These medications were sent to [id number] [place] - [address] - P [phone] - F [phone]  [address] [facility] [address]      Phone: [phone]   apixaban 5 MG Tabs tablet  butalbital-acetaminophen-caffeine -40 MG per tablet  calcium carbonate 500 MG chewable tablet  famotidine 20 MG tablet  oxyCODONE 5 MG immediate release tablet  vancomycin 125 MG capsule  vancomycin 125 MG capsule  vancomycin 125 MG capsule  vancomycin 125 MG capsule       Information about where to get these medications is not yet available    Ask your nurse or doctor about these medications  gabapentin 300 MG capsule         Activity: activity as tolerated  Diet: ADULT DIET; Regular; Low Sodium (2 gm); 2000 ml      Disposition: home  Discharged Condition: Stable  Follow Up:   MD Jacqueline Perdue [address]  [phone]    Schedule an appointment as soon as possible for a visit in 1 week(s)      Doron Putnam MD  [facility] [address] [place] [address]  [phone]    Schedule an appointment as soon as possible for a visit in 1 week(s)      MD Jacqueline Perdue [address]  [phone]    Schedule an appointment as soon as possible for a visit in 1 week(s)      Kizzy Holland., DO  [address] [facility] [address] [facility] [address]  [phone]    Schedule an appointment as soon as possible for a visit in 1 week(s)      Jose Roberto Quiles MD  [address] [place] [address]  [phone]    Schedule an appointment as soon as possible for a visit in 1 week(s)      Code status:  Full Code     Total time spent on discharge, finalizing medications, referrals and arranging outpatient follow up was more than 30 minutes      Thank you Dr. Maritza Ma MD for the opportunity to be involved in this patients care.

## 2022-11-12 NOTE — PROGRESS NOTES
Respiratory Therapy    SPO2 on RA at rest 85%  SPO2 on 2L at rest 89%  SPO2 on 2L with ambulation 87%  SPO2 on 3L with ambulation >3 minutes   91%    Pt says she has Home O2 with stationary and portable concentrator but does not know who provides it. Pt made a phone call to family member to inquire whether or not she can find the portable concentrator for discharge as she hasn't used it for a long time. Pt states that she uses the O2 prn    Pt will require and order for Home O2 for 2L at rest and 3L with ambulation (portability) and diagnosis. Electronically signed by Kelsy Isaac RCP on 11/12/2022 at 9:29 AM      Family brought portable O2 concentrator to room and pt has it charging bedside.  Pt also has a stationary concentrator at home    Per pt: O2 supplied per [facility], [address] [phone]  RN aware    Electronically signed by Kelsy Isaac RCP on 11/12/2022 at 12:07 PM

## 2022-11-12 NOTE — PROGRESS NOTES
Discharge instructions reviewed with patient and daughter; verbalizes understanding of medications and follow up appointments. Denies any further questions/concerns. Patient being wheeled out of hospital via wheelchair for discharge to home; daughter transporting.

## 2022-11-12 NOTE — PROGRESS NOTES
Hospital Medicine Progress Note     Date:  11/12/2022    PCP: Cande Rod MD (Tel: [phone])    Date of Admission: 11/2/2022    Chief complaint:   Chief Complaint   Patient presents with    Leg Pain     Fall leg and ankle pain       Brief admission history: 57-year-old male with history of paroxysmal atrial fibrillation, history of CHF (previously systolic dysfunction, although recent echocardiogram with preserved ejection fraction, diastolic function not measured), CLL, s/p splenectomy, COPD, essential hypertension, factor V Leiden mutation, chronic Coumadin use, osteoporosis, hyperlipidemia, who presented to the emergency room on November 3, 2022 with complaints of generalized weakness, fall, recent nausea, vomiting and diarrhea, with associated decreased oral intake. Stool study was positive for C. difficile infection. She was noted to be hypoxic on presentation. Assessment/plan:  C. difficile colitis. Patient was on intravenous Flagyl, Dificid, oral vancomycin, during hospital stay. She has been discharged on a taper of oral vancomycin. Overall, diarrhea has since improved. She will follow-up with GI and infectious disease as outpatient. Generalized weakness. Likely secondary to underlying medical conditions. Recent fall at home, likely secondary to generalized weakness. Fall precautions. History of factor V Leiden, on chronic anticoagulation with Coumadin. INR supratherapeutic due to antibiotic therapy; coumadin has been on hold since admission. Discussed with patient about possibility of using Eliquis instead of Coumadin, and she agreed to starting Eliquis. She has now been changed to oral Eliquis. History of atrial fibrillation. Continue amiodarone, Eliquis. Gastroesophageal reflux disease. PPI has been changed to H2 blocker due to Clostridium difficile infection.   Other comorbidities: history of paroxysmal atrial fibrillation, history of CHF (previously systolic dysfunction, although recent echocardiogram with preserved ejection fraction, diastolic function not measured), CLL, s/p splenectomy, COPD, essential hypertension, factor V Leiden mutation, chronic Coumadin use, osteoporosis, hyperlipidemia. Disposition. Patient is stable for discharge at this time. Diet: ADULT DIET; Regular; Low Sodium (2 gm); 2000 ml    Code status: Full Code   ----------  Subjective  No diarrhea. Rachel Siddiqui to go home. Objective  Physical exam:  Vitals: BP (!) 150/62   Pulse 70   Temp 98.9 °F (37.2 °C) (Oral)   Resp 18   Ht 5' (1.524 m)   Wt 153 lb 10.6 oz (69.7 kg)   SpO2 93%   BMI 30.01 kg/m²   Gen/overall appearance: Not in acute distress. Alert. Oriented x3. Head: Normocephalic, atraumatic  Eyes: EOMI, good acuity  ENT: Oral mucosa moist  Neck: No JVD, thyromegaly  CVS: Nml S1S2, no MRG, RRR  Pulm: Clear bilaterally. No crackles/wheezes  Gastrointestinal: Mild diffuse tenderness to palpation. Soft, ND, +BS  Musculoskeletal: No edema. Warm  Neuro: No focal deficit. Moves extremity spontaneously. Psychiatry: Appropriate affect. Not agitated. Skin: Warm, dry with normal turgor. No rash  Capillary refill: Brisk,< 3 seconds   Peripheral Pulses: +2 palpable, equal bilaterally      24HR INTAKE/OUTPUT:    Intake/Output Summary (Last 24 hours) at 11/12/2022 0857  Last data filed at 11/11/2022 1851  Gross per 24 hour   Intake 840 ml   Output --   Net 840 ml       I/O last 3 completed shifts: In: 840 [P.O.:840]  Out: -   No intake/output data recorded.   Meds:    vancomycin  125 mg Oral 4x Daily    Followed by    Savilla Opal ON 11/14/2022] vancomycin  125 mg Oral 3 times per day    Followed by    Savilla Opal ON 11/16/2022] vancomycin  125 mg Oral Q12H    Followed by    Savilla Opal ON 11/20/2022] vancomycin  125 mg Oral Daily    sacubitril-valsartan  0.5 tablet Oral BID    amiodarone  200 mg Oral Daily    gabapentin  300 mg Oral Nightly    metoprolol succinate  12.5 mg Oral Daily    sodium chloride flush 5-40 mL IntraVENous 2 times per day    warfarin placeholder: dosing by pharmacy   Other RX Placeholder     Infusions:    sodium chloride 10 mL/hr at 11/09/22 2131     PRN Meds: melatonin, calcium carbonate, trimethobenzamide, meclizine, butalbital-acetaminophen-caffeine, oxyCODONE, sodium chloride flush, sodium chloride, acetaminophen **OR** acetaminophen, potassium chloride **OR** potassium alternative oral replacement **OR** potassium chloride    Labs/imaging:  CBC:   Recent Labs     11/10/22  0532 11/11/22 0614   WBC 31.8* 27.1*   HGB 11.6* 10.6*    380       BMP:    Recent Labs     11/10/22  0532 11/11/22 0614    143   K 3.6 3.7    103   CO2 30 32   BUN 5* 5*   CREATININE 0.6 0.5*   GLUCOSE 125* 87       Hepatic:   No results for input(s): AST, ALT, ALB, BILITOT, ALKPHOS in the last 72 hours.       Pao Moran MD  -------------------------------  [facility] hospitalist

## 2022-11-12 NOTE — PROGRESS NOTES
Hematology Note    There is no INR today. Will check one tomorrow. Gave low dose Vit K yesterday due to arm hematoma.     Alexandria Lesches MD

## 2022-11-12 NOTE — PLAN OF CARE
Problem: Discharge Planning  Goal: Discharge to home or other facility with appropriate resources  Outcome: Completed     Problem: Pain  Goal: Verbalizes/displays adequate comfort level or baseline comfort level  Outcome: Completed     Problem: Safety - Adult  Goal: Free from fall injury  Outcome: Completed     Problem: ABCDS Injury Assessment  Goal: Absence of physical injury  Outcome: Completed     Problem: Cardiovascular - Adult  Goal: Maintains optimal cardiac output and hemodynamic stability  Outcome: Completed  Goal: Absence of cardiac dysrhythmias or at baseline  Outcome: Completed     Problem: Gastrointestinal - Adult  Goal: Maintains or returns to baseline bowel function  Outcome: Completed     Problem: Infection - Adult  Goal: Absence of infection at discharge  Outcome: Completed     Problem: Metabolic/Fluid and Electrolytes - Adult  Goal: Electrolytes maintained within normal limits  Outcome: Completed  Goal: Hemodynamic stability and optimal renal function maintained  Outcome: Completed     Problem: Skin/Tissue Integrity  Goal: Absence of new skin breakdown  Description: 1. Monitor for areas of redness and/or skin breakdown  2. Assess vascular access sites hourly  3. Every 4-6 hours minimum:  Change oxygen saturation probe site  4. Every 4-6 hours:  If on nasal continuous positive airway pressure, respiratory therapy assess nares and determine need for appliance change or resting period.   Outcome: Completed     Problem: Neurosensory - Adult  Goal: Achieves stable or improved neurological status  Outcome: Completed  Goal: Achieves maximal functionality and self care  Outcome: Completed     Problem: Skin/Tissue Integrity - Adult  Goal: Skin integrity remains intact  Outcome: Completed  Goal: Oral mucous membranes remain intact  Outcome: Completed     Problem: Musculoskeletal - Adult  Goal: Return mobility to safest level of function  Outcome: Completed  Goal: Return ADL status to a safe level of function  Outcome: Completed

## 2022-11-12 NOTE — PROGRESS NOTES
INPATIENT CONSULTATION:    IDENTIFYING DATA/REASON FOR CONSULTATION   PATIENT:  Ce Gomez  MRN:  [id number]  ADMIT DATE: 11/2/2022  TIME OF EVALUATION: 11/12/2022 11:47 AM  HOSPITAL STAY:   LOS: 9 days   CONSULTING PHYSICIAN: Caesar Larson MD   REASON FOR CONSULTATION: CDI    Subjective:    Patient seen and examined in follow-up. Patient reports abdominal pain is improved but she continues to have left lower quadrant cramping which she has had chronically since having shingles due to postherpetic neuralgia. She had 1 semi-formed brown non-bloody bowel movements yesterday. She is passing flatus. She is tolerating diet.     MEDICATIONS   SCHEDULED:  vancomycin, 125 mg, 4x Daily   Followed by  Bebeto Cummins ON 11/14/2022] vancomycin, 125 mg, 3 times per day   Followed by  [START ON 11/16/2022] vancomycin, 125 mg, Q12H   Followed by  Bebeto Cummins ON 11/20/2022] vancomycin, 125 mg, Daily  sacubitril-valsartan, 0.5 tablet, BID  amiodarone, 200 mg, Daily  gabapentin, 300 mg, Nightly  metoprolol succinate, 12.5 mg, Daily  sodium chloride flush, 5-40 mL, 2 times per day  warfarin placeholder: dosing by pharmacy, , RX Placeholder    FLUIDS/DRIPS:     sodium chloride 10 mL/hr at 11/09/22 2131     PRNs: melatonin, 6 mg, Nightly PRN  calcium carbonate, 500 mg, TID PRN  trimethobenzamide, 200 mg, Q8H PRN  meclizine, 12.5 mg, TID PRN  butalbital-acetaminophen-caffeine, 1 tablet, Q6H PRN  oxyCODONE, 5 mg, Q6H PRN  sodium chloride flush, 5-40 mL, PRN  sodium chloride, , PRN  acetaminophen, 650 mg, Q6H PRN   Or  acetaminophen, 650 mg, Q6H PRN  potassium chloride, 40 mEq, PRN   Or  potassium alternative oral replacement, 40 mEq, PRN   Or  potassium chloride, 10 mEq, PRN    ALLERGIES:  No Known Allergies      PHYSICAL EXAM     Vitals:    11/11/22 2213 11/11/22 2215 11/12/22 0542 11/12/22 0815   BP:  (!) 168/75 (!) 152/71 (!) 150/62   Pulse:  70 67 70   Resp: 21 21 18   Temp:  97.9 °F (36.6 °C) 98.5 °F (36.9 °C) 98.9 °F (37.2 °C) TempSrc:  Oral Oral Oral   SpO2:  93% (!) 88% 93%   Weight:   153 lb 10.6 oz (69.7 kg)    Height:           I/O last 3 completed shifts: In: 840 [P.O.:840]  Out: -     Physical Exam:  Gen: Resting in bed, NAD   HEENT: normocephalic, atraumatic. No scleral icterus. CV: RRR no MRG   Pul: CTAB, normal work of breathing without wheezing  Abd: Good bowel sounds throughout, soft, minimally tender to palpation in LLQ, ND, no masses, no HSM   Ext: No edema, atraumatic  Neuro: No gross deficits, moves all 4 extremities, follows commands  Skin: No jaundice, spider angiomas, rodrigues erythema     LABS AND IMAGING     Recent Results (from the past 24 hour(s))   POCT Glucose    Collection Time: 11/11/22  9:01 PM   Result Value Ref Range    POC Glucose 163 (H) 70 - 99 mg/dl    Performed on ACCU-CHEK        Other Labs    Imaging:    XR ABDOMEN (KUB) (SINGLE AP VIEW)   Final Result   Known history of C difficile colitis. Relatively unremarkable bowel gas   pattern. Suspected mild pattern of ileus of small bowel. CT ABDOMEN PELVIS W IV CONTRAST Additional Contrast? Radiologist Recommendation   Final Result   1. Worsening mucosal thickening of the colon in a patient with a known   history of C difficile colitis. No pattern of dilation/megacolon. 2. Cholelithiasis with increasing gallbladder mucosal thickening and mild   dilation of the common bile duct. Current changes felt to be secondary to   more systemic findings above. Underlying pattern of cholecystitis cannot be   excluded. 3. No CT evidence of pancreatitis. Stable pattern of mild pancreatic ductal   dilatation. CT HEAD WO CONTRAST   Final Result   No acute intracranial abnormality. CT ABDOMEN PELVIS W IV CONTRAST Additional Contrast? None   Final Result   1. No pulmonary embolism. 2. Tiny subpleural nodules in the periphery of the lungs may represent an   underlying infectious or inflammatory process.   Follow-up recommendations are provided below. 3. Diffuse mucosal thickening of the colon suggesting underlying pattern of   infectious colitis including C difficile. 4. Incidental cholelithiasis and stable right adrenal nodule suspected to   represent an adenoma. RECOMMENDATIONS:   Multiple pulmonary nodules. Most severe: 3 mm solid pulmonary nodule within   the upper lobe. If patient is low risk for malignancy, no routine follow-up   imaging is recommended; if patient is high risk for malignancy, a   non-contrast Chest CT at 12 months is optional. If performed and the nodule   is stable at 12 months, no further follow-up is recommended. These guidelines do not apply to immunocompromised patients and patients with   cancer. Follow up in patients with significant comorbidities as clinically   warranted. For lung cancer screening, adhere to Lung-RADS guidelines. Reference: Radiology. 2017; 284(1):228-43. CT CHEST PULMONARY EMBOLISM W CONTRAST   Final Result   1. No pulmonary embolism. 2. Tiny subpleural nodules in the periphery of the lungs may represent an   underlying infectious or inflammatory process. Follow-up recommendations are   provided below. 3. Diffuse mucosal thickening of the colon suggesting underlying pattern of   infectious colitis including C difficile. 4. Incidental cholelithiasis and stable right adrenal nodule suspected to   represent an adenoma. RECOMMENDATIONS:   Multiple pulmonary nodules. Most severe: 3 mm solid pulmonary nodule within   the upper lobe. If patient is low risk for malignancy, no routine follow-up   imaging is recommended; if patient is high risk for malignancy, a   non-contrast Chest CT at 12 months is optional. If performed and the nodule   is stable at 12 months, no further follow-up is recommended. These guidelines do not apply to immunocompromised patients and patients with   cancer. Follow up in patients with significant comorbidities as clinically   warranted.  For lung cancer screening, adhere to Lung-RADS guidelines. Reference: Radiology. 2017; 284(1):228-43. CT HEAD WO CONTRAST   Final Result   No acute intracranial abnormality. XR ANKLE LEFT (MIN 3 VIEWS)   Final Result   Ankle mortise intact. No acute fracture or dislocation of the ankle. No   acute fracture or dislocation of the foot. Degenerative changes are present   at the interphalangeal joints and there is deformity of the 5th PIP joint   which may be sequela of a remote injury. No soft tissue swelling of the   foot. Moderate soft tissue swelling/edema over the medial malleolus of the   ankle. RECOMMENDATION:   Medial soft tissue swelling about the left ankle with no underlying fracture. Degenerative changes in the left foot with no acute abnormality. XR FOOT LEFT (MIN 3 VIEWS)   Final Result   Ankle mortise intact. No acute fracture or dislocation of the ankle. No   acute fracture or dislocation of the foot. Degenerative changes are present   at the interphalangeal joints and there is deformity of the 5th PIP joint   which may be sequela of a remote injury. No soft tissue swelling of the   foot. Moderate soft tissue swelling/edema over the medial malleolus of the   ankle. RECOMMENDATION:   Medial soft tissue swelling about the left ankle with no underlying fracture. Degenerative changes in the left foot with no acute abnormality. XR CHEST PORTABLE   Final Result   Cardiomegaly with mild vascular congestion that is stable. ASSESSMENT AND RECOMMENDATIONS   Myriam Ashraf is a 78 y.o. female with history of CLL, CAD, atrial fibrillation, COPD, hypertension, factor V Leiden deficiency, history of splenectomy who presented to [facility] with diarrhea, nausea, vomiting and anorexia. CDI: Clinically improving on p.o. Dificid and Vanc, with decreased stool output, but persistent severe leukocytosis.   This is likely in part due to underlying CLL and history of splenectomy. Continue current therapy. CT A/P 11/7/22 without megacolon. Defer further management to ID. Postherpetic neuralgia: On Neurontin, could consider titrating dose. Atrial fibrillation: On Coumadin, with supratherapeutic INR. Factor V Leiden: On Coumadin, supratherapeutic INR on admission. Defer Coumadin dosing to primary team and pharmacy. RECOMMENDATIONS:    -Continue Dificid and Vanc, with plan vancomycin taper per ID  -Okay for discharge from GI standpoint  -Follow-up with Dr. Sachin Bridgse    If you have any questions or need any further information, please feel free to contact anyone on our consult team.  Thank you for allowing us to participate in the care of Del Warren. Efren Hough.  [address] [facility]

## 2022-11-12 NOTE — CARE COORDINATION
CASE MANAGEMENT DISCHARGE SUMMARY:    DISCHARGE DATE: 11/12/2022    DISCHARGED TO HOME     TRANSPORTATION: via family             TIME: TBD by patient and bedside RN              COMMENTS: Patient denies discharge needs at this time.     Electronically signed by Brigida Souza RN on 11/12/2022 at 9:01 AM

## 2022-11-14 ENCOUNTER — TELEPHONE (OUTPATIENT)
Dept: PHARMACY | Age: 82
End: 2022-11-14

## 2022-11-14 ENCOUNTER — CARE COORDINATION (OUTPATIENT)
Dept: CASE MANAGEMENT | Age: 82
End: 2022-11-14

## 2022-11-14 ENCOUNTER — ANTI-COAG VISIT (OUTPATIENT)
Dept: PHARMACY | Age: 82
End: 2022-11-14

## 2022-11-14 DIAGNOSIS — R79.1 ABNORMAL COAGULATION PROFILE: Primary | ICD-10-CM

## 2022-11-14 NOTE — CARE COORDINATION
[facility] Care Transitions Initial Follow Up Call    Call within 2 business days of discharge: Yes  Patient: Saundra Garza Patient : 1940   MRN: [id number]  Reason for Admission: C. difficile colitis  Discharge Date: 22 RARS: Readmission Risk Score: 15.7      Last Discharge [address]       Date Complaint Diagnosis Description Type Department Provider    22 Leg Pain Colitis . .. ED to Hosp-Admission (Discharged) (ADMITTED) [facility] 5W Tennille Torres MD; [place] August All. .. Attempted to contact patient for initial follow up transition call. Left voicemail message to return call with an update on condition since discharge. Contact information provided. Will continue to follow up.           Care Transitions 24 Hour Call    Do you have all of your prescriptions and are they filled?: Yes  Post Acute Services: Home Health (Comment: Caio Castano)  Care Transitions Interventions         Follow Up  Future Appointments   Date Time Provider Buck Soares   2022 10:00 AM FAB Hook - CNP [facility] BRANDYN   2022 11:00 AM Destinee Gross MD [facility] BRANDYN Cantu LPN

## 2022-11-14 NOTE — TELEPHONE ENCOUNTER
Fior Agudelo was admitted to [facility] 11/2-11/12 for c. Diff colitis. Warfarin switched to Eliquis. Will close her referral and episode. She is welcome back any time should warfarin be restarted. Siddharth Oakley, PharmD, [address] [place] [address]  [phone]    For Pharmacy Admin Tracking Only    Intervention Detail:   Total # of Interventions Recommended:  Total # of Interventions Accepted:   Time Spent (min): 15

## 2022-11-15 ENCOUNTER — TELEPHONE (OUTPATIENT)
Dept: FAMILY MEDICINE CLINIC | Age: 82
End: 2022-11-15

## 2022-11-15 ENCOUNTER — CARE COORDINATION (OUTPATIENT)
Dept: CASE MANAGEMENT | Age: 82
End: 2022-11-15

## 2022-11-15 NOTE — CARE COORDINATION
[facility] Care Transitions Initial Follow Up Call    Call within 2 business days of discharge: Yes       Patient: Sayra Mayo Patient : 1940   MRN: [id number]  Reason for Admission: Colitis  Discharge Date: 22 RARS: Readmission Risk Score: 15.7      Last Discharge  Street       Date Complaint Diagnosis Description Type Department Provider    22 Leg Pain Colitis . .. ED to Hosp-Admission (Discharged) (ADMITTED) [facility]W Kristy Riddle MD; Harleen Alert All. .. Was this an external facility discharge? No [facility] by [facility] provider                  2nd and final attempt at CT discharge phone call. Unable to reach patient. Left message. Informed jordin this would be the final CTN outreach.     Follow Up  Future Appointments   Date Time Provider Buck Soares   2022 10:00 AM FAB Grajeda - CNP [facility] [place] BRANDYN   2022 11:00 AM Queen Nanette MD [facility]        Suze Xiao RN BSN  Care Transition Nurse  [phone]

## 2022-11-15 NOTE — TELEPHONE ENCOUNTER
----- Message from Radene Lanes, MD sent at 11/15/2022 12:18 PM EST -----  Please schedule for f/u visit w/ a provider in office in next 1-2 weeks. Isela Isbell  ----- Message -----  From: Amy Ruelas RN  Sent: 11/15/2022  10:20 AM EST  To: Radene Lanes, MD [facility] [place] Dx: Colitis.    Thank you

## 2022-11-15 NOTE — TELEPHONE ENCOUNTER
Care Transitions Initial Follow Up Call    Call within 2 business days of discharge: No     Patient:  Rowan Leonardo Patient : 1940 MRN: [id number]    [unfilled]    RARS: Readmission Risk Score: 15.7       Spoke with: Robert Villa     Discharge department/facility: [employer]     Non-face-to-face services provided:  Scheduled appointment with PCP-Dr Pena    Follow Up  Future Appointments   Date Time Provider Buck Camachoi   2022  2:00 PM Mera Edge  [address] - DYD   2022 10:00 AM FAB Murray - CNP [facility]   2022 11:00 AM Luis Amezquita MD [address]

## 2022-11-18 ENCOUNTER — OFFICE VISIT (OUTPATIENT)
Dept: FAMILY MEDICINE CLINIC | Age: 82
End: 2022-11-18

## 2022-11-18 VITALS
HEIGHT: 60 IN | HEART RATE: 67 BPM | WEIGHT: 144 LBS | DIASTOLIC BLOOD PRESSURE: 66 MMHG | OXYGEN SATURATION: 96 % | SYSTOLIC BLOOD PRESSURE: 118 MMHG | BODY MASS INDEX: 28.27 KG/M2
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
## 2022-11-18 DIAGNOSIS — E78.00 HYPERCHOLESTEROLEMIA: ICD-10-CM

## 2022-11-18 DIAGNOSIS — E55.9 VITAMIN D DEFICIENCY: ICD-10-CM

## 2022-11-18 DIAGNOSIS — I51.81 TAKOTSUBO CARDIOMYOPATHY: ICD-10-CM

## 2022-11-18 DIAGNOSIS — D64.9 MILD ANEMIA: ICD-10-CM

## 2022-11-18 DIAGNOSIS — M81.0 AGE-RELATED OSTEOPOROSIS WITHOUT CURRENT PATHOLOGICAL FRACTURE: ICD-10-CM

## 2022-11-18 DIAGNOSIS — K21.9 GASTROESOPHAGEAL REFLUX DISEASE, UNSPECIFIED WHETHER ESOPHAGITIS PRESENT: ICD-10-CM

## 2022-11-18 DIAGNOSIS — C91.10 CLL (CHRONIC LYMPHOCYTIC LEUKEMIA) (HCC): ICD-10-CM

## 2022-11-18 DIAGNOSIS — M15.9 PRIMARY OSTEOARTHRITIS INVOLVING MULTIPLE JOINTS: ICD-10-CM

## 2022-11-18 DIAGNOSIS — E79.0 HYPERURICEMIA: ICD-10-CM

## 2022-11-18 DIAGNOSIS — E04.9 GOITER: ICD-10-CM

## 2022-11-18 DIAGNOSIS — Z85.828 HISTORY OF NONMELANOMA SKIN CANCER: ICD-10-CM

## 2022-11-18 DIAGNOSIS — I10 ESSENTIAL HYPERTENSION: ICD-10-CM

## 2022-11-18 DIAGNOSIS — R73.01 IMPAIRED FASTING GLUCOSE: ICD-10-CM

## 2022-11-18 DIAGNOSIS — D69.6 THROMBOCYTOPENIA (HCC): ICD-10-CM

## 2022-11-18 DIAGNOSIS — E83.51 HYPOCALCEMIA: ICD-10-CM

## 2022-11-18 DIAGNOSIS — Z09 HOSPITAL DISCHARGE FOLLOW-UP: ICD-10-CM

## 2022-11-18 DIAGNOSIS — A04.72 C. DIFFICILE COLITIS: Primary | ICD-10-CM

## 2022-11-18 DIAGNOSIS — D68.51 FACTOR V LEIDEN CARRIER (HCC): ICD-10-CM

## 2022-11-18 DIAGNOSIS — R53.1 GENERAL WEAKNESS: ICD-10-CM

## 2022-11-18 PROBLEM — I48.91 ATRIAL FIBRILLATION WITH RAPID VENTRICULAR RESPONSE (HCC): Status: RESOLVED | Noted: 2022-01-13 | Resolved: 2022-11-18

## 2022-11-18 LAB
A/G RATIO: 1.7 (ref 1.1–2.2)
ALBUMIN SERPL-MCNC: 3.6 G/DL (ref 3.4–5)
ALP BLD-CCNC: 100 U/L (ref 40–129)
ALT SERPL-CCNC: 10 U/L (ref 10–40)
ANION GAP SERPL CALCULATED.3IONS-SCNC: 15 MMOL/L (ref 3–16)
AST SERPL-CCNC: 17 U/L (ref 15–37)
BILIRUB SERPL-MCNC: <0.2 MG/DL (ref 0–1)
BUN BLDV-MCNC: 8 MG/DL (ref 7–20)
CALCIUM SERPL-MCNC: 9 MG/DL (ref 8.3–10.6)
CHLORIDE BLD-SCNC: 105 MMOL/L (ref 99–110)
CO2: 24 MMOL/L (ref 21–32)
CREAT SERPL-MCNC: 0.8 MG/DL (ref 0.6–1.2)
GFR SERPL CREATININE-BSD FRML MDRD: >60 ML/MIN/{1.73_M2}
GLUCOSE BLD-MCNC: 156 MG/DL (ref 70–99)
MAGNESIUM: 2.4 MG/DL (ref 1.8–2.4)
PARATHYROID HORMONE INTACT: 53.6 PG/ML (ref 14–72)
PHOSPHORUS: 3.2 MG/DL (ref 2.5–4.9)
POTASSIUM SERPL-SCNC: 4.3 MMOL/L (ref 3.5–5.1)
REASON FOR REJECTION: NORMAL
REJECTED TEST: NORMAL
SODIUM BLD-SCNC: 144 MMOL/L (ref 136–145)
TOTAL PROTEIN: 5.7 G/DL (ref 6.4–8.2)
VITAMIN D 25-HYDROXY: 18.7 NG/ML

## 2022-11-18 RX ORDER — CIPROFLOXACIN 500 MG/1
1 TABLET, FILM COATED ORAL
COMMUNITY
Start: 2022-05-13 | End: 2022-11-18

## 2022-11-18 RX ORDER — METRONIDAZOLE 500 MG/1
1 TABLET ORAL
COMMUNITY
Start: 2022-05-13

## 2022-11-18 NOTE — PROGRESS NOTES
Brief hospital course: 17-year-old male with history of paroxysmal atrial fibrillation, history of CHF (previously systolic dysfunction, although recent echocardiogram with preserved ejection fraction, diastolic function not measured), CLL, s/p splenectomy, COPD, essential hypertension, factor V Leiden mutation, chronic Coumadin use, osteoporosis, hyperlipidemia, who presented to the emergency room on November 3, 2022 with complaints of generalized weakness, fall, recent nausea, vomiting and diarrhea, with associated decreased oral intake. Stool study was positive for C. difficile infection. She was noted to be hypoxic on presentation. Assessment/plan:  C. difficile colitis. Patient was on intravenous Flagyl, Dificid, oral vancomycin, during hospital stay. She has been discharged on a taper of oral vancomycin. Overall, diarrhea has since improved. She will follow-up with GI and infectious disease as outpatient. Generalized weakness. Likely secondary to underlying medical conditions. Recent fall at home, likely secondary to generalized weakness. Fall precautions. History of factor V Leiden, on chronic anticoagulation with Coumadin. INR supratherapeutic due to antibiotic therapy; coumadin has been on hold since admission. Discussed with patient about possibility of using Eliquis instead of Coumadin, and she agreed to starting Eliquis. She has now been changed to oral Eliquis. History of atrial fibrillation. Continue amiodarone, Eliquis. Gastroesophageal reflux disease. PPI has been changed to H2 blocker due to Clostridium difficile infection.   Other comorbidities: history of paroxysmal atrial fibrillation, history of CHF (previously systolic dysfunction, although recent echocardiogram with preserved ejection fraction, diastolic function not measured), CLL, s/p splenectomy, COPD, essential hypertension, factor V Leiden mutation, chronic Coumadin use, osteoporosis, hyperlipidemia. Disposition. Patient is stable for discharge at this time. Lab Results   Component Value Date    WBC 27.1 (H) 11/11/2022    HGB 10.6 (L) 11/11/2022    HCT 32.7 (L) 11/11/2022    MCV 93.9 11/11/2022     11/11/2022     Lab Results   Component Value Date/Time     11/11/2022 06:14 AM    K 3.7 11/11/2022 06:14 AM    K 3.3 11/03/2022 12:35 AM     11/11/2022 06:14 AM    CO2 32 11/11/2022 06:14 AM    BUN 5 11/11/2022 06:14 AM    CREATININE 0.5 11/11/2022 06:14 AM    GLUCOSE 87 11/11/2022 06:14 AM    GLUCOSE 113 06/23/2017 12:10 PM    CALCIUM 7.9 11/11/2022 06:14 AM       Post-Discharge Transitional Care  Follow Up      Wing Castillo   YOB: 1940    Date of Office Visit:  11/18/2022  Date of Hospital Admission: 11/2/22  Date of Hospital Discharge: 11/12/22  Risk of hospital readmission (high >=14%.  Medium >=10%) :Readmission Risk Score: 15.7      Care management risk score Rising risk (score 2-5) and Complex Care (Scores >=6): No Risk Score On File     Non face to face  following discharge, date last encounter closed (first attempt may have been earlier): 11/15/2022    Call initiated 2 business days of discharge: No    ASSESSMENT/PLAN:   C. 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 diff  Medical Decision Making: moderate complexity  Calcium 1500mg daily for now pending labs  Check vit d, pth, phos, mg, cmp - albumin low likely from mild malnutrition which may be cause but some of sxs c/w hypocalcemia  F/u hem-onc, gi, pulm, cardio in next few weeks  Cont walker at home w/ family support for safety  Lorazepam 0.5 daily prn as anxiety seems to be a significant factor and this helps per pt - d/w pt concerns w/ fall risk/ confusion w/ medication and would only advise using short-term as needed. Trying to do HEP from PT in hospital gradually increasing activity   Staying by self - no home nursing presently - doesn't want home nursing or PT presently             Subjective:   HPI:  Follow up of Hospital problems/diagnosis(es): C DIFF COLITIS  FEELING TRIED/WORN OUT, LOW APPETITE  SOME LOOSE STOOL - [address] course: Discharge summary reviewed- see chart. Interval history/Current status: LOOSE STOOLS BUT DIARRHEA IMPROVED  FEELS SHAKY - MALAISE - ANXIETY MED HELPS - lorazepam seems to help. Someone helping her care for son - had to rearrange plans  No pep/ energy / doesn't care if gets up in morning  Hot flushing all day from toes to neck - sleeps good through night - up once to urinate  GETS HOT FLASH  COMING OVER HER -HOT FLASH STAYS - DOESN'T GO AWAY.   [address] FOR [employer] ABDOMINAL PAIN WHICH IS BETTER  HOT FLASHES SEEM BETTER AFTER 8-9 PM  Eating soup / scrambled eggs but not eating much  White blood count went up and hgb down - on oxygen - seeing dr. Sri Saldivar- hem-onc  Not using oxygen all the time - uses prn w/ much exertion  No calcium or vitamin d supplements - take b12  Feels like aged 10 years in last 2-3 weeks  Weepy at times - all since hospital stay  Good family support    BP Readings from Last 3 Encounters:   11/18/22 118/66   11/12/22 (!) 156/73   07/21/22 110/60     Pulse Readings from Last 3 Encounters:   11/18/22 67   11/12/22 87   07/21/22 88 Wt Readings from Last 3 Encounters:   11/18/22 144 lb (65.3 kg)   11/12/22 153 lb 10.6 oz (69.7 kg)   07/21/22 145 lb 9.6 oz (66 kg)         Patient Active Problem List   Diagnosis    Osteoporosis,Dexas:10/18/02 (Lumbar spine -1.52), hip -0.55 (T scores), Actonel, then fosamax; Dexa 1/25/06 (Lumbar spine -1.1 and hip -0.5)    Post herpetic neuralgia    Thrombocytopenia; 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/06    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 ([facility]    CLL (chronic lymphocytic leukemia) (HCC)    Osteoarthritis    Hypercholesterolemia    Goiter, 1.5 cm midline    Impaired fasting glucose    Vitamin D deficiency    Small cleaved cell (diffuse) NHL (HCC)    B12 deficiency    Essential hypertension    Encounter for follow-up examination after completed treatment for cancer    Encounter for care related to Port-a-Cath    Hyperuricemia    Arthritis of knee    Neoplasm of uncertain behavior of skin    History of squamous cell carcinoma of skin    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    History of basal cell carcinoma    Seborrheic keratoses, inflamed    Takotsubo cardiomyopathy    Chronic combined systolic (congestive) and diastolic (congestive) heart failure (HCC)    Acute ST elevation myocardial infarction (STEMI) (HCC)    Acute CHF (congestive heart failure) (HCC)    Sepsis (HCC)    Chronic respiratory failure with hypoxia (HCC)    Simple chronic bronchitis (HCC)    Syncope and collapse    Colitis    C. difficile colitis    Overweight (BMI 25.0-29. 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 procedure    Calculus of gallbladder without cholecystitis without obstruction    Bandemia    Head contusion    Diarrhea    Sprain of left ankle    Leukemoid reaction       Medications listed as ordered at the time of discharge from hospital     Medication List            Accurate as of November 18, 2022  1:58 PM. If you have any questions, ask your nurse or doctor. CONTINUE taking these medications      amiodarone 200 MG tablet  Commonly known as: CORDARONE  TAKE 1 TABLET BY MOUTH DAILY     apixaban 5 MG Tabs tablet  Commonly known as: Eliquis  Take 1 tablet by mouth 2 times daily     butalbital-acetaminophen-caffeine -40 MG per tablet  Commonly known as: FIORICET, ESGIC  Take 1 tablet by mouth every 6 hours as needed for Headaches     calcium carbonate 500 MG chewable tablet  Commonly known as: TUMS  Take 1 tablet by mouth 3 times daily as needed for Heartburn     ciprofloxacin 500 MG tablet  Commonly known as: CIPRO     famotidine 20 MG tablet  Commonly known as: PEPCID  Take 1 tablet by mouth 2 times daily     gabapentin 300 MG capsule  Commonly known as: NEURONTIN  Take 1 capsule by mouth at bedtime for 30 days. 2 caps po nightly     metoprolol succinate 25 MG extended release tablet  Commonly known as: TOPROL XL  Take 0.5 tablets by mouth daily     metroNIDAZOLE 500 MG tablet  Commonly known as: FLAGYL     oxyCODONE 5 MG immediate release tablet  Commonly known as: Roxicodone  Take 1 tablet by mouth every 6 hours as needed for Pain for up to 7 days. Intended supply: 5 days. Take lowest dose possible to manage pain     sacubitril-valsartan 24-26 MG per tablet  Commonly known as: ENTRESTO  Take 0.5 tablets by mouth 2 times daily     * vancomycin 125 MG capsule  Commonly known as: VANCOCIN  Take 1 capsule by mouth in the morning and 1 capsule in the evening. Do all this for 7 doses. * vancomycin 125 MG capsule  Commonly known as: VANCOCIN  Take 1 capsule by mouth daily for 7 doses  Start taking on: November 20, 2022     Vitamin B 12 500 MCG Tabs           * This list has 2 medication(s) that are the same as other medications prescribed for you. Read the directions carefully, and ask your doctor or other care provider to review them with you.                     Medications marked \"taking\" at this time  Outpatient Medications Marked as Taking for the 11/18/22 encounter (Office Visit) with Celena Moreno MD   Medication Sig Dispense Refill    ciprofloxacin (CIPRO) 500 MG tablet Take 1 tablet by mouth      metroNIDAZOLE (FLAGYL) 500 MG tablet Take 1 tablet by mouth      vancomycin (VANCOCIN) 125 MG capsule Take 1 capsule by mouth in the morning and 1 capsule in the evening. Do all this for 7 doses. 7 capsule 0    [START ON 11/20/2022] vancomycin (VANCOCIN) 125 MG capsule Take 1 capsule by mouth daily for 7 doses 7 capsule 0    famotidine (PEPCID) 20 MG tablet Take 1 tablet by mouth 2 times daily 60 tablet 3    butalbital-acetaminophen-caffeine (FIORICET, ESGIC) -40 MG per tablet Take 1 tablet by mouth every 6 hours as needed for Headaches 30 tablet 0    calcium carbonate (TUMS) 500 MG chewable tablet Take 1 tablet by mouth 3 times daily as needed for Heartburn 90 tablet 0    apixaban (ELIQUIS) 5 MG TABS tablet Take 1 tablet by mouth 2 times daily 60 tablet 1    oxyCODONE (ROXICODONE) 5 MG immediate release tablet Take 1 tablet by mouth every 6 hours as needed for Pain for up to 7 days. Intended supply: 5 days. Take lowest dose possible to manage pain 20 tablet 0    gabapentin (NEURONTIN) 300 MG capsule Take 1 capsule by mouth at bedtime for 30 days.  2 caps po nightly 30 capsule 0    amiodarone (CORDARONE) 200 MG tablet TAKE 1 TABLET BY MOUTH DAILY 90 tablet 3    metoprolol succinate (TOPROL XL) 25 MG extended release tablet Take 0.5 tablets by mouth daily 45 tablet 3    sacubitril-valsartan (ENTRESTO) 24-26 MG per tablet Take 0.5 tablets by mouth 2 times daily 90 tablet 3    Cyanocobalamin (VITAMIN B 12) 500 MCG TABS Take 500 mcg by mouth daily           Medications patient taking as of now reconciled against medications ordered at time of hospital discharge: Yes        Objective:    /66 (Site: Left Upper Arm, Position: Sitting, Cuff Size: Medium Adult)   Pulse 67   Ht 5' (1.524 m)   Wt 144 lb (65.3 kg)   SpO2 96%   BMI 28.12 kg/m²   Nad - alert, clear  Mild swelling left ankle  Abd mildly tender to palpate left side  Cv rrr  Lungs quiet but clear      An electronic signature was used to authenticate this note.   --Miguelina Olson MD

## 2022-11-21 ENCOUNTER — TELEPHONE (OUTPATIENT)
Dept: FAMILY MEDICINE CLINIC | Age: 82
End: 2022-11-21

## 2022-11-21 ENCOUNTER — OFFICE VISIT (OUTPATIENT)
Dept: CARDIOLOGY CLINIC | Age: 82
End: 2022-11-21
Payer: MEDICARE

## 2022-11-21 VITALS
BODY MASS INDEX: 27.48 KG/M2 | HEART RATE: 62 BPM | OXYGEN SATURATION: 92 % | HEIGHT: 60 IN | SYSTOLIC BLOOD PRESSURE: 130 MMHG | DIASTOLIC BLOOD PRESSURE: 60 MMHG | WEIGHT: 140 LBS

## 2022-11-21 DIAGNOSIS — I50.22 CHRONIC SYSTOLIC HEART FAILURE (HCC): Primary | ICD-10-CM

## 2022-11-21 PROCEDURE — 3074F SYST BP LT 130 MM HG: CPT | Performed by: NURSE PRACTITIONER

## 2022-11-21 PROCEDURE — G8484 FLU IMMUNIZE NO ADMIN: HCPCS | Performed by: NURSE PRACTITIONER

## 2022-11-21 PROCEDURE — 1123F ACP DISCUSS/DSCN MKR DOCD: CPT | Performed by: NURSE PRACTITIONER

## 2022-11-21 PROCEDURE — G8400 PT W/DXA NO RESULTS DOC: HCPCS | Performed by: NURSE PRACTITIONER

## 2022-11-21 PROCEDURE — 1036F TOBACCO NON-USER: CPT | Performed by: NURSE PRACTITIONER

## 2022-11-21 PROCEDURE — 99214 OFFICE O/P EST MOD 30 MIN: CPT | Performed by: NURSE PRACTITIONER

## 2022-11-21 PROCEDURE — G8427 DOCREV CUR MEDS BY ELIG CLIN: HCPCS | Performed by: NURSE PRACTITIONER

## 2022-11-21 PROCEDURE — 1090F PRES/ABSN URINE INCON ASSESS: CPT | Performed by: NURSE PRACTITIONER

## 2022-11-21 PROCEDURE — 1111F DSCHRG MED/CURRENT MED MERGE: CPT | Performed by: NURSE PRACTITIONER

## 2022-11-21 PROCEDURE — 3078F DIAST BP <80 MM HG: CPT | Performed by: NURSE PRACTITIONER

## 2022-11-21 PROCEDURE — G8417 CALC BMI ABV UP PARAM F/U: HCPCS | Performed by: NURSE PRACTITIONER

## 2022-11-21 PROCEDURE — 93000 ELECTROCARDIOGRAM COMPLETE: CPT | Performed by: NURSE PRACTITIONER

## 2022-11-21 RX ORDER — OXYCODONE HYDROCHLORIDE 5 MG/1
5 CAPSULE ORAL EVERY 6 HOURS PRN
COMMUNITY

## 2022-11-21 NOTE — PROGRESS NOTES
The [address] [facility], [address] [place] [address] [address] [address]  [phone]    PrimaryCare Doctor:  Keaton Brush MD  Primary Cardiologist: Dr Katya Matson   Patient presents with    Follow-up     Chronic systolic congestive heart failure. History of Present Illness: Anita Macario is a 78 y.o. female with PMH CAD, inferioanterolateral STEMI 7/2021 , SHF (7/2021), NICM, PAF (warfarin), BRENNAN, CLL s/p spleenectomy, factor V mutation. Former smoker. Patient was admitted to [facility] 7/19-7/26/2021 with CP. EKG indicated inferoanterolateral STEMI. Went for emergent LHC>patent coronaries. Found acute systolic heart failure, EF initially <10%. Unclear etiology. Required impella support and inotropes. Started on GDMT and midodrine for low BP. Hem/Onc consulted with possible concern for hx chemo R/T CM and noted that it was unlikely. DC wt 166lbs. Was doing well as OP until readmission 9/11-9/18/21 with acute resp failure. Underwent RHC and ruled out for fluid overload/pulm edema. Treated for CAP. Held entresto at DC and continued torsemide 20mg daily. DC wt 142lbs. Admitted W 10/21-10/23/2021 with RML PNA. ECHo this adm showed improved 45%. Adm to [facility] 11/2-11/12/2022 with C Diff. Coumadin changed to eliquis for AF. No bleeding. Patient presents to [facility] cardiology for follow up for heart failure. Recovering from [address] Cont with generalized weakness and fatigue. Her daughter is helping her at home. C/O SOB going up1 flight of steps. Checks her O2 sat, sometimes below 90, improves with intermittent O2 2L . This has been her baseline. Denies CP, LH, dizziness, palpitations, syncope, swelling, orthopnea, wt gain. She is following 2gm Na diet and 64 fl oz restriction. Review of Systems:   General: Denies fever, chills  Skin: Denies skin changes, rash, itching, lesions.   HEENT: Denies headache, dizziness, vision changes, nosebleeds, sore throat, nasal drainage  RESP: Denies cough, sputum,wheeze , snoring  CARD: Denies palpitations,  murmur  GI:Denies nausea, vomiting, heartburn, loss of appetite, change in bowels  : Denies frequency, pain, incontinence, polyuria  VASC: Denies claudication, leg cramps, clots  MUSC/SKEL: Denies pain, stiffness, arthritis  PSYCH: Denies anxiety, depression, stress  NEURO: Denies numbness, tingling, weakness,change in mood or memory  HEME: Denies abn bruising, bleeding, anemia  ENDO: Denies intolerance to heat, cold, excessive thirst or hunger, hx thyroid disease    /60 (Site: Left Upper Arm, Position: Sitting)   Pulse 62   Ht 5' (1.524 m)   Wt 140 lb (63.5 kg)   SpO2 92%   BMI 27.34 kg/m²   Wt Readings from Last 3 Encounters:   11/21/22 140 lb (63.5 kg)   11/18/22 144 lb (65.3 kg)   11/12/22 153 lb 10.6 oz (69.7 kg)     Physical Exam:  GEN: Appears frail, no acute distress  SKIN: Pink, warm, dry. Nails without clubbing. HEENT: PERRLA. Normocephalic, atraumatic. Neck supple. No adenopathy. LUNG: AP diameter normal.  Diminished bilateral. Few exp wheeze, no crackles. Respiratory effort increased with activity. HEART: S1S2 A/R. No JVD. No carotid bruit. No murmur, rub or gallop. ABD: Soft, nontender. +BS X 4 quads. No hepatomegaly. EXT: Radial and pedal pulses 2+ and symmetric. Without varicosities. Trace pedal/ankle edema. MUSCSKEL: Good ROM X4 extremities. No deformity. NEURO: A/O X3. Calm and cooperative. Past Medical History:   has a past medical history of Acute ST elevation myocardial infarction (STEMI) ([facility] [address] Atrial fibrillation ([facility] [address] Chronic lymphocytic leukemia in remission ([facility] [address] CLL (chronic lymphocytic leukemia) ([facility] [address] COPD (chronic obstructive pulmonary disease) ([facility] [address] Essential hypertension, Factor V Leiden mutation ([facility] [address] Goiter, Hypercholesterolemia, Impaired fasting glucose, Osteoarthritis, Osteoporosis, Post herpetic neuralgia, and Vitamin D deficiency.   Surgical History: has a past surgical history that includes Splenectomy; knee surgery (); Cataract removal with implant (12); Tunneled venous port placement; Upper gastrointestinal endoscopy (N/A, 2019); Mohs surgery (2019); and joint replacement. Social History:   reports that she quit smoking about 27 years ago. Her smoking use included cigarettes. She has a 15.00 pack-year smoking history. She has never used smokeless tobacco. She reports that she does not currently use drugs. She reports that she does not drink alcohol. Family History:   Family History   Problem Relation Age of Onset    Diabetes Father     Stroke Sister 50         of a stroke       HomeMedications:  Prior to Admission medications    Medication Sig Start Date End Date Taking? Authorizing Provider   oxyCODONE 5 MG capsule Take 5 mg by mouth every 6 hours as needed for Pain. Yes Historical Provider, MD   vancomycin (VANCOCIN) 125 MG capsule Take 1 capsule by mouth daily for 7 doses 22 Yes Kelley Foley MD   famotidine (PEPCID) 20 MG tablet Take 1 tablet by mouth 2 times daily 22  Yes Kelley Foley MD   butalbital-acetaminophen-caffeine (FIORICET, ESGIC) -40 MG per tablet Take 1 tablet by mouth every 6 hours as needed for Headaches 22  Yes Kelley Foley MD   calcium carbonate (TUMS) 500 MG chewable tablet Take 1 tablet by mouth 3 times daily as needed for Heartburn 22 Yes Kelley Foley MD   apixaban (ELIQUIS) 5 MG TABS tablet Take 1 tablet by mouth 2 times daily 22  Yes Kelley Foley MD   gabapentin (NEURONTIN) 300 MG capsule Take 1 capsule by mouth at bedtime for 30 days.  2 caps po nightly 22 Yes Kelley Foley MD   amiodarone (CORDARONE) 200 MG tablet TAKE 1 TABLET BY MOUTH DAILY 22  Yes Rocio Alejandra MD   metoprolol succinate (TOPROL XL) 25 MG extended release tablet Take 0.5 tablets by mouth daily 12/15/21  Yes Pura Wen, APRN - CNP sacubitril-valsartan (ENTRESTO) 24-26 MG per tablet Take 0.5 tablets by mouth 2 times daily 11/15/21  Yes FAB Chávez CNP   Cyanocobalamin (VITAMIN B 12) 500 MCG TABS Take 500 mcg by mouth daily    Yes Historical Provider, MD   metroNIDAZOLE (FLAGYL) 500 MG tablet Take 1 tablet by mouth 5/13/22   Historical Provider, MD        Allergies:  Patient has no known allergies. LABS: Results reviewed with patient today.     CBC:   Lab Results   Component Value Date/Time    WBC 27.1 11/11/2022 06:14 AM    WBC 31.8 11/10/2022 05:32 AM    WBC 31.5 11/09/2022 05:30 AM    RBC 3.48 11/11/2022 06:14 AM    RBC 3.77 11/10/2022 05:32 AM    RBC 3.58 11/09/2022 05:30 AM    RBC 4.62 06/23/2017 12:10 PM    RBC 4.70 02/03/2017 02:20 PM    RBC 4.97 07/29/2016 02:41 PM    HGB 10.6 11/11/2022 06:14 AM    HGB 11.6 11/10/2022 05:32 AM    HGB 11.1 11/09/2022 05:30 AM    HCT 32.7 11/11/2022 06:14 AM    HCT 35.0 11/10/2022 05:32 AM    HCT 33.0 11/09/2022 05:30 AM    MCV 93.9 11/11/2022 06:14 AM    MCV 92.9 11/10/2022 05:32 AM    MCV 92.3 11/09/2022 05:30 AM    RDW 15.3 11/11/2022 06:14 AM    RDW 15.6 11/10/2022 05:32 AM    RDW 15.6 11/09/2022 05:30 AM     11/11/2022 06:14 AM     11/10/2022 05:32 AM     11/09/2022 05:30 AM     BMP:  Lab Results   Component Value Date/Time     11/18/2022 02:41 PM     11/11/2022 06:14 AM     11/10/2022 05:32 AM    K 4.3 11/18/2022 02:41 PM    K 3.7 11/11/2022 06:14 AM    K 3.6 11/10/2022 05:32 AM    K 3.3 11/03/2022 12:35 AM    K 3.4 10/21/2021 11:57 PM    K 3.4 09/12/2021 07:56 AM     11/18/2022 02:41 PM     11/11/2022 06:14 AM     11/10/2022 05:32 AM    CO2 24 11/18/2022 02:41 PM    CO2 32 11/11/2022 06:14 AM    CO2 30 11/10/2022 05:32 AM    PHOS 3.2 11/18/2022 02:41 PM    PHOS 3.7 11/11/2022 06:14 AM    PHOS 3.1 11/10/2022 05:32 AM    BUN 8 11/18/2022 02:41 PM    BUN 5 11/11/2022 06:14 AM    BUN 5 11/10/2022 05:32 AM    CREATININE 0.8 11/18/2022 02:41 PM    CREATININE 0.5 11/11/2022 06:14 AM    CREATININE 0.6 11/10/2022 05:32 AM     BNP:   Lab Results   Component Value Date/Time    PROBNP 463 11/03/2022 12:35 AM    PROBNP 1,215 05/05/2022 04:01 PM    PROBNP 1,879 10/21/2021 11:57 PM       Parameters:   > 450 pg/mL under age 48  > 900 pg/mL ages 54-65  > 1800 pg/mL over age 76    Iron Studies:  No results found for: TIBC, FERRITIN  GLUCOSE:   Recent Labs     11/18/22  1441   GLUCOSE 156*     LIVER PROFILE:   Lab Results   Component Value Date/Time    AST 17 11/18/2022 02:41 PM    ALT 10 11/18/2022 02:41 PM    LIPASE 9.0 11/03/2022 12:35 AM    LABALBU 3.6 11/18/2022 02:41 PM    BILIDIR <0.2 11/07/2022 04:30 AM    BILITOT <0.2 11/18/2022 02:41 PM    ALKPHOS 100 11/18/2022 02:41 PM     PT/INR:   Lab Results   Component Value Date/Time    PROTIME 37.4 11/11/2022 06:14 AM    INR 3.75 11/11/2022 06:14 AM     Cardiac Enzymes:  Lab Results   Component Value Date/Time    TROPONINI <0.01 11/03/2022 12:35 AM     FASTING LIPID PANEL:  Lab Results   Component Value Date/Time    CHOL 259 04/20/2021 08:54 AM    HDL 55 04/20/2021 08:54 AM    HDL 62 11/08/2011 10:00 AM    LDLCALC 178 04/20/2021 08:54 AM    TRIG 128 04/20/2021 08:54 AM       Cardiac Imaging: Reports reviewed with patient today. EKG: Today, SB 55  Anterlateral and inferior infarct    ECHO:     Summary 7/19/2021  Impella well seated LV measuring 3.2cm from inlet to AV. Ejection fraction is visually estimated to be 15-20%. Regional wall motion abnormalities are noted. Summary 7/20/2021  Impella device well seated in LV and Aorta measuring 3.1cm  Ejection fraction is visually estimated to be <10%. , Severe hypokinesis except for the bases  Unchanged from prior study    Summary 7/26/2021  Ejection fraction is visually estimated to be 45-50%. There is mid to apical akinesis. No evidence of left ventricular mass or thrombus noted. There is a moderate left pleural effusion.   Estimated pulmonary artery systolic pressure is moderately elevated at 63 mmHg assuming a right atrial pressure of 8 mmHg. CATH:  Cath: 7/19/21  RA 7  RV 22/7  PA 30/17/22  PCWP 20   PA % 52  AO % 97  CO/CI 2.22 L/min 1.3 L/min/m2  SVR 2763 dynes . Sec/cm-5   dynes . Sec/cm-5  TPG 2   0.4  The left main coronary artery is normal .   The left anterior descending artery is normal .   The left circumflex artery is normal .   The right coronary artery is normal .  Left ventricular angiogram was done in the 30° MARCELO projection and revealed severe LV systolic dysfunction   with an estimated ejection fraction of 15%. ECHO 8/23/2022  Summary  Limited study. Ejection fraction is visually estimated to be 55-60 %. Diastolic function not measured due to limited study. The left atrium is mildly dilated. The aortic valve is structurally normal. There is no significant aortic valve stenosis. Trivial AI. The right atrium is mildly dilated. Assessment/Plan:      1.) Chronic systolic heart failure EF <10%, improved to 45% and to 55-60% most recent ECHO: Initial etiology unclear. Appears euvolemic today. SOB is at baseline. Continue current GDMT. NYHA Class III   Stage C  Diuretic:  none  Beta Blocker: Toprol XL  ACEi/ARB/ARNI:entresto  Aldosterone Antagonist: none  SGLT2 Inhibitor: none  2gm Na diet, daily weight, 64 oz fluid restriction  Avoid NSAIDS and other nephrotoxic meds  Cardiac Rehab: Not indicated for EF>35%  ICD: Not indicated for EF>35%  [facility] Referral: yes  Not a candidate for advanced HF therapy such as LVAD per Dr. Mynor Contreras (discussed with [facility] HF team). 2.) Paroxysmal Atrial Fib: NSR/Sinus walt  CHADSVASC- 4    HASBLED-  Thromboembolic risk reduction: eliquis  Rate Control/ Rhythm Control: Toprol XL 25mg daily, Amiodarone  Thyroid fx, liver fx annually  PFT:  EKG at least annually     3.) Hypotension:   midodrine to 2.5mg BID- stopped, BP now OK. Follow up for routine visit 3 months.       I appreciate the opportunity of cooperating in the care of this individual.    ELIAS Edmonds, APRN - CNP, CNP, 11/21/2022,10:16 AM

## 2022-11-21 NOTE — TELEPHONE ENCOUNTER
[facility]  - [facility] @  [phone]    TEST FOR IONIZED CALCIUM - REJECTED BECAUSE OF SPECIFIC OR SPECIAL HANDLING    COULDN'T BE OPENED

## 2022-11-28 NOTE — TELEPHONE ENCOUNTER
Nader Sagastume called and stated that the pharmacy told them to call the doctors office for the refill of lorazepam       [address], [address] [place] [address] [phone] - F [phone]

## 2022-11-29 ENCOUNTER — TELEPHONE (OUTPATIENT)
Dept: CARDIOLOGY CLINIC | Age: 82
End: 2022-11-29

## 2022-11-29 RX ORDER — LORAZEPAM 0.5 MG/1
TABLET ORAL
Qty: 15 TABLET | Refills: 0 | Status: SHIPPED | OUTPATIENT
Start: 2022-11-29 | End: 2022-12-21

## 2022-11-29 NOTE — TELEPHONE ENCOUNTER
Dr. Maribeth Balbuena,     Please review previous message regarding cardiac clearance and advise, thanks.

## 2022-11-29 NOTE — TELEPHONE ENCOUNTER
CARDIAC CLEARANCE     Procedure: colonoscopy   : Dr. Brett Siddiqui   Date: 01/19/2023    Medications needing held: Eliquis      How long?: 2 days prior     Phone Number and Contact Name for Physicians office: [phone]  Fax number to send information: [phone]    Clinical staff:    Cardiologist: Dr. Selwyn Lanes   Last Appointment: 11/21/2022  Next Appointment:03/2/23  Cardiac History: htn, hypercholesterolemia, afib, and chf

## 2022-12-01 NOTE — TELEPHONE ENCOUNTER
Please update patient and a letter to GI. Thanks.        MD Margie Shah, RN; [facility] Nurse 5 hours ago (8:28 AM)     Low risk, okay to hold

## 2022-12-12 ENCOUNTER — OFFICE VISIT (OUTPATIENT)
Dept: PULMONOLOGY | Age: 82
End: 2022-12-12
Payer: MEDICARE

## 2022-12-12 VITALS
BODY MASS INDEX: 27.8 KG/M2 | HEIGHT: 60 IN | HEART RATE: 53 BPM | SYSTOLIC BLOOD PRESSURE: 130 MMHG | RESPIRATION RATE: 16 BRPM | DIASTOLIC BLOOD PRESSURE: 70 MMHG | WEIGHT: 141.6 LBS | OXYGEN SATURATION: 94 % | TEMPERATURE: 97 F

## 2022-12-12 DIAGNOSIS — J41.0 SIMPLE CHRONIC BRONCHITIS (HCC): ICD-10-CM

## 2022-12-12 DIAGNOSIS — J96.11 CHRONIC RESPIRATORY FAILURE WITH HYPOXIA (HCC): ICD-10-CM

## 2022-12-12 PROBLEM — A41.9 SEPSIS (HCC): Status: RESOLVED | Noted: 2021-09-12 | Resolved: 2022-12-12

## 2022-12-12 PROCEDURE — 3074F SYST BP LT 130 MM HG: CPT | Performed by: INTERNAL MEDICINE

## 2022-12-12 PROCEDURE — G8484 FLU IMMUNIZE NO ADMIN: HCPCS | Performed by: INTERNAL MEDICINE

## 2022-12-12 PROCEDURE — G8427 DOCREV CUR MEDS BY ELIG CLIN: HCPCS | Performed by: INTERNAL MEDICINE

## 2022-12-12 PROCEDURE — G8417 CALC BMI ABV UP PARAM F/U: HCPCS | Performed by: INTERNAL MEDICINE

## 2022-12-12 PROCEDURE — 1090F PRES/ABSN URINE INCON ASSESS: CPT | Performed by: INTERNAL MEDICINE

## 2022-12-12 PROCEDURE — 99213 OFFICE O/P EST LOW 20 MIN: CPT | Performed by: INTERNAL MEDICINE

## 2022-12-12 PROCEDURE — 1111F DSCHRG MED/CURRENT MED MERGE: CPT | Performed by: INTERNAL MEDICINE

## 2022-12-12 PROCEDURE — 3078F DIAST BP <80 MM HG: CPT | Performed by: INTERNAL MEDICINE

## 2022-12-12 PROCEDURE — 3023F SPIROM DOC REV: CPT | Performed by: INTERNAL MEDICINE

## 2022-12-12 PROCEDURE — 1036F TOBACCO NON-USER: CPT | Performed by: INTERNAL MEDICINE

## 2022-12-12 PROCEDURE — 1123F ACP DISCUSS/DSCN MKR DOCD: CPT | Performed by: INTERNAL MEDICINE

## 2022-12-12 PROCEDURE — G8400 PT W/DXA NO RESULTS DOC: HCPCS | Performed by: INTERNAL MEDICINE

## 2022-12-12 NOTE — PROGRESS NOTES
REASON FOR CONSULTATION/CC:    Chief Complaint   Patient presents with    Cough    Follow-up           PCP: Leann Flynn MD    HISTORY OF PRESENT ILLNESS: Annelise Stout is a 78y.o. year old female with a history of chronic hypoxemia  who presents      History  She has a history of CLL and was admitted October 2021 for community-acquired pneumonia. Was discharged on oxygen. Extensive history of smoking. Current history     COPD  Out of Stiolto. States run ou of refills. Only notice it with stairs. Chronic hypoxemia   2L nC as needed for desaturation. Some times needed after walking. Objective:   PHYSICAL EXAM:  Blood pressure 130/70, pulse 53, temperature 97 °F (36.1 °C), temperature source Infrared, resp. rate 16, height 5' (1.524 m), weight 141 lb 9.6 oz (64.2 kg), SpO2 94 %, not currently breastfeeding.'  Gen: No distress. Eyes: PERRL. No sclera icterus. No conjunctival injection. ENT: No discharge. Pharynx clear. External appearance of ears and nose normal.  Neck: Trachea midline. No obvious mass. Resp: No accessory muscle use. No crackles. No wheezes. No rhonchi. CV: Regular rate. Regular rhythm. No murmur or rub. No edema. GI:    Skin: Warm, dry, normal texture and turgor. No nodule on exposed extremities. Lymph: No cervical LAD. No supraclavicular LAD. M/S: No cyanosis. No clubbing. No joint deformity. Neuro: Moves all four extremities. Psych: Oriented x 3. No anxiety. Awake. Alert. Intact judgement and insight. Data Reviewed:          Assessment:     Cough   pneumonia 2021  Hx smoking   Chronic hypoxemia  Fatigue  Copd? Plan:      Problem List Items Addressed This Visit       Simple chronic bronchitis ([address]     She is doing well off Stiolto. Still continue to monitor off Stiolto. Chronic respiratory failure with hypoxia ([address]     Annelise Stout continues to need and benefit from supplemental oxygen.  she is using oxygen at 2  L NC prn .                        This note was transcribed using [address] [employer]. Please disregard any translational errors.     Joan [facility], [facility] and [facility] [phone]

## 2022-12-12 NOTE — ASSESSMENT & PLAN NOTE
William Marvin continues to need and benefit from supplemental oxygen. she is using oxygen at 2  L NC prn .

## 2022-12-19 DIAGNOSIS — F43.22 ADJUSTMENT DISORDER WITH ANXIETY: ICD-10-CM

## 2022-12-21 RX ORDER — LORAZEPAM 0.5 MG/1
TABLET ORAL
Qty: 15 TABLET | Refills: 2 | Status: SHIPPED | OUTPATIENT
Start: 2022-12-21 | End: 2023-01-20

## 2023-01-23 RX ORDER — SACUBITRIL AND VALSARTAN 24; 26 MG/1; MG/1
TABLET, FILM COATED ORAL
Qty: 90 TABLET | Refills: 3 | Status: SHIPPED | OUTPATIENT
Start: 2023-01-23

## 2023-01-24 ENCOUNTER — TELEPHONE (OUTPATIENT)
Dept: FAMILY MEDICINE CLINIC | Age: 83
End: 2023-01-24

## 2023-01-24 NOTE — TELEPHONE ENCOUNTER
PATIENT WAS PRESCRIBED ELIQUIS FROM THE HOSPITAL DOCTOR AND NOW SHE IS OUT AND CAN'T GET A HOLD OF THE Erm[address]im[address] PLEASE CALL THIS IN FOR HER OR PUT HER BACK ON Rian Minus?     [facility] [address] [facility] [address], [address] [facility],First Floor - F [phone]    PT @  [phone]

## 2023-02-27 RX ORDER — METOPROLOL SUCCINATE 25 MG/1
TABLET, EXTENDED RELEASE ORAL
Qty: 45 TABLET | Refills: 3 | Status: SHIPPED | OUTPATIENT
Start: 2023-02-27

## 2023-02-27 NOTE — TELEPHONE ENCOUNTER
Last OV:  11/21/22  Next OV:  3/2/23  Last refill: 12/15/21  #45  3 R/F  Most recent Labs: 11/18/22  Last EKG (if needed): 11/21/22

## 2023-03-07 RX ORDER — GABAPENTIN 300 MG/1
CAPSULE ORAL
Qty: 60 CAPSULE | Refills: 0 | Status: SHIPPED | OUTPATIENT
Start: 2023-03-07 | End: 2023-04-07

## 2023-03-08 ENCOUNTER — APPOINTMENT (OUTPATIENT)
Dept: CT IMAGING | Age: 83
End: 2023-03-08
Payer: MEDICARE

## 2023-03-08 ENCOUNTER — APPOINTMENT (OUTPATIENT)
Dept: GENERAL RADIOLOGY | Age: 83
End: 2023-03-08
Payer: MEDICARE

## 2023-03-08 ENCOUNTER — HOSPITAL ENCOUNTER (OUTPATIENT)
Age: 83
Setting detail: OBSERVATION
LOS: 1 days | Discharge: HOME | End: 2023-03-10
Attending: EMERGENCY MEDICINE | Admitting: INTERNAL MEDICINE
Payer: MEDICARE

## 2023-03-08 DIAGNOSIS — R07.9 CHEST PAIN, UNSPECIFIED TYPE: Primary | ICD-10-CM

## 2023-03-08 DIAGNOSIS — R77.8 ELEVATED TROPONIN: ICD-10-CM

## 2023-03-08 PROBLEM — R79.89 ELEVATED TROPONIN: Status: ACTIVE | Noted: 2023-03-08

## 2023-03-08 PROBLEM — I21.4 NSTEMI (NON-ST ELEVATED MYOCARDIAL INFARCTION) (HCC): Status: ACTIVE | Noted: 2023-03-08

## 2023-03-08 PROBLEM — R51.9 HEADACHE: Status: ACTIVE | Noted: 2023-03-08

## 2023-03-08 PROBLEM — R11.2 NAUSEA AND VOMITING: Status: ACTIVE | Noted: 2023-03-08

## 2023-03-08 LAB
A/G RATIO: 1.5 (ref 1.1–2.2)
ALBUMIN SERPL-MCNC: 3.9 G/DL (ref 3.4–5)
ALP BLD-CCNC: 76 U/L (ref 40–129)
ALT SERPL-CCNC: 12 U/L (ref 10–40)
AMPHETAMINE SCREEN, URINE: ABNORMAL
ANION GAP SERPL CALCULATED.3IONS-SCNC: 11 MMOL/L (ref 3–16)
AST SERPL-CCNC: 18 U/L (ref 15–37)
BACTERIA: NORMAL /HPF
BANDED NEUTROPHILS RELATIVE PERCENT: 1 % (ref 0–7)
BARBITURATE SCREEN URINE: ABNORMAL
BASOPHILS ABSOLUTE: 0 K/UL (ref 0–0.2)
BASOPHILS RELATIVE PERCENT: 0 %
BENZODIAZEPINE SCREEN, URINE: ABNORMAL
BILIRUB SERPL-MCNC: 0.6 MG/DL (ref 0–1)
BILIRUBIN URINE: NEGATIVE
BLOOD, URINE: NEGATIVE
BUN BLDV-MCNC: 11 MG/DL (ref 7–20)
CALCIUM SERPL-MCNC: 9.3 MG/DL (ref 8.3–10.6)
CANNABINOID SCREEN URINE: ABNORMAL
CHLORIDE BLD-SCNC: 100 MMOL/L (ref 99–110)
CLARITY: CLEAR
CO2: 25 MMOL/L (ref 21–32)
COCAINE METABOLITE SCREEN URINE: ABNORMAL
COLOR: YELLOW
CREAT SERPL-MCNC: 0.9 MG/DL (ref 0.6–1.2)
EOSINOPHILS ABSOLUTE: 0.2 K/UL (ref 0–0.6)
EOSINOPHILS RELATIVE PERCENT: 1 %
EPITHELIAL CELLS, UA: 1 /HPF (ref 0–5)
ETHANOL: NORMAL MG/DL (ref 0–0.08)
FENTANYL SCREEN, URINE: ABNORMAL
GFR SERPL CREATININE-BSD FRML MDRD: >60 ML/MIN/{1.73_M2}
GLUCOSE BLD-MCNC: 109 MG/DL (ref 70–99)
GLUCOSE URINE: NEGATIVE MG/DL
HCT VFR BLD CALC: 42.9 % (ref 36–48)
HEMATOLOGY PATH CONSULT: NO
HEMOGLOBIN: 14 G/DL (ref 12–16)
HYALINE CASTS: 0 /LPF (ref 0–8)
KETONES, URINE: 15 MG/DL
LACTATE DEHYDROGENASE: 180 U/L (ref 100–190)
LEUKOCYTE ESTERASE, URINE: NEGATIVE
LIPASE: 14 U/L (ref 13–60)
LYMPHOCYTES ABSOLUTE: 6.1 K/UL (ref 1–5.1)
LYMPHOCYTES RELATIVE PERCENT: 37 %
Lab: ABNORMAL
MCH RBC QN AUTO: 30.8 PG (ref 26–34)
MCHC RBC AUTO-ENTMCNC: 32.7 G/DL (ref 31–36)
MCV RBC AUTO: 94.3 FL (ref 80–100)
METHADONE SCREEN, URINE: ABNORMAL
MICROSCOPIC EXAMINATION: YES
MONOCYTES ABSOLUTE: 1.3 K/UL (ref 0–1.3)
MONOCYTES RELATIVE PERCENT: 8 %
NEUTROPHILS ABSOLUTE: 8.9 K/UL (ref 1.7–7.7)
NEUTROPHILS RELATIVE PERCENT: 53 %
NITRITE, URINE: NEGATIVE
OPIATE SCREEN URINE: ABNORMAL
OXYCODONE URINE: POSITIVE
PDW BLD-RTO: 14.6 % (ref 12.4–15.4)
PH UA: 6.5 (ref 5–8)
PH UA: 7
PHENCYCLIDINE SCREEN URINE: ABNORMAL
PLATELET # BLD: 273 K/UL (ref 135–450)
PLATELET SLIDE REVIEW: ADEQUATE
PMV BLD AUTO: 9.1 FL (ref 5–10.5)
POTASSIUM SERPL-SCNC: 3.6 MMOL/L (ref 3.5–5.1)
PRO-BNP: 1158 PG/ML (ref 0–449)
PROTEIN UA: 30 MG/DL
RAPID INFLUENZA  B AGN: NEGATIVE
RAPID INFLUENZA A AGN: NEGATIVE
RBC # BLD: 4.55 M/UL (ref 4–5.2)
RBC UA: 2 /HPF (ref 0–4)
SARS-COV-2, NAAT: NOT DETECTED
SLIDE REVIEW: ABNORMAL
SMUDGE CELLS: PRESENT
SODIUM BLD-SCNC: 136 MMOL/L (ref 136–145)
SPECIFIC GRAVITY UA: 1.01 (ref 1–1.03)
TOTAL PROTEIN: 6.5 G/DL (ref 6.4–8.2)
TROPONIN: 0.02 NG/ML
TROPONIN: 0.16 NG/ML
URINE REFLEX TO CULTURE: ABNORMAL
URINE TYPE: ABNORMAL
UROBILINOGEN, URINE: 0.2 E.U./DL
WBC # BLD: 16.5 K/UL (ref 4–11)
WBC UA: 1 /HPF (ref 0–5)

## 2023-03-08 PROCEDURE — 99285 EMERGENCY DEPT VISIT HI MDM: CPT

## 2023-03-08 PROCEDURE — 96374 THER/PROPH/DIAG INJ IV PUSH: CPT

## 2023-03-08 PROCEDURE — A4216 STERILE WATER/SALINE, 10 ML: HCPCS | Performed by: PHYSICIAN ASSISTANT

## 2023-03-08 PROCEDURE — 87804 INFLUENZA ASSAY W/OPTIC: CPT

## 2023-03-08 PROCEDURE — 96375 TX/PRO/DX INJ NEW DRUG ADDON: CPT

## 2023-03-08 PROCEDURE — 83880 ASSAY OF NATRIURETIC PEPTIDE: CPT

## 2023-03-08 PROCEDURE — 85730 THROMBOPLASTIN TIME PARTIAL: CPT

## 2023-03-08 PROCEDURE — 71260 CT THORAX DX C+: CPT | Performed by: PHYSICIAN ASSISTANT

## 2023-03-08 PROCEDURE — 2500000003 HC RX 250 WO HCPCS: Performed by: PHYSICIAN ASSISTANT

## 2023-03-08 PROCEDURE — 85025 COMPLETE CBC W/AUTO DIFF WBC: CPT

## 2023-03-08 PROCEDURE — 36415 COLL VENOUS BLD VENIPUNCTURE: CPT

## 2023-03-08 PROCEDURE — 71046 X-RAY EXAM CHEST 2 VIEWS: CPT

## 2023-03-08 PROCEDURE — 6360000002 HC RX W HCPCS: Performed by: PHYSICIAN ASSISTANT

## 2023-03-08 PROCEDURE — 80307 DRUG TEST PRSMV CHEM ANLYZR: CPT

## 2023-03-08 PROCEDURE — 83615 LACTATE (LD) (LDH) ENZYME: CPT

## 2023-03-08 PROCEDURE — 80053 COMPREHEN METABOLIC PANEL: CPT

## 2023-03-08 PROCEDURE — 81001 URINALYSIS AUTO W/SCOPE: CPT

## 2023-03-08 PROCEDURE — 6360000004 HC RX CONTRAST MEDICATION: Performed by: PHYSICIAN ASSISTANT

## 2023-03-08 PROCEDURE — 87635 SARS-COV-2 COVID-19 AMP PRB: CPT

## 2023-03-08 PROCEDURE — 6370000000 HC RX 637 (ALT 250 FOR IP): Performed by: PHYSICIAN ASSISTANT

## 2023-03-08 PROCEDURE — 2580000003 HC RX 258: Performed by: PHYSICIAN ASSISTANT

## 2023-03-08 PROCEDURE — 83690 ASSAY OF LIPASE: CPT

## 2023-03-08 PROCEDURE — 84484 ASSAY OF TROPONIN QUANT: CPT

## 2023-03-08 PROCEDURE — 93005 ELECTROCARDIOGRAM TRACING: CPT | Performed by: EMERGENCY MEDICINE

## 2023-03-08 PROCEDURE — 82077 ASSAY SPEC XCP UR&BREATH IA: CPT

## 2023-03-08 PROCEDURE — 74177 CT ABD & PELVIS W/CONTRAST: CPT

## 2023-03-08 RX ORDER — HEPARIN SODIUM 1000 [USP'U]/ML
30 INJECTION, SOLUTION INTRAVENOUS; SUBCUTANEOUS PRN
Status: DISCONTINUED | OUTPATIENT
Start: 2023-03-08 | End: 2023-03-09

## 2023-03-08 RX ORDER — HEPARIN SODIUM 10000 [USP'U]/100ML
0-4000 INJECTION, SOLUTION INTRAVENOUS CONTINUOUS
Status: DISCONTINUED | OUTPATIENT
Start: 2023-03-09 | End: 2023-03-09

## 2023-03-08 RX ORDER — ACETAMINOPHEN/DIPHENHYDRAMINE 500MG-25MG
1 TABLET ORAL NIGHTLY PRN
COMMUNITY

## 2023-03-08 RX ORDER — ASPIRIN 81 MG/1
324 TABLET, CHEWABLE ORAL ONCE
Status: COMPLETED | OUTPATIENT
Start: 2023-03-08 | End: 2023-03-08

## 2023-03-08 RX ORDER — ONDANSETRON 2 MG/ML
4 INJECTION INTRAMUSCULAR; INTRAVENOUS ONCE
Status: COMPLETED | OUTPATIENT
Start: 2023-03-08 | End: 2023-03-08

## 2023-03-08 RX ORDER — LORAZEPAM 0.5 MG/1
1 TABLET ORAL 3 TIMES DAILY PRN
COMMUNITY
Start: 2023-03-08

## 2023-03-08 RX ORDER — HEPARIN SODIUM 1000 [USP'U]/ML
60 INJECTION, SOLUTION INTRAVENOUS; SUBCUTANEOUS PRN
Status: DISCONTINUED | OUTPATIENT
Start: 2023-03-08 | End: 2023-03-09

## 2023-03-08 RX ORDER — HEPARIN SODIUM 1000 [USP'U]/ML
4000 INJECTION, SOLUTION INTRAVENOUS; SUBCUTANEOUS ONCE
Status: COMPLETED | OUTPATIENT
Start: 2023-03-09 | End: 2023-03-09

## 2023-03-08 RX ADMIN — ONDANSETRON 4 MG: 2 INJECTION INTRAMUSCULAR; INTRAVENOUS at 20:00

## 2023-03-08 RX ADMIN — ASPIRIN 81 MG CHEWABLE TABLET 324 MG: 81 TABLET CHEWABLE at 23:25

## 2023-03-08 RX ADMIN — FAMOTIDINE 20 MG: 10 INJECTION, SOLUTION INTRAVENOUS at 19:58

## 2023-03-08 RX ADMIN — IOPAMIDOL 75 ML: 755 INJECTION, SOLUTION INTRAVENOUS at 20:18

## 2023-03-08 ASSESSMENT — PAIN DESCRIPTION - FREQUENCY: FREQUENCY: CONTINUOUS

## 2023-03-08 ASSESSMENT — ENCOUNTER SYMPTOMS
ABDOMINAL PAIN: 1
SHORTNESS OF BREATH: 1
COLOR CHANGE: 0
DIARRHEA: 0
BACK PAIN: 1
VOMITING: 1
NAUSEA: 1

## 2023-03-08 ASSESSMENT — PAIN DESCRIPTION - PAIN TYPE: TYPE: ACUTE PAIN

## 2023-03-08 ASSESSMENT — LIFESTYLE VARIABLES
HOW OFTEN DO YOU HAVE A DRINK CONTAINING ALCOHOL: MONTHLY OR LESS
HOW MANY STANDARD DRINKS CONTAINING ALCOHOL DO YOU HAVE ON A TYPICAL DAY: 1 OR 2

## 2023-03-08 ASSESSMENT — PAIN DESCRIPTION - ORIENTATION: ORIENTATION: MID

## 2023-03-08 ASSESSMENT — PAIN SCALES - GENERAL: PAINLEVEL_OUTOF10: 6

## 2023-03-08 ASSESSMENT — PAIN DESCRIPTION - LOCATION: LOCATION: BACK

## 2023-03-08 ASSESSMENT — PAIN DESCRIPTION - DESCRIPTORS: DESCRIPTORS: ACHING

## 2023-03-08 NOTE — ED PROVIDER NOTES
Patient seen and evaluated by EMILY. EKG: Normal sinus rhythm with a rate of 76. Left axis deviation. QTc prolonged at 468. Otherwise normal intervals and durations. Nonspecific ST and T wave changes appreciated. No acute signs of ischemia. Nonspecific ST and T wave changes appear to be new when compared to previous EKG on 11/21/2022.      Zehra John DO  03/08/23 9311

## 2023-03-09 LAB
ALBUMIN SERPL-MCNC: 3.3 G/DL (ref 3.4–5)
ANION GAP SERPL CALCULATED.3IONS-SCNC: 10 MMOL/L (ref 3–16)
APTT: 29.2 SEC (ref 23–34.3)
APTT: 52 SEC (ref 23–34.3)
APTT: 84.6 SEC (ref 23–34.3)
BASOPHILS ABSOLUTE: 0 K/UL (ref 0–0.2)
BASOPHILS RELATIVE PERCENT: 0 %
BLOOD SMEAR REVIEW: NORMAL
BUN BLDV-MCNC: 12 MG/DL (ref 7–20)
CALCIUM SERPL-MCNC: 8.7 MG/DL (ref 8.3–10.6)
CHLORIDE BLD-SCNC: 103 MMOL/L (ref 99–110)
CO2: 28 MMOL/L (ref 21–32)
CREAT SERPL-MCNC: 0.9 MG/DL (ref 0.6–1.2)
EKG ATRIAL RATE: 51 BPM
EKG ATRIAL RATE: 55 BPM
EKG ATRIAL RATE: 76 BPM
EKG DIAGNOSIS: NORMAL
EKG P AXIS: 51 DEGREES
EKG P AXIS: 56 DEGREES
EKG P AXIS: 77 DEGREES
EKG P-R INTERVAL: 180 MS
EKG P-R INTERVAL: 184 MS
EKG P-R INTERVAL: 196 MS
EKG Q-T INTERVAL: 416 MS
EKG Q-T INTERVAL: 460 MS
EKG Q-T INTERVAL: 508 MS
EKG QRS DURATION: 102 MS
EKG QTC CALCULATION (BAZETT): 423 MS
EKG QTC CALCULATION (BAZETT): 468 MS
EKG QTC CALCULATION (BAZETT): 485 MS
EKG R AXIS: -33 DEGREES
EKG R AXIS: -36 DEGREES
EKG R AXIS: -52 DEGREES
EKG T AXIS: 105 DEGREES
EKG T AXIS: 66 DEGREES
EKG T AXIS: 80 DEGREES
EKG VENTRICULAR RATE: 51 BPM
EKG VENTRICULAR RATE: 55 BPM
EKG VENTRICULAR RATE: 76 BPM
EOSINOPHILS ABSOLUTE: 0.3 K/UL (ref 0–0.6)
EOSINOPHILS RELATIVE PERCENT: 2 %
GFR SERPL CREATININE-BSD FRML MDRD: >60 ML/MIN/{1.73_M2}
GLUCOSE BLD-MCNC: 85 MG/DL (ref 70–99)
GLUCOSE BLD-MCNC: 88 MG/DL (ref 70–99)
HCT VFR BLD CALC: 39.2 % (ref 36–48)
HEMATOLOGY PATH CONSULT: NO
HEMATOLOGY PATH CONSULT: NORMAL
HEMOGLOBIN: 12.7 G/DL (ref 12–16)
LYMPHOCYTES ABSOLUTE: 12.6 K/UL (ref 1–5.1)
LYMPHOCYTES RELATIVE PERCENT: 76 %
MAGNESIUM: 2 MG/DL (ref 1.8–2.4)
MCH RBC QN AUTO: 30.9 PG (ref 26–34)
MCHC RBC AUTO-ENTMCNC: 32.3 G/DL (ref 31–36)
MCV RBC AUTO: 95.6 FL (ref 80–100)
MONOCYTES ABSOLUTE: 0.3 K/UL (ref 0–1.3)
MONOCYTES RELATIVE PERCENT: 2 %
NEUTROPHILS ABSOLUTE: 3.3 K/UL (ref 1.7–7.7)
NEUTROPHILS RELATIVE PERCENT: 20 %
PDW BLD-RTO: 14.9 % (ref 12.4–15.4)
PERFORMED ON: NORMAL
PHOSPHORUS: 4.5 MG/DL (ref 2.5–4.9)
PLATELET # BLD: 251 K/UL (ref 135–450)
PLATELET SLIDE REVIEW: ADEQUATE
PMV BLD AUTO: 9.4 FL (ref 5–10.5)
POTASSIUM SERPL-SCNC: 3.8 MMOL/L (ref 3.5–5.1)
RBC # BLD: 4.1 M/UL (ref 4–5.2)
REASON FOR REJECTION: NORMAL
REJECTED TEST: NORMAL
SLIDE REVIEW: ABNORMAL
SMUDGE CELLS: PRESENT
SODIUM BLD-SCNC: 141 MMOL/L (ref 136–145)
TROPONIN: 0.1 NG/ML
TROPONIN: 0.12 NG/ML
TROPONIN: 0.14 NG/ML
WBC # BLD: 16.6 K/UL (ref 4–11)

## 2023-03-09 PROCEDURE — 99222 1ST HOSP IP/OBS MODERATE 55: CPT | Performed by: INTERNAL MEDICINE

## 2023-03-09 PROCEDURE — 2580000003 HC RX 258: Performed by: STUDENT IN AN ORGANIZED HEALTH CARE EDUCATION/TRAINING PROGRAM

## 2023-03-09 PROCEDURE — 85025 COMPLETE CBC W/AUTO DIFF WBC: CPT

## 2023-03-09 PROCEDURE — 6370000000 HC RX 637 (ALT 250 FOR IP): Performed by: INTERNAL MEDICINE

## 2023-03-09 PROCEDURE — 80069 RENAL FUNCTION PANEL: CPT

## 2023-03-09 PROCEDURE — 84484 ASSAY OF TROPONIN QUANT: CPT

## 2023-03-09 PROCEDURE — 97530 THERAPEUTIC ACTIVITIES: CPT

## 2023-03-09 PROCEDURE — 93010 ELECTROCARDIOGRAM REPORT: CPT | Performed by: INTERNAL MEDICINE

## 2023-03-09 PROCEDURE — 83735 ASSAY OF MAGNESIUM: CPT

## 2023-03-09 PROCEDURE — 85730 THROMBOPLASTIN TIME PARTIAL: CPT

## 2023-03-09 PROCEDURE — 6360000002 HC RX W HCPCS: Performed by: STUDENT IN AN ORGANIZED HEALTH CARE EDUCATION/TRAINING PROGRAM

## 2023-03-09 PROCEDURE — 36415 COLL VENOUS BLD VENIPUNCTURE: CPT

## 2023-03-09 PROCEDURE — 93005 ELECTROCARDIOGRAM TRACING: CPT | Performed by: STUDENT IN AN ORGANIZED HEALTH CARE EDUCATION/TRAINING PROGRAM

## 2023-03-09 PROCEDURE — 6370000000 HC RX 637 (ALT 250 FOR IP): Performed by: STUDENT IN AN ORGANIZED HEALTH CARE EDUCATION/TRAINING PROGRAM

## 2023-03-09 PROCEDURE — 97166 OT EVAL MOD COMPLEX 45 MIN: CPT

## 2023-03-09 PROCEDURE — 1200000000 HC SEMI PRIVATE

## 2023-03-09 PROCEDURE — 97162 PT EVAL MOD COMPLEX 30 MIN: CPT

## 2023-03-09 RX ORDER — GABAPENTIN 300 MG/1
300 CAPSULE ORAL NIGHTLY
Status: DISCONTINUED | OUTPATIENT
Start: 2023-03-09 | End: 2023-03-10 | Stop reason: HOSPADM

## 2023-03-09 RX ORDER — ATORVASTATIN CALCIUM 80 MG/1
80 TABLET, FILM COATED ORAL NIGHTLY
Status: DISCONTINUED | OUTPATIENT
Start: 2023-03-09 | End: 2023-03-10 | Stop reason: HOSPADM

## 2023-03-09 RX ORDER — MORPHINE SULFATE 2 MG/ML
2 INJECTION, SOLUTION INTRAMUSCULAR; INTRAVENOUS EVERY 4 HOURS PRN
Status: DISCONTINUED | OUTPATIENT
Start: 2023-03-09 | End: 2023-03-10 | Stop reason: HOSPADM

## 2023-03-09 RX ORDER — ACETAMINOPHEN 650 MG/1
650 SUPPOSITORY RECTAL EVERY 6 HOURS PRN
Status: DISCONTINUED | OUTPATIENT
Start: 2023-03-09 | End: 2023-03-10 | Stop reason: HOSPADM

## 2023-03-09 RX ORDER — SODIUM CHLORIDE 9 MG/ML
INJECTION, SOLUTION INTRAVENOUS PRN
Status: DISCONTINUED | OUTPATIENT
Start: 2023-03-09 | End: 2023-03-10 | Stop reason: HOSPADM

## 2023-03-09 RX ORDER — ASPIRIN 81 MG/1
81 TABLET, CHEWABLE ORAL DAILY
Status: DISCONTINUED | OUTPATIENT
Start: 2023-03-09 | End: 2023-03-10 | Stop reason: HOSPADM

## 2023-03-09 RX ORDER — NITROGLYCERIN 0.4 MG/1
0.4 TABLET SUBLINGUAL EVERY 5 MIN PRN
Status: DISCONTINUED | OUTPATIENT
Start: 2023-03-09 | End: 2023-03-10 | Stop reason: HOSPADM

## 2023-03-09 RX ORDER — SODIUM CHLORIDE 0.9 % (FLUSH) 0.9 %
5-40 SYRINGE (ML) INJECTION PRN
Status: DISCONTINUED | OUTPATIENT
Start: 2023-03-09 | End: 2023-03-10 | Stop reason: HOSPADM

## 2023-03-09 RX ORDER — ACETAMINOPHEN 325 MG/1
650 TABLET ORAL EVERY 6 HOURS PRN
Status: DISCONTINUED | OUTPATIENT
Start: 2023-03-09 | End: 2023-03-10 | Stop reason: HOSPADM

## 2023-03-09 RX ORDER — AMIODARONE HYDROCHLORIDE 200 MG/1
200 TABLET ORAL DAILY
Status: DISCONTINUED | OUTPATIENT
Start: 2023-03-09 | End: 2023-03-10 | Stop reason: HOSPADM

## 2023-03-09 RX ORDER — LORAZEPAM 0.5 MG/1
0.5 TABLET ORAL 3 TIMES DAILY PRN
Status: DISCONTINUED | OUTPATIENT
Start: 2023-03-09 | End: 2023-03-10 | Stop reason: HOSPADM

## 2023-03-09 RX ORDER — ONDANSETRON 2 MG/ML
4 INJECTION INTRAMUSCULAR; INTRAVENOUS EVERY 6 HOURS PRN
Status: DISCONTINUED | OUTPATIENT
Start: 2023-03-09 | End: 2023-03-10 | Stop reason: HOSPADM

## 2023-03-09 RX ORDER — SODIUM CHLORIDE 0.9 % (FLUSH) 0.9 %
5-40 SYRINGE (ML) INJECTION EVERY 12 HOURS SCHEDULED
Status: DISCONTINUED | OUTPATIENT
Start: 2023-03-09 | End: 2023-03-10 | Stop reason: HOSPADM

## 2023-03-09 RX ORDER — SODIUM CHLORIDE 9 MG/ML
INJECTION, SOLUTION INTRAVENOUS CONTINUOUS
Status: ACTIVE | OUTPATIENT
Start: 2023-03-09 | End: 2023-03-09

## 2023-03-09 RX ORDER — HEPARIN SODIUM 1000 [USP'U]/ML
4000 INJECTION, SOLUTION INTRAVENOUS; SUBCUTANEOUS ONCE
Status: DISCONTINUED | OUTPATIENT
Start: 2023-03-09 | End: 2023-03-09

## 2023-03-09 RX ORDER — METOPROLOL SUCCINATE 25 MG/1
25 TABLET, EXTENDED RELEASE ORAL DAILY
Status: DISCONTINUED | OUTPATIENT
Start: 2023-03-10 | End: 2023-03-10 | Stop reason: HOSPADM

## 2023-03-09 RX ADMIN — HEPARIN SODIUM 4000 UNITS: 1000 INJECTION INTRAVENOUS; SUBCUTANEOUS at 00:47

## 2023-03-09 RX ADMIN — ACETAMINOPHEN 650 MG: 325 TABLET ORAL at 12:38

## 2023-03-09 RX ADMIN — Medication 10 ML: at 21:33

## 2023-03-09 RX ADMIN — ATORVASTATIN CALCIUM 80 MG: 80 TABLET, FILM COATED ORAL at 21:32

## 2023-03-09 RX ADMIN — GABAPENTIN 300 MG: 300 CAPSULE ORAL at 21:32

## 2023-03-09 RX ADMIN — ASPIRIN 81 MG CHEWABLE TABLET 81 MG: 81 TABLET CHEWABLE at 10:28

## 2023-03-09 RX ADMIN — AMIODARONE HYDROCHLORIDE 200 MG: 200 TABLET ORAL at 21:32

## 2023-03-09 RX ADMIN — HEPARIN SODIUM 800 UNITS/HR: 10000 INJECTION, SOLUTION INTRAVENOUS at 00:54

## 2023-03-09 RX ADMIN — SACUBITRIL AND VALSARTAN 1 TABLET: 24; 26 TABLET, FILM COATED ORAL at 21:32

## 2023-03-09 RX ADMIN — SODIUM CHLORIDE: 9 INJECTION, SOLUTION INTRAVENOUS at 02:38

## 2023-03-09 ASSESSMENT — PAIN SCALES - GENERAL
PAINLEVEL_OUTOF10: 5
PAINLEVEL_OUTOF10: 0

## 2023-03-09 NOTE — PROGRESS NOTES
Pt arrived to room 4267 from ED via stretcher. Ambulatory to bathroom and back to bed, will be on bedrest until specified. VSS on 2L NC. Admission questions completed. Oriented to room and call light, bed side table within reach. No additional needs at this time.

## 2023-03-09 NOTE — CONSULTS
Interventional Cardiology Consultation     Sachivictorinotre Eubankser  1940    PCP: Duarte Farley MD  Referring Physician: Dr. Blu Monterroso  Reason for Referral: elevated troponin  Chief Complaint:   Chief Complaint   Patient presents with    Abdominal Pain     Started this afternoon    Headache     Just feels lousy today    Back Pain     Thoracic back pain  started since she arrived here    Chest Pain     Mid sternal No hx of acid reflux but started after emesis       Subjective:     History of Present Illness: The patient is 78 y.o. 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 She admits to an episode of generalized fatigue and not feeling well followed by an episode of nausea and vomiting. She denies chest pain PND orthopnea palpitations syncope or edema.   Cardiology is consulted for elevated troponin          Past Medical History:   Diagnosis Date    Acute ST elevation myocardial infarction (STEMI) ([facility] 07/19/2021    Atrial fibrillation (HCC)     Chronic lymphocytic leukemia in remission ([facility]     CLL (chronic lymphocytic leukemia) ([facility] 02/12/2015    COPD (chronic obstructive pulmonary disease) ([facility]     Essential hypertension 01/01/2015    controlled with salt restriction (initially)    Factor V Leiden mutation ([facility]     Goiter 11/01/2011    1.5 cm midline    Hypercholesterolemia     Impaired fasting glucose 11/01/2011    Osteoarthritis     Osteoporosis     Post herpetic neuralgia     Vitamin D deficiency 11/01/2011     Past Surgical History:   Procedure Laterality Date    CATARACT REMOVAL WITH IMPLANT  5/14/12    left eye    JOINT REPLACEMENT      partial knee R and L    KNEE SURGERY  2008    partial replacements, both knees    MOHS SURGERY  03/12/2019    R cheek and R superior temple    SPLENECTOMY      TUNNELED VENOUS PORT PLACEMENT      UPPER GASTROINTESTINAL ENDOSCOPY N/A 2019    EGD ESOPHAGOGASTRODUODENOSCOPY, WITH ENDOSCOPIC ULTRASOUND OF ESOPHAGUS performed by Marianela Nesbitt MD at [address] History   Problem Relation Age of Onset    Diabetes Father     Stroke Sister 50         of a stroke     Social History     Tobacco Use    Smoking status: Former     Packs/day: 0.30     Years: 50.00     Pack years: 15.00     Types: Cigarettes     Quit date: 1995     Years since quittin.3    Smokeless tobacco: Never   Vaping Use    Vaping Use: Never used   Substance Use Topics    Alcohol use: No     Alcohol/week: 0.0 standard drinks    Drug use: Not Currently       No Known Allergies    Current Facility-Administered Medications   Medication Dose Route Frequency Provider Last Rate Last Admin    gabapentin (NEURONTIN) capsule 300 mg  300 mg Oral Nightly [facility], DO        LORazepam (ATIVAN) tablet 0.5 mg  0.5 mg Oral TID PRN [facility], DO        sodium chloride flush 0.9 % injection 5-40 mL  5-40 mL IntraVENous 2 times per day [facility], DO        sodium chloride flush 0.9 % injection 5-40 mL  5-40 mL IntraVENous PRN [facility], DO        0.9 % sodium chloride infusion   IntraVENous PRN [facility], DO        acetaminophen (TYLENOL) tablet 650 mg  650 mg Oral Q6H PRN [facility], DO   650 mg at 23 1238    Or    acetaminophen (TYLENOL) suppository 650 mg  650 mg Rectal Q6H PRN [facility], DO        aspirin chewable tablet 81 mg  81 mg Oral Daily [facility], DO   81 mg at 23 1028    atorvastatin (LIPITOR) tablet 80 mg  80 mg Oral Nightly [facility], DO        ondansetron (ZOFRAN) injection 4 mg  4 mg IntraVENous Q6H PRN [facility], DO        nitroGLYCERIN (NITROSTAT) SL tablet 0.4 mg  0.4 mg SubLINGual Q5 Min PRN [facility], DO        morphine (PF) injection 2 mg  2 mg IntraVENous Q4H PRN [facility], DO        heparin (porcine) injection 4,000 Units  4,000 Units IntraVENous Once Brandon EVARISTO Barnett DO           Review of Systems:  Constitutional: No unanticipated weight loss. There's been no change in energy level, sleep pattern, or activity level. No fevers, chills. Eyes: No visual changes or diplopia. No scleral icterus. ENT: No Headaches, hearing loss or vertigo. No mouth sores or sore throat. Cardiovascular: as reviewed in HPI  Respiratory: No cough or wheezing, no sputum production. No hemoptysis. Gastrointestinal: No abdominal pain, appetite loss, blood in stools. No change in bowel or bladder habits. Genitourinary: No dysuria, trouble voiding, or hematuria. Musculoskeletal:  No gait disturbance, no joint complaints. Integumentary: No rash or pruritis. Neurological: No headache, diplopia, change in muscle strength, numbness or tingling. Psychiatric: No anxiety or depression. Endocrine: No temperature intolerance. No excessive thirst, fluid intake, or urination. No tremor. Hematologic/Lymphatic: No abnormal bruising or bleeding, blood clots or swollen lymph nodes. Allergic/Immunologic: No nasal congestion or hives. Physical Exam:   /65   Pulse 54   Temp 98.2 °F (36.8 °C) (Oral)   Resp 16   Ht 5' (1.524 m)   Wt 138 lb 14.2 oz (63 kg)   SpO2 95%   BMI 27.13 kg/m²   Wt Readings from Last 3 Encounters:   03/09/23 138 lb 14.2 oz (63 kg)   12/12/22 141 lb 9.6 oz (64.2 kg)   11/21/22 140 lb (63.5 kg)     Constitutional: She is oriented to person, place, and time. She appears well-developed and well-nourished. In no acute distress. Head: Normocephalic and atraumatic. Pupils equal and round. Neck: Neck supple. No JVP or carotid bruit appreciated. No mass and no thyromegaly present. No lymphadenopathy present. Cardiovascular: Normal rate. Normal heart sounds. Exam reveals no gallop and no friction rub. No murmur heard. Pulmonary/Chest: Effort normal and breath sounds normal. No respiratory distress.  She has no wheezes, rhonchi or rales. Abdominal: Soft, non-tender. Bowel sounds are normal. She exhibits no organomegaly, mass or bruit. Extremities: No edema. No cyanosis or clubbing. Pulses are 2+ radial/carotid bilaterally. Neurological: No gross cranial nerve deficit. Coordination normal.   Skin: Skin is warm and dry. There is no rash or diaphoresis. Psychiatric: She has a normal mood and affect. Her speech is normal and behavior is normal.     Lab Review:   FLP:    Lab Results   Component Value Date/Time    TRIG 128 04/20/2021 08:54 AM    HDL 55 04/20/2021 08:54 AM    HDL 62 11/08/2011 10:00 AM    LDLCALC 178 04/20/2021 08:54 AM    LDLDIRECT 117 12/14/2015 11:36 AM    LABVLDL 26 04/20/2021 08:54 AM     BUN/Creatinine:    Lab Results   Component Value Date/Time    BUN 12 03/09/2023 05:44 AM    CREATININE 0.9 03/09/2023 05:44 AM     PT/INR, TNI, HGB A1C:   Lab Results   Component Value Date/Time    TROPONINI 0.10 (H) 03/09/2023 10:18 AM    LABA1C 6.2 04/20/2021 08:54 AM      No results found for: CBCAUTODIF    EKG Interpretation: Normal sinus rhythm    Echo: 7/2021      Ejection fraction is visually estimated to be 45-50%. There is mid to apical akinesis. No evidence of left ventricular mass or thrombus noted. There is a moderate left pleural effusion. Estimated pulmonary artery systolic pressure is moderately elevated at 63   mmHg assuming a right atrial pressure of 8 mmHg. Cath: 7/2021  ANGIOGRAM/CORONARY ARTERIOGRAM:        The left main coronary artery is normal .     The left anterior descending artery is normal .     The left circumflex artery is normal .     The right coronary artery is normal .     LEFT VENTRICULOGRAM:  Left ventricular angiogram was done in the 30° MARCELO projection and revealed severe LV systolic dysfunction   with an estimated ejection fraction of 15%.          INTERVENTION  From the right femoral approach to artery was dilated to 12F, followed by placement of 14F impella sheath Impella CP was positioned into the LV apex to assist in cardiac output as patient is in cardiogenic shock   After 40 mg brevitol, 2 synchronized cardioversions were performed from afib to NSR     SUMMARY:   Normal coronary arteries  Severe LV systolic dysfunction   Cardiogenic shock   Afib with rapid rate   CT:     Cath: 7/19/21  RA 7  RV 22/7  PA 30/17/22  PCWP 20   PA % 52  AO % 97  CO/CI 2.22 L/min 1.3 L/min/m2  SVR 2763 dynes . Sec/cm-5   dynes . Sec/cm-5  TPG 2   0.4  The left main coronary artery is normal .   The left anterior descending artery is normal .   The left circumflex artery is normal .   The right coronary artery is normal .  Left ventricular angiogram was done in the 30° MARCELO projection and revealed severe LV systolic dysfunction   with an estimated ejection fraction of 15%. All above diagnostic testing and laboratory data was independently visualized and reviewed by me (not simply review of report)       Assessment and Plan   1) elevated troponin  -Has known normal coronary arteries  -Findings not consistent with an acute coronary syndrome  -Recommend no further cardiac testing and cleared for discharge home    2) nonischemic cardiomyopathy  -Well compensated  -Continue with home medical therapy    3) paroxysmal atrial fibrillation  Warfarin      Recommend outpatient follow-up in my office in 4 to 6-weeks    Thank you very much for allowing me to participate in the care of your patient. Please do not hesitate to contact me if you have any questions.       Sarkis Jordan MD [address] [facility], Interventional Cardiology, and Peripheral Vascular Disease   [facility]   Ph: [phone]  Fax: [phone]

## 2023-03-09 NOTE — PROGRESS NOTES
Occupational Therapy  Facility/Department: [facility] SURG  Occupational Therapy Initial Assessment    Name: Ramesh Malone  : 1940  MRN: [id number]  Date of Service: 3/9/2023    Discharge Recommendations:  Home with assist PRN     Ramesh Malone scored a 21/24 on the AM-PAC ADL Inpatient form. At this time, no further OT is recommended upon discharge due to pt near baseline. Recommend patient returns to prior setting with prior services. Patient Diagnosis(es): The primary encounter diagnosis was Chest pain, unspecified type. A diagnosis of Elevated troponin was also pertinent to this visit. Past Medical History:  has a past medical history of Acute ST elevation myocardial infarction (STEMI) ([facility] [address] Atrial fibrillation ([facility] Chronic lymphocytic leukemia in remission ([facility] [facility] CLL (chronic lymphocytic leukemia) ([facility] COPD (chronic obstructive pulmonary disease) ([facility] [facility] Essential hypertension, Factor V Leiden mutation ([facility] Goiter, Hypercholesterolemia, Impaired fasting glucose, Osteoarthritis, Osteoporosis, Post herpetic neuralgia, and Vitamin D deficiency. Past Surgical History:  has a past surgical history that includes Splenectomy; knee surgery (); Cataract removal with implant (12); Tunneled venous port placement; Upper gastrointestinal endoscopy (N/A, 2019); Mohs surgery (2019); and joint replacement. Treatment Diagnosis: Decreased: ADLs, functional mobility      Assessment   Performance deficits / Impairments: Decreased functional mobility ; Decreased ADL status; Decreased high-level IADLs;Decreased balance  Assessment: Pt is a 77 yo F admitted with headache, N/V, abdominal pain; possible NSTEMI. PTA, pt lives in a house w/ her son (son has h/o CVA) where she is typically independent in self-care, functional mobility, and homemaking.  She is sightly below baseline at this time, requiring SBA functional transfers, SBA/CGA functional mobility, SBA standing grooming/toileting. She requires 1L O2 to maintain O2 sats above 90%. Will continue to see on acute in order to address the above deficits and maximize pt's functional performance. Anticipate pt will be safe to return home w/ assist PRN when medically stable. Treatment Diagnosis: Decreased: ADLs, functional mobility  Prognosis: Good  Decision Making: Medium Complexity  REQUIRES OT FOLLOW-UP: Yes  Activity Tolerance  Activity Tolerance: Patient Tolerated treatment well  Activity Tolerance Comments: Attempted to wean pt from 1L O2 to RA - desatted to 85%. Back on 1L O2 at end of session        Plan   Occupational Therapy Plan  Times Per Week: 1-2 more sessions  Times Per Day: Once a day  Current Treatment Recommendations: Strengthening, ROM, Balance training, Functional mobility training, Endurance training, Self-Care / ADL, Safety education & training     Restrictions  Position Activity Restriction  Other position/activity restrictions: 1L O2    Subjective   General  Chart Reviewed: Yes  Patient assessed for rehabilitation services?: Yes  Additional Pertinent Hx: Pt is a 77 yo F admitted with increasing nausea, abdominal pain, vomiting, headache; admitted with possible NSTEMI  Family / Caregiver Present: No  Referring Practitioner: Marla Eden MD  Diagnosis: NSTEMI  Subjective  Subjective: Pt met b/s for OT eval/tx w/ PT. Pt in bed, agreeable to therapy.  Denied pain     Social/Functional History  Social/Functional History  Lives With: Son  Type of Home: Condo  Home Layout: Two level  Home Access: Stairs to enter with rails  Entrance Stairs - Number of Steps: 13  Entrance Stairs - Rails: Both  Bathroom Shower/Tub: Walk-in shower  Bathroom Toilet: Handicap height  Bathroom Equipment: Grab bars in shower, Built-in shower seat, Grab bars around toilet  Home Equipment: Rollator, Wheelchair-manual, Walker, rolling, Cane, Oxygen (intermittent use of O2)  ADL Assistance: Independent  Homemaking Assistance: (cleaning servive bi-weekly)  Ambulation Assistance: Independent  Transfer Assistance: Independent  Active : Yes  Mode of Transportation: Car  Occupation: Retired  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[place] [address]  Type of Devices: All fall risk precautions in place;Call light within reach;Gait belt;Patient at risk for falls; Left in chair;Chair alarm in place;Nurse notified     Toilet Transfers  Toilet - Technique: Ambulating  Equipment Used: Grab bars  Toilet Transfer: Stand by assistance  AROM: Within functional limits  PROM: Within functional limits  Strength:  Within functional limits  Coordination: Within functional limits  ADL  Grooming: Stand by assistance  Grooming Skilled Clinical Factors: Pt brushed hair in stance at the sink  Toileting: Stand by assistance  Toileting Skilled Clinical Factors: Pt voided urine from the commode, she completed hygiene and managed briefs w/ SBA  Additional Comments: Anticipate pt would be independent w/ feeding, setup/SBA bathing/dressing based on ROM, strength, endurance this sessino     Activity Tolerance  Activity Tolerance: Patient tolerated treatment well  Bed mobility  Supine to Sit: Modified independent  Sit to Supine: Unable to assess (in recliner at end of session)  Transfers  Sit to stand: Stand by assistance  Stand to sit: Stand by assistance  Transfer Comments: Pt completed functional mobility from bed > BR > recliner w/ SBA/CGA, pt initially held IV pole for support, then held therapist's hand for stability  Vision  Vision: Within Functional Limits  Vision Exceptions: Wears glasses at all times  Hearing  Hearing: Within functional limits  Hearing Exceptions: Hard of hearing/hearing concerns;Bilateral hearing aid (does not have hearing aids with her)  Cognition  Overall Cognitive Status: WFL  Orientation  Overall Orientation Status: Within Functional Limits                  Education Given To: Patient  Education Provided: Role of Therapy; ADL Adaptive Strategies;Transfer Training  Education Method: Verbal  Barriers to Learning: None  Education Outcome: Verbalized understanding        AM-PAC Score  AM-PAC Inpatient Daily Activity Raw Score: 21 (03/09/23 1521)  AM-PAC Inpatient ADL T-Scale Score : 44.27 (03/09/23 1521)  ADL Inpatient CMS 0-100% Score: 32.79 (03/09/23 1521)  ADL Inpatient CMS G-Code Modifier : CJ (03/09/23 1521)    Goals  Short Term Goals  Time Frame for Short Term Goals: 1-2 more sessions  Short Term Goal 1: Pt will complete ADL transfers mod I  Short Term Goal 2: Pt will toilet mod I  Short Term Goal 3: Pt will tolerate standing x5 mins during ADL task mod I  Short Term Goal 4: Pt will complete LB dressing mod I  Long Term Goals  Time Frame for Long Term Goals : LTG=STG  Patient Goals   Patient goals : to go home       Therapy Time   Individual Concurrent Group Co-treatment   Time In 1440         Time Out 1510         Minutes 30         Timed Code Treatment Minutes: YAMILEX Jason/L [id number]

## 2023-03-09 NOTE — ED PROVIDER NOTES
[facility]        Pt Name: Isaiah Abebe  MRN: [id number]  [place] 1940  Date of evaluation: 3/8/2023  Provider: KENA Eastman  PCP: Steve Oleary MD  Note Started: 7:02 PM EST 3/8/23       I have seen and evaluated this patient with my supervising physician Yesenia Roy MD.      CHIEF COMPLAINT       Chief Complaint   Patient presents with    Abdominal Pain     Started this afternoon    Headache     Just feels lousy today    Back Pain     Thoracic back pain  started since she arrived here    Chest Pain     Mid sternal No hx of acid reflux but started after emesis       HISTORY OF PRESENT ILLNESS: 1 or more Elements     History from : Patient    Limitations to history : None    Isaiah Abebe is a 78 y.o. female who presents and tells me \"I just feel bad. \"  She tells me all day she had headache again not felt well she was nauseous and had some vomiting this evening. She has had some generalized abdominal pain this afternoon no diarrhea. She got here in the emergency department via EMS from home where she lives with family she started having some pain in her upper chest upper back and towards her neck. Is a pressure-like sensation. She does not have any fevers. 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 She denies prior history of coronary artery disease or stenting. She is a former smoker. History of COPD. She not really been coughing anything up. Nursing Notes were all reviewed and agreed with or any disagreements were addressed in the HPI. REVIEW OF SYSTEMS :      Review of Systems   Constitutional:  Positive for fatigue. Respiratory:  Positive for shortness of breath. Cardiovascular:  Positive for chest pain. Negative for leg swelling. Gastrointestinal:  Positive for abdominal pain, nausea and vomiting. Negative for diarrhea. Musculoskeletal:  Positive for back pain. Skin:  Negative for color change and wound. Neurological:  Positive for headaches. Negative for weakness and numbness. Psychiatric/Behavioral:  Negative for agitation, behavioral problems and confusion. Positives and Pertinent negatives as per HPI. SURGICAL HISTORY     Past Surgical History:   Procedure Laterality Date    CATARACT REMOVAL WITH IMPLANT  5/14/12    left eye    JOINT REPLACEMENT      partial knee R and L    KNEE SURGERY  2008    partial replacements, both knees    MOHS SURGERY  03/12/2019    R cheek and R superior temple    SPLENECTOMY      TUNNELED VENOUS PORT PLACEMENT      UPPER GASTROINTESTINAL ENDOSCOPY N/A 2/5/2019    EGD ESOPHAGOGASTRODUODENOSCOPY, WITH ENDOSCOPIC ULTRASOUND OF ESOPHAGUS performed by Makayla Powell MD at [facility]. 192       Previous Medications    AMIODARONE (CORDARONE) 200 MG TABLET    TAKE 1 TABLET BY MOUTH DAILY    APIXABAN (ELIQUIS) 5 MG TABS TABLET    Take 1 tablet by mouth 2 times daily    BUTALBITAL-ACETAMINOPHEN-CAFFEINE (FIORICET, ESGIC) -40 MG PER TABLET    Take 1 tablet by mouth every 6 hours as needed for Headaches    CYANOCOBALAMIN (VITAMIN B 12) 500 MCG TABS    Take 500 mcg by mouth daily     DIPHENHYDRAMINE-APAP, SLEEP, (TYLENOL PM EXTRA STRENGTH)  MG TABLET    Take 1 tablet by mouth nightly as needed for Sleep    ENTRESTO 24-26 MG PER TABLET    TAKE 1/2 TABLET BY MOUTH TWICE DAILY    FAMOTIDINE (PEPCID) 20 MG TABLET    Take 1 tablet by mouth 2 times daily    GABAPENTIN (NEURONTIN) 300 MG CAPSULE    TAKE 2 CAPSULES BY MOUTH EVERY NIGHT    LORAZEPAM (ATIVAN) 0.5 MG TABLET    Take 1 tablet by mouth 3 times daily as needed. METOPROLOL SUCCINATE (TOPROL XL) 25 MG EXTENDED RELEASE TABLET    TAKE 1/2 TABLET BY MOUTH DAILY    OXYCODONE 5 MG CAPSULE    Take 5 mg by mouth every 6 hours as needed for Pain.        ALLERGIES     Patient has no known allergies. FAMILYHISTORY       Family History   Problem Relation Age of Onset    Diabetes Father     Stroke Sister 50         of a stroke        SOCIAL HISTORY       Social History     Tobacco Use    Smoking status: Former     Packs/day: 0.30     Years: 50.00     Pack years: 15.00     Types: Cigarettes     Quit date: 1995     Years since quittin.3    Smokeless tobacco: Never   Vaping Use    Vaping Use: Never used   Substance Use Topics    Alcohol use: No     Alcohol/week: 0.0 standard drinks    Drug use: Not Currently       SCREENINGS        Divide Coma Scale  Eye Opening: Spontaneous  Best Verbal Response: Oriented  Best Motor Response: Obeys commands  Sam Coma Scale Score: 15                CIWA Assessment  BP: 118/62  Heart Rate: 61           PHYSICAL EXAM  1 or more Elements     ED Triage Vitals   BP Temp Temp src Pulse Resp SpO2 Height Weight   -- -- -- -- -- -- -- --       Physical Exam  Vitals and nursing note reviewed. Constitutional:       General: She is not in acute distress. Appearance: She is well-developed. She is not ill-appearing. HENT:      Head: Normocephalic and atraumatic. Mouth/Throat:      Mouth: Mucous membranes are moist.   Eyes:      Pupils: Pupils are equal, round, and reactive to light. Cardiovascular:      Rate and Rhythm: Normal rate. Pulmonary:      Effort: Pulmonary effort is normal. No respiratory distress. Abdominal:      Tenderness: There is generalized abdominal tenderness. There is no guarding or rebound. Skin:     General: Skin is warm. Neurological:      General: No focal deficit present. Mental Status: She is alert and oriented to person, place, and time.    Psychiatric:         Mood and Affect: Mood normal.         Behavior: Behavior normal.       DIAGNOSTIC RESULTS   LABS:    Labs Reviewed   CBC WITH AUTO DIFFERENTIAL - Abnormal; Notable for the following components:       Result Value    WBC 16.5 (*)     Neutrophils Absolute 8.9 (*)     Lymphocytes Absolute 6.1 (*)     Smudge Cells Present (*)     All other components within normal limits   COMPREHENSIVE METABOLIC PANEL - Abnormal; Notable for the following components:    Glucose 109 (*)     All other components within normal limits   TROPONIN - Abnormal; Notable for the following components:    Troponin 0.02 (*)     All other components within normal limits   BRAIN NATRIURETIC PEPTIDE - Abnormal; Notable for the following components:    Pro-BNP 1,158 (*)     All other components within normal limits   URINALYSIS WITH REFLEX TO CULTURE - Abnormal; Notable for the following components:    Ketones, Urine 15 (*)     Protein, UA 30 (*)     All other components within normal limits   COVID-19, RAPID   RAPID INFLUENZA A/B ANTIGENS   LIPASE   MICROSCOPIC URINALYSIS   URINE DRUG SCREEN   TROPONIN   PERIPHERAL BLOOD SMEAR, PATH REVIEW   LACTATE DEHYDROGENASE   ETHANOL       When ordered only abnormal lab results are displayed. All other labs were within normal range or not returned as of this dictation. EKG: When ordered, EKG's are interpreted by the Emergency Department Physician in the absence of a cardiologist.  Please see their note for interpretation of EKG. RADIOLOGY:   Non-plain film images such as CT, Ultrasound and MRI are read by the radiologist. Plain radiographic images are visualized and preliminarily interpreted by the ED Provider with the below findings:    Interpretation per the Radiologist below, if available at the time of this note:    CT ABDOMEN PELVIS W IV CONTRAST Additional Contrast? None   Final Result   Colonic diverticulosis without acute diverticulitis. CT CHEST PULMONARY EMBOLISM W CONTRAST   Final Result   No pulmonary embolism or acute pulmonary abnormality. Possible pulmonary   hypertension. XR CHEST (2 VW)   Final Result   No acute process. No results found. No results found.     PROCEDURES   Unless otherwise noted below, none Procedures    CRITICAL CARE TIME (.cctime)   I performed a total Critical Care time of 15 minutes, excluding separately reportable procedures. There was a high probability of clinically significant/life threatening deterioration in the patient's condition which required my urgent intervention. Not limited to multiple reexaminations, discussions with attending physician and consultants. PAST MEDICAL HISTORY      has a past medical history of Acute ST elevation myocardial infarction (STEMI) ([facility] (07/19/2021), Atrial fibrillation ([facility] Chronic lymphocytic leukemia in remission ([facility] CLL (chronic lymphocytic leukemia) ([facility] (02/12/2015), COPD (chronic obstructive pulmonary disease) ([facility] Essential hypertension (01/01/2015), Factor V Leiden mutation ([facility] Goiter (11/01/2011), Hypercholesterolemia, Impaired fasting glucose (11/01/2011), Osteoarthritis, Osteoporosis, Post herpetic neuralgia, and Vitamin D deficiency (11/01/2011). EMERGENCY DEPARTMENT COURSE and DIFFERENTIAL DIAGNOSIS/MDM:   Vitals:    Vitals:    03/08/23 1915 03/08/23 1931 03/08/23 2000 03/08/23 2115   BP: 135/67  (!) 118/56 118/62   Pulse: 65  60 61   Resp: 14  21 16   Temp:  98.4 °F (36.9 °C) 98.4 °F (36.9 °C)    TempSrc:  Oral Oral    SpO2: 94%  96% 94%   Weight:  146 lb 13.2 oz (66.6 kg)     Height:  5' (1.524 m)         Patient was given the following medications:  Medications   ondansetron (ZOFRAN) injection 4 mg (4 mg IntraVENous Given 3/8/23 2000)   famotidine (PEPCID) 20 mg in sodium chloride (PF) 0.9 % 10 mL injection (20 mg IntraVENous Given 3/8/23 1958)   iopamidol (ISOVUE-370) 76 % injection 75 mL (75 mLs IntraVENous Given 3/8/23 2018)             Is this patient to be included in the SEP-1 Core Measure due to severe sepsis or septic shock? No   Exclusion criteria - the patient is NOT to be included for SEP-1 Core Measure due to:   Infection is not suspected    CONSULTS: (Who and What was discussed)  IP CONSULT TO HOSPITALIST  Discussion with Other Profesionals : Admitting Team      Social Determinants : None    Records Reviewed : None    CC/HPI Summary, DDx, ED Course, and Reassessment: Fatigue headaches chest pain, she did have an episode of vomiting. She says abdominal pain and generalized tenderness. Satting around 90% on room air not febrile or tachycardic blood pressure around 541 systolic. The patient had an elevated troponin and BNP no pulmonary embolism on CT scan. Abdomen without acute abnormality. Given aspirin. Will consult hospitalist for admission. She is agreeable. I am the Primary Clinician of Record. FINAL IMPRESSION      1. Chest pain, unspecified type    2. Elevated troponin          DISPOSITION/PLAN     DISPOSITION Decision To Admit 03/08/2023 10:31:04 PM      PATIENT REFERRED TO:  No follow-up provider specified.     DISCHARGE MEDICATIONS:  New Prescriptions    No medications on file       DISCONTINUED MEDICATIONS:  Discontinued Medications    METRONIDAZOLE (FLAGYL) 500 MG TABLET    Take 1 tablet by mouth              (Please note that portions of this note were completed with a voice recognition program.  Efforts were made to edit the dictations but occasionally words are mis-transcribed.)    Brian Miramontes (electronically signed)            Brian Miramontes  03/08/23 0350

## 2023-03-09 NOTE — PROGRESS NOTES
Clinical Pharmacy Note  Heparin Dosing       Lab Results   Component Value Date/Time    APTT 84.6 03/09/2023 06:15 AM     Lab Results   Component Value Date/Time    HGB 12.7 03/09/2023 05:44 AM    HCT 39.2 03/09/2023 05:44 AM     03/09/2023 05:44 AM    INR 3.75 11/11/2022 06:14 AM       Current Infusion Rate: 800 units/hr    Plan:  Continue current rate: 800 units/hr  Next aPTT: 1230 on 3-9-23    Pharmacy will continue to monitor and adjust based on aPTT results.    Janessa Restrepo [facility]  3/9/2023  7:37 AM

## 2023-03-09 NOTE — ED NOTES
Awake alert visiting with daughter states oxygen helped her feel better     Rubi Lewis RN  03/08/23 2038

## 2023-03-09 NOTE — ED NOTES
Oxygen 2.5 liters placed she uses this at home  Voided 300 cc of urine started at [facility] [address]  03/08/23 [facility]  03/08/23 2038

## 2023-03-09 NOTE — FLOWSHEET NOTE
4 Eyes Admission Assessment     I agree as the admission nurse that 2 RN's have performed a thorough Head to Toe Skin Assessment on the patient. ALL assessment sites listed below have been assessed on admission. Areas assessed by both nurses:   [x]   Head, Face, and Ears   [x]   Shoulders, Back, and Chest  [x]   Arms, Elbows, and Hands   [x]   Coccyx, Sacrum, and Ischium  [x]   Legs, Feet, and Heels        Does the Patient have Skin Breakdown?   No         Zana Prevention initiated:  NA   Wound Care Orders initiated:  NA      WOC nurse consulted for Pressure Injury (Stage 3,4, Unstageable, DTI, NWPT, and Complex wounds) or Zana score 18 or lower:  NA      Nurse 1 eSignature: Electronically signed by Melanie Martins RN on 3/9/23 at 4:39 AM EST    **SHARE this note so that the co-signing nurse is able to place an eSignature**    Nurse 2 eSignature: Electronically signed by Magalis Domingo RN on 3/9/23 at 4:54 AM EST

## 2023-03-09 NOTE — PROGRESS NOTES
Medication Reconciliation    List of medications for Anika Rudolph is currently taking is complete.     Source of Information:   Epic records  Conversation with patient at bedside    Notes Regarding Home Medications:   Patient did not receive any of their home medications prior to arrival to the emergency department    Denies other otc/herbal use    Camilla Lopez, Pharmacy Intern  3/8/2023 9:41 PM

## 2023-03-09 NOTE — PROGRESS NOTES
Clinical Pharmacy Note  Heparin Dosing       Lab Results   Component Value Date/Time    APTT 52.0 03/09/2023 02:28 PM     Lab Results   Component Value Date/Time    HGB 12.7 03/09/2023 05:44 AM    HCT 39.2 03/09/2023 05:44 AM     03/09/2023 05:44 AM    INR 3.75 11/11/2022 06:14 AM       Current Infusion Rate: 800 units/hr    Plan:  Bolus: 4000 units  Rate: increase to 1050 units/hr  Next aPTT: 2300 3/9/23    Pharmacy will continue to monitor and adjust based on aPTT results.

## 2023-03-09 NOTE — ED PROVIDER NOTES
I have personally performed a face to face diagnostic evaluation on this patient. I have fully participated in the care of this patient I personally saw the patient and performed a substantive portion of the visit including all aspects of the medical decision making. I have reviewed and agree with all pertinent clinical information including history, physical exam, diagnostic tests, and the plan. HPI: Tino Chance presented with abdominal pain headache chest pain and back pain. Midsternal radiates to the back. Worse throughout the day. Nothing else seems to make it better. Is currently gone. History of CLL history of factor V Leiden. Patient has history of pulmonary hypertension and hypertension. Patient does also have history of CHF. She is also had a previous STEMI. EP note for further details. Chief Complaint   Patient presents with    Abdominal Pain     Started this afternoon    Headache     Just feels lousy today    Back Pain     Thoracic back pain  started since she arrived here    Chest Pain     Mid sternal No hx of acid reflux but started after emesis      Review of Systems: See EMILY note  Vital Signs: BP (!) 118/56   Pulse 60   Temp 98.4 °F (36.9 °C) (Oral)   Resp 21   Ht 5' (1.524 m)   Wt 146 lb 13.2 oz (66.6 kg)   SpO2 96%   BMI 28.68 kg/m²     Alert 78 y.o. female who does not appear toxic or acutely ill  HENT: Atraumatic, oral mucosa moist  Neck: Grossly normal ROM  Chest/Lungs: respiratory effort normal   Abdomen: Soft nontender  Musculoskeletal: Grossly normal ROM  Skin: No palor or diaphoresis    Medical Decision Making and Plan:  Pertinent Labs & Imaging studies reviewed. (See EMILY chart for details)  I agree with EMILY assessment and plan. 78-year-old female presents for abdominal pain chest pain and back pain. Positive troponin. Patient has history of CLL and factor V Leiden CT PE is negative.   Patient also has a history of CHF no obvious signs of fluid overload but patient has significantly elevated BNP. Patient has mild hypoxia. We will plan on admission at this time for further cardiac and pulmonary work-up. Patient's vital signs otherwise stable. EKG without signs of ischemia. Concern for high risk chest pain. I personally saw the patient and independently provided 0 minutes of nonconcurrent critical care out of the total shared critical care time provided    EKG: All EKG's are interpreted by the Emergency Department Physician who either signs or Co-signs this chart in the absence of a cardiologist.    EKG Interpretation    Interpreted by emergency department physician    Rhythm: normal sinus   Rate: normal  Axis: left  Ectopy: none  Conduction: normal  ST Segments: nonspecific changes  T Waves: non specific changes  Q Waves: none    Clinical Impression: Normal sinus rhythm with left axis deviation nonspecific ST and T wave changes normal WV interval normal QRS duration normal QT QTc. Interpreted by myself.     MD Tk Morris MD  03/08/23 4279

## 2023-03-09 NOTE — PROGRESS NOTES
Hospitalist Progress Note      PCP: Daina Whitaker MD    Date of Admission: 3/8/2023    Chief Complaint:   Chief Complaint   Patient presents with    Abdominal Pain     Started this afternoon    Headache     Just feels lousy today    Back Pain     Thoracic back pain  started since she arrived here    Chest Pain     Mid sternal No hx of acid reflux but started after emesis       Hospital Course: 24-year-old female past medical history significant for CAD, atrial fibrillation, CLL, COPD, hypertension admitted with complaints of headache, nausea and vomiting and chest pain. Patient was found to have an elevated troponin and nonspecific EKG changes. CT of the abdomen did not show any acute findings    Subjective:     3/9  feels better ans wants to go home      Medications:  Reviewed    Infusion Medications    sodium chloride      heparin (PORCINE) Infusion 800 Units/hr (03/09/23 0054)     Scheduled Medications    gabapentin  300 mg Oral Nightly    sodium chloride flush  5-40 mL IntraVENous 2 times per day    aspirin  81 mg Oral Daily    atorvastatin  80 mg Oral Nightly     PRN Meds: LORazepam, sodium chloride flush, sodium chloride, acetaminophen **OR** acetaminophen, ondansetron, nitroGLYCERIN, morphine    No intake or output data in the 24 hours ending 03/09/23 1404    Physical Exam Performed:    /63   Pulse 55   Temp 98.4 °F (36.9 °C) (Oral)   Resp 18   Ht 5' (1.524 m)   Wt 138 lb 14.2 oz (63 kg)   SpO2 92%   BMI 27.13 kg/m²     General appearance: No apparent distress, appears stated age and cooperative. HEENT: Pupils equal, round, and reactive to light. Conjunctivae/corneas clear. Neck: Supple, with full range of motion. No jugular venous distention. Trachea midline. Respiratory:  Normal respiratory effort. Clear to auscultation, bilaterally without Rales/Wheezes/Rhonchi. Cardiovascular: Regular rate and rhythm with normal S1/S2 without murmurs, rubs or gallops.   Abdomen: Soft, non-tender, non-distended with normal bowel sounds. Musculoskeletal: No clubbing, cyanosis or edema bilaterally. Full range of motion without deformity. Skin: Skin color, texture, turgor normal.  No rashes or lesions. Neurologic:  Neurovascularly intact without any focal sensory/motor deficits. Cranial nerves: II-XII intact, grossly non-focal.  Psychiatric: Alert and oriented, thought content appropriate, normal insight  Capillary Refill: Brisk, 3 seconds, normal   Peripheral Pulses: +2 palpable, equal bilaterally       Labs:   Recent Labs     03/08/23 1900 03/09/23  0544   WBC 16.5* 16.6*   HGB 14.0 12.7   HCT 42.9 39.2    251     Recent Labs     03/08/23  1900 03/09/23  0544    141   K 3.6 3.8    103   CO2 25 28   BUN 11 12   CREATININE 0.9 0.9   CALCIUM 9.3 8.7   PHOS  --  4.5     Recent Labs     03/08/23  1900   AST 18   ALT 12   BILITOT 0.6   ALKPHOS 76     No results for input(s): INR in the last 72 hours. Recent Labs     03/09/23  0544 03/09/23  0732 03/09/23  1018   TROPONINI 0.14* 0.12* 0.10*       Urinalysis:      Lab Results   Component Value Date/Time    NITRU Negative 03/08/2023 08:04 PM    WBCUA 1 03/08/2023 08:04 PM    BACTERIA None Seen 03/08/2023 08:04 PM    RBCUA 2 03/08/2023 08:04 PM    BLOODU Negative 03/08/2023 08:04 PM    SPECGRAV 1.012 03/08/2023 08:04 PM    GLUCOSEU Negative 03/08/2023 08:04 PM       Radiology:  CT ABDOMEN PELVIS W IV CONTRAST Additional Contrast? None   Final Result   Colonic diverticulosis without acute diverticulitis. CT CHEST PULMONARY EMBOLISM W CONTRAST   Final Result   No pulmonary embolism or acute pulmonary abnormality. Possible pulmonary   hypertension. XR CHEST (2 VW)   Final Result   No acute process.              IP CONSULT TO HOSPITALIST  IP CONSULT TO CARDIOLOGY    Assessment/Plan:    Active Hospital Problems    Diagnosis     Headache [R51.9]      Priority: Medium    Nausea and vomiting [R11.2]      Priority: Medium    Elevated troponin [R77.8]      Priority: Medium    NSTEMI (non-ST elevated myocardial infarction) ([facility] [I21.4]      Priority: Medium    Chronic atrial fibrillation (HCC) [I48.20]      Priority: Medium    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 [facility]) [D68.51]      Priority: Medium     NSTEMI   Continue heparin infusion, starting antiplatelet therapy   Cardiology has been consulted for evaluation, pending    Nausea vomiting  Headache   Suspected migraine   CT of the abdomen without acute findings,    Leukocytosis  History of CLL   White count appears to be close to her baseline    History of heart failure with low ejection fraction-now normalized EF   Appears to be compensated, resume Entresto metoprolol    History of atrial fibrillation   Takes amiodarone and Eliquis at home, holding Eliquis. Due to heparin infusion    Debility   PT OT    DVT Prophylaxis: +  Diet: ADULT DIET; Regular;  No Added Salt (3-4 gm)  Code Status: Full Code  PT/OT Eval Status: +    Dispo - pending clinical improvement       Georges Erickson MD

## 2023-03-09 NOTE — PROGRESS NOTES
Seen and examined   Known normal coronary arteries  D/c heparin gtt   Cleared for d/c home from cardiology   Outpatient follow up in 4-6 weeks     Full consult to follow     Sonu Farah MD [address], Interventional Cardiology, and Peripheral Vascular [address]   (O): [phone]  (F): [phone]

## 2023-03-09 NOTE — CONSULTS
Clinical Pharmacy Note  Heparin Dosing Consult    Gloria Meyers is a 78 y.o. female ordered heparin per low dose nomogram by Dr. Alvino Neri. Lab Results   Component Value Date/Time    APTT 29.2 03/08/2023 07:00 PM     Lab Results   Component Value Date/Time    HGB 14.0 03/08/2023 07:00 PM    HCT 42.9 03/08/2023 07:00 PM     03/08/2023 07:00 PM    INR 3.75 11/11/2022 06:14 AM       Ht Readings from Last 1 Encounters:   03/08/23 5' (1.524 m)        Wt Readings from Last 1 Encounters:   03/08/23 146 lb 13.2 oz (66.6 kg)        Assessment/Plan:  Initial bolus: 4000 units  Initial infusion rate: 800 units/hr  Next aPTT: 0700 3/9/23    Pharmacy will continue to monitor adjust heparin based on aPTT results using nomogram below:     CAD/STEMI/NSTEMI/UA/AFIB Heparin Nomogram     Initial Bolus: 60 units/kg Max Bolus: 4,000 units       Initial Rate: 12 units/kg/hr Max Initial Rate: 1,000 units/hr     aPTT < 59    Heparin 60 units/kg bolus Increase infusion by 4 units/kg/hr        (maximum 4,000 units)   aPTT 59.1-72.9 Heparin 30 units/kg bolus Increase infusion by 2 units/kg/hr        (maximum 2,000 units)   aPTT     No bolus   No change   aPTT 102.1-109 No bolus   Decrease infusion by 1 units/kg/hr   aPTT 109.1-122.9 No bolus     Decrease infusion by 2 units/kg/hr   aPTT > 123    Hold heparin for 1 hour Decrease infusion by 3 units/kg/hr     Obtain aPTT 6 hours after initial bolus and 6 hours after any dose change until two consecutive therapeutic aPTTs are achieved - then daily.     Rosalie Jiang, PharmD

## 2023-03-09 NOTE — PROGRESS NOTES
Physical Therapy  Facility/Department: [facility]  Physical Therapy Initial Assessment    Name: Tino Chance  : 1940  MRN: [id number]  Date of Service: 3/9/2023    Discharge Recommendations:  Home with assist PRN, Home with Home health PT, Continue to assess pending progress          Patient Diagnosis(es): The primary encounter diagnosis was Chest pain, unspecified type. A diagnosis of Elevated troponin was also pertinent to this visit. Past Medical History:  has a past medical history of Acute ST elevation myocardial infarction (STEMI) ([facility] Atrial fibrillation ([facility] Chronic lymphocytic [facility] CLL (chronic lymphocytic leukemia) ([facility] COPD (chronic obstructive pulmonary disease) ([facility] Essential hypertension, Factor V Leiden mutation ([facility] Goiter, Hypercholesterolemia, Impaired fasting glucose, Osteoarthritis, Osteoporosis, Post herpetic neuralgia, and Vitamin D deficiency. Past Surgical History:  has a past surgical history that includes Splenectomy; knee surgery (); Cataract removal with implant (12); Tunneled venous port placement; Upper gastrointestinal endoscopy (N/A, 2019); Mohs surgery (2019); and joint replacement. Assessment   Body Structures, Functions, Activity Limitations Requiring Skilled Therapeutic Intervention: Decreased functional mobility   Assessment: Pt is an 78 y.o. female who presented to the ED on 3/8/23 with headache, nausea, vomiting, abdominal pain, initially no chest pain but then having some midsternal pain or tingling while in ED. Prior to admission, pt living in home setting with son, independent with ADLs and ambulation without device. Pt currently functioning below baseline. Anticipate return home with PRN assist and strict use of RW. She would also benefit from HHPT.   Treatment Diagnosis: impaired mobility  Therapy Prognosis: Good  Decision Making: Medium Complexity  History: see above  Exam: see below  Clinical Presentation: evolving  Requires PT Follow-Up: Yes  Activity Tolerance  Activity Tolerance: Patient tolerated treatment well     Plan   Physcial Therapy Plan  General Plan: 3-5 times per week  Current Treatment Recommendations: Strengthening, Balance training, Functional mobility training, Transfer training, Endurance training, Gait training, Neuromuscular re-education, Patient/Caregiver education & training, Safety education & training, Equipment evaluation, education, & procurement, Therapeutic activities  Safety Devices  Type of Devices: All fall risk precautions in place, Call light within reach, Gait belt, Patient at risk for falls, Left in chair, Chair alarm in place, Nurse notified     Restrictions  Position Activity Restriction  Other position/activity restrictions: 1L O2     Subjective   General  Chart Reviewed: Yes  Patient assessed for rehabilitation services?: Yes  Additional Pertinent Hx: Pt is an 78 y.o. female who presented to the ED on 3/8/23 with headache, nausea, vomiting, abdominal pain, initially no chest pain but then having some midsternal pain or tingling while in ED.   Response To Previous Treatment: Not applicable  Family / Caregiver Present: No  Referring Practitioner: Jesús Au MD  Referral Date : 03/09/23  Diagnosis: abdominal pain  Follows Commands: Within Functional Limits  Subjective  Subjective: Pt is agreeable to PT         Social/Functional History  Social/Functional History  Lives With: Son  Type of Home: Condo  Home Layout: Two level  Home Access: Stairs to enter with rails  Entrance Stairs - Number of Steps: 13  Entrance Stairs - Rails: Both  Bathroom Shower/Tub: Walk-in shower  Bathroom Toilet: Handicap height  Bathroom Equipment: Grab bars in shower, Built-in shower seat, Grab bars around toilet  Home Equipment: Rollator, Wheelchair-manual, Walker, rolling, Cane, Oxygen (intermittent use of O2)  ADL Assistance: Independent  Homemaking Assistance:  (cleaning servive bi-weekly)  Ambulation Assistance: Independent  Transfer Assistance: Independent  Active : Yes  Mode of Transportation: Car  Occupation: Retired  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[facility] [address]  Hearing: Within functional limits      Cognition   Orientation  Overall Orientation Status: Within [address]  Overall Cognitive Status: WFL     Objective   Gross Assessment  Strength: Generally decreased, functional     Bed mobility  Supine to Sit: Modified independent  Sit to Supine: Unable to assess (in recliner at end of session)  Transfers  Sit to Stand: Contact guard assistance  Stand to Sit: Contact guard assistance  Ambulation  Surface: Level tile  Device: Hand-Held Assist  Assistance: Contact guard assistance  Quality of Gait: guarded  Gait Deviations: Slow Samantha;Decreased step length;Decreased step height  Distance: 10' + 25'  Comments: Pt unsteady with ambulation - requiring HHA. Desaturation to 85% on RA - required 1L to recover.      Balance  Posture: Good  Sitting - Static: Good  Sitting - Dynamic: Good  Standing - Static: Fair  Standing - Dynamic: Fair       AM-PAC Score  AM-PAC Inpatient Mobility Raw Score : 20 (03/09/23 1520)  AM-PAC Inpatient T-Scale Score : 47.67 (03/09/23 1520)  Mobility Inpatient CMS 0-100% Score: 35.83 (03/09/23 1520)  Mobility Inpatient CMS G-Code Modifier : CJ (03/09/23 1520)          Goals  Short Term Goals  Time Frame for Short Term Goals: by acute discharge  Short Term Goal 1: bed mobility mod I  Short Term Goal 2: sit<>stand (I)  Short Term Goal 3: ambulate > 36' mod I with LRAD  Patient Goals   Patient Goals : none stated       Education  Patient Education  Education Given To: Patient  Education Provided: Role of Therapy;Plan of Care  Education Method: Verbal  Barriers to Learning: None  Education Outcome: Verbalized understanding;Continued education needed      Therapy Time   Individual Concurrent Group Co-treatment   Time In 1440         Time Out 1510         Minutes 30         Timed Code Treatment Minutes: 15 Minutes       Zofia Bales, PT

## 2023-03-09 NOTE — FLOWSHEET NOTE
Pt arrived in room 4267 around 0400. Pt is alert and oriented x4. VSS. Pt is hard of hearing. She has hearing aides with her but they are running low on battery. Pt did not bring a . Pt stated she wears 2.5L of O2 NC at home as needed. SpO2 is 92% on 2.5L NC. Pt resting in bed. Oriented pt to room. Denies any needs at this time.

## 2023-03-09 NOTE — H&P
Hospital Medicine History & Physical      PCP: Gary Costa MD    Date of Admission: 3/8/2023    Date of Service: Pt seen/examined on 3/8/2023 and admitted to inpatient    Chief Complaint: Headache, nausea, vomiting, abdominal pain, initially no chest pain but then having some midsternal pain or tingling while in the ED      History Of Present Illness: The patient is a 78 y.o. female with past medical history as below who presents to [facility] with concern that although she slept and ate and drink just fine the previous night and she was not feeling ill prior to today morning, she started becoming increasingly more nauseous and having epigastric abdominal pain with vomiting and persistent frontal headache as the last 12 hours have gone on. She did note that she did not take her medications morning and was uncertain whether those may have caused her to feel sick but she was feeling sick in general regardless of whether she took her medications or not it sounds like. She did take her medications the previous night just fine without any issue. She not eat anything strange or out of the ordinary. She denies that she was having any major chest pain with the GI symptoms initially but she does feel some chest tingling to the center of her chest as well as to her back started when she got to the ED. Her GI symptoms and overall clinical status is improved at this time and she is not having any current active chest pain or headache nor nausea or vomiting. She she is currently feeling better enough to go home but I did advise that she still has something going on it sounds like. He denies that she had ever had symptoms like this prior to today. She was overall not feeling unwell prior to today and has not been exposed anyone sick recently.   Over the course of the day today apart from the symptoms as above, she denies any other symptoms of fever, cough, sputum, blood in urine/stool/sputum, diarrhea or dysuria, shortness of breath, leg swelling, rashes. Past Medical History:        Diagnosis Date    Acute ST elevation myocardial infarction (STEMI) ([facility] 07/19/2021    Atrial fibrillation ([facility])     Chronic lymphocytic leukemia in remission ([facility]     CLL (chronic lymphocytic leukemia) ([facility] 02/12/2015    COPD (chronic obstructive pulmonary disease) ([facility]     Essential hypertension 01/01/2015    controlled with salt restriction (initially)    Factor V Leiden mutation ([facility]     Goiter 11/01/2011    1.5 cm midline    Hypercholesterolemia     Impaired fasting glucose 11/01/2011    Osteoarthritis     Osteoporosis     Post herpetic neuralgia     Vitamin D deficiency 11/01/2011       Past Surgical History:        Procedure Laterality Date    CATARACT REMOVAL WITH IMPLANT  5/14/12    left eye    JOINT REPLACEMENT      partial knee R and L    KNEE SURGERY  2008    partial replacements, both knees    MOHS SURGERY  03/12/2019    R cheek and R superior temple    SPLENECTOMY      TUNNELED VENOUS PORT PLACEMENT      UPPER GASTROINTESTINAL ENDOSCOPY N/A 2/5/2019    EGD ESOPHAGOGASTRODUODENOSCOPY, WITH ENDOSCOPIC ULTRASOUND OF ESOPHAGUS performed by Doug Erazo MD at [facility]       Medications Prior to Admission:    Prior to Admission medications    Medication Sig Start Date End Date Taking? Authorizing Provider   diphenhydrAMINE-APAP, sleep, (TYLENOL PM EXTRA STRENGTH)  MG tablet Take 1 tablet by mouth nightly as needed for Sleep   Yes Historical Provider, MD   LORazepam (ATIVAN) 0.5 MG tablet Take 1 tablet by mouth 3 times daily as needed.  3/8/23   Historical Provider, MD   gabapentin (NEURONTIN) 300 MG capsule TAKE 2 CAPSULES BY MOUTH EVERY NIGHT 3/7/23 4/7/23  Frederick Bertrand MD   metoprolol succinate (TOPROL XL) 25 MG extended release tablet TAKE 1/2 TABLET BY MOUTH DAILY 23   FAB Booker CNP   apixaban [place]) 5 MG TABS tablet Take 1 tablet by mouth 2 times daily 23   Anahi Thompson MD   ENTRESTO 24-26 MG per tablet TAKE 1/2 TABLET BY MOUTH TWICE DAILY 23   FAB Booker CNP   oxyCODONE 5 MG capsule Take 5 mg by mouth every 6 hours as needed for Pain. Patient not taking: Reported on 3/8/2023    Historical Provider, MD   famotidine (PEPCID) 20 MG tablet Take 1 tablet by mouth 2 times daily 22   Jose Quiles MD   butalbital-acetaminophen-caffeine (FIORICET, ESGIC) -40 MG per tablet Take 1 tablet by mouth every 6 hours as needed for Headaches  Patient not taking: Reported on 3/8/2023 11/12/22   Jose Quiles MD   amiodarone (CORDARONE) 200 MG tablet TAKE 1 TABLET BY MOUTH DAILY 22   Damion Stern MD   Cyanocobalamin (VITAMIN B 12) 500 MCG TABS Take 500 mcg by mouth daily     Historical Provider, MD       Allergies:  Patient has no known allergies. Social History:  The patient currently lives home    TOBACCO:   reports that she quit smoking about 27 years ago. Her smoking use included cigarettes. She has a 15.00 pack-year smoking history. She has never used smokeless tobacco.  ETOH:   reports no history of alcohol use. Family History:  Reviewed in detail and negative for DM, Early CAD, Cancer, CVA. Positive as follows:        Problem Relation Age of Onset    Diabetes Father     Stroke Sister 50         of a stroke       REVIEW OF SYSTEMS:    and as noted in the HPI. All other systems reviewed and negative. PHYSICAL EXAM:    /71   Pulse 51   Temp 97.8 °F (36.6 °C) (Oral)   Resp 16   Ht 5' (1.524 m)   Wt 138 lb 14.2 oz (63 kg)   SpO2 92%   BMI 27.13 kg/m²     General appearance: Currently alert and oriented x4, at this time was not on nasal cannula oxygen and not in respiratory distress, alert and oriented x4  HEENT Normal cephalic, atraumatic without obvious deformity.   Pupils equal, round, and reactive to light. Extra ocular muscles intact. Mildly dry mucous membranes, anicteric sclera  Neck: Supple, no JVD  Lungs: Clear breath sounds bilaterally at this time  Heart: Somewhat bradycardic rhythm, seems to be regular at this time, no murmurs at this time  Abdomen: Slightly sore abdomen to multiple quadrants but nothing extreme, active bowel sounds, nondistended, soft overall  Extremities: No edema  Skin: No rashes  Neurologic: Grossly intact neurologically  Mental status: Alert, oriented, thought content appropriate. Capillary Refill: Acceptable  < 3 seconds  Peripheral Pulses: +3 Easily felt, not easily obliterated with pressure    03/08/23 2125  CT ABDOMEN PELVIS W IV CONTRAST Additional Contrast? None   Performed: 03/08/23 2020  Final        Impression: Colonic diverticulosis without acute diverticulitis. 03/08/23 2045  CT CHEST PULMONARY EMBOLISM W CONTRAST   Performed: 03/08/23 2017  Final        Impression: No pulmonary embolism or acute pulmonary abnormality. Possible pulmonary hypertension. 03/08/23 1940  XR CHEST (2 VW)   Performed: 03/08/23 1931  Final  Specimen: Chest        Impression: No acute process. CBC   Recent Labs     03/08/23  1900 03/09/23  0544   WBC 16.5* 16.6*   HGB 14.0 12.7   HCT 42.9 39.2    251      RENAL  Recent Labs     03/08/23  1900 03/09/23  0544    141   K 3.6 3.8    103   CO2 25 28   PHOS  --  4.5   BUN 11 12   CREATININE 0.9 0.9     LFT'S  Recent Labs     03/08/23 1900   AST 18   ALT 12   BILITOT 0.6   ALKPHOS 76     COAG  No results for input(s): INR in the last 72 hours.   CARDIAC ENZYMES  Recent Labs     03/08/23  1900 03/08/23  2319 03/09/23  0544   TROPONINI 0.02* 0.16* 0.14*       U/A:    Lab Results   Component Value Date/Time    NITRITE neg 04/20/2021 08:53 AM    COLORU Yellow 03/08/2023 08:04 PM    WBCUA 1 03/08/2023 08:04 PM    RBCUA 2 03/08/2023 08:04 PM    BACTERIA None Seen 03/08/2023 08:04 PM    CLARITYU Clear 03/08/2023 08:04 PM    SPECGRAV 1.012 03/08/2023 08:04 PM    LEUKOCYTESUR Negative 03/08/2023 08:04 PM    BLOODU Negative 03/08/2023 08:04 PM    GLUCOSEU Negative 03/08/2023 08:04 PM       ABG    Lab Results   Component Value Date/Time    NLT4ZME 26.9 09/12/2021 01:30 AM    BEART 0.5 09/12/2021 01:30 AM    X5UQXZOS >100.0 09/12/2021 01:30 AM    PHART 7.344 09/12/2021 01:30 AM    LQP3KGI 49.4 09/12/2021 01:30 AM    PO2ART 316.0 09/12/2021 01:30 AM    FXT5MKF 28.4 09/12/2021 01:30 AM           Active Hospital Problems    Diagnosis Date Noted    Headache [R51.9] 03/08/2023     Priority: Medium    Nausea and vomiting [R11.2] 03/08/2023     Priority: Medium    Elevated troponin [R77.8] 03/08/2023     Priority: Medium    NSTEMI (non-ST elevated myocardial infarction) ([facility] [I21.4] 03/08/2023     Priority: Medium    Chronic [facility] ([facility] [I48.20] 11/06/2022     Priority: Medium    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 ([facility] [D68.51]      Priority: Medium         PHYSICIANS CERTIFICATION:    I certify that Neri Vega is expected to be hospitalized for greater than than 2 midnights based on the following assessment and plan:      ASSESSMENT/PLAN:  NSTEMI  Chronic atrial fibrillation  Nausea and vomiting  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  Chronic lymphocytic leukemia    Plan:  EKG was nonspecific but initial troponin went from 0.02 up to 0.16, concern for NSTEMI at this time given patient's somewhat concerning symptoms of nausea and vomiting and headache without provocation  Started patient on heparin IV infusion for concerning NSTEMI  Start patient on aspirin and statin, restart home medications but holding her home normal anticoagulation  Continue patient 100/h of normal saline x10 hours 5 fluid hydration  Ativan as needed for anxiety  Morphine as needed for chest pain  Trend troponins and repeat labs  Cardiology consult for NSTEMI  Leukocytosis noted, no infectious process found at this time, suspect most likely this is secondary to her CLL    DVT Prophylaxis: Heparin IV infusion  Diet: Diet NPO  Code Status: Full Code  PT/OT Eval Status: Ambulatory    Dispo -pending clinical course       Brandon Barnett, DO    Thank you Anastasia Souza MD for the opportunity to be involved in this patient's care. If you have any questions or concerns please feel free to contact me at [phone].

## 2023-03-10 VITALS
HEIGHT: 60 IN | RESPIRATION RATE: 16 BRPM | OXYGEN SATURATION: 92 % | DIASTOLIC BLOOD PRESSURE: 57 MMHG | SYSTOLIC BLOOD PRESSURE: 121 MMHG | TEMPERATURE: 98.1 F | HEART RATE: 57 BPM | BODY MASS INDEX: 27.01 KG/M2 | WEIGHT: 137.57 LBS

## 2023-03-10 PROBLEM — R07.9 CHEST PAIN: Status: ACTIVE | Noted: 2023-03-10

## 2023-03-10 LAB
ALBUMIN SERPL-MCNC: 3.3 G/DL (ref 3.4–5)
ANION GAP SERPL CALCULATED.3IONS-SCNC: 5 MMOL/L (ref 3–16)
ATYPICAL LYMPHOCYTE RELATIVE PERCENT: 1 % (ref 0–6)
BANDED NEUTROPHILS RELATIVE PERCENT: 1 % (ref 0–7)
BASOPHILS ABSOLUTE: 0 K/UL (ref 0–0.2)
BASOPHILS RELATIVE PERCENT: 0 %
BUN BLDV-MCNC: 13 MG/DL (ref 7–20)
CALCIUM SERPL-MCNC: 8.4 MG/DL (ref 8.3–10.6)
CHLORIDE BLD-SCNC: 104 MMOL/L (ref 99–110)
CO2: 30 MMOL/L (ref 21–32)
CREAT SERPL-MCNC: 0.8 MG/DL (ref 0.6–1.2)
EOSINOPHILS ABSOLUTE: 0.3 K/UL (ref 0–0.6)
EOSINOPHILS RELATIVE PERCENT: 2 %
GFR SERPL CREATININE-BSD FRML MDRD: >60 ML/MIN/{1.73_M2}
GLUCOSE BLD-MCNC: 89 MG/DL (ref 70–99)
HCT VFR BLD CALC: 39 % (ref 36–48)
HEMATOLOGY PATH CONSULT: NO
HEMOGLOBIN: 12.6 G/DL (ref 12–16)
LYMPHOCYTES ABSOLUTE: 10.1 K/UL (ref 1–5.1)
LYMPHOCYTES RELATIVE PERCENT: 62 %
MAGNESIUM: 2 MG/DL (ref 1.8–2.4)
MCH RBC QN AUTO: 30.6 PG (ref 26–34)
MCHC RBC AUTO-ENTMCNC: 32.3 G/DL (ref 31–36)
MCV RBC AUTO: 94.8 FL (ref 80–100)
MONOCYTES ABSOLUTE: 1 K/UL (ref 0–1.3)
MONOCYTES RELATIVE PERCENT: 6 %
NEUTROPHILS ABSOLUTE: 4.6 K/UL (ref 1.7–7.7)
NEUTROPHILS RELATIVE PERCENT: 28 %
PDW BLD-RTO: 14.3 % (ref 12.4–15.4)
PHOSPHORUS: 3.5 MG/DL (ref 2.5–4.9)
PLATELET # BLD: 250 K/UL (ref 135–450)
PLATELET SLIDE REVIEW: ADEQUATE
PMV BLD AUTO: 9 FL (ref 5–10.5)
POTASSIUM SERPL-SCNC: 4.3 MMOL/L (ref 3.5–5.1)
RBC # BLD: 4.12 M/UL (ref 4–5.2)
RBC # BLD: NORMAL 10*6/UL
SLIDE REVIEW: ABNORMAL
SMUDGE CELLS: PRESENT
SODIUM BLD-SCNC: 139 MMOL/L (ref 136–145)
WBC # BLD: 16 K/UL (ref 4–11)

## 2023-03-10 PROCEDURE — 80069 RENAL FUNCTION PANEL: CPT

## 2023-03-10 PROCEDURE — 85025 COMPLETE CBC W/AUTO DIFF WBC: CPT

## 2023-03-10 PROCEDURE — 6370000000 HC RX 637 (ALT 250 FOR IP): Performed by: STUDENT IN AN ORGANIZED HEALTH CARE EDUCATION/TRAINING PROGRAM

## 2023-03-10 PROCEDURE — 2580000003 HC RX 258: Performed by: STUDENT IN AN ORGANIZED HEALTH CARE EDUCATION/TRAINING PROGRAM

## 2023-03-10 PROCEDURE — 6370000000 HC RX 637 (ALT 250 FOR IP): Performed by: INTERNAL MEDICINE

## 2023-03-10 PROCEDURE — 83735 ASSAY OF MAGNESIUM: CPT

## 2023-03-10 PROCEDURE — 94760 N-INVAS EAR/PLS OXIMETRY 1: CPT

## 2023-03-10 PROCEDURE — 97530 THERAPEUTIC ACTIVITIES: CPT

## 2023-03-10 PROCEDURE — 2700000000 HC OXYGEN THERAPY PER DAY

## 2023-03-10 RX ADMIN — Medication 10 ML: at 08:21

## 2023-03-10 RX ADMIN — ASPIRIN 81 MG CHEWABLE TABLET 81 MG: 81 TABLET CHEWABLE at 08:21

## 2023-03-10 RX ADMIN — METOPROLOL SUCCINATE 25 MG: 25 TABLET, EXTENDED RELEASE ORAL at 08:21

## 2023-03-10 RX ADMIN — SACUBITRIL AND VALSARTAN 1 TABLET: 24; 26 TABLET, FILM COATED ORAL at 08:21

## 2023-03-10 RX ADMIN — APIXABAN 5 MG: 5 TABLET, FILM COATED ORAL at 09:23

## 2023-03-10 RX ADMIN — AMIODARONE HYDROCHLORIDE 200 MG: 200 TABLET ORAL at 08:21

## 2023-03-10 NOTE — CARE COORDINATION
03/10/23 1422   IMM Letter   Observation Status Letter date given: 03/10/23   Observation Status Letter time given: 46   Observation Status Letter given to Patient/Family/Significant other/Guardian/POA/by: code 40 letter unable to be entered in the system for me to print, verbally expained to pt re: downgrade & pt does not want to wait for letter to be loaded to leave & gave permission for verbal signature, will have code 44 securely mailed to pt home address/[facility] Maegan Garcia RN, BSN,   [phone]  Electronically signed by Guido Kidd RN on 3/10/2023 at 2:23 PM

## 2023-03-10 NOTE — CARE COORDINATION
Code 44 letter sent to patient via Certified Mail # [id number]. Corbin Hawkins Sr.  Administrative Assist, Case Management  [phone]  Electronically signed by Corbin Hawkins on 3/10/2023 at 3:47 PM

## 2023-03-10 NOTE — CARE COORDINATION
Declined [facility], daughter will be here around 1300 to take her home.     Rogerio Reid RN, BSN,   [phone]  Electronically signed by Rogerio Reid RN on 3/10/2023 at 12:05 PM

## 2023-03-10 NOTE — DISCHARGE INSTR - COC
Continuity of Care Form    Patient Name: Efren Mejia   :  1940  MRN:  [id number]    Admit date:  3/8/2023  Discharge date:  ***    Code Status Order: Full Code   Advance Directives:     Admitting Physician:  Ralph Bush DO  PCP: Tucker Graham MD    Discharging Nurse: [facility] Unit/Room#: R4V-1751/3740-43  Discharging Unit Phone Number: ***    Emergency Contact:   Extended Emergency Contact Information  Primary Emergency Contact: [address] Phone: [phone]  Relation: Child  Secondary Emergency Contact: [address] [place] [address] Phone: [phone]  Relation: Child    Past Surgical History:  Past Surgical History:   Procedure Laterality Date    CATARACT REMOVAL WITH IMPLANT  12    left eye    JOINT REPLACEMENT      partial knee R and L    KNEE SURGERY      partial replacements, both knees    MOHS SURGERY  2019    R cheek and R superior temple    SPLENECTOMY      TUNNELED VENOUS PORT PLACEMENT      UPPER GASTROINTESTINAL ENDOSCOPY N/A 2019    EGD ESOPHAGOGASTRODUODENOSCOPY, WITH ENDOSCOPIC ULTRASOUND OF ESOPHAGUS performed by Isac Vega MD at [address] [facility] ENDOSCOPY       Immunization History:   Immunization History   Administered Date(s) Administered    COVID-19, MODERNA BLUE border, Primary or Immunocompromised, (age 12y+), IM, 100 mcg/0.5mL 2021    COVID-19, PFIZER PURPLE top, DILUTE for use, (age 15 y+), 30mcg/0.3mL 2021, 2021    Influenza Virus Vaccine 10/16/2009, 2010, 2010, 10/03/2016    Influenza, FLUARIX, FLULAVAL, Luisa Shoe (age 10 mo+) AND AFLURIA, (age 1 y+), PF, 0.5mL 2020, 10/23/2021    Influenza, High Dose (Fluzone 65 yrs and older) 2011, 10/01/2012, 10/29/2013, 10/23/2014, 2015, 10/23/2018, 2019    Pneumococcal Conjugate 13-valent ([id number]) 2015    Pneumococcal Polysaccharide (Ddkcliadc99) 2006, 2011    Td, unspecified formulation 10/04/2004 Tdap (Boostrix, Adacel) 09/01/2015       Active Problems:  Patient Active Problem List   Diagnosis Code    Osteoporosis,Dexas:10/18/02 (Lumbar spine -1.52), hip -0.55 (T scores), Actonel, then fosamax; Dexa 1/25/06 (Lumbar spine -1.1 and hip -0.5) M81.0    Post herpetic neuralgia B02.29    Thrombocytopenia; 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/06 D69.6    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 ([facility] D68.51    CLL (chronic lymphocytic leukemia) ([facility]) C91.10    Osteoarthritis M19.90    Hypercholesterolemia E78.00    Goiter, 1.5 cm midline E04.9    Impaired fasting glucose R73.01    Vitamin D deficiency E55.9    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    B12 deficiency E53.8    Essential hypertension I10    Encounter for follow-up examination after completed treatment for cancer Z08    Encounter for care related to Port-a-Cath Z45.2    Hyperuricemia E79.0    Arthritis of knee M17.10    Neoplasm of uncertain behavior of skin D48.5    History of squamous cell carcinoma of skin Z85.828    Basal cell carcinoma of right medial nasolabial fold C44.319    Basal cell carcinoma of left temple region C44.319    History of basal cell carcinoma Z85.828    Seborrheic keratoses, inflamed L82.0    Takotsubo cardiomyopathy I51.81    Chronic combined systolic (congestive) and diastolic (congestive) heart failure ([facility]) I50.42    Acute ST elevation myocardial infarction (STEMI) ([facility]) I21.3    Acute CHF (congestive heart failure) ([facility]) I50.9    Chronic respiratory failure with hypoxia ([facility]) J96.11    Simple chronic bronchitis ([facility]) J41.0    Syncope and collapse R55    Colitis K52.9    C. difficile colitis A04.72    Overweight (BMI 25.0-29. 9) E66.3    Chronic atrial fibrillation ([facility]) I48.20    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 procedure Z90.81    Calculus of gallbladder without cholecystitis without obstruction K80.20    Bandemia D72.825    Head contusion S00.93XA    Diarrhea R19.7    Sprain of left ankle S93.402A    Leukemoid reaction B79.530    Headache R51.9    Nausea and vomiting R11.2    Elevated troponin R77.8    NSTEMI (non-ST elevated myocardial infarction) ([facility]) I21.4       Isolation/Infection:   Isolation            No Isolation          Patient Infection Status       Infection Onset Added Last Indicated Last Indicated By Review Planned Expiration Resolved Resolved By    None active    Resolved    COVID-19 (Rule Out) 23 COVID-19, Rapid (Ordered)   23 Rule-Out Test Resulted    C-diff (Clostridium difficile) 22 C. difficile toxin Molecular   22     C-diff Rule Out 22 C. difficile toxin Molecular (Ordered)   22 Rule-Out Test Resulted    C-diff Rule Out 22 Clostridium Difficile Toxin/Antigen (Ordered)   22 Rule-Out Test Resulted    COVID-19 (Rule Out) 22 COVID-19, Rapid (Ordered)   22 Rule-Out Test Resulted    COVID-19 (Rule Out) 10/22/21 10/22/21 10/22/21 COVID-19, Rapid (Ordered)   10/22/21 Rule-Out Test Resulted            Nurse Assessment:  Last Vital Signs: BP (!) 121/57   Pulse 57   Temp 98.1 °F (36.7 °C) (Oral)   Resp 16   Ht 5' (1.524 m)   Wt 137 lb 9.1 oz (62.4 kg)   SpO2 92%   BMI 26.87 kg/m²     Last documented pain score (0-10 scale): Pain Level: 0  Last Weight:   Wt Readings from Last 1 Encounters:   03/10/23 137 lb 9.1 oz (62.4 kg)     Mental Status:  {IP PT MENTAL STATUS:00760}    IV Access:  {[facility] IV ACCESS:825593419}    Nursing Mobility/ADLs:  Walking   {CHP DME GBJV:339016572}  Transfer  {CHP DME EKKR:155688318}  Bathing  {CHP DME DETF:855867376}  Dressing  {CHP DME MMUU:793370332}  Toileting  {CHP DME ACGP:144122357}  Feeding  {P DME HWAU:868211993}  Med Admin  {[facility] DME ECIO:978982856}  Med Delivery   {[facility] MED Delivery:708368762}    Wound Care Documentation and Therapy:        Elimination:  Continence:    Bowel: {YES / GF:97252}  Bladder: {YES / PW:35671}  Urinary Catheter: {Urinary Catheter:047770233}   Colostomy/Ileostomy/Ileal Conduit: {YES / AZ:80067}       Date of Last BM: ***    Intake/Output Summary (Last 24 hours) at 3/10/2023 1129  Last data filed at 3/10/2023 9364  Gross per 24 hour   Intake 1020 ml   Output --   Net 1020 ml     I/O last 3 completed shifts: In: 80 [P.O.:780]  Out: -     Safety Concerns:     [address] Safety Concerns:614414161}    Impairments/Disabilities:      [address] Impairments/Disabilities:033743159}    Nutrition Therapy:  Current Nutrition Therapy:   [address] Diet List:516615952}    Routes of Feeding: {CH DME Other Feedings:615739364}  Liquids: {Slp liquid thickness:31576}  Daily Fluid Restriction: {CHP DME Yes amt example:285546036}  Last Modified Barium Swallow with Video (Video Swallowing Test): {Done Not Done NGJQ:086368145}    Treatments at the Time of Hospital Discharge:   Respiratory Treatments: ***  Oxygen Therapy:  {Therapy; copd oxygen:93981}  Ventilator:    { CC Vent MFQQ:931754207}    Rehab Therapies: {THERAPEUTIC INTERVENTION:8930396126}  Weight Bearing Status/Restrictions: [address]  Weight Bearin}  Other Medical Equipment (for information only, NOT a DME order):  {EQUIPMENT:428678679}  Other Treatments: ***    Patient's personal belongings (please select all that are sent with patient):  {[facility] DME Belongings:209120060}    RN SIGNATURE:  {Esignature:404154500}    CASE MANAGEMENT/SOCIAL WORK SECTION    Inpatient Status Date: ***    Readmission Risk Assessment Score:  Readmission Risk              Risk of Unplanned Readmission:  16           Discharging to Facility/ Agency   Name:   Address:  Phone:  Fax:    Dialysis Facility (if applicable)   Name:  Address:  Dialysis Schedule:  Phone:  Fax:    / signature: {Esignature:021953844}    PHYSICIAN SECTION    Prognosis: Fair    Condition at Discharge: Stable    Rehab Potential (if transferring to Rehab):  Fair    Recommended Labs or Other Treatments After Discharge: Follow-up with PCP within 7 days from discharge, not doing so could have life-threatening consequences. Take medication as instructed;  return to the emergency department if persistent symptoms, experiencing side effects from medication including nausea vomiting or unable to keep medications down. Physician Certification: I certify the above information and transfer of Liang Sen  is necessary for the continuing treatment of the diagnosis listed and that she requires Home Care for greater 30 days.      Update Admission H&P: No change in H&P    PHYSICIAN SIGNATURE:  Electronically signed by Devyn Balderas MD on 3/10/23 at 11:30 AM EST

## 2023-03-10 NOTE — PROGRESS NOTES
Physical Therapy  Facility/Department: [address] MED SURG  Physical Therapy Treatment Note    Name: Woody Nguyen  : 1940  MRN: [id number]  Date of Service: 3/10/2023    Discharge Recommendations:  Home with assist PRN, Home with Home health PT, Continue to assess pending progress          Patient Diagnosis(es): The primary encounter diagnosis was Chest pain, unspecified type. A diagnosis of Elevated troponin was also pertinent to this visit. Past Medical History:  has a past medical history of Acute ST elevation myocardial infarction (STEMI) ([facility] Atrial fibrillation ([facility] Chronic lymphocytic leukemia in remission ([facility] CLL (chronic lymphocytic leukemia) ([facility] COPD (chronic obstructive pulmonary disease) ([facility] Essential hypertension, Factor V Leiden mutation ([facility] Goiter, Hypercholesterolemia, Impaired fasting glucose, Osteoarthritis, Osteoporosis, Post herpetic neuralgia, and Vitamin D deficiency. Past Surgical History:  has a past surgical history that includes Splenectomy; knee surgery (); Cataract removal with implant (12); Tunneled venous port placement; Upper gastrointestinal endoscopy (N/A, 2019); Mohs surgery (2019); and joint replacement. Assessment   Body Structures, Functions, Activity Limitations Requiring Skilled Therapeutic Intervention: Decreased functional mobility   Assessment: Pt is an 78 y.o. female who presented to the ED on 3/8/23 with headache, nausea, vomiting, abdominal pain, initially no chest pain but then having some midsternal pain or tingling while in ED. Prior to admission, pt living in home setting with son, independent with ADLs and ambulation without device. Pt currently functioning below baseline. Anticipate return home with PRN assist and strict use of RW. She would also benefit from HHPT to aid in transition off walker.   Treatment Diagnosis: impaired mobility  Therapy Prognosis: Good  Decision Making: Medium Complexity  History: see above  Exam: see below  Clinical Presentation: evolving  Activity Tolerance  Activity Tolerance: Patient tolerated treatment well     Plan   Physcial Therapy Plan  General Plan: 3-5 times per week  Current Treatment Recommendations: Strengthening, Balance training, Functional mobility training, Transfer training, Endurance training, Gait training, Neuromuscular re-education, Patient/Caregiver education & training, Safety education & training, Equipment evaluation, education, & procurement, Therapeutic activities  Safety Devices  Type of Devices: All fall risk precautions in place, Call light within reach, Left in chair, Nurse notified     Restrictions  Position Activity Restriction  Other position/activity restrictions: 1L O2 to RA     Subjective   General  Chart Reviewed: Yes  Patient assessed for rehabilitation services?: Yes  Additional Pertinent Hx: Pt is an 78 y.o. female who presented to the ED on 3/8/23 with headache, nausea, vomiting, abdominal pain, initially no chest pain but then having some midsternal pain or tingling while in ED. Response To Previous Treatment: Not applicable  Family / Caregiver Present: No  Referring Practitioner: Marisa Schaffer MD  Referral Date : 03/09/23  Diagnosis: abdominal pain  Follows Commands: Within Functional Limits  Subjective  Subjective: Pt is agreeable to PT - reports she has been getting up.          Social/Functional History  Social/Functional History  Lives With: Son  Type of Home: Condo  Home Layout: Two level  Home Access: Stairs to enter with rails  Entrance Stairs - Number of Steps: 13  Entrance Stairs - Rails: Both  Bathroom Shower/Tub: Walk-in shower  Bathroom Toilet: Handicap height  Bathroom Equipment: Grab bars in shower, Built-in shower seat, Grab bars around toilet  Home Equipment: Rollator, Wheelchair-manual, Walker, rolling, Cane, Oxygen (intermittent use of O2)  ADL Assistance: Independent  Homemaking Assistance:  (cleaning servive bi-weekly)  Ambulation Assistance: Independent  Transfer Assistance: Independent  Active : Yes  Mode of Transportation: Car  Occupation: Retired  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 [employer] Real: Within [facility] [address]  Hearing: Within functional limits  Hearing Exceptions: Hard of hearing/hearing concerns;Bilateral hearing aid      Cognition   Orientation  Overall Orientation Status: Within Functional Limits  Cognition  Overall Cognitive Status: WFL     Objective   Gross Assessment  Strength: Generally decreased, functional     Bed mobility  Supine to Sit: Modified independent  Sit to Supine: Unable to assess (in recliner at end of session)  Transfers  Sit to Stand: Moderate Assistance  Stand to Sit: Moderate Assistance  Ambulation  Surface: Level tile  Device: Rolling Walker  Assistance: Supervision  Quality of Gait: guarded  Gait Deviations: Slow Samantha  Distance: 100'  Comments: Steady with RW. O2 sats between 88 and 92%. Attempting RA trial - nsg aware. Pt does not want to trial ambulation without walker.      Balance  Posture: Good  Sitting - Static: Good  Sitting - Dynamic: Good  Standing - Static: Good (@RW)  Standing - Dynamic: Good (@RW)           AM-PAC Score  AM-PAC Inpatient Mobility Raw Score : 23 (03/10/23 0739)  AM-PAC Inpatient T-Scale Score : 56.93 (03/10/23 0739)  Mobility Inpatient CMS 0-100% Score: 11.2 (03/10/23 0739)  Mobility Inpatient CMS G-Code Modifier : CI (03/10/23 0739)          Goals  Short Term Goals  Time Frame for Short Term Goals: by acute discharge  Short Term Goal 1: bed mobility mod I - MET 3/10/23  Short Term Goal 2: sit<>stand (I)  Short Term Goal 3: ambulate > 36' mod I with LRAD  Patient Goals   Patient Goals : none stated       Education  Patient Education  Education Given To: Patient  Education Provided: Role of Therapy;Plan of Care  Education Method: Verbal  Barriers to Learning: None  Education Outcome: Verbalized understanding;Continued education needed      Therapy Time   Individual Concurrent Group Co-treatment   Time In 0700         Time Out 0738         Minutes 38         Timed Code Treatment Minutes: 45 Minutes       Steph Kline PT

## 2023-03-10 NOTE — PLAN OF CARE
Problem: Discharge Planning  Goal: Discharge to home or other facility with appropriate resources  3/10/2023 0850 by Lizet Maki RN  Outcome: Progressing  3/10/2023 0034 by Marianela High RN  Outcome: Progressing     Problem: Safety - Adult  Goal: Free from fall injury  Outcome: Progressing     Problem: ABCDS Injury Assessment  Goal: Absence of physical injury  Outcome: Progressing     Problem: Cardiovascular - Adult  Goal: Maintains optimal cardiac output and hemodynamic stability  Outcome: Progressing  Goal: Absence of cardiac dysrhythmias or at baseline  Outcome: Progressing     Problem: Gastrointestinal - Adult  Goal: Minimal or absence of nausea and vomiting  Outcome: Progressing  Goal: Maintains or returns to baseline bowel function  Outcome: Progressing  Goal: Maintains adequate nutritional intake  Outcome: Progressing     Problem: Chronic Conditions and Co-morbidities  Goal: Patient's chronic conditions and co-morbidity symptoms are monitored and maintained or improved  Outcome: Progressing     Problem: Pain  Goal: Verbalizes/displays adequate comfort level or baseline comfort level  Outcome: Progressing

## 2023-03-10 NOTE — DISCHARGE SUMMARY
Hospital Medicine Discharge Summary    Patient ID: Isaiah Abebe      Patient's PCP: Steve Oleary MD    Admit Date: 3/8/2023     Discharge Date:   03/10/23    Admitting Provider: France Torres DO     Discharge Provider: Giuliano Guerin MD     Discharge Diagnoses: Active Hospital Problems    Diagnosis     Headache [R51.9]      Priority: Medium    Nausea and vomiting [R11.2]      Priority: Medium    Elevated troponin [R77.8]      Priority: Medium    NSTEMI (non-ST elevated [facility] infarction) [facility] [I21.4]      Priority: Medium    Chronic atrial fibrillation ([facility] [I48.20]      Priority: Medium    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 [facility]) [D68.51]      Priority: Medium       The patient was seen and examined on day of discharge and this discharge summary is in conjunction with any daily progress note from day of discharge. Hospital Course:     NSTEMI   Patient was evaluated by cardiology, patient with known normal coronaries. Patient was cleared for discharge and outpatient follow-up recommended. History of dyslipidemia   Patient reports intolerance to statin      Nausea vomiting  Headache              Suspected migraine              CT of the abdomen without acute findings,     Leukocytosis  History of CLL              White count appears to be close to her baseline     History of heart failure with low ejection fraction-now normalized EF              Appears to be compensated, resume Entresto metoprolol     History of atrial fibrillation              Takes amiodarone and Eliquis at home, holding Eliquis. Due to heparin infusion     Debility              PT OT; home with home care, declined           Physical Exam Performed:     BP (!) 121/57   Pulse 57   Temp 98.1 °F (36.7 °C) (Oral)   Resp 16   Ht 5' (1.524 m)   Wt 137 lb 9.1 oz (62.4 kg)   SpO2 92%   BMI 26.87 kg/m²       General appearance:  No apparent distress, appears stated age and cooperative.   HEENT:  Normal cephalic, atraumatic without obvious deformity. Pupils equal, round, and reactive to light. Extra ocular muscles intact. Conjunctivae/corneas clear. Neck: Supple, with full range of motion. No jugular venous distention. Trachea midline. Respiratory:  Normal respiratory effort. Clear to auscultation, bilaterally without Rales/Wheezes/Rhonchi. Cardiovascular:  Regular rate and rhythm with normal S1/S2 without murmurs, rubs or gallops. Abdomen: Soft, non-tender, non-distended with normal bowel sounds. Musculoskeletal:  No clubbing, cyanosis or edema bilaterally. Full range of motion without deformity. Skin: Skin color, texture, turgor normal.  No rashes or lesions. Neurologic:  Neurovascularly intact without any focal sensory/motor deficits. Cranial nerves: II-XII intact, grossly non-focal.  Psychiatric:  Alert and oriented, thought content appropriate, normal insight  Capillary Refill: Brisk,< 3 seconds   Peripheral Pulses: +2 palpable, equal bilaterally       Labs: For convenience and continuity at follow-up the following most recent labs are provided:      CBC:    Lab Results   Component Value Date/Time    WBC 16.0 03/10/2023 06:00 AM    HGB 12.6 03/10/2023 06:00 AM    HCT 39.0 03/10/2023 06:00 AM     03/10/2023 06:00 AM       Renal:    Lab Results   Component Value Date/Time     03/10/2023 06:00 AM    K 4.3 03/10/2023 06:00 AM    K 3.3 11/03/2022 12:35 AM     03/10/2023 06:00 AM    CO2 30 03/10/2023 06:00 AM    BUN 13 03/10/2023 06:00 AM    CREATININE 0.8 03/10/2023 06:00 AM    CALCIUM 8.4 03/10/2023 06:00 AM    PHOS 3.5 03/10/2023 06:00 AM         Significant Diagnostic Studies    Radiology:   CT ABDOMEN PELVIS W IV CONTRAST Additional Contrast? None   Final Result   Colonic diverticulosis without acute diverticulitis. CT CHEST PULMONARY EMBOLISM W CONTRAST   Final Result   No pulmonary embolism or acute pulmonary abnormality. Possible pulmonary   hypertension.          XR CHEST (2 VW) Final Result   No acute process. Consults:     IP CONSULT TO HOSPITALIST  IP CONSULT TO CARDIOLOGY  IP CONSULT TO HOME CARE NEEDS    Disposition:  home     Condition at Discharge: Stable    Discharge Instructions/Follow-up:  Follow-up with PCP within 7 days from discharge, not doing so could have life-threatening consequences. Take medication as instructed;  return to the emergency department if persistent symptoms, experiencing side effects from medication including nausea vomiting or unable to keep medications down. Code Status:  Full Code     Activity: activity as tolerated    Diet: cardiac diet      Discharge Medications:     Current Discharge Medication List             Details   diphenhydrAMINE-APAP, sleep, (TYLENOL PM EXTRA STRENGTH)  MG tablet Take 1 tablet by mouth nightly as needed for Sleep      LORazepam (ATIVAN) 0.5 MG tablet Take 1 tablet by mouth 3 times daily as needed.       gabapentin (NEURONTIN) 300 MG capsule TAKE 2 CAPSULES BY MOUTH EVERY NIGHT  Qty: 60 capsule, Refills: 0      metoprolol succinate (TOPROL XL) 25 MG extended release tablet TAKE 1/2 TABLET BY MOUTH DAILY  Qty: 45 tablet, Refills: 3      apixaban (ELIQUIS) 5 MG TABS tablet Take 1 tablet by mouth 2 times daily  Qty: 60 tablet, Refills: 5      ENTRESTO 24-26 MG per tablet TAKE 1/2 TABLET BY MOUTH TWICE DAILY  Qty: 90 tablet, Refills: 3      oxyCODONE 5 MG capsule Take 5 mg by mouth every 6 hours as needed for Pain.      famotidine (PEPCID) 20 MG tablet Take 1 tablet by mouth 2 times daily  Qty: 60 tablet, Refills: 3      butalbital-acetaminophen-caffeine (FIORICET, ESGIC) -40 MG per tablet Take 1 tablet by mouth every 6 hours as needed for Headaches  Qty: 30 tablet, Refills: 0      amiodarone (CORDARONE) 200 MG tablet TAKE 1 TABLET BY MOUTH DAILY  Qty: 90 tablet, Refills: 3      Cyanocobalamin (VITAMIN B 12) 500 MCG TABS Take 500 mcg by mouth daily              Time Spent on discharge: 33 min  in the examination, evaluation, counseling and review of medications and discharge plan. Signed:    Marisa Schaffer MD   3/10/2023      Thank you Roderick Blackmon MD for the opportunity to be involved in this patient's care. If you have any questions or concerns, please feel free to contact me at [phone].

## 2023-03-13 ENCOUNTER — CARE COORDINATION (OUTPATIENT)
Dept: CASE MANAGEMENT | Age: 83
End: 2023-03-13

## 2023-03-14 ENCOUNTER — CARE COORDINATION (OUTPATIENT)
Dept: CASE MANAGEMENT | Age: 83
End: 2023-03-14

## 2023-03-14 NOTE — CARE COORDINATION
[facility] Care Transitions Initial Follow Up Call    Call within 2 business days of discharge: Yes    Patient [place] Location:  [place] [address]    LPN Care Coordinator contacted the patient by telephone to perform post hospital discharge assessment. Verified name and  with patient as identifiers. Provided introduction to self, and explanation of the LPN Care Coordinator role. Patient: Vaishali Oliva Patient : 1940   MRN: [id number]  Reason for Admission: Chest pain  Discharge Date: 3/10/23 RARS: Readmission Risk Score: 10.8      Last Discharge [address]       Date Complaint Diagnosis Description Type Department Provider    3/8/23 Abdominal Pain; Headache; Back Pain; Chest Pain Chest pain, unspecified type . .. ED to Hosp-Admission (Discharged) (ADMITTED) Joanna Shelby MD; Sylvester Morales. .. Was this an external facility discharge? No Discharge Facility: [facility] to be reviewed by the provider   Additional needs identified to be addressed with provider: No  none               Method of communication with provider: none. Spoke with Vaishali Oliva briefly. She stated that she is doing great. Patient denied cp, sob, cough, dizziness, headache, n/v, diarrhea, abdominal pains, fever, or chills. Patient report that appetite and fluid intake is good and denied any problems with bowel or bladder. Patient reported that she is taking all medications as ordered. Patient declined a complete medication review. She stated that Dr Ravi Arceo is her cardiologist and he went over all of her medications with her before she left the hospital. Patient stated that she will see her PCP for a HFU on Wednesday 3/22/23. Patient denied any other needs at this time. Patient instructed to continue to monitor s/s, reporting any that may present to MD immediately for early intervention. Patient is agreeable to f/u calls.  [facility] provided contact information for future needs. Reminded of COVID 19 precautions. LPN Care Coordinator reviewed discharge instructions, medical action plan, and red flags with patient who verbalized understanding. The patient was given an opportunity to ask questions and does not have any further questions or concerns at this time. Were discharge instructions available to patient? Yes. Reviewed appropriate site of care based on symptoms and resources available to patient including: PCP  Specialist  When to call 911. The patient agrees to contact the PCP office for questions related to their healthcare. Advance Care Planning:   Does patient have an Advance Directive:  no  .    Medication reconciliation was not performed with patient, who verbalizes understanding of administration of home medications. Medications reviewed, 1111F entered: no    Was patient discharged with a pulse oximeter? no    Non-face-to-face services provided:  Obtained and reviewed discharge summary and/or continuity of care documents  Education of patient/family/caregiver/guardian to support self-management-s/s of Covid and when to contact the doctor  Assessment and support for treatment adherence and medication management-. Offered patient enrollment in the Remote Patient Monitoring (RPM) program for in-home monitoring:  no .    Care Transitions 24 Hour Call    Do you have a copy of your discharge instructions?: Yes  Do you have all of your prescriptions and are they filled?: Yes  Have you been contacted by a [address] Pharmacist?: No  Have you scheduled your follow up appointment?: Yes  Post Acute Services: Home Health (Comment: Faisal Campbell)  Do you feel like you have everything you need to keep you well at home?: Yes  Care Transitions Interventions  No Identified Needs         Discussed follow-up appointments. If no appointment was previously scheduled, appointment scheduling offered: Yes. Is follow up appointment scheduled within 7 days of discharge? Yes.     Follow Up  Future Appointments   Date Time Provider Buck Soares   3/22/2023  1:40 PM Karl Kline MD [facility]   10/23/2023 11:20 AM Arabella Goins [facility] provided contact information. Plan for follow-up call in 5-7 days based on severity of symptoms and risk factors.   Plan for next call: symptom management-,  self management-.    Christin Yang LPN

## 2023-03-21 ENCOUNTER — CARE COORDINATION (OUTPATIENT)
Dept: CASE MANAGEMENT | Age: 83
End: 2023-03-21

## 2023-03-21 NOTE — CARE COORDINATION
[facility] Care Transitions Follow Up Call        Patient: Maykel Murry  Patient : 1940   MRN: [id number]  Reason for Admission:  Chest pain  Discharge Date: 3/10/23 RARS: Readmission Risk Score: 10.8      Needs to be reviewed by the provider         1st attempt to reach for Care Transition follow up call unsuccessful. HIPAA compliant message left requesting call back.          Follow Up  Future Appointments   Date Time Provider Buck Soares   3/22/2023  1:40 PM Carol Leos MD [facility]   10/23/2023 11:20 AM Tiara Kuhn MD [facility] BRANDYN Pepper RN

## 2023-03-22 ENCOUNTER — OFFICE VISIT (OUTPATIENT)
Dept: FAMILY MEDICINE CLINIC | Age: 83
End: 2023-03-22

## 2023-03-22 VITALS
SYSTOLIC BLOOD PRESSURE: 118 MMHG | OXYGEN SATURATION: 92 % | WEIGHT: 140 LBS | DIASTOLIC BLOOD PRESSURE: 70 MMHG | HEART RATE: 52 BPM | BODY MASS INDEX: 27.34 KG/M2

## 2023-03-22 DIAGNOSIS — I50.42 CHRONIC COMBINED SYSTOLIC (CONGESTIVE) AND DIASTOLIC (CONGESTIVE) HEART FAILURE (HCC): ICD-10-CM

## 2023-03-22 DIAGNOSIS — J41.0 SIMPLE CHRONIC BRONCHITIS (HCC): ICD-10-CM

## 2023-03-22 DIAGNOSIS — F41.9 ANXIETY: ICD-10-CM

## 2023-03-22 DIAGNOSIS — E55.9 VITAMIN D DEFICIENCY: ICD-10-CM

## 2023-03-22 DIAGNOSIS — I50.22 CHRONIC SYSTOLIC HEART FAILURE (HCC): ICD-10-CM

## 2023-03-22 DIAGNOSIS — I48.20 CHRONIC ATRIAL FIBRILLATION (HCC): ICD-10-CM

## 2023-03-22 DIAGNOSIS — I10 ESSENTIAL HYPERTENSION: Primary | ICD-10-CM

## 2023-03-22 DIAGNOSIS — E79.0 HYPERURICEMIA: ICD-10-CM

## 2023-03-22 DIAGNOSIS — M15.9 PRIMARY OSTEOARTHRITIS INVOLVING MULTIPLE JOINTS: ICD-10-CM

## 2023-03-22 DIAGNOSIS — C91.10 CLL (CHRONIC LYMPHOCYTIC LEUKEMIA) (HCC): ICD-10-CM

## 2023-03-22 DIAGNOSIS — E78.00 HYPERCHOLESTEROLEMIA: ICD-10-CM

## 2023-03-22 DIAGNOSIS — Z85.828 HISTORY OF BASAL CELL CARCINOMA: ICD-10-CM

## 2023-03-22 DIAGNOSIS — I25.10 CORONARY ARTERY DISEASE INVOLVING NATIVE CORONARY ARTERY OF NATIVE HEART WITHOUT ANGINA PECTORIS: ICD-10-CM

## 2023-03-22 DIAGNOSIS — D68.51 FACTOR V LEIDEN CARRIER (HCC): ICD-10-CM

## 2023-03-22 DIAGNOSIS — Z85.828 HISTORY OF SQUAMOUS CELL CARCINOMA OF SKIN: ICD-10-CM

## 2023-03-22 PROBLEM — I50.9 ACUTE CHF (CONGESTIVE HEART FAILURE) (HCC): Status: RESOLVED | Noted: 2021-09-12 | Resolved: 2023-03-22

## 2023-03-22 PROBLEM — I21.3 ACUTE ST ELEVATION MYOCARDIAL INFARCTION (STEMI) (HCC): Status: RESOLVED | Noted: 2021-07-19 | Resolved: 2023-03-22

## 2023-03-22 RX ORDER — LORAZEPAM 0.5 MG/1
0.5 TABLET ORAL DAILY PRN
Qty: 15 TABLET | Refills: 2 | Status: SHIPPED | OUTPATIENT
Start: 2023-04-07 | End: 2023-05-07

## 2023-03-22 RX ORDER — GABAPENTIN 300 MG/1
CAPSULE ORAL
Qty: 60 CAPSULE | Refills: 5 | Status: SHIPPED | OUTPATIENT
Start: 2023-03-22 | End: 2023-04-22

## 2023-03-22 ASSESSMENT — PATIENT HEALTH QUESTIONNAIRE - PHQ9
SUM OF ALL RESPONSES TO PHQ QUESTIONS 1-9: 0
2. FEELING DOWN, DEPRESSED OR HOPELESS: 0
1. LITTLE INTEREST OR PLEASURE IN DOING THINGS: 0
SUM OF ALL RESPONSES TO PHQ9 QUESTIONS 1 & 2: 0

## 2023-03-22 NOTE — PROGRESS NOTES
[facility]  Clinic Note    Date: 3/22/2023                                               Subjective:     Chief Complaint   Patient presents with    Hypertension     Htn routine follow up      HPI  Stopped driving at night  Some congestion in chest at night - goes away quickly. In hospital 3/8-3/10 this month - reviewed d/c summary/ tests - cp/ nstemi - normal coronaries  On amiodarone/ eliquis at home for afib  On entresto/ bb for HF  Sees pulm/ cardio routinely  On stiolto for copd - on oxygen prn - hx of smoking  W/o hearing aids today - battery . - 65yo son lives w/ her but had stroke on left side - walks w/ cane  Left sided visual loss os  Uses oxygen periodically if oxygen drops low - below 90% - trying to not use   Going to [facility] end of April and doesn't want to drag along oxygen. No cp/ palpitations  Had vomiting few weeks ago -heavy w/ metallic taste -heart started racing at that point -dx w/ NSTEMI  Bowels back to normal  No swelling in feet/ ankles  Some knee pain bilat - had partial knee replacement bilaterally and pain since then - sore in am but better w/ walking some - voltaren gel helps some - not needing cane  Generally steady gait but had couple days recently of feeling off balance  Using oxygen helped.   Drinks a lot of soda/ tea  Seeing oncology routinely  On vit d supplement  Dexa normal approximately 9 years ago  Has cleaning service for house - looking forward to warmer weather / travelling            BP Readings from Last 3 Encounters:   23 118/70   03/10/23 (!) 121/57   22 130/70     Pulse Readings from Last 3 Encounters:   23 52   03/10/23 57   22 53     Wt Readings from Last 3 Encounters:   23 140 lb (63.5 kg)   03/10/23 137 lb 9.1 oz (62.4 kg)   22 141 lb 9.6 oz (64.2 kg)            Patient Active Problem List    Diagnosis Date Noted    Essential hypertension     CLL (chronic lymphocytic leukemia) (HCC)     Hypercholesterolemia

## 2023-03-23 ENCOUNTER — CARE COORDINATION (OUTPATIENT)
Dept: CASE MANAGEMENT | Age: 83
End: 2023-03-23

## 2023-03-23 NOTE — CARE COORDINATION
Care Transitions Outreach Attempt      Attempted to reach patient for transitions of care follow up. Sister Margaret Hoang answered the phone. She said patient is not home at the moment. She said she could take a message. CTN offered to call back and leave a message if that was more convenient. Margaret Hoang said patient does not check messages. Margaret Hoang recorded contact information for a return call. Patient: Rand Zavala Patient : 1940 MRN: [id number]    Last Discharge  Street       Date Complaint Diagnosis Description Type Department Provider    3/8/23 Abdominal Pain; Headache; Back Pain; Chest Pain Chest pain, unspecified type . .. ED to Hosp-Admission (Discharged) (ADMITTED) Solo Oconnell MD; Yes[facility]ine. .. Was this an external facility discharge?  No Discharge Facility: [facility]    Noted following upcoming appointments from discharge chart review:   [facility] follow up appointment(s):   Future Appointments   Date Time Provider Buck Soares   2023  1:20 PM Joanna Encarnacion MD [facility]   10/23/2023 11:20 AM Tremayne Strong MD [facility]

## 2023-03-28 ENCOUNTER — CARE COORDINATION (OUTPATIENT)
Dept: CASE MANAGEMENT | Age: 83
End: 2023-03-28

## 2023-03-28 NOTE — CARE COORDINATION
[address] Care Transitions Follow Up Call      Patient: Roque Trimble  Patient : 1940   MRN: [id number]  Reason for Admission: CP/NSTEMI  Discharge Date: 3/10/23 RARS: Readmission Risk Score: 10.8      Third and final outreach call attempt, no answer. CTN left  with contact information and request for return call. CTN will resolve episode and remain available.     Follow Up  Future Appointments   Date Time Provider Buck Soares   2023  1:20 PM Sudha Tejeda MD [facility]   10/23/2023 11:20 AM Michael Jewell MD [facility]       AURELIA Alejandra, RN   Care Transition Nurse  Mobile: [phone]

## 2023-04-07 PROBLEM — R77.8 ELEVATED TROPONIN: Status: RESOLVED | Noted: 2023-03-08 | Resolved: 2023-04-07

## 2023-04-07 PROBLEM — R79.89 ELEVATED TROPONIN: Status: RESOLVED | Noted: 2023-03-08 | Resolved: 2023-04-07

## 2023-06-22 ENCOUNTER — OFFICE VISIT (OUTPATIENT)
Dept: FAMILY MEDICINE CLINIC | Age: 83
End: 2023-06-22

## 2023-06-22 VITALS
SYSTOLIC BLOOD PRESSURE: 116 MMHG | BODY MASS INDEX: 28.07 KG/M2 | WEIGHT: 143 LBS | HEIGHT: 60 IN | DIASTOLIC BLOOD PRESSURE: 74 MMHG | OXYGEN SATURATION: 99 % | HEART RATE: 54 BPM

## 2023-06-22 DIAGNOSIS — C85.80: ICD-10-CM

## 2023-06-22 DIAGNOSIS — D68.69 SECONDARY HYPERCOAGULABLE STATE (HCC): ICD-10-CM

## 2023-06-22 DIAGNOSIS — C91.11 CHRONIC LYMPHOCYTIC LEUKEMIA IN REMISSION (HCC): ICD-10-CM

## 2023-06-22 DIAGNOSIS — L82.0 SEBORRHEIC KERATOSES, INFLAMED: ICD-10-CM

## 2023-06-22 DIAGNOSIS — G47.00 INSOMNIA, UNSPECIFIED TYPE: ICD-10-CM

## 2023-06-22 DIAGNOSIS — I48.20 CHRONIC ATRIAL FIBRILLATION (HCC): ICD-10-CM

## 2023-06-22 DIAGNOSIS — F41.9 ANXIETY: Primary | ICD-10-CM

## 2023-06-22 DIAGNOSIS — D68.51 FACTOR V LEIDEN CARRIER (HCC): ICD-10-CM

## 2023-06-22 DIAGNOSIS — I10 ESSENTIAL HYPERTENSION: ICD-10-CM

## 2023-06-22 DIAGNOSIS — E79.0 HYPERURICEMIA: ICD-10-CM

## 2023-06-22 DIAGNOSIS — B02.29 POST HERPETIC NEURALGIA: ICD-10-CM

## 2023-06-22 DIAGNOSIS — E53.8 B12 DEFICIENCY: ICD-10-CM

## 2023-06-22 PROBLEM — K52.9 COLITIS: Status: RESOLVED | Noted: 2022-11-03 | Resolved: 2023-06-22

## 2023-06-22 PROBLEM — R11.2 NAUSEA AND VOMITING: Status: RESOLVED | Noted: 2023-03-08 | Resolved: 2023-06-22

## 2023-06-22 PROBLEM — A04.72 C. DIFFICILE COLITIS: Status: RESOLVED | Noted: 2022-11-05 | Resolved: 2023-06-22

## 2023-06-22 PROBLEM — R07.9 CHEST PAIN: Status: RESOLVED | Noted: 2023-03-10 | Resolved: 2023-06-22

## 2023-06-22 PROBLEM — R51.9 HEADACHE: Status: RESOLVED | Noted: 2023-03-08 | Resolved: 2023-06-22

## 2023-06-22 PROBLEM — R55 SYNCOPE AND COLLAPSE: Status: RESOLVED | Noted: 2022-11-03 | Resolved: 2023-06-22

## 2023-06-22 PROBLEM — I21.4 NSTEMI (NON-ST ELEVATED MYOCARDIAL INFARCTION) (HCC): Status: RESOLVED | Noted: 2023-03-08 | Resolved: 2023-06-22

## 2023-06-22 NOTE — PROGRESS NOTES
[facility]  Clinic Note    Date: 6/22/2023                                               Subjective:     Chief Complaint   Patient presents with    Anxiety     ANXIETY ROUTINE FOLLOW UP UDS DONE 3/8/23 ISSUES WITH SLEEP      HPI  Reviewed 4/13 cardio note - EF improved from <10% to 55-60% on most recent  echo  Lorazepam for anxiety - uses 1 daily prn anxiety - may take 2/ week  On gabapentin for shingles neuralgia left upper abdomen  On eliquis from warfarin for afib  HAS SEB K'S DIFFUSELY BUT SPOT LEFT AXILLA IRRITATED BY CLOTHING - WOULD LIKE TO HAVE TX'D  Tosses/ turns in bed -not getting good consistent sleep - hard to get to sleep  Usually reads to relax  Son w/ stroke living w/ her - but requiring her help - needs alternative plan  Causing stress. On disability now  Tries to avoid nap  Tylenol pm helps -melatonin not helpful.     BP Readings from Last 3 Encounters:   06/22/23 116/74   04/13/23 104/64   03/22/23 118/70     Pulse Readings from Last 3 Encounters:   06/22/23 54   04/13/23 51   03/22/23 52     Wt Readings from Last 3 Encounters:   06/22/23 143 lb (64.9 kg)   04/13/23 141 lb (64 kg)   03/22/23 140 lb (63.5 kg)     Lab Results   Component Value Date     03/10/2023    K 4.3 03/10/2023     03/10/2023    CO2 30 03/10/2023    BUN 13 03/10/2023    CREATININE 0.8 03/10/2023    GLUCOSE 89 03/10/2023    CALCIUM 8.4 03/10/2023    PROT 6.5 03/08/2023    LABALBU 3.3 (L) 03/10/2023    BILITOT 0.6 03/08/2023    ALKPHOS 76 03/08/2023    AST 18 03/08/2023    ALT 12 03/08/2023    LABGLOM >60 03/10/2023    GFRAA 47 (A) 05/17/2022    AGRATIO 1.5 03/08/2023    GLOB 3.2 10/21/2021       Lab Results   Component Value Date    WBC 16.0 (H) 03/10/2023    HGB 12.6 03/10/2023    HCT 39.0 03/10/2023    MCV 94.8 03/10/2023     03/10/2023     Lab Results   Component Value Date    CHOL 259 (H) 04/20/2021    CHOL 261 (H) 03/06/2017    CHOL 265 (H) 09/02/2016     Lab Results   Component Value Date

## 2023-08-21 RX ORDER — APIXABAN 5 MG/1
TABLET, FILM COATED ORAL
Qty: 60 TABLET | Refills: 5 | Status: SHIPPED | OUTPATIENT
Start: 2023-08-21

## 2023-10-06 NOTE — PROGRESS NOTES
Cardiology Follow Up    [facility]  1940    PCP: Kathryn Puri MD  Chief Complaint: \"I'm doing good\"      Subjective:   History of Present Illness: The patient is 78 y.o. female with a past medical history significant for CLL, factor V mutation and CAD s/p STEMI resulting normal coronary arteries with severe LV systolic function (8/6071). Admitted to [facility] 7/[facility] with CP. EKG showed inferior STEMI and she underwent emergent LHC resulting in normal coronaries. Found to be in acute CHF with EF <10%. Required impella support and inotropes. She was started on GDMT with midodrine for hypotension. Re-admitted 9/2021 with acute respiratory failure and underwent RHC. Another admission 10/2021 with PNA. Repeat Echo 7/2021 showed improved EF of 45%. Today, she returns in follow up. States she hasn't been sleeping well, has trouble staying asleep. States she tries to stay active, is going to [place] in May. Patient currently denies any weight gain, edema, palpitations, chest pain, shortness of breath, dizziness, and syncope.         Past Medical History:   Diagnosis Date    Acute ST elevation myocardial infarction (STEMI) ([address]) 07/19/2021    Atrial fibrillation (HCC)     Chronic lymphocytic leukemia in remission ([address])     CLL (chronic lymphocytic leukemia) ([address]) 02/12/2015    COPD (chronic obstructive pulmonary disease) ([address])     Essential hypertension 01/01/2015    controlled with salt restriction (initially)    Factor V Leiden mutation ([address])     Goiter 11/01/2011    1.5 cm midline    Hypercholesterolemia     Impaired fasting glucose 11/01/2011    Osteoarthritis     Osteoporosis     Post herpetic neuralgia     Vitamin D deficiency 11/01/2011     Past Surgical History:   Procedure Laterality Date    CATARACT REMOVAL WITH IMPLANT  5/14/12    left eye    JOINT REPLACEMENT      partial knee R and L    KNEE SURGERY  2008    partial replacements, both knees    MOHS SURGERY  03/12/2019    R cheek and

## 2023-10-16 ENCOUNTER — OFFICE VISIT (OUTPATIENT)
Dept: CARDIOLOGY CLINIC | Age: 83
End: 2023-10-16

## 2023-10-16 VITALS
WEIGHT: 148 LBS | DIASTOLIC BLOOD PRESSURE: 58 MMHG | BODY MASS INDEX: 29.06 KG/M2 | HEIGHT: 60 IN | SYSTOLIC BLOOD PRESSURE: 134 MMHG | OXYGEN SATURATION: 94 % | HEART RATE: 60 BPM

## 2023-10-16 DIAGNOSIS — I48.0 PAF (PAROXYSMAL ATRIAL FIBRILLATION) (HCC): Primary | ICD-10-CM

## 2023-10-16 DIAGNOSIS — Z79.899 ON AMIODARONE THERAPY: ICD-10-CM

## 2023-10-16 DIAGNOSIS — I50.22 CHRONIC SYSTOLIC HEART FAILURE (HCC): ICD-10-CM

## 2023-10-16 RX ORDER — AMIODARONE HYDROCHLORIDE 100 MG/1
200 TABLET ORAL DAILY
Qty: 60 TABLET | Refills: 3 | Status: SHIPPED | OUTPATIENT
Start: 2023-10-16 | End: 2023-10-18

## 2023-10-16 NOTE — TELEPHONE ENCOUNTER
Last OV:10/16/2023  Last Labs:3/10/2023  Last Refills:10/16/2023  Next Appt: Follow up 1 year  Last EKG:  10/16/2023

## 2023-10-18 RX ORDER — AMIODARONE HYDROCHLORIDE 100 MG/1
200 TABLET ORAL DAILY
Qty: 180 TABLET | Refills: 3 | Status: SHIPPED | OUTPATIENT
Start: 2023-10-18

## 2023-10-20 ENCOUNTER — OFFICE VISIT (OUTPATIENT)
Dept: FAMILY MEDICINE CLINIC | Age: 83
End: 2023-10-20

## 2023-10-20 VITALS
BODY MASS INDEX: 29.45 KG/M2 | WEIGHT: 150 LBS | OXYGEN SATURATION: 94 % | SYSTOLIC BLOOD PRESSURE: 126 MMHG | DIASTOLIC BLOOD PRESSURE: 82 MMHG | HEIGHT: 60 IN | RESPIRATION RATE: 18 BRPM | HEART RATE: 56 BPM

## 2023-10-20 DIAGNOSIS — G89.29 CHRONIC PAIN OF BOTH KNEES: ICD-10-CM

## 2023-10-20 DIAGNOSIS — M25.561 CHRONIC PAIN OF BOTH KNEES: ICD-10-CM

## 2023-10-20 DIAGNOSIS — F41.9 ANXIETY: Primary | ICD-10-CM

## 2023-10-20 DIAGNOSIS — M25.562 CHRONIC PAIN OF BOTH KNEES: ICD-10-CM

## 2023-10-20 RX ORDER — LORAZEPAM 0.5 MG/1
0.5 TABLET ORAL DAILY PRN
Qty: 15 TABLET | Refills: 2 | Status: SHIPPED | OUTPATIENT
Start: 2023-10-20 | End: 2023-11-19

## 2023-10-20 RX ORDER — LORAZEPAM 0.5 MG/1
TABLET ORAL
COMMUNITY
Start: 2023-08-28 | End: 2023-10-20

## 2023-10-20 SDOH — ECONOMIC STABILITY: INCOME INSECURITY: HOW HARD IS IT FOR YOU TO PAY FOR THE VERY BASICS LIKE FOOD, HOUSING, MEDICAL CARE, AND HEATING?: NOT HARD AT ALL

## 2023-10-20 SDOH — ECONOMIC STABILITY: FOOD INSECURITY: WITHIN THE PAST 12 MONTHS, THE FOOD YOU BOUGHT JUST DIDN'T LAST AND YOU DIDN'T HAVE MONEY TO GET MORE.: NEVER TRUE

## 2023-10-20 SDOH — ECONOMIC STABILITY: FOOD INSECURITY: WITHIN THE PAST 12 MONTHS, YOU WORRIED THAT YOUR FOOD WOULD RUN OUT BEFORE YOU GOT MONEY TO BUY MORE.: NEVER TRUE

## 2023-10-20 SDOH — ECONOMIC STABILITY: HOUSING INSECURITY
IN THE LAST 12 MONTHS, WAS THERE A TIME WHEN YOU DID NOT HAVE A STEADY PLACE TO SLEEP OR SLEPT IN A SHELTER (INCLUDING NOW)?: NO

## 2023-10-20 ASSESSMENT — ENCOUNTER SYMPTOMS
CHEST TIGHTNESS: 0
PHOTOPHOBIA: 0
COUGH: 0
SHORTNESS OF BREATH: 0
COLOR CHANGE: 0
WHEEZING: 0
BACK PAIN: 0

## 2023-10-20 NOTE — PROGRESS NOTES
Mike Pruett (:  1940) is a 78 y.o. female,Established patient, here for evaluation of the following chief complaint(s): Anxiety (3 mon anxiety f/u)         ASSESSMENT/PLAN:  1. Anxiety  -Well-controlled on Ativan 0.5 mg daily as needed. Continue taking sparingly.  -Lower prescription use. -PDMP/OARRS reviewed. -Refill sent to pharmacy.  -Follow-up in 3 months. -     LORazepam (ATIVAN) 0.5 MG tablet; Take 1 tablet by mouth daily as needed for Anxiety for up to 30 days. Max Daily Amount: 0.5 mg, Disp-15 tablet, R-2Normal  2. Chronic pain of both knees  -Referral placed. Information provided to the patient. Educated it is their responsibility to follow-up on this. The patient verbalized understanding.  -Consider topical options in the meantime.  -     Luis Angel Mehta MD, Orthopedic Surgery (Hip; Knee; Shoulder), [facility]      Return in about 3 months (around 2024) for Annual Wellness Visit, Controlled Medications. Subjective   SUBJECTIVE/OBJECTIVE:  HPI  She is following up on anxiety. Her  passed away a few years ago, and the she was prescribed lorazepam. She takes the ativan only as needed, like with flights. When she gets anxious, she gets hot flashes. In her usual day to day routine, she is not too anxious and does not need her ativan. She notes she has a flight and thanksgiving coming up. Additionally, she has some chest congestion. She saw her leukemia Dr and was given 1 week of doxycycline BID. She is better, but still has clear thick phlegm coughed up with some congestion. She notes she used to be a smoker. Her throat has been a bit sore as well. Additionally, she has a history of bilateral partial knee replacements, since then knees have been painful. She has mostly been recommenced to do exercises. She recently flew to [place] and back, and then knees hurt from sitting for so ling. Knees hurt/burn often, and in the morning.        Review of

## 2023-10-20 NOTE — PATIENT INSTRUCTIONS
at time of . Instead of the fee, you can choose to have the paperwork filled out during a separate office visit that is for filling out the paperwork only. Medication Samples: This office does not carry medication samples. If you need assistance in getting your medications, then please let the medical assistant know so they can help you sign up for a drug assistance program that can help get medications at a reduced cost or even free (if you qualify). Workman's Comp Claims: We do not handle workman's comp cases or claims. You will need to go to an urgent care to be seen or to whomever your employer uses. General - Any abusive/rude behavior toward staff/providers may be cause for dismissal.     Neal Primrose may receive a survey regarding the care you received during your visit. Your input is valuable to us. We encourage you to complete and return your survey. We hope you will choose us in the future for your healthcare needs.

## 2023-10-23 ENCOUNTER — OFFICE VISIT (OUTPATIENT)
Dept: PULMONOLOGY | Age: 83
End: 2023-10-23
Payer: MEDICARE

## 2023-10-23 VITALS
BODY MASS INDEX: 29.21 KG/M2 | DIASTOLIC BLOOD PRESSURE: 60 MMHG | TEMPERATURE: 96.8 F | OXYGEN SATURATION: 93 % | RESPIRATION RATE: 18 BRPM | SYSTOLIC BLOOD PRESSURE: 102 MMHG | HEIGHT: 60 IN | HEART RATE: 58 BPM | WEIGHT: 148.8 LBS

## 2023-10-23 DIAGNOSIS — Z23 NEEDS FLU SHOT: Primary | ICD-10-CM

## 2023-10-23 DIAGNOSIS — J43.8 OTHER EMPHYSEMA (HCC): ICD-10-CM

## 2023-10-23 PROCEDURE — G8484 FLU IMMUNIZE NO ADMIN: HCPCS | Performed by: INTERNAL MEDICINE

## 2023-10-23 PROCEDURE — G8427 DOCREV CUR MEDS BY ELIG CLIN: HCPCS | Performed by: INTERNAL MEDICINE

## 2023-10-23 PROCEDURE — 90694 VACC AIIV4 NO PRSRV 0.5ML IM: CPT | Performed by: INTERNAL MEDICINE

## 2023-10-23 PROCEDURE — G8400 PT W/DXA NO RESULTS DOC: HCPCS | Performed by: INTERNAL MEDICINE

## 2023-10-23 PROCEDURE — 3074F SYST BP LT 130 MM HG: CPT | Performed by: INTERNAL MEDICINE

## 2023-10-23 PROCEDURE — 1123F ACP DISCUSS/DSCN MKR DOCD: CPT | Performed by: INTERNAL MEDICINE

## 2023-10-23 PROCEDURE — G0008 ADMIN INFLUENZA VIRUS VAC: HCPCS | Performed by: INTERNAL MEDICINE

## 2023-10-23 PROCEDURE — 3078F DIAST BP <80 MM HG: CPT | Performed by: INTERNAL MEDICINE

## 2023-10-23 PROCEDURE — 1090F PRES/ABSN URINE INCON ASSESS: CPT | Performed by: INTERNAL MEDICINE

## 2023-10-23 PROCEDURE — 1036F TOBACCO NON-USER: CPT | Performed by: INTERNAL MEDICINE

## 2023-10-23 PROCEDURE — 99212 OFFICE O/P EST SF 10 MIN: CPT | Performed by: INTERNAL MEDICINE

## 2023-10-23 PROCEDURE — G8417 CALC BMI ABV UP PARAM F/U: HCPCS | Performed by: INTERNAL MEDICINE

## 2023-10-23 PROCEDURE — 3023F SPIROM DOC REV: CPT | Performed by: INTERNAL MEDICINE

## 2023-10-23 ASSESSMENT — COPD QUESTIONNAIRES
QUESTION4_WALKINCLINE: 3
QUESTION6_LEAVINGHOUSE: 1
QUESTION8_ENERGYLEVEL: 2
CAT_TOTALSCORE: 19
QUESTION1_COUGHFREQUENCY: 4
QUESTION5_HOMEACTIVITIES: 1
QUESTION7_SLEEPQUALITY: 3
QUESTION2_CHESTPHLEGM: 4
QUESTION3_CHESTTIGHTNESS: 1

## 2023-11-02 RX ORDER — GABAPENTIN 300 MG/1
300 CAPSULE ORAL 2 TIMES DAILY
Qty: 60 CAPSULE | Refills: 0 | Status: SHIPPED | OUTPATIENT
Start: 2023-11-02 | End: 2023-12-02

## 2023-11-09 ENCOUNTER — OFFICE VISIT (OUTPATIENT)
Dept: ORTHOPEDIC SURGERY | Age: 83
End: 2023-11-09
Payer: MEDICARE

## 2023-11-09 VITALS — RESPIRATION RATE: 16 BRPM | HEIGHT: 60 IN | WEIGHT: 148 LBS | BODY MASS INDEX: 29.06 KG/M2

## 2023-11-09 DIAGNOSIS — T84.84XA PAIN DUE TO UNICOMPARTMENTAL ARTHROPLASTY OF KNEE, INITIAL ENCOUNTER (HCC): ICD-10-CM

## 2023-11-09 DIAGNOSIS — M25.561 PAIN IN BOTH KNEES, UNSPECIFIED CHRONICITY: Primary | ICD-10-CM

## 2023-11-09 DIAGNOSIS — Z96.659 PAIN DUE TO UNICOMPARTMENTAL ARTHROPLASTY OF KNEE, INITIAL ENCOUNTER (HCC): ICD-10-CM

## 2023-11-09 DIAGNOSIS — M25.562 PAIN IN BOTH KNEES, UNSPECIFIED CHRONICITY: Primary | ICD-10-CM

## 2023-11-09 PROCEDURE — G8427 DOCREV CUR MEDS BY ELIG CLIN: HCPCS | Performed by: ORTHOPAEDIC SURGERY

## 2023-11-09 PROCEDURE — G8484 FLU IMMUNIZE NO ADMIN: HCPCS | Performed by: ORTHOPAEDIC SURGERY

## 2023-11-09 PROCEDURE — 1036F TOBACCO NON-USER: CPT | Performed by: ORTHOPAEDIC SURGERY

## 2023-11-09 PROCEDURE — G8417 CALC BMI ABV UP PARAM F/U: HCPCS | Performed by: ORTHOPAEDIC SURGERY

## 2023-11-09 PROCEDURE — 99203 OFFICE O/P NEW LOW 30 MIN: CPT | Performed by: ORTHOPAEDIC SURGERY

## 2023-11-09 PROCEDURE — 1123F ACP DISCUSS/DSCN MKR DOCD: CPT | Performed by: ORTHOPAEDIC SURGERY

## 2023-11-09 PROCEDURE — 1090F PRES/ABSN URINE INCON ASSESS: CPT | Performed by: ORTHOPAEDIC SURGERY

## 2023-11-09 PROCEDURE — G8400 PT W/DXA NO RESULTS DOC: HCPCS | Performed by: ORTHOPAEDIC SURGERY

## 2023-11-09 RX ORDER — LIDOCAINE 50 MG/G
1 PATCH TOPICAL DAILY
Qty: 10 PATCH | Refills: 0 | Status: SHIPPED | OUTPATIENT
Start: 2023-11-09 | End: 2023-11-19

## 2023-11-09 NOTE — PROGRESS NOTES
and treatment options. She describes a neurogenic pain anteriorly in both knees that is symmetric and always bilateral.  This pain is never quite stopped since surgery. She could possibly have a neuroma, but she describes the pain is deeper and not in the skin layer. I do not see any significant issues with her radiographs or clinical exam.  We discussed symptomatic treatment with lidocaine patches versus topical cream.  She would like to again try lidocaine patches and these were prescribed today. I also recommended she continue physical therapy exercises to maintain quadriceps strength. She will follow-up here as needed. Total time spent on today's encounter was at least 31 minutes. This time included reviewing prior notes, radiographs, and lab results when available, reviewing history obtained by medical assistant, performing history and physical exam, reviewing tests/radiographs with the patient, counseling the patient, ordering medications or tests, documentation in the electronic health record, and coordination of care. This dictation was done with Dragon dictation and may contain mechanical errors related to translation.

## 2023-11-18 NOTE — LETTER
which may also result in my being prevented from receiving further care from this office. · Other:____________________________________________________________________    AGREEMENT:    I have read the above and have had all of my questions answered. For chronic disease management, I know that my symptoms can be managed with many types of treatments. A chronic medication trial may be part of my treatment, but I must be an active participant in my care. Medication therapy is only one part of my symptom management plan. In some cases, there may be limited scientific evidence to support the chronic use of certain medications to improve symptoms and daily function. Furthermore, in certain circumstances, there may be scientific information that suggests that use of chronic controlled substances may actually worsen my symptoms and increase my risk of unintentional death directly related to this medication therapy. I know that if my provider feels my risk from controlled medications is greater than my benefit, I will have my controlled substance medication(s) compassionately lowered or removed altogether. I agree to a controlled substance medication trial.      I further agree to allow this office to contact my HIPAA contact on file if there are concerns about my safety and use of controlled medications. I have agreed to use the following medications above as instructed by my physician and as stated in this [id number].      Patient Signature:  ______________________  Date:1/31/2020 or _____________    Provider Signature:______________________  Date:1/31/2020 or _____________ IMPROVE-DD Application Not Available

## 2024-01-20 RX ORDER — GABAPENTIN 300 MG/1
300 CAPSULE ORAL 2 TIMES DAILY
Qty: 120 CAPSULE | Refills: 0 | Status: SHIPPED | OUTPATIENT
Start: 2024-01-20 | End: 2024-03-20

## 2024-01-30 ENCOUNTER — TELEPHONE (OUTPATIENT)
Dept: FAMILY MEDICINE CLINIC | Age: 84
End: 2024-01-30

## 2024-01-30 DIAGNOSIS — F41.9 ANXIETY: ICD-10-CM

## 2024-01-30 RX ORDER — LORAZEPAM 0.5 MG/1
0.5 TABLET ORAL DAILY PRN
Qty: 15 TABLET | Refills: 0 | Status: SHIPPED | OUTPATIENT
Start: 2024-01-30 | End: 2024-02-29

## 2024-01-30 NOTE — TELEPHONE ENCOUNTER
Pt is requesting a refill    LORazepam (ATIVAN) 0.5 MG tablet     [id number] - [place], [place] - [address] - P [phone] - F [phone] [address] [facility] [address]  Phone: [phone]  Fax: [phone]

## 2024-01-31 NOTE — TELEPHONE ENCOUNTER
We have attempted to contact patient multiple times with no response. Closing message for now, will open back up if patient returns the call. Sc

## 2024-02-19 NOTE — PATIENT INSTRUCTIONS
GENERAL OFFICE POLICIES      Telephone Calls: Messages will be answered within 1-2 business days, unless the provider is out of the office.  If it is urgent a covering provider will answer. (this does not include Medication refills).    MyChart:  We recommend all patients sign up for [employer].  Through this portal you can see your lab results, request refills, schedule appointments, pay your bill and send messages to the office.   [employer] messages will be answered within 1-2 business days unless the provider is out of the office.  For urgent matters, please call the office.  Appointments:  All appointments must be scheduled.  We ask all patients to schedule their next follow up appointment before they leave the office to make sure you will be able to be seen before you run out of medications.  24 hours notice is required to cancel or reschedule an appointment to avoid being marked as a no show.  You may be dismissed from the practice after 3 no shows.    LATE for Appointment: If you are 15 or more minutes late for your appointment, you may be asked to reschedule.  MA/LAB APPTS: Must be scheduled, cannot accept walk in lab visits.  We only draw labs for patients established in our office.  We only do injections for medications ordered by our office.  Acute Sick Visits:  Nothing other than acute complaint will be addressed at this visit.  TRADITIONAL MEDICARE  DOES NOT COVER PHYSICALS  MEDICARE WELLNESS VISITS: These are NOT physicals but the free annual visit offered by Medicare to discuss wellness issues. Medication refills, checkups, etc. will not be addressed during this visit.  Medication Refills: Refills are handled electronically so please contact your pharmacy for medication refills even if current refills have been exhausted. If you are on a controlled medication you will be referred to a specialist (pain specialist, psychiatry, etc).   Forms: There is a $35 fee to fill out FMLA/Disability paperwork, payable

## 2024-02-20 ENCOUNTER — OFFICE VISIT (OUTPATIENT)
Dept: FAMILY MEDICINE CLINIC | Age: 84
End: 2024-02-20
Payer: MEDICARE

## 2024-02-20 VITALS
BODY MASS INDEX: 29.29 KG/M2 | WEIGHT: 149.2 LBS | HEIGHT: 60 IN | SYSTOLIC BLOOD PRESSURE: 110 MMHG | OXYGEN SATURATION: 95 % | HEART RATE: 53 BPM | DIASTOLIC BLOOD PRESSURE: 60 MMHG

## 2024-02-20 DIAGNOSIS — F41.9 ANXIETY: ICD-10-CM

## 2024-02-20 PROCEDURE — 3074F SYST BP LT 130 MM HG: CPT

## 2024-02-20 PROCEDURE — 99213 OFFICE O/P EST LOW 20 MIN: CPT

## 2024-02-20 PROCEDURE — G8417 CALC BMI ABV UP PARAM F/U: HCPCS

## 2024-02-20 PROCEDURE — G8484 FLU IMMUNIZE NO ADMIN: HCPCS

## 2024-02-20 PROCEDURE — G8400 PT W/DXA NO RESULTS DOC: HCPCS

## 2024-02-20 PROCEDURE — 1123F ACP DISCUSS/DSCN MKR DOCD: CPT

## 2024-02-20 PROCEDURE — 1036F TOBACCO NON-USER: CPT

## 2024-02-20 PROCEDURE — 3078F DIAST BP <80 MM HG: CPT

## 2024-02-20 PROCEDURE — G8427 DOCREV CUR MEDS BY ELIG CLIN: HCPCS

## 2024-02-20 PROCEDURE — 1090F PRES/ABSN URINE INCON ASSESS: CPT

## 2024-02-20 RX ORDER — LORAZEPAM 0.5 MG/1
0.5 TABLET ORAL DAILY PRN
Qty: 30 TABLET | Refills: 0 | Status: SHIPPED | OUTPATIENT
Start: 2024-02-20 | End: 2024-03-21

## 2024-02-20 ASSESSMENT — PATIENT HEALTH QUESTIONNAIRE - PHQ9
6. FEELING BAD ABOUT YOURSELF - OR THAT YOU ARE A FAILURE OR HAVE LET YOURSELF OR YOUR FAMILY DOWN: 0
SUM OF ALL RESPONSES TO PHQ QUESTIONS 1-9: 10
4. FEELING TIRED OR HAVING LITTLE ENERGY: 2
5. POOR APPETITE OR OVEREATING: 1
1. LITTLE INTEREST OR PLEASURE IN DOING THINGS: 3
2. FEELING DOWN, DEPRESSED OR HOPELESS: 2
7. TROUBLE CONCENTRATING ON THINGS, SUCH AS READING THE NEWSPAPER OR WATCHING TELEVISION: 0
SUM OF ALL RESPONSES TO PHQ QUESTIONS 1-9: 10
10. IF YOU CHECKED OFF ANY PROBLEMS, HOW DIFFICULT HAVE THESE PROBLEMS MADE IT FOR YOU TO DO YOUR WORK, TAKE CARE OF THINGS AT HOME, OR GET ALONG WITH OTHER PEOPLE: 2
8. MOVING OR SPEAKING SO SLOWLY THAT OTHER PEOPLE COULD HAVE NOTICED. OR THE OPPOSITE, BEING SO FIGETY OR RESTLESS THAT YOU HAVE BEEN MOVING AROUND A LOT MORE THAN USUAL: 0
SUM OF ALL RESPONSES TO PHQ QUESTIONS 1-9: 10
3. TROUBLE FALLING OR STAYING ASLEEP: 2
SUM OF ALL RESPONSES TO PHQ QUESTIONS 1-9: 10
9. THOUGHTS THAT YOU WOULD BE BETTER OFF DEAD, OR OF HURTING YOURSELF: 0
SUM OF ALL RESPONSES TO PHQ9 QUESTIONS 1 & 2: 5

## 2024-02-20 ASSESSMENT — ENCOUNTER SYMPTOMS
CHEST TIGHTNESS: 0
BACK PAIN: 0
COLOR CHANGE: 0
PHOTOPHOBIA: 0
SHORTNESS OF BREATH: 0
COUGH: 0
WHEEZING: 0

## 2024-02-20 NOTE — PROGRESS NOTES
Respiratory:  Negative for cough, chest tightness, shortness of breath and wheezing.    Cardiovascular:  Negative for chest pain, palpitations and leg swelling.   Endocrine: Negative for cold intolerance and heat intolerance.   Musculoskeletal:  Positive for arthralgias (Right wrist fracture, in a sling). Negative for back pain, gait problem, joint swelling and myalgias.   Skin:  Negative for color change, rash and wound.   Neurological:  Negative for dizziness, weakness, light-headedness, numbness and headaches.   Psychiatric/Behavioral:  Positive for sleep disturbance. Negative for agitation, behavioral problems, confusion, decreased concentration, dysphoric mood, self-injury and suicidal ideas. The patient is nervous/anxious.           Objective   Physical Exam  Vitals and nursing note reviewed.   Constitutional:       General: She is not in acute distress.     Appearance: Normal appearance. She is overweight. She is not diaphoretic.   HENT:      Head: Normocephalic and atraumatic.   Cardiovascular:      Rate and Rhythm: Normal rate and regular rhythm.      Pulses: Normal pulses.      Heart sounds: Normal heart sounds. No murmur heard.     No gallop.   Pulmonary:      Effort: Pulmonary effort is normal. No respiratory distress.      Breath sounds: Normal breath sounds. No wheezing.   Chest:      Chest wall: No tenderness.   Abdominal:      General: Bowel sounds are normal. There is no distension.      Palpations: Abdomen is soft.      Tenderness: There is no abdominal tenderness.   Musculoskeletal:         General: Swelling, tenderness, deformity and signs of injury present. Normal range of motion.      Cervical back: Normal range of motion. No tenderness.      Comments: Right wrist   Lymphadenopathy:      Cervical: No cervical adenopathy.   Skin:     General: Skin is warm and dry.      Capillary Refill: Capillary refill takes less than 2 seconds.      Coloration: Skin is not jaundiced or pale.      Findings:

## 2024-03-04 RX ORDER — METOPROLOL SUCCINATE 25 MG/1
TABLET, EXTENDED RELEASE ORAL
Qty: 45 TABLET | Refills: 3 | Status: SHIPPED | OUTPATIENT
Start: 2024-03-04

## 2024-03-22 RX ORDER — APIXABAN 5 MG/1
TABLET, FILM COATED ORAL
Qty: 60 TABLET | Refills: 2 | Status: SHIPPED | OUTPATIENT
Start: 2024-03-22

## 2024-04-03 DIAGNOSIS — F41.9 ANXIETY: ICD-10-CM

## 2024-04-03 RX ORDER — LORAZEPAM 0.5 MG/1
TABLET ORAL
Qty: 30 TABLET | Refills: 0 | Status: SHIPPED | OUTPATIENT
Start: 2024-04-03 | End: 2024-05-03

## 2024-04-12 NOTE — PLAN OF CARE
Problem: Discharge Planning  Goal: Discharge to home or other facility with appropriate resources  11/12/2022 0024 by Caterina Massey RN  Outcome: Progressing  Case mgmt involved to assess appropriate level of care and resources. 11/11/2022 1257 by Velma Ochoa RN  Outcome: Progressing  [address] (Taken 11/11/2022 0827)  Discharge to home or other facility with appropriate resources: Identify barriers to discharge with patient and caregiver     Problem: Pain  Goal: Verbalizes/displays adequate comfort level or baseline comfort level  11/12/2022 0024 by Caterina Massey RN  Outcome: Progressing  Pain being managed with PRN analgesics per MD orders. Patient able to express presence and absence of pain and rate pain appropriately using numerical scale. 11/11/2022 1257 by Velma Ochoa RN  Outcome: Progressing     Problem: Safety - Adult  Goal: Free from fall injury  11/12/2022 0024 by Caterina Massey RN  Outcome: Progressing  Fall risk assessment completed every shift. All precautions in place. Patient has call light with in reach at all times. Room free from clutter.  Patient aware to call for assistance when getting up.    11/11/2022 1257 by Velma Ochoa RN  Outcome: Progressing     Problem: ABCDS Injury Assessment  Goal: Absence of physical injury  11/12/2022 0024 by Caterina Massey RN  Outcome: Progressing  Patient free from physical injury  11/11/2022 1257 by Velma Ochoa RN  Outcome: Progressing No-Patient/Caregiver offered and refused free interpretation services.

## 2024-04-29 RX ORDER — SACUBITRIL AND VALSARTAN 24; 26 MG/1; MG/1
0.5 TABLET, FILM COATED ORAL 2 TIMES DAILY
Qty: 90 TABLET | Refills: 3 | Status: SHIPPED | OUTPATIENT
Start: 2024-04-29

## 2024-05-17 NOTE — PATIENT INSTRUCTIONS
person you are caring for has a hard time cleaning their teeth on their own, you may need to brush and floss their teeth for them. It may be easiest to have the person sit and face away from you, and to sit or stand behind them. That way you can steady their head against your arm as you reach around to floss and brush their teeth. Choose a place that has good lighting and is comfortable for both of you.  Before you begin, gather your supplies. You will need gloves, floss, a toothbrush, and a container to hold water if you are not near a sink. Wash and dry your hands well and put on gloves. Start by flossing:  Gently work a piece of floss between each of the teeth toward the gums. A plastic flossing tool may make this easier, and they are available at most [facility].  Curve the floss around each tooth into a U-shape and gently slide it under the gum line.  Move the floss firmly up and down several times to scrape off the plaque.  After you've finished flossing, throw away the used floss and begin brushing:  Wet the brush and apply toothpaste.  Place the brush at a 45-degree angle where the teeth meet the gums. Press firmly, and move the brush in small circles over the surface of the teeth.  Be careful not to brush too hard. Vigorous brushing can make the gums pull away from the teeth and can scratch the tooth enamel.  Brush all surfaces of the teeth, on the tongue side and on the cheek side. Pay special attention to the front teeth and all surfaces of the back teeth.  Brush chewing surfaces with short back-and-forth strokes.  After you've finished, help the person rinse the remaining toothpaste from their mouth.  Where can you learn more?  Go to https://www.Naked.net/patientEd and enter F944 to learn more about \"Learning About Dental Care for Older Adults.\"  Current as of: August 6, 2023               Content Version: 14.0  © 1190-7961 [employer].   Care instructions adapted under license by [facility]

## 2024-05-20 ENCOUNTER — OFFICE VISIT (OUTPATIENT)
Dept: FAMILY MEDICINE CLINIC | Age: 84
End: 2024-05-20
Payer: MEDICARE

## 2024-05-20 VITALS
WEIGHT: 142 LBS | HEART RATE: 72 BPM | BODY MASS INDEX: 27.88 KG/M2 | HEIGHT: 60 IN | DIASTOLIC BLOOD PRESSURE: 68 MMHG | SYSTOLIC BLOOD PRESSURE: 120 MMHG | OXYGEN SATURATION: 93 %

## 2024-05-20 DIAGNOSIS — Z00.00 MEDICARE ANNUAL WELLNESS VISIT, SUBSEQUENT: Primary | ICD-10-CM

## 2024-05-20 PROCEDURE — 3078F DIAST BP <80 MM HG: CPT

## 2024-05-20 PROCEDURE — 3074F SYST BP LT 130 MM HG: CPT

## 2024-05-20 PROCEDURE — 1123F ACP DISCUSS/DSCN MKR DOCD: CPT

## 2024-05-20 PROCEDURE — G0439 PPPS, SUBSEQ VISIT: HCPCS

## 2024-05-20 ASSESSMENT — PATIENT HEALTH QUESTIONNAIRE - PHQ9
SUM OF ALL RESPONSES TO PHQ9 QUESTIONS 1 & 2: 2
4. FEELING TIRED OR HAVING LITTLE ENERGY: NEARLY EVERY DAY
SUM OF ALL RESPONSES TO PHQ QUESTIONS 1-9: 8
3. TROUBLE FALLING OR STAYING ASLEEP: SEVERAL DAYS
8. MOVING OR SPEAKING SO SLOWLY THAT OTHER PEOPLE COULD HAVE NOTICED. OR THE OPPOSITE, BEING SO FIGETY OR RESTLESS THAT YOU HAVE BEEN MOVING AROUND A LOT MORE THAN USUAL: SEVERAL DAYS
SUM OF ALL RESPONSES TO PHQ QUESTIONS 1-9: 8
6. FEELING BAD ABOUT YOURSELF - OR THAT YOU ARE A FAILURE OR HAVE LET YOURSELF OR YOUR FAMILY DOWN: NOT AT ALL
9. THOUGHTS THAT YOU WOULD BE BETTER OFF DEAD, OR OF HURTING YOURSELF: NOT AT ALL
5. POOR APPETITE OR OVEREATING: SEVERAL DAYS
1. LITTLE INTEREST OR PLEASURE IN DOING THINGS: SEVERAL DAYS
7. TROUBLE CONCENTRATING ON THINGS, SUCH AS READING THE NEWSPAPER OR WATCHING TELEVISION: NOT AT ALL
SUM OF ALL RESPONSES TO PHQ QUESTIONS 1-9: 8
SUM OF ALL RESPONSES TO PHQ QUESTIONS 1-9: 8
10. IF YOU CHECKED OFF ANY PROBLEMS, HOW DIFFICULT HAVE THESE PROBLEMS MADE IT FOR YOU TO DO YOUR WORK, TAKE CARE OF THINGS AT HOME, OR GET ALONG WITH OTHER PEOPLE: SOMEWHAT DIFFICULT
2. FEELING DOWN, DEPRESSED OR HOPELESS: SEVERAL DAYS

## 2024-05-20 ASSESSMENT — LIFESTYLE VARIABLES
HOW OFTEN DO YOU HAVE A DRINK CONTAINING ALCOHOL: NEVER
HOW MANY STANDARD DRINKS CONTAINING ALCOHOL DO YOU HAVE ON A TYPICAL DAY: PATIENT DOES NOT DRINK

## 2024-05-20 NOTE — PROGRESS NOTES
Interventions:    Fall Risk:  Do you feel unsteady or are you worried about falling? : (!) yes  2 or more falls in past year?: (!) yes  Fall with injury in past year?: (!) yes     Interventions:    Patient comments: additional fall in [place], someone caught her. Uses a walking stick as a cane, not many falls. Takes a minute to feel steady on her feet in the morning, can be wobbly initially. Better if she sits on the edge of the bed.   Reviewed medications, home hazards, visual acuity, and co-morbidities that can increase risk for falls  Patient declines any further evaluation or treatment     Depression:  PHQ-2 Score: 2  PHQ-9 Total Score: 8    Interpretation:  5-9 mild   10-14 moderate   15-19 moderately severe   20-27 severe     Interventions:  Patient comments: jet lagged and tired, affecting her mood some  Patient declines any further evaluation or treatment          General HRA Questions:  Select all that apply: (!) New or Increased Fatigue, Stress, Social Isolation, Loneliness    Fatigue Interventions:  Patient comments: see above  Patient declined any further interventions or treatment    Loneliness Interventions:  Patient comments: jet lagged affecting her mood  See above    Social Isolation Interventions:  See above    Stress Interventions:  See above      Activity, Diet, and Weight:  On average, how many days per week do you engage in moderate to strenuous exercise (like a brisk walk)?: 3 days  On average, how many minutes do you engage in exercise at this level?: 30 min    Do you eat balanced/healthy meals regularly?: (!) No    Body mass index is 27.73 kg/m².    Do you eat balanced/healthy meals regularly Interventions:  Patient comments: appetite is poor, hard to cook for one person. Likes to walk to a local bar. Loves diet Coke.   Patient declines any further evaluation or treatment        Dentist Screen:  Have you seen the dentist within the past year?: (!) No    Intervention:  Patient declines

## 2024-06-19 ENCOUNTER — APPOINTMENT (OUTPATIENT)
Dept: CT IMAGING | Age: 84
DRG: 853 | End: 2024-06-19
Payer: MEDICARE

## 2024-06-19 ENCOUNTER — HOSPITAL ENCOUNTER (INPATIENT)
Age: 84
LOS: 6 days | Discharge: HOME | DRG: 853 | End: 2024-06-25
Attending: INTERNAL MEDICINE | Admitting: INTERNAL MEDICINE
Payer: MEDICARE

## 2024-06-19 ENCOUNTER — APPOINTMENT (OUTPATIENT)
Dept: GENERAL RADIOLOGY | Age: 84
DRG: 853 | End: 2024-06-19
Payer: MEDICARE

## 2024-06-19 DIAGNOSIS — G03.9 MENINGITIS: ICD-10-CM

## 2024-06-19 DIAGNOSIS — N39.0 ACUTE UTI: ICD-10-CM

## 2024-06-19 DIAGNOSIS — Z71.89 GOALS OF CARE, COUNSELING/DISCUSSION: ICD-10-CM

## 2024-06-19 DIAGNOSIS — R11.2 NAUSEA AND VOMITING, UNSPECIFIED VOMITING TYPE: Primary | ICD-10-CM

## 2024-06-19 DIAGNOSIS — R79.89 ELEVATED TROPONIN: ICD-10-CM

## 2024-06-19 DIAGNOSIS — H70.90 MASTOIDITIS: ICD-10-CM

## 2024-06-19 DIAGNOSIS — H74.91: ICD-10-CM

## 2024-06-19 PROBLEM — A41.9 SEPSIS (HCC): Status: ACTIVE | Noted: 2024-06-19

## 2024-06-19 LAB
ALBUMIN SERPL-MCNC: 3.8 G/DL (ref 3.4–5)
ALBUMIN/GLOB SERPL: 1.3 {RATIO} (ref 1.1–2.2)
ALP SERPL-CCNC: 99 U/L (ref 40–129)
ALT SERPL-CCNC: 40 U/L (ref 10–40)
AMMONIA PLAS-SCNC: 26 UMOL/L (ref 11–51)
ANION GAP SERPL CALCULATED.3IONS-SCNC: 16 MMOL/L (ref 3–16)
AST SERPL-CCNC: 43 U/L (ref 15–37)
BACTERIA URNS QL MICRO: ABNORMAL /HPF
BASOPHILS # BLD: 0 K/UL (ref 0–0.2)
BASOPHILS NFR BLD: 0.1 %
BILIRUB SERPL-MCNC: 2.3 MG/DL (ref 0–1)
BILIRUB UR QL STRIP.AUTO: NEGATIVE
BUN SERPL-MCNC: 16 MG/DL (ref 7–20)
CALCIUM SERPL-MCNC: 9 MG/DL (ref 8.3–10.6)
CHLORIDE SERPL-SCNC: 101 MMOL/L (ref 99–110)
CLARITY UR: CLEAR
CO2 SERPL-SCNC: 21 MMOL/L (ref 21–32)
COLOR UR: ABNORMAL
CREAT SERPL-MCNC: 0.8 MG/DL (ref 0.6–1.2)
DEPRECATED RDW RBC AUTO: 14.8 % (ref 12.4–15.4)
EOSINOPHIL # BLD: 0 K/UL (ref 0–0.6)
EOSINOPHIL NFR BLD: 0 %
EPI CELLS #/AREA URNS AUTO: 2 /HPF (ref 0–5)
GFR SERPLBLD CREATININE-BSD FMLA CKD-EPI: 72 ML/MIN/{1.73_M2}
GLUCOSE SERPL-MCNC: 102 MG/DL (ref 70–99)
GLUCOSE UR STRIP.AUTO-MCNC: NEGATIVE MG/DL
HCT VFR BLD AUTO: 42.9 % (ref 36–48)
HGB BLD-MCNC: 14 G/DL (ref 12–16)
HGB UR QL STRIP.AUTO: ABNORMAL
HYALINE CASTS #/AREA URNS AUTO: 0 /LPF (ref 0–8)
KETONES UR STRIP.AUTO-MCNC: 80 MG/DL
LACTATE BLDV-SCNC: 1.9 MMOL/L (ref 0.4–1.9)
LEUKOCYTE ESTERASE UR QL STRIP.AUTO: ABNORMAL
LYMPHOCYTES # BLD: 14.5 K/UL (ref 1–5.1)
LYMPHOCYTES NFR BLD: 40.6 %
MCH RBC QN AUTO: 31.4 PG (ref 26–34)
MCHC RBC AUTO-ENTMCNC: 32.6 G/DL (ref 31–36)
MCV RBC AUTO: 96.2 FL (ref 80–100)
MONOCYTES # BLD: 0.4 K/UL (ref 0–1.3)
MONOCYTES NFR BLD: 1.2 %
NEUTROPHILS # BLD: 20.8 K/UL (ref 1.7–7.7)
NEUTROPHILS NFR BLD: 58.1 %
NITRITE UR QL STRIP.AUTO: NEGATIVE
PATH INTERP BLD-IMP: NO
PH UR STRIP.AUTO: 5.5 [PH] (ref 5–8)
PLATELET # BLD AUTO: 210 K/UL (ref 135–450)
PMV BLD AUTO: 8.9 FL (ref 5–10.5)
POTASSIUM SERPL-SCNC: 4 MMOL/L (ref 3.5–5.1)
PROCALCITONIN SERPL IA-MCNC: 4.53 NG/ML (ref 0–0.15)
PROT SERPL-MCNC: 6.8 G/DL (ref 6.4–8.2)
PROT UR STRIP.AUTO-MCNC: 100 MG/DL
RBC # BLD AUTO: 4.46 M/UL (ref 4–5.2)
RBC CLUMPS #/AREA URNS AUTO: 6 /HPF (ref 0–4)
SODIUM SERPL-SCNC: 138 MMOL/L (ref 136–145)
SP GR UR STRIP.AUTO: 1.02 (ref 1–1.03)
TROPONIN, HIGH SENSITIVITY: 107 NG/L (ref 0–14)
TROPONIN, HIGH SENSITIVITY: 124 NG/L (ref 0–14)
UA COMPLETE W REFLEX CULTURE PNL UR: YES
UA DIPSTICK W REFLEX MICRO PNL UR: YES
URN SPEC COLLECT METH UR: ABNORMAL
UROBILINOGEN UR STRIP-ACNC: 1 E.U./DL
WBC # BLD AUTO: 35.8 K/UL (ref 4–11)
WBC #/AREA URNS AUTO: 12 /HPF (ref 0–5)

## 2024-06-19 PROCEDURE — 87186 SC STD MICRODIL/AGAR DIL: CPT

## 2024-06-19 PROCEDURE — 81001 URINALYSIS AUTO W/SCOPE: CPT

## 2024-06-19 PROCEDURE — 51701 INSERT BLADDER CATHETER: CPT

## 2024-06-19 PROCEDURE — 70450 CT HEAD/BRAIN W/O DYE: CPT

## 2024-06-19 PROCEDURE — 80053 COMPREHEN METABOLIC PANEL: CPT

## 2024-06-19 PROCEDURE — 2580000003 HC RX 258

## 2024-06-19 PROCEDURE — 87040 BLOOD CULTURE FOR BACTERIA: CPT

## 2024-06-19 PROCEDURE — 1200000000 HC SEMI PRIVATE

## 2024-06-19 PROCEDURE — 6370000000 HC RX 637 (ALT 250 FOR IP)

## 2024-06-19 PROCEDURE — 6360000002 HC RX W HCPCS

## 2024-06-19 PROCEDURE — 96375 TX/PRO/DX INJ NEW DRUG ADDON: CPT

## 2024-06-19 PROCEDURE — 87150 DNA/RNA AMPLIFIED PROBE: CPT

## 2024-06-19 PROCEDURE — 71045 X-RAY EXAM CHEST 1 VIEW: CPT

## 2024-06-19 PROCEDURE — 85025 COMPLETE CBC W/AUTO DIFF WBC: CPT

## 2024-06-19 PROCEDURE — 99285 EMERGENCY DEPT VISIT HI MDM: CPT

## 2024-06-19 PROCEDURE — 87086 URINE CULTURE/COLONY COUNT: CPT

## 2024-06-19 PROCEDURE — 84484 ASSAY OF TROPONIN QUANT: CPT

## 2024-06-19 PROCEDURE — 96365 THER/PROPH/DIAG IV INF INIT: CPT

## 2024-06-19 PROCEDURE — 82140 ASSAY OF AMMONIA: CPT

## 2024-06-19 PROCEDURE — 83605 ASSAY OF LACTIC ACID: CPT

## 2024-06-19 PROCEDURE — 36415 COLL VENOUS BLD VENIPUNCTURE: CPT

## 2024-06-19 PROCEDURE — 93005 ELECTROCARDIOGRAM TRACING: CPT

## 2024-06-19 PROCEDURE — 84145 PROCALCITONIN (PCT): CPT

## 2024-06-19 PROCEDURE — 87181 SC STD AGAR DILUTION PER AGT: CPT

## 2024-06-19 RX ORDER — VANCOMYCIN 1.75 G/350ML
1250 INJECTION, SOLUTION INTRAVENOUS ONCE
Status: COMPLETED | OUTPATIENT
Start: 2024-06-19 | End: 2024-06-19

## 2024-06-19 RX ORDER — CIPROFLOXACIN HYDROCHLORIDE 3.5 MG/ML
4 SOLUTION/ DROPS TOPICAL 2 TIMES DAILY
Status: DISCONTINUED | OUTPATIENT
Start: 2024-06-19 | End: 2024-06-25 | Stop reason: HOSPADM

## 2024-06-19 RX ORDER — SODIUM CHLORIDE, SODIUM LACTATE, POTASSIUM CHLORIDE, AND CALCIUM CHLORIDE .6; .31; .03; .02 G/100ML; G/100ML; G/100ML; G/100ML
1000 INJECTION, SOLUTION INTRAVENOUS ONCE
Status: COMPLETED | OUTPATIENT
Start: 2024-06-19 | End: 2024-06-19

## 2024-06-19 RX ORDER — ONDANSETRON 2 MG/ML
4 INJECTION INTRAMUSCULAR; INTRAVENOUS EVERY 30 MIN PRN
Status: DISCONTINUED | OUTPATIENT
Start: 2024-06-19 | End: 2024-06-25 | Stop reason: HOSPADM

## 2024-06-19 RX ORDER — CIPROFLOXACIN AND DEXAMETHASONE 3; 1 MG/ML; MG/ML
4 SUSPENSION/ DROPS AURICULAR (OTIC) 2 TIMES DAILY
Status: DISCONTINUED | OUTPATIENT
Start: 2024-06-19 | End: 2024-06-19

## 2024-06-19 RX ORDER — ASPIRIN 81 MG/1
324 TABLET, CHEWABLE ORAL ONCE
Status: COMPLETED | OUTPATIENT
Start: 2024-06-19 | End: 2024-06-19

## 2024-06-19 RX ORDER — DEXAMETHASONE SODIUM PHOSPHATE 1 MG/ML
4 SOLUTION/ DROPS OPHTHALMIC 2 TIMES DAILY
Status: DISCONTINUED | OUTPATIENT
Start: 2024-06-19 | End: 2024-06-25 | Stop reason: HOSPADM

## 2024-06-19 RX ADMIN — VANCOMYCIN 1250 MG: 1.75 INJECTION, SOLUTION INTRAVENOUS at 21:59

## 2024-06-19 RX ADMIN — ONDANSETRON 4 MG: 2 INJECTION INTRAMUSCULAR; INTRAVENOUS at 19:07

## 2024-06-19 RX ADMIN — CIPROFLOXACIN 4 DROP: 3 SOLUTION OPHTHALMIC at 23:30

## 2024-06-19 RX ADMIN — ASPIRIN 324 MG: 81 TABLET, CHEWABLE ORAL at 23:25

## 2024-06-19 RX ADMIN — SODIUM CHLORIDE, POTASSIUM CHLORIDE, SODIUM LACTATE AND CALCIUM CHLORIDE 1000 ML: 600; 310; 30; 20 INJECTION, SOLUTION INTRAVENOUS at 19:07

## 2024-06-19 RX ADMIN — CEFEPIME 2000 MG: 2 INJECTION, POWDER, FOR SOLUTION INTRAVENOUS at 21:25

## 2024-06-19 ASSESSMENT — LIFESTYLE VARIABLES
HOW OFTEN DO YOU HAVE A DRINK CONTAINING ALCOHOL: PATIENT DECLINED
HOW MANY STANDARD DRINKS CONTAINING ALCOHOL DO YOU HAVE ON A TYPICAL DAY: PATIENT DECLINED

## 2024-06-19 ASSESSMENT — PAIN SCALES - GENERAL: PAINLEVEL_OUTOF10: 8

## 2024-06-19 ASSESSMENT — PAIN - FUNCTIONAL ASSESSMENT: PAIN_FUNCTIONAL_ASSESSMENT: 0-10

## 2024-06-19 ASSESSMENT — PAIN DESCRIPTION - LOCATION: LOCATION: HAND

## 2024-06-19 NOTE — ED PROVIDER NOTES
[facility]  EMERGENCY DEPARTMENT ENCOUNTER        Pt Name: Anika Rudolph  MRN: [id number]  Birthdate 1940  Date of evaluation: 6/19/2024  Provider: FAB Arevalo - PRATIMA  PCP: Kieran Espinoza MD  Note Started: 6:20 PM EDT 6/19/24      EMILY. I have evaluated this patient.        CHIEF COMPLAINT       Chief Complaint   Patient presents with    Emesis     Emesis, diarrhea, confusion x 2 days       HISTORY OF PRESENT ILLNESS: 1 or more Elements     History From: Patient, daughter at the bedside    Limitations to history : Extremely hard of hearing    Social Determinants Significantly Affecting Health : Patient has significant healthcare illiteracy    Chief Complaint: Above    Anika Rudolph is a 83 y.o. female who  has a past medical history of Acute ST elevation myocardial infarction (STEMI) ([facility]), Atrial fibrillation (HCC), Chronic lymphocytic leukemia in remission (HCC), CLL (chronic lymphocytic leukemia) (HCC), COPD (chronic obstructive pulmonary disease) (HCC), Essential hypertension, Factor V Leiden mutation (HCC), Goiter, Hypercholesterolemia, Impaired fasting glucose, Osteoarthritis, Osteoporosis, Post herpetic neuralgia, and Vitamin D deficiency. presents to ED with concern for nausea and vomiting ongoing for the past 2 days.  1 or 2 episodes of diarrhea.  Daughter concerned because the last time she had similar symptoms she had a UTI.  Patient denies any fevers or chills.  Denies any abdominal pain.  Denies any blood in emesis or in bowel movements.  Denies any chest pain or shortness of breath.    Towards end of visit daughter also reports some drainage from the right ear.  Patient typically wears hearing aids.    The patient  reports that she quit smoking about 28 years ago. Her smoking use included cigarettes. She started smoking about 78 years ago. She has a 15.0 pack-year smoking history. She has been exposed to tobacco smoke. She has never used smokeless tobacco. She

## 2024-06-19 NOTE — ED TRIAGE NOTES
Pt unable to eat and drink for 3 days with a headache that's about an 8 out of 10. She denies chest pain,  and dizziness.  Pt denies wearing O2 at home right now is 91% room air.

## 2024-06-20 ENCOUNTER — PREP FOR PROCEDURE (OUTPATIENT)
Dept: ENT CLINIC | Age: 84
End: 2024-06-20

## 2024-06-20 DIAGNOSIS — G03.9 MENINGITIS: ICD-10-CM

## 2024-06-20 PROBLEM — R78.81 PNEUMOCOCCAL BACTEREMIA: Status: ACTIVE | Noted: 2024-06-20

## 2024-06-20 PROBLEM — D72.828 NEUTROPHILIA: Status: ACTIVE | Noted: 2024-06-20

## 2024-06-20 PROBLEM — H70.91 MASTOIDITIS OF RIGHT SIDE: Status: ACTIVE | Noted: 2024-06-20

## 2024-06-20 PROBLEM — R79.89 ELEVATED PROCALCITONIN: Status: ACTIVE | Noted: 2024-06-20

## 2024-06-20 PROBLEM — R11.2 NAUSEA AND VOMITING: Status: ACTIVE | Noted: 2024-06-20

## 2024-06-20 PROBLEM — H74.90: Status: ACTIVE | Noted: 2024-06-20

## 2024-06-20 PROBLEM — E87.20 LACTIC ACID ACIDOSIS: Status: ACTIVE | Noted: 2024-06-20

## 2024-06-20 PROBLEM — D72.9 NEUTROPHILIA: Status: ACTIVE | Noted: 2024-06-20

## 2024-06-20 PROBLEM — B95.3 PNEUMOCOCCAL BACTEREMIA: Status: ACTIVE | Noted: 2024-06-20

## 2024-06-20 PROBLEM — R50.9 FEVER AND CHILLS: Status: ACTIVE | Noted: 2024-06-20

## 2024-06-20 PROBLEM — C91.10 CLL (CHRONIC LYMPHOCYTIC LEUKEMIA) (HCC): Status: ACTIVE | Noted: 2024-06-20

## 2024-06-20 PROBLEM — R65.21 SEPTIC SHOCK (HCC): Status: ACTIVE | Noted: 2024-06-19

## 2024-06-20 PROBLEM — H70.003 MASTOIDITIS, ACUTE, BILATERAL: Status: ACTIVE | Noted: 2024-06-20

## 2024-06-20 PROBLEM — Z90.81 HX OF SPLENECTOMY: Status: ACTIVE | Noted: 2024-06-20

## 2024-06-20 LAB
ALBUMIN SERPL-MCNC: 3.3 G/DL (ref 3.4–5)
ALBUMIN/GLOB SERPL: 1.5 {RATIO} (ref 1.1–2.2)
ALP SERPL-CCNC: 66 U/L (ref 40–129)
ALT SERPL-CCNC: 26 U/L (ref 10–40)
ANION GAP SERPL CALCULATED.3IONS-SCNC: 12 MMOL/L (ref 3–16)
AST SERPL-CCNC: 30 U/L (ref 15–37)
BACTERIA UR CULT: NORMAL
BASOPHILS # BLD: 0 K/UL (ref 0–0.2)
BASOPHILS NFR BLD: 0 %
BILIRUB SERPL-MCNC: 1.9 MG/DL (ref 0–1)
BUN SERPL-MCNC: 19 MG/DL (ref 7–20)
CALCIUM SERPL-MCNC: 8.3 MG/DL (ref 8.3–10.6)
CHLORIDE SERPL-SCNC: 103 MMOL/L (ref 99–110)
CO2 SERPL-SCNC: 22 MMOL/L (ref 21–32)
CREAT SERPL-MCNC: 1 MG/DL (ref 0.6–1.2)
CRP SERPL-MCNC: 260.5 MG/L (ref 0–5.1)
DEPRECATED RDW RBC AUTO: 14.8 % (ref 12.4–15.4)
EKG ATRIAL RATE: 101 BPM
EKG DIAGNOSIS: NORMAL
EKG P AXIS: 69 DEGREES
EKG P-R INTERVAL: 184 MS
EKG Q-T INTERVAL: 348 MS
EKG QRS DURATION: 106 MS
EKG QTC CALCULATION (BAZETT): 451 MS
EKG R AXIS: -57 DEGREES
EKG T AXIS: 76 DEGREES
EKG VENTRICULAR RATE: 101 BPM
EOSINOPHIL # BLD: 0 K/UL (ref 0–0.6)
EOSINOPHIL NFR BLD: 0 %
GFR SERPLBLD CREATININE-BSD FMLA CKD-EPI: 55 ML/MIN/{1.73_M2}
GLUCOSE BLD-MCNC: 125 MG/DL (ref 70–99)
GLUCOSE SERPL-MCNC: 115 MG/DL (ref 70–99)
HCT VFR BLD AUTO: 33.3 % (ref 36–48)
HGB BLD-MCNC: 11.3 G/DL (ref 12–16)
LACTATE BLDV-SCNC: 1.1 MMOL/L (ref 0.4–2)
LACTATE BLDV-SCNC: 1.2 MMOL/L (ref 0.4–2)
LACTATE BLDV-SCNC: 2.7 MMOL/L (ref 0.4–1.9)
LYMPHOCYTES # BLD: 5 K/UL (ref 1–5.1)
LYMPHOCYTES NFR BLD: 15 %
MAGNESIUM SERPL-MCNC: 1.7 MG/DL (ref 1.8–2.4)
MCH RBC QN AUTO: 32.2 PG (ref 26–34)
MCHC RBC AUTO-ENTMCNC: 33.8 G/DL (ref 31–36)
MCV RBC AUTO: 95.2 FL (ref 80–100)
MONOCYTES # BLD: 0.3 K/UL (ref 0–1.3)
MONOCYTES NFR BLD: 1 %
NEUTROPHILS # BLD: 28.1 K/UL (ref 1.7–7.7)
NEUTROPHILS NFR BLD: 74 %
NEUTS BAND NFR BLD MANUAL: 10 % (ref 0–7)
NEUTS VAC BLD QL SMEAR: PRESENT
PATH INTERP BLD-IMP: NO
PERFORMED ON: ABNORMAL
PLATELET # BLD AUTO: 146 K/UL (ref 135–450)
PLATELET BLD QL SMEAR: ADEQUATE
PMV BLD AUTO: 9.5 FL (ref 5–10.5)
POTASSIUM SERPL-SCNC: 3.5 MMOL/L (ref 3.5–5.1)
PROT SERPL-MCNC: 5.5 G/DL (ref 6.4–8.2)
RBC # BLD AUTO: 3.49 M/UL (ref 4–5.2)
REPORT: NORMAL
SLIDE REVIEW: ABNORMAL
SMUDGE CELLS BLD QL SMEAR: PRESENT
SODIUM SERPL-SCNC: 137 MMOL/L (ref 136–145)
WBC # BLD AUTO: 33.5 K/UL (ref 4–11)

## 2024-06-20 PROCEDURE — 36415 COLL VENOUS BLD VENIPUNCTURE: CPT

## 2024-06-20 PROCEDURE — 87205 SMEAR GRAM STAIN: CPT

## 2024-06-20 PROCEDURE — 6360000002 HC RX W HCPCS: Performed by: INTERNAL MEDICINE

## 2024-06-20 PROCEDURE — 2000000000 HC ICU R&B

## 2024-06-20 PROCEDURE — 2580000003 HC RX 258: Performed by: NURSE PRACTITIONER

## 2024-06-20 PROCEDURE — 99291 CRITICAL CARE FIRST HOUR: CPT | Performed by: INTERNAL MEDICINE

## 2024-06-20 PROCEDURE — 97530 THERAPEUTIC ACTIVITIES: CPT

## 2024-06-20 PROCEDURE — 83735 ASSAY OF MAGNESIUM: CPT

## 2024-06-20 PROCEDURE — 94761 N-INVAS EAR/PLS OXIMETRY MLT: CPT

## 2024-06-20 PROCEDURE — 2700000000 HC OXYGEN THERAPY PER DAY

## 2024-06-20 PROCEDURE — 6370000000 HC RX 637 (ALT 250 FOR IP): Performed by: NURSE PRACTITIONER

## 2024-06-20 PROCEDURE — 97165 OT EVAL LOW COMPLEX 30 MIN: CPT

## 2024-06-20 PROCEDURE — 80053 COMPREHEN METABOLIC PANEL: CPT

## 2024-06-20 PROCEDURE — 6360000002 HC RX W HCPCS: Performed by: NURSE PRACTITIONER

## 2024-06-20 PROCEDURE — 93005 ELECTROCARDIOGRAM TRACING: CPT | Performed by: INTERNAL MEDICINE

## 2024-06-20 PROCEDURE — 93010 ELECTROCARDIOGRAM REPORT: CPT | Performed by: INTERNAL MEDICINE

## 2024-06-20 PROCEDURE — 99223 1ST HOSP IP/OBS HIGH 75: CPT | Performed by: STUDENT IN AN ORGANIZED HEALTH CARE EDUCATION/TRAINING PROGRAM

## 2024-06-20 PROCEDURE — 2500000003 HC RX 250 WO HCPCS: Performed by: INTERNAL MEDICINE

## 2024-06-20 PROCEDURE — 97116 GAIT TRAINING THERAPY: CPT

## 2024-06-20 PROCEDURE — 6370000000 HC RX 637 (ALT 250 FOR IP)

## 2024-06-20 PROCEDURE — 85025 COMPLETE CBC W/AUTO DIFF WBC: CPT

## 2024-06-20 PROCEDURE — 97162 PT EVAL MOD COMPLEX 30 MIN: CPT

## 2024-06-20 PROCEDURE — 86140 C-REACTIVE PROTEIN: CPT

## 2024-06-20 PROCEDURE — P9047 ALBUMIN (HUMAN), 25%, 50ML: HCPCS | Performed by: NURSE PRACTITIONER

## 2024-06-20 PROCEDURE — 83605 ASSAY OF LACTIC ACID: CPT

## 2024-06-20 PROCEDURE — 87075 CULTR BACTERIA EXCEPT BLOOD: CPT

## 2024-06-20 PROCEDURE — 6370000000 HC RX 637 (ALT 250 FOR IP): Performed by: INTERNAL MEDICINE

## 2024-06-20 PROCEDURE — 87070 CULTURE OTHR SPECIMN AEROBIC: CPT

## 2024-06-20 RX ORDER — TRAMADOL HYDROCHLORIDE 50 MG/1
25 TABLET ORAL EVERY 6 HOURS PRN
Status: DISCONTINUED | OUTPATIENT
Start: 2024-06-20 | End: 2024-06-21

## 2024-06-20 RX ORDER — METOPROLOL SUCCINATE 25 MG/1
12.5 TABLET, EXTENDED RELEASE ORAL DAILY
Status: DISCONTINUED | OUTPATIENT
Start: 2024-06-20 | End: 2024-06-25 | Stop reason: HOSPADM

## 2024-06-20 RX ORDER — METAXALONE 800 MG/1
400 TABLET ORAL ONCE
Status: COMPLETED | OUTPATIENT
Start: 2024-06-20 | End: 2024-06-20

## 2024-06-20 RX ORDER — SODIUM CHLORIDE 9 MG/ML
INJECTION, SOLUTION INTRAVENOUS PRN
Status: DISCONTINUED | OUTPATIENT
Start: 2024-06-20 | End: 2024-06-25 | Stop reason: HOSPADM

## 2024-06-20 RX ORDER — LIDOCAINE 4 G/G
1 PATCH TOPICAL DAILY
Status: DISCONTINUED | OUTPATIENT
Start: 2024-06-21 | End: 2024-06-20

## 2024-06-20 RX ORDER — SODIUM CHLORIDE, SODIUM LACTATE, POTASSIUM CHLORIDE, AND CALCIUM CHLORIDE .6; .31; .03; .02 G/100ML; G/100ML; G/100ML; G/100ML
1000 INJECTION, SOLUTION INTRAVENOUS ONCE
Status: COMPLETED | OUTPATIENT
Start: 2024-06-20 | End: 2024-06-20

## 2024-06-20 RX ORDER — NOREPINEPHRINE BITARTRATE 0.06 MG/ML
1-100 INJECTION, SOLUTION INTRAVENOUS CONTINUOUS
Status: DISCONTINUED | OUTPATIENT
Start: 2024-06-20 | End: 2024-06-24

## 2024-06-20 RX ORDER — ACETAMINOPHEN 325 MG/1
650 TABLET ORAL EVERY 6 HOURS PRN
Status: DISCONTINUED | OUTPATIENT
Start: 2024-06-20 | End: 2024-06-25 | Stop reason: HOSPADM

## 2024-06-20 RX ORDER — LACTOBACILLUS RHAMNOSUS GG 10B CELL
2 CAPSULE ORAL 2 TIMES DAILY WITH MEALS
Status: DISCONTINUED | OUTPATIENT
Start: 2024-06-20 | End: 2024-06-25 | Stop reason: HOSPADM

## 2024-06-20 RX ORDER — ALBUTEROL SULFATE 2.5 MG/3ML
2.5 SOLUTION RESPIRATORY (INHALATION) EVERY 4 HOURS PRN
Status: DISCONTINUED | OUTPATIENT
Start: 2024-06-20 | End: 2024-06-25 | Stop reason: HOSPADM

## 2024-06-20 RX ORDER — LIDOCAINE 4 G/G
1 PATCH TOPICAL DAILY
Status: DISCONTINUED | OUTPATIENT
Start: 2024-06-20 | End: 2024-06-25 | Stop reason: HOSPADM

## 2024-06-20 RX ORDER — VANCOMYCIN 1.75 G/350ML
1250 INJECTION, SOLUTION INTRAVENOUS EVERY 24 HOURS
Status: DISCONTINUED | OUTPATIENT
Start: 2024-06-20 | End: 2024-06-23

## 2024-06-20 RX ORDER — MAGNESIUM SULFATE 1 G/100ML
1000 INJECTION INTRAVENOUS ONCE
Status: COMPLETED | OUTPATIENT
Start: 2024-06-20 | End: 2024-06-20

## 2024-06-20 RX ORDER — SODIUM CHLORIDE 0.9 % (FLUSH) 0.9 %
5-40 SYRINGE (ML) INJECTION PRN
Status: DISCONTINUED | OUTPATIENT
Start: 2024-06-20 | End: 2024-06-25 | Stop reason: HOSPADM

## 2024-06-20 RX ORDER — ALBUMIN (HUMAN) 12.5 G/50ML
25 SOLUTION INTRAVENOUS ONCE
Status: COMPLETED | OUTPATIENT
Start: 2024-06-20 | End: 2024-06-20

## 2024-06-20 RX ORDER — AMIODARONE HYDROCHLORIDE 200 MG/1
200 TABLET ORAL DAILY
Status: DISCONTINUED | OUTPATIENT
Start: 2024-06-20 | End: 2024-06-25 | Stop reason: HOSPADM

## 2024-06-20 RX ORDER — SODIUM CHLORIDE 9 MG/ML
INJECTION, SOLUTION INTRAVENOUS CONTINUOUS
Status: ACTIVE | OUTPATIENT
Start: 2024-06-20 | End: 2024-06-22

## 2024-06-20 RX ORDER — ACETAMINOPHEN 650 MG/1
650 SUPPOSITORY RECTAL EVERY 6 HOURS PRN
Status: DISCONTINUED | OUTPATIENT
Start: 2024-06-20 | End: 2024-06-25 | Stop reason: HOSPADM

## 2024-06-20 RX ORDER — SODIUM CHLORIDE 0.9 % (FLUSH) 0.9 %
5-40 SYRINGE (ML) INJECTION EVERY 12 HOURS SCHEDULED
Status: DISCONTINUED | OUTPATIENT
Start: 2024-06-20 | End: 2024-06-25 | Stop reason: HOSPADM

## 2024-06-20 RX ADMIN — Medication 10 ML: at 21:25

## 2024-06-20 RX ADMIN — DEXAMETHASONE SODIUM PHOSPHATE 4 DROP: 1 SOLUTION/ DROPS OPHTHALMIC at 22:36

## 2024-06-20 RX ADMIN — METAXALONE 400 MG: 800 TABLET ORAL at 23:19

## 2024-06-20 RX ADMIN — Medication 2 CAPSULE: at 12:55

## 2024-06-20 RX ADMIN — SODIUM CHLORIDE, POTASSIUM CHLORIDE, SODIUM LACTATE AND CALCIUM CHLORIDE 1000 ML: 600; 310; 30; 20 INJECTION, SOLUTION INTRAVENOUS at 02:14

## 2024-06-20 RX ADMIN — Medication 10 ML: at 00:25

## 2024-06-20 RX ADMIN — ACETAMINOPHEN 325MG 650 MG: 325 TABLET ORAL at 00:26

## 2024-06-20 RX ADMIN — APIXABAN 5 MG: 5 TABLET, FILM COATED ORAL at 08:47

## 2024-06-20 RX ADMIN — VANCOMYCIN 1250 MG: 1.75 INJECTION, SOLUTION INTRAVENOUS at 22:50

## 2024-06-20 RX ADMIN — APIXABAN 5 MG: 5 TABLET, FILM COATED ORAL at 00:26

## 2024-06-20 RX ADMIN — DEXAMETHASONE SODIUM PHOSPHATE 4 DROP: 1 SOLUTION/ DROPS OPHTHALMIC at 08:49

## 2024-06-20 RX ADMIN — MAGNESIUM SULFATE HEPTAHYDRATE 1000 MG: 1 INJECTION, SOLUTION INTRAVENOUS at 06:18

## 2024-06-20 RX ADMIN — Medication 2 CAPSULE: at 16:28

## 2024-06-20 RX ADMIN — DEXAMETHASONE SODIUM PHOSPHATE 4 DROP: 1 SOLUTION/ DROPS OPHTHALMIC at 00:27

## 2024-06-20 RX ADMIN — ACETAMINOPHEN 325MG 650 MG: 325 TABLET ORAL at 08:50

## 2024-06-20 RX ADMIN — Medication 5 MCG/MIN: at 04:43

## 2024-06-20 RX ADMIN — AMIODARONE HYDROCHLORIDE 200 MG: 200 TABLET ORAL at 08:47

## 2024-06-20 RX ADMIN — APIXABAN 5 MG: 5 TABLET, FILM COATED ORAL at 21:22

## 2024-06-20 RX ADMIN — Medication 10 ML: at 08:48

## 2024-06-20 RX ADMIN — TRAMADOL HYDROCHLORIDE 25 MG: 50 TABLET ORAL at 13:57

## 2024-06-20 RX ADMIN — SODIUM CHLORIDE: 9 INJECTION, SOLUTION INTRAVENOUS at 00:25

## 2024-06-20 RX ADMIN — CEFTRIAXONE SODIUM 2000 MG: 2 INJECTION, POWDER, FOR SOLUTION INTRAMUSCULAR; INTRAVENOUS at 10:47

## 2024-06-20 RX ADMIN — ALBUMIN (HUMAN) 25 G: 0.25 INJECTION, SOLUTION INTRAVENOUS at 02:16

## 2024-06-20 RX ADMIN — ACETAMINOPHEN 325MG 650 MG: 325 TABLET ORAL at 23:15

## 2024-06-20 RX ADMIN — CIPROFLOXACIN 4 DROP: 3 SOLUTION OPHTHALMIC at 08:48

## 2024-06-20 RX ADMIN — TRAMADOL HYDROCHLORIDE 25 MG: 50 TABLET ORAL at 23:13

## 2024-06-20 RX ADMIN — CEFTRIAXONE SODIUM 2000 MG: 2 INJECTION, POWDER, FOR SOLUTION INTRAMUSCULAR; INTRAVENOUS at 22:48

## 2024-06-20 RX ADMIN — CIPROFLOXACIN 4 DROP: 3 SOLUTION OPHTHALMIC at 21:22

## 2024-06-20 ASSESSMENT — PAIN SCALES - GENERAL
PAINLEVEL_OUTOF10: 0
PAINLEVEL_OUTOF10: 8
PAINLEVEL_OUTOF10: 0
PAINLEVEL_OUTOF10: 5
PAINLEVEL_OUTOF10: 0
PAINLEVEL_OUTOF10: 5
PAINLEVEL_OUTOF10: 10
PAINLEVEL_OUTOF10: 0
PAINLEVEL_OUTOF10: 5
PAINLEVEL_OUTOF10: 0

## 2024-06-20 ASSESSMENT — PAIN DESCRIPTION - LOCATION
LOCATION: HEAD
LOCATION: NECK
LOCATION: HEAD

## 2024-06-20 ASSESSMENT — PAIN - FUNCTIONAL ASSESSMENT: PAIN_FUNCTIONAL_ASSESSMENT: PREVENTS OR INTERFERES SOME ACTIVE ACTIVITIES AND ADLS

## 2024-06-20 ASSESSMENT — PAIN DESCRIPTION - ORIENTATION: ORIENTATION: LEFT;RIGHT;MID

## 2024-06-20 ASSESSMENT — PAIN DESCRIPTION - PAIN TYPE: TYPE: ACUTE PAIN

## 2024-06-20 ASSESSMENT — PAIN DESCRIPTION - FREQUENCY: FREQUENCY: CONTINUOUS

## 2024-06-20 ASSESSMENT — PAIN SCALES - WONG BAKER
WONGBAKER_NUMERICALRESPONSE: NO HURT
WONGBAKER_NUMERICALRESPONSE: NO HURT

## 2024-06-20 ASSESSMENT — PAIN DESCRIPTION - DESCRIPTORS
DESCRIPTORS: ACHING
DESCRIPTORS: ACHING;BURNING

## 2024-06-20 ASSESSMENT — PAIN DESCRIPTION - ONSET: ONSET: ON-GOING

## 2024-06-20 NOTE — ED NOTES
Report given to 5N RN. She verbalized understanding of patient condition and has no further questions.

## 2024-06-20 NOTE — H&P
History and Physical      Name:  Anika Rudolph /Age/Sex: 1940  (83 y.o. female)   MRN & CSN:  [id number] & [id number] Admission Date/Time: 2024  6:03 PM   Location:  [id number]/5252-01 PCP: Kieran Espinoza MD       Hospital Day: 2    Assessment and Plan:   Anika Rudolph is a 83 y.o.  female  who presents with Sepsis ([facility])    Hospital Problems             Last Modified POA    * (Principal) Sepsis ([facility]) 2024 Yes    Mastoiditis, acute, bilateral 2024 Yes    CLL (chronic lymphocytic leukemia) ([facility]) 2024 Yes     Lives at home by self, visited by her daughter Olimpia MCKINNON POA: Daughter Olimpia Patcher: [phone]  Anticoagulation therapy: Eliquis  CODE STATUS full  Social determinants: Advanced age, living alone, anticoagulation therapy VERY [place] (does not have hearing aids with her), high risk for fall.    Sepsis:?  Etiology: Multifactorial: #2 #3-> WBC 35,000, Pro-Andres 4.53, lactic acid 1.9-To 2.7> BC x 2 in process.  IVF 30 mill per kilo given with fluid maintenance.  Strict intake and output.  Vancomycin and cefepime initiated in the ED=> continue on admission.  Chest x-ray is without evidence of consolidation or pneumonia.  Monitor labs closely daily: Pro-Andres and CRP in the morning along with other labs.  Hypotension developed with bolus and albumin-> transferred to ICU for pressor therapy.  OtoMastoiditis: Bilateral: ENT consulted from the ED, CT head noting right mastoid effusion and fluid attenuation.  Clinical exam is consistent with a right middle ear effusion and swelling in the external ear canal.  Bilateral ear wick placed per myself -> continue Ciloxan and dexamethasone otic solution on admission . I do not believe that the otomastoiditis would contribute to a lactic acidosis of 2.7, a procalcitonin of 4.53 and a white count of 35,000.  I feel like these values are more influenced by the CLL.  Nausea vomiting diarrhea: X 2 days, none during the ED course.  Likely contributed

## 2024-06-21 ENCOUNTER — ANESTHESIA (OUTPATIENT)
Dept: OPERATING ROOM | Age: 84
End: 2024-06-21
Payer: MEDICARE

## 2024-06-21 ENCOUNTER — ANESTHESIA EVENT (OUTPATIENT)
Dept: OPERATING ROOM | Age: 84
End: 2024-06-21
Payer: MEDICARE

## 2024-06-21 ENCOUNTER — APPOINTMENT (OUTPATIENT)
Dept: CT IMAGING | Age: 84
DRG: 853 | End: 2024-06-21
Payer: MEDICARE

## 2024-06-21 PROBLEM — H70.90 MASTOIDITIS: Status: ACTIVE | Noted: 2024-06-20

## 2024-06-21 PROBLEM — G03.9 MENINGITIS: Status: ACTIVE | Noted: 2024-06-20

## 2024-06-21 PROBLEM — H61.22 IMPACTED CERUMEN OF LEFT EAR: Status: ACTIVE | Noted: 2024-06-21

## 2024-06-21 LAB
ALBUMIN SERPL-MCNC: 3.3 G/DL (ref 3.4–5)
ALBUMIN/GLOB SERPL: 1.4 {RATIO} (ref 1.1–2.2)
ALP SERPL-CCNC: 67 U/L (ref 40–129)
ALT SERPL-CCNC: 24 U/L (ref 10–40)
ANION GAP SERPL CALCULATED.3IONS-SCNC: 12 MMOL/L (ref 3–16)
AST SERPL-CCNC: 39 U/L (ref 15–37)
BILIRUB SERPL-MCNC: 0.6 MG/DL (ref 0–1)
BUN SERPL-MCNC: 16 MG/DL (ref 7–20)
CALCIUM SERPL-MCNC: 7.8 MG/DL (ref 8.3–10.6)
CHLORIDE SERPL-SCNC: 108 MMOL/L (ref 99–110)
CO2 SERPL-SCNC: 22 MMOL/L (ref 21–32)
CREAT SERPL-MCNC: 0.7 MG/DL (ref 0.6–1.2)
EKG ATRIAL RATE: 82 BPM
EKG DIAGNOSIS: NORMAL
EKG P AXIS: 59 DEGREES
EKG P-R INTERVAL: 164 MS
EKG Q-T INTERVAL: 392 MS
EKG QRS DURATION: 104 MS
EKG QTC CALCULATION (BAZETT): 457 MS
EKG R AXIS: -33 DEGREES
EKG T AXIS: 71 DEGREES
EKG VENTRICULAR RATE: 82 BPM
GFR SERPLBLD CREATININE-BSD FMLA CKD-EPI: 84 ML/MIN/{1.73_M2}
GLUCOSE BLD-MCNC: 110 MG/DL (ref 70–99)
GLUCOSE SERPL-MCNC: 115 MG/DL (ref 70–99)
MAGNESIUM SERPL-MCNC: 2.1 MG/DL (ref 1.8–2.4)
PERFORMED ON: ABNORMAL
PHOSPHATE SERPL-MCNC: 1.8 MG/DL (ref 2.5–4.9)
POTASSIUM SERPL-SCNC: 3.8 MMOL/L (ref 3.5–5.1)
PROT SERPL-MCNC: 5.7 G/DL (ref 6.4–8.2)
SODIUM SERPL-SCNC: 142 MMOL/L (ref 136–145)
VANCOMYCIN SERPL-MCNC: 21.2 UG/ML

## 2024-06-21 PROCEDURE — P9045 ALBUMIN (HUMAN), 5%, 250 ML: HCPCS | Performed by: NURSE ANESTHETIST, CERTIFIED REGISTERED

## 2024-06-21 PROCEDURE — 82784 ASSAY IGA/IGD/IGG/IGM EACH: CPT

## 2024-06-21 PROCEDURE — 6370000000 HC RX 637 (ALT 250 FOR IP): Performed by: INTERNAL MEDICINE

## 2024-06-21 PROCEDURE — 87075 CULTR BACTERIA EXCEPT BLOOD: CPT

## 2024-06-21 PROCEDURE — 6370000000 HC RX 637 (ALT 250 FOR IP): Performed by: NURSE PRACTITIONER

## 2024-06-21 PROCEDURE — 70482 CT ORBIT/EAR/FOSSA W/O&W/DYE: CPT

## 2024-06-21 PROCEDURE — 99233 SBSQ HOSP IP/OBS HIGH 50: CPT | Performed by: INTERNAL MEDICINE

## 2024-06-21 PROCEDURE — 85025 COMPLETE CBC W/AUTO DIFF WBC: CPT

## 2024-06-21 PROCEDURE — 2000000000 HC ICU R&B

## 2024-06-21 PROCEDURE — 2500000003 HC RX 250 WO HCPCS: Performed by: NURSE PRACTITIONER

## 2024-06-21 PROCEDURE — 2720000010 HC SURG SUPPLY STERILE: Performed by: OTOLARYNGOLOGY

## 2024-06-21 PROCEDURE — 87070 CULTURE OTHR SPECIMN AEROBIC: CPT

## 2024-06-21 PROCEDURE — 6360000002 HC RX W HCPCS: Performed by: NURSE ANESTHETIST, CERTIFIED REGISTERED

## 2024-06-21 PROCEDURE — 3600000014 HC SURGERY LEVEL 4 ADDTL 15MIN: Performed by: OTOLARYNGOLOGY

## 2024-06-21 PROCEDURE — 2500000003 HC RX 250 WO HCPCS: Performed by: INTERNAL MEDICINE

## 2024-06-21 PROCEDURE — 2500000003 HC RX 250 WO HCPCS: Performed by: OTOLARYNGOLOGY

## 2024-06-21 PROCEDURE — 2709999900 HC NON-CHARGEABLE SUPPLY: Performed by: OTOLARYNGOLOGY

## 2024-06-21 PROCEDURE — 3700000001 HC ADD 15 MINUTES (ANESTHESIA): Performed by: OTOLARYNGOLOGY

## 2024-06-21 PROCEDURE — 69210 REMOVE IMPACTED EAR WAX UNI: CPT | Performed by: OTOLARYNGOLOGY

## 2024-06-21 PROCEDURE — 6370000000 HC RX 637 (ALT 250 FOR IP): Performed by: OTOLARYNGOLOGY

## 2024-06-21 PROCEDURE — 6360000004 HC RX CONTRAST MEDICATION: Performed by: STUDENT IN AN ORGANIZED HEALTH CARE EDUCATION/TRAINING PROGRAM

## 2024-06-21 PROCEDURE — 69436 CREATE EARDRUM OPENING: CPT | Performed by: OTOLARYNGOLOGY

## 2024-06-21 PROCEDURE — 099570Z DRAINAGE OF RIGHT MIDDLE EAR WITH DRAINAGE DEVICE, VIA NATURAL OR ARTIFICIAL OPENING: ICD-10-PCS | Performed by: OTOLARYNGOLOGY

## 2024-06-21 PROCEDURE — 3600000004 HC SURGERY LEVEL 4 BASE: Performed by: OTOLARYNGOLOGY

## 2024-06-21 PROCEDURE — 6360000002 HC RX W HCPCS: Performed by: INTERNAL MEDICINE

## 2024-06-21 PROCEDURE — 2500000003 HC RX 250 WO HCPCS: Performed by: NURSE ANESTHETIST, CERTIFIED REGISTERED

## 2024-06-21 PROCEDURE — 6360000002 HC RX W HCPCS: Performed by: NURSE PRACTITIONER

## 2024-06-21 PROCEDURE — 87205 SMEAR GRAM STAIN: CPT

## 2024-06-21 PROCEDURE — 69501 MASTOIDECTOMY: CPT | Performed by: OTOLARYNGOLOGY

## 2024-06-21 PROCEDURE — 09BB0ZZ EXCISION OF RIGHT MASTOID SINUS, OPEN APPROACH: ICD-10-PCS | Performed by: OTOLARYNGOLOGY

## 2024-06-21 PROCEDURE — 83735 ASSAY OF MAGNESIUM: CPT

## 2024-06-21 PROCEDURE — 87077 CULTURE AEROBIC IDENTIFY: CPT

## 2024-06-21 PROCEDURE — 3700000000 HC ANESTHESIA ATTENDED CARE: Performed by: OTOLARYNGOLOGY

## 2024-06-21 PROCEDURE — 80202 ASSAY OF VANCOMYCIN: CPT

## 2024-06-21 PROCEDURE — 93010 ELECTROCARDIOGRAM REPORT: CPT | Performed by: INTERNAL MEDICINE

## 2024-06-21 PROCEDURE — 2580000003 HC RX 258: Performed by: NURSE PRACTITIONER

## 2024-06-21 PROCEDURE — L8699 PROSTHETIC IMPLANT NOS: HCPCS | Performed by: OTOLARYNGOLOGY

## 2024-06-21 PROCEDURE — 2580000003 HC RX 258: Performed by: NURSE ANESTHETIST, CERTIFIED REGISTERED

## 2024-06-21 PROCEDURE — 87186 SC STD MICRODIL/AGAR DIL: CPT

## 2024-06-21 PROCEDURE — 84100 ASSAY OF PHOSPHORUS: CPT

## 2024-06-21 PROCEDURE — 09C4XZZ EXTIRPATION OF MATTER FROM LEFT EXTERNAL AUDITORY CANAL, EXTERNAL APPROACH: ICD-10-PCS | Performed by: OTOLARYNGOLOGY

## 2024-06-21 PROCEDURE — 80053 COMPREHEN METABOLIC PANEL: CPT

## 2024-06-21 PROCEDURE — 51702 INSERT TEMP BLADDER CATH: CPT

## 2024-06-21 DEVICE — COLLAR BUTTON VENT TUBE 1.27MM ID - SILICONE 30 PACK
Type: IMPLANTABLE DEVICE | Site: EAR | Status: FUNCTIONAL
Brand: COLLAR BUTTON VENT TUBE

## 2024-06-21 RX ORDER — SUCCINYLCHOLINE/SOD CL,ISO/PF 200MG/10ML
SYRINGE (ML) INTRAVENOUS PRN
Status: DISCONTINUED | OUTPATIENT
Start: 2024-06-21 | End: 2024-06-21 | Stop reason: SDUPTHER

## 2024-06-21 RX ORDER — ONDANSETRON 2 MG/ML
4 INJECTION INTRAMUSCULAR; INTRAVENOUS
Status: ACTIVE | OUTPATIENT
Start: 2024-06-21 | End: 2024-06-22

## 2024-06-21 RX ORDER — OXYCODONE HYDROCHLORIDE 10 MG/1
10 TABLET ORAL PRN
Status: DISCONTINUED | OUTPATIENT
Start: 2024-06-21 | End: 2024-06-21

## 2024-06-21 RX ORDER — OXYMETAZOLINE HYDROCHLORIDE 0.05 G/100ML
2 SPRAY NASAL
Status: ACTIVE | OUTPATIENT
Start: 2024-06-21 | End: 2024-06-21

## 2024-06-21 RX ORDER — OXYMETAZOLINE HYDROCHLORIDE 0.05 G/100ML
2 SPRAY NASAL
Status: CANCELLED | OUTPATIENT
Start: 2024-06-21 | End: 2024-06-21

## 2024-06-21 RX ORDER — SODIUM CHLORIDE 0.9 % (FLUSH) 0.9 %
5-40 SYRINGE (ML) INJECTION PRN
Status: DISCONTINUED | OUTPATIENT
Start: 2024-06-21 | End: 2024-06-21

## 2024-06-21 RX ORDER — SODIUM CHLORIDE 0.9 % (FLUSH) 0.9 %
5-40 SYRINGE (ML) INJECTION EVERY 12 HOURS SCHEDULED
Status: CANCELLED | OUTPATIENT
Start: 2024-06-21

## 2024-06-21 RX ORDER — SODIUM CHLORIDE, SODIUM LACTATE, POTASSIUM CHLORIDE, CALCIUM CHLORIDE 600; 310; 30; 20 MG/100ML; MG/100ML; MG/100ML; MG/100ML
INJECTION, SOLUTION INTRAVENOUS CONTINUOUS PRN
Status: DISCONTINUED | OUTPATIENT
Start: 2024-06-21 | End: 2024-06-21 | Stop reason: SDUPTHER

## 2024-06-21 RX ORDER — ROCURONIUM BROMIDE 10 MG/ML
INJECTION, SOLUTION INTRAVENOUS PRN
Status: DISCONTINUED | OUTPATIENT
Start: 2024-06-21 | End: 2024-06-21 | Stop reason: SDUPTHER

## 2024-06-21 RX ORDER — CALCIUM GLUCONATE 20 MG/ML
1000 INJECTION, SOLUTION INTRAVENOUS ONCE
Status: COMPLETED | OUTPATIENT
Start: 2024-06-21 | End: 2024-06-21

## 2024-06-21 RX ORDER — SODIUM CHLORIDE 9 MG/ML
INJECTION, SOLUTION INTRAVENOUS PRN
Status: DISCONTINUED | OUTPATIENT
Start: 2024-06-21 | End: 2024-06-21

## 2024-06-21 RX ORDER — DEXAMETHASONE SODIUM PHOSPHATE 1 MG/ML
SOLUTION/ DROPS OPHTHALMIC
Status: COMPLETED | OUTPATIENT
Start: 2024-06-21 | End: 2024-06-21

## 2024-06-21 RX ORDER — SODIUM CHLORIDE 0.9 % (FLUSH) 0.9 %
5-40 SYRINGE (ML) INJECTION EVERY 12 HOURS SCHEDULED
Status: DISCONTINUED | OUTPATIENT
Start: 2024-06-21 | End: 2024-06-21

## 2024-06-21 RX ORDER — SODIUM CHLORIDE 9 MG/ML
INJECTION, SOLUTION INTRAVENOUS PRN
Status: CANCELLED | OUTPATIENT
Start: 2024-06-21

## 2024-06-21 RX ORDER — NALOXONE HYDROCHLORIDE 0.4 MG/ML
INJECTION, SOLUTION INTRAMUSCULAR; INTRAVENOUS; SUBCUTANEOUS PRN
Status: DISCONTINUED | OUTPATIENT
Start: 2024-06-21 | End: 2024-06-25 | Stop reason: HOSPADM

## 2024-06-21 RX ORDER — TRAMADOL HYDROCHLORIDE 50 MG/1
50 TABLET ORAL EVERY 6 HOURS PRN
Status: DISCONTINUED | OUTPATIENT
Start: 2024-06-21 | End: 2024-06-21

## 2024-06-21 RX ORDER — SODIUM CHLORIDE 0.9 % (FLUSH) 0.9 %
5-40 SYRINGE (ML) INJECTION PRN
Status: CANCELLED | OUTPATIENT
Start: 2024-06-21

## 2024-06-21 RX ORDER — PROPOFOL 10 MG/ML
INJECTION, EMULSION INTRAVENOUS PRN
Status: DISCONTINUED | OUTPATIENT
Start: 2024-06-21 | End: 2024-06-21 | Stop reason: SDUPTHER

## 2024-06-21 RX ORDER — FENTANYL CITRATE 50 UG/ML
INJECTION, SOLUTION INTRAMUSCULAR; INTRAVENOUS PRN
Status: DISCONTINUED | OUTPATIENT
Start: 2024-06-21 | End: 2024-06-21 | Stop reason: SDUPTHER

## 2024-06-21 RX ORDER — LIDOCAINE HYDROCHLORIDE AND EPINEPHRINE 10; 10 MG/ML; UG/ML
INJECTION, SOLUTION INFILTRATION; PERINEURAL
Status: COMPLETED | OUTPATIENT
Start: 2024-06-21 | End: 2024-06-21

## 2024-06-21 RX ORDER — FENTANYL CITRATE 50 UG/ML
25 INJECTION, SOLUTION INTRAMUSCULAR; INTRAVENOUS ONCE
Status: COMPLETED | OUTPATIENT
Start: 2024-06-21 | End: 2024-06-21

## 2024-06-21 RX ORDER — MIDAZOLAM HYDROCHLORIDE 1 MG/ML
INJECTION INTRAMUSCULAR; INTRAVENOUS PRN
Status: DISCONTINUED | OUTPATIENT
Start: 2024-06-21 | End: 2024-06-21 | Stop reason: SDUPTHER

## 2024-06-21 RX ORDER — POTASSIUM CHLORIDE 29.8 MG/ML
20 INJECTION INTRAVENOUS ONCE
Status: COMPLETED | OUTPATIENT
Start: 2024-06-21 | End: 2024-06-21

## 2024-06-21 RX ORDER — OFLOXACIN 3 MG/ML
SOLUTION/ DROPS OPHTHALMIC
Status: COMPLETED | OUTPATIENT
Start: 2024-06-21 | End: 2024-06-21

## 2024-06-21 RX ORDER — EPHEDRINE SULFATE/0.9% NACL/PF 25 MG/5 ML
SYRINGE (ML) INTRAVENOUS PRN
Status: DISCONTINUED | OUTPATIENT
Start: 2024-06-21 | End: 2024-06-21 | Stop reason: SDUPTHER

## 2024-06-21 RX ORDER — ONDANSETRON 2 MG/ML
INJECTION INTRAMUSCULAR; INTRAVENOUS PRN
Status: DISCONTINUED | OUTPATIENT
Start: 2024-06-21 | End: 2024-06-21 | Stop reason: SDUPTHER

## 2024-06-21 RX ORDER — ALBUMIN, HUMAN INJ 5% 5 %
SOLUTION INTRAVENOUS PRN
Status: DISCONTINUED | OUTPATIENT
Start: 2024-06-21 | End: 2024-06-21 | Stop reason: SDUPTHER

## 2024-06-21 RX ORDER — CALCIUM CHLORIDE 100 MG/ML
INJECTION INTRAVENOUS; INTRAVENTRICULAR PRN
Status: DISCONTINUED | OUTPATIENT
Start: 2024-06-21 | End: 2024-06-21 | Stop reason: SDUPTHER

## 2024-06-21 RX ORDER — OXYCODONE HYDROCHLORIDE 5 MG/1
5 TABLET ORAL PRN
Status: DISCONTINUED | OUTPATIENT
Start: 2024-06-21 | End: 2024-06-21

## 2024-06-21 RX ADMIN — DEXAMETHASONE SODIUM PHOSPHATE 4 DROP: 1 SOLUTION/ DROPS OPHTHALMIC at 21:33

## 2024-06-21 RX ADMIN — APIXABAN 5 MG: 5 TABLET, FILM COATED ORAL at 08:42

## 2024-06-21 RX ADMIN — MIDAZOLAM 1 MG: 1 INJECTION INTRAMUSCULAR; INTRAVENOUS at 14:55

## 2024-06-21 RX ADMIN — POTASSIUM CHLORIDE 20 MEQ: 29.8 INJECTION, SOLUTION INTRAVENOUS at 08:37

## 2024-06-21 RX ADMIN — ROCURONIUM BROMIDE 10 MG: 10 INJECTION INTRAVENOUS at 14:55

## 2024-06-21 RX ADMIN — TRAMADOL HYDROCHLORIDE 50 MG: 50 TABLET ORAL at 11:03

## 2024-06-21 RX ADMIN — CALCIUM CHLORIDE INJECTION 0.2 G: 100 INJECTION, SOLUTION INTRAVENOUS at 15:43

## 2024-06-21 RX ADMIN — FENTANYL CITRATE 50 MCG: 50 INJECTION INTRAMUSCULAR; INTRAVENOUS at 14:55

## 2024-06-21 RX ADMIN — IOPAMIDOL 75 ML: 755 INJECTION, SOLUTION INTRAVENOUS at 13:19

## 2024-06-21 RX ADMIN — Medication 120 MG: at 14:57

## 2024-06-21 RX ADMIN — TRAMADOL HYDROCHLORIDE 50 MG: 50 TABLET ORAL at 18:09

## 2024-06-21 RX ADMIN — PROPOFOL 80 MG: 10 INJECTION, EMULSION INTRAVENOUS at 14:56

## 2024-06-21 RX ADMIN — CIPROFLOXACIN 4 DROP: 3 SOLUTION OPHTHALMIC at 08:38

## 2024-06-21 RX ADMIN — FENTANYL CITRATE 50 MCG: 50 INJECTION INTRAMUSCULAR; INTRAVENOUS at 15:15

## 2024-06-21 RX ADMIN — Medication 10 ML: at 08:46

## 2024-06-21 RX ADMIN — SODIUM CHLORIDE: 9 INJECTION, SOLUTION INTRAVENOUS at 16:51

## 2024-06-21 RX ADMIN — VANCOMYCIN 1250 MG: 1.75 INJECTION, SOLUTION INTRAVENOUS at 21:35

## 2024-06-21 RX ADMIN — CALCIUM GLUCONATE 1000 MG: 20 INJECTION, SOLUTION INTRAVENOUS at 08:36

## 2024-06-21 RX ADMIN — ONDANSETRON 4 MG: 2 INJECTION INTRAMUSCULAR; INTRAVENOUS at 15:52

## 2024-06-21 RX ADMIN — DEXAMETHASONE SODIUM PHOSPHATE 4 DROP: 1 SOLUTION/ DROPS OPHTHALMIC at 08:38

## 2024-06-21 RX ADMIN — EPHEDRINE SULFATE 10 MG: 5 INJECTION INTRAVENOUS at 15:16

## 2024-06-21 RX ADMIN — CALCIUM CHLORIDE INJECTION 0.1 G: 100 INJECTION, SOLUTION INTRAVENOUS at 15:36

## 2024-06-21 RX ADMIN — CIPROFLOXACIN 4 DROP: 3 SOLUTION OPHTHALMIC at 21:00

## 2024-06-21 RX ADMIN — ACETAMINOPHEN 325MG 650 MG: 325 TABLET ORAL at 21:14

## 2024-06-21 RX ADMIN — SODIUM CHLORIDE, SODIUM LACTATE, POTASSIUM CHLORIDE, AND CALCIUM CHLORIDE: .6; .31; .03; .02 INJECTION, SOLUTION INTRAVENOUS at 14:41

## 2024-06-21 RX ADMIN — Medication 10 ML: at 21:35

## 2024-06-21 RX ADMIN — CEFTRIAXONE SODIUM 2000 MG: 2 INJECTION, POWDER, FOR SOLUTION INTRAMUSCULAR; INTRAVENOUS at 21:44

## 2024-06-21 RX ADMIN — SUGAMMADEX 200 MG: 100 INJECTION, SOLUTION INTRAVENOUS at 16:00

## 2024-06-21 RX ADMIN — SODIUM CHLORIDE: 9 INJECTION, SOLUTION INTRAVENOUS at 12:54

## 2024-06-21 RX ADMIN — CEFTRIAXONE SODIUM 2000 MG: 2 INJECTION, POWDER, FOR SOLUTION INTRAMUSCULAR; INTRAVENOUS at 09:42

## 2024-06-21 RX ADMIN — POTASSIUM PHOSPHATE, MONOBASIC POTASSIUM PHOSPHATE, DIBASIC 20 MMOL: 224; 236 INJECTION, SOLUTION, CONCENTRATE INTRAVENOUS at 11:00

## 2024-06-21 RX ADMIN — EPHEDRINE SULFATE 15 MG: 5 INJECTION INTRAVENOUS at 15:27

## 2024-06-21 RX ADMIN — Medication 2 CAPSULE: at 08:42

## 2024-06-21 RX ADMIN — AMIODARONE HYDROCHLORIDE 200 MG: 200 TABLET ORAL at 08:42

## 2024-06-21 RX ADMIN — SODIUM CHLORIDE: 9 INJECTION, SOLUTION INTRAVENOUS at 01:53

## 2024-06-21 RX ADMIN — ALBUMIN (HUMAN) 250 ML: 12.5 SOLUTION INTRAVENOUS at 14:16

## 2024-06-21 RX ADMIN — CALCIUM CHLORIDE INJECTION 0.1 G: 100 INJECTION, SOLUTION INTRAVENOUS at 15:40

## 2024-06-21 RX ADMIN — MIDAZOLAM 1 MG: 1 INJECTION INTRAMUSCULAR; INTRAVENOUS at 14:41

## 2024-06-21 RX ADMIN — CALCIUM CHLORIDE INJECTION 0.1 G: 100 INJECTION, SOLUTION INTRAVENOUS at 15:33

## 2024-06-21 RX ADMIN — FENTANYL CITRATE 25 MCG: 50 INJECTION INTRAMUSCULAR; INTRAVENOUS at 05:56

## 2024-06-21 RX ADMIN — Medication 5 MCG/MIN: at 01:25

## 2024-06-21 RX ADMIN — Medication 2 CAPSULE: at 18:04

## 2024-06-21 ASSESSMENT — PAIN SCALES - GENERAL
PAINLEVEL_OUTOF10: 6
PAINLEVEL_OUTOF10: 6
PAINLEVEL_OUTOF10: 2
PAINLEVEL_OUTOF10: 10
PAINLEVEL_OUTOF10: 0
PAINLEVEL_OUTOF10: 0
PAINLEVEL_OUTOF10: 5

## 2024-06-21 ASSESSMENT — PAIN DESCRIPTION - ONSET
ONSET: PROGRESSIVE
ONSET: ON-GOING
ONSET: ON-GOING

## 2024-06-21 ASSESSMENT — PAIN - FUNCTIONAL ASSESSMENT
PAIN_FUNCTIONAL_ASSESSMENT: PREVENTS OR INTERFERES SOME ACTIVE ACTIVITIES AND ADLS
PAIN_FUNCTIONAL_ASSESSMENT: PREVENTS OR INTERFERES SOME ACTIVE ACTIVITIES AND ADLS
PAIN_FUNCTIONAL_ASSESSMENT: ACTIVITIES ARE NOT PREVENTED
PAIN_FUNCTIONAL_ASSESSMENT: PREVENTS OR INTERFERES SOME ACTIVE ACTIVITIES AND ADLS

## 2024-06-21 ASSESSMENT — PAIN DESCRIPTION - ORIENTATION
ORIENTATION: MID;LOWER
ORIENTATION: MID;UPPER
ORIENTATION: MID

## 2024-06-21 ASSESSMENT — PAIN DESCRIPTION - PAIN TYPE
TYPE: ACUTE PAIN

## 2024-06-21 ASSESSMENT — PAIN DESCRIPTION - LOCATION
LOCATION: BACK;NECK
LOCATION: NECK
LOCATION: HEAD
LOCATION: BACK;NECK
LOCATION: BACK;NECK

## 2024-06-21 ASSESSMENT — PAIN DESCRIPTION - DESCRIPTORS
DESCRIPTORS: ACHING;DISCOMFORT
DESCRIPTORS: ACHING;DISCOMFORT
DESCRIPTORS: ACHING;DISCOMFORT;TIGHTNESS
DESCRIPTORS: DISCOMFORT;ACHING
DESCRIPTORS: DULL

## 2024-06-21 ASSESSMENT — PAIN DESCRIPTION - FREQUENCY
FREQUENCY: CONTINUOUS

## 2024-06-21 NOTE — ANESTHESIA POSTPROCEDURE EVALUATION
Department of Anesthesiology  Postprocedure Note    Patient: Anika Rudolph  MRN: [id number]  YOB: 1940  Date of evaluation: 6/21/2024    Procedure Summary       Date: 06/21/24 Room / Location: [address]    Anesthesia Start: 1441 Anesthesia Stop: 1627    Procedure: BILATERAL EAR EXAM UNDER ANESTHESIA, RIGHT TYMPANOSTOMY TUBE (Right: Ear) Diagnosis:       Meningitis      Mastoiditis      (Meningitis [G03.9])      (Mastoiditis [H70.90])    Surgeons: Myron Russell MD Responsible Provider: Enzo Mead MD    Anesthesia Type: general ASA Status: 4 - Emergent            Anesthesia Type: No value filed.    Adam Phase I:      Adam Phase II:      Anesthesia Post Evaluation    Patient location during evaluation: ICU  Level of consciousness: awake and alert  Airway patency: patent  Nausea & Vomiting: no nausea and no vomiting  Cardiovascular status: blood pressure returned to baseline  Respiratory status: acceptable  Hydration status: euvolemic  Comments: Postoperative Anesthesia Note    Name:    Anika Rudolph  MRN:      [id number]    Patient Vitals in the past 12 hrs:  06/21/24 1400, BP:(!) 133/57, Pulse:62, Resp:17, SpO2:97 %  06/21/24 1300, BP:(!) 129/59, Pulse:67, Resp:18, SpO2:96 %  06/21/24 1245, BP:(!) 115/56, Pulse:59, Resp:14, SpO2:93 %  06/21/24 1230, BP:(!) 124/56, Pulse:60, Resp:12, SpO2:93 %  06/21/24 1215, BP:123/60, Pulse:61, Resp:11, SpO2:95 %  06/21/24 1200, BP:125/63, Pulse:68, Resp:17, SpO2:95 %  06/21/24 1145, BP:(!) 114/58, Pulse:63, Resp:10, SpO2:95 %  06/21/24 1130, BP:(!) 128/59, Pulse:61, Resp:14, SpO2:96 %  06/21/24 1115, BP:(!) 126/58, Pulse:61, Resp:15, SpO2:96 %  06/21/24 1100, BP:(!) 128/59, Pulse:63, Resp:17, SpO2:97 %  06/21/24 1045, BP:(!) 121/57, Pulse:63, Resp:16, SpO2:94 %  06/21/24 1030, BP:(!) 127/57, Pulse:63, Resp:14, SpO2:96 %  06/21/24 1015, BP:(!) 122/57, Pulse:64, Resp:17, SpO2:95 %  06/21/24 1000, BP:123/61,

## 2024-06-21 NOTE — ANESTHESIA PRE PROCEDURE
consumption: 06/20/24              Anesthesia Evaluation  Patient summary reviewed   no history of anesthetic complications:   Airway: Mallampati: III  TM distance: >3 FB   Neck ROM: full     Dental:    (+) partials      Pulmonary:   (+)  COPD:                                     Cardiovascular:  Exercise tolerance: poor (<4 METS)  (+) hypertension:, past MI: no interval change, CHF:         Beta Blocker:  Not on Beta Blocker         Neuro/Psych:   (+) neuromuscular disease:            GI/Hepatic/Renal: Neg GI/Hepatic/Renal ROS            Endo/Other:    (+) blood dyscrasia (warfarin): anticoagulation therapy and Factor V:., malignancy/cancer (CLL, in remission).                 Abdominal:             Vascular: negative vascular ROS.         Other Findings:         Anesthesia Plan      general     ASA 4 - emergent     (I discussed with the patient the risks and benefits of PIV, general anesthesia, IV Narcotics, PACU.  All questions were answered the patient agrees with the plan.)  Induction: intravenous.    MIPS: Postoperative opioids intended and Prophylactic antiemetics administered.  Anesthetic plan and risks discussed with patient, spouse and child/children.      Plan discussed with CRNA.                  This pre-anesthesia assessment may be used as a history and physical.    DOS STAFF ADDENDUM:    Pt seen and examined, chart reviewed (including anesthesia, drug and allergy history).  No interval changes to history and physical examination.  Anesthetic plan, risks, benefits, alternatives, and personnel involved discussed with patient.  Patient verbalized an understanding and agrees to proceed.      Enzo Mead MD  June 21, 2024  3:22 PM

## 2024-06-21 NOTE — OP NOTE
draped.    The right ear was first visualized.  The purulent fluid in the ear canal was cultured.  It was then evacuated.  This showed a small perforation with significant purulent drainage from the middle ear.  This was irrigated and a collar-button style tube placed.    Next the postauricular incision was made on the right side down to the bone.  Soft tissue was cleared off the mastoid.  A drill was then used to perform a simple mastoidectomy.  This was carried through Korner's septum.  The mastoid antrum was filled with thick mucosal hypertropia, granulation tissue and purulent debris.  This was cultured.  It was then copiously irrigated.    A Penrose drain was then placed in the mastoid and secured to the skin inferiorly.  The wound was loosely closed with simple interrupted deep 3-0 Vicryl sutures followed by a simple running 4-0 Prolene.  A New dressing was placed in the right ear.    Attention was then turned to the left ear.  Cerumen impaction was removed with a Dey suction.  Tympanic membrane appeared to be intact with aerated middle ear.    I attest that I was present for and did the entire procedure myself.    Estimated Blood Loss: 10 mL                 Specimens:   ID Type Source Tests Collected by Time Destination   1 : 1. RIGHT EAR CANAL Tissue Tissue CULTURE, SURGICAL Jarrod Maynard MD 6/21/2024 5531    2 : 2. RIGHT Mastoid Tissue Tissue CULTURE, SURGICAL Myron Russell MD 6/21/2024 1523               Drains: Right postauricular Penrose     Complications: There were no complications.    Myron Russell MD

## 2024-06-22 PROBLEM — H66.011 NON-RECURRENT ACUTE SUPPURATIVE OTITIS MEDIA OF RIGHT EAR WITH SPONTANEOUS RUPTURE OF TYMPANIC MEMBRANE: Status: ACTIVE | Noted: 2024-06-22

## 2024-06-22 PROBLEM — Z86.79 HISTORY OF ATRIAL FIBRILLATION: Status: ACTIVE | Noted: 2024-06-22

## 2024-06-22 LAB
ALBUMIN SERPL-MCNC: 2.9 G/DL (ref 3.4–5)
ALBUMIN/GLOB SERPL: 1.5 {RATIO} (ref 1.1–2.2)
ALP SERPL-CCNC: 53 U/L (ref 40–129)
ALT SERPL-CCNC: 16 U/L (ref 10–40)
ANION GAP SERPL CALCULATED.3IONS-SCNC: 6 MMOL/L (ref 3–16)
AST SERPL-CCNC: 23 U/L (ref 15–37)
BACTERIA BLD CULT ORG #2: ABNORMAL
BACTERIA BLD CULT: ABNORMAL
BACTERIA BLD CULT: ABNORMAL
BASOPHILS # BLD: 0 K/UL (ref 0–0.2)
BASOPHILS # BLD: 0.3 K/UL (ref 0–0.2)
BASOPHILS NFR BLD: 0 %
BASOPHILS NFR BLD: 1 %
BILIRUB SERPL-MCNC: 0.3 MG/DL (ref 0–1)
BUN SERPL-MCNC: 11 MG/DL (ref 7–20)
CALCIUM SERPL-MCNC: 7.8 MG/DL (ref 8.3–10.6)
CHLORIDE SERPL-SCNC: 108 MMOL/L (ref 99–110)
CO2 SERPL-SCNC: 24 MMOL/L (ref 21–32)
CREAT SERPL-MCNC: 0.6 MG/DL (ref 0.6–1.2)
DEPRECATED RDW RBC AUTO: 14.8 % (ref 12.4–15.4)
DEPRECATED RDW RBC AUTO: 15.3 % (ref 12.4–15.4)
EOSINOPHIL # BLD: 0 K/UL (ref 0–0.6)
EOSINOPHIL # BLD: 0 K/UL (ref 0–0.6)
EOSINOPHIL NFR BLD: 0 %
EOSINOPHIL NFR BLD: 0 %
GFR SERPLBLD CREATININE-BSD FMLA CKD-EPI: 88 ML/MIN/{1.73_M2}
GLUCOSE SERPL-MCNC: 88 MG/DL (ref 70–99)
HCT VFR BLD AUTO: 33.4 % (ref 36–48)
HCT VFR BLD AUTO: 38.5 % (ref 36–48)
HGB BLD-MCNC: 11 G/DL (ref 12–16)
HGB BLD-MCNC: 12.9 G/DL (ref 12–16)
IGA SERPL-MCNC: 150 MG/DL (ref 70–400)
IGG SERPL-MCNC: 620 MG/DL (ref 700–1600)
IGM SERPL-MCNC: 32 MG/DL (ref 40–230)
LYMPHOCYTES # BLD: 16.7 K/UL (ref 1–5.1)
LYMPHOCYTES # BLD: 17.7 K/UL (ref 1–5.1)
LYMPHOCYTES NFR BLD: 46 %
LYMPHOCYTES NFR BLD: 65 %
MAGNESIUM SERPL-MCNC: 1.9 MG/DL (ref 1.8–2.4)
MCH RBC QN AUTO: 31.7 PG (ref 26–34)
MCH RBC QN AUTO: 32.1 PG (ref 26–34)
MCHC RBC AUTO-ENTMCNC: 32.9 G/DL (ref 31–36)
MCHC RBC AUTO-ENTMCNC: 33.6 G/DL (ref 31–36)
MCV RBC AUTO: 95.6 FL (ref 80–100)
MCV RBC AUTO: 96.4 FL (ref 80–100)
MONOCYTES # BLD: 0.5 K/UL (ref 0–1.3)
MONOCYTES # BLD: 1.8 K/UL (ref 0–1.3)
MONOCYTES NFR BLD: 2 %
MONOCYTES NFR BLD: 5 %
NEUTROPHILS # BLD: 17.8 K/UL (ref 1.7–7.7)
NEUTROPHILS # BLD: 8.7 K/UL (ref 1.7–7.7)
NEUTROPHILS NFR BLD: 32 %
NEUTROPHILS NFR BLD: 47 %
NEUTS BAND NFR BLD MANUAL: 2 % (ref 0–7)
NEUTS VAC BLD QL SMEAR: PRESENT
NRBC BLD-RTO: 1 /100 WBC
ORGANISM: ABNORMAL
PATH INTERP BLD-IMP: NO
PATH INTERP BLD-IMP: NO
PHOSPHATE SERPL-MCNC: 2.2 MG/DL (ref 2.5–4.9)
PLATELET # BLD AUTO: 142 K/UL (ref 135–450)
PLATELET # BLD AUTO: 163 K/UL (ref 135–450)
PLATELET BLD QL SMEAR: ADEQUATE
PMV BLD AUTO: 10.4 FL (ref 5–10.5)
PMV BLD AUTO: 10.6 FL (ref 5–10.5)
POTASSIUM SERPL-SCNC: 3.9 MMOL/L (ref 3.5–5.1)
PROCALCITONIN SERPL IA-MCNC: 1.26 NG/ML (ref 0–0.15)
PROT SERPL-MCNC: 4.9 G/DL (ref 6.4–8.2)
RBC # BLD AUTO: 3.46 M/UL (ref 4–5.2)
RBC # BLD AUTO: 4.03 M/UL (ref 4–5.2)
RBC MORPH BLD: NORMAL
RBC MORPH BLD: NORMAL
SLIDE REVIEW: ABNORMAL
SLIDE REVIEW: ABNORMAL
SMUDGE CELLS BLD QL SMEAR: PRESENT
SMUDGE CELLS BLD QL SMEAR: PRESENT
SODIUM SERPL-SCNC: 138 MMOL/L (ref 136–145)
TROPONIN, HIGH SENSITIVITY: 140 NG/L (ref 0–14)
WBC # BLD AUTO: 27.3 K/UL (ref 4–11)
WBC # BLD AUTO: 36.3 K/UL (ref 4–11)

## 2024-06-22 PROCEDURE — 84100 ASSAY OF PHOSPHORUS: CPT

## 2024-06-22 PROCEDURE — 84145 PROCALCITONIN (PCT): CPT

## 2024-06-22 PROCEDURE — 2700000000 HC OXYGEN THERAPY PER DAY

## 2024-06-22 PROCEDURE — 83735 ASSAY OF MAGNESIUM: CPT

## 2024-06-22 PROCEDURE — 94761 N-INVAS EAR/PLS OXIMETRY MLT: CPT

## 2024-06-22 PROCEDURE — 1200000000 HC SEMI PRIVATE

## 2024-06-22 PROCEDURE — 80053 COMPREHEN METABOLIC PANEL: CPT

## 2024-06-22 PROCEDURE — 6360000002 HC RX W HCPCS: Performed by: NURSE PRACTITIONER

## 2024-06-22 PROCEDURE — 99232 SBSQ HOSP IP/OBS MODERATE 35: CPT | Performed by: INTERNAL MEDICINE

## 2024-06-22 PROCEDURE — 2580000003 HC RX 258: Performed by: NURSE PRACTITIONER

## 2024-06-22 PROCEDURE — 6370000000 HC RX 637 (ALT 250 FOR IP): Performed by: NURSE PRACTITIONER

## 2024-06-22 PROCEDURE — 84484 ASSAY OF TROPONIN QUANT: CPT

## 2024-06-22 PROCEDURE — 6360000002 HC RX W HCPCS: Performed by: INTERNAL MEDICINE

## 2024-06-22 PROCEDURE — 6370000000 HC RX 637 (ALT 250 FOR IP)

## 2024-06-22 PROCEDURE — 99223 1ST HOSP IP/OBS HIGH 75: CPT | Performed by: INTERNAL MEDICINE

## 2024-06-22 PROCEDURE — 6370000000 HC RX 637 (ALT 250 FOR IP): Performed by: INTERNAL MEDICINE

## 2024-06-22 PROCEDURE — 85025 COMPLETE CBC W/AUTO DIFF WBC: CPT

## 2024-06-22 PROCEDURE — 87040 BLOOD CULTURE FOR BACTERIA: CPT

## 2024-06-22 RX ORDER — TRAMADOL HYDROCHLORIDE 50 MG/1
50 TABLET ORAL ONCE
Status: COMPLETED | OUTPATIENT
Start: 2024-06-22 | End: 2024-06-22

## 2024-06-22 RX ORDER — METAXALONE 800 MG/1
400 TABLET ORAL ONCE
Status: COMPLETED | OUTPATIENT
Start: 2024-06-22 | End: 2024-06-22

## 2024-06-22 RX ADMIN — CIPROFLOXACIN 4 DROP: 3 SOLUTION OPHTHALMIC at 21:35

## 2024-06-22 RX ADMIN — CEFTRIAXONE SODIUM 2000 MG: 2 INJECTION, POWDER, FOR SOLUTION INTRAMUSCULAR; INTRAVENOUS at 21:48

## 2024-06-22 RX ADMIN — DEXAMETHASONE SODIUM PHOSPHATE 4 DROP: 1 SOLUTION/ DROPS OPHTHALMIC at 10:17

## 2024-06-22 RX ADMIN — TRAMADOL HYDROCHLORIDE 50 MG: 50 TABLET ORAL at 21:46

## 2024-06-22 RX ADMIN — METAXALONE 400 MG: 800 TABLET ORAL at 21:52

## 2024-06-22 RX ADMIN — APIXABAN 5 MG: 5 TABLET, FILM COATED ORAL at 21:30

## 2024-06-22 RX ADMIN — Medication 10 ML: at 08:05

## 2024-06-22 RX ADMIN — Medication 10 ML: at 21:31

## 2024-06-22 RX ADMIN — Medication 2 CAPSULE: at 16:49

## 2024-06-22 RX ADMIN — VANCOMYCIN 1250 MG: 1.75 INJECTION, SOLUTION INTRAVENOUS at 21:52

## 2024-06-22 RX ADMIN — AMIODARONE HYDROCHLORIDE 200 MG: 200 TABLET ORAL at 08:01

## 2024-06-22 RX ADMIN — DIBASIC SODIUM PHOSPHATE, MONOBASIC POTASSIUM PHOSPHATE AND MONOBASIC SODIUM PHOSPHATE 1 TABLET: 852; 155; 130 TABLET ORAL at 09:16

## 2024-06-22 RX ADMIN — ACETAMINOPHEN 325MG 650 MG: 325 TABLET ORAL at 21:30

## 2024-06-22 RX ADMIN — SODIUM CHLORIDE: 9 INJECTION, SOLUTION INTRAVENOUS at 21:51

## 2024-06-22 RX ADMIN — DIBASIC SODIUM PHOSPHATE, MONOBASIC POTASSIUM PHOSPHATE AND MONOBASIC SODIUM PHOSPHATE 1 TABLET: 852; 155; 130 TABLET ORAL at 21:30

## 2024-06-22 RX ADMIN — CEFTRIAXONE SODIUM 2000 MG: 2 INJECTION, POWDER, FOR SOLUTION INTRAMUSCULAR; INTRAVENOUS at 10:14

## 2024-06-22 RX ADMIN — DEXAMETHASONE SODIUM PHOSPHATE 4 DROP: 1 SOLUTION/ DROPS OPHTHALMIC at 22:30

## 2024-06-22 RX ADMIN — Medication 2 CAPSULE: at 08:01

## 2024-06-22 RX ADMIN — CIPROFLOXACIN 4 DROP: 3 SOLUTION OPHTHALMIC at 09:46

## 2024-06-22 ASSESSMENT — PAIN SCALES - GENERAL
PAINLEVEL_OUTOF10: 0
PAINLEVEL_OUTOF10: 8
PAINLEVEL_OUTOF10: 0
PAINLEVEL_OUTOF10: 8
PAINLEVEL_OUTOF10: 8
PAINLEVEL_OUTOF10: 2
PAINLEVEL_OUTOF10: 0
PAINLEVEL_OUTOF10: 0

## 2024-06-22 ASSESSMENT — PAIN DESCRIPTION - LOCATION
LOCATION: HEAD;NECK
LOCATION: NECK

## 2024-06-22 ASSESSMENT — PAIN DESCRIPTION - PAIN TYPE: TYPE: ACUTE PAIN

## 2024-06-22 ASSESSMENT — PAIN DESCRIPTION - ORIENTATION: ORIENTATION: MID;POSTERIOR

## 2024-06-22 ASSESSMENT — PAIN DESCRIPTION - DESCRIPTORS: DESCRIPTORS: DULL

## 2024-06-22 NOTE — CONSULTS
Clinical Pharmacy Note  Vancomycin Consult    Pharmacy consult received for one-time dose of vancomycin in the Emergency Department per LEONARDO Martínez.    Ht Readings from Last 1 Encounters:   05/20/24 1.524 m (5')        Wt Readings from Last 1 Encounters:   06/19/24 63 kg (138 lb 14.2 oz)         Assessment/Plan:  Vancomycin 1250mg x 1 in ED.  If vancomycin is to continue on admission and pharmacy is to manage dosing, please re-consult with admission orders.    Vidya Taylor, GaetanoD    
[facility]  Cardiology Consult Note        CC:      Troponin elevation             HPI:   This is a 83 y.o. female came to the ER with concern for nausea vomiting over the last 2 days with some diarrhea.  No chest pain or abdominal pain.  In the ER the EKG shows very subtle ST depressions in the lateral leads that are new..  In addition she had marked leukocytosis    Patient is status post right cortical mastoidectomy, right tympanostomy tube placement placement.    Interval history  Laying comfortably in bed  No complaints    Past Medical History:   Diagnosis Date    Acute ST elevation myocardial infarction (STEMI) ([facility]) 2021    Atrial fibrillation (HCC)     Chronic lymphocytic leukemia in remission (HCC)     CLL (chronic lymphocytic leukemia) ([facility]) 2015    COPD (chronic obstructive pulmonary disease) (HCC)     Essential hypertension 2015    controlled with salt restriction (initially)    Factor V Leiden mutation ([facility])     Goiter 2011    1.5 cm midline    Hypercholesterolemia     Impaired fasting glucose 2011    Osteoarthritis     Osteoporosis     Post herpetic neuralgia     Vitamin D deficiency 2011      Past Surgical History:   Procedure Laterality Date    CATARACT REMOVAL WITH IMPLANT  12    left eye    JOINT REPLACEMENT      partial knee R and L    KNEE SURGERY      partial replacements, both knees    MASTOIDECTOMY Right 2024    BILATERAL EAR EXAM UNDER ANESTHESIA, RIGHT TYMPANOSTOMY TUBE performed by Myron Russell MD at [facility] OR    MOHS SURGERY  2019    R cheek and R superior temple    SPLENECTOMY      TUNNELED VENOUS PORT PLACEMENT      UPPER GASTROINTESTINAL ENDOSCOPY N/A 2019    EGD ESOPHAGOGASTRODUODENOSCOPY, WITH ENDOSCOPIC ULTRASOUND OF ESOPHAGUS performed by Enmanuel Mccollum MD at [facility] ENDOSCOPY      Family History   Problem Relation Age of Onset    Diabetes Father     Stroke Sister 48         of a stroke      Social 
11/01/2011    Osteoarthritis     Osteoporosis     Post herpetic neuralgia     Vitamin D deficiency 11/01/2011       Past Surgical History:  Past Surgical History:   Procedure Laterality Date    CATARACT REMOVAL WITH IMPLANT  5/14/12    left eye    JOINT REPLACEMENT      partial knee R and L    KNEE SURGERY  2008    partial replacements, both knees    MOHS SURGERY  03/12/2019    R cheek and R superior temple    SPLENECTOMY      TUNNELED VENOUS PORT PLACEMENT      UPPER GASTROINTESTINAL ENDOSCOPY N/A 2/5/2019    EGD ESOPHAGOGASTRODUODENOSCOPY, WITH ENDOSCOPIC ULTRASOUND OF ESOPHAGUS performed by Enmanuel Mccollum MD at [facility] ENDOSCOPY       Current Medications:  Current Facility-Administered Medications   Medication Dose Route Frequency Provider Last Rate Last Admin    amiodarone (CORDARONE) tablet 200 mg  200 mg Oral Daily Dianna Conner APRN - CNP   200 mg at 06/20/24 0847    apixaban (ELIQUIS) tablet 5 mg  5 mg Oral BID Dianna Conner APRN - CNP   5 mg at 06/20/24 0847    metoprolol succinate (TOPROL XL) extended release tablet 12.5 mg  12.5 mg Oral Daily Dianna Conner APRN - CNP        [Held by provider] sacubitril-valsartan (ENTRESTO) 24-26 MG per tablet 0.5 tablet  0.5 tablet Oral BID Dianna Conner APRN - CNP        sodium chloride flush 0.9 % injection 5-40 mL  5-40 mL IntraVENous 2 times per day Dianna Conner APRN - CNP   10 mL at 06/20/24 0848    sodium chloride flush 0.9 % injection 5-40 mL  5-40 mL IntraVENous PRN Dianna Conner APRN - CNP   10 mL at 06/20/24 0025    0.9 % sodium chloride infusion   IntraVENous PRN Dianna Conner APRN - CNP        acetaminophen (TYLENOL) tablet 650 mg  650 mg Oral Q6H PRN Dinana Conner APRN - CNP   650 mg at 06/20/24 0850    Or    acetaminophen (TYLENOL) suppository 650 mg  650 mg Rectal Q6H PRN Dianna Conner APRN - CNP        0.9 % sodium chloride infusion   IntraVENous Continuous Dianna Conner APRN - CNP   Stopped at 
attenuation in the right middle and  external ears which could represent otomastoiditis given the history, but no  definitive erosions are identified.  2. No acute intracranial findings.  3. Moderate areas of white matter hypoattenuation appear unchanged likely  reflect chronic microvascular ischemic changes.    I reviewed all the above labs and studies pertaining to this visit.    ASSESSMENT/PLAN:  Mastoiditis   Continue with Rocephin and Vanco.  CNS dosing levels  Add lactobacillus due to history of C diff however might be risky to use due to underlying CLL   ENT consulted   Pneumococcal bacteremia   Consult ED.  May need longer term antibiotics at DC  Septic Shock  Levophed for MAP of 65  Emphysema   Add PRN albuterol nebs   CLL with asplenism     DVT prophylaxis  Eliquis         Critical care time of 31 discontinuous minutes.  Critical care time is cumulative for the day.  This does not include procedural time.  Please see procedural notes for details.    DO [facility]    
appearance, EAC patent bilaterally  FACE: 1/6 House-Brackmann Scale, symmetric, sensation equal bilaterally  ORAL CAVITY: No masses or lesions palpated, uvula is midline, moist mucous membranes, unremarkable tonsils, no dentition  NECK: Normal range of motion, no thyromegaly, trachea is midline,   CHEST: Normal respiratory effort, no retractions, breathing comfortably  SKIN: No rashes, normal appearing skin, no evidence of skin lesions/tumors    LABS  Lab Results   Component Value Date    WBC 33.5 (H) 06/20/2024    HGB 11.3 (L) 06/20/2024    HCT 33.3 (L) 06/20/2024    MCV 95.2 06/20/2024     06/20/2024           RADIOLOGY  Summary of findings:  I dependently reviewed and interpreted the patient's CT head.  She has evidence of opacification of the right mastoid without coalescence or erosion.  The tegmen is intact.  There is also a middle ear effusion.  The left mastoid is clear and there is what appears to be ceruminous debris in the external auditory canal.    PROCEDURE           Summary of findings:      ASSESSMENT/PLAN  Anika is a very pleasant 83 y.o. female with acute otitis media with mastoid effusion.    -Her acute otitis media could certainly be the source of her sepsis.  She has no facial nerve weakness or vertigo to suggest erosion into the inner ear or involvement of the facial nerve.  Based on her clinical exam, she does not have true mastoiditis and does not need something urgent like a mastoidectomy.  Her ear appears to be spontaneously draining on that side, and this essentially functions like an ear tube so I do not feel strongly that placing 1 emergently is necessary at this time.  -Recommend continued IV antibiotics  -Continue Ciprodex drops to the bilateral ears.  -The patient has poor hearing at baseline.  She has some functional hearing in her left ear patient has poor hearing at baseline.  She has some sound perception in the left ear.  I think what is going on the left ear is combination 
I suspect CLL and splenectomy predisposed her encapsulated bacteremia which is typical to see pneumococcal invasive infections secondary to lack of opsonization      It appears the patient already received pneumococcal vaccine 13 and Pneumovax 23 in  the past        She required pressor support secondary to hypotension slowly improving        Labs, Microbiology, Radiology and pertinent results from current hospitalization and care every where were reviewed by me as a part of the consultation.    PLAN :  Continue IV ceftriaxone 2 g every 12 hrs   Continue IV vancomycin pending Streptococcus sensitivities  Blood cultures repeat tomorrow  Check immunoglobulin levels  Trend WBC  Trend lactic acid  ENT notes reviewed  Risk for meningitis  Cont supportive care   Can check Drainage cx from Rt ear      Discussed with patient/Family and Nursing     Medical Decision Making:  The following items were considered in medical decision making:  Discussion of patient care with other providers  Reviewed clinical lab tests  Reviewed radiology tests  Reviewed other diagnostic tests/interventions  Independent review of radiologic images  Independent review of  Microbiology cultures and other micro tests reviewed     Risk of Complications/Morbidity: High      Patient is critically ill and has a potentially life threatening infection that poses threat to life/bodily function.   There is potential for worsening infection/ sudden clinically significant or life-threatening deterioration in the patient's condition without appropriate antimicrobial therapy.  Complex medical decision making process was involved to select appropriate antimicrobial agents to reverse the cause of patient's severe infection/ illness.  Antimicrobial therapy requires intensive monitoring for toxicity and frequent dose adjustments to prevent toxicity and permanent end-organ dysfunction    CC time  \" 31 min      Thanks for allowing me to participate in your patient's

## 2024-06-23 LAB
ALBUMIN SERPL-MCNC: 3.1 G/DL (ref 3.4–5)
ALBUMIN/GLOB SERPL: 1.3 {RATIO} (ref 1.1–2.2)
ALP SERPL-CCNC: 58 U/L (ref 40–129)
ALT SERPL-CCNC: 15 U/L (ref 10–40)
ANION GAP SERPL CALCULATED.3IONS-SCNC: 9 MMOL/L (ref 3–16)
AST SERPL-CCNC: 17 U/L (ref 15–37)
BILIRUB SERPL-MCNC: 0.5 MG/DL (ref 0–1)
BUN SERPL-MCNC: 6 MG/DL (ref 7–20)
CALCIUM SERPL-MCNC: 8.1 MG/DL (ref 8.3–10.6)
CHLORIDE SERPL-SCNC: 108 MMOL/L (ref 99–110)
CO2 SERPL-SCNC: 27 MMOL/L (ref 21–32)
CREAT SERPL-MCNC: 0.5 MG/DL (ref 0.6–1.2)
EKG ATRIAL RATE: 86 BPM
EKG DIAGNOSIS: NORMAL
EKG P AXIS: 77 DEGREES
EKG P-R INTERVAL: 162 MS
EKG Q-T INTERVAL: 404 MS
EKG QRS DURATION: 106 MS
EKG QTC CALCULATION (BAZETT): 483 MS
EKG R AXIS: 22 DEGREES
EKG T AXIS: 97 DEGREES
EKG VENTRICULAR RATE: 86 BPM
GFR SERPLBLD CREATININE-BSD FMLA CKD-EPI: >90 ML/MIN/{1.73_M2}
GLUCOSE SERPL-MCNC: 91 MG/DL (ref 70–99)
POTASSIUM SERPL-SCNC: 3.5 MMOL/L (ref 3.5–5.1)
PROT SERPL-MCNC: 5.5 G/DL (ref 6.4–8.2)
SODIUM SERPL-SCNC: 144 MMOL/L (ref 136–145)
VANCOMYCIN SERPL-MCNC: 20.1 UG/ML

## 2024-06-23 PROCEDURE — 93010 ELECTROCARDIOGRAM REPORT: CPT | Performed by: INTERNAL MEDICINE

## 2024-06-23 PROCEDURE — 2580000003 HC RX 258: Performed by: NURSE PRACTITIONER

## 2024-06-23 PROCEDURE — 6370000000 HC RX 637 (ALT 250 FOR IP): Performed by: NURSE PRACTITIONER

## 2024-06-23 PROCEDURE — 80053 COMPREHEN METABOLIC PANEL: CPT

## 2024-06-23 PROCEDURE — 6370000000 HC RX 637 (ALT 250 FOR IP): Performed by: HOSPITALIST

## 2024-06-23 PROCEDURE — 6360000002 HC RX W HCPCS: Performed by: NURSE PRACTITIONER

## 2024-06-23 PROCEDURE — 85025 COMPLETE CBC W/AUTO DIFF WBC: CPT

## 2024-06-23 PROCEDURE — 99232 SBSQ HOSP IP/OBS MODERATE 35: CPT | Performed by: INTERNAL MEDICINE

## 2024-06-23 PROCEDURE — 1200000000 HC SEMI PRIVATE

## 2024-06-23 PROCEDURE — 93005 ELECTROCARDIOGRAM TRACING: CPT | Performed by: INTERNAL MEDICINE

## 2024-06-23 PROCEDURE — 80202 ASSAY OF VANCOMYCIN: CPT

## 2024-06-23 PROCEDURE — 6370000000 HC RX 637 (ALT 250 FOR IP): Performed by: INTERNAL MEDICINE

## 2024-06-23 RX ORDER — METAXALONE 800 MG/1
400 TABLET ORAL
Status: COMPLETED | OUTPATIENT
Start: 2024-06-23 | End: 2024-06-24

## 2024-06-23 RX ORDER — OXYCODONE HYDROCHLORIDE 5 MG/1
2.5 TABLET ORAL
Status: COMPLETED | OUTPATIENT
Start: 2024-06-23 | End: 2024-06-23

## 2024-06-23 RX ADMIN — CIPROFLOXACIN 4 DROP: 3 SOLUTION OPHTHALMIC at 20:31

## 2024-06-23 RX ADMIN — Medication 10 ML: at 07:58

## 2024-06-23 RX ADMIN — Medication 10 ML: at 20:31

## 2024-06-23 RX ADMIN — CIPROFLOXACIN 4 DROP: 3 SOLUTION OPHTHALMIC at 07:53

## 2024-06-23 RX ADMIN — DEXAMETHASONE SODIUM PHOSPHATE 4 DROP: 1 SOLUTION/ DROPS OPHTHALMIC at 08:47

## 2024-06-23 RX ADMIN — AMIODARONE HYDROCHLORIDE 200 MG: 200 TABLET ORAL at 07:56

## 2024-06-23 RX ADMIN — Medication 2 CAPSULE: at 17:13

## 2024-06-23 RX ADMIN — DIBASIC SODIUM PHOSPHATE, MONOBASIC POTASSIUM PHOSPHATE AND MONOBASIC SODIUM PHOSPHATE 1 TABLET: 852; 155; 130 TABLET ORAL at 07:56

## 2024-06-23 RX ADMIN — CEFTRIAXONE SODIUM 2000 MG: 2 INJECTION, POWDER, FOR SOLUTION INTRAMUSCULAR; INTRAVENOUS at 21:29

## 2024-06-23 RX ADMIN — ACETAMINOPHEN 325MG 650 MG: 325 TABLET ORAL at 18:09

## 2024-06-23 RX ADMIN — OXYCODONE HYDROCHLORIDE 2.5 MG: 5 TABLET ORAL at 07:56

## 2024-06-23 RX ADMIN — APIXABAN 5 MG: 5 TABLET, FILM COATED ORAL at 07:56

## 2024-06-23 RX ADMIN — APIXABAN 5 MG: 5 TABLET, FILM COATED ORAL at 21:26

## 2024-06-23 RX ADMIN — DEXAMETHASONE SODIUM PHOSPHATE 4 DROP: 1 SOLUTION/ DROPS OPHTHALMIC at 21:26

## 2024-06-23 RX ADMIN — CEFTRIAXONE SODIUM 2000 MG: 2 INJECTION, POWDER, FOR SOLUTION INTRAMUSCULAR; INTRAVENOUS at 09:42

## 2024-06-23 RX ADMIN — Medication 2 CAPSULE: at 07:56

## 2024-06-23 ASSESSMENT — PAIN DESCRIPTION - LOCATION
LOCATION: HEAD
LOCATION: NECK

## 2024-06-23 ASSESSMENT — PAIN SCALES - GENERAL
PAINLEVEL_OUTOF10: 0
PAINLEVEL_OUTOF10: 0
PAINLEVEL_OUTOF10: 8
PAINLEVEL_OUTOF10: 0
PAINLEVEL_OUTOF10: 7
PAINLEVEL_OUTOF10: 0

## 2024-06-23 ASSESSMENT — PAIN DESCRIPTION - ORIENTATION: ORIENTATION: UPPER

## 2024-06-23 ASSESSMENT — PAIN DESCRIPTION - DESCRIPTORS
DESCRIPTORS: ACHING
DESCRIPTORS: ACHING

## 2024-06-24 PROBLEM — R79.89 ELEVATED PROCALCITONIN: Status: ACTIVE | Noted: 2024-06-24

## 2024-06-24 LAB
ACANTHOCYTES BLD QL SMEAR: ABNORMAL
ALBUMIN SERPL-MCNC: 2.7 G/DL (ref 3.4–5)
ALBUMIN/GLOB SERPL: 1.2 {RATIO} (ref 1.1–2.2)
ALP SERPL-CCNC: 54 U/L (ref 40–129)
ALT SERPL-CCNC: 13 U/L (ref 10–40)
ANION GAP SERPL CALCULATED.3IONS-SCNC: 6 MMOL/L (ref 3–16)
ANISOCYTOSIS BLD QL SMEAR: ABNORMAL
AST SERPL-CCNC: 16 U/L (ref 15–37)
BASOPHILS # BLD: 0.2 K/UL (ref 0–0.2)
BASOPHILS # BLD: 0.2 K/UL (ref 0–0.2)
BASOPHILS NFR BLD: 1 %
BASOPHILS NFR BLD: 1 %
BILIRUB SERPL-MCNC: 0.4 MG/DL (ref 0–1)
BUN SERPL-MCNC: 6 MG/DL (ref 7–20)
CALCIUM SERPL-MCNC: 8 MG/DL (ref 8.3–10.6)
CHLORIDE SERPL-SCNC: 105 MMOL/L (ref 99–110)
CO2 SERPL-SCNC: 31 MMOL/L (ref 21–32)
CREAT SERPL-MCNC: 0.6 MG/DL (ref 0.6–1.2)
DEPRECATED RDW RBC AUTO: 14.9 % (ref 12.4–15.4)
DEPRECATED RDW RBC AUTO: 15 % (ref 12.4–15.4)
EOSINOPHIL # BLD: 0 K/UL (ref 0–0.6)
EOSINOPHIL # BLD: 0 K/UL (ref 0–0.6)
EOSINOPHIL NFR BLD: 0 %
EOSINOPHIL NFR BLD: 0 %
GFR SERPLBLD CREATININE-BSD FMLA CKD-EPI: 88 ML/MIN/{1.73_M2}
GLUCOSE SERPL-MCNC: 94 MG/DL (ref 70–99)
HCT VFR BLD AUTO: 33.6 % (ref 36–48)
HCT VFR BLD AUTO: 36.7 % (ref 36–48)
HGB BLD-MCNC: 11.1 G/DL (ref 12–16)
HGB BLD-MCNC: 12 G/DL (ref 12–16)
LYMPHOCYTES # BLD: 18 K/UL (ref 1–5.1)
LYMPHOCYTES # BLD: 7.5 K/UL (ref 1–5.1)
LYMPHOCYTES NFR BLD: 33 %
LYMPHOCYTES NFR BLD: 69 %
MCH RBC QN AUTO: 31.4 PG (ref 26–34)
MCH RBC QN AUTO: 31.5 PG (ref 26–34)
MCHC RBC AUTO-ENTMCNC: 32.8 G/DL (ref 31–36)
MCHC RBC AUTO-ENTMCNC: 33.1 G/DL (ref 31–36)
MCV RBC AUTO: 95.3 FL (ref 80–100)
MCV RBC AUTO: 95.5 FL (ref 80–100)
MONOCYTES # BLD: 1.5 K/UL (ref 0–1.3)
MONOCYTES # BLD: 2 K/UL (ref 0–1.3)
MONOCYTES NFR BLD: 6 %
MONOCYTES NFR BLD: 9 %
NEUTROPHILS # BLD: 12.4 K/UL (ref 1.7–7.7)
NEUTROPHILS # BLD: 4.9 K/UL (ref 1.7–7.7)
NEUTROPHILS NFR BLD: 20 %
NEUTROPHILS NFR BLD: 55 %
NEUTS BAND NFR BLD MANUAL: 1 % (ref 0–7)
NEUTS VAC BLD QL SMEAR: PRESENT
PATH INTERP BLD-IMP: NO
PATH INTERP BLD-IMP: NO
PLATELET # BLD AUTO: 148 K/UL (ref 135–450)
PLATELET # BLD AUTO: 153 K/UL (ref 135–450)
PLATELET BLD QL SMEAR: ADEQUATE
PLATELET BLD QL SMEAR: ADEQUATE
PMV BLD AUTO: 9.6 FL (ref 5–10.5)
PMV BLD AUTO: 9.7 FL (ref 5–10.5)
POIKILOCYTOSIS BLD QL SMEAR: ABNORMAL
POIKILOCYTOSIS BLD QL SMEAR: ABNORMAL
POTASSIUM SERPL-SCNC: 3.3 MMOL/L (ref 3.5–5.1)
PROT SERPL-MCNC: 5 G/DL (ref 6.4–8.2)
RBC # BLD AUTO: 3.53 M/UL (ref 4–5.2)
RBC # BLD AUTO: 3.84 M/UL (ref 4–5.2)
SLIDE REVIEW: ABNORMAL
SLIDE REVIEW: ABNORMAL
SMUDGE CELLS BLD QL SMEAR: PRESENT
SMUDGE CELLS BLD QL SMEAR: PRESENT
SODIUM SERPL-SCNC: 142 MMOL/L (ref 136–145)
TARGETS BLD QL SMEAR: ABNORMAL
VARIANT LYMPHS NFR BLD MANUAL: 1 % (ref 0–6)
VARIANT LYMPHS NFR BLD MANUAL: 4 % (ref 0–6)
WBC # BLD AUTO: 22.1 K/UL (ref 4–11)
WBC # BLD AUTO: 24.7 K/UL (ref 4–11)

## 2024-06-24 PROCEDURE — 6370000000 HC RX 637 (ALT 250 FOR IP): Performed by: INTERNAL MEDICINE

## 2024-06-24 PROCEDURE — 6370000000 HC RX 637 (ALT 250 FOR IP): Performed by: NURSE PRACTITIONER

## 2024-06-24 PROCEDURE — 80053 COMPREHEN METABOLIC PANEL: CPT

## 2024-06-24 PROCEDURE — 6360000002 HC RX W HCPCS: Performed by: NURSE PRACTITIONER

## 2024-06-24 PROCEDURE — 2580000003 HC RX 258: Performed by: NURSE PRACTITIONER

## 2024-06-24 PROCEDURE — 97530 THERAPEUTIC ACTIVITIES: CPT

## 2024-06-24 PROCEDURE — 99232 SBSQ HOSP IP/OBS MODERATE 35: CPT | Performed by: INTERNAL MEDICINE

## 2024-06-24 PROCEDURE — 1200000000 HC SEMI PRIVATE

## 2024-06-24 PROCEDURE — 99233 SBSQ HOSP IP/OBS HIGH 50: CPT | Performed by: INTERNAL MEDICINE

## 2024-06-24 PROCEDURE — 97110 THERAPEUTIC EXERCISES: CPT

## 2024-06-24 PROCEDURE — 97116 GAIT TRAINING THERAPY: CPT

## 2024-06-24 PROCEDURE — 6370000000 HC RX 637 (ALT 250 FOR IP)

## 2024-06-24 PROCEDURE — 85025 COMPLETE CBC W/AUTO DIFF WBC: CPT

## 2024-06-24 PROCEDURE — 6370000000 HC RX 637 (ALT 250 FOR IP): Performed by: HOSPITALIST

## 2024-06-24 RX ORDER — BISACODYL 10 MG
10 SUPPOSITORY, RECTAL RECTAL DAILY PRN
Status: DISCONTINUED | OUTPATIENT
Start: 2024-06-24 | End: 2024-06-25 | Stop reason: HOSPADM

## 2024-06-24 RX ORDER — TRAMADOL HYDROCHLORIDE 50 MG/1
25 TABLET ORAL EVERY 6 HOURS PRN
Status: DISPENSED | OUTPATIENT
Start: 2024-06-24 | End: 2024-06-25

## 2024-06-24 RX ORDER — POTASSIUM CHLORIDE 20 MEQ/1
40 TABLET, EXTENDED RELEASE ORAL ONCE
Status: COMPLETED | OUTPATIENT
Start: 2024-06-24 | End: 2024-06-24

## 2024-06-24 RX ORDER — HYDRALAZINE HYDROCHLORIDE 20 MG/ML
5 INJECTION INTRAMUSCULAR; INTRAVENOUS EVERY 6 HOURS PRN
Status: DISCONTINUED | OUTPATIENT
Start: 2024-06-24 | End: 2024-06-25 | Stop reason: HOSPADM

## 2024-06-24 RX ADMIN — SACUBITRIL AND VALSARTAN 0.5 TABLET: 24; 26 TABLET, FILM COATED ORAL at 12:13

## 2024-06-24 RX ADMIN — DEXAMETHASONE SODIUM PHOSPHATE 4 DROP: 1 SOLUTION/ DROPS OPHTHALMIC at 21:51

## 2024-06-24 RX ADMIN — Medication 10 ML: at 12:13

## 2024-06-24 RX ADMIN — AMIODARONE HYDROCHLORIDE 200 MG: 200 TABLET ORAL at 08:12

## 2024-06-24 RX ADMIN — ACETAMINOPHEN 325MG 650 MG: 325 TABLET ORAL at 15:50

## 2024-06-24 RX ADMIN — CEFTRIAXONE SODIUM 2000 MG: 2 INJECTION, POWDER, FOR SOLUTION INTRAMUSCULAR; INTRAVENOUS at 21:59

## 2024-06-24 RX ADMIN — APIXABAN 5 MG: 5 TABLET, FILM COATED ORAL at 21:51

## 2024-06-24 RX ADMIN — DEXAMETHASONE SODIUM PHOSPHATE 4 DROP: 1 SOLUTION/ DROPS OPHTHALMIC at 09:40

## 2024-06-24 RX ADMIN — CIPROFLOXACIN 4 DROP: 3 SOLUTION OPHTHALMIC at 21:51

## 2024-06-24 RX ADMIN — Medication 10 ML: at 21:53

## 2024-06-24 RX ADMIN — Medication 2 CAPSULE: at 08:13

## 2024-06-24 RX ADMIN — SACUBITRIL AND VALSARTAN 0.5 TABLET: 24; 26 TABLET, FILM COATED ORAL at 21:51

## 2024-06-24 RX ADMIN — TRAMADOL HYDROCHLORIDE 25 MG: 50 TABLET ORAL at 17:48

## 2024-06-24 RX ADMIN — POTASSIUM CHLORIDE 40 MEQ: 1500 TABLET, EXTENDED RELEASE ORAL at 08:12

## 2024-06-24 RX ADMIN — METOPROLOL SUCCINATE 12.5 MG: 25 TABLET, EXTENDED RELEASE ORAL at 08:13

## 2024-06-24 RX ADMIN — Medication 2 CAPSULE: at 17:31

## 2024-06-24 RX ADMIN — CEFTRIAXONE SODIUM 2000 MG: 2 INJECTION, POWDER, FOR SOLUTION INTRAMUSCULAR; INTRAVENOUS at 12:12

## 2024-06-24 RX ADMIN — CIPROFLOXACIN 4 DROP: 3 SOLUTION OPHTHALMIC at 09:41

## 2024-06-24 RX ADMIN — METAXALONE 400 MG: 800 TABLET ORAL at 07:45

## 2024-06-24 RX ADMIN — APIXABAN 5 MG: 5 TABLET, FILM COATED ORAL at 08:13

## 2024-06-24 ASSESSMENT — PAIN DESCRIPTION - FREQUENCY
FREQUENCY: INTERMITTENT
FREQUENCY: CONTINUOUS

## 2024-06-24 ASSESSMENT — PAIN DESCRIPTION - ORIENTATION
ORIENTATION: RIGHT
ORIENTATION: MID;UPPER
ORIENTATION: LEFT;RIGHT
ORIENTATION: RIGHT

## 2024-06-24 ASSESSMENT — PAIN DESCRIPTION - LOCATION
LOCATION: EAR
LOCATION: NECK
LOCATION: EAR
LOCATION: NECK

## 2024-06-24 ASSESSMENT — PAIN SCALES - GENERAL
PAINLEVEL_OUTOF10: 8
PAINLEVEL_OUTOF10: 0
PAINLEVEL_OUTOF10: 5
PAINLEVEL_OUTOF10: 8
PAINLEVEL_OUTOF10: 5
PAINLEVEL_OUTOF10: 6

## 2024-06-24 ASSESSMENT — PAIN DESCRIPTION - DESCRIPTORS
DESCRIPTORS: ACHING
DESCRIPTORS: ACHING;CRAMPING

## 2024-06-24 ASSESSMENT — PAIN DESCRIPTION - ONSET: ONSET: ON-GOING

## 2024-06-24 ASSESSMENT — PAIN DESCRIPTION - PAIN TYPE
TYPE: ACUTE PAIN
TYPE: CHRONIC PAIN

## 2024-06-24 ASSESSMENT — PAIN SCALES - WONG BAKER: WONGBAKER_NUMERICALRESPONSE: NO HURT

## 2024-06-24 NOTE — DISCHARGE INSTR - COC
Continuity of Care Form    Patient Name: Anika Rudolph   :  1940  MRN:  [id number]    Admit date:  2024  Discharge date:  24      Code Status Order: Full Code   Advance Directives:   Advance Care Flowsheet Documentation       Date/Time Healthcare Directive Type of Healthcare Directive Copy in Chart Healthcare Agent Appointed Healthcare Agent's Name Healthcare Agent's Phone Number    24 1233 No, patient does not have an advance directive for healthcare treatment -- -- -- -- --            Admitting Physician:  Chris Hopkins MD  PCP: Kieran Espinoza MD    Discharging Nurse: Lorie Wagner RN  Discharging Hospital Unit/Room#: X5K-6434/4271-01  Discharging Unit Phone Number: [phone]    Emergency Contact:   Extended Emergency Contact Information  Primary Emergency Contact: Olimpia Avilez   [facility]  Home Phone: [phone]  Relation: Child  Secondary Emergency Contact: LeviGlenys  Home Phone: [phone]  Relation: Child    Past Surgical History:  Past Surgical History:   Procedure Laterality Date    CATARACT REMOVAL WITH IMPLANT  12    left eye    JOINT REPLACEMENT      partial knee R and L    KNEE SURGERY      partial replacements, both knees    MASTOIDECTOMY Right 2024    BILATERAL EAR EXAM UNDER ANESTHESIA, RIGHT TYMPANOSTOMY TUBE performed by Myron Russell MD at [facility] OR    MOHS SURGERY  2019    R cheek and R superior temple    SPLENECTOMY      TUNNELED VENOUS PORT PLACEMENT      UPPER GASTROINTESTINAL ENDOSCOPY N/A 2019    EGD ESOPHAGOGASTRODUODENOSCOPY, WITH ENDOSCOPIC ULTRASOUND OF ESOPHAGUS performed by Enmanuel Mccollum MD at [facility] ENDOSCOPY       Immunization History:   Immunization History   Administered Date(s) Administered    COVID-19, MODERNA BLUE border, Primary or Immunocompromised, (age 12y+), IM, 100 mcg/0.5mL 2021    COVID-19, PFIZER PURPLE top, DILUTE for use, (age 12 y+), 30mcg/0.3mL 2021, 2021

## 2024-06-25 ENCOUNTER — TELEPHONE (OUTPATIENT)
Dept: INFECTIOUS DISEASES | Age: 84
End: 2024-06-25

## 2024-06-25 VITALS
RESPIRATION RATE: 16 BRPM | TEMPERATURE: 98 F | DIASTOLIC BLOOD PRESSURE: 68 MMHG | WEIGHT: 148.37 LBS | HEART RATE: 69 BPM | BODY MASS INDEX: 29.13 KG/M2 | HEIGHT: 60 IN | SYSTOLIC BLOOD PRESSURE: 164 MMHG | OXYGEN SATURATION: 95 %

## 2024-06-25 LAB
ALBUMIN SERPL-MCNC: 3.2 G/DL (ref 3.4–5)
ALBUMIN/GLOB SERPL: 1.7 {RATIO} (ref 1.1–2.2)
ALP SERPL-CCNC: 55 U/L (ref 40–129)
ALT SERPL-CCNC: 11 U/L (ref 10–40)
ANION GAP SERPL CALCULATED.3IONS-SCNC: 7 MMOL/L (ref 3–16)
ANISOCYTOSIS BLD QL SMEAR: ABNORMAL
AST SERPL-CCNC: 16 U/L (ref 15–37)
BACTERIA SPEC AEROBE CULT: ABNORMAL
BACTERIA SPEC ANAEROBE CULT: ABNORMAL
BASOPHILS # BLD: 0 K/UL (ref 0–0.2)
BASOPHILS NFR BLD: 0 %
BILIRUB SERPL-MCNC: 0.5 MG/DL (ref 0–1)
BUN SERPL-MCNC: 5 MG/DL (ref 7–20)
CALCIUM SERPL-MCNC: 8 MG/DL (ref 8.3–10.6)
CHLORIDE SERPL-SCNC: 102 MMOL/L (ref 99–110)
CO2 SERPL-SCNC: 34 MMOL/L (ref 21–32)
CREAT SERPL-MCNC: <0.5 MG/DL (ref 0.6–1.2)
DEPRECATED RDW RBC AUTO: 14.8 % (ref 12.4–15.4)
EOSINOPHIL # BLD: 0.2 K/UL (ref 0–0.6)
EOSINOPHIL NFR BLD: 1 %
GFR SERPLBLD CREATININE-BSD FMLA CKD-EPI: >90 ML/MIN/{1.73_M2}
GLUCOSE BLD-MCNC: 105 MG/DL (ref 70–99)
GLUCOSE BLD-MCNC: 110 MG/DL (ref 70–99)
GLUCOSE SERPL-MCNC: 77 MG/DL (ref 70–99)
GRAM STN SPEC: ABNORMAL
HCT VFR BLD AUTO: 34.9 % (ref 36–48)
HGB BLD-MCNC: 11.6 G/DL (ref 12–16)
HYPOCHROMIA BLD QL SMEAR: ABNORMAL
LYMPHOCYTES # BLD: 8.9 K/UL (ref 1–5.1)
LYMPHOCYTES NFR BLD: 38 %
MCH RBC QN AUTO: 31.6 PG (ref 26–34)
MCHC RBC AUTO-ENTMCNC: 33.2 G/DL (ref 31–36)
MCV RBC AUTO: 95.2 FL (ref 80–100)
MONOCYTES # BLD: 2.7 K/UL (ref 0–1.3)
MONOCYTES NFR BLD: 12 %
NEUTROPHILS # BLD: 10.9 K/UL (ref 1.7–7.7)
NEUTROPHILS NFR BLD: 47 %
NEUTS BAND NFR BLD MANUAL: 1 % (ref 0–7)
NEUTS VAC BLD QL SMEAR: PRESENT
PATH INTERP BLD-IMP: NO
PERFORMED ON: ABNORMAL
PERFORMED ON: ABNORMAL
PLATELET # BLD AUTO: 188 K/UL (ref 135–450)
PMV BLD AUTO: 10 FL (ref 5–10.5)
POIKILOCYTOSIS BLD QL SMEAR: ABNORMAL
POTASSIUM SERPL-SCNC: 3.3 MMOL/L (ref 3.5–5.1)
PROT SERPL-MCNC: 5.1 G/DL (ref 6.4–8.2)
RBC # BLD AUTO: 3.66 M/UL (ref 4–5.2)
SMUDGE CELLS BLD QL SMEAR: PRESENT
SODIUM SERPL-SCNC: 143 MMOL/L (ref 136–145)
TARGETS BLD QL SMEAR: ABNORMAL
VARIANT LYMPHS NFR BLD MANUAL: 1 % (ref 0–6)
WBC # BLD AUTO: 22.8 K/UL (ref 4–11)

## 2024-06-25 PROCEDURE — 6360000002 HC RX W HCPCS: Performed by: NURSE PRACTITIONER

## 2024-06-25 PROCEDURE — 85025 COMPLETE CBC W/AUTO DIFF WBC: CPT

## 2024-06-25 PROCEDURE — 80053 COMPREHEN METABOLIC PANEL: CPT

## 2024-06-25 PROCEDURE — 6370000000 HC RX 637 (ALT 250 FOR IP): Performed by: NURSE PRACTITIONER

## 2024-06-25 PROCEDURE — 6370000000 HC RX 637 (ALT 250 FOR IP): Performed by: INTERNAL MEDICINE

## 2024-06-25 PROCEDURE — 2580000003 HC RX 258: Performed by: NURSE PRACTITIONER

## 2024-06-25 PROCEDURE — 97116 GAIT TRAINING THERAPY: CPT

## 2024-06-25 PROCEDURE — 2700000000 HC OXYGEN THERAPY PER DAY

## 2024-06-25 PROCEDURE — 97110 THERAPEUTIC EXERCISES: CPT

## 2024-06-25 PROCEDURE — 94761 N-INVAS EAR/PLS OXIMETRY MLT: CPT

## 2024-06-25 RX ORDER — POTASSIUM CHLORIDE 20 MEQ/1
40 TABLET, EXTENDED RELEASE ORAL ONCE
Status: COMPLETED | OUTPATIENT
Start: 2024-06-25 | End: 2024-06-25

## 2024-06-25 RX ORDER — LACTOBACILLUS RHAMNOSUS GG 10B CELL
2 CAPSULE ORAL 2 TIMES DAILY WITH MEALS
Qty: 30 CAPSULE | Refills: 0 | Status: SHIPPED | OUTPATIENT
Start: 2024-06-25

## 2024-06-25 RX ORDER — CIPROFLOXACIN AND DEXAMETHASONE 3; 1 MG/ML; MG/ML
4 SUSPENSION/ DROPS AURICULAR (OTIC) 2 TIMES DAILY
Qty: 1 EACH | Refills: 0 | Status: SHIPPED | OUTPATIENT
Start: 2024-06-25 | End: 2024-07-05

## 2024-06-25 RX ADMIN — ACETAMINOPHEN 325MG 650 MG: 325 TABLET ORAL at 16:58

## 2024-06-25 RX ADMIN — CIPROFLOXACIN 4 DROP: 3 SOLUTION OPHTHALMIC at 08:29

## 2024-06-25 RX ADMIN — AMIODARONE HYDROCHLORIDE 200 MG: 200 TABLET ORAL at 08:13

## 2024-06-25 RX ADMIN — POTASSIUM CHLORIDE 40 MEQ: 1500 TABLET, EXTENDED RELEASE ORAL at 12:05

## 2024-06-25 RX ADMIN — Medication 2 CAPSULE: at 08:13

## 2024-06-25 RX ADMIN — TRAMADOL HYDROCHLORIDE 25 MG: 50 TABLET ORAL at 00:45

## 2024-06-25 RX ADMIN — Medication 10 ML: at 08:19

## 2024-06-25 RX ADMIN — TRAMADOL HYDROCHLORIDE 25 MG: 50 TABLET ORAL at 08:27

## 2024-06-25 RX ADMIN — APIXABAN 5 MG: 5 TABLET, FILM COATED ORAL at 08:13

## 2024-06-25 RX ADMIN — Medication 2 CAPSULE: at 16:58

## 2024-06-25 RX ADMIN — SACUBITRIL AND VALSARTAN 0.5 TABLET: 24; 26 TABLET, FILM COATED ORAL at 08:13

## 2024-06-25 RX ADMIN — METOPROLOL SUCCINATE 12.5 MG: 25 TABLET, EXTENDED RELEASE ORAL at 08:13

## 2024-06-25 RX ADMIN — CEFTRIAXONE SODIUM 2000 MG: 2 INJECTION, POWDER, FOR SOLUTION INTRAMUSCULAR; INTRAVENOUS at 10:12

## 2024-06-25 RX ADMIN — DEXAMETHASONE SODIUM PHOSPHATE 4 DROP: 1 SOLUTION/ DROPS OPHTHALMIC at 08:29

## 2024-06-25 ASSESSMENT — PAIN SCALES - GENERAL
PAINLEVEL_OUTOF10: 5
PAINLEVEL_OUTOF10: 4
PAINLEVEL_OUTOF10: 7
PAINLEVEL_OUTOF10: 3

## 2024-06-25 ASSESSMENT — PAIN - FUNCTIONAL ASSESSMENT: PAIN_FUNCTIONAL_ASSESSMENT: ACTIVITIES ARE NOT PREVENTED

## 2024-06-25 ASSESSMENT — PAIN DESCRIPTION - LOCATION: LOCATION: BACK;NECK

## 2024-06-25 ASSESSMENT — PAIN DESCRIPTION - ORIENTATION: ORIENTATION: UPPER

## 2024-06-25 ASSESSMENT — PAIN DESCRIPTION - DESCRIPTORS: DESCRIPTORS: ACHING

## 2024-06-25 ASSESSMENT — PAIN SCALES - WONG BAKER: WONGBAKER_NUMERICALRESPONSE: NO HURT

## 2024-06-25 NOTE — DISCHARGE SUMMARY
Hospital Medicine Discharge Summary    Patient ID: Anika Rudolph      Patient's PCP: Kieran Espinoza MD    Admit Date: 6/19/2024     Discharge Date: 06/25/2024    Admitting Provider: Chris Hopkins MD     Discharge Provider: Justin Branham MD     Discharge Diagnoses:       Active Hospital Problems    Diagnosis     Centrilobular emphysema (HCC) [J43.2]      Priority: High    Elevated procalcitonin [R79.89]     Non-recurrent acute suppurative otitis media of right ear with spontaneous rupture of tympanic membrane [H66.011]     History of atrial fibrillation [Z86.79]     Impacted cerumen of left ear [H61.22]     Mastoiditis, acute, bilateral [H70.003]     CLL (chronic lymphocytic leukemia) (HCC) [C91.10]     Mastoiditis of right side [H70.91]     Pneumococcal bacteremia [R78.81, B95.3]     Disorder of middle ear and mastoid [H74.90]     Fever and chills [R50.9]     Neutrophilia [D72.9]     Lactic acid acidosis [E87.20]     Troponin level elevated [R79.89]     Hx of splenectomy [Z90.81]     Nausea and vomiting [R11.2]     Meningitis [G03.9]     Septic shock (HCC) [A41.9, R65.21]        The patient was seen and examined on day of discharge and this discharge summary is in conjunction with any daily progress note from day of discharge.    Hospital Course:       From HPI:\"Anika Rudolph is a 83 y.o.  female  who presents with PMHx: CLL, STEMI, COPD, goiter, A-fib        HPI provided by ED provider     Patient was brought in from home by the daughter with reports that her mother had been experiencing nausea and vomiting and diarrhea for 2 days.  She also seemed a little confused at times.     The daughter was not present during my exam as she had already gone home.  The patient did not have her hearing aids in and really could not offer any kind of history.     ED workup: CBC, metabolic panel, Pro-Andres, lactic acidosis, liver enzymes, NH 4, CT head, chest x-ray, initiation of sink cefepime and vancomycin,

## 2024-06-25 NOTE — TELEPHONE ENCOUNTER
Verbal OPAT orders given to [place] pharmacist at [facility].  IV Ceftriaxone x 2 gm q 12 HR X STOP DATE X  7/9   CBC with diff, CMP, ESR,CRP weekly   Fax results to    Chest port in place   No ID follow up   First dose given in Hospital

## 2024-06-25 NOTE — PROGRESS NOTES
ONCOLOGY HEMATOLOGY CARE PROGRESS NOTE      SUBJECTIVE:  Her hearing is slowly improving.  She denies any shortness of breath or chest pain.    ROS:     Constitutional:  No weight loss, No fever, No chills, No night sweats.  Energy level good.  Eyes:  No impairment or change in vision  ENT / Mouth:  No pain, abnormal ulceration, bleeding, nasal drip or change in voice or hearing  Cardiovascular:  No chest pain, palpitations, new edema, or calf discomfort  Respiratory:  No pain, hemoptysis, change to breathing  Breast:  No pain, discharge, change in appearance or texture  Gastrointestinal:  No pain, cramping, jaundice, change to eating and bowel habits  Urinary:  No pain, bleeding or change in continence  Genitalia: No pain, bleeding or discharge  Musculoskeletal:  No redness, pain, edema or weakness  Skin:  No pruritus, rash, change to nodules or lesions  Neurologic:  No discomfort, change in mental status, speech, sensory or motor activity  Psychiatric:  No change in concentration or change to affect or mood  Endocrine:  No hot flashes, increased thirst, or change to urine production  Hematologic: No petechiae, ecchymosis or bleeding  Lymphatic:  No lymphadenopathy or lymphedema  Allergy / Immunologic:  No eczema, hives, frequent or recurrent infections    OBJECTIVE        Physical    VITALS:  Patient Vitals for the past 24 hrs:   BP Temp Temp src Pulse Resp SpO2 Weight   06/24/24 0422 (!) 153/98 97.9 °F (36.6 °C) Oral 71 19 97 % --   06/24/24 0404 -- -- -- -- -- -- 69.1 kg (152 lb 5.4 oz)   06/24/24 0341 -- -- -- 55 14 97 % --   06/24/24 0100 -- -- -- 69 -- -- --   06/24/24 0000 (!) 158/71 97.9 °F (36.6 °C) Oral 71 17 97 % --   06/23/24 2331 -- -- -- 59 15 96 % --   06/23/24 2200 -- -- -- 73 14 96 % --   06/23/24 2100 -- -- -- 78 18 98 % --   06/23/24 2000 (!) 140/63 97.7 °F (36.5 °C) Oral 68 19 96 % --   06/23/24 1900 -- -- -- 64 -- -- --   06/23/24 1600 (!) 159/77 98.1 °F 
                                      ONCOLOGY HEMATOLOGY CARE PROGRESS NOTE      SUBJECTIVE:  She is starting to get some hearing back.  She denies any shortness of breath or chest pain.    ROS:     Constitutional:  No weight loss, No fever, No chills, No night sweats.  Energy level good.  Eyes:  No impairment or change in vision  ENT / Mouth:  No pain, abnormal ulceration, bleeding, nasal drip or change in voice or hearing  Cardiovascular:  No chest pain, palpitations, new edema, or calf discomfort  Respiratory:  No pain, hemoptysis, change to breathing  Breast:  No pain, discharge, change in appearance or texture  Gastrointestinal:  No pain, cramping, jaundice, change to eating and bowel habits  Urinary:  No pain, bleeding or change in continence  Genitalia: No pain, bleeding or discharge  Musculoskeletal:  No redness, pain, edema or weakness  Skin:  No pruritus, rash, change to nodules or lesions  Neurologic:  No discomfort, change in mental status, speech, sensory or motor activity  Psychiatric:  No change in concentration or change to affect or mood  Endocrine:  No hot flashes, increased thirst, or change to urine production  Hematologic: No petechiae, ecchymosis or bleeding  Lymphatic:  No lymphadenopathy or lymphedema  Allergy / Immunologic:  No eczema, hives, frequent or recurrent infections    OBJECTIVE        Physical    VITALS:  Patient Vitals for the past 24 hrs:   BP Temp Temp src Pulse Resp SpO2 Weight   06/22/24 1100 (!) 120/55 -- -- 62 15 97 % --   06/22/24 1000 133/79 -- -- 92 18 93 % --   06/22/24 0906 -- -- -- 65 17 98 % --   06/22/24 0900 120/82 -- -- 73 21 99 % --   06/22/24 0800 (!) 126/56 97.7 °F (36.5 °C) Oral 58 23 97 % --   06/22/24 0700 (!) 113/53 -- -- 57 13 97 % --   06/22/24 0600 (!) 123/56 -- -- 56 12 98 % 68.2 kg (150 lb 5.7 oz)   06/22/24 0500 (!) 122/56 -- -- 59 14 97 % --   06/22/24 0400 (!) 116/55 97.6 °F (36.4 °C) Oral 58 15 97 % --   06/22/24 0300 (!) 111/53 -- -- 64 16 96 % -- 
    Had been paged for increased ear pain but actually the patient was able to communicate through writing and verbalization that is actually her neck and her back.  RN also reported that the patient had short run of V. tach as depicted in the telemetry review below.    Patient was admitted yesterday per myself for sepsis related to mastoiditis and strep bacteremia.  She had been transferred to the ICU per myself last evening due to hypotension.    BEDSIDE eval: neck and back pain, that is exacerbated from baseline    On clinical exam the patient is awake alert and oriented.  She actually peers clinically much better than she did yesterday evening when I last seen her.  Due to the loss of hearing because of the otitis media we are communicating by writing and she responds verbally.  Skin is warm and dry.  She is denying chest pain.  She verbalized that she had the odd sensation as a note below in her chest during the NSVT episode.  Respirations are easy and regular nonlabored.  No evidence of nuchal rigidity.  On palpation of the neck and shoulder muscles she designates that those are very sore areas.  No evidence of rash.    Before I made it to bedside pt reported she was having an odd beating sensation  and the tele monitor was consistent with NSVT.  [id number]: NSR: LAD, TWI lateral leads and Q wqave II, and aVF-> No STEMI  Rate 82, 164, 104, 457    Plan: RN was asked to medicate patient with the tramadol that is already in the as needed orders for pain.  Administering Skelaxin for muscle ache pain at 400 mg and apply a topical Lidoderm patch.  Regarding the NSVT: She has not underlying history of A-fib which she is currently not in.  She is also already on Cordarone and I believe beta-blocker therapy.  Her blood pressure is stable we need to continue to monitor.  Her EKG was reviewed and described above.                                        
    V2.0    [facility] [place] Progress Note      Name:  Anika Rudolph /Age/Sex: 1940  (83 y.o. female)   MRN & CSN:  [id number] & [id number] Encounter Date/Time: 2024 7:49 AM EDT   Location:  [id number] PCP: Kieran Espinoza MD     Attending:Justin Branham MD       Hospital Day: 6    Assessment and Recommendations   Anika Rudolph is a 83 y.o. female who presents with Septic shock (HCC)      Plan:   Severe sepsis and septic shock with Streptococcus pneumonia bacteremia, present on admission, white count on presentation 35,000, Pro-Andres 4.53.  Lactic acid elevated.  Likely due to otitis and mastoiditis ENT consulted from ED, vancomycin and cefepime initially with bacteremia Streptococcus related, cefepime changed to ceftriaxone.  Afebrile for now, blood culture repeated  and still negative to date, surgical culture noted, vancomycin discontinued per ID.  Hemodynamically stable for now off pressors.  Otitis and possibly mastoiditis, IV antibiotics as above ENT consulted status post right cortical mastoidectomy, right tympanostomy tube placement on   Parotid mass incidental finding ENT already consulted.  Generalized weakness due to above  Nausea vomiting improved  CLL likely contributing factor as immunocompromised continue broad-spectrum antibiotics  Elevated troponin likely severe sepsis cannot exclude MI type II no chest pain EKG with nonspecific changes, repeated EKG this morning  A-fib EKG currently sinus tach patient on Eliquis and Amio.  Episodes of nonsustained V. tach, likely pressure induced patient on amiodarone with minimally elevated troponin, will consult cardiology for further recommendation        Diet ADULT DIET; Regular   DVT Prophylaxis [] Lovenox, []  Heparin, [] SCDs, [] Ambulation,  [] Eliquis, [] Xarelto  [] Coumadin   Code Status Full Code             Personally reviewed Lab Studies and Imaging     Discussed management of the case with ENT    Telemetry strip reviewed by 
    V2.0    [facility] [place] Progress Note      Name:  Anika Rudolph /Age/Sex: 1940  (83 y.o. female)   MRN & CSN:  [id number] & [id number] Encounter Date/Time: 2024 7:23 AM EDT   Location:  [id number] PCP: Kieran Espinoza MD     Attending:Justin Branham MD       Hospital Day: 3    Assessment and Recommendations   Anika Rudolph is a 83 y.o. female who presents with Septic shock (HCC)      Plan:       Plan:   Severe sepsis and septic shock with Streptococcus pneumonia bacteremia, present on admission, white count on presentation 35,000, Pro-Andres 4.53.  Lactic acid elevated.  ENT consulted from ED, vancomycin and cefepime initially with bacteremia Streptococcus related, cefepime changed to ceftriaxone.  To note that patient still having leukocytosis significant but patient with CLL this is likely her baseline  Otitis media, IV antibiotics as above ENT consulted  Generalized weakness due to above  Nausea vomiting improved  CLL likely contributing factor as immunocompromised continue broad-spectrum antibiotics  Elevated troponin likely severe sepsis cannot exclude MI type II no chest pain  A-fib EKG currently sinus tach patient on Eliquis and Amio.  Episodes of nonsustained V. tach, likely pressure induced patient on amiodarone          Diet Diet NPO   DVT Prophylaxis [] Lovenox, []  Heparin, [] SCDs, [] Ambulation,  [] Eliquis, [] Xarelto  [] Coumadin   Code Status Full Code             Personally reviewed Lab Studies and Imaging     Discussed management of the case with infectious disease who recommended keep on antibiotics  Telemetry strip reviewed by myself nonsustained V. tach        Drugs that require monitoring for toxicity include vancomycin and the method of monitoring was creatinine    Medical Decision Making:  The following items were considered in medical decision making:  Discussion of patient care with other providers  Reviewed clinical lab tests  Reviewed radiology tests  Reviewed other 
    V2.0    [facility] [place] Progress Note      Name:  Anika Rudolph /Age/Sex: 1940  (83 y.o. female)   MRN & CSN:  [id number] & [id number] Encounter Date/Time: 2024 8:18 AM EDT   Location:  [id number] PCP: Kieran Espinoza MD     Attending:Justin Branham MD       Hospital Day: 2    Assessment and Recommendations   Anika Rudolph is a 83 y.o. female who presents with Sepsis (HCC)      Plan:   Severe sepsis and septic shock with Streptococcus pneumonia bacteremia, present on admission, white count on presentation 35,000, Pro-Andres 4.53.  Lactic acid elevated.  ENT consulted from ED, vancomycin and cefepime could be due to mastoiditis.  White count still elevated at 33.5.  Blood pressure improved on pressors patient still at risk for life-threatening complications repeating procalcitonin in a.m., CBC in a.m. trending lactic acid  Mastoiditis, IV antibiotics as above ENT consulted  Generalized weakness due to above  Nausea vomiting and diarrhea for the last 2 to 3 days appears as viral illness in all circumstances patient is on broad antibiotics coverage  CLL likely contributing factor as immunocompromised continue broad-spectrum antibiotics  Elevated troponin likely severe sepsis cannot exclude MI type II no chest pain  A-fib EKG currently sinus tach patient on Eliquis and Amio  Hypomagnesemia replace with IV supplement    Total critical care time including but not limited to physical exam ordering and following on critical labs, ordering critical IV medication in a patient with potential life-threatening complications, time, negative including discussion with other subspecialty and not including any procedure time 32 minutes  Diet ADULT DIET; Clear Liquid   DVT Prophylaxis [] Lovenox, []  Heparin, [] SCDs, [] Ambulation,  [] Eliquis, [] Xarelto  [] Coumadin   Code Status Full Code             Personally reviewed Lab Studies and Imaging     Discussed management of the case with intensivist    Telemetry 
4 Eyes Skin Assessment     NAME:  Anika Rudolph  YOB: 1940  MEDICAL RECORD NUMBER:  [id number]    The patient is being assessed for  Shift Handoff    I agree that at least one RN has performed a thorough Head to Toe Skin Assessment on the patient. ALL assessment sites listed below have been assessed.      Areas assessed by both nurses:    Head, Face, Ears, Shoulders, Back, Chest, Arms, Elbows, Hands, Sacrum. Buttock, Coccyx, Ischium, Legs. Feet and Heels, and Under Medical Devices   Skin tear to back of left lower leg, mepilex in place, bruising noted to right shin. Blanchable Redness to coccyx, and bilateral heels, pt up to chair today and offloaded.         Does the Patient have a Wound? Yes wound(s) were present on assessment. LDA wound assessment was Initiated and completed by RN       Zana Prevention initiated by RN: Yes  Wound Care Orders initiated by RN: No    Pressure Injury (Stage 3,4, Unstageable, DTI, NWPT, and Complex wounds) if present, place Wound referral order by RN under : No    New Ostomies, if present place, Ostomy referral order under : No     Nurse 1 eSignature: Electronically signed by Fernanda Adams RN on 6/20/24 at 6:09 PM EDT    **SHARE this note so that the co-signing nurse can place an eSignature**    Nurse 2 eSignature: {Esignature:[id number]}   
4 Eyes Skin Assessment     NAME:  Anika Rudolph  YOB: 1940  MEDICAL RECORD NUMBER:  [id number]    The patient is being assessed for  Admission    I agree that at least one RN has performed a thorough Head to Toe Skin Assessment on the patient. ALL assessment sites listed below have been assessed.      Areas assessed by both nurses:    Head, Face, Ears, Shoulders, Back, Chest, Arms, Elbows, Hands, Sacrum. Buttock, Coccyx, Ischium, Legs. Feet and Heels, and Under Medical Devices         Does the Patient have a Wound? No noted wound(s)       Zana Prevention initiated by RN: Yes  Wound Care Orders initiated by RN: No    Pressure Injury (Stage 3,4, Unstageable, DTI, NWPT, and Complex wounds) if present, place Wound referral order by RN under : No    New Ostomies, if present place, Ostomy referral order under : Yes     Nurse 1 eSignature: Electronically signed by Hortencia Eid RN on 6/20/24 at 1:09 AM EDT    **SHARE this note so that the co-signing nurse can place an eSignature**    Nurse 2 eSignature: Electronically signed by Carolina Velez RN on 6/20/24 at 2:44 AM EDT   
Clinical Pharmacy Note  Vancomycin Consult    Anika Rudolph is a 83 y.o. female ordered vancomycin for sepsis of unknown etiology; consult received from Dr. Hopkins to manage therapy. Also receiving cefepime.    Allergies:  Patient has no known allergies.     Temp max:  Temp (24hrs), Av.2 °F (37.3 °C), Min:98 °F (36.7 °C), Max:100.1 °F (37.8 °C)      Recent Labs     24  1835   WBC 35.8*       Recent Labs     24  1835   BUN 16   CREATININE 0.8         Intake/Output Summary (Last 24 hours) at 2024 0255  Last data filed at 2024 0025  Gross per 24 hour   Intake 10 ml   Output --   Net 10 ml       Culture Results:  Pending    Ht Readings from Last 1 Encounters:   24 1.524 m (5')        Wt Readings from Last 1 Encounters:   24 63.3 kg (139 lb 8.8 oz)         Estimated Creatinine Clearance: 43 mL/min (based on SCr of 0.8 mg/dL).    Assessment/Plan:    Loading dose 1250mg 24 2200    Day # 1 of vancomycin.  Vancomycin 1250 mg IV every 24 hours.    Goal -600  Predicted  (24-48), 599 (24,ss)    Vanc random 0600 24    Thank you for the consult.     Vidya Taylor, PharmD    
Clinical Pharmacy Note  Vancomycin Consult    Anika Rudolph is a 83 y.o. female ordered vancomycin for sepsis of unknown etiology; consult received from Dr. Hopkins to manage therapy. Also receiving ceftriaxone.    Allergies:  Patient has no known allergies.     Temp max:  Temp (24hrs), Av.6 °F (36.4 °C), Min:97.3 °F (36.3 °C), Max:97.9 °F (36.6 °C)      Recent Labs     24  1835 24  0450 24  0520   WBC 35.8* 33.5* 36.3*         Recent Labs     24  1835 24  0450 24  0520   BUN 16 19 16   CREATININE 0.8 1.0 0.7           Intake/Output Summary (Last 24 hours) at 2024 0700  Last data filed at 2024 0656  Gross per 24 hour   Intake 3495.51 ml   Output 1740 ml   Net 1755.51 ml         Culture Results:  Blood cultures + Strep pneumo 3/4 bottles    Ht Readings from Last 1 Encounters:   24 1.524 m (5')        Wt Readings from Last 1 Encounters:   24 65 kg (143 lb 4.8 oz)         Estimated Creatinine Clearance: 49 mL/min (based on SCr of 0.7 mg/dL).    Assessment/Plan:    Day # 2 of vancomycin.  Vancomycin 1250 mg IV every 24 hours.      Vanco random this am = 21.2 mg/L (6.5 hour level)  Predicted  (24-48), 519 (24,ss)  Goal -600    Continue current regimen.    Thank you for the consult.     Lorie Lovett, PharmD.  2024  7:02 AM    
Discharge instructions given and explained. Meds brought to bedside from pharmacy. W/C placed for transport. Pt discharged with all of her belongings.   
ID Follow-up NOTE    CC:   Pneumococcal bacteremia,    Antibiotics: Vancomycin, Ceftriaxone     Admit Date: 6/19/2024  Hospital Day: 4    Subjective:     Patient feels good       Objective:     Patient Vitals for the past 8 hrs:   BP Temp Temp src Pulse Resp SpO2 Weight   06/22/24 1100 (!) 120/55 -- -- 62 15 97 % --   06/22/24 1000 133/79 -- -- 92 18 93 % --   06/22/24 0906 -- -- -- 65 17 98 % --   06/22/24 0900 120/82 -- -- 73 21 99 % --   06/22/24 0800 (!) 126/56 97.7 °F (36.5 °C) Oral 58 23 97 % --   06/22/24 0700 (!) 113/53 -- -- 57 13 97 % --   06/22/24 0600 (!) 123/56 -- -- 56 12 98 % 68.2 kg (150 lb 5.7 oz)   06/22/24 0500 (!) 122/56 -- -- 59 14 97 % --   06/22/24 0400 (!) 116/55 97.6 °F (36.4 °C) Oral 58 15 97 % --     I/O last 3 completed shifts:  In: 4478.5 [P.O.:120; I.V.:3327.8; IV Piggyback:1030.8]  Out: 2720 [Urine:2710; Blood:10]  I/O this shift:  In: 59.1 [IV Piggyback:59.1]  Out: 210 [Urine:210]    EXAM:  GENERAL: No apparent distress.    HEENT: Membranes moist, no oral lesion  NECK:  Supple, no lymphadenopathy  LUNGS: Clear b/l, no rales, no dullness  CARDIAC: RRR, no murmur appreciated  ABD:  + BS, soft / NT  EXT:  No rash, no edema, no lesions  NEURO: No focal neurologic findings  PSYCH: Orientation, sensorium, mood normal  LINES:  Peripheral iv       Data Review:  Lab Results   Component Value Date    WBC 27.3 (H) 06/22/2024    HGB 11.0 (L) 06/22/2024    HCT 33.4 (L) 06/22/2024    MCV 96.4 06/22/2024     06/22/2024     Lab Results   Component Value Date    CREATININE 0.6 06/22/2024    BUN 11 06/22/2024     06/22/2024    K 3.9 06/22/2024     06/22/2024    CO2 24 06/22/2024       Hepatic Function Panel:   Lab Results   Component Value Date/Time    ALKPHOS 53 06/22/2024 03:35 AM    ALT 16 06/22/2024 03:35 AM    AST 23 06/22/2024 03:35 AM    PROT 6.2 06/23/2017 12:10 PM    BILITOT 0.3 06/22/2024 03:35 AM    BILIDIR <0.2 11/07/2022 04:30 AM    IBILI see below 11/07/2022 04:30 AM 
NAME:  Anika Rudolph  YOB: 1940  MEDICAL RECORD NUMBER:  [id number]    Shift Summary: Pt having small drainage from right ear throughout shift. Pt up to bathroom with walker and up to chair during shift.     Family updated: Yes:  at bedside    Rhythm: Normal Sinus Rhythm     Most recent vitals:   Visit Vitals  BP (!) 159/77   Pulse 76   Temp 98.1 °F (36.7 °C) (Oral)   Resp 19   Ht 1.524 m (5')   Wt 59.6 kg (131 lb 6.3 oz)   SpO2 98%   BMI 25.66 kg/m²           No data found.    No data found.      Respiratory support needed (if any):  - O2 - NC - 2 lpm    Admission weight Weight - Scale: 63 kg (138 lb 14.2 oz) (06/19/24 1726)    Today's weight    Wt Readings from Last 1 Encounters:   06/23/24 59.6 kg (131 lb 6.3 oz)        Stevenson need assessed each shift: N/A - no stevenson present  UOP >30ml/hr: YES  Last documented BM (in last 48 hrs):  Patient Vitals for the past 48 hrs:   Last BM (including prior to admit) Stool Occurrence   06/22/24 1014 06/22/24 1   06/22/24 2100 06/22/24 --                Restraints (in use currently or dc'd in last 12 hrs): No      Lines/Drains reviewed @ bedside.  Implantable Port 06/19/24 Right Subclavian (Active)   Number of days: 3       Peripheral IV 06/20/24 Left Hand (Active)   Number of days: 3         Drip rates at handoff:    sodium chloride Stopped (06/23/24 0451)    norepinephrine Stopped (06/21/24 1000)       Lab Data:   CBC:   Recent Labs     06/22/24  0335 06/23/24  0816   WBC 27.3* 24.7*   HGB 11.0* 12.0   HCT 33.4* 36.7   MCV 96.4 95.5    153     BMP:    Recent Labs     06/22/24  0335 06/23/24  0816    144   K 3.9 3.5   CO2 24 27   BUN 11 6*   CREATININE 0.6 0.5*     LIVR:   Recent Labs     06/22/24  0335 06/23/24  0816   AST 23 17   ALT 16 15     PT/INR: No results for input(s): \"PROT\", \"INR\" in the last 72 hours.  APTT: No results for input(s): \"APTT\" in the last 72 hours.  ABG: No results for input(s): \"PHART\", \"[id number]\", \"PO2ART\" in the last 72 
NAME:  Anika Rudolph  YOB: 1940  MEDICAL RECORD NUMBER:  [id number]    Shift Summary: 6/21: Weaned off Levo in AM. ENT took to OR in afternoon, tube placed in R Ear, protective headpiece over ear, small amount of bleeding noted, not increased since OR.     Family updated: Yes:  Family @ bedside     Rhythm: Normal Sinus Rhythm     Most recent vitals:   Visit Vitals  /61   Pulse 69   Temp 97.4 °F (36.3 °C) (Oral)   Resp 19   Ht 1.524 m (5')   Wt 65 kg (143 lb 4.8 oz)   SpO2 96%   BMI 27.99 kg/m²           No data found.    No data found.      Respiratory support needed (if any):  - O2 - NC - 2 lpm    Admission weight Weight - Scale: 63 kg (138 lb 14.2 oz) (06/19/24 1726)    Today's weight    Wt Readings from Last 1 Encounters:   06/21/24 65 kg (143 lb 4.8 oz)        Steevnson need assessed each shift: YES -  - continue stevenson r/t - I&O  UOP >30ml/hr: YES  Last documented BM (in last 48 hrs):  No data found.             Restraints (in use currently or dc'd in last 12 hrs): No    Order current and documentation up to date? N/A    Lines/Drains reviewed @ bedside.  Implantable Port 06/19/24 Right Subclavian (Active)   Number of days: 2       Peripheral IV 06/20/24 Left Hand (Active)   Number of days: 1       Urinary Catheter 06/19/24 (Active)   Number of days: 1         Drip rates at handoff:    sodium chloride      sodium chloride 100 mL/hr at 06/21/24 1818    norepinephrine Stopped (06/21/24 1000)       Lab Data:   CBC:   Recent Labs     06/20/24  0450 06/21/24  0520   WBC 33.5* 36.3*   HGB 11.3* 12.9   HCT 33.3* 38.5   MCV 95.2 95.6    163     BMP:    Recent Labs     06/20/24  0450 06/21/24  0520    142   K 3.5 3.8   CO2 22 22   BUN 19 16   CREATININE 1.0 0.7     LIVR:   Recent Labs     06/20/24  0450 06/21/24  0520   AST 30 39*   ALT 26 24     PT/INR: No results for input(s): \"PROT\", \"INR\" in the last 72 hours.  APTT: No results for input(s): \"APTT\" in the last 72 hours.  ABG: No 
NAME:  Anika Rudolph  YOB: 1940  MEDICAL RECORD NUMBER:  [id number]    Shift Summary: Short run of Vtach- converted back to NSR on own. NP came to bedside, EKG performed. Uncontrolled pain in neck and back. 1 time dose of IV Fent. Push given. Minimal improve after dose given. A&Ox4. NPO for possible procedure with ENT today.    Family updated: No    Rhythm: Normal Sinus Rhythm     Most recent vitals:   Visit Vitals  BP (!) 108/53   Pulse 65   Temp 97.3 °F (36.3 °C) (Oral)   Resp 15   Ht 1.524 m (5')   Wt 65 kg (143 lb 4.8 oz)   SpO2 94%   BMI 27.99 kg/m²           No data found.    No data found.      Respiratory support needed (if any):  - O2 - NC - 1-2 lpm    Admission weight Weight - Scale: 63 kg (138 lb 14.2 oz) (06/19/24 1726)    Today's weight    Wt Readings from Last 1 Encounters:   06/21/24 65 kg (143 lb 4.8 oz)        Stevenson need assessed each shift: YES -  - continue stevenson r/t - strict I & o  UOP >30ml/hr: YES  Last documented BM (in last 48 hrs):  No data found.             Restraints (in use currently or dc'd in last 12 hrs): No    Order current and documentation up to date? No    Lines/Drains reviewed @ bedside.  Implantable Port 06/19/24 Right Subclavian (Active)   Number of days: 1       Peripheral IV 06/20/24 Left Hand (Active)   Number of days: 1       Urinary Catheter 06/19/24 (Active)   Number of days: 1         Drip rates at handoff:    sodium chloride      sodium chloride 100 mL/hr at 06/21/24 0153    norepinephrine 5 mcg/min (06/21/24 0125)       Lab Data:   CBC:   Recent Labs     06/19/24  1835 06/20/24  0450   WBC 35.8* 33.5*   HGB 14.0 11.3*   HCT 42.9 33.3*   MCV 96.2 95.2    146     BMP:    Recent Labs     06/20/24  0450 06/21/24  0520    142   K 3.5 3.8   CO2 22 22   BUN 19 16   CREATININE 1.0 0.7     LIVR:   Recent Labs     06/20/24  0450 06/21/24  0520   AST 30 39*   ALT 26 24     PT/INR: No results for input(s): \"PROT\", \"INR\" in the last 72 
NAME:  Anika Rudolph  YOB: 1940  MEDICAL RECORD NUMBER:  [id number]    Shift Summary: Patient remained alert and oriented overnight, VSS, ear drainage has remained stable, urine output satisfactory, NS gtt , per MD do not restart if PO fluid intake is satisfactory.    Family updated: No    Rhythm: Normal Sinus Rhythm     Most recent vitals:   Visit Vitals  BP (!) 123/56   Pulse 56   Temp 97.6 °F (36.4 °C) (Oral)   Resp 12   Ht 1.524 m (5')   Wt 65 kg (143 lb 4.8 oz)   SpO2 98%   BMI 27.99 kg/m²           No data found.    No data found.      Respiratory support needed (if any):  - O2 - NC - 2 lpm    Admission weight Weight - Scale: 63 kg (138 lb 14.2 oz) (24 1726)    Today's weight    Wt Readings from Last 1 Encounters:   24 65 kg (143 lb 4.8 oz)        Stevenson need assessed each shift: YES -  - continue stevenson r/t - strict intake and output  UOP >30ml/hr: YES  Last documented BM (in last 48 hrs):  No data found.             Restraints (in use currently or dc'd in last 12 hrs): No    Order current and documentation up to date? No    Lines/Drains reviewed @ bedside.  Implantable Port 24 Right Subclavian (Active)   Number of days: 2       Peripheral IV 24 Left Hand (Active)   Number of days: 2       Urinary Catheter 24 (Active)   Number of days: 2         Drip rates at handoff:    sodium chloride      norepinephrine Stopped (24 1000)       Lab Data:   CBC:   Recent Labs     24  0524  0335   WBC 36.3* 27.3*   HGB 12.9 11.0*   HCT 38.5 33.4*   MCV 95.6 96.4    142     BMP:    Recent Labs     24  0520 24  0335    138   K 3.8 3.9   CO2 22 24   BUN 16 11   CREATININE 0.7 0.6     LIVR:   Recent Labs     24  0520 24  0335   AST 39* 23   ALT 24 16     PT/INR: No results for input(s): \"PROT\", \"INR\" in the last 72 hours.  APTT: No results for input(s): \"APTT\" in the last 72 hours.  ABG: No results for input(s): 
NAME:  Anika Rudolph  YOB: 1940  MEDICAL RECORD NUMBER:  [id number]    Shift Summary: slept good, bathed, port dressing changed    Family updated: No    Rhythm: Normal Sinus Rhythm     Most recent vitals:   Visit Vitals  BP (!) 153/98   Pulse 71   Temp 97.9 °F (36.6 °C) (Oral)   Resp 19   Ht 1.524 m (5')   Wt 69.1 kg (152 lb 5.4 oz)   SpO2 97%   BMI 29.75 kg/m²           No data found.    No data found.      Respiratory support needed (if any):  - O2 - NC - 2 lpm    Admission weight Weight - Scale: 63 kg (138 lb 14.2 oz) (06/19/24 1726)    Today's weight    Wt Readings from Last 1 Encounters:   06/24/24 69.1 kg (152 lb 5.4 oz)        Stevenson need assessed each shift: N/A - no stevenson present  UOP >30ml/hr: YES  Last documented BM (in last 48 hrs):  Patient Vitals for the past 48 hrs:   Last BM (including prior to admit) Stool Occurrence   06/22/24 1014 06/22/24 1   06/22/24 2100 06/22/24 --                Restraints (in use currently or dc'd in last 12 hrs): No    Order current and documentation up to date? No    Lines/Drains reviewed @ bedside.  Implantable Port 06/19/24 Right Subclavian (Active)   Number of days: 4       Peripheral IV 06/20/24 Left Hand (Active)   Number of days: 3         Drip rates at handoff:    sodium chloride Stopped (06/23/24 0451)    norepinephrine Stopped (06/21/24 1000)       Lab Data:   CBC:   Recent Labs     06/22/24  0335 06/23/24  0816   WBC 27.3* 24.7*   HGB 11.0* 12.0   HCT 33.4* 36.7   MCV 96.4 95.5    153     BMP:    Recent Labs     06/23/24  0816 06/24/24  0345    142   K 3.5 3.3*   CO2 27 31   BUN 6* 6*   CREATININE 0.5* 0.6     LIVR:   Recent Labs     06/23/24  0816 06/24/24  0345   AST 17 16   ALT 15 13     PT/INR: No results for input(s): \"PROT\", \"INR\" in the last 72 hours.  APTT: No results for input(s): \"APTT\" in the last 72 hours.  ABG: No results for input(s): \"PHART\", \"[id number]\", \"PO2ART\" in the last 72 hours.    Any consults during the shift? 
NAME:  Anika Rudolph  YOB: 1940  MEDICAL RECORD NUMBER:  [id number]    Shift Summary: Pt reported pain for majority of the night, responded well to pain pill and muscle relaxer.      Family updated: No    Rhythm: Normal Sinus Rhythm     Most recent vitals:   Visit Vitals  BP (!) 127/56   Pulse 61   Temp 98.4 °F (36.9 °C) (Oral)   Resp 13   Ht 1.524 m (5')   Wt 68.2 kg (150 lb 5.7 oz)   SpO2 90%   BMI 29.36 kg/m²           No data found.    No data found.      Respiratory support needed (if any):  - O2 - NC - 2 lpm    Admission weight Weight - Scale: 63 kg (138 lb 14.2 oz) (06/19/24 1726)    Today's weight    Wt Readings from Last 1 Encounters:   06/22/24 68.2 kg (150 lb 5.7 oz)        Stevenson need assessed each shift: N/A - no stevenson present  UOP >30ml/hr: YES  Last documented BM (in last 48 hrs):  Patient Vitals for the past 48 hrs:   Last BM (including prior to admit) Stool Occurrence   06/22/24 1014 06/22/24 1   06/22/24 2100 06/22/24 --                Restraints (in use currently or dc'd in last 12 hrs): No    Order current and documentation up to date? No    Lines/Drains reviewed @ bedside.  Implantable Port 06/19/24 Right Subclavian (Active)   Number of days: 3       Peripheral IV 06/20/24 Left Hand (Active)   Number of days: 3         Drip rates at handoff:    sodium chloride 5 mL/hr at 06/23/24 0101    norepinephrine Stopped (06/21/24 1000)       Lab Data:   CBC:   Recent Labs     06/21/24  0520 06/22/24  0335   WBC 36.3* 27.3*   HGB 12.9 11.0*   HCT 38.5 33.4*   MCV 95.6 96.4    142     BMP:    Recent Labs     06/21/24  0520 06/22/24  0335    138   K 3.8 3.9   CO2 22 24   BUN 16 11   CREATININE 0.7 0.6     LIVR:   Recent Labs     06/21/24  0520 06/22/24  0335   AST 39* 23   ALT 24 16     PT/INR: No results for input(s): \"PROT\", \"INR\" in the last 72 hours.  APTT: No results for input(s): \"APTT\" in the last 72 hours.  ABG: No results for input(s): \"PHART\", \"[id number]\", \"PO2ART\" in 
NAME:  Anika Rudolph  YOB: 1940  MEDICAL RECORD NUMBER:  [id number]    Shift Summary: Pt A&Ox4 throughout shift. Small right ear drainage. Stevenson catheter removed per Dr. Justin Branham's orders.     Family updated: Yes:  pt updated family    Rhythm: Normal Sinus Rhythm     Most recent vitals:   Visit Vitals  BP (!) 129/55   Pulse 78   Temp 98.4 °F (36.9 °C) (Oral)   Resp 15   Ht 1.524 m (5')   Wt 68.2 kg (150 lb 5.7 oz)   SpO2 97%   BMI 29.36 kg/m²           No data found.    No data found.      Respiratory support needed (if any):  - O2 - NC - 2 lpm    Admission weight Weight - Scale: 63 kg (138 lb 14.2 oz) (06/19/24 1726)    Today's weight    Wt Readings from Last 1 Encounters:   06/22/24 68.2 kg (150 lb 5.7 oz)        Stevenson need assessed each shift: N/A - no stevenson present  UOP >30ml/hr: YES  Last documented BM (in last 48 hrs):  Patient Vitals for the past 48 hrs:   Last BM (including prior to admit) Stool Occurrence   06/22/24 1014 06/22/24 1                Restraints (in use currently or dc'd in last 12 hrs): No      Lines/Drains reviewed @ bedside.  Implantable Port 06/19/24 Right Subclavian (Active)   Number of days: 2       Peripheral IV 06/20/24 Left Hand (Active)   Number of days: 2         Drip rates at handoff:    sodium chloride      norepinephrine Stopped (06/21/24 1000)       Lab Data:   CBC:   Recent Labs     06/21/24  0520 06/22/24  0335   WBC 36.3* 27.3*   HGB 12.9 11.0*   HCT 38.5 33.4*   MCV 95.6 96.4    142     BMP:    Recent Labs     06/21/24  0520 06/22/24  0335    138   K 3.8 3.9   CO2 22 24   BUN 16 11   CREATININE 0.7 0.6     LIVR:   Recent Labs     06/21/24  0520 06/22/24  0335   AST 39* 23   ALT 24 16     PT/INR: No results for input(s): \"PROT\", \"INR\" in the last 72 hours.  APTT: No results for input(s): \"APTT\" in the last 72 hours.  ABG: No results for input(s): \"PHART\", \"[id number]\", \"PO2ART\" in the last 72 hours.    Any consults during the shift? Yes: 
[facility]  Cardiology Consult Note        CC:      Troponin elevation             HPI:   This is a 83 y.o. female came to the ER with concern for nausea vomiting over the last 2 days with some diarrhea.  No chest pain or abdominal pain.  In the ER the EKG shows very subtle ST depressions in the lateral leads that are new..  In addition she had marked leukocytosis    Patient is status post right cortical mastoidectomy, right tympanostomy tube placement placement.    Interval history  Laying comfortably in bed  No complaints    Past Medical History:   Diagnosis Date    Acute ST elevation myocardial infarction (STEMI) ([facility]) 2021    Atrial fibrillation (HCC)     Chronic lymphocytic leukemia in remission (HCC)     CLL (chronic lymphocytic leukemia) ([facility]) 2015    COPD (chronic obstructive pulmonary disease) (HCC)     Essential hypertension 2015    controlled with salt restriction (initially)    Factor V Leiden mutation ([facility])     Goiter 2011    1.5 cm midline    Hypercholesterolemia     Impaired fasting glucose 2011    Osteoarthritis     Osteoporosis     Post herpetic neuralgia     Vitamin D deficiency 2011      Past Surgical History:   Procedure Laterality Date    CATARACT REMOVAL WITH IMPLANT  12    left eye    JOINT REPLACEMENT      partial knee R and L    KNEE SURGERY      partial replacements, both knees    MASTOIDECTOMY Right 2024    BILATERAL EAR EXAM UNDER ANESTHESIA, RIGHT TYMPANOSTOMY TUBE performed by Myron Russell MD at [facility] OR    MOHS SURGERY  2019    R cheek and R superior temple    SPLENECTOMY      TUNNELED VENOUS PORT PLACEMENT      UPPER GASTROINTESTINAL ENDOSCOPY N/A 2019    EGD ESOPHAGOGASTRODUODENOSCOPY, WITH ENDOSCOPIC ULTRASOUND OF ESOPHAGUS performed by Enmanuel Mccollum MD at [facility] ENDOSCOPY      Family History   Problem Relation Age of Onset    Diabetes Father     Stroke Sister 48         of a stroke      Social 
Notified NP that pt's BP is still low 79/44.Advised to shift pt to ICU.    Informed pt about her status and transfer to ICU.    Report given to ICU RN.All questions answered.    Attempted to call Pt's daughter,Olimpia but no answer.Will try to call later.    
Patient c/o abd pain. Perfect serve was sent and a GI cocktail was ordered. Checked on patient who stated that pain was gone. Patient did not receive the GI cocktail. Patient had tramadol once this evening. Patient is now resting in bed. Bed in lowest position, no alarm activated. Patient calls appropriately.   
Patient returned via bed to room 2105. Attached to monitoring equipment. Awakens easily to tactile stimuli, states able to hear some sounds. Appropriate responses to verbal questioning. Gleason patent to gravity bedside drainage. IVF infusing per pump without difficulty. Noted bloody drainage in fluff of protective right ear covering, Dr Maynard informed. Dr. Stewart at bedside to see patient and spoke with daughter, Olimpia at bedside. Patient denies any c/o pain at present time. Repositioned in bed for comfort. SCDs on, call light in easy reach.    Electronically signed by Becki Baidr RN on 6/21/2024 at 5:31 PM    
Patient taken via bed to OR. Informed patient is to be recovered by ICU RN on unit post op.     Electronically signed by Becki Baird RN on 6/21/2024 at 2:57 PM    
Patient transferred from the ICU into room 4270   
Patient transferred to room 4271 via wheel chair, all belongings brought on patient. Handoff given to receiving RN, patient daughter randy called but did not answer. Voice mailbox full.   
Pharmacy Medication Reconciliation Note     List of medications patient is currently taking is not complete.    Source of information:   1. EMR    Notes regarding home medications:   1. Unable to assess when last home medications were taken- patient was sleeping.  2. Patient could possibly be taking Ativan and gabapentin PRN- unable to ask.      Jo Sullivan, Pharmacy Intern  6/19/2024 9:40 PM    
Physical Therapy  Facility/Department: [address] MED SURG  Physical Therapy Treatment Note    Name: Anika Rudolph  : 1940  MRN: [id number]  Date of Service: 2024    Discharge Recommendations:  Continue to assess pending progress, Home with assist PRN   PT Equipment Recommendations  Equipment Needed: No      Anika Rudolph scored a 19/24 on the AM-PAC short mobility form. Current research shows that an AM-PAC score of 18 or greater is typically associated with a discharge to the patient's home setting. Based on the patient's AM-PAC score and their current functional mobility deficits, it is recommended that the patient have 2-3 sessions per week of Physical Therapy at d/c to increase the patient's independence.  At this time, this patient demonstrates the endurance and safety to discharge home with Home PT Evaluation  and a follow up treatment frequency of 2-3x/wk.  Please see assessment section for further patient specific details.    If patient discharges prior to next session this note will serve as a discharge summary.  Please see below for the latest assessment towards goals.       Assessment   Body Structures, Functions, Activity Limitations Requiring Skilled Therapeutic Intervention: Decreased functional mobility ;Decreased endurance;Decreased balance  Assessment: 82 y/o female admit 2024 with Mastoiditis, Sepsis, Elevated Troponin. CT Head : R Mastoid Effusion, Fluid Attenuation. ENT Consulted. 2024 S/P R Mastoidectomy, R Myringotomy with Tube Placement; L Cerumen Removal. PMH as noted including STEMI, A-Fib, CLL, COPD, Factor V Leiden Mutation, OA, TKR, Post Herpetic Neuralgia.  PTA pt living in [facility] with several steps to access; independent daily care and functional mobility (cane prn) and has cleaning service.  Pt currently requiring Min assist to eob, transfers/amb short functional distances with Walker SBA/CGA.  Pt reports adequate assist/support for d/c home.  Will cont to 
Physical Therapy  Facility/Department: [address] ICU  Physical Therapy Initial Assessment    Name: Anika Rudolph  : 1940  MRN: [id number]  Date of Service: 2024    Discharge Recommendations:  24 hour supervision or assist, Home with Home health PT, Patient would benefit from continued therapy after discharge, Continue to assess pending progress   PT Equipment Recommendations  Equipment Needed: No    Anika Rudolph scored a 18/24 on the AM-PAC short mobility form. Current research shows that an AM-PAC score of 18 or greater is typically associated with a discharge to the patient's home setting. Based on the patient's AM-PAC score and their current functional mobility deficits, it is recommended that the patient have 2-3 sessions per week of Physical Therapy at d/c to increase the patient's independence.  At this time, this patient demonstrates the endurance and safety to discharge home with home PT and a follow up treatment frequency of 2-3x/wk.  Please see assessment section for further patient specific details.    If patient discharges prior to next session this note will serve as a discharge summary.  Please see below for the latest assessment towards goals.         Patient Diagnosis(es): The primary encounter diagnosis was Nausea and vomiting, unspecified vomiting type. Diagnoses of Elevated troponin, Acute UTI, Disorder of right middle ear or mastoid, and Goals of care, counseling/discussion were also pertinent to this visit.  Past Medical History:  has a past medical history of Acute ST elevation myocardial infarction (STEMI) ([facility]), Atrial fibrillation (HCC), Chronic lymphocytic leukemia in remission (HCC), CLL (chronic lymphocytic leukemia) (HCC), COPD (chronic obstructive pulmonary disease) (HCC), Essential hypertension, Factor V Leiden mutation (HCC), Goiter, Hypercholesterolemia, Impaired fasting glucose, Osteoarthritis, Osteoporosis, Post herpetic neuralgia, and Vitamin D deficiency.  Past 
Physical Therapy  Facility/Department: [address] MED SURG  Physical Therapy Treatment    Name: Anika Rudolph  : 1940  MRN: [id number]  Date of Service: 2024    Discharge Recommendations:  Home with assist PRN   PT Equipment Recommendations  Equipment Needed: No  Anika Rudolph scored a 19/24 on the AM-PAC short mobility form.  At this time, no further PT is recommended upon discharge due to pt approaching functional baseline.  Recommend patient returns to prior setting with prior services.       This note serves as D/C summary if patient is discharged prior to next treatment session.      Patient Diagnosis(es): The primary encounter diagnosis was Nausea and vomiting, unspecified vomiting type. Diagnoses of Elevated troponin, Acute UTI, Disorder of right middle ear or mastoid, Goals of care, counseling/discussion, Meningitis, and Mastoiditis were also pertinent to this visit.  Past Medical History:  has a past medical history of Acute ST elevation myocardial infarction (STEMI) ([facility]), Atrial fibrillation (HCC), Chronic lymphocytic leukemia in remission ([facility]), CLL (chronic lymphocytic leukemia) ([facility]), COPD (chronic obstructive pulmonary disease) (HCC), Essential hypertension, Factor V Leiden mutation (HCC), Goiter, Hypercholesterolemia, Impaired fasting glucose, Osteoarthritis, Osteoporosis, Post herpetic neuralgia, and Vitamin D deficiency.  Past Surgical History:  has a past surgical history that includes Splenectomy; knee surgery (); Cataract removal with implant (12); Tunneled venous port placement; Upper gastrointestinal endoscopy (N/A, 2019); Mohs surgery (2019); joint replacement; and mastoidectomy (Right, 2024).    Assessment   Body Structures, Functions, Activity Limitations Requiring Skilled Therapeutic Intervention: Decreased functional mobility ;Decreased endurance;Decreased balance  Assessment: 84 y/o female admit 2024 with Mastoiditis, Sepsis, Elevated Troponin. 
Physical Therapy  PT attempt  Anika Rudolph    Attempted PT treatment this PM.  Pt off the floor for procedure.  Will attempt again as schedule permits.    Electronically signed by Heena Gayle, [place] [id number] on 6/21/2024 at 3:39 PM      
Pt arrived to floor via stretcher from ED and transferred to bed.Bedside monitor activated. Patient oriented to room and use of call light. Call light and personal items within reach. Admission and assessment initiated. Education initiated and reviewed with patient. Denied further needs or questions at this time.    
Pt's BP dropped to 84/43(MAP) 58.Notified NP,Dianna Conner.LR bolus & Human Albumin ordered.  
Pulmonary Progress Note    CC:  Follow up septic shock    Subjective:  Went to OR yesterday   She feels slightly better today   Still no hearing           Intake/Output Summary (Last 24 hours) at 6/22/2024 0962  Last data filed at 6/22/2024 0811  Gross per 24 hour   Intake 3012.95 ml   Output 1525 ml   Net 1487.95 ml           PHYSICAL EXAM:  Blood pressure 120/82, pulse 65, temperature 97.7 °F (36.5 °C), temperature source Oral, resp. rate 17, height 1.524 m (5'), weight 68.2 kg (150 lb 5.7 oz), SpO2 98 %, not currently breastfeeding.'  Gen: No distress.   Eyes: PERRL. No sclera icterus. No conjunctival injection.   ENT: hearing slightly better, ear contraption on  Neck: Trachea midline. No obvious mass.    Resp: No crackles. No wheezes. No rhonchi. No dullness on percussion.  CV: Regular rate. Regular rhythm. No murmur or rub.    GI: Non-tender. Non-distended. No hernia.   Skin: Warm, dry, normal texture and turgor. No nodule on exposed extremities.   Lymph: No cervical LAD. No supraclavicular LAD.   M/S: No cyanosis. No clubbing. No joint deformity.    Neuro: Moves all four extremities. CN 2-12 tested, no defect noted.  Ext:   no edema    Medications:    Scheduled Meds:   phosphorus  250 mg Oral BID    amiodarone  200 mg Oral Daily    [Held by provider] apixaban  5 mg Oral BID    [Held by provider] metoprolol succinate  12.5 mg Oral Daily    [Held by provider] sacubitril-valsartan  0.5 tablet Oral BID    sodium chloride flush  5-40 mL IntraVENous 2 times per day    vancomycin  1,250 mg IntraVENous Q24H    vancomycin (VANCOCIN) intermittent dosing (placeholder)   Other RX Placeholder    cefTRIAXone (ROCEPHIN) IV  2,000 mg IntraVENous Q12H    lactobacillus  2 capsule Oral BID WC    lidocaine  1 patch TransDERmal Daily    ciprofloxacin  4 drop Both Ears BID    And    dexAMETHasone  4 drop Both Ears BID       Continuous Infusions:   sodium chloride      norepinephrine Stopped (06/21/24 1000)       PRN Meds:  naloxone 
Received call from patient's daughter Olimpia Avilez, updated on patient's status, answered all questions.  Olimpia was informed of new room number and will be coming to visit in the afternoon.    Marva Singer RN    
[facility]  HEAD AND NECK - ENT PROGRESS NOTE        ASSESSMENT: Anika Rudolph is a 83 y.o. female  with acute otitis media and mastoiditis    PLAN/RECOMMENDATIONS:   Status post right cortical mastoidectomy, right tympanostomy tube placement and cerumen removal from the left ear.  Penrose to be left in place for the next several days to facilitate drainage from the mastoid.  Expect there to be some bloody drainage as the patient was on Eliquis.  Okay to resume from an ENT perspective.  Continue Ciprodex drops to the right ear twice daily  Follow-up OR cultures  Antibiotic choice per ID  Patient has baseline poor hearing.  She would likely do better if her daughter brought in her left ear hearing aid.  Continue [place] dressing until tomorrow.    SUBJECTIVE:   White blood cell count down from 36-27 today.  Patient reports improvement of her hearing.  Tolerating diet    OBJECTIVE:  Physical Exam:   Temp (24hrs), Av.5 °F (36.4 °C), Min:97.4 °F (36.3 °C), Max:97.7 °F (36.5 °C)    Vitals:    24 0700 24 0800 24 0900 24 0906   BP: (!) 113/53 (!) 126/56 120/82    Pulse: 57 58 73 65   Resp: 13 23 21 17   Temp:  97.7 °F (36.5 °C)     TempSrc:  Oral     SpO2: 97% 97% 99% 98%   Weight:       Height:          Date 24 0000 - 24 2359   Shift 3135-3591 1853-1013 4319-4559 24 Hour Total   INTAKE   I.V.(mL/kg) 391.4(5.7)   391.4(5.7)   IV Piggyback(mL/kg) 19.4(0.3)   19.4(0.3)   Shift Total(mL/kg) 410.7(6)   410.7(6)   OUTPUT   Urine(mL/kg/hr) 445(0.8) 95  540   Shift Total(mL/kg) 445(6.5) 95(1.4)  540(7.9)   Weight (kg) 68.2 68.2 68.2 68.2       General:   Facial nerve intact on the right side.  New dressing was removed.  Penrose was in expected position with scant bloody drainage.  No evidence of hematoma.  Improvement of gross hearing.  
[facility]  DIVISION OF OTOLARYNGOLOGY- HEAD & NECK SURGERY  Follow up      Patient Name: Anika Rudolph  Medical Record Number:  [id number]  Primary Care Physician:  Kieran Espinoza MD  Date of Consultation: 6/24/2024        Interval History  Patient is feeling better.  She has regained a little bit of hearing on the left side.  Less drainage on the right side.            REVIEW OF SYSTEMS  As above    PHYSICAL EXAM  GENERAL: No Acute Distress, Alert and Oriented, no Hoarseness, strong voice  EYES: EOMI, Anti-icteric  HENT:   Head: Normocephalic and atraumatic.   Face:  Symmetric, facial nerve intact, no sinus tenderness  Right Ear: Postauricular incision is intact.  Penrose drain removed.  serosanguineous drainage  Left Ear: Normal external ear  Mouth/Oral Cavity:  normal lips, Uvula is midline, no mucosal lesions  Oropharynx/Larynx:  normal oropharynx,  Nose:Normal external nasal appearance.   NECK: Normal range of motion, no thyromegaly, trachea is midline, no lymphadenopathy, no neck masses, no crepitus          ASSESSMENT/PLAN  Right mastoiditis-clinically patient appears a lot better today.  I removed the Penrose drain.  She probably needs 10 full days of antibiotics, defer to infectious disease on which 1.  Would also do 10 days of Ciprodex drops on the right side.  4 drops twice a day.    Patient should follow-up with ENT in 1 to 2 weeks with an audiogram if she is discharged today.           I have performed a head and neck physical exam personally or was physically present during the key or critical portions of the service.    This note was generated completely or in part utilizing Dragon dictation speech recognition software.  Occasionally, words are mistranscribed and despite editing, the text may contain inaccuracies due to incorrect word recognition.  If further clarification is needed please contact the office at [phone].    
[facility]  HEAD AND NECK - ENT PROGRESS NOTE        ASSESSMENT: Anika Rudolph is a 83 y.o. female  with acute otitis media and mastoiditis    PLAN/RECOMMENDATIONS:   Status post right cortical mastoidectomy, right tympanostomy tube placement and cerumen removal from the left ear.  Plan to remove Penrose tomorrow  Continue Ciprodex drops to the right ear twice daily  Follow-up OR cultures.  Still pending.  Blood cultures positive for strep pneumoniae.   Antibiotic choice per ID  Patient has baseline poor hearing.  She would likely do better if her daughter brought in her left ear hearing aid.  Okay for discharge from an ENT perspective likely tomorrow pending antibiotic selection and duration per ID.      SUBJECTIVE:   Doing well today.  No significant pain.  Still having baseline hearing loss.  Overall hearing is better prior to surgery.    OBJECTIVE:  Physical Exam:   Temp (24hrs), Av.2 °F (36.8 °C), Min:97.7 °F (36.5 °C), Max:98.6 °F (37 °C)    Vitals:    24 0800 24 0808 24 0826 24 1035   BP: (!) 150/78   (!) 144/64   Pulse: 91   89   Resp: 20  18 17   Temp: 97.7 °F (36.5 °C)      TempSrc: Oral      SpO2: 96%   (!) 88%   Weight:  59.6 kg (131 lb 6.3 oz)     Height:          Date 24 0000 - 24 2359   Shift 6692-0368 9169-6804 6946-9196 24 Hour Total   INTAKE   I.V.(mL/kg) 8.3(0.1) 19.2(0.3)  27.5(0.5)   IV Piggyback(mL/kg) 300(4.4) 58.5(1)  358.5(6)   Shift Total(mL/kg) 308.3(4.5) 77.7(1.3)  385.9(6.5)   OUTPUT   Urine(mL/kg/hr)  100  100   Shift Total(mL/kg)  100(1.7)  100(1.7)   Weight (kg) 68.2 59.6 59.6 59.6       General:   Facial nerve intact on the right side.  [place] dressing was removed.  Penrose with scant serous drainage.  
chloride, acetaminophen **OR** acetaminophen, albuterol, ondansetron    Labs:  CBC:   Recent Labs     06/19/24 1835 06/20/24 0450 06/21/24  0520   WBC 35.8* 33.5* 36.3*   HGB 14.0 11.3* 12.9   HCT 42.9 33.3* 38.5   MCV 96.2 95.2 95.6    146 163     BMP:   Recent Labs     06/19/24 1835 06/20/24 0450 06/21/24  0520    137 142   K 4.0 3.5 3.8    103 108   CO2 21 22 22   PHOS  --   --  1.8*   BUN 16 19 16   CREATININE 0.8 1.0 0.7     LIVER PROFILE:   Recent Labs     06/19/24 1835 06/20/24 0450 06/21/24  0520   AST 43* 30 39*   ALT 40 26 24   BILITOT 2.3* 1.9* 0.6   ALKPHOS 99 66 67     PT/INR: No results for input(s): \"PROTIME\", \"INR\" in the last 72 hours.  APTT: No results for input(s): \"APTT\" in the last 72 hours.  UA:  Recent Labs     06/19/24 2005   COLORU DARK YELLOW*   PHUR 5.5   WBCUA 12*   RBCUA 6*   BACTERIA None Seen   CLARITYU Clear   LEUKOCYTESUR SMALL*   UROBILINOGEN 1.0   BILIRUBINUR Negative   BLOODU SMALL*   GLUCOSEU Negative     No results for input(s): \"PH\", \"PCO2\", \"PO2\" in the last 72 hours.        Films:  Chest imaging reports were reviewed and imaging was reviewed by me and showed no new films     ABG:  None    Cultures:  Blood:  pneumococcal     I reviewed the labs and images listed above    Mastoiditis   Continue with Rocephin and Vanco per ID  Pneumococcal bacteremia   Per ID  Septic Shock  Levophed for MAP of 65.  Just about off   Emphysema   PRN albuterol nebs   CLL with asplenism   Replace electrolytes       DVT prophylaxis  Jeremias Barcenas, DO [facility]   
drainage from right ear.  Still having difficulty hearing speech.    The above note has been discussed with the attending on record who agrees with the assessment and plan.    
  MG  --   --   --  1.90 2.10 1.70*       Lab Results   Component Value Date    CALCIUM 8.0 (L) 06/25/2024    PHOS 2.2 (L) 06/22/2024       LDH:No results for input(s): \"LDH\" in the last 720 hours.    Radiology Review:  CT IAC POSTERIOR FOSSA W WO CONTRAST  Narrative: EXAMINATION:  CT OF THE TEMPORAL INNER AUDIO CANAL WITH AND WITHOUT CONTRAST 6/21/2024  12:59 pm:    TECHNIQUE:  CT of the temporal internal auditory canal was performed with and without the  administration of intravenous contrast. Multiplanar reformatted images are  provided for review. Automated exposure control, iterative reconstruction,  and/or weight based adjustment of the mA/kV was utilized to reduce the  radiation dose to as low as reasonably achievable.    COMPARISON:  None.    HISTORY:  ORDERING SYSTEM PROVIDED HISTORY: right mastoiditis  TECHNOLOGIST PROVIDED HISTORY:  Reason for exam:->right mastoiditis  Additional Contrast?->1  Reason for Exam: right mastoiditis  Relevant Medical/Surgical History: none    FINDINGS:  RIGHT TEMPORAL BONE:  A severe right mastoid effusion is present with  opacification of the middle ear.  The posterior chain is grossly intact.  The  external auditory canal is partially opacified.    The otic capsule is intact.  No bony erosions are visualized at this time.    No abnormal enhancement is visualized postcontrast administration.    There is a mass within the superficial lobe of the right parotid gland  measuring at least 1.8 cm in diameter.    LEFT TEMPORAL BONE: The external auditory canal is clear without evidence of  bony erosion.  The scutum is intact.  The middle ear cavity is clear.  The  ossicular chain is intact.  The mastoid air cells are clear.  The inner ear  structures appear unremarkable.  Normal mineralization of the otic capsule.  The internal auditory canal and vestibular aqueduct appear unremarkable.  The  carotid canal is normal in appearance.  The jugular bulb is unremarkable.    BRAIN: The 
assistance  Functional Mobility Skilled Clinical Factors: Pt took a few steps bed > chair without AD and CGA/min  and 15 ft x 2 with RW and CGA/SBA. Fatigues easily  Additional Comments: Pt declined ADLs. Anticipate pt will reuqire CGA/min A for LB bathing/dressing, SBA for UB bathing/dressing and grooming when seated based on balance and endurance observed     Activity Tolerance  Activity Tolerance: Patient tolerated evaluation without incident    Bed mobility  Supine to Sit: Moderate assistance (HOB elevated, pulled up on therapist hands to achieve EOB.)  Sit to Supine:  (pt in chair at end of session)    Transfers  Sit to stand: Contact guard assistance  Stand to sit: Contact guard assistance  Transfer Comments: to/from RW- cuing for hand placement    Vision  Vision: Within Functional Limits  Hearing  Hearing: Exceptions to WFL  Hearing Exceptions: Hard of hearing/hearing concerns;Bilateral hearing aid (Pt reports she can't hear the past few days PTA and requiring written communication)    Cognition  Cognition Comment: appears functional- difficult to fully assess as pt very [place] and written communication used to communicate.  Orientation  Overall Orientation Status: Within Functional Limits  Orientation Level: Oriented to person;Oriented to place;Oriented to situation       LUE AROM (degrees)  LUE AROM : WFL  Left Hand AROM (degrees)  Left Hand AROM: WFL  RUE AROM (degrees)  RUE AROM : WFL  Right Hand AROM (degrees)  Right Hand AROM: WFL  Right Hand General AROM: broke wrist a few months ago- reports intermittent soreness but functional      AM-PAC - ADL  AM-PAC Daily Activity - Inpatient   How much help is needed for putting on and taking off regular lower body clothing?: A Little  How much help is needed for bathing (which includes washing, rinsing, drying)?: A Little  How much help is needed for toileting (which includes using toilet, bedpan, or urinal)?: A Little  How much help is needed for putting on and 
canal is partially opacified. The otic capsule is intact.  No bony erosions are visualized at this time. No abnormal enhancement is visualized postcontrast administration. There is a mass within the superficial lobe of the right parotid gland measuring at least 1.8 cm in diameter. LEFT TEMPORAL BONE: The external auditory canal is clear without evidence of bony erosion.  The scutum is intact.  The middle ear cavity is clear.  The ossicular chain is intact.  The mastoid air cells are clear.  The inner ear structures appear unremarkable.  Normal mineralization of the otic capsule. The internal auditory canal and vestibular aqueduct appear unremarkable.  The carotid canal is normal in appearance.  The jugular bulb is unremarkable. BRAIN: The visualized portion of the intracranial contents appear unremarkable. ORBITS: The visualized portion of the orbits demonstrate no acute abnormality. SINUSES: The visualized paranasal sinuses are clear.     CT findings worrisome for acute otitis externa and otomastoiditis, on the right-hand side. Incidentally noted mass within the right parotid gland, which could be further characterized with MRI.     CT HEAD WO CONTRAST    Result Date: 6/19/2024  EXAMINATION: CT OF THE HEAD WITHOUT CONTRAST  6/19/2024 8:13 pm TECHNIQUE: CT of the head was performed without the administration of intravenous contrast. Automated exposure control, iterative reconstruction, and/or weight based adjustment of the mA/kV was utilized to reduce the radiation dose to as low as reasonably achievable. COMPARISON: 11/05/2022. HISTORY: ORDERING SYSTEM PROVIDED HISTORY: purulent drainage from right ear TECHNOLOGIST PROVIDED HISTORY: Reason for exam:->purulent drainage from right ear Has a \"code stroke\" or \"stroke alert\" been called?->No Decision Support Exception - unselect if not a suspected or confirmed emergency medical condition->Emergency Medical Condition (MA) Reason for Exam: purulent drainage from right ear 
maintained without evidence of an acute infarct.  There is no evidence of hydrocephalus. Moderate areas of white matter hypoattenuation appear unchanged. ORBITS: The visualized portion of the orbits demonstrate no acute abnormality. SINUSES: The paranasal sinuses are normally aerated.  There is a right mastoid effusion and there is fluid attenuation in the middle and external right ear.  No erosions are identified. SOFT TISSUES/SKULL:  No acute abnormality of the visualized skull or soft tissues.     1. Right mastoid effusion and fluid attenuation in the right middle and external ears which could represent otomastoiditis given the history, but no definitive erosions are identified. 2. No acute intracranial findings. 3. Moderate areas of white matter hypoattenuation appear unchanged likely reflect chronic microvascular ischemic changes.     XR CHEST PORTABLE    Result Date: 6/19/2024  EXAMINATION: ONE XRAY VIEW OF THE CHEST 6/19/2024 6:26 pm COMPARISON: CT, 03/08/2023 HISTORY: ORDERING SYSTEM PROVIDED HISTORY: Altered Mental Status TECHNOLOGIST PROVIDED HISTORY: Reason for exam:->Altered Mental Status Reason for Exam: ams FINDINGS: There is a calcified granuloma in the the right lung base.  The lungs otherwise are clear.  There is no evidence for any pulmonary infiltration, CHF or pneumothorax.  There is vascular prominence of the right hilum.  There is a port in place the position with distal tip overlying the superior vena cava.  Bony structures unremarkable.     1. No acute cardiopulmonary process. 2. Right hilar vascular prominence. 3. Calcified granuloma in the right lung base.       CBC:   Recent Labs     06/20/24  0450 06/21/24  0520 06/22/24  0335   WBC 33.5* 36.3* 27.3*   HGB 11.3* 12.9 11.0*    163 142     BMP:    Recent Labs     06/20/24  0450 06/21/24  0520 06/22/24  0335    142 138   K 3.5 3.8 3.9    108 108   CO2 22 22 24   BUN 19 16 11   CREATININE 1.0 0.7 0.6   GLUCOSE 115* 115* 88 
acute abnormality of the visualized skull or soft  tissues.  Impression: 1. Right mastoid effusion and fluid attenuation in the right middle and  external ears which could represent otomastoiditis given the history, but no  definitive erosions are identified.  2. No acute intracranial findings.  3. Moderate areas of white matter hypoattenuation appear unchanged likely  reflect chronic microvascular ischemic changes.  XR CHEST PORTABLE  Narrative: EXAMINATION:  ONE XRAY VIEW OF THE CHEST    6/19/2024 6:26 pm    COMPARISON:  CT, 03/08/2023    HISTORY:  ORDERING SYSTEM PROVIDED HISTORY: Altered Mental Status  TECHNOLOGIST PROVIDED HISTORY:  Reason for exam:->Altered Mental Status  Reason for Exam: ams    FINDINGS:  There is a calcified granuloma in the the right lung base.  The lungs  otherwise are clear.  There is no evidence for any pulmonary infiltration,  CHF or pneumothorax.  There is vascular prominence of the right hilum.  There  is a port in place the position with distal tip overlying the superior vena  cava.  Bony structures unremarkable.  Impression: 1. No acute cardiopulmonary process.  2. Right hilar vascular prominence.  3. Calcified granuloma in the right lung base.      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 procedure Z90.81    Calculus of gallbladder without cholecystitis without obstruction K80.20    Bandemia D72.825    Head contusion S00.93XA    Diarrhea R19.7    Sprain of left ankle S93.402A    Leukemoid reaction D72.823    Secondary hypercoagulable state ([facility]) D68.69    PAF (paroxysmal atrial fibrillation) ([facility]) I48.0    Anxiety F41.9    Septic shock ([facility]) A41.9, R65.21    Mastoiditis, acute, bilateral H70.003    CLL (chronic lymphocytic leukemia) ([facility]) C91.10    Mastoiditis of right side H70.91    Pneumococcal bacteremia R78.81, B95.3    Disorder of middle ear and mastoid H74.90    Fever and chills R50.9    Neutrophilia D72.9    Lactic acid acidosis E87.20    Troponin level elevated R79.89    Hx of splenectomy Z90.81    Nausea and vomiting R11.2    Meningitis G03.9    Mastoiditis H70.90    Impacted cerumen of left ear H61.22    Non-recurrent acute suppurative otitis media of right ear with spontaneous rupture of tympanic membrane H66.011    History of atrial fibrillation Z86.79        ICD-10-CM    1. Nausea and vomiting, unspecified vomiting type  R11.2       2. Elevated troponin  R79.89       3. Acute UTI  N39.0       4. Disorder of right middle ear or mastoid  H74.91       5. Goals of care, counseling/discussion  Z71.89       6. Meningitis  G03.9 Culture, Surgical     Culture, Surgical     Culture, Surgical     Culture, Surgical      7. 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  
process  12.  Hematology oncology notes reviewed  Discussed with patient/Family and Nursing discussed with daughter at bedside    Medical Decision Making:  The following items were considered in medical decision making:  Discussion of patient care with other providers  Reviewed clinical lab tests  Reviewed radiology tests  Reviewed other diagnostic tests/interventions  Independent review of radiologic images  Independent review of  Microbiology cultures and other micro tests reviewed     Risk of Complications/Morbidity: High      Patient is critically ill and has a potentially life threatening infection that poses threat to life/bodily function.   There is potential for worsening infection/ sudden clinically significant or life-threatening deterioration in the patient's condition without appropriate antimicrobial therapy.  Complex medical decision making process was involved to select appropriate antimicrobial agents to reverse the cause of patient's severe infection/ illness.  Antimicrobial therapy requires intensive monitoring for toxicity and frequent dose adjustments to prevent toxicity and permanent end-organ dysfunction          Thanks for allowing me to participate in your patient's care please call me with any questions or concerns.    Dr. Gurpreet Stewart MD  Infectious Disease  [facility] Physician  Phone: [phone]   Fax : [phone]

## 2024-06-25 NOTE — CARE COORDINATION
CASE MANAGEMENT DISCHARGE SUMMARY:    DISCHARGE DATE: 6/25/24    DISCHARGED TO HOME     TRANSPORTATION: family       Discharging to Facility/ Agency   Name:  [facility]    Address: [address] [place] [address]  Phone: [phone]  Fax: [phone]      [facility]  [address] [place] [address]  Phone: [phone]  Fax: [phone]       Electronically signed by ALEC Puckett on 6/25/2024 at 12:52 PM    
Chart review done, rounds completed. Pt needs IVABs post dc, referral placed to [facility] who confirmed benefits: $47/week for meds, and $26/day for the supplies. Referral to [facility] for HC.   Pt will dc today.     Maverick Oshea LMSW, [facility] Social Work Case Management   Phone: [phone]  Fax: [phone]   
Per chart review, remains on oxygen at 2 lpm, non-dependent, ambulating, on IV antibiotics. Is still experiencing drainage from the right ear.     Discharge plan is to return to home alone. List of home healthcare providers offers on Friday 6/21/24. Stated her daughters provide her with as much help as she needs. Family will provide transportation.     Chad MOSES RN  Case Management  [phone]    Electronically signed by Chad Ledbetter RN on 6/24/2024 at 10:36 AM    
shares the quality data associated with the providers was provided to: Patient   Patient Representative Name:       The Patient and/or Patient Representative Agree with the Discharge Plan? Yes    Chad Ledbetter RN  Case Management Department  Ph: [phone] Fax: [phone]

## 2024-06-25 NOTE — PLAN OF CARE
Problem: Discharge Planning  Goal: Discharge to home or other facility with appropriate resources  6/22/2024 0039 by Darshana Bean, RN  Outcome: Progressing  Flowsheets  Taken 6/22/2024 0000 by Darshana Bean RN  Discharge to home or other facility with appropriate resources:   Identify barriers to discharge with patient and caregiver   Arrange for needed discharge resources and transportation as appropriate   Identify discharge learning needs (meds, wound care, etc)   Arrange for interpreters to assist at discharge as needed   Refer to discharge planning if patient needs post-hospital services based on physician order or complex needs related to functional status, cognitive ability or social support system  Taken 6/21/2024 2000 by Dulce Abrams RN  Discharge to home or other facility with appropriate resources:   Identify barriers to discharge with patient and caregiver   Arrange for needed discharge resources and transportation as appropriate   Identify discharge learning needs (meds, wound care, etc)   Arrange for interpreters to assist at discharge as needed   Refer to discharge planning if patient needs post-hospital services based on physician order or complex needs related to functional status, cognitive ability or social support system  6/21/2024 1221 by Becki Baird, RN  Outcome: Progressing  Flowsheets (Taken 6/21/2024 0800)  Discharge to home or other facility with appropriate resources:   Identify barriers to discharge with patient and caregiver   Arrange for needed discharge resources and transportation as appropriate   Identify discharge learning needs (meds, wound care, etc)   Refer to discharge planning if patient needs post-hospital services based on physician order or complex needs related to functional status, cognitive ability or social support system     Problem: Pain  Goal: Verbalizes/displays adequate comfort level or baseline comfort level  6/22/2024 0039 by Darshana Bean, 
  Problem: Discharge Planning  Goal: Discharge to home or other facility with appropriate resources  6/25/2024 1123 by Lorie Wagner RN  Outcome: Progressing  6/25/2024 0336 by Anentte Baldwin RN  Outcome: Progressing     Problem: Pain  Goal: Verbalizes/displays adequate comfort level or baseline comfort level  6/25/2024 1123 by Lorie Wagner RN  Outcome: Progressing  6/25/2024 0336 by Annette Baldwin RN  Outcome: Progressing     Problem: Safety - Adult  Goal: Free from fall injury  6/25/2024 1123 by Lorie Wagner RN  Outcome: Progressing  6/25/2024 0336 by Annette Baldwin RN  Outcome: Progressing     Problem: Skin/Tissue Integrity  Goal: Absence of new skin breakdown  Description: 1.  Monitor for areas of redness and/or skin breakdown  2.  Assess vascular access sites hourly  3.  Every 4-6 hours minimum:  Change oxygen saturation probe site  4.  Every 4-6 hours:  If on nasal continuous positive airway pressure, respiratory therapy assess nares and determine need for appliance change or resting period.  6/25/2024 1123 by Lorie Wagner RN  Outcome: Progressing  6/25/2024 0336 by Annette Baldwin RN  Outcome: Progressing     Problem: ABCDS Injury Assessment  Goal: Absence of physical injury  6/25/2024 1123 by Lorie Wagner RN  Outcome: Progressing  6/25/2024 0336 by Annette Baldwin RN  Outcome: Progressing     Problem: Chronic Conditions and Co-morbidities  Goal: Patient's chronic conditions and co-morbidity symptoms are monitored and maintained or improved  6/25/2024 1123 by Lorie Wagner RN  Outcome: Progressing  6/25/2024 0336 by Annette Baldwin RN  Outcome: Progressing     Problem: Neurosensory - Adult  Goal: Achieves stable or improved neurological status  6/25/2024 1123 by Lorie Wagner RN  Outcome: Progressing  6/25/2024 0336 by Annette Baldwin RN  Outcome: Progressing  Goal: Achieves maximal functionality and self care  6/25/2024 1123 by Lorie Wagner RN  Outcome: Progressing  6/25/2024 0336 by Annette Baldwin RN  Outcome: 
  Problem: Discharge Planning  Goal: Discharge to home or other facility with appropriate resources  Outcome: Progressing     Problem: Pain  Goal: Verbalizes/displays adequate comfort level or baseline comfort level  Outcome: Progressing     Problem: Safety - Adult  Goal: Free from fall injury  Outcome: Progressing     Problem: Skin/Tissue Integrity  Goal: Absence of new skin breakdown  Description: 1.  Monitor for areas of redness and/or skin breakdown  2.  Assess vascular access sites hourly  3.  Every 4-6 hours minimum:  Change oxygen saturation probe site  4.  Every 4-6 hours:  If on nasal continuous positive airway pressure, respiratory therapy assess nares and determine need for appliance change or resting period.  Outcome: Progressing     Problem: ABCDS Injury Assessment  Goal: Absence of physical injury  Outcome: Progressing     Problem: Chronic Conditions and Co-morbidities  Goal: Patient's chronic conditions and co-morbidity symptoms are monitored and maintained or improved  Outcome: Progressing     Problem: Neurosensory - Adult  Goal: Achieves stable or improved neurological status  Outcome: Progressing  Goal: Achieves maximal functionality and self care  Outcome: Progressing     Problem: Respiratory - Adult  Goal: Achieves optimal ventilation and oxygenation  Outcome: Progressing     Problem: Cardiovascular - Adult  Goal: Maintains optimal cardiac output and hemodynamic stability  Outcome: Progressing  Goal: Absence of cardiac dysrhythmias or at baseline  Outcome: Progressing     Problem: Skin/Tissue Integrity - Adult  Goal: Skin integrity remains intact  Outcome: Progressing  Goal: Oral mucous membranes remain intact  Outcome: Progressing     Problem: Musculoskeletal - Adult  Goal: Return mobility to safest level of function  Outcome: Progressing  Goal: Return ADL status to a safe level of function  Outcome: Progressing     Problem: Gastrointestinal - Adult  Goal: Minimal or absence of nausea and 
  Problem: Discharge Planning  Goal: Discharge to home or other facility with appropriate resources  Outcome: Progressing  Flowsheets  Taken 6/23/2024 0745 by Paulina Vera, RN  Discharge to home or other facility with appropriate resources:   Identify barriers to discharge with patient and caregiver   Arrange for needed discharge resources and transportation as appropriate        Problem: Safety - Adult  Goal: Free from fall injury  Outcome: Progressing     Problem: Skin/Tissue Integrity  Goal: Absence of new skin breakdown  Description: 1.  Monitor for areas of redness and/or skin breakdown  2.  Assess vascular access sites hourly  3.  Every 4-6 hours minimum:  Change oxygen saturation probe site  4.  Every 4-6 hours:  If on nasal continuous positive airway pressure, respiratory therapy assess nares and determine need for appliance change or resting period.  Outcome: Progressing     Problem: ABCDS Injury Assessment  Goal: Absence of physical injury  Outcome: Progressing  Flowsheets (Taken 6/23/2024 0917)  Absence of Physical Injury: Implement safety measures based on patient assessment     Problem: Chronic Conditions and Co-morbidities  Goal: Patient's chronic conditions and co-morbidity symptoms are monitored and maintained or improved  Outcome: Progressing  Flowsheets  Taken 6/23/2024 0745 by Paulina Vera, RN  Care Plan - Patient's Chronic Conditions and Co-Morbidity Symptoms are Monitored and Maintained or Improved:   Monitor and assess patient's chronic conditions and comorbid symptoms for stability, deterioration, or improvement   Collaborate with multidisciplinary team to address chronic and comorbid conditions and prevent exacerbation or deterioration   Update acute care plan with appropriate goals if chronic or comorbid symptoms are exacerbated and prevent overall improvement and discharge     Problem: Neurosensory - Adult  Goal: Achieves stable or improved neurological status  Outcome: 
  Problem: Discharge Planning  Goal: Discharge to home or other facility with appropriate resources  Outcome: Progressing  Flowsheets (Taken 6/21/2024 0800)  Discharge to home or other facility with appropriate resources:   Identify barriers to discharge with patient and caregiver   Arrange for needed discharge resources and transportation as appropriate   Identify discharge learning needs (meds, wound care, etc)   Refer to discharge planning if patient needs post-hospital services based on physician order or complex needs related to functional status, cognitive ability or social support system     Problem: Pain  Goal: Verbalizes/displays adequate comfort level or baseline comfort level  Outcome: Progressing  Flowsheets  Taken 6/21/2024 1103  Verbalizes/displays adequate comfort level or baseline comfort level:   Encourage patient to monitor pain and request assistance   Assess pain using appropriate pain scale   Administer analgesics based on type and severity of pain and evaluate response   Implement non-pharmacological measures as appropriate and evaluate response  Taken 6/21/2024 0800  Verbalizes/displays adequate comfort level or baseline comfort level:   Encourage patient to monitor pain and request assistance   Assess pain using appropriate pain scale   Administer analgesics based on type and severity of pain and evaluate response   Implement non-pharmacological measures as appropriate and evaluate response     Problem: Safety - Adult  Goal: Free from fall injury  Outcome: Progressing   Falling star program remains in place. Call light and personal belongings within reach. Frequent visual monitoring continues. Toileting program in place, stevenson patent to gravity bedside drainage. Patient assisted in turning/repositioning at least once every 2 hours, and on a prn basis.     Problem: Skin/Tissue Integrity  Goal: Absence of new skin breakdown  Description: 1.  Monitor for areas of redness and/or skin 
  Problem: Pain  Goal: Verbalizes/displays adequate comfort level or baseline comfort level  Outcome: Progressing    Problem: Safety - Adult  Goal: Free from fall injury  Outcome: Progressing     
oxygenation  Outcome: Progressing  Flowsheets (Taken 6/23/2024 2126)  Achieves optimal ventilation and oxygenation: Assess for changes in respiratory status     Problem: Cardiovascular - Adult  Goal: Maintains optimal cardiac output and hemodynamic stability  Outcome: Progressing  Flowsheets (Taken 6/23/2024 2126)  Maintains optimal cardiac output and hemodynamic stability: Monitor blood pressure and heart rate     Problem: Skin/Tissue Integrity - Adult  Goal: Skin integrity remains intact  Outcome: Progressing  Flowsheets (Taken 6/23/2024 2126)  Skin Integrity Remains Intact: Monitor for areas of redness and/or skin breakdown     Problem: Musculoskeletal - Adult  Goal: Return mobility to safest level of function  Outcome: Progressing  Flowsheets (Taken 6/23/2024 2126)  Return Mobility to Safest Level of Function: Assess patient stability and activity tolerance for standing, transferring and ambulating with or without assistive devices     Problem: Gastrointestinal - Adult  Goal: Minimal or absence of nausea and vomiting  Outcome: Progressing     Problem: Genitourinary - Adult  Goal: Urinary catheter remains patent  Outcome: Progressing     Problem: Infection - Adult  Goal: Absence of infection at discharge  Outcome: Progressing  Flowsheets (Taken 6/23/2024 2126)  Absence of infection at discharge: Assess and monitor for signs and symptoms of infection     Problem: Metabolic/Fluid and Electrolytes - Adult  Goal: Electrolytes maintained within normal limits  Outcome: Progressing  Flowsheets (Taken 6/23/2024 2126)  Electrolytes maintained within normal limits: Monitor labs and assess patient for signs and symptoms of electrolyte imbalances     Problem: Hematologic - Adult  Goal: Maintains hematologic stability  Outcome: Progressing  Flowsheets (Taken 6/23/2024 2126)  Maintains hematologic stability: Assess for signs and symptoms of bleeding or hemorrhage     Problem: Anxiety  Goal: Will report anxiety at manageable 
function  6/22/2024 0838 by Paulina Vera RN  Outcome: Progressing  Flowsheets (Taken 6/22/2024 0800)  Return ADL Status to a Safe Level of Function:   Administer medication as ordered   Assess activities of daily living deficits and provide assistive devices as needed   Obtain physical therapy/occupational therapy consults as needed    Problem: Gastrointestinal - Adult  Goal: Minimal or absence of nausea and vomiting  6/22/2024 0838 by Paulina Vera RN  Outcome: Progressing  Flowsheets (Taken 6/22/2024 0800)  Minimal or absence of nausea and vomiting: Administer IV fluids as ordered to ensure adequate hydration  Goal: Maintains or returns to baseline bowel function  6/22/2024 0838 by Paulina Vera RN  Outcome: Progressing  Flowsheets (Taken 6/22/2024 0800)  Maintains or returns to baseline bowel function:   Assess bowel function   Encourage oral fluids to ensure adequate hydration   Administer IV fluids as ordered to ensure adequate hydration     Problem: Genitourinary - Adult  Goal: Urinary catheter remains patent  6/22/2024 0838 by Paulina Vera RN  Outcome: Progressing  Flowsheets (Taken 6/22/2024 0800)  Urinary catheter remains patent: Assess patency of urinary catheter     Problem: Infection - Adult  Goal: Absence of infection at discharge  6/22/2024 0838 by Paulina Vera RN  Outcome: Progressing  Flowsheets (Taken 6/22/2024 0800)  Absence of infection at discharge:   Assess and monitor for signs and symptoms of infection   Monitor lab/diagnostic results   Monitor all insertion sites i.e., indwelling lines, tubes and drains   Administer medications as ordered     Problem: Metabolic/Fluid and Electrolytes - Adult  Goal: Electrolytes maintained within normal limits  6/22/2024 0838 by Paulina Vera RN  Outcome: Progressing  Flowsheets (Taken 6/22/2024 0800)  Electrolytes maintained within normal limits:   Monitor labs and assess patient for signs and symptoms of electrolyte imbalances   Administer

## 2024-06-26 ENCOUNTER — TELEPHONE (OUTPATIENT)
Dept: INFECTIOUS DISEASES | Age: 84
End: 2024-06-26

## 2024-06-26 DIAGNOSIS — R78.81 PNEUMOCOCCAL BACTEREMIA: Primary | ICD-10-CM

## 2024-06-26 DIAGNOSIS — B95.3 PNEUMOCOCCAL BACTEREMIA: Primary | ICD-10-CM

## 2024-06-26 LAB
BACTERIA BLD CULT: NORMAL
BACTERIA SPEC AEROBE CULT: ABNORMAL
BACTERIA SPEC AEROBE CULT: NORMAL
BACTERIA SPEC ANAEROBE CULT: ABNORMAL
BACTERIA SPEC ANAEROBE CULT: NORMAL
GRAM STN SPEC: ABNORMAL
GRAM STN SPEC: NORMAL
ORGANISM: ABNORMAL

## 2024-06-27 ENCOUNTER — TELEPHONE (OUTPATIENT)
Dept: FAMILY MEDICINE CLINIC | Age: 84
End: 2024-06-27

## 2024-06-27 DIAGNOSIS — H91.90 HEARING LOSS, UNSPECIFIED HEARING LOSS TYPE, UNSPECIFIED LATERALITY: Primary | ICD-10-CM

## 2024-06-27 NOTE — TELEPHONE ENCOUNTER
Care Transitions Initial Follow Up Call    Outreach made within 2 business days of discharge: Yes    Patient: Anika Rudolph Patient : [id number]   MRN: [id number]  Reason for Admission: There are no discharge diagnoses documented for the most recent discharge.  Discharge Date: 24       Spoke with: PT'S ESTER CLEMENTE    Discharge department/facility: [facility]    TCM Interactive Patient Contact:  Was patient able to fill all prescriptions: Yes  Was patient instructed to bring all medications to the follow-up visit: Yes  Is patient taking all medications as directed in the discharge summary? Yes  Does patient understand their discharge instructions: Yes  Does patient have questions or concerns that need addressed prior to 7-14 day follow up office visit: no    Scheduled appointment with PCP within 7-14 days    Follow Up  Future Appointments   Date Time Provider Department Center   2024  2:20 PM Kieran Espinoza MD [place]   7/15/2024  8:00 AM Kandace Nix AuD [place] AUDIO [facility]   7/15/2024  8:30 AM Myron Russell MD [place] [facility]   10/23/2024 11:20 AM Enrique Lenz MD  PULM [facility]   2025 10:10 AM Glischinski, Luke A, APRN - CNP [place] [facility]     SPOKE TO [facility], PT HAS AN APPT SCHEDULED FOR 2024 FOR HOSPITAL FOLLOW UP WITH DR ESPINOZA'. PT IS GOING [facility] AND HER [facility] ARE TAKING CARE OF THE HOME IV TREATMENTS. ADVISED TO CALL IF ANY ISSUES ARISE.  Padmini Amador MA

## 2024-07-01 LAB
ALBUMIN SERPL-MCNC: 3.5 G/DL (ref 3.4–5)
ALBUMIN/GLOB SERPL: 1.7 {RATIO} (ref 1.1–2.2)
ALP SERPL-CCNC: 65 U/L (ref 40–129)
ALT SERPL-CCNC: 12 U/L (ref 10–40)
ANION GAP SERPL CALCULATED.3IONS-SCNC: 11 MMOL/L (ref 3–16)
AST SERPL-CCNC: 21 U/L (ref 15–37)
BASOPHILS # BLD: 0 K/UL (ref 0–0.2)
BASOPHILS NFR BLD: 0 %
BILIRUB SERPL-MCNC: 0.3 MG/DL (ref 0–1)
BUN SERPL-MCNC: 8 MG/DL (ref 7–20)
CALCIUM SERPL-MCNC: 8.3 MG/DL (ref 8.3–10.6)
CHLORIDE SERPL-SCNC: 102 MMOL/L (ref 99–110)
CO2 SERPL-SCNC: 31 MMOL/L (ref 21–32)
CREAT SERPL-MCNC: 0.6 MG/DL (ref 0.6–1.2)
CRP SERPL-MCNC: 10.4 MG/L (ref 0–5.1)
DEPRECATED RDW RBC AUTO: 14.4 % (ref 12.4–15.4)
EOSINOPHIL # BLD: 0.5 K/UL (ref 0–0.6)
EOSINOPHIL NFR BLD: 2 %
ERYTHROCYTE [SEDIMENTATION RATE] IN BLOOD BY WESTERGREN METHOD: 16 MM/HR (ref 0–30)
GFR SERPLBLD CREATININE-BSD FMLA CKD-EPI: 88 ML/MIN/{1.73_M2}
GLUCOSE SERPL-MCNC: 95 MG/DL (ref 70–99)
HCT VFR BLD AUTO: 34.6 % (ref 36–48)
HGB BLD-MCNC: 11.4 G/DL (ref 12–16)
LYMPHOCYTES # BLD: 17.4 K/UL (ref 1–5.1)
LYMPHOCYTES NFR BLD: 72 %
MCH RBC QN AUTO: 31.5 PG (ref 26–34)
MCHC RBC AUTO-ENTMCNC: 33.1 G/DL (ref 31–36)
MCV RBC AUTO: 95.1 FL (ref 80–100)
MONOCYTES # BLD: 0.2 K/UL (ref 0–1.3)
MONOCYTES NFR BLD: 1 %
NEUTROPHILS # BLD: 6 K/UL (ref 1.7–7.7)
NEUTROPHILS NFR BLD: 25 %
PATH INTERP BLD-IMP: NO
PLATELET # BLD AUTO: 291 K/UL (ref 135–450)
PMV BLD AUTO: 9.9 FL (ref 5–10.5)
POTASSIUM SERPL-SCNC: 3.5 MMOL/L (ref 3.5–5.1)
PROT SERPL-MCNC: 5.6 G/DL (ref 6.4–8.2)
RBC # BLD AUTO: 3.63 M/UL (ref 4–5.2)
RBC MORPH BLD: NORMAL
SLIDE REVIEW: ABNORMAL
SMUDGE CELLS BLD QL SMEAR: PRESENT
SODIUM SERPL-SCNC: 144 MMOL/L (ref 136–145)
WBC # BLD AUTO: 24.1 K/UL (ref 4–11)

## 2024-07-03 DIAGNOSIS — F41.9 ANXIETY: ICD-10-CM

## 2024-07-03 RX ORDER — LORAZEPAM 0.5 MG/1
TABLET ORAL
Qty: 30 TABLET | Refills: 0 | Status: SHIPPED | OUTPATIENT
Start: 2024-07-03 | End: 2024-08-02

## 2024-07-05 ENCOUNTER — PROCEDURE VISIT (OUTPATIENT)
Dept: AUDIOLOGY | Age: 84
End: 2024-07-05

## 2024-07-05 ENCOUNTER — OFFICE VISIT (OUTPATIENT)
Dept: ENT CLINIC | Age: 84
End: 2024-07-05
Payer: MEDICARE

## 2024-07-05 VITALS
DIASTOLIC BLOOD PRESSURE: 74 MMHG | SYSTOLIC BLOOD PRESSURE: 161 MMHG | BODY MASS INDEX: 28.27 KG/M2 | WEIGHT: 144 LBS | HEIGHT: 60 IN | OXYGEN SATURATION: 98 % | HEART RATE: 63 BPM

## 2024-07-05 DIAGNOSIS — H70.91 MASTOIDITIS OF RIGHT SIDE: Primary | ICD-10-CM

## 2024-07-05 DIAGNOSIS — H90.6 MIXED CONDUCTIVE AND SENSORINEURAL HEARING LOSS OF BOTH EARS: Primary | ICD-10-CM

## 2024-07-05 DIAGNOSIS — H91.90 HEARING LOSS, UNSPECIFIED HEARING LOSS TYPE, UNSPECIFIED LATERALITY: ICD-10-CM

## 2024-07-05 PROCEDURE — G8417 CALC BMI ABV UP PARAM F/U: HCPCS | Performed by: OTOLARYNGOLOGY

## 2024-07-05 PROCEDURE — 1111F DSCHRG MED/CURRENT MED MERGE: CPT | Performed by: OTOLARYNGOLOGY

## 2024-07-05 PROCEDURE — 1123F ACP DISCUSS/DSCN MKR DOCD: CPT | Performed by: OTOLARYNGOLOGY

## 2024-07-05 PROCEDURE — 3077F SYST BP >= 140 MM HG: CPT | Performed by: OTOLARYNGOLOGY

## 2024-07-05 PROCEDURE — 1090F PRES/ABSN URINE INCON ASSESS: CPT | Performed by: OTOLARYNGOLOGY

## 2024-07-05 PROCEDURE — G8400 PT W/DXA NO RESULTS DOC: HCPCS | Performed by: OTOLARYNGOLOGY

## 2024-07-05 PROCEDURE — 99213 OFFICE O/P EST LOW 20 MIN: CPT | Performed by: OTOLARYNGOLOGY

## 2024-07-05 PROCEDURE — 1036F TOBACCO NON-USER: CPT | Performed by: OTOLARYNGOLOGY

## 2024-07-05 PROCEDURE — G8427 DOCREV CUR MEDS BY ELIG CLIN: HCPCS | Performed by: OTOLARYNGOLOGY

## 2024-07-05 PROCEDURE — 3078F DIAST BP <80 MM HG: CPT | Performed by: OTOLARYNGOLOGY

## 2024-07-05 NOTE — PROGRESS NOTES
[facility]  DIVISION OF OTOLARYNGOLOGY- HEAD & NECK SURGERY  Follow up      Patient Name: Anika Rudolph  Medical Record Number:  [id number]  Primary Care Physician:  Kieran Espinoza MD  Date of Consultation: 7/5/2024    Chief Complaint: Right ear issues        Interval History  Patient following up for her right ear.  I saw her last when she was in the hospital for mastoiditis.  I ended up taking her to the operating room on June 21 for a mastoidectomy and ear tube on the right side.  The patient says that the ear feels better.  It is not draining or hurting.  Her hearing is still not good.            REVIEW OF SYSTEMS  As above    PHYSICAL EXAM  GENERAL: No Acute Distress, Alert and Oriented, no Hoarseness, strong voice  EYES: EOMI, Anti-icteric  HENT:   Head: Normocephalic and atraumatic.   Face:  Symmetric, facial nerve intact, no sinus tenderness  As: See below          PROCEDURE  Bilateral ear exam with debridement  The right postauricular sutures were removed.  There is a healing well.  Some dried blood was removed from the lateral canal.  There was some thicker debris medially that  I partially evacuated the Dey suction, but it was bleeding easily so I left most of it in place.  Difficult to see if the ear tube is in place.  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 Mastoiditis of right side  I believe that the acute infection is better.  She still has a significant conductive component likely secondary to fluid in the ear.  I suspect this will get a little bit better, but is always difficult to tell.  I would like for her to use the eardrops once daily until the bottle is empty.  I do not think that she should use a hearing aid on that side as it would trap moisture.  In addition given the profound hearing loss I do not think it

## 2024-07-05 NOTE — PROGRESS NOTES
dB HL  Word Recognition: Very Poor 0%, based on NU-6 by-difficulty list at 90 dBHL using recorded speech stimuli.  Could not increase volume due to limits of the audiometer.   Tympanometry: Flat, no peak pressure or compliance, Type B tympanogram, with typical ear canal volume, consistent with middle ear fluid.       LEFT EAR:  Hearing Sensitivity: Severe Mixed hearing loss  Speech Recognition Threshold: 75 dB HL  Word Recognition: Poor 56%, based on NU-6 by-difficulty list at 90 dBHL using recorded speech stimuli.    Tympanometry: Negative peak pressure with normal compliance, Type C tympanogram, consistent with ETD/history of otitis media.       Reliability: Good /Fair- Patient had an oxygen machine running throughout testing.   Transducer: HF Headphones    See scanned audiogram dated 7/5/2024 for results.        PATIENT EDUCATION:       The following items were discussed with the patient:   - Good Communication Strategies  - Hearing Loss and Hearing Aids    Educational information was shared in the After Visit Summary.                                                RECOMMENDATIONS:                                                                                                                                                                                                                                                            The following items are recommended based on patient report and results from today's appointment:   - Continue medical follow-up with Myron Russell MD.   - Retest hearing as medically indicated and/or sooner if a change in hearing is noted.  - Continue hearing aid use and follow-up with dispensing audiologist as needed.  - Utilize \"Good Communication Strategies\" as discussed to assist in speech understanding with communication partners.    Elizabet Barone  Audiologist     Chart CC'd to: Myron Russell MD        Degree of   Hearing Sensitivity dB Range   Within Normal Limits (WNL) 0 - 20

## 2024-07-08 ENCOUNTER — OFFICE VISIT (OUTPATIENT)
Dept: PULMONOLOGY | Age: 84
End: 2024-07-08
Payer: MEDICARE

## 2024-07-08 VITALS
OXYGEN SATURATION: 92 % | BODY MASS INDEX: 27.48 KG/M2 | HEIGHT: 60 IN | WEIGHT: 140 LBS | RESPIRATION RATE: 18 BRPM | TEMPERATURE: 97.6 F | HEART RATE: 69 BPM | SYSTOLIC BLOOD PRESSURE: 140 MMHG | DIASTOLIC BLOOD PRESSURE: 62 MMHG

## 2024-07-08 DIAGNOSIS — J43.2 CENTRILOBULAR EMPHYSEMA (HCC): ICD-10-CM

## 2024-07-08 DIAGNOSIS — J96.01 ACUTE RESPIRATORY FAILURE WITH HYPOXIA (HCC): Primary | ICD-10-CM

## 2024-07-08 DIAGNOSIS — R91.8 MULTIPLE LUNG NODULES ON CT: ICD-10-CM

## 2024-07-08 LAB
ALBUMIN SERPL-MCNC: 3.6 G/DL (ref 3.4–5)
ALBUMIN/GLOB SERPL: 1.6 {RATIO} (ref 1.1–2.2)
ALP SERPL-CCNC: 89 U/L (ref 40–129)
ALT SERPL-CCNC: 10 U/L (ref 10–40)
ANION GAP SERPL CALCULATED.3IONS-SCNC: 9 MMOL/L (ref 3–16)
AST SERPL-CCNC: 16 U/L (ref 15–37)
BILIRUB SERPL-MCNC: <0.2 MG/DL (ref 0–1)
BUN SERPL-MCNC: 10 MG/DL (ref 7–20)
CALCIUM SERPL-MCNC: 8.7 MG/DL (ref 8.3–10.6)
CHLORIDE SERPL-SCNC: 101 MMOL/L (ref 99–110)
CO2 SERPL-SCNC: 31 MMOL/L (ref 21–32)
CREAT SERPL-MCNC: 0.7 MG/DL (ref 0.6–1.2)
CRP SERPL-MCNC: 4.3 MG/L (ref 0–5.1)
ERYTHROCYTE [SEDIMENTATION RATE] IN BLOOD BY WESTERGREN METHOD: 18 MM/HR (ref 0–30)
GFR SERPLBLD CREATININE-BSD FMLA CKD-EPI: 84 ML/MIN/{1.73_M2}
GLUCOSE SERPL-MCNC: 96 MG/DL (ref 70–99)
POTASSIUM SERPL-SCNC: 4.6 MMOL/L (ref 3.5–5.1)
PROT SERPL-MCNC: 5.8 G/DL (ref 6.4–8.2)
SODIUM SERPL-SCNC: 141 MMOL/L (ref 136–145)

## 2024-07-08 PROCEDURE — 3077F SYST BP >= 140 MM HG: CPT | Performed by: INTERNAL MEDICINE

## 2024-07-08 PROCEDURE — G8417 CALC BMI ABV UP PARAM F/U: HCPCS | Performed by: INTERNAL MEDICINE

## 2024-07-08 PROCEDURE — G8400 PT W/DXA NO RESULTS DOC: HCPCS | Performed by: INTERNAL MEDICINE

## 2024-07-08 PROCEDURE — 1036F TOBACCO NON-USER: CPT | Performed by: INTERNAL MEDICINE

## 2024-07-08 PROCEDURE — 1090F PRES/ABSN URINE INCON ASSESS: CPT | Performed by: INTERNAL MEDICINE

## 2024-07-08 PROCEDURE — 1123F ACP DISCUSS/DSCN MKR DOCD: CPT | Performed by: INTERNAL MEDICINE

## 2024-07-08 PROCEDURE — G8427 DOCREV CUR MEDS BY ELIG CLIN: HCPCS | Performed by: INTERNAL MEDICINE

## 2024-07-08 PROCEDURE — 3023F SPIROM DOC REV: CPT | Performed by: INTERNAL MEDICINE

## 2024-07-08 PROCEDURE — 99214 OFFICE O/P EST MOD 30 MIN: CPT | Performed by: INTERNAL MEDICINE

## 2024-07-08 PROCEDURE — 1111F DSCHRG MED/CURRENT MED MERGE: CPT | Performed by: INTERNAL MEDICINE

## 2024-07-08 PROCEDURE — 3078F DIAST BP <80 MM HG: CPT | Performed by: INTERNAL MEDICINE

## 2024-07-08 ASSESSMENT — COPD QUESTIONNAIRES
QUESTION2_CHESTPHLEGM: 1
QUESTION7_SLEEPQUALITY: 4
CAT_TOTALSCORE: 22
QUESTION3_CHESTTIGHTNESS: 1
QUESTION1_COUGHFREQUENCY: 1
QUESTION6_LEAVINGHOUSE: 4
QUESTION5_HOMEACTIVITIES: 3
QUESTION8_ENERGYLEVEL: 3
QUESTION4_WALKINCLINE: 5

## 2024-07-08 NOTE — PROGRESS NOTES
REASON FOR CONSULTATION/CC:    Chief Complaint   Patient presents with    COPD           PCP: Kieran Espinoza MD    HISTORY OF PRESENT ILLNESS: Anika Rudolph is a 83 y.o. year old female with a history of chronic hypoxemia  who presents      History  She has a history of CLL and was admitted October 2021 for community-acquired pneumonia.  Was discharged on oxygen.  Extensive history of smoking.        Current history     COPD      Patient was recently admitted for sepsis secondary to otitis and mastoiditis with Streptococcus pneumonia a bacteremia.    Prior to this admission, the patient was doing well with COPD.  She was off oxygen.  After the admission, she remains on 4 L nasal cannula with saturations barely able to maintain 90%.  She has worsening shortness of breath.  Chest x-ray during admission did not show acute pathology.  She was not felt to be in COPD exacerbation without significant wheeze since.        Objective:   PHYSICAL EXAM:  Blood pressure (!) 140/62, pulse 69, temperature 97.6 °F (36.4 °C), resp. rate 18, height 1.524 m (5'), weight 63.5 kg (140 lb), SpO2 92 %, not currently breastfeeding.'  Gen: No distress.    Eyes: PERRL. No sclera icterus. No conjunctival injection.   ENT:   Neck: Trachea midline. No obvious mass.    Resp: No accessory muscle use. No crackles. No wheezes. No rhonchi.    CV: Regular rate. Regular rhythm. No murmur or rub. No edema.   GI:    Skin: Warm, dry, normal texture and turgor. No nodule on exposed extremities.   Lymph: No cervical LAD. No supraclavicular LAD.   M/S: No cyanosis. No clubbing. No joint deformity.    Neuro: Moves all four extremities.    Psych: Oriented x 3. No anxiety.  Awake. Alert. Intact judgement and insight.       Data Reviewed:          Assessment:     Cough   pneumonia 2021  Hx smoking   Chronic hypoxemia  Fatigue  Copd?   History of CLL  Atrial fibrillation      Plan:      Problem List Items Addressed This Visit       Centrilobular emphysema

## 2024-07-09 ENCOUNTER — HOSPITAL ENCOUNTER (OUTPATIENT)
Dept: CT IMAGING | Age: 84
Discharge: HOME | End: 2024-07-09
Attending: INTERNAL MEDICINE
Payer: MEDICARE

## 2024-07-09 DIAGNOSIS — J96.01 ACUTE RESPIRATORY FAILURE WITH HYPOXIA (HCC): ICD-10-CM

## 2024-07-09 PROBLEM — A41.9 SEPTIC SHOCK (HCC): Status: RESOLVED | Noted: 2024-06-19 | Resolved: 2024-07-09

## 2024-07-09 PROBLEM — R65.21 SEPTIC SHOCK (HCC): Status: RESOLVED | Noted: 2024-06-19 | Resolved: 2024-07-09

## 2024-07-09 LAB
BASOPHILS # BLD: 0.2 K/UL (ref 0–0.2)
BASOPHILS NFR BLD: 1 %
DEPRECATED RDW RBC AUTO: 14.7 % (ref 12.4–15.4)
EOSINOPHIL # BLD: 0.6 K/UL (ref 0–0.6)
EOSINOPHIL NFR BLD: 3 %
HCT VFR BLD AUTO: 36.4 % (ref 36–48)
HGB BLD-MCNC: 12 G/DL (ref 12–16)
LYMPHOCYTES # BLD: 14.1 K/UL (ref 1–5.1)
LYMPHOCYTES NFR BLD: 68 %
MCH RBC QN AUTO: 31.8 PG (ref 26–34)
MCHC RBC AUTO-ENTMCNC: 33 G/DL (ref 31–36)
MCV RBC AUTO: 96.3 FL (ref 80–100)
MONOCYTES # BLD: 1 K/UL (ref 0–1.3)
MONOCYTES NFR BLD: 5 %
NEUTROPHILS # BLD: 4.8 K/UL (ref 1.7–7.7)
NEUTROPHILS NFR BLD: 21 %
NEUTS BAND NFR BLD MANUAL: 2 % (ref 0–7)
PATH INTERP BLD-IMP: NO
PLATELET # BLD AUTO: 236 K/UL (ref 135–450)
PLATELET BLD QL SMEAR: ADEQUATE
PMV BLD AUTO: 10.6 FL (ref 5–10.5)
RBC # BLD AUTO: 3.78 M/UL (ref 4–5.2)
SLIDE REVIEW: ABNORMAL
SMUDGE CELLS BLD QL SMEAR: PRESENT
WBC # BLD AUTO: 20.8 K/UL (ref 4–11)

## 2024-07-09 PROCEDURE — 71260 CT THORAX DX C+: CPT

## 2024-07-09 PROCEDURE — 6360000004 HC RX CONTRAST MEDICATION: Performed by: INTERNAL MEDICINE

## 2024-07-09 RX ADMIN — IOPAMIDOL 75 ML: 755 INJECTION, SOLUTION INTRAVENOUS at 14:31

## 2024-07-09 NOTE — ASSESSMENT & PLAN NOTE
No wheezing.  Having significant shortness of breath.  Unclear this is related to COPD.    Prior to admission, the patient was able to wean off oxygen.  Now she has significant shortness of breath with hypoxemia.  Etiology unclear.  CTPA ordered to assess for pulmonary embolism.  This is less likely since patient is on Eliquis.    CT also assess for other etiology

## 2024-07-10 NOTE — PATIENT INSTRUCTIONS
medication you will be referred to a specialist (pain specialist, psychiatry, etc).   Forms: There is a $35 fee to fill out FMLA/Disability paperwork, payable at time of . Instead of the fee, you can choose to have the paperwork filled out during a separate office visit that is for filling out the paperwork only.  Medication Samples:  This office does not carry medication samples.  If you need assistance in getting your medications, then please let the medical assistant know so they can help you sign up for a drug assistance program that can help get medications at a reduced cost or even free (if you qualify).  Workman's Comp Claims: We do not handle workman's comp cases or claims. You will need to go to an urgent care to be seen or to whomever your employer uses.  General - Any abusive/rude behavior toward staff/providers may be cause for dismissal.      WE NOW OFFER [employer] SELF-SCHEDULING   IN 3 EASY STEPS    SCHEDULE AN APPT AT YOUR CONVENIENCE WITH NO HOLD/WAIT TIME  IN THE [employer] EMILY SELECT 'SCHEDULE AN APPOINTMENT' FROM THE MENU  CHOOSE DATE/TIME THAT WORKS FOR YOU    If you don't find an appointment time that works for your schedule, you can also submit an appointment request thru [employer].    CONVENIENT QUALITY CARE AT YOUR FINGERTIPS    NOT ON [employer]?  PLEASE ASK ANY STAFF MEMBER     You may receive a survey regarding the care you received during your visit.  Your input is valuable to us.  We encourage you to complete and return your survey.  We hope you will choose us in the future for your healthcare needs.

## 2024-07-11 ENCOUNTER — TELEPHONE (OUTPATIENT)
Dept: INFECTIOUS DISEASES | Age: 84
End: 2024-07-11

## 2024-07-11 ENCOUNTER — OFFICE VISIT (OUTPATIENT)
Dept: FAMILY MEDICINE CLINIC | Age: 84
End: 2024-07-11
Payer: MEDICARE

## 2024-07-11 VITALS
OXYGEN SATURATION: 94 % | DIASTOLIC BLOOD PRESSURE: 62 MMHG | SYSTOLIC BLOOD PRESSURE: 130 MMHG | HEIGHT: 60 IN | BODY MASS INDEX: 27.29 KG/M2 | HEART RATE: 66 BPM | WEIGHT: 139 LBS

## 2024-07-11 DIAGNOSIS — H70.90 MASTOIDITIS, UNSPECIFIED LATERALITY: ICD-10-CM

## 2024-07-11 DIAGNOSIS — A41.9 SEPSIS, DUE TO UNSPECIFIED ORGANISM, UNSPECIFIED WHETHER ACUTE ORGAN DYSFUNCTION PRESENT (HCC): ICD-10-CM

## 2024-07-11 DIAGNOSIS — Z09 HOSPITAL DISCHARGE FOLLOW-UP: Primary | ICD-10-CM

## 2024-07-11 DIAGNOSIS — M54.2 NECK PAIN: ICD-10-CM

## 2024-07-11 PROCEDURE — 99214 OFFICE O/P EST MOD 30 MIN: CPT

## 2024-07-11 PROCEDURE — G8400 PT W/DXA NO RESULTS DOC: HCPCS

## 2024-07-11 PROCEDURE — 1111F DSCHRG MED/CURRENT MED MERGE: CPT

## 2024-07-11 PROCEDURE — 3075F SYST BP GE 130 - 139MM HG: CPT

## 2024-07-11 PROCEDURE — 1090F PRES/ABSN URINE INCON ASSESS: CPT

## 2024-07-11 PROCEDURE — 3078F DIAST BP <80 MM HG: CPT

## 2024-07-11 PROCEDURE — G8417 CALC BMI ABV UP PARAM F/U: HCPCS

## 2024-07-11 PROCEDURE — 1036F TOBACCO NON-USER: CPT

## 2024-07-11 PROCEDURE — 1123F ACP DISCUSS/DSCN MKR DOCD: CPT

## 2024-07-11 PROCEDURE — G8427 DOCREV CUR MEDS BY ELIG CLIN: HCPCS

## 2024-07-11 RX ORDER — TRAMADOL HYDROCHLORIDE 50 MG/1
25 TABLET ORAL 2 TIMES DAILY PRN
Qty: 10 TABLET | Refills: 0 | Status: SHIPPED | OUTPATIENT
Start: 2024-07-11 | End: 2024-07-21

## 2024-07-11 NOTE — PROGRESS NOTES
Marked as Taking for the 7/11/24 encounter (Office Visit) with Glischinski, Luke A, APRN - CNP   Medication Sig Dispense Refill    traMADol (ULTRAM) 50 MG tablet Take 0.5 tablets by mouth 2 times daily as needed for Pain for up to 10 days. Intended supply: 7 days. Take lowest dose possible to manage pain Max Daily Amount: 50 mg 10 tablet 0    sacubitril-valsartan (ENTRESTO) 24-26 MG per tablet Take 0.5 tablets by mouth 2 times daily 90 tablet 3    apixaban (ELIQUIS) 5 MG TABS tablet TAKE 1 TABLET BY MOUTH TWICE DAILY (Patient taking differently: Take 1 tablet by mouth 2 times daily) 60 tablet 2    metoprolol succinate (TOPROL XL) 25 MG extended release tablet TAKE 1/2 TABLET BY MOUTH DAILY (Patient taking differently: Take 0.5 tablets by mouth daily) 45 tablet 3    amiodarone (PACERONE) 100 MG tablet TAKE 2 TABLETS BY MOUTH DAILY 180 tablet 3    diphenhydrAMINE-APAP, sleep, (TYLENOL PM EXTRA STRENGTH)  MG tablet Take 1 tablet by mouth nightly as needed for Sleep      Cyanocobalamin (VITAMIN B 12) 500 MCG TABS Take 500 mcg by mouth daily           Medications patient taking as of now reconciled against medications ordered at time of hospital discharge: Yes    A comprehensive review of systems was negative except for: Constitutional: positive for fatigue  Respiratory: positive for cough, dyspnea on exertion, shortness of breath, and 4 L of oxygen at all times  Musculoskeletal: positive for arthralgias, back pain, and walker dependent    Objective:    /62 (Site: Left Upper Arm, Position: Sitting, Cuff Size: Medium Adult)   Pulse 66   Ht 1.524 m (5')   Wt 63 kg (139 lb)   SpO2 94%   BMI 27.15 kg/m²   General Appearance: alert and oriented to person, place and time, well developed and well- nourished, in no acute distress  Skin: warm and dry, no rash or erythema  Head: normocephalic and atraumatic  Eyes: pupils equal, round, and reactive to light, extraocular eye movements intact, conjunctivae

## 2024-07-11 NOTE — TELEPHONE ENCOUNTER
ALLIET End  Call made to pharmacy  Elly [facility]  Call made to A  [facility]  Call made to patient  yes  Will f/u in 1-2 days to verify line removal

## 2024-07-15 ENCOUNTER — TELEPHONE (OUTPATIENT)
Dept: INFECTIOUS DISEASES | Age: 84
End: 2024-07-15

## 2024-07-15 NOTE — TELEPHONE ENCOUNTER
Spoke with Olimpia, patients daughter, she states HH RN coming today at 1300 to deaccess PAC.  Advised to call if RN does not come.  She v/u

## 2024-07-26 ENCOUNTER — TELEPHONE (OUTPATIENT)
Dept: PULMONOLOGY | Age: 84
End: 2024-07-26

## 2024-07-26 ENCOUNTER — TELEPHONE (OUTPATIENT)
Dept: FAMILY MEDICINE CLINIC | Age: 84
End: 2024-07-26

## 2024-07-26 NOTE — TELEPHONE ENCOUNTER
Mercedes with [facility] called wanting to know if it would be okay to titrate the 02 for this pt. Please return call [phone]

## 2024-08-05 RX ORDER — APIXABAN 5 MG/1
TABLET, FILM COATED ORAL
Qty: 60 TABLET | Refills: 2 | Status: SHIPPED | OUTPATIENT
Start: 2024-08-05

## 2024-08-15 ENCOUNTER — TELEPHONE (OUTPATIENT)
Dept: FAMILY MEDICINE CLINIC | Age: 84
End: 2024-08-15

## 2024-08-15 NOTE — TELEPHONE ENCOUNTER
Ce from [facility] is calling to let us know that Anika is being discharged for OT & also from [facility] as of today. JUST DULCE

## 2024-09-12 NOTE — PROGRESS NOTES
Cardiology Follow Up    Anika Rudolph  1940    PCP: Kieran Espinoza MD  Chief Complaint: \"I feel good\"      Subjective:   History of Present Illness: The patient is 83 y.o. female with a past medical history significant for CLL, factor V mutation and CAD s/p STEMI resulting normal coronary arteries with severe LV systolic function (7/2021). Admitted to  7/2021 with CP. EKG showed inferior STEMI and she underwent emergent LHC resulting in normal coronaries. Found to be in acute CHF with EF <10%. Required impella support and inotropes. Admitted 6/19/2024 with complaints of nausea and vomiting. In the ER EKG showed subtle ST depressions with elevated troponin. Repeat EKG showed no significant ST changes, no indication for ischemic work up. States she had developed infection of her right ear requiring surgery resulting in hearing loss of her right ear.         Past Medical History:   Diagnosis Date    Acute ST elevation myocardial infarction (STEMI) ([facility]) 07/19/2021    Atrial fibrillation ([facility])     Chronic lymphocytic leukemia in remission ([facility])     CLL (chronic lymphocytic leukemia) ([facility]) 02/12/2015    COPD (chronic obstructive pulmonary disease) ([facility])     Essential hypertension 01/01/2015    controlled with salt restriction (initially)    Factor V Leiden mutation ([facility])     Goiter 11/01/2011    1.5 cm midline    Hypercholesterolemia     Impaired fasting glucose 11/01/2011    Osteoarthritis     Osteoporosis     Post herpetic neuralgia     Vitamin D deficiency 11/01/2011     Past Surgical History:   Procedure Laterality Date    CATARACT REMOVAL WITH IMPLANT  5/14/12    left eye    JOINT REPLACEMENT      partial knee R and L    KNEE SURGERY  2008    partial replacements, both knees    MASTOIDECTOMY Right 6/21/2024    BILATERAL EAR EXAM UNDER ANESTHESIA, RIGHT TYMPANOSTOMY TUBE performed by Myron Russell MD at [facility] OR    MOHS SURGERY  03/12/2019    R cheek and R superior temple

## 2024-09-17 ENCOUNTER — OFFICE VISIT (OUTPATIENT)
Dept: CARDIOLOGY CLINIC | Age: 84
End: 2024-09-17

## 2024-09-17 VITALS
OXYGEN SATURATION: 93 % | DIASTOLIC BLOOD PRESSURE: 42 MMHG | BODY MASS INDEX: 28.31 KG/M2 | WEIGHT: 144.2 LBS | HEIGHT: 60 IN | HEART RATE: 63 BPM | SYSTOLIC BLOOD PRESSURE: 120 MMHG

## 2024-09-17 DIAGNOSIS — I48.0 PAF (PAROXYSMAL ATRIAL FIBRILLATION) (HCC): Primary | ICD-10-CM

## 2024-10-04 ENCOUNTER — OFFICE VISIT (OUTPATIENT)
Dept: ENT CLINIC | Age: 84
End: 2024-10-04
Payer: MEDICARE

## 2024-10-04 VITALS — BODY MASS INDEX: 28.16 KG/M2 | HEIGHT: 60 IN

## 2024-10-04 DIAGNOSIS — H70.91 MASTOIDITIS OF RIGHT SIDE: Primary | ICD-10-CM

## 2024-10-04 DIAGNOSIS — H91.90 HEARING LOSS, UNSPECIFIED HEARING LOSS TYPE, UNSPECIFIED LATERALITY: ICD-10-CM

## 2024-10-04 PROCEDURE — 1123F ACP DISCUSS/DSCN MKR DOCD: CPT | Performed by: OTOLARYNGOLOGY

## 2024-10-04 PROCEDURE — 99213 OFFICE O/P EST LOW 20 MIN: CPT | Performed by: OTOLARYNGOLOGY

## 2024-10-04 PROCEDURE — 1090F PRES/ABSN URINE INCON ASSESS: CPT | Performed by: OTOLARYNGOLOGY

## 2024-10-04 PROCEDURE — G8417 CALC BMI ABV UP PARAM F/U: HCPCS | Performed by: OTOLARYNGOLOGY

## 2024-10-04 PROCEDURE — G8484 FLU IMMUNIZE NO ADMIN: HCPCS | Performed by: OTOLARYNGOLOGY

## 2024-10-04 PROCEDURE — G8400 PT W/DXA NO RESULTS DOC: HCPCS | Performed by: OTOLARYNGOLOGY

## 2024-10-04 PROCEDURE — 1036F TOBACCO NON-USER: CPT | Performed by: OTOLARYNGOLOGY

## 2024-10-04 PROCEDURE — G8427 DOCREV CUR MEDS BY ELIG CLIN: HCPCS | Performed by: OTOLARYNGOLOGY

## 2024-10-04 NOTE — PROGRESS NOTES
[facility]  DIVISION OF OTOLARYNGOLOGY- HEAD & NECK SURGERY  Follow up      Patient Name: Anika Rudolph  Medical Record Number:  [id number]  Primary Care Physician:  Kieran Espinoza MD  Date of Consultation: 10/4/2024    Chief Complaint: Right ear issues        Interval History  Patient is following up for her right ear.  I ended up doing a right mastoidectomy and ear tube placement in July on the patient for a mastoiditis.  She says she continues to not have significant pain or drainage from the ear, however her hearing is still not good.            REVIEW OF SYSTEMS  As above    PHYSICAL EXAM  GENERAL: No Acute Distress, Alert and Oriented, no Hoarseness, strong voice  EYES: EOMI, Anti-icteric  HENT:   Head: Normocephalic and atraumatic.   Face:  Symmetric, facial nerve intact, no sinus tenderness  Ears: See below        PROCEDURE  Bilateral ear exam  Right ear was visualized binocular scope.  Postauricular incisions healed well.  I removed some wax from the medial canal.  The ear tube had extruded and I removed it.  Tympanic membrane intact, but there probably was a serous middle ear effusion.  On the left side tympanic membrane intact with aerated middle ear        ASSESSMENT/PLAN  1. Mastoiditis of right side  She no longer has any signs of mastoiditis, but probably still does have fluid in the middle ear.  May benefit from another ear tube, but would be fairly difficult to do a weight given the shape of her ear canal.  For now I would continue to observe this.  I will have her follow-up in 3 months in the audiogram.    2. Hearing loss, unspecified hearing loss type, unspecified laterality  As above             I have performed a head and neck physical exam personally or was physically present during the key or critical portions of the service.    This note was generated completely or in part utilizing Dragon dictation speech recognition software.  Occasionally, words are mistranscribed and despite editing,

## 2024-10-06 DIAGNOSIS — F41.9 ANXIETY: ICD-10-CM

## 2024-10-07 DIAGNOSIS — H91.90 HEARING LOSS, UNSPECIFIED HEARING LOSS TYPE, UNSPECIFIED LATERALITY: Primary | ICD-10-CM

## 2024-10-07 RX ORDER — LORAZEPAM 0.5 MG/1
TABLET ORAL
Qty: 30 TABLET | OUTPATIENT
Start: 2024-10-07 | End: 2024-11-06

## 2024-10-15 NOTE — ED PROVIDER NOTES
[address] [facility]      Pt Name: Pa Lilly  MRN: [id number]  Pankajgfchalo 1940  Date of evaluation: 11/2/2022  Provider: Vern Simon, [address] [place] [address]       Chief Complaint   Patient presents with    Leg Pain     Fall leg and ankle pain         HISTORY OF PRESENT ILLNESS   (Location/Symptom, Timing/Onset, Context/Setting, Quality, Duration, Modifying Factors, Severity)  Note limiting factors. Pa Lilly is a 78 y.o. female who presents to the emergency department with a complaint of a fall that occurred at home prior to arrival.  The patient states that she was downstairs, walked up the stairs, became short of breath while walking up the stairs and after getting to the top, walked into her bedroom, felt lightheaded, and passed out. She reports a brief loss of consciousness. She is unsure if she struck her head. She landed on the hardwood floor. She complains of pain in the left foot and ankle. She denies any other injury. She denies any neck pain. She denies any chest pain heaviness pressure or tightness. Paramedics report that she was on the floor when they arrived. They noted that her systolic blood pressure was in the 90s but when they rechecked it in route it was improved. They also noted that her oxygen saturation was 88% on room air. She normally does not wear oxygen. She states that she has not been feeling well for the last couple of days. She has been feeling generally ill. She developed some diarrhea approximately 24 hours ago and has had 4 or 5 episodes of watery diarrhea. She states that she had 2 episodes of black diarrhea however, the last 2 episodes have been lighter in color. She denies any gross blood. She is had some nausea but denies any vomiting. She does complain of some mid abdominal cramping. She denies any cough, sputum production, fever, chills, rhinorrhea or nasal congestion.   She Treatment Goal Explanation (Does Not Render In The Note): Stable for the purposes of categorizing medical decision making is defined by the specific treatment goals for an individual patient. A patient that is not at their treatment goal is not stable, even if the condition has not changed and there is no short- term threat to life or function. denies any headache or body aches. She denies any exposure to illness. She denies any dysuria hematuria frequency urgency. Medical history is significant for COPD. She does not normally wear oxygen but she does have an oxygen concentrator at home and occasionally uses it if she feels short of breath. Medical history is significant for atrial fibrillation as well as factor V Leiden deficiency, and pulmonary embolus. She does take warfarin and reports that her last INR was several weeks ago. No recent changes to medication. She is compliant with her medications. Medical history is also significant for prior STEMI, coronary artery disease, congestive heart failure, atrial fibrillation, goiter, splenectomy, and CLL. Nursing Notes were reviewed. HPI        REVIEW OF SYSTEMS    (2-9 systems for level 4, 10 or more for level 5)       Constitutional: Negative for fever or chills. HENT: Negative for rhinorrhea and sore throat. Eyes: Negative for redness or drainage. Respiratory: Negative for shortness of breath or dyspnea on exertion. Cardiovascular: Negative for chest pain. Gastrointestinal: Negative for abdominal pain. Negative for vomiting or diarrhea. Genitourinary: Negative for flank pain. Negative for dysuria. Negative for hematuria. Neurological: Negative for headache. Musculoskeletal:  Negative edema. Hematological: Negative for adenopathy. All systems are reviewed and are negative except for those listed above in the history of present illness and ROS.         PAST MEDICAL HISTORY     Past Medical History:   Diagnosis Date    Acute ST elevation myocardial infarction ([facility]) [facility] 07/19/2021    Atrial fibrillation (HCC)     Chronic lymphocytic leukemia in remission ([facility]     CLL (chronic lymphocytic leukemia) ([facility] 02/12/2015    COPD (chronic obstructive pulmonary disease) ([facility]     Essential hypertension 01/01/2015    controlled with salt restriction (initially) Factor V Leiden mutation ([facility]     Goiter 11/01/2011    1.5 cm midline    Hypercholesterolemia     Impaired fasting glucose 11/01/2011    Osteoarthritis     Osteoporosis     Post herpetic neuralgia     Vitamin D deficiency 11/01/2011         SURGICAL HISTORY       Past Surgical History:   Procedure Laterality Date    CATARACT REMOVAL WITH IMPLANT  5/14/12    left eye    JOINT REPLACEMENT      partial knee R and L    KNEE SURGERY  2008    partial replacements, both knees    MOHS SURGERY  03/12/2019    R cheek and R superior temple    SPLENECTOMY      TUNNELED VENOUS PORT PLACEMENT      UPPER GASTROINTESTINAL ENDOSCOPY N/A 2/5/2019    EGD ESOPHAGOGASTRODUODENOSCOPY, WITH ENDOSCOPIC ULTRASOUND OF ESOPHAGUS performed by Minh Taylor MD at [address]        Previous Medications    AMIODARONE (CORDARONE) 200 MG TABLET    TAKE 1 TABLET BY MOUTH DAILY    CIPROFLOXACIN (CIPRO) 500 MG TABLET    Take 500 mg by mouth in the morning and 500 mg before bedtime. For 7 days. CYANOCOBALAMIN (VITAMIN B 12) 500 MCG TABS    Take 500 mcg by mouth daily     DICYCLOMINE (BENTYL) 10 MG CAPSULE    Take 1-2 capsules by mouth every 6 hours as needed (abdominal cramping)    GABAPENTIN (NEURONTIN) 300 MG CAPSULE    2 caps po nightly    LORAZEPAM (ATIVAN) 0.5 MG TABLET    TAKE 1/2 TO 1 TABLET BY MOUTH EVERY 8 HOURS FOR UP TO 10 DAYS AS NEEDED FOR ANXIETY    METOPROLOL SUCCINATE (TOPROL XL) 25 MG EXTENDED RELEASE TABLET    Take 0.5 tablets by mouth daily    METRONIDAZOLE (FLAGYL) 500 MG TABLET    Take 500 mg by mouth every 8 hours For 7 days    NYSTATIN (MYCOSTATIN) 446009 UNIT/GM POWDER    Apply 3 times daily.     PANTOPRAZOLE (PROTONIX) 40 MG TABLET    TAKE 1 TABLET BY MOUTH EVERY MORNING BEFORE BREAKFAST    SACUBITRIL-VALSARTAN (ENTRESTO) 24-26 MG PER TABLET    Take 0.5 tablets by mouth 2 times daily    WARFARIN (COUMADIN) 5 MG TABLET    TAKE 1 1/2 TABLETS BY MOUTH MONDAY THRU FRIDAY, THEN 1 TABLET BY MOUTH DAILY ON THE OTHER DAYS       ALLERGIES     Patient has no known allergies. FAMILY HISTORY       Family History   Problem Relation Age of Onset    Diabetes Father     Stroke Sister 50         of a stroke          SOCIAL HISTORY       Social History     Socioeconomic History    Marital status:      Spouse name: None    Number of children: None    Years of education: None    Highest education level: None   Tobacco Use    Smoking status: Former     Packs/day: 0.30     Years: 50.00     Pack years: 15.00     Types: Cigarettes     Quit date: 1995     Years since quittin.0    Smokeless tobacco: Never   Vaping Use    Vaping Use: Never used   Substance and Sexual Activity    Alcohol use: No     Alcohol/week: 0.0 standard drinks    Drug use: Not Currently    Sexual activity: Not Currently     Social Determinants of Health     Financial Resource Strain: Low Risk     Difficulty of Paying Living Expenses: Not hard at all   Food Insecurity: No Food Insecurity    Worried About Running Out of Food in the Last Year: Never true    Ran Out of Food in the Last Year: Never true   Transportation Needs: No Transportation Needs    Lack of Transportation (Medical): No    Lack of Transportation (Non-Medical): No       SCREENINGS    [place] Coma Scale  Eye Opening: Spontaneous  Best Verbal Response: Oriented  Best Motor Response: Obeys commands  Sam Coma Scale Score: 15        PHYSICAL EXAM    (up to 7 for level 4, 8 or more for level 5)     ED Triage Vitals   BP Temp Temp src Pulse Resp SpO2 Height Weight   -- -- -- -- -- -- -- --         Physical Exam   Constitutional: Awake and alert. Chronically ill-appearing. Head: No visible evidence of trauma. Normocephalic. Eyes: Pupils equal and reactive. No photophobia. Conjunctiva normal.    HENT: Oral mucosa moist.  Airway patent. Pharynx without erythema. Nares were clear. Neck:  Soft and supple. Nontender. No point or axial tenderness.   Full range of motion without discomfort. Heart: Atrial fibrillation on the monitor. No murmur. Lungs:  Clear to auscultation. No wheezes, rales, or ronchi. No conversational dyspnea or accessory muscle use. Chest: Chest wall non-tender. No evidence of trauma. No crepitance deformity or step-off. Abdomen:  Soft, nondistended, bowel sounds present. Mild vague mid abdominal tenderness. Mild left flank tenderness as well. No guarding rigidity or rebound. No masses. Musculoskeletal: Thoracic and lumbar spine were nontender. No point tenderness or step-off. Pelvis stable and nontender. Superficial abrasions and bruising noted to both lower legs. There is tenderness with some soft tissue swelling to the left ankle. No gross deformity. Dorsalis pedis and posterior tibial pulses are equal laterally. Capillary refill less than 2 seconds in all digits. There is mild 1+ bilateral lower extremity edema. No calf tenderness or erythema. Neurological: Alert and oriented x 3. GCS 15. No dysarthria. No aphasia. No pronator drift. Speech clear. Cranial nerves II-XII intact. No facial droop. No acute focal motor or sensory deficits. Skin: Skin is warm and dry. No rash. Lymphatic:  No lympadenopathy. Psychiatric: Normal mood and affect. Behavior is normal.         DIAGNOSTIC RESULTS     EKG: All EKG's are interpreted by the Emergency Department Physician who either signs or Co-signs this chart in the absence of a cardiologist.    Normal sinus rhythm. Rate 83. Parable 164 ms. QRS duration 102 ms. QTc 639 ms.  R axis -59 degrees. There is no ST elevation. Q waves noted in the inferior leads. Poor R wave progression.     RADIOLOGY:   Non-plain film images such as CT, Ultrasound and MRI are read by the radiologist. Plain radiographic images are visualized and preliminarily interpreted by the emergency physician with the below findings:        Interpretation per the Radiologist below, if available at the time of this note:    CT ABDOMEN PELVIS W IV CONTRAST Additional Contrast? None   Final Result   1. No pulmonary embolism. 2. Tiny subpleural nodules in the periphery of the lungs may represent an   underlying infectious or inflammatory process. Follow-up recommendations are   provided below. 3. Diffuse mucosal thickening of the colon suggesting underlying pattern of   infectious colitis including C difficile. 4. Incidental cholelithiasis and stable right adrenal nodule suspected to   represent an adenoma. RECOMMENDATIONS:   Multiple pulmonary nodules. Most severe: 3 mm solid pulmonary nodule within   the upper lobe. If patient is low risk for malignancy, no routine follow-up   imaging is recommended; if patient is high risk for malignancy, a   non-contrast Chest CT at 12 months is optional. If performed and the nodule   is stable at 12 months, no further follow-up is recommended. These guidelines do not apply to immunocompromised patients and patients with   cancer. Follow up in patients with significant comorbidities as clinically   warranted. For lung cancer screening, adhere to Lung-RADS guidelines. Reference: Radiology. 2017; 284(1):228-43. CT CHEST PULMONARY EMBOLISM W CONTRAST   Final Result   1. No pulmonary embolism. 2. Tiny subpleural nodules in the periphery of the lungs may represent an   underlying infectious or inflammatory process. Follow-up recommendations are   provided below. 3. Diffuse mucosal thickening of the colon suggesting underlying pattern of   infectious colitis including C difficile. 4. Incidental cholelithiasis and stable right adrenal nodule suspected to   represent an adenoma. RECOMMENDATIONS:   Multiple pulmonary nodules. Most severe: 3 mm solid pulmonary nodule within   the upper lobe.  If patient is low risk for malignancy, no routine follow-up   imaging is recommended; if patient is high risk for malignancy, a   non-contrast Chest CT at 12 months is optional. If performed and the nodule   is stable at 12 months, no further follow-up is recommended. These guidelines do not apply to immunocompromised patients and patients with   cancer. Follow up in patients with significant comorbidities as clinically   warranted. For lung cancer screening, adhere to Lung-RADS guidelines. Reference: Radiology. 2017; 284(1):228-43. CT HEAD WO CONTRAST   Final Result   No acute intracranial abnormality. XR ANKLE LEFT (MIN 3 VIEWS)   Final Result   Ankle mortise intact. No acute fracture or dislocation of the ankle. No   acute fracture or dislocation of the foot. Degenerative changes are present   at the interphalangeal joints and there is deformity of the 5th PIP joint   which may be sequela of a remote injury. No soft tissue swelling of the   foot. Moderate soft tissue swelling/edema over the medial malleolus of the   ankle. RECOMMENDATION:   Medial soft tissue swelling about the left ankle with no underlying fracture. Degenerative changes in the left foot with no acute abnormality. XR FOOT LEFT (MIN 3 VIEWS)   Final Result   Ankle mortise intact. No acute fracture or dislocation of the ankle. No   acute fracture or dislocation of the foot. Degenerative changes are present   at the interphalangeal joints and there is deformity of the 5th PIP joint   which may be sequela of a remote injury. No soft tissue swelling of the   foot. Moderate soft tissue swelling/edema over the medial malleolus of the   ankle. RECOMMENDATION:   Medial soft tissue swelling about the left ankle with no underlying fracture. Degenerative changes in the left foot with no acute abnormality. XR CHEST PORTABLE   Final Result   Cardiomegaly with mild vascular congestion that is stable.                ED BEDSIDE ULTRASOUND:   Performed by ED Physician - none    LABS:  Labs Reviewed   CBC WITH AUTO DIFFERENTIAL - Abnormal; Notable for the following components:       Result Value    WBC 27.6 (*)     Neutrophils Absolute 22.6 (*)     Monocytes Absolute 2.5 (*)     Smudge Cells Present (*)     All other components within normal limits   COMPREHENSIVE METABOLIC PANEL W/ REFLEX TO MG FOR LOW K - Abnormal; Notable for the following components:    Potassium reflex Magnesium 3.3 (*)     Chloride 97 (*)     Glucose 121 (*)     All other components within normal limits   PROTIME-INR - Abnormal; Notable for the following components:    Protime 36.4 (*)     INR 3.63 (*)     All other components within normal limits   BRAIN NATRIURETIC PEPTIDE - Abnormal; Notable for the following components:    Pro- (*)     All other components within normal limits   LIPASE - Abnormal; Notable for the following components:    Lipase 9.0 (*)     All other components within normal limits   BLOOD GAS, VENOUS - Abnormal; Notable for the following components:    pCO2, Vinod 50.7 (*)     HCO3, Venous 30 (*)     All other components within normal limits   COVID-19, RAPID   RAPID INFLUENZA A/B ANTIGENS   CULTURE, BLOOD 1   CULTURE, BLOOD 2   GASTROINTESTINAL PANEL, MOLECULAR   C DIFF TOXIN/ANTIGEN   LACTATE, SEPSIS   TROPONIN   BLOOD OCCULT STOOL DIAGNOSTIC    Narrative:     ORDER#: [id number]                          ORDERED BY: MATT FANG  SOURCE: Stool                              COLLECTED:  11/03/22 00:35  ANTIBIOTICS AT [facility].:                      RECEIVED :  11/03/22 00:41   MAGNESIUM   LACTATE, SEPSIS   URINALYSIS WITH REFLEX TO CULTURE       All other labs were within normal range or not returned as of this dictation.     EMERGENCY DEPARTMENT COURSE and DIFFERENTIAL DIAGNOSIS/MDM:   Vitals:    Vitals:    11/02/22 2359 11/03/22 0005   BP: (!) 150/60    Pulse: 89 89   Resp: 16 15   Temp: 98.3 °F (36.8 °C)    TempSrc: Oral    SpO2: (!) 88% 96%   Weight: 137 lb 12.6 oz (62.5 kg)    Height: 5' (1.524 m)          MDM        The patient presents with a syncopal episode that occurred at home prior to arrival which was preceded by shortness of breath, dyspnea on exertion. She is currently fully awake and alert. She is neurologically intact. No motor or sensory deficits. She is hemodynamically stable. However, paramedics did report that her blood pressure was in the 85G systolic at the scene and improved without intervention. In addition her oxygen saturation was reported to be 88% on room air on arrival and was placed on oxygen 2 L per nasal cannula. She does report some black stool/watery diarrhea over the last couple of days as well as some abdominal cramping and nausea and generally feeling poorly. Rectal exam was completed with the nurse present. No evidence of hemorrhoids. No gross blood. Stool was dark brown in appearance. Hemoccult is pending. He does have some tenderness and soft tissue swelling to the left ankle. X-rays of the left foot and ankle were obtained. CT head without contrast was obtained. The patient is anticoagulated on warfarin and struck her head with a syncopal episode. Given her hypoxia and history of factor V Leiden deficiency, CT chest PE protocol was obtained. In addition CT abdomen and pelvis with IV contrast was obtained due to her abdominal tenderness, diarrhea, and fall. Is this patient to be included in the SEP-1 Core Measure due to severe sepsis or septic shock? Yes   SEP-1 CORE MEASURE DATA      Sepsis Criteria   Severe Sepsis Criteria   Septic Shock Criteria     Must be confirmed or suspected to move forward with diagnosis of sepsis. Must meet 2:    [] Temperature > 100.9 F (38.3 C)        or < 96.8 F (36 C)  [] HR > 90  [] RR > 20  [x] WBC > 12 or < 4 or 10% bands      AND:      [x] Infection Confirmed or        Suspected.      Must meet 1:    [] Lactate > 2       or   [] Signs of Organ Dysfunction:    - SBP < 90 or MAP < 65  - Altered mental status  - Creatinine > 2 or increased from      baseline  - Urine Output < 0.5 ml/kg/hr  - Bilirubin > 2  - INR > 1.5 (not anticoagulated)  - Platelets < 884,432  - Acute Respiratory Failure as     evidenced by new need for NIPPV     or mechanical ventilation      [x] No criteria met for Severe Sepsis. Must meet 1:    [] Lactate > 4        or   [] SBP < 90 or MAP < 65 for at        least two readings in the first        hour after fluid bolus        administration      [] Vasopressors initiated (if hypotension persists after fluid resuscitation)        [x] No criteria met for Septic Shock. Patient Vitals for the past 6 hrs:   BP Temp Pulse Resp SpO2 Height Weight Weight Method Percent Weight Change   11/02/22 2359 (!) 150/60 98.3 °F (36.8 °C) 89 16 (!) 88 % 5' (1.524 m) 137 lb 12.6 oz (62.5 kg) Actual 0   11/03/22 0005 -- -- 89 15 96 % -- -- -- --      Recent Labs     11/03/22  0034 11/03/22  0035   WBC  --  27.6*   CREATININE  --  0.9   BILITOT  --  0.7   INR 3.63*  --    PLT  --  310         Time sepsis not identified. Identified: Not applicable    Fluid Resuscitation Rational:  Sepsis fluid bolus not indicated. Repeat lactate level: ordered and pending at this time    Reassessment Exam:   Not applicable. Patient does not have septic shock. REASSESSMENT          2:35 AM: Lactate is 1.4. White blood cell count is 27.6. There are 2% bands. CT abdomen pelvis reveals diffuse thickening of the colon consistent with colitis. C. difficile colitis is suspected. She was given Zosyn 3.375 g IV and Flagyl 500 mg IV. Stool for C. difficile cytotoxin has been ordered. Potassium is 3.3. Creatinine 0.9. Troponin is normal.  pH 7.38. Stool for Hemoccult is negative. INR is therapeutic at 3.63. Rapid COVID and influenza are negative. The patient will be admitted for further treatment and evaluation. A call was placed to the hospitalist on-call for admission. CRITICAL CARE TIME   Total Critical Care time was 0 minutes, excluding separately reportable procedures.   There was a high probability of clinically significant/life threatening deterioration in the patient's condition which required my urgent intervention. CONSULTS:  None    PROCEDURES:  Unless otherwise noted below, none     Procedures        FINAL IMPRESSION      1. Colitis    2. Syncope and collapse    3. Contusion of head, unspecified part of head, initial encounter    4. Sprain of left ankle, unspecified ligament, initial encounter    5. Diarrhea, unspecified type          DISPOSITION/PLAN   DISPOSITION Decision To Admit 11/03/2022 02:38:12 AM      PATIENT REFERRED TO:  No follow-up provider specified. DISCHARGE MEDICATIONS:  New Prescriptions    No medications on file     Controlled Substances Monitoring:     RX Monitoring 8/13/2018   Attestation The Prescription Monitoring Report for this patient was reviewed today. Periodic Controlled Substance Monitoring Possible medication side effects, risk of tolerance/dependence & alternative treatments discussed. ;Obtaining appropriate analgesic effect of treatment. ;No signs of potential drug abuse or diversion identified. (Please note that portions of this note were completed with a voice recognition program.  Efforts were made to edit the dictations but occasionally words are mis-transcribed. )    [id number] James Simon DO (electronically signed)  Attending Emergency Physician           Andriy Obando DO  11/03/22 [id number]

## 2024-10-22 ENCOUNTER — OFFICE VISIT (OUTPATIENT)
Dept: PULMONOLOGY | Age: 84
End: 2024-10-22
Payer: MEDICARE

## 2024-10-22 VITALS
HEART RATE: 76 BPM | DIASTOLIC BLOOD PRESSURE: 70 MMHG | TEMPERATURE: 97.6 F | HEIGHT: 60 IN | OXYGEN SATURATION: 90 % | RESPIRATION RATE: 18 BRPM | SYSTOLIC BLOOD PRESSURE: 160 MMHG | WEIGHT: 145.4 LBS | BODY MASS INDEX: 28.54 KG/M2

## 2024-10-22 DIAGNOSIS — J41.0 SIMPLE CHRONIC BRONCHITIS (HCC): Primary | ICD-10-CM

## 2024-10-22 DIAGNOSIS — I28.8 ENLARGED PULMONARY ARTERY (HCC): ICD-10-CM

## 2024-10-22 PROCEDURE — G8400 PT W/DXA NO RESULTS DOC: HCPCS | Performed by: INTERNAL MEDICINE

## 2024-10-22 PROCEDURE — 99214 OFFICE O/P EST MOD 30 MIN: CPT | Performed by: INTERNAL MEDICINE

## 2024-10-22 PROCEDURE — G8417 CALC BMI ABV UP PARAM F/U: HCPCS | Performed by: INTERNAL MEDICINE

## 2024-10-22 PROCEDURE — G8427 DOCREV CUR MEDS BY ELIG CLIN: HCPCS | Performed by: INTERNAL MEDICINE

## 2024-10-22 PROCEDURE — G8484 FLU IMMUNIZE NO ADMIN: HCPCS | Performed by: INTERNAL MEDICINE

## 2024-10-22 PROCEDURE — 3078F DIAST BP <80 MM HG: CPT | Performed by: INTERNAL MEDICINE

## 2024-10-22 PROCEDURE — 1036F TOBACCO NON-USER: CPT | Performed by: INTERNAL MEDICINE

## 2024-10-22 PROCEDURE — 1123F ACP DISCUSS/DSCN MKR DOCD: CPT | Performed by: INTERNAL MEDICINE

## 2024-10-22 PROCEDURE — 3077F SYST BP >= 140 MM HG: CPT | Performed by: INTERNAL MEDICINE

## 2024-10-22 PROCEDURE — 3023F SPIROM DOC REV: CPT | Performed by: INTERNAL MEDICINE

## 2024-10-22 PROCEDURE — 1090F PRES/ABSN URINE INCON ASSESS: CPT | Performed by: INTERNAL MEDICINE

## 2024-10-22 PROCEDURE — 1159F MED LIST DOCD IN RCRD: CPT | Performed by: INTERNAL MEDICINE

## 2024-10-22 NOTE — PROGRESS NOTES
REASON FOR CONSULTATION/CC:    No chief complaint on file.          PCP: Kieran Espinoza MD    HISTORY OF PRESENT ILLNESS: Anika Rudolph is a 83 y.o. year old female with a history of chronic hypoxemia  who presents      History  She has a history of CLL and was admitted October 2021 for community-acquired pneumonia.  Was discharged on oxygen.  Extensive history of smoking.    Prior to this admission, the patient was doing well with COPD.  She was off oxygen.  After the admission, she remains on 4 L nasal cannula with saturations barely able to maintain 90%.  She has worsening shortness of breath.  Chest x-ray during admission did not show acute pathology.  She was not felt to be in COPD exacerbation without significant wheeze since.      Current history     Copd    Off oxygen. 90% off oxygen.      Not using POC.  Will using o2 tank prn but has not needed it.      No inhalers.         Objective:   PHYSICAL EXAM:  Blood pressure (!) 160/70, pulse 76, temperature 97.6 °F (36.4 °C), resp. rate 18, height 1.524 m (5'), weight 66 kg (145 lb 6.4 oz), SpO2 90%, not currently breastfeeding.'  Gen: No distress.    Eyes: PERRL. No sclera icterus. No conjunctival injection.   ENT:   Neck: Trachea midline. No obvious mass.    Resp: No accessory muscle use. No crackles. No wheezes. No rhonchi.    CV: Regular rate. Regular rhythm. No murmur or rub. No edema.   GI:    Skin: Warm, dry, normal texture and turgor. No nodule on exposed extremities.   Lymph: No cervical LAD. No supraclavicular LAD.   M/S: No cyanosis. No clubbing. No joint deformity.    Neuro: Moves all four extremities.    Psych: Oriented x 3. No anxiety.  Awake. Alert. Intact judgement and insight.       Data Reviewed:          Assessment:     Cough   pneumonia 2021  Hx smoking   Chronic hypoxemia  Fatigue  Copd?   History of CLL  Atrial fibrillation      Plan:      Problem List Items Addressed This Visit       Simple chronic bronchitis (HCC) - Primary     Doing

## 2024-10-23 PROBLEM — I28.8 ENLARGED PULMONARY ARTERY (HCC): Status: ACTIVE | Noted: 2024-10-23

## 2024-10-23 NOTE — ASSESSMENT & PLAN NOTE
Doing well off of medications.  Currently off oxygen.  90%.  Has oxygen that she will use as needed.

## 2024-10-23 NOTE — ASSESSMENT & PLAN NOTE
Enlarged pulmonary arteries seen on his CT chest.  Echocardiogram does not confirm pulmonary hypertension.  Monitor for now.

## 2024-11-20 RX ORDER — APIXABAN 5 MG/1
TABLET, FILM COATED ORAL
Qty: 60 TABLET | Refills: 2 | Status: SHIPPED | OUTPATIENT
Start: 2024-11-20

## 2024-12-24 RX ORDER — AMIODARONE HYDROCHLORIDE 100 MG/1
200 TABLET ORAL DAILY
Qty: 180 TABLET | Refills: 1 | Status: SHIPPED | OUTPATIENT
Start: 2024-12-24

## 2025-01-03 ENCOUNTER — OFFICE VISIT (OUTPATIENT)
Dept: ENT CLINIC | Age: 85
End: 2025-01-03

## 2025-01-03 ENCOUNTER — PROCEDURE VISIT (OUTPATIENT)
Dept: AUDIOLOGY | Age: 85
End: 2025-01-03

## 2025-01-03 VITALS
DIASTOLIC BLOOD PRESSURE: 71 MMHG | WEIGHT: 148 LBS | BODY MASS INDEX: 29.06 KG/M2 | SYSTOLIC BLOOD PRESSURE: 177 MMHG | HEIGHT: 60 IN

## 2025-01-03 DIAGNOSIS — H90.3 SENSORINEURAL HEARING LOSS (SNHL) OF BOTH EARS: Primary | ICD-10-CM

## 2025-01-03 DIAGNOSIS — H70.91 MASTOIDITIS OF RIGHT SIDE: ICD-10-CM

## 2025-01-03 NOTE — PROGRESS NOTES
Anika Rudolph   1940, 84 y.o. female   [id number]       Referring Provider: Myron Russell MD  Referral Type: In an order in Epic    Reason for Visit: Evaluation of suspected change in hearing, tinnitus, or balance.    ADULT AUDIOLOGIC EVALUATION      Anika Rudolph is a 84 y.o. female seen today, 1/3/2025 , for a recheck audiologic evaluation.  Patient was seen by Myron Russell MD following today's evaluation.    AUDIOLOGIC AND OTHER PERTINENT MEDICAL HISTORY:      Anika Rudolph reports little or no improvement in her hearing since her last test on 7/5/2024. She stated that she is able to wear both hearing aids now, and does well with both hearing aids in.      Case history and results from previous audiogram on 7/5/2024  Anika Rudolph reports a recent surgery on her right ear due to right mastoiditis and signifcant otorrhea. She reported that her hearing in her right ear changed significantly, and that it used to be her better ear. She noted her hearing in her left ear is not very good. She reported she has hearing aids for both ears but that she is unable to wear the right hearing aid. She denied any recent pain since surgery, or history of ear surgeries prior to this. Patient has a family history of age-related hearing loss and a history of chemotherapy for recessive leukemia.      She denied otalgia, aural fullness, otorrhea, tinnitus, dizziness, imbalance, history of falls, history of noise exposure, and history of head trauma    Testing revealed a Profound Mixed hearing loss in the right ear and a Severe Mixed hearing loss in the left ear.     Date: 1/3/2025     IMPRESSIONS:      Today's results revealed a Moderately-Severe sloping to Severe Sensorineural hearing loss bilaterally. This demonstrates a significant improvement in hearing and word understanding in her right ear compared to her last test on 7/5/2024. Good speech understanding when in quiet in the right ear; poor in the left ear.

## 2025-01-03 NOTE — PROGRESS NOTES
[facility]  DIVISION OF OTOLARYNGOLOGY- HEAD & NECK SURGERY  Follow up      Patient Name: Anika Rudolph  Medical Record Number:  [id number]  Primary Care Physician:  Kieran Espinoza MD  Date of Consultation: 1/3/2025    Chief Complaint: Ear issues        Interval History    Patient is following up for her ears.  I did a mastoidectomy and placed a right ear tube in June 2024 as she had mastoiditis.  She has not had any pain.  She says that she thinks he needs her hearing aids adjusted.  Otherwise no issues today          REVIEW OF SYSTEMS  As above    PHYSICAL EXAM  GENERAL: No Acute Distress, Alert and Oriented, no Hoarseness, strong voice  EYES: EOMI, Anti-icteric  HENT:   Head: Normocephalic and atraumatic.   Face:  Symmetric, facial nerve intact, no sinus tenderness  Right Ear: There is some scarring of the tympanic membrane.  I do not see an obvious perforation or fluid  Left Ear: Normal external ear, normal external auditory canal, intact tympanic membrane with normal mobility and aerated middle ear      Patient an audiogram today that showed moderate to severe sensorineural hearing loss.  Resolution of conductive component on the right side.        ASSESSMENT/PLAN  1. Sensorineural hearing loss (SNHL) of both ears  The conductive component secondary to the mastoid and middle ear effusion has resolved.  Her ears are looking good today.  She is going to get her hearing aids adjusted.  Recommend 1 year follow-up.  If she starts having any symptoms I would like to see her sooner.    2. Mastoiditis of right side  As above             I have performed a head and neck physical exam personally or was physically present during the key or critical portions of the service.    This note was generated completely or in part utilizing Dragon dictation speech recognition software.  Occasionally, words are mistranscribed and despite editing, the text may contain inaccuracies due to incorrect word recognition.  If further

## 2025-01-22 ENCOUNTER — OFFICE VISIT (OUTPATIENT)
Dept: FAMILY MEDICINE CLINIC | Age: 85
End: 2025-01-22

## 2025-01-22 VITALS
BODY MASS INDEX: 29.06 KG/M2 | OXYGEN SATURATION: 95 % | HEART RATE: 55 BPM | DIASTOLIC BLOOD PRESSURE: 72 MMHG | WEIGHT: 148.8 LBS | TEMPERATURE: 97.5 F | SYSTOLIC BLOOD PRESSURE: 136 MMHG

## 2025-01-22 DIAGNOSIS — S41.112A LACERATION OF LEFT UPPER EXTREMITY, INITIAL ENCOUNTER: Primary | ICD-10-CM

## 2025-01-22 SDOH — ECONOMIC STABILITY: FOOD INSECURITY: WITHIN THE PAST 12 MONTHS, YOU WORRIED THAT YOUR FOOD WOULD RUN OUT BEFORE YOU GOT MONEY TO BUY MORE.: NEVER TRUE

## 2025-01-22 SDOH — ECONOMIC STABILITY: FOOD INSECURITY: WITHIN THE PAST 12 MONTHS, THE FOOD YOU BOUGHT JUST DIDN'T LAST AND YOU DIDN'T HAVE MONEY TO GET MORE.: NEVER TRUE

## 2025-01-22 ASSESSMENT — PATIENT HEALTH QUESTIONNAIRE - PHQ9
1. LITTLE INTEREST OR PLEASURE IN DOING THINGS: NOT AT ALL
SUM OF ALL RESPONSES TO PHQ QUESTIONS 1-9: 0
2. FEELING DOWN, DEPRESSED OR HOPELESS: NOT AT ALL
SUM OF ALL RESPONSES TO PHQ9 QUESTIONS 1 & 2: 0
SUM OF ALL RESPONSES TO PHQ QUESTIONS 1-9: 0

## 2025-01-22 NOTE — PROGRESS NOTES
Pulses: Normal pulses.   Pulmonary:      Effort: Pulmonary effort is normal.   Musculoskeletal:         General: No swelling or tenderness.      Right lower leg: No edema.      Left lower leg: No edema.   Skin:     General: Skin is warm and dry.      Capillary Refill: Capillary refill takes less than 2 seconds.      Findings: Bruising (surrounding wound) present.             Comments: Laceration with pink wound, non-draining, surrounding bruising   Neurological:      General: No focal deficit present.      Mental Status: She is alert and oriented to person, place, and time. Mental status is at baseline.   Psychiatric:         Mood and Affect: Mood normal.         Behavior: Behavior normal.         Thought Content: Thought content normal.         Judgment: Judgment normal.                  An electronic signature was used to authenticate this note.    --FAB Navarro - CNP

## 2025-02-24 RX ORDER — GABAPENTIN 300 MG/1
300 CAPSULE ORAL 2 TIMES DAILY
Qty: 120 CAPSULE | Refills: 0 | OUTPATIENT
Start: 2025-02-24

## 2025-03-17 NOTE — TELEPHONE ENCOUNTER
Medication Refill    Medication needing refilled:    LENORA   Dosage of the medication:  5 MG TABS tablet   How are you taking this medication (QD, BID, TID, QID, PRN):   TAKE 1 TABLET BY MOUTH TWICE DAILY   30 or 90 day supply called in:   60 tablet   When will you run out of your medication:  1 left  Which Pharmacy are we sending the medication to?:  [id number]

## 2025-03-17 NOTE — TELEPHONE ENCOUNTER
Last OV: 09/17/24  Next OV: None  Last refill:11/20/24  Most recent Labs: CMP 07/08/24  Last EKG (if needed):06/23/24

## 2025-04-08 ENCOUNTER — OFFICE VISIT (OUTPATIENT)
Dept: FAMILY MEDICINE CLINIC | Age: 85
End: 2025-04-08

## 2025-04-08 VITALS
WEIGHT: 148 LBS | BODY MASS INDEX: 29.06 KG/M2 | HEART RATE: 64 BPM | HEIGHT: 60 IN | SYSTOLIC BLOOD PRESSURE: 118 MMHG | OXYGEN SATURATION: 99 % | DIASTOLIC BLOOD PRESSURE: 70 MMHG

## 2025-04-08 DIAGNOSIS — J96.01 ACUTE RESPIRATORY FAILURE WITH HYPOXIA: ICD-10-CM

## 2025-04-08 DIAGNOSIS — F51.04 PSYCHOPHYSIOLOGICAL INSOMNIA: ICD-10-CM

## 2025-04-08 DIAGNOSIS — I48.0 PAF (PAROXYSMAL ATRIAL FIBRILLATION) (HCC): ICD-10-CM

## 2025-04-08 DIAGNOSIS — J41.0 SIMPLE CHRONIC BRONCHITIS (HCC): ICD-10-CM

## 2025-04-08 DIAGNOSIS — Z71.89 ACP (ADVANCE CARE PLANNING): ICD-10-CM

## 2025-04-08 DIAGNOSIS — C85.80: ICD-10-CM

## 2025-04-08 DIAGNOSIS — R42 VERTIGO: Primary | ICD-10-CM

## 2025-04-08 DIAGNOSIS — I28.8 ENLARGED PULMONARY ARTERY (HCC): ICD-10-CM

## 2025-04-08 RX ORDER — MECLIZINE HCL 12.5 MG 12.5 MG/1
12.5 TABLET ORAL 3 TIMES DAILY PRN
Qty: 15 TABLET | Refills: 1 | Status: SHIPPED | OUTPATIENT
Start: 2025-04-08 | End: 2025-04-18

## 2025-04-08 RX ORDER — HYDROXYZINE HYDROCHLORIDE 25 MG/1
25 TABLET, FILM COATED ORAL NIGHTLY
Qty: 30 TABLET | Refills: 0 | Status: SHIPPED | OUTPATIENT
Start: 2025-04-08 | End: 2025-05-08

## 2025-04-08 ASSESSMENT — ENCOUNTER SYMPTOMS
WHEEZING: 0
NAUSEA: 0
VOMITING: 0
DIARRHEA: 0
CHEST TIGHTNESS: 0
BACK PAIN: 0
ABDOMINAL PAIN: 0
EYE DISCHARGE: 0
COUGH: 0
EYE PAIN: 0
CONSTIPATION: 0
SHORTNESS OF BREATH: 0

## 2025-04-08 NOTE — ASSESSMENT & PLAN NOTE
Chronic, at goal (stable), continue current treatment plan  - routine management with pulmonology.  - O2 levels stable today on RA, lungs are CTA.

## 2025-04-08 NOTE — ASSESSMENT & PLAN NOTE
Chronic, at goal (stable), continue current treatment plan  - rate controlled today, apically regular.  - continue management per cardiology - Amiodarone 100 mg daily, Eliquis 5 mg for OAC.         [facility]   Daily Progress Note      Admit Date:  11/7/2024    Reason for follow up visit: Vertigo    CC: \"I feel better this morning.\"    56 y/o male admitted with necrotizing fasciitis of LLE, S/P amputation (L BKA), uncontrolled DM with DKA, hyponatremia, anemia and JORGE who was transferred to  after experiencing an abrupt onset of dizziness/vertigo.    Interval History:  Pt. seen and examined; records reviewed  BP stable. Resting in bed this AM  Dizziness essentially resolved  Denies chest pain, SOB, cough, palpitations   Events of last PM reviewed.  Now on meclizine  Plan for transfer to ARU    Subjective:  Pt with no acute overnight cardiac events.     Review of Systems:   Constitutional: no unanticipated weight loss. There's been no change in energy level, sleep pattern, or activity level.   No fevers, chills.   Eyes: No visual changes or diplopia. No scleral icterus.  ENT: No Headaches, hearing loss. No mouth sores or sore throat. + vertigo last PM  Cardiovascular: as reviewed in HPI  Respiratory: No cough or wheezing, no sputum production. No hemoptysis.    Gastrointestinal: No abdominal pain, appetite loss, blood in stools. No change in bowel or bladder habits.  Genitourinary: No dysuria, trouble voiding, or hematuria.  Musculoskeletal:  No gait disturbance, no joint complaints.  Integumentary: No rash or pruritis.  Neurological: No headache, diplopia, change in muscle strength, numbness or tingling.   Psychiatric: No anxiety or depression.  Endocrine: No temperature intolerance. No excessive thirst, fluid intake, or urination. No tremor.  Hematologic/Lymphatic: No abnormal bruising or bleeding, blood clots or swollen lymph nodes.  Allergic/Immunologic: No nasal congestion or hives.    Objective:   BP (!) 110/54   Pulse 82   Temp 98.2 °F (36.8 °C) (Oral)   Resp 20   Ht 1.803 m (5' 11\")   Wt 93 kg (205 lb 0.4 oz)   SpO2 98%   BMI 28.60 kg/m²     Intake/Output Summary (Last 24 hours) at

## 2025-04-08 NOTE — ASSESSMENT & PLAN NOTE
Chronic, at goal (stable), continue current treatment plan  - routine f/u with pulmonology and cardiology.

## 2025-04-08 NOTE — PATIENT INSTRUCTIONS
You may receive a survey regarding the care you received during your visit.  Your input is valuable to us.  We encourage you to complete and return your survey.  We hope you will choose us in the future for your healthcare needs.   Advance Care Planning     Advance Care Planning opens a door to talk about and write down your wishes before a sudden accident or illness.  Make your goals, values, and preferences known.     This puts you in the ’s seat and helps others know what matters most to you so they won’t have to guess.      Where can you learn more?    Go to https://www.[employer]/patient-resources/advance-care-planning   to learn how to:    Name someone you trust to make healthcare decisions for you, only if you can’t. (Healthcare Power of )    Document your wishes for care if you were seriously ill and not expected to recover or are approaching end of life. (Advance Directive or Living Will)    The same page can be found using the QR code below.

## 2025-04-08 NOTE — PROGRESS NOTES
An electronic signature was used to authenticate this note.    --FAB Navarro - CNP Advance Care Planning   Discussed the patient’s choices for care and treatment preferences in case of a health event that adversely affects decision-making abilities or is life-limiting. Recommended the patient document care preferences in state-specific advance directives. Also reviewed the process of designating a trusted capable adult as an Agent (or Health Care Power of ) to make health care decisions for the patient if the patient becomes unable due to incapacity. Patient was asked to complete advance directive forms, if not already done, and to provide a dated, signed and witnessed (or notarized) copy, per the forms' requirements, to the practice office.    Time spent (minutes): <16 minutes (Non-Billable)

## 2025-04-08 NOTE — ASSESSMENT & PLAN NOTE
Chronic, at goal (stable), continue current treatment plan  - routine f/u with pulmonology.

## 2025-04-16 ENCOUNTER — OFFICE VISIT (OUTPATIENT)
Dept: PULMONOLOGY | Age: 85
End: 2025-04-16
Payer: MEDICARE

## 2025-04-16 VITALS
RESPIRATION RATE: 18 BRPM | HEIGHT: 61 IN | HEART RATE: 45 BPM | WEIGHT: 150 LBS | DIASTOLIC BLOOD PRESSURE: 64 MMHG | TEMPERATURE: 98.3 F | OXYGEN SATURATION: 91 % | BODY MASS INDEX: 28.32 KG/M2 | SYSTOLIC BLOOD PRESSURE: 152 MMHG

## 2025-04-16 DIAGNOSIS — F41.9 ANXIETY: Primary | ICD-10-CM

## 2025-04-16 DIAGNOSIS — F51.04 PSYCHOPHYSIOLOGICAL INSOMNIA: ICD-10-CM

## 2025-04-16 PROCEDURE — 1090F PRES/ABSN URINE INCON ASSESS: CPT | Performed by: INTERNAL MEDICINE

## 2025-04-16 PROCEDURE — 1159F MED LIST DOCD IN RCRD: CPT | Performed by: INTERNAL MEDICINE

## 2025-04-16 PROCEDURE — 1123F ACP DISCUSS/DSCN MKR DOCD: CPT | Performed by: INTERNAL MEDICINE

## 2025-04-16 PROCEDURE — G8427 DOCREV CUR MEDS BY ELIG CLIN: HCPCS | Performed by: INTERNAL MEDICINE

## 2025-04-16 PROCEDURE — 99214 OFFICE O/P EST MOD 30 MIN: CPT | Performed by: INTERNAL MEDICINE

## 2025-04-16 PROCEDURE — G8417 CALC BMI ABV UP PARAM F/U: HCPCS | Performed by: INTERNAL MEDICINE

## 2025-04-16 PROCEDURE — 1036F TOBACCO NON-USER: CPT | Performed by: INTERNAL MEDICINE

## 2025-04-16 NOTE — PROGRESS NOTES
REASON FOR CONSULTATION/CC:    Chief Complaint   Patient presents with    Follow-up           PCP: Josselyn Vargas, APRN - CNP    HISTORY OF PRESENT ILLNESS: Anika Rudolph is a 84 y.o. year old female with a history of chronic hypoxemia  who presents      History  She has a history of CLL and was admitted October 2021 for community-acquired pneumonia.  Was discharged on oxygen.  Extensive history of smoking.    Prior to this admission, the patient was doing well with COPD.  She was off oxygen.  After the admission, she remains on 4 L nasal cannula with saturations barely able to maintain 90%.  She has worsening shortness of breath.  Chest x-ray during admission did not show acute pathology.  She was not felt to be in COPD exacerbation without significant wheeze since.      Current history     Copd    Off oxygen. 90% off oxygen.      Not using POC.  Will using o2 tank prn but has not needed it.      No inhalers.       The patient (or guardian, if applicable) and other individuals in attendance with the patient were advised that Artificial Intelligence will be utilized during this visit to record, process the conversation to generate a clinical note, and support improvement of the AI technology. The patient (or guardian, if applicable) and other individuals in attendance at the appointment consented to the use of AI, including the recording.      History of Present Illness  The patient is an 84-year-old female who presents for a follow-up visit.    She reports persistent sleep disturbances, characterized by difficulty falling asleep and maintaining sleep. Her sleep pattern typically involves going to bed around midnight, experiencing restlessness until approximately 4:00 AM, and then achieving a sleep duration of 4 to 5 hours. She attributes her sleep issues to excessive worrying about her family. She does not engage in daytime napping and has a sedentary lifestyle, spending most of her time at home alone, on

## 2025-04-17 PROBLEM — F51.04 PSYCHOPHYSIOLOGICAL INSOMNIA: Status: ACTIVE | Noted: 2025-04-17

## 2025-05-13 ENCOUNTER — OFFICE VISIT (OUTPATIENT)
Dept: PULMONOLOGY | Age: 85
End: 2025-05-13
Payer: MEDICARE

## 2025-05-13 VITALS
SYSTOLIC BLOOD PRESSURE: 130 MMHG | DIASTOLIC BLOOD PRESSURE: 72 MMHG | TEMPERATURE: 97.6 F | RESPIRATION RATE: 18 BRPM | BODY MASS INDEX: 28.81 KG/M2 | HEIGHT: 61 IN | WEIGHT: 152.6 LBS | OXYGEN SATURATION: 90 %

## 2025-05-13 DIAGNOSIS — F41.9 ANXIETY: Primary | ICD-10-CM

## 2025-05-13 DIAGNOSIS — F51.04 PSYCHOPHYSIOLOGICAL INSOMNIA: ICD-10-CM

## 2025-05-13 PROCEDURE — 1090F PRES/ABSN URINE INCON ASSESS: CPT | Performed by: INTERNAL MEDICINE

## 2025-05-13 PROCEDURE — 1123F ACP DISCUSS/DSCN MKR DOCD: CPT | Performed by: INTERNAL MEDICINE

## 2025-05-13 PROCEDURE — 99214 OFFICE O/P EST MOD 30 MIN: CPT | Performed by: INTERNAL MEDICINE

## 2025-05-13 PROCEDURE — 1159F MED LIST DOCD IN RCRD: CPT | Performed by: INTERNAL MEDICINE

## 2025-05-13 PROCEDURE — 1036F TOBACCO NON-USER: CPT | Performed by: INTERNAL MEDICINE

## 2025-05-13 PROCEDURE — G8417 CALC BMI ABV UP PARAM F/U: HCPCS | Performed by: INTERNAL MEDICINE

## 2025-05-13 PROCEDURE — G8427 DOCREV CUR MEDS BY ELIG CLIN: HCPCS | Performed by: INTERNAL MEDICINE

## 2025-05-13 NOTE — PROGRESS NOTES
REASON FOR CONSULTATION/CC:    Chief Complaint   Patient presents with    Follow-up           PCP: Josselyn Vargas APRN - CNP    HISTORY OF PRESENT ILLNESS: Anika Rudolph is a 84 y.o. year old female with a history of chronic hypoxemia  who presents      History  She has a history of CLL and was admitted October 2021 for community-acquired pneumonia.  Was discharged on oxygen.  Extensive history of smoking.    Prior to this admission, the patient was doing well with COPD.  She was off oxygen.  After the admission, she remains on 4 L nasal cannula with saturations barely able to maintain 90%.  She has worsening shortness of breath.  Chest x-ray during admission did not show acute pathology.  She was not felt to be in COPD exacerbation without significant wheeze since.      Current history         The patient (or guardian, if applicable) and other individuals in attendance with the patient were advised that Artificial Intelligence will be utilized during this visit to record, process the conversation to generate a clinical note, and support improvement of the AI technology. The patient (or guardian, if applicable) and other individuals in attendance at the appointment consented to the use of AI, including the recording.      History of Present Illness    The patient is an 84-year-old female who presents for a follow-up of insomnia.    Insomnia  - Reports significant improvement in sleep quality over the past month  - Adheres to a consistent sleep schedule, going to bed at 2 AM and waking up at 9 AM  - Uses melatonin as a sleep aid, which typically induces sleep within 15 to 20 minutes  - Falls asleep easily but has difficulty maintaining sleep throughout the night  - Feels well-rested upon waking  - Notes a decrease in restlessness during the night    Recent Illness  - Experienced severe diarrhea and vomiting during a cruise trip  - Required medical intervention on the ship and was diagnosed with a stomach

## 2025-05-20 RX ORDER — METOPROLOL SUCCINATE 25 MG/1
12.5 TABLET, EXTENDED RELEASE ORAL DAILY
Qty: 45 TABLET | Refills: 3 | Status: SHIPPED | OUTPATIENT
Start: 2025-05-20 | End: 2025-06-18 | Stop reason: ALTCHOICE

## 2025-05-20 NOTE — TELEPHONE ENCOUNTER
Last OV: 9/17/24  Next OV: X due yearly  Last refill: 3/4/24 #45 3 R/F  Most recent Labs: 7/8/24  Last EKG (if needed): 6/23/24

## 2025-05-30 ENCOUNTER — APPOINTMENT (OUTPATIENT)
Dept: GENERAL RADIOLOGY | Age: 85
DRG: 871 | End: 2025-05-30
Payer: MEDICARE

## 2025-05-30 ENCOUNTER — HOSPITAL ENCOUNTER (INPATIENT)
Age: 85
LOS: 5 days | Discharge: HOME HEALTH | DRG: 871 | End: 2025-06-04
Attending: EMERGENCY MEDICINE | Admitting: FAMILY MEDICINE
Payer: MEDICARE

## 2025-05-30 ENCOUNTER — APPOINTMENT (OUTPATIENT)
Age: 85
DRG: 871 | End: 2025-05-30
Attending: INTERNAL MEDICINE
Payer: MEDICARE

## 2025-05-30 ENCOUNTER — APPOINTMENT (OUTPATIENT)
Dept: CT IMAGING | Age: 85
DRG: 871 | End: 2025-05-30
Payer: MEDICARE

## 2025-05-30 DIAGNOSIS — I21.3 STEMI (ST ELEVATION MYOCARDIAL INFARCTION) (HCC): ICD-10-CM

## 2025-05-30 DIAGNOSIS — I51.81 TAKOTSUBO SYNDROME: ICD-10-CM

## 2025-05-30 DIAGNOSIS — J81.0 ACUTE PULMONARY EDEMA (HCC): ICD-10-CM

## 2025-05-30 DIAGNOSIS — I42.9 CARDIOMYOPATHY, UNSPECIFIED TYPE (HCC): Primary | ICD-10-CM

## 2025-05-30 DIAGNOSIS — A41.9 SEPSIS, DUE TO UNSPECIFIED ORGANISM, UNSPECIFIED WHETHER ACUTE ORGAN DYSFUNCTION PRESENT (HCC): ICD-10-CM

## 2025-05-30 PROBLEM — I50.31 ACUTE DIASTOLIC CHF (CONGESTIVE HEART FAILURE) (HCC): Status: ACTIVE | Noted: 2025-05-30

## 2025-05-30 LAB
ALBUMIN SERPL-MCNC: 4.1 G/DL (ref 3.4–5)
ALBUMIN/GLOB SERPL: 1.5 {RATIO} (ref 1.1–2.2)
ALP SERPL-CCNC: 100 U/L (ref 40–129)
ALT SERPL-CCNC: 19 U/L (ref 10–40)
ANION GAP SERPL CALCULATED.3IONS-SCNC: 17 MMOL/L (ref 3–16)
ANISOCYTOSIS BLD QL SMEAR: ABNORMAL
APTT BLD: 27.3 SEC (ref 22.1–36.4)
AST SERPL-CCNC: 39 U/L (ref 15–37)
BACTERIA URNS QL MICRO: NORMAL /HPF
BASE EXCESS BLDV CALC-SCNC: -1.6 MMOL/L
BASE EXCESS BLDV CALC-SCNC: -5.5 MMOL/L
BASOPHILS # BLD: 0 K/UL (ref 0–0.2)
BASOPHILS NFR BLD: 0 %
BILIRUB SERPL-MCNC: 1 MG/DL (ref 0–1)
BILIRUB UR QL STRIP.AUTO: NEGATIVE
BUN SERPL-MCNC: 11 MG/DL (ref 7–20)
CALCIUM SERPL-MCNC: 9.3 MG/DL (ref 8.3–10.6)
CHLORIDE SERPL-SCNC: 100 MMOL/L (ref 99–110)
CLARITY UR: CLEAR
CO2 BLDV-SCNC: 25 MMOL/L
CO2 BLDV-SCNC: 28 MMOL/L
CO2 SERPL-SCNC: 21 MMOL/L (ref 21–32)
COHGB MFR BLDV: 1.1 %
COHGB MFR BLDV: 1.1 %
COLOR UR: YELLOW
CREAT SERPL-MCNC: 1.3 MG/DL (ref 0.6–1.2)
DEPRECATED RDW RBC AUTO: 15.5 % (ref 12.4–15.4)
ECHO AO ASC DIAM: 3.4 CM
ECHO AO ASCENDING AORTA INDEX: 2.01 CM/M2
ECHO AO ROOT DIAM: 3.7 CM
ECHO AO ROOT INDEX: 2.19 CM/M2
ECHO AV AREA PEAK VELOCITY: 3.4 CM2
ECHO AV AREA VTI: 2.9 CM2
ECHO AV AREA/BSA PEAK VELOCITY: 2 CM2/M2
ECHO AV AREA/BSA VTI: 1.7 CM2/M2
ECHO AV MEAN GRADIENT: 2 MMHG
ECHO AV MEAN VELOCITY: 0.6 M/S
ECHO AV PEAK GRADIENT: 3 MMHG
ECHO AV PEAK VELOCITY: 0.9 M/S
ECHO AV VELOCITY RATIO: 0.78
ECHO AV VTI: 18.1 CM
ECHO BSA: 1.73 M2
ECHO LA AREA 2C: 14.5 CM2
ECHO LA AREA 4C: 17 CM2
ECHO LA MAJOR AXIS: 4.9 CM
ECHO LA MINOR AXIS: 4.4 CM
ECHO LA VOL BP: 44 ML (ref 22–52)
ECHO LA VOL MOD A2C: 38 ML (ref 22–52)
ECHO LA VOL MOD A4C: 46 ML (ref 22–52)
ECHO LA VOL/BSA BIPLANE: 26 ML/M2 (ref 16–34)
ECHO LA VOLUME INDEX MOD A2C: 22 ML/M2 (ref 16–34)
ECHO LA VOLUME INDEX MOD A4C: 27 ML/M2 (ref 16–34)
ECHO LV EF PHYSICIAN: 35 %
ECHO LV FRACTIONAL SHORTENING: 30 % (ref 28–44)
ECHO LV INTERNAL DIMENSION DIASTOLE INDEX: 2.37 CM/M2
ECHO LV INTERNAL DIMENSION DIASTOLIC: 4 CM (ref 3.9–5.3)
ECHO LV INTERNAL DIMENSION SYSTOLIC INDEX: 1.66 CM/M2
ECHO LV INTERNAL DIMENSION SYSTOLIC: 2.8 CM
ECHO LV IVSD: 1.6 CM (ref 0.6–0.9)
ECHO LV MASS 2D: 197.6 G (ref 67–162)
ECHO LV MASS INDEX 2D: 116.9 G/M2 (ref 43–95)
ECHO LV POSTERIOR WALL DIASTOLIC: 1.1 CM (ref 0.6–0.9)
ECHO LV RELATIVE WALL THICKNESS RATIO: 0.55
ECHO LVOT AREA: 4.2 CM2
ECHO LVOT AV VTI INDEX: 0.68
ECHO LVOT DIAM: 2.3 CM
ECHO LVOT MEAN GRADIENT: 1 MMHG
ECHO LVOT PEAK GRADIENT: 2 MMHG
ECHO LVOT PEAK VELOCITY: 0.7 M/S
ECHO LVOT STROKE VOLUME INDEX: 30.2 ML/M2
ECHO LVOT SV: 51.1 ML
ECHO LVOT VTI: 12.3 CM
ECHO MV AREA VTI: 4.4 CM2
ECHO MV LVOT VTI INDEX: 0.94
ECHO MV MAX VELOCITY: 0.8 M/S
ECHO MV MEAN GRADIENT: 1 MMHG
ECHO MV MEAN VELOCITY: 0.4 M/S
ECHO MV PEAK GRADIENT: 3 MMHG
ECHO MV VTI: 11.6 CM
ECHO PV MAX VELOCITY: 0.7 M/S
ECHO PV PEAK GRADIENT: 2 MMHG
ECHO RA AREA 4C: 11.7 CM2
ECHO RA END SYSTOLIC VOLUME APICAL 4 CHAMBER INDEX BSA: 17 ML/M2
ECHO RA VOLUME: 28 ML
ECHO RV BASAL DIMENSION: 3.9 CM
ECHO RV FREE WALL PEAK S': 16 CM/S
ECHO RV MID DIMENSION: 2.7 CM
ECHO RV TAPSE: 1.8 CM (ref 1.7–?)
ECHO TV REGURGITANT MAX VELOCITY: 2.57 M/S
ECHO TV REGURGITANT PEAK GRADIENT: 26 MMHG
EOSINOPHIL # BLD: 0 K/UL (ref 0–0.6)
EOSINOPHIL NFR BLD: 0 %
EPI CELLS #/AREA URNS AUTO: 1 /HPF (ref 0–5)
EST. AVERAGE GLUCOSE BLD GHB EST-MCNC: 131.2 MG/DL
FERRITIN SERPL IA-MCNC: 182 NG/ML (ref 15–150)
FLUAV + FLUBV AG NOSE IA.RAPID: NOT DETECTED
FLUAV + FLUBV AG NOSE IA.RAPID: NOT DETECTED
GFR SERPLBLD CREATININE-BSD FMLA CKD-EPI: 40 ML/MIN/{1.73_M2}
GLUCOSE SERPL-MCNC: 226 MG/DL (ref 70–99)
GLUCOSE UR STRIP.AUTO-MCNC: NEGATIVE MG/DL
HBA1C MFR BLD: 6.2 %
HCO3 BLDV-SCNC: 23 MMOL/L (ref 23–29)
HCO3 BLDV-SCNC: 26 MMOL/L (ref 23–29)
HCT VFR BLD AUTO: 50.4 % (ref 36–48)
HGB BLD-MCNC: 16.4 G/DL (ref 12–16)
HGB UR QL STRIP.AUTO: NEGATIVE
HOWELL-JOLLY BOD BLD QL SMEAR: ABNORMAL
HYALINE CASTS #/AREA URNS AUTO: 1 /LPF (ref 0–8)
INR PPP: 1.23 (ref 0.85–1.15)
IRON SATN MFR SERPL: 9 % (ref 15–50)
IRON SERPL-MCNC: 22 UG/DL (ref 37–145)
KETONES UR STRIP.AUTO-MCNC: NEGATIVE MG/DL
LACTATE BLDV-SCNC: 2.7 MMOL/L (ref 0.4–1.9)
LACTATE BLDV-SCNC: 3.1 MMOL/L (ref 0.4–2)
LACTATE BLDV-SCNC: 4 MMOL/L (ref 0.4–1.9)
LEUKOCYTE ESTERASE UR QL STRIP.AUTO: NEGATIVE
LYMPHOCYTES # BLD: 37.8 K/UL (ref 1–5.1)
LYMPHOCYTES NFR BLD: 85 %
MAGNESIUM SERPL-MCNC: 1.97 MG/DL (ref 1.8–2.4)
MCH RBC QN AUTO: 31.3 PG (ref 26–34)
MCHC RBC AUTO-ENTMCNC: 32.5 G/DL (ref 31–36)
MCV RBC AUTO: 96.3 FL (ref 80–100)
METHGB MFR BLDV: 0.6 %
METHGB MFR BLDV: 0.8 %
MONOCYTES # BLD: 0.9 K/UL (ref 0–1.3)
MONOCYTES NFR BLD: 2 %
NEUTROPHILS # BLD: 5.8 K/UL (ref 1.7–7.7)
NEUTROPHILS NFR BLD: 13 %
NITRITE UR QL STRIP.AUTO: NEGATIVE
NT-PROBNP SERPL-MCNC: 3254 PG/ML (ref 0–449)
O2 THERAPY: ABNORMAL
O2 THERAPY: ABNORMAL
PATH INTERP BLD-IMP: NO
PCO2 BLDV: 54.3 MMHG (ref 40–50)
PCO2 BLDV: 57.2 MMHG (ref 40–50)
PH BLDV: 7.22 [PH] (ref 7.35–7.45)
PH BLDV: 7.29 [PH] (ref 7.35–7.45)
PH UR STRIP.AUTO: 5 [PH] (ref 5–8)
PLATELET # BLD AUTO: 290 K/UL (ref 135–450)
PMV BLD AUTO: 9.1 FL (ref 5–10.5)
PO2 BLDV: 50 MMHG
PO2 BLDV: 52 MMHG
POTASSIUM SERPL-SCNC: 3.9 MMOL/L (ref 3.5–5.1)
PROCALCITONIN SERPL IA-MCNC: 0.1 NG/ML (ref 0–0.15)
PROT SERPL-MCNC: 6.8 G/DL (ref 6.4–8.2)
PROT UR STRIP.AUTO-MCNC: ABNORMAL MG/DL
PROTHROMBIN TIME: 15.7 SEC (ref 11.9–14.9)
RBC # BLD AUTO: 5.24 M/UL (ref 4–5.2)
RBC CLUMPS #/AREA URNS AUTO: 1 /HPF (ref 0–4)
REASON FOR REJECTION: NORMAL
REJECTED TEST: NORMAL
SAO2 % BLDV: 77 %
SAO2 % BLDV: 84 %
SARS-COV-2 RDRP RESP QL NAA+PROBE: NOT DETECTED
SMUDGE CELLS BLD QL SMEAR: PRESENT
SODIUM SERPL-SCNC: 138 MMOL/L (ref 136–145)
SP GR UR STRIP.AUTO: 1.02 (ref 1–1.03)
TIBC SERPL-MCNC: 258 UG/DL (ref 260–445)
TROPONIN, HIGH SENSITIVITY: 57 NG/L (ref 0–14)
UA COMPLETE W REFLEX CULTURE PNL UR: ABNORMAL
UA DIPSTICK W REFLEX MICRO PNL UR: YES
URN SPEC COLLECT METH UR: ABNORMAL
UROBILINOGEN UR STRIP-ACNC: 0.2 E.U./DL
WBC # BLD AUTO: 44.5 K/UL (ref 4–11)
WBC #/AREA URNS AUTO: 1 /HPF (ref 0–5)

## 2025-05-30 PROCEDURE — 71260 CT THORAX DX C+: CPT

## 2025-05-30 PROCEDURE — 6370000000 HC RX 637 (ALT 250 FOR IP): Performed by: FAMILY MEDICINE

## 2025-05-30 PROCEDURE — 83880 ASSAY OF NATRIURETIC PEPTIDE: CPT

## 2025-05-30 PROCEDURE — 83735 ASSAY OF MAGNESIUM: CPT

## 2025-05-30 PROCEDURE — 83036 HEMOGLOBIN GLYCOSYLATED A1C: CPT

## 2025-05-30 PROCEDURE — 83605 ASSAY OF LACTIC ACID: CPT

## 2025-05-30 PROCEDURE — 93005 ELECTROCARDIOGRAM TRACING: CPT | Performed by: EMERGENCY MEDICINE

## 2025-05-30 PROCEDURE — 94640 AIRWAY INHALATION TREATMENT: CPT

## 2025-05-30 PROCEDURE — 51798 US URINE CAPACITY MEASURE: CPT

## 2025-05-30 PROCEDURE — 6360000002 HC RX W HCPCS: Performed by: INTERNAL MEDICINE

## 2025-05-30 PROCEDURE — 2580000003 HC RX 258: Performed by: PHYSICIAN ASSISTANT

## 2025-05-30 PROCEDURE — 94660 CPAP INITIATION&MGMT: CPT

## 2025-05-30 PROCEDURE — 2500000003 HC RX 250 WO HCPCS: Performed by: FAMILY MEDICINE

## 2025-05-30 PROCEDURE — 94669 MECHANICAL CHEST WALL OSCILL: CPT

## 2025-05-30 PROCEDURE — 96375 TX/PRO/DX INJ NEW DRUG ADDON: CPT

## 2025-05-30 PROCEDURE — 2700000000 HC OXYGEN THERAPY PER DAY

## 2025-05-30 PROCEDURE — 87641 MR-STAPH DNA AMP PROBE: CPT

## 2025-05-30 PROCEDURE — 85025 COMPLETE CBC W/AUTO DIFF WBC: CPT

## 2025-05-30 PROCEDURE — 2500000003 HC RX 250 WO HCPCS: Performed by: INTERNAL MEDICINE

## 2025-05-30 PROCEDURE — 2580000003 HC RX 258: Performed by: FAMILY MEDICINE

## 2025-05-30 PROCEDURE — 93306 TTE W/DOPPLER COMPLETE: CPT | Performed by: INTERNAL MEDICINE

## 2025-05-30 PROCEDURE — 6360000004 HC RX CONTRAST MEDICATION: Performed by: PHYSICIAN ASSISTANT

## 2025-05-30 PROCEDURE — 6370000000 HC RX 637 (ALT 250 FOR IP): Performed by: PHYSICIAN ASSISTANT

## 2025-05-30 PROCEDURE — 6360000002 HC RX W HCPCS: Performed by: FAMILY MEDICINE

## 2025-05-30 PROCEDURE — 82803 BLOOD GASES ANY COMBINATION: CPT

## 2025-05-30 PROCEDURE — 84145 PROCALCITONIN (PCT): CPT

## 2025-05-30 PROCEDURE — 81001 URINALYSIS AUTO W/SCOPE: CPT

## 2025-05-30 PROCEDURE — 87502 INFLUENZA DNA AMP PROBE: CPT

## 2025-05-30 PROCEDURE — 74177 CT ABD & PELVIS W/CONTRAST: CPT

## 2025-05-30 PROCEDURE — 87150 DNA/RNA AMPLIFIED PROBE: CPT

## 2025-05-30 PROCEDURE — 99291 CRITICAL CARE FIRST HOUR: CPT | Performed by: INTERNAL MEDICINE

## 2025-05-30 PROCEDURE — 83550 IRON BINDING TEST: CPT

## 2025-05-30 PROCEDURE — 36415 COLL VENOUS BLD VENIPUNCTURE: CPT

## 2025-05-30 PROCEDURE — 82728 ASSAY OF FERRITIN: CPT

## 2025-05-30 PROCEDURE — 80053 COMPREHEN METABOLIC PANEL: CPT

## 2025-05-30 PROCEDURE — 85610 PROTHROMBIN TIME: CPT

## 2025-05-30 PROCEDURE — 71045 X-RAY EXAM CHEST 1 VIEW: CPT

## 2025-05-30 PROCEDURE — 87077 CULTURE AEROBIC IDENTIFY: CPT

## 2025-05-30 PROCEDURE — 6360000002 HC RX W HCPCS: Performed by: EMERGENCY MEDICINE

## 2025-05-30 PROCEDURE — 93306 TTE W/DOPPLER COMPLETE: CPT

## 2025-05-30 PROCEDURE — 5A09357 ASSISTANCE WITH RESPIRATORY VENTILATION, LESS THAN 24 CONSECUTIVE HOURS, CONTINUOUS POSITIVE AIRWAY PRESSURE: ICD-10-PCS | Performed by: STUDENT IN AN ORGANIZED HEALTH CARE EDUCATION/TRAINING PROGRAM

## 2025-05-30 PROCEDURE — 87040 BLOOD CULTURE FOR BACTERIA: CPT

## 2025-05-30 PROCEDURE — 96374 THER/PROPH/DIAG INJ IV PUSH: CPT

## 2025-05-30 PROCEDURE — 6360000002 HC RX W HCPCS: Performed by: PHYSICIAN ASSISTANT

## 2025-05-30 PROCEDURE — 84484 ASSAY OF TROPONIN QUANT: CPT

## 2025-05-30 PROCEDURE — 2100000000 HC CCU R&B

## 2025-05-30 PROCEDURE — 83540 ASSAY OF IRON: CPT

## 2025-05-30 PROCEDURE — 87635 SARS-COV-2 COVID-19 AMP PRB: CPT

## 2025-05-30 PROCEDURE — 99285 EMERGENCY DEPT VISIT HI MDM: CPT

## 2025-05-30 PROCEDURE — 85730 THROMBOPLASTIN TIME PARTIAL: CPT

## 2025-05-30 PROCEDURE — 94761 N-INVAS EAR/PLS OXIMETRY MLT: CPT

## 2025-05-30 RX ORDER — ASPIRIN 300 MG/1
300 SUPPOSITORY RECTAL ONCE
Status: COMPLETED | OUTPATIENT
Start: 2025-05-30 | End: 2025-05-30

## 2025-05-30 RX ORDER — ONDANSETRON 4 MG/1
4 TABLET, ORALLY DISINTEGRATING ORAL EVERY 8 HOURS PRN
Status: DISCONTINUED | OUTPATIENT
Start: 2025-05-30 | End: 2025-06-04 | Stop reason: HOSPADM

## 2025-05-30 RX ORDER — MAGNESIUM SULFATE IN WATER 40 MG/ML
2000 INJECTION, SOLUTION INTRAVENOUS PRN
Status: DISCONTINUED | OUTPATIENT
Start: 2025-05-30 | End: 2025-05-30

## 2025-05-30 RX ORDER — ENOXAPARIN SODIUM 100 MG/ML
40 INJECTION SUBCUTANEOUS DAILY
Status: CANCELLED | OUTPATIENT
Start: 2025-05-30

## 2025-05-30 RX ORDER — ACETAMINOPHEN 650 MG/1
650 SUPPOSITORY RECTAL EVERY 6 HOURS PRN
Status: DISCONTINUED | OUTPATIENT
Start: 2025-05-30 | End: 2025-06-04 | Stop reason: HOSPADM

## 2025-05-30 RX ORDER — ONDANSETRON 2 MG/ML
4 INJECTION INTRAMUSCULAR; INTRAVENOUS ONCE
Status: COMPLETED | OUTPATIENT
Start: 2025-05-30 | End: 2025-05-30

## 2025-05-30 RX ORDER — ONDANSETRON 2 MG/ML
4 INJECTION INTRAMUSCULAR; INTRAVENOUS EVERY 6 HOURS PRN
Status: DISCONTINUED | OUTPATIENT
Start: 2025-05-30 | End: 2025-06-04 | Stop reason: HOSPADM

## 2025-05-30 RX ORDER — BUDESONIDE 0.5 MG/2ML
0.5 INHALANT ORAL
Status: DISCONTINUED | OUTPATIENT
Start: 2025-05-30 | End: 2025-06-04 | Stop reason: HOSPADM

## 2025-05-30 RX ORDER — ACETAMINOPHEN 325 MG/1
650 TABLET ORAL ONCE
Status: COMPLETED | OUTPATIENT
Start: 2025-05-30 | End: 2025-05-30

## 2025-05-30 RX ORDER — POLYETHYLENE GLYCOL 3350 17 G/17G
17 POWDER, FOR SOLUTION ORAL DAILY PRN
Status: DISCONTINUED | OUTPATIENT
Start: 2025-05-30 | End: 2025-06-04 | Stop reason: HOSPADM

## 2025-05-30 RX ORDER — IPRATROPIUM BROMIDE AND ALBUTEROL SULFATE 2.5; .5 MG/3ML; MG/3ML
1 SOLUTION RESPIRATORY (INHALATION)
Status: DISCONTINUED | OUTPATIENT
Start: 2025-05-30 | End: 2025-06-04 | Stop reason: HOSPADM

## 2025-05-30 RX ORDER — FUROSEMIDE 10 MG/ML
60 INJECTION INTRAMUSCULAR; INTRAVENOUS ONCE
Status: COMPLETED | OUTPATIENT
Start: 2025-05-30 | End: 2025-05-30

## 2025-05-30 RX ORDER — SODIUM CHLORIDE 9 MG/ML
INJECTION, SOLUTION INTRAVENOUS PRN
Status: DISCONTINUED | OUTPATIENT
Start: 2025-05-30 | End: 2025-06-04 | Stop reason: HOSPADM

## 2025-05-30 RX ORDER — POTASSIUM CHLORIDE 1500 MG/1
40 TABLET, EXTENDED RELEASE ORAL PRN
Status: DISCONTINUED | OUTPATIENT
Start: 2025-05-30 | End: 2025-05-30

## 2025-05-30 RX ORDER — SODIUM CHLORIDE 0.9 % (FLUSH) 0.9 %
5-40 SYRINGE (ML) INJECTION PRN
Status: DISCONTINUED | OUTPATIENT
Start: 2025-05-30 | End: 2025-06-04 | Stop reason: HOSPADM

## 2025-05-30 RX ORDER — METHYLPREDNISOLONE SODIUM SUCCINATE 40 MG/ML
20 INJECTION INTRAMUSCULAR; INTRAVENOUS DAILY
Status: DISCONTINUED | OUTPATIENT
Start: 2025-05-30 | End: 2025-05-30

## 2025-05-30 RX ORDER — WATER 10 ML/10ML
INJECTION INTRAMUSCULAR; INTRAVENOUS; SUBCUTANEOUS
Status: DISCONTINUED
Start: 2025-05-30 | End: 2025-05-30 | Stop reason: WASHOUT

## 2025-05-30 RX ORDER — SODIUM CHLORIDE 0.9 % (FLUSH) 0.9 %
5-40 SYRINGE (ML) INJECTION EVERY 12 HOURS SCHEDULED
Status: DISCONTINUED | OUTPATIENT
Start: 2025-05-30 | End: 2025-06-04 | Stop reason: HOSPADM

## 2025-05-30 RX ORDER — AMIODARONE HYDROCHLORIDE 200 MG/1
200 TABLET ORAL DAILY
Status: DISCONTINUED | OUTPATIENT
Start: 2025-05-30 | End: 2025-06-04 | Stop reason: HOSPADM

## 2025-05-30 RX ORDER — IOPAMIDOL 755 MG/ML
75 INJECTION, SOLUTION INTRAVASCULAR
Status: COMPLETED | OUTPATIENT
Start: 2025-05-30 | End: 2025-05-30

## 2025-05-30 RX ORDER — PHENYLEPHRINE HCL IN 0.9% NACL 50MG/250ML
10-300 PLASTIC BAG, INJECTION (ML) INTRAVENOUS CONTINUOUS
Status: DISCONTINUED | OUTPATIENT
Start: 2025-05-30 | End: 2025-06-02

## 2025-05-30 RX ORDER — ACETAMINOPHEN 325 MG/1
650 TABLET ORAL EVERY 6 HOURS PRN
Status: DISCONTINUED | OUTPATIENT
Start: 2025-05-30 | End: 2025-06-04 | Stop reason: HOSPADM

## 2025-05-30 RX ORDER — POTASSIUM CHLORIDE 7.45 MG/ML
10 INJECTION INTRAVENOUS PRN
Status: DISCONTINUED | OUTPATIENT
Start: 2025-05-30 | End: 2025-05-30

## 2025-05-30 RX ADMIN — CEFEPIME 1000 MG: 1 INJECTION, POWDER, FOR SOLUTION INTRAMUSCULAR; INTRAVENOUS at 20:23

## 2025-05-30 RX ADMIN — AMIODARONE HYDROCHLORIDE 200 MG: 200 TABLET ORAL at 11:05

## 2025-05-30 RX ADMIN — IPRATROPIUM BROMIDE AND ALBUTEROL SULFATE 1 DOSE: .5; 2.5 SOLUTION RESPIRATORY (INHALATION) at 19:43

## 2025-05-30 RX ADMIN — ONDANSETRON 4 MG: 2 INJECTION, SOLUTION INTRAMUSCULAR; INTRAVENOUS at 07:20

## 2025-05-30 RX ADMIN — METHYLPREDNISOLONE SODIUM SUCCINATE 20 MG: 40 INJECTION, POWDER, LYOPHILIZED, FOR SOLUTION INTRAMUSCULAR; INTRAVENOUS at 12:03

## 2025-05-30 RX ADMIN — BUDESONIDE 500 MCG: 0.5 INHALANT RESPIRATORY (INHALATION) at 11:43

## 2025-05-30 RX ADMIN — SODIUM CHLORIDE, PRESERVATIVE FREE 10 ML: 5 INJECTION INTRAVENOUS at 19:59

## 2025-05-30 RX ADMIN — ASPIRIN 300 MG: 300 SUPPOSITORY RECTAL at 07:22

## 2025-05-30 RX ADMIN — APIXABAN 5 MG: 5 TABLET, FILM COATED ORAL at 20:20

## 2025-05-30 RX ADMIN — ACETAMINOPHEN 650 MG: 325 TABLET ORAL at 08:23

## 2025-05-30 RX ADMIN — APIXABAN 5 MG: 5 TABLET, FILM COATED ORAL at 11:05

## 2025-05-30 RX ADMIN — BUDESONIDE 500 MCG: 0.5 INHALANT RESPIRATORY (INHALATION) at 19:43

## 2025-05-30 RX ADMIN — SACUBITRIL AND VALSARTAN 0.5 TABLET: 24; 26 TABLET, FILM COATED ORAL at 11:04

## 2025-05-30 RX ADMIN — Medication 30 MCG/MIN: at 21:05

## 2025-05-30 RX ADMIN — NITROGLYCERIN 0.5 INCH: 20 OINTMENT TOPICAL at 07:27

## 2025-05-30 RX ADMIN — FUROSEMIDE 60 MG: 10 INJECTION, SOLUTION INTRAMUSCULAR; INTRAVENOUS at 07:26

## 2025-05-30 RX ADMIN — ACETAMINOPHEN 650 MG: 325 TABLET ORAL at 12:02

## 2025-05-30 RX ADMIN — IPRATROPIUM BROMIDE AND ALBUTEROL SULFATE 1 DOSE: .5; 2.5 SOLUTION RESPIRATORY (INHALATION) at 15:56

## 2025-05-30 RX ADMIN — CEFEPIME 2000 MG: 2 INJECTION, POWDER, FOR SOLUTION INTRAVENOUS at 08:23

## 2025-05-30 RX ADMIN — SODIUM CHLORIDE 1500 MG: 0.9 INJECTION, SOLUTION INTRAVENOUS at 09:24

## 2025-05-30 RX ADMIN — IPRATROPIUM BROMIDE AND ALBUTEROL SULFATE 1 DOSE: .5; 2.5 SOLUTION RESPIRATORY (INHALATION) at 11:43

## 2025-05-30 RX ADMIN — IOPAMIDOL 75 ML: 755 INJECTION, SOLUTION INTRAVENOUS at 09:46

## 2025-05-30 ASSESSMENT — PAIN SCALES - GENERAL
PAINLEVEL_OUTOF10: 2
PAINLEVEL_OUTOF10: 0
PAINLEVEL_OUTOF10: 0
PAINLEVEL_OUTOF10: 4

## 2025-05-30 ASSESSMENT — PAIN DESCRIPTION - LOCATION
LOCATION: HEAD
LOCATION: HEAD

## 2025-05-30 ASSESSMENT — PAIN DESCRIPTION - FREQUENCY
FREQUENCY: CONTINUOUS
FREQUENCY: CONTINUOUS

## 2025-05-30 ASSESSMENT — PAIN DESCRIPTION - PAIN TYPE
TYPE: ACUTE PAIN
TYPE: ACUTE PAIN

## 2025-05-30 ASSESSMENT — PAIN DESCRIPTION - ONSET
ONSET: ON-GOING
ONSET: ON-GOING

## 2025-05-30 ASSESSMENT — PAIN DESCRIPTION - ORIENTATION
ORIENTATION: ANTERIOR
ORIENTATION: ANTERIOR

## 2025-05-30 ASSESSMENT — PAIN DESCRIPTION - DESCRIPTORS
DESCRIPTORS: ACHING
DESCRIPTORS: ACHING

## 2025-05-30 NOTE — H&P
V2.0  History and Physical      Name:  Anika Rudolph /Age/Sex: 1940  (84 y.o. female)   MRN & CSN:  [id number] & [id number] Encounter Date/Time: 2025 9:05 AM EDT   Location:  [facility] PCP: Josselyn Vargas APRN - CNP       Hospital Day: 1    Assessment and Plan:   Anika Rudolph is a 84 y.o. female with a pmh of HLD, HTN, factor V Leiden A-fib CLL, COPD, remote history of MI who presents with Acute diastolic CHF (congestive heart failure) ([facility])    Hospital Problems           Last Modified POA    * (Principal) Acute diastolic CHF (congestive heart failure) ([facility]) 2025 Yes       Plan:  Acute on chronic congestive heart failure  Presenting with chest pain, shortness of breath  Presenting with troponin 57, will trend, proBNP 3254  Placed on continuous BiPAP  HEBER's, daily weights  Will obtain a cardiac echo  Cardiology on board and following      2.  Multifocal pneumonia  Presented with sepsis-like picture  30 mg/kg resuscitation needed, but patient is in congestive heart failure which limits the fluids  WBC 44.5 lactic acid of 4.0, temperature 101.6, respiration 32, blood pressure 86/54  CT chest shows signs of pulmonary hypertension, multiple bilateral ground glass solid pulmonary nodules could be due to mets versus atypical infection.  Patient does have history of CLL  Vancomycin with pharmacy consult to dose  Cefepime 1 g every 12 hours  Blood cultures  Pulmonology consult      3.  BRENNAN  Presenting with creatinine 1.3, GFR 40  Last known labs on 2024 show creatinine of 0.7, GFR 84  Avoid hypotension, avoid nephrotoxic agent  Follow monitor kidney function      4.  Acute respiratory failure  Most likely in setting of congestive heart failure versus pneumonia  Continuous BiPAP as tolerated  DuoNeb breathing treatments  Budesonide breathing treatments  Pulmonology on board      5.  Atrial fibrillation  Most likely paroxysmal  Currently in sinus tach with heart rate 117  Amio 200 mg

## 2025-05-30 NOTE — CARE COORDINATION
Chart Reviewed.  Pt has pcp with recent visit.  Pt has insurance.  Pt came from home.  Following for DC needs.  DEENA Melissa     Case Management   [phone]    5/30/2025  4:33 PM

## 2025-05-30 NOTE — ED PROVIDER NOTES
ED Attending Attestation Note    This patient was seen by the advanced practice provider.     I personally saw the patient. Management plan and care decisions have been discussed with me and I made/approved the management plan and take responsibility for patient management.     Briefly, 84 y.o. female presents with shortness of breath.  States she is having shortness of breath, feeling like her heart is racing and having some chest tightness that started yesterday.  She is been feeling unwell for the past several days and has been having some nausea vomiting.  She appeared ill, pale and diaphoretic for EMS and EMS computer interpretation read \"STEMI\".    Focused exam:   Gen: 84 y.o. female, NAD, nontoxic appearing  HEENT: NCAT. MMM, PERRL. EOMI.   CV: RRR w/o MRG  Vascular: intact and symmetric radial and DP pulses bilaterally  Lungs: CTAB. No incr WOB.   Abdomen: Soft, nontender, nondistended. No rebound/guarding.   Neuro: awake and alert, speech clear w/o aphasia; intact and symmetric strength and sensation in all 4 extremities, CN 2-12 intact bilaterally    MDM:   84 y.o. female with history of atrial fibrillation on Eliquis and amiodarone presents with shortness of breath.  States she is having shortness of breath, feeling like her heart is racing and having some chest tightness that started yesterday.  She is been feeling unwell for the past several days and has been having some nausea vomiting.  She appeared ill, pale and diaphoretic for EMS and EMS computer interpretation read \"STEMI\".    EMS EKG showed significant motion artifact with diffuse nonspecific ST changes without any reciprocal changes did not meet STEMI criteria.  However, it was significantly different from her baseline EKG from 6/23/2024.  EKG obtained upon arrival to the emergency department showed sinus tachycardia with rate of 117, right bundle branch block, intervals: , , QTc 471, diffuse nonspecific ST changes without

## 2025-05-30 NOTE — ED PROVIDER NOTES
[facility] EMERGENCY DEPARTMENT  EMERGENCY DEPARTMENT ENCOUNTER        Pt Name: Anika Rudolph  MRN: [id number]  Birthdate 1940  Date of evaluation: 5/30/2025  Provider: KENA Torres  PCP: Josselyn Vargas APRN - CNP  Note Started: 8:10 AM EDT 5/30/25       I have seen and evaluated this patient with my supervising physician Julianna Griffith MD.      CHIEF COMPLAINT       Chief Complaint   Patient presents with    Chest Pain     Pt arrives ems from home c/o chest tightness started yesterday shortness of breath. Hx of heart issues.     Shortness of Breath       HISTORY OF PRESENT ILLNESS: 1 or more Elements     History From: Patient/EMS  Limitations to history : Resp Distress    Anika Rudolph is a 84 y.o. female with past medical history of emphysema, hyperlipidemia, hypertension, history of factor V Leiden, atrial fibrillation on Eliquis, CHF, CLL who presents ED with complaint of shortness of breath.  She reports been feeling poorly for the past couple days.  Symptoms worsened yesterday.  Increasing shortness of breath and some feelings of chest tightness.  EMS called and she was brought to the ED for further evaluation treatment.  On arrival patient tachypneic and hypoxic.  She has increased work of breathing.  She is hard of hearing.  She denies any chest pain.  Denies back pain or flank pain.  Denies abdominal pain.  She reports nausea but denies vomiting.  She denies lightheadedness or dizziness.  Does report diaphoresis.  No syncope or near syncope.  No orthopnea, pedal edema or calf tenderness.  No urinary symptoms or changes in bowel movements.  She is anticoagulated.  Reports she took her anticoagulation this morning.  She follows up with cardiologist, Dr. Mckay.     Nursing Notes were all reviewed and agreed with or any disagreements were addressed in the HPI.    REVIEW OF SYSTEMS :      Review of Systems   Constitutional:  Positive for activity change and

## 2025-05-30 NOTE — PLAN OF CARE
Problem: Chronic Conditions and Co-morbidities  Goal: Patient's chronic conditions and co-morbidity symptoms are monitored and maintained or improved  Outcome: Progressing  Flowsheets (Taken 5/30/2025 1251)  Care Plan - Patient's Chronic Conditions and Co-Morbidity Symptoms are Monitored and Maintained or Improved:   Collaborate with multidisciplinary team to address chronic and comorbid conditions and prevent exacerbation or deterioration   Update acute care plan with appropriate goals if chronic or comorbid symptoms are exacerbated and prevent overall improvement and discharge   Monitor and assess patient's chronic conditions and comorbid symptoms for stability, deterioration, or improvement     Problem: Skin/Tissue Integrity  Goal: Skin integrity remains intact  Description: 1.  Monitor for areas of redness and/or skin breakdown2.  Assess vascular access sites hourly3.  Every 4-6 hours minimum:  Change oxygen saturation probe site4.  Every 4-6 hours:  If on nasal continuous positive airway pressure, respiratory therapy assess nares and determine need for appliance change or resting period  Outcome: Progressing  Flowsheets (Taken 5/30/2025 1251)  Skin Integrity Remains Intact:   Monitor for areas of redness and/or skin breakdown   Turn and reposition as indicated   Positioning devices   Pressure redistribution bed/mattress (bed type)   Monitor skin under medical devices     Problem: Safety - Adult  Goal: Free from fall injury  Outcome: Progressing  Flowsheets (Taken 5/30/2025 1251)  Free From Fall Injury: Instruct family/caregiver on patient safety     Problem: Pain  Goal: Verbalizes/displays adequate comfort level or baseline comfort level  Outcome: Progressing  Flowsheets (Taken 5/30/2025 1251)  Verbalizes/displays adequate comfort level or baseline comfort level:   Encourage patient to monitor pain and request assistance   Administer analgesics based on type and severity of pain and evaluate response   Assess

## 2025-05-31 ENCOUNTER — APPOINTMENT (OUTPATIENT)
Dept: GENERAL RADIOLOGY | Age: 85
DRG: 871 | End: 2025-05-31
Payer: MEDICARE

## 2025-05-31 LAB
ALBUMIN SERPL-MCNC: 3.6 G/DL (ref 3.4–5)
ALBUMIN/GLOB SERPL: 1.3 {RATIO} (ref 1.1–2.2)
ALP SERPL-CCNC: 80 U/L (ref 40–129)
ALT SERPL-CCNC: 22 U/L (ref 10–40)
ANION GAP SERPL CALCULATED.3IONS-SCNC: 12 MMOL/L (ref 3–16)
AST SERPL-CCNC: 43 U/L (ref 15–37)
BASE EXCESS BLDA CALC-SCNC: 0.6 MMOL/L (ref -3–3)
BILIRUB DIRECT SERPL-MCNC: <0.1 MG/DL (ref 0–0.3)
BILIRUB INDIRECT SERPL-MCNC: 0.3 MG/DL (ref 0–1)
BILIRUB SERPL-MCNC: 0.4 MG/DL (ref 0–1)
BUN SERPL-MCNC: 19 MG/DL (ref 7–20)
CALCIUM SERPL-MCNC: 9.1 MG/DL (ref 8.3–10.6)
CHLORIDE SERPL-SCNC: 102 MMOL/L (ref 99–110)
CHOLEST SERPL-MCNC: 252 MG/DL (ref 0–199)
CO2 BLDA-SCNC: 28 MMOL/L
CO2 SERPL-SCNC: 26 MMOL/L (ref 21–32)
COHGB MFR BLDA: 0.2 % (ref 0–1.5)
CREAT SERPL-MCNC: 1.3 MG/DL (ref 0.6–1.2)
DEPRECATED RDW RBC AUTO: 15.5 % (ref 12.4–15.4)
EKG ATRIAL RATE: 117 BPM
EKG DIAGNOSIS: NORMAL
EKG P-R INTERVAL: 192 MS
EKG Q-T INTERVAL: 338 MS
EKG QRS DURATION: 138 MS
EKG QTC CALCULATION (BAZETT): 471 MS
EKG R AXIS: 269 DEGREES
EKG T AXIS: 44 DEGREES
EKG VENTRICULAR RATE: 117 BPM
GFR SERPLBLD CREATININE-BSD FMLA CKD-EPI: 40 ML/MIN/{1.73_M2}
GLUCOSE BLD-MCNC: 142 MG/DL (ref 70–99)
GLUCOSE BLD-MCNC: 153 MG/DL (ref 70–99)
GLUCOSE BLD-MCNC: 248 MG/DL (ref 70–99)
GLUCOSE BLD-MCNC: 94 MG/DL (ref 70–99)
GLUCOSE SERPL-MCNC: 105 MG/DL (ref 70–99)
HCO3 BLDA-SCNC: 26.6 MMOL/L (ref 21–29)
HCT VFR BLD AUTO: 46.1 % (ref 36–48)
HDLC SERPL-MCNC: 70 MG/DL (ref 40–60)
HGB BLD-MCNC: 14.8 G/DL (ref 12–16)
HGB BLDA-MCNC: 14.7 G/DL (ref 12–16)
IGA SERPL-MCNC: 192 MG/DL (ref 70–400)
IGG SERPL-MCNC: 826 MG/DL (ref 700–1600)
IGM SERPL-MCNC: 31.4 MG/DL (ref 40–230)
LACTATE BLDV-SCNC: 2 MMOL/L (ref 0.4–2)
LDLC SERPL CALC-MCNC: 166 MG/DL
MAGNESIUM SERPL-MCNC: 2.2 MG/DL (ref 1.8–2.4)
MCH RBC QN AUTO: 31.1 PG (ref 26–34)
MCHC RBC AUTO-ENTMCNC: 32.2 G/DL (ref 31–36)
MCV RBC AUTO: 96.6 FL (ref 80–100)
METHGB MFR BLDA: 0.6 %
MRSA DNA SPEC QL NAA+PROBE: NORMAL
O2 THERAPY: ABNORMAL
PCO2 BLDA: 46.6 MMHG (ref 35–45)
PERFORMED ON: ABNORMAL
PERFORMED ON: NORMAL
PH BLDA: 7.36 [PH] (ref 7.35–7.45)
PLATELET # BLD AUTO: 250 K/UL (ref 135–450)
PMV BLD AUTO: 8.6 FL (ref 5–10.5)
PO2 BLDA: 146 MMHG (ref 75–108)
POTASSIUM SERPL-SCNC: 5 MMOL/L (ref 3.5–5.1)
PROT SERPL-MCNC: 6.4 G/DL (ref 6.4–8.2)
RBC # BLD AUTO: 4.77 M/UL (ref 4–5.2)
REPORT: NORMAL
SAO2 % BLDA: 98.3 %
SODIUM SERPL-SCNC: 140 MMOL/L (ref 136–145)
TRIGL SERPL-MCNC: 78 MG/DL (ref 0–150)
TROPONIN, HIGH SENSITIVITY: 220 NG/L (ref 0–14)
VANCOMYCIN SERPL-MCNC: 11.6 UG/ML
VLDLC SERPL CALC-MCNC: 16 MG/DL
WBC # BLD AUTO: 38.8 K/UL (ref 4–11)

## 2025-05-31 PROCEDURE — 93010 ELECTROCARDIOGRAM REPORT: CPT | Performed by: STUDENT IN AN ORGANIZED HEALTH CARE EDUCATION/TRAINING PROGRAM

## 2025-05-31 PROCEDURE — 2500000003 HC RX 250 WO HCPCS: Performed by: FAMILY MEDICINE

## 2025-05-31 PROCEDURE — 87040 BLOOD CULTURE FOR BACTERIA: CPT

## 2025-05-31 PROCEDURE — 2580000003 HC RX 258: Performed by: INTERNAL MEDICINE

## 2025-05-31 PROCEDURE — 83605 ASSAY OF LACTIC ACID: CPT

## 2025-05-31 PROCEDURE — 94669 MECHANICAL CHEST WALL OSCILL: CPT

## 2025-05-31 PROCEDURE — 36415 COLL VENOUS BLD VENIPUNCTURE: CPT

## 2025-05-31 PROCEDURE — 94640 AIRWAY INHALATION TREATMENT: CPT

## 2025-05-31 PROCEDURE — 51702 INSERT TEMP BLADDER CATH: CPT

## 2025-05-31 PROCEDURE — 80202 ASSAY OF VANCOMYCIN: CPT

## 2025-05-31 PROCEDURE — 2580000003 HC RX 258: Performed by: FAMILY MEDICINE

## 2025-05-31 PROCEDURE — 6360000002 HC RX W HCPCS: Performed by: FAMILY MEDICINE

## 2025-05-31 PROCEDURE — 93005 ELECTROCARDIOGRAM TRACING: CPT | Performed by: INTERNAL MEDICINE

## 2025-05-31 PROCEDURE — 5A0935A ASSISTANCE WITH RESPIRATORY VENTILATION, LESS THAN 24 CONSECUTIVE HOURS, HIGH NASAL FLOW/VELOCITY: ICD-10-PCS | Performed by: STUDENT IN AN ORGANIZED HEALTH CARE EDUCATION/TRAINING PROGRAM

## 2025-05-31 PROCEDURE — 6360000002 HC RX W HCPCS: Performed by: INTERNAL MEDICINE

## 2025-05-31 PROCEDURE — 6370000000 HC RX 637 (ALT 250 FOR IP): Performed by: STUDENT IN AN ORGANIZED HEALTH CARE EDUCATION/TRAINING PROGRAM

## 2025-05-31 PROCEDURE — 2700000000 HC OXYGEN THERAPY PER DAY

## 2025-05-31 PROCEDURE — 80053 COMPREHEN METABOLIC PANEL: CPT

## 2025-05-31 PROCEDURE — 80061 LIPID PANEL: CPT

## 2025-05-31 PROCEDURE — 6370000000 HC RX 637 (ALT 250 FOR IP): Performed by: FAMILY MEDICINE

## 2025-05-31 PROCEDURE — 82784 ASSAY IGA/IGD/IGG/IGM EACH: CPT

## 2025-05-31 PROCEDURE — 71045 X-RAY EXAM CHEST 1 VIEW: CPT

## 2025-05-31 PROCEDURE — 82248 BILIRUBIN DIRECT: CPT

## 2025-05-31 PROCEDURE — 2100000000 HC CCU R&B

## 2025-05-31 PROCEDURE — 94761 N-INVAS EAR/PLS OXIMETRY MLT: CPT

## 2025-05-31 PROCEDURE — 2500000003 HC RX 250 WO HCPCS: Performed by: INTERNAL MEDICINE

## 2025-05-31 PROCEDURE — 99233 SBSQ HOSP IP/OBS HIGH 50: CPT | Performed by: STUDENT IN AN ORGANIZED HEALTH CARE EDUCATION/TRAINING PROGRAM

## 2025-05-31 PROCEDURE — 51798 US URINE CAPACITY MEASURE: CPT

## 2025-05-31 PROCEDURE — 84484 ASSAY OF TROPONIN QUANT: CPT

## 2025-05-31 PROCEDURE — 83735 ASSAY OF MAGNESIUM: CPT

## 2025-05-31 PROCEDURE — 82803 BLOOD GASES ANY COMBINATION: CPT

## 2025-05-31 PROCEDURE — 36600 WITHDRAWAL OF ARTERIAL BLOOD: CPT

## 2025-05-31 PROCEDURE — 99291 CRITICAL CARE FIRST HOUR: CPT | Performed by: INTERNAL MEDICINE

## 2025-05-31 PROCEDURE — 6370000000 HC RX 637 (ALT 250 FOR IP): Performed by: INTERNAL MEDICINE

## 2025-05-31 PROCEDURE — 85027 COMPLETE CBC AUTOMATED: CPT

## 2025-05-31 RX ORDER — DEXTROSE MONOHYDRATE 100 MG/ML
INJECTION, SOLUTION INTRAVENOUS CONTINUOUS PRN
Status: DISCONTINUED | OUTPATIENT
Start: 2025-05-31 | End: 2025-06-04 | Stop reason: HOSPADM

## 2025-05-31 RX ORDER — WATER 10 ML/10ML
INJECTION INTRAMUSCULAR; INTRAVENOUS; SUBCUTANEOUS
Status: DISCONTINUED
Start: 2025-05-31 | End: 2025-05-31 | Stop reason: WASHOUT

## 2025-05-31 RX ORDER — INSULIN LISPRO 100 [IU]/ML
0-8 INJECTION, SOLUTION INTRAVENOUS; SUBCUTANEOUS
Status: DISCONTINUED | OUTPATIENT
Start: 2025-05-31 | End: 2025-06-04 | Stop reason: HOSPADM

## 2025-05-31 RX ORDER — GLUCAGON 1 MG/ML
1 KIT INJECTION PRN
Status: DISCONTINUED | OUTPATIENT
Start: 2025-05-31 | End: 2025-06-04 | Stop reason: HOSPADM

## 2025-05-31 RX ORDER — INSULIN LISPRO 100 [IU]/ML
0.08 INJECTION, SOLUTION INTRAVENOUS; SUBCUTANEOUS
Status: DISCONTINUED | OUTPATIENT
Start: 2025-05-31 | End: 2025-06-03

## 2025-05-31 RX ADMIN — IPRATROPIUM BROMIDE AND ALBUTEROL SULFATE 1 DOSE: .5; 2.5 SOLUTION RESPIRATORY (INHALATION) at 11:31

## 2025-05-31 RX ADMIN — IPRATROPIUM BROMIDE AND ALBUTEROL SULFATE 1 DOSE: .5; 2.5 SOLUTION RESPIRATORY (INHALATION) at 19:07

## 2025-05-31 RX ADMIN — CEFEPIME 1000 MG: 1 INJECTION, POWDER, FOR SOLUTION INTRAMUSCULAR; INTRAVENOUS at 20:06

## 2025-05-31 RX ADMIN — APIXABAN 5 MG: 5 TABLET, FILM COATED ORAL at 08:56

## 2025-05-31 RX ADMIN — CEFEPIME 1000 MG: 1 INJECTION, POWDER, FOR SOLUTION INTRAMUSCULAR; INTRAVENOUS at 09:01

## 2025-05-31 RX ADMIN — BUDESONIDE 500 MCG: 0.5 INHALANT RESPIRATORY (INHALATION) at 19:07

## 2025-05-31 RX ADMIN — SACUBITRIL AND VALSARTAN 0.5 TABLET: 24; 26 TABLET, FILM COATED ORAL at 08:56

## 2025-05-31 RX ADMIN — SACUBITRIL AND VALSARTAN 0.5 TABLET: 24; 26 TABLET, FILM COATED ORAL at 20:04

## 2025-05-31 RX ADMIN — ACETAMINOPHEN 650 MG: 325 TABLET ORAL at 17:26

## 2025-05-31 RX ADMIN — BUDESONIDE 500 MCG: 0.5 INHALANT RESPIRATORY (INHALATION) at 07:40

## 2025-05-31 RX ADMIN — IPRATROPIUM BROMIDE AND ALBUTEROL SULFATE 1 DOSE: .5; 2.5 SOLUTION RESPIRATORY (INHALATION) at 07:40

## 2025-05-31 RX ADMIN — IPRATROPIUM BROMIDE AND ALBUTEROL SULFATE 1 DOSE: .5; 2.5 SOLUTION RESPIRATORY (INHALATION) at 16:13

## 2025-05-31 RX ADMIN — SODIUM CHLORIDE 125 MG: 9 INJECTION, SOLUTION INTRAVENOUS at 09:10

## 2025-05-31 RX ADMIN — VANCOMYCIN HYDROCHLORIDE 1000 MG: 1 INJECTION, POWDER, LYOPHILIZED, FOR SOLUTION INTRAVENOUS at 11:03

## 2025-05-31 RX ADMIN — INSULIN LISPRO 2 UNITS: 100 INJECTION, SOLUTION INTRAVENOUS; SUBCUTANEOUS at 17:23

## 2025-05-31 RX ADMIN — Medication 50 MCG/MIN: at 21:30

## 2025-05-31 RX ADMIN — SODIUM CHLORIDE, PRESERVATIVE FREE 10 ML: 5 INJECTION INTRAVENOUS at 20:05

## 2025-05-31 RX ADMIN — METHYLPREDNISOLONE SODIUM SUCCINATE 20 MG: 40 INJECTION, POWDER, LYOPHILIZED, FOR SOLUTION INTRAMUSCULAR; INTRAVENOUS at 09:01

## 2025-05-31 RX ADMIN — Medication 30 MCG/MIN: at 13:08

## 2025-05-31 RX ADMIN — AMIODARONE HYDROCHLORIDE 200 MG: 200 TABLET ORAL at 08:56

## 2025-05-31 RX ADMIN — APIXABAN 5 MG: 5 TABLET, FILM COATED ORAL at 20:04

## 2025-05-31 RX ADMIN — SODIUM CHLORIDE, PRESERVATIVE FREE 10 ML: 5 INJECTION INTRAVENOUS at 08:58

## 2025-05-31 RX ADMIN — SODIUM CHLORIDE 125 MG: 9 INJECTION, SOLUTION INTRAVENOUS at 12:23

## 2025-05-31 ASSESSMENT — PAIN SCALES - GENERAL
PAINLEVEL_OUTOF10: 2
PAINLEVEL_OUTOF10: 3

## 2025-05-31 ASSESSMENT — PAIN DESCRIPTION - LOCATION: LOCATION: GENERALIZED

## 2025-05-31 ASSESSMENT — PAIN DESCRIPTION - DESCRIPTORS: DESCRIPTORS: ACHING

## 2025-05-31 ASSESSMENT — PAIN DESCRIPTION - ORIENTATION: ORIENTATION: RIGHT;LEFT;MID

## 2025-05-31 NOTE — PLAN OF CARE
Nursing reports QTc 620 msec on telemetry.  EKG (5/30) QTc 471 msec.   Patient dose have a right bundle branch block.  Recheck EKG.

## 2025-05-31 NOTE — PLAN OF CARE
Problem: Chronic Conditions and Co-morbidities  Goal: Patient's chronic conditions and co-morbidity symptoms are monitored and maintained or improved  5/30/2025 2046 by Haley Lee RN  Outcome: Progressing  Flowsheets (Taken 5/30/2025 2000)  Care Plan - Patient's Chronic Conditions and Co-Morbidity Symptoms are Monitored and Maintained or Improved:   Monitor and assess patient's chronic conditions and comorbid symptoms for stability, deterioration, or improvement   Update acute care plan with appropriate goals if chronic or comorbid symptoms are exacerbated and prevent overall improvement and discharge     Problem: Skin/Tissue Integrity  Goal: Skin integrity remains intact  Description: 1.  Monitor for areas of redness and/or skin breakdown2.  Assess vascular access sites hourly3.  Every 4-6 hours minimum:  Change oxygen saturation probe site4.  Every 4-6 hours:  If on nasal continuous positive airway pressure, respiratory therapy assess nares and determine need for appliance change or resting period  5/30/2025 2046 by Haley Lee RN  Outcome: Progressing  Flowsheets (Taken 5/30/2025 1949)  Skin Integrity Remains Intact:   Monitor for areas of redness and/or skin breakdown   Assess vascular access sites hourly   Every 4-6 hours minimum:  Change oxygen saturation probe site   Every 4-6 hours:  If on nasal continuous positive airway pressure, assess nares and determine need for appliance change or resting period   Turn and reposition as indicated   Assess need for specialty bed   Positioning devices   Pressure redistribution bed/mattress (bed type)   Check visual cues for pain   Monitor skin under medical devices     Problem: Safety - Adult  Goal: Free from fall injury  5/30/2025 2046 by Haley Lee RN  Outcome: Progressing  Flowsheets (Taken 5/30/2025 1949)  Free From Fall Injury: Instruct family/caregiver on patient safety     Problem: ABCDS Injury Assessment  Goal: Absence of physical

## 2025-05-31 NOTE — PLAN OF CARE
Problem: Chronic Conditions and Co-morbidities  Goal: Patient's chronic conditions and co-morbidity symptoms are monitored and maintained or improved  Outcome: Progressing  Flowsheets (Taken 5/31/2025 0800)  Care Plan - Patient's Chronic Conditions and Co-Morbidity Symptoms are Monitored and Maintained or Improved:   Monitor and assess patient's chronic conditions and comorbid symptoms for stability, deterioration, or improvement   Update acute care plan with appropriate goals if chronic or comorbid symptoms are exacerbated and prevent overall improvement and discharge   Collaborate with multidisciplinary team to address chronic and comorbid conditions and prevent exacerbation or deterioration     Problem: Skin/Tissue Integrity  Goal: Skin integrity remains intact  Description: 1.  Monitor for areas of redness and/or skin breakdown2.  Assess vascular access sites hourly3.  Every 4-6 hours minimum:  Change oxygen saturation probe site4.  Every 4-6 hours:  If on nasal continuous positive airway pressure, respiratory therapy assess nares and determine need for appliance change or resting period  Outcome: Progressing  Flowsheets (Taken 5/31/2025 0800)  Skin Integrity Remains Intact:   Monitor for areas of redness and/or skin breakdown   Assess vascular access sites hourly   Every 4-6 hours minimum:  Change oxygen saturation probe site   Every 4-6 hours:  If on nasal continuous positive airway pressure, assess nares and determine need for appliance change or resting period     Problem: Safety - Adult  Goal: Free from fall injury  Outcome: Progressing     Problem: ABCDS Injury Assessment  Goal: Absence of physical injury  Outcome: Progressing     Problem: Pain  Goal: Verbalizes/displays adequate comfort level or baseline comfort level  Outcome: Progressing     Problem: Respiratory - Adult  Goal: Achieves optimal ventilation and oxygenation  Outcome: Progressing  Flowsheets (Taken 5/31/2025 0800)  Achieves optimal

## 2025-06-01 ENCOUNTER — APPOINTMENT (OUTPATIENT)
Dept: GENERAL RADIOLOGY | Age: 85
DRG: 871 | End: 2025-06-01
Payer: MEDICARE

## 2025-06-01 LAB
ALBUMIN SERPL-MCNC: 3.2 G/DL (ref 3.4–5)
ALBUMIN/GLOB SERPL: 1.5 {RATIO} (ref 1.1–2.2)
ALP SERPL-CCNC: 65 U/L (ref 40–129)
ALT SERPL-CCNC: 17 U/L (ref 10–40)
ANION GAP SERPL CALCULATED.3IONS-SCNC: 8 MMOL/L (ref 3–16)
AST SERPL-CCNC: 27 U/L (ref 15–37)
BILIRUB SERPL-MCNC: 0.3 MG/DL (ref 0–1)
BUN SERPL-MCNC: 17 MG/DL (ref 7–20)
CALCIUM SERPL-MCNC: 8.4 MG/DL (ref 8.3–10.6)
CHLORIDE SERPL-SCNC: 105 MMOL/L (ref 99–110)
CO2 SERPL-SCNC: 27 MMOL/L (ref 21–32)
CREAT SERPL-MCNC: 0.9 MG/DL (ref 0.6–1.2)
DEPRECATED RDW RBC AUTO: 15.6 % (ref 12.4–15.4)
EKG ATRIAL RATE: 82 BPM
EKG DIAGNOSIS: NORMAL
EKG P AXIS: 5 DEGREES
EKG P-R INTERVAL: 158 MS
EKG Q-T INTERVAL: 556 MS
EKG QRS DURATION: 104 MS
EKG QTC CALCULATION (BAZETT): 649 MS
EKG R AXIS: -47 DEGREES
EKG T AXIS: 206 DEGREES
EKG VENTRICULAR RATE: 82 BPM
GFR SERPLBLD CREATININE-BSD FMLA CKD-EPI: 62 ML/MIN/{1.73_M2}
GLUCOSE BLD-MCNC: 100 MG/DL (ref 70–99)
GLUCOSE BLD-MCNC: 135 MG/DL (ref 70–99)
GLUCOSE BLD-MCNC: 162 MG/DL (ref 70–99)
GLUCOSE BLD-MCNC: 171 MG/DL (ref 70–99)
GLUCOSE SERPL-MCNC: 110 MG/DL (ref 70–99)
HCT VFR BLD AUTO: 39.5 % (ref 36–48)
HGB BLD-MCNC: 12.9 G/DL (ref 12–16)
MAGNESIUM SERPL-MCNC: 2.14 MG/DL (ref 1.8–2.4)
MCH RBC QN AUTO: 31.2 PG (ref 26–34)
MCHC RBC AUTO-ENTMCNC: 32.7 G/DL (ref 31–36)
MCV RBC AUTO: 95.4 FL (ref 80–100)
NT-PROBNP SERPL-MCNC: ABNORMAL PG/ML (ref 0–449)
PERFORMED ON: ABNORMAL
PLATELET # BLD AUTO: 240 K/UL (ref 135–450)
PMV BLD AUTO: 9.1 FL (ref 5–10.5)
POTASSIUM SERPL-SCNC: 3.8 MMOL/L (ref 3.5–5.1)
PROT SERPL-MCNC: 5.4 G/DL (ref 6.4–8.2)
RBC # BLD AUTO: 4.14 M/UL (ref 4–5.2)
SODIUM SERPL-SCNC: 140 MMOL/L (ref 136–145)
VANCOMYCIN SERPL-MCNC: 10.9 UG/ML
WBC # BLD AUTO: 34.9 K/UL (ref 4–11)

## 2025-06-01 PROCEDURE — 36591 DRAW BLOOD OFF VENOUS DEVICE: CPT

## 2025-06-01 PROCEDURE — 80053 COMPREHEN METABOLIC PANEL: CPT

## 2025-06-01 PROCEDURE — 6360000002 HC RX W HCPCS: Performed by: FAMILY MEDICINE

## 2025-06-01 PROCEDURE — 94761 N-INVAS EAR/PLS OXIMETRY MLT: CPT

## 2025-06-01 PROCEDURE — 71045 X-RAY EXAM CHEST 1 VIEW: CPT

## 2025-06-01 PROCEDURE — 6370000000 HC RX 637 (ALT 250 FOR IP): Performed by: FAMILY MEDICINE

## 2025-06-01 PROCEDURE — 94640 AIRWAY INHALATION TREATMENT: CPT

## 2025-06-01 PROCEDURE — 2100000000 HC CCU R&B

## 2025-06-01 PROCEDURE — 99291 CRITICAL CARE FIRST HOUR: CPT | Performed by: INTERNAL MEDICINE

## 2025-06-01 PROCEDURE — 94669 MECHANICAL CHEST WALL OSCILL: CPT

## 2025-06-01 PROCEDURE — 6360000002 HC RX W HCPCS: Performed by: INTERNAL MEDICINE

## 2025-06-01 PROCEDURE — 2580000003 HC RX 258: Performed by: FAMILY MEDICINE

## 2025-06-01 PROCEDURE — 2500000003 HC RX 250 WO HCPCS: Performed by: FAMILY MEDICINE

## 2025-06-01 PROCEDURE — 83880 ASSAY OF NATRIURETIC PEPTIDE: CPT

## 2025-06-01 PROCEDURE — 80202 ASSAY OF VANCOMYCIN: CPT

## 2025-06-01 PROCEDURE — 6370000000 HC RX 637 (ALT 250 FOR IP): Performed by: STUDENT IN AN ORGANIZED HEALTH CARE EDUCATION/TRAINING PROGRAM

## 2025-06-01 PROCEDURE — 85027 COMPLETE CBC AUTOMATED: CPT

## 2025-06-01 PROCEDURE — 2700000000 HC OXYGEN THERAPY PER DAY

## 2025-06-01 PROCEDURE — 83735 ASSAY OF MAGNESIUM: CPT

## 2025-06-01 PROCEDURE — 2500000003 HC RX 250 WO HCPCS: Performed by: INTERNAL MEDICINE

## 2025-06-01 PROCEDURE — 99291 CRITICAL CARE FIRST HOUR: CPT | Performed by: STUDENT IN AN ORGANIZED HEALTH CARE EDUCATION/TRAINING PROGRAM

## 2025-06-01 PROCEDURE — 93010 ELECTROCARDIOGRAM REPORT: CPT | Performed by: STUDENT IN AN ORGANIZED HEALTH CARE EDUCATION/TRAINING PROGRAM

## 2025-06-01 RX ORDER — METOPROLOL SUCCINATE 25 MG/1
25 TABLET, EXTENDED RELEASE ORAL DAILY
Status: DISCONTINUED | OUTPATIENT
Start: 2025-06-01 | End: 2025-06-01

## 2025-06-01 RX ADMIN — SACUBITRIL AND VALSARTAN 0.5 TABLET: 24; 26 TABLET, FILM COATED ORAL at 08:32

## 2025-06-01 RX ADMIN — IPRATROPIUM BROMIDE AND ALBUTEROL SULFATE 1 DOSE: .5; 2.5 SOLUTION RESPIRATORY (INHALATION) at 19:25

## 2025-06-01 RX ADMIN — CEFEPIME 2000 MG: 2 INJECTION, POWDER, FOR SOLUTION INTRAVENOUS at 08:50

## 2025-06-01 RX ADMIN — CEFEPIME 2000 MG: 2 INJECTION, POWDER, FOR SOLUTION INTRAVENOUS at 20:32

## 2025-06-01 RX ADMIN — BUDESONIDE 500 MCG: 0.5 INHALANT RESPIRATORY (INHALATION) at 19:25

## 2025-06-01 RX ADMIN — BUDESONIDE 500 MCG: 0.5 INHALANT RESPIRATORY (INHALATION) at 07:42

## 2025-06-01 RX ADMIN — Medication 30 MCG/MIN: at 18:37

## 2025-06-01 RX ADMIN — METHYLPREDNISOLONE SODIUM SUCCINATE 20 MG: 40 INJECTION, POWDER, LYOPHILIZED, FOR SOLUTION INTRAMUSCULAR; INTRAVENOUS at 08:51

## 2025-06-01 RX ADMIN — IPRATROPIUM BROMIDE AND ALBUTEROL SULFATE 1 DOSE: .5; 2.5 SOLUTION RESPIRATORY (INHALATION) at 17:09

## 2025-06-01 RX ADMIN — APIXABAN 5 MG: 5 TABLET, FILM COATED ORAL at 20:30

## 2025-06-01 RX ADMIN — SODIUM CHLORIDE, PRESERVATIVE FREE 10 ML: 5 INJECTION INTRAVENOUS at 20:30

## 2025-06-01 RX ADMIN — IPRATROPIUM BROMIDE AND ALBUTEROL SULFATE 1 DOSE: .5; 2.5 SOLUTION RESPIRATORY (INHALATION) at 07:42

## 2025-06-01 RX ADMIN — APIXABAN 5 MG: 5 TABLET, FILM COATED ORAL at 08:33

## 2025-06-01 RX ADMIN — ACETAMINOPHEN 650 MG: 325 TABLET ORAL at 08:31

## 2025-06-01 RX ADMIN — AMIODARONE HYDROCHLORIDE 200 MG: 200 TABLET ORAL at 08:31

## 2025-06-01 RX ADMIN — SODIUM CHLORIDE, PRESERVATIVE FREE 10 ML: 5 INJECTION INTRAVENOUS at 09:00

## 2025-06-01 RX ADMIN — IPRATROPIUM BROMIDE AND ALBUTEROL SULFATE 1 DOSE: .5; 2.5 SOLUTION RESPIRATORY (INHALATION) at 13:03

## 2025-06-01 ASSESSMENT — PAIN SCALES - GENERAL: PAINLEVEL_OUTOF10: 2

## 2025-06-01 ASSESSMENT — PAIN DESCRIPTION - DESCRIPTORS: DESCRIPTORS: ACHING

## 2025-06-01 ASSESSMENT — PAIN DESCRIPTION - LOCATION: LOCATION: HEAD

## 2025-06-02 ENCOUNTER — CARE COORDINATION (OUTPATIENT)
Dept: CASE MANAGEMENT | Age: 85
End: 2025-06-02

## 2025-06-02 PROBLEM — I42.9 CARDIOMYOPATHY (HCC): Status: ACTIVE | Noted: 2021-07-19

## 2025-06-02 LAB
ALBUMIN SERPL-MCNC: 3 G/DL (ref 3.4–5)
ALBUMIN/GLOB SERPL: 1.5 {RATIO} (ref 1.1–2.2)
ALP SERPL-CCNC: 62 U/L (ref 40–129)
ALT SERPL-CCNC: 15 U/L (ref 10–40)
ANION GAP SERPL CALCULATED.3IONS-SCNC: 7 MMOL/L (ref 3–16)
AST SERPL-CCNC: 25 U/L (ref 15–37)
BACTERIA BLD CULT ORG #2: ABNORMAL
BACTERIA BLD CULT ORG #2: ABNORMAL
BILIRUB SERPL-MCNC: 0.3 MG/DL (ref 0–1)
BUN SERPL-MCNC: 14 MG/DL (ref 7–20)
CALCIUM SERPL-MCNC: 8.4 MG/DL (ref 8.3–10.6)
CHLORIDE SERPL-SCNC: 105 MMOL/L (ref 99–110)
CO2 SERPL-SCNC: 28 MMOL/L (ref 21–32)
CREAT SERPL-MCNC: 0.7 MG/DL (ref 0.6–1.2)
DEPRECATED RDW RBC AUTO: 15.6 % (ref 12.4–15.4)
GFR SERPLBLD CREATININE-BSD FMLA CKD-EPI: 84 ML/MIN/{1.73_M2}
GLUCOSE BLD-MCNC: 129 MG/DL (ref 70–99)
GLUCOSE BLD-MCNC: 149 MG/DL (ref 70–99)
GLUCOSE BLD-MCNC: 197 MG/DL (ref 70–99)
GLUCOSE BLD-MCNC: 90 MG/DL (ref 70–99)
GLUCOSE SERPL-MCNC: 95 MG/DL (ref 70–99)
HCT VFR BLD AUTO: 35.5 % (ref 36–48)
HGB BLD-MCNC: 11.5 G/DL (ref 12–16)
MAGNESIUM SERPL-MCNC: 2.03 MG/DL (ref 1.8–2.4)
MCH RBC QN AUTO: 30.8 PG (ref 26–34)
MCHC RBC AUTO-ENTMCNC: 32.5 G/DL (ref 31–36)
MCV RBC AUTO: 94.8 FL (ref 80–100)
ORGANISM: ABNORMAL
PERFORMED ON: ABNORMAL
PERFORMED ON: NORMAL
PLATELET # BLD AUTO: 215 K/UL (ref 135–450)
PMV BLD AUTO: 9.2 FL (ref 5–10.5)
POTASSIUM SERPL-SCNC: 4.2 MMOL/L (ref 3.5–5.1)
PROT SERPL-MCNC: 5 G/DL (ref 6.4–8.2)
RBC # BLD AUTO: 3.75 M/UL (ref 4–5.2)
SODIUM SERPL-SCNC: 140 MMOL/L (ref 136–145)
WBC # BLD AUTO: 31.3 K/UL (ref 4–11)

## 2025-06-02 PROCEDURE — 6370000000 HC RX 637 (ALT 250 FOR IP): Performed by: FAMILY MEDICINE

## 2025-06-02 PROCEDURE — 6360000002 HC RX W HCPCS: Performed by: FAMILY MEDICINE

## 2025-06-02 PROCEDURE — 83735 ASSAY OF MAGNESIUM: CPT

## 2025-06-02 PROCEDURE — 94669 MECHANICAL CHEST WALL OSCILL: CPT

## 2025-06-02 PROCEDURE — 94640 AIRWAY INHALATION TREATMENT: CPT

## 2025-06-02 PROCEDURE — 80053 COMPREHEN METABOLIC PANEL: CPT

## 2025-06-02 PROCEDURE — 2500000003 HC RX 250 WO HCPCS: Performed by: INTERNAL MEDICINE

## 2025-06-02 PROCEDURE — 2500000003 HC RX 250 WO HCPCS: Performed by: FAMILY MEDICINE

## 2025-06-02 PROCEDURE — 99232 SBSQ HOSP IP/OBS MODERATE 35: CPT | Performed by: INTERNAL MEDICINE

## 2025-06-02 PROCEDURE — 2580000003 HC RX 258: Performed by: FAMILY MEDICINE

## 2025-06-02 PROCEDURE — 2700000000 HC OXYGEN THERAPY PER DAY

## 2025-06-02 PROCEDURE — 6370000000 HC RX 637 (ALT 250 FOR IP): Performed by: STUDENT IN AN ORGANIZED HEALTH CARE EDUCATION/TRAINING PROGRAM

## 2025-06-02 PROCEDURE — 85027 COMPLETE CBC AUTOMATED: CPT

## 2025-06-02 PROCEDURE — 2140000000 HC CCU INTERMEDIATE R&B

## 2025-06-02 PROCEDURE — 6360000002 HC RX W HCPCS: Performed by: INTERNAL MEDICINE

## 2025-06-02 PROCEDURE — 94761 N-INVAS EAR/PLS OXIMETRY MLT: CPT

## 2025-06-02 PROCEDURE — 99233 SBSQ HOSP IP/OBS HIGH 50: CPT | Performed by: INTERNAL MEDICINE

## 2025-06-02 PROCEDURE — 6370000000 HC RX 637 (ALT 250 FOR IP)

## 2025-06-02 RX ORDER — GUAIFENESIN/DEXTROMETHORPHAN 100-10MG/5
5 SYRUP ORAL ONCE
Status: COMPLETED | OUTPATIENT
Start: 2025-06-02 | End: 2025-06-02

## 2025-06-02 RX ADMIN — CEFEPIME 2000 MG: 2 INJECTION, POWDER, FOR SOLUTION INTRAVENOUS at 09:13

## 2025-06-02 RX ADMIN — SACUBITRIL AND VALSARTAN 0.5 TABLET: 24; 26 TABLET, FILM COATED ORAL at 09:15

## 2025-06-02 RX ADMIN — SACUBITRIL AND VALSARTAN 0.5 TABLET: 24; 26 TABLET, FILM COATED ORAL at 20:18

## 2025-06-02 RX ADMIN — IPRATROPIUM BROMIDE AND ALBUTEROL SULFATE 1 DOSE: .5; 2.5 SOLUTION RESPIRATORY (INHALATION) at 19:42

## 2025-06-02 RX ADMIN — SODIUM CHLORIDE, PRESERVATIVE FREE 10 ML: 5 INJECTION INTRAVENOUS at 20:19

## 2025-06-02 RX ADMIN — Medication 6 MG: at 02:31

## 2025-06-02 RX ADMIN — GUAIFENESIN SYRUP AND DEXTROMETHORPHAN 5 ML: 100; 10 SYRUP ORAL at 02:31

## 2025-06-02 RX ADMIN — BUDESONIDE 500 MCG: 0.5 INHALANT RESPIRATORY (INHALATION) at 07:49

## 2025-06-02 RX ADMIN — BUDESONIDE 500 MCG: 0.5 INHALANT RESPIRATORY (INHALATION) at 19:42

## 2025-06-02 RX ADMIN — IPRATROPIUM BROMIDE AND ALBUTEROL SULFATE 1 DOSE: .5; 2.5 SOLUTION RESPIRATORY (INHALATION) at 15:58

## 2025-06-02 RX ADMIN — METHYLPREDNISOLONE SODIUM SUCCINATE 20 MG: 40 INJECTION, POWDER, LYOPHILIZED, FOR SOLUTION INTRAMUSCULAR; INTRAVENOUS at 09:14

## 2025-06-02 RX ADMIN — IPRATROPIUM BROMIDE AND ALBUTEROL SULFATE 1 DOSE: .5; 2.5 SOLUTION RESPIRATORY (INHALATION) at 07:49

## 2025-06-02 RX ADMIN — CEFEPIME 2000 MG: 2 INJECTION, POWDER, FOR SOLUTION INTRAVENOUS at 20:23

## 2025-06-02 RX ADMIN — APIXABAN 5 MG: 5 TABLET, FILM COATED ORAL at 20:18

## 2025-06-02 RX ADMIN — SODIUM CHLORIDE: 0.9 INJECTION, SOLUTION INTRAVENOUS at 09:10

## 2025-06-02 RX ADMIN — APIXABAN 5 MG: 5 TABLET, FILM COATED ORAL at 09:15

## 2025-06-02 RX ADMIN — AMIODARONE HYDROCHLORIDE 200 MG: 200 TABLET ORAL at 09:15

## 2025-06-02 ASSESSMENT — PAIN SCALES - GENERAL: PAINLEVEL_OUTOF10: 0

## 2025-06-02 NOTE — DISCHARGE INSTR - COC
Continuity of Care Form    Patient Name: Anika Rudolph   :  1940  MRN:  [id number]    Admit date:  2025  Discharge date:  2025    Code Status Order: Full Code   Advance Directives:     Admitting Physician:  Diaz Lopez MD  PCP: Josselyn Vargas APRN - CNP    Discharging Nurse: sherice  Discharging Hospital Unit/Room#: A9Z-0109/1306-01  Discharging Unit Phone Number: [phone]      Emergency Contact:   Extended Emergency Contact Information  Primary Emergency Contact: Olimpia Avilez   [facility]  Home Phone: [phone]  Relation: Child  Secondary Emergency Contact: Glenys Sims  Home Phone: [phone]  Relation: Child    Past Surgical History:  Past Surgical History:   Procedure Laterality Date    CATARACT REMOVAL WITH IMPLANT  12    left eye    JOINT REPLACEMENT      partial knee R and L    KNEE SURGERY      partial replacements, both knees    MASTOIDECTOMY Right 2024    BILATERAL EAR EXAM UNDER ANESTHESIA, RIGHT TYMPANOSTOMY TUBE performed by Myron Russell MD at [facility] OR    MOHS SURGERY  2019    R cheek and R superior temple    SPLENECTOMY      TUNNELED VENOUS PORT PLACEMENT      UPPER GASTROINTESTINAL ENDOSCOPY N/A 2019    EGD ESOPHAGOGASTRODUODENOSCOPY, WITH ENDOSCOPIC ULTRASOUND OF ESOPHAGUS performed by Enmanuel Mccollum MD at [facility] ENDOSCOPY       Immunization History:   Immunization History   Administered Date(s) Administered    COVID-19, MODERNA BLUE border, Primary or Immunocompromised, (age 12y+), IM, 100 mcg/0.5mL 2021    COVID-19, PFIZER PURPLE top, DILUTE for use, (age 12 y+), 30mcg/0.3mL 2021, 2021    Influenza Virus Vaccine 10/16/2009, 2010, 2010, 10/03/2016    Influenza, FLUAD, (age 65 y+), IM, Quadv, 0.5mL 10/23/2023    Influenza, FLUARIX, FLULAVAL, FLUZONE (age 6 mo+) and AFLURIA, (age 3 y+), Quadv PF, 0.5mL 2020, 10/23/2021    Influenza, FLUZONE High Dose (age 65 y+), IM, Quadv, 0.7mL

## 2025-06-02 NOTE — DISCHARGE INSTRUCTIONS
Extra Heart Failure Education/ Tools/ Resources:     https://[facility].com/publication/?j=292895   --- this is American Heart Association interactive Healthier Living with Heart Failure guidebook.  Please click hyperlink or copy / paste link into search bar. The QR Code is also available below. Use your mouse to scroll through the pages.  Lots of information about weight monitoring, diet tips, activity, meds, etc    Heart Failure Tools and Resources QR Code is below. It includes multiple resources to include symptom tracker, med tracker, further HF info, and access to a HF Support Network online Community    HF [place] Jean Claude  -- this is a free smart phone jean claude available for iPhone and Android download.  Use your phone to track sodium / fluid intake, zone tool symptom tracking, weights, medications, etc. Click on this hyperlink  HF [place] Jean Claude   for QR code for easy download or the link is also found in the below HF Tools and Resources.      DASH (Dietary Approach to Stop Hypertension) diet --  https://www.nhlbi.nih.gov/education/dash-eating-plan -- this diet is a flexible eating plan that promotes heart healthy eating style.  Click on hyperlink or copy / paste link into search bar.  Lots of low sodium recipes and tips.    https://www.[employer]/recipes  -- more free recipes              Broken Heart Syndrome/Stress induced Cardiomyopathy: Care Instructions  Overview    With broken heart syndrome, the heart has trouble pumping blood normally. A chamber of the heart swells up like a small balloon. Broken heart syndrome is also called takotsubo (say \"GLIP-jd-yqu-boh\") syndrome or stress cardiomyopathy (say \"lgt-akw-om-mp-TFO-jv-thee\").  Broken heart syndrome is often triggered by great emotional stress, such as grief after losing a loved one. It can also be triggered by physical stress, such as having a serious health problem. Sometimes the cause is not known.  Broken heart syndrome causes the same symptoms

## 2025-06-02 NOTE — CARE COORDINATION
RPM eligible - HF/HTN/COPD  Email sent to [facility] on 06/02/2025 to inform of RPM eligibility.    Mei Ku RN BSN  Care Transition Nurse  [phone]

## 2025-06-03 ENCOUNTER — CARE COORDINATION (OUTPATIENT)
Dept: CASE MANAGEMENT | Age: 85
End: 2025-06-03

## 2025-06-03 LAB
ANION GAP SERPL CALCULATED.3IONS-SCNC: 8 MMOL/L (ref 3–16)
BACTERIA BLD CULT: NORMAL
BUN SERPL-MCNC: 16 MG/DL (ref 7–20)
CALCIUM SERPL-MCNC: 7.9 MG/DL (ref 8.3–10.6)
CHLORIDE SERPL-SCNC: 105 MMOL/L (ref 99–110)
CO2 SERPL-SCNC: 27 MMOL/L (ref 21–32)
CREAT SERPL-MCNC: 0.7 MG/DL (ref 0.6–1.2)
GFR SERPLBLD CREATININE-BSD FMLA CKD-EPI: 84 ML/MIN/{1.73_M2}
GLUCOSE BLD-MCNC: 158 MG/DL (ref 70–99)
GLUCOSE BLD-MCNC: 207 MG/DL (ref 70–99)
GLUCOSE BLD-MCNC: 88 MG/DL (ref 70–99)
GLUCOSE BLD-MCNC: 91 MG/DL (ref 70–99)
GLUCOSE SERPL-MCNC: 99 MG/DL (ref 70–99)
MAGNESIUM SERPL-MCNC: 2.1 MG/DL (ref 1.8–2.4)
NT-PROBNP SERPL-MCNC: ABNORMAL PG/ML (ref 0–449)
PERFORMED ON: ABNORMAL
PERFORMED ON: ABNORMAL
PERFORMED ON: NORMAL
PERFORMED ON: NORMAL
POTASSIUM SERPL-SCNC: 4.3 MMOL/L (ref 3.5–5.1)
SODIUM SERPL-SCNC: 140 MMOL/L (ref 136–145)

## 2025-06-03 PROCEDURE — 83880 ASSAY OF NATRIURETIC PEPTIDE: CPT

## 2025-06-03 PROCEDURE — 6370000000 HC RX 637 (ALT 250 FOR IP): Performed by: STUDENT IN AN ORGANIZED HEALTH CARE EDUCATION/TRAINING PROGRAM

## 2025-06-03 PROCEDURE — 6360000002 HC RX W HCPCS: Performed by: FAMILY MEDICINE

## 2025-06-03 PROCEDURE — 6370000000 HC RX 637 (ALT 250 FOR IP): Performed by: FAMILY MEDICINE

## 2025-06-03 PROCEDURE — 2580000003 HC RX 258: Performed by: FAMILY MEDICINE

## 2025-06-03 PROCEDURE — 97530 THERAPEUTIC ACTIVITIES: CPT

## 2025-06-03 PROCEDURE — 6360000002 HC RX W HCPCS: Performed by: INTERNAL MEDICINE

## 2025-06-03 PROCEDURE — 2500000003 HC RX 250 WO HCPCS: Performed by: FAMILY MEDICINE

## 2025-06-03 PROCEDURE — 99232 SBSQ HOSP IP/OBS MODERATE 35: CPT | Performed by: INTERNAL MEDICINE

## 2025-06-03 PROCEDURE — 2140000000 HC CCU INTERMEDIATE R&B

## 2025-06-03 PROCEDURE — 2500000003 HC RX 250 WO HCPCS: Performed by: INTERNAL MEDICINE

## 2025-06-03 PROCEDURE — 99233 SBSQ HOSP IP/OBS HIGH 50: CPT | Performed by: STUDENT IN AN ORGANIZED HEALTH CARE EDUCATION/TRAINING PROGRAM

## 2025-06-03 PROCEDURE — 97110 THERAPEUTIC EXERCISES: CPT

## 2025-06-03 PROCEDURE — 94150 VITAL CAPACITY TEST: CPT

## 2025-06-03 PROCEDURE — 80048 BASIC METABOLIC PNL TOTAL CA: CPT

## 2025-06-03 PROCEDURE — 97165 OT EVAL LOW COMPLEX 30 MIN: CPT

## 2025-06-03 PROCEDURE — 36591 DRAW BLOOD OFF VENOUS DEVICE: CPT

## 2025-06-03 PROCEDURE — 97161 PT EVAL LOW COMPLEX 20 MIN: CPT

## 2025-06-03 PROCEDURE — 94669 MECHANICAL CHEST WALL OSCILL: CPT

## 2025-06-03 PROCEDURE — 2700000000 HC OXYGEN THERAPY PER DAY

## 2025-06-03 PROCEDURE — 94761 N-INVAS EAR/PLS OXIMETRY MLT: CPT

## 2025-06-03 PROCEDURE — 83735 ASSAY OF MAGNESIUM: CPT

## 2025-06-03 PROCEDURE — 94640 AIRWAY INHALATION TREATMENT: CPT

## 2025-06-03 PROCEDURE — 6370000000 HC RX 637 (ALT 250 FOR IP): Performed by: INTERNAL MEDICINE

## 2025-06-03 PROCEDURE — APPNB15 APP NON BILLABLE TIME 0-15 MINS: Performed by: NURSE PRACTITIONER

## 2025-06-03 RX ORDER — TRAZODONE HYDROCHLORIDE 50 MG/1
50 TABLET ORAL NIGHTLY
Status: DISCONTINUED | OUTPATIENT
Start: 2025-06-03 | End: 2025-06-04 | Stop reason: HOSPADM

## 2025-06-03 RX ADMIN — TRAZODONE HYDROCHLORIDE 50 MG: 50 TABLET ORAL at 20:58

## 2025-06-03 RX ADMIN — INSULIN LISPRO 5 UNITS: 100 INJECTION, SOLUTION INTRAVENOUS; SUBCUTANEOUS at 08:36

## 2025-06-03 RX ADMIN — SACUBITRIL AND VALSARTAN 0.5 TABLET: 24; 26 TABLET, FILM COATED ORAL at 08:29

## 2025-06-03 RX ADMIN — IPRATROPIUM BROMIDE AND ALBUTEROL SULFATE 1 DOSE: .5; 2.5 SOLUTION RESPIRATORY (INHALATION) at 12:14

## 2025-06-03 RX ADMIN — METHYLPREDNISOLONE SODIUM SUCCINATE 20 MG: 40 INJECTION, POWDER, LYOPHILIZED, FOR SOLUTION INTRAMUSCULAR; INTRAVENOUS at 08:34

## 2025-06-03 RX ADMIN — CEFEPIME 2000 MG: 2 INJECTION, POWDER, FOR SOLUTION INTRAVENOUS at 20:49

## 2025-06-03 RX ADMIN — APIXABAN 5 MG: 5 TABLET, FILM COATED ORAL at 20:57

## 2025-06-03 RX ADMIN — BUDESONIDE 500 MCG: 0.5 INHALANT RESPIRATORY (INHALATION) at 08:53

## 2025-06-03 RX ADMIN — SODIUM CHLORIDE, PRESERVATIVE FREE 10 ML: 5 INJECTION INTRAVENOUS at 08:35

## 2025-06-03 RX ADMIN — IPRATROPIUM BROMIDE AND ALBUTEROL SULFATE 1 DOSE: .5; 2.5 SOLUTION RESPIRATORY (INHALATION) at 08:53

## 2025-06-03 RX ADMIN — AMIODARONE HYDROCHLORIDE 200 MG: 200 TABLET ORAL at 08:29

## 2025-06-03 RX ADMIN — IPRATROPIUM BROMIDE AND ALBUTEROL SULFATE 1 DOSE: .5; 2.5 SOLUTION RESPIRATORY (INHALATION) at 16:44

## 2025-06-03 RX ADMIN — SACUBITRIL AND VALSARTAN 0.5 TABLET: 24; 26 TABLET, FILM COATED ORAL at 20:57

## 2025-06-03 RX ADMIN — BUDESONIDE 500 MCG: 0.5 INHALANT RESPIRATORY (INHALATION) at 19:29

## 2025-06-03 RX ADMIN — IPRATROPIUM BROMIDE AND ALBUTEROL SULFATE 1 DOSE: .5; 2.5 SOLUTION RESPIRATORY (INHALATION) at 19:29

## 2025-06-03 RX ADMIN — SODIUM CHLORIDE, PRESERVATIVE FREE 10 ML: 5 INJECTION INTRAVENOUS at 21:00

## 2025-06-03 RX ADMIN — APIXABAN 5 MG: 5 TABLET, FILM COATED ORAL at 08:29

## 2025-06-03 RX ADMIN — CEFEPIME 2000 MG: 2 INJECTION, POWDER, FOR SOLUTION INTRAVENOUS at 08:34

## 2025-06-03 RX ADMIN — INSULIN LISPRO 2 UNITS: 100 INJECTION, SOLUTION INTRAVENOUS; SUBCUTANEOUS at 21:05

## 2025-06-03 RX ADMIN — ACETAMINOPHEN 650 MG: 325 TABLET ORAL at 04:56

## 2025-06-03 ASSESSMENT — PAIN DESCRIPTION - ORIENTATION
ORIENTATION: LEFT

## 2025-06-03 ASSESSMENT — PAIN SCALES - GENERAL
PAINLEVEL_OUTOF10: 5
PAINLEVEL_OUTOF10: 3
PAINLEVEL_OUTOF10: 4
PAINLEVEL_OUTOF10: 0
PAINLEVEL_OUTOF10: 7
PAINLEVEL_OUTOF10: 3

## 2025-06-03 ASSESSMENT — PAIN DESCRIPTION - ONSET
ONSET: ON-GOING
ONSET: ON-GOING

## 2025-06-03 ASSESSMENT — PAIN - FUNCTIONAL ASSESSMENT
PAIN_FUNCTIONAL_ASSESSMENT: ACTIVITIES ARE NOT PREVENTED

## 2025-06-03 ASSESSMENT — PAIN DESCRIPTION - DESCRIPTORS
DESCRIPTORS: DULL
DESCRIPTORS: ACHING
DESCRIPTORS: ACHING

## 2025-06-03 ASSESSMENT — PAIN DESCRIPTION - FREQUENCY
FREQUENCY: INTERMITTENT
FREQUENCY: CONTINUOUS

## 2025-06-03 ASSESSMENT — PAIN DESCRIPTION - LOCATION
LOCATION: RIB CAGE
LOCATION: CHEST
LOCATION: CHEST

## 2025-06-03 ASSESSMENT — PAIN DESCRIPTION - PAIN TYPE
TYPE: ACUTE PAIN
TYPE: ACUTE PAIN

## 2025-06-03 NOTE — PLAN OF CARE
Problem: Chronic Conditions and Co-morbidities  Goal: Patient's chronic conditions and co-morbidity symptoms are monitored and maintained or improved  Outcome: Progressing     Problem: Skin/Tissue Integrity  Goal: Skin integrity remains intact  Description: 1.  Monitor for areas of redness and/or skin breakdown2.  Assess vascular access sites hourly3.  Every 4-6 hours minimum:  Change oxygen saturation probe site4.  Every 4-6 hours:  If on nasal continuous positive airway pressure, respiratory therapy assess nares and determine need for appliance change or resting period  Outcome: Progressing     Problem: Pain  Goal: Verbalizes/displays adequate comfort level or baseline comfort level  Outcome: Progressing

## 2025-06-03 NOTE — CARE COORDINATION
Case Management Assessment  Initial Evaluation    Date/Time of Evaluation: 6/3/2025 2:46 PM  Assessment Completed by: DEENA Briscoe    If patient is discharged prior to next notation, then this note serves as note for discharge by case management.    Patient Name: Ankia Rudolph                   YOB: 1940  Diagnosis: Acute pulmonary edema (HCC) [J81.0]  Takotsubo syndrome [I51.81]  STEMI (ST elevation myocardial infarction) (HCC) [I21.3]  Acute diastolic CHF (congestive heart failure) (HCC) [I50.31]  Cardiomyopathy, unspecified type (HCC) [I42.9]  Sepsis, due to unspecified organism, unspecified whether acute organ dysfunction present (HCC) [A41.9]                   Date / Time: 5/30/2025  7:02 AM    Patient Admission Status: Inpatient   Readmission Risk (Low < 19, Mod (19-27), High > 27): Readmission Risk Score: 14    Current PCP: Josselyn Vargas APRN - CNP  PCP verified by CM? Yes (Dr Mccall   last visit was 4/2025)    Chart Reviewed: Yes      History Provided by: Patient  Patient Orientation: Alert and Oriented    Patient Cognition: Alert    Hospitalization in the last 30 days (Readmission):  No    If yes, Readmission Assessment in CM Navigator will be completed.    Advance Directives:      Code Status: Full Code   Patient's Primary Decision Maker is: Legal Next of Kin (Legal next of kin, her daughters.)    Primary Decision Maker: Olimpia Avilez - Child - [phone]    Secondary Decision Maker: LeviGlenys - Child - [phone]    Discharge Planning:    Patient lives with: Alone Type of Home: Apartment  Primary Care Giver: Self  Patient Support Systems include: Children, Family Members   Current Financial resources: Medicare  Current community resources: ECF/Home Care  Current services prior to admission: Durable Medical Equipment            Current DME: Oxygen Therapy (Comment), Shower Chair, Walker, Cane, Other (Comment) (rollator)            Type of Home Care  endoscopy

## 2025-06-03 NOTE — CARE COORDINATION
[employer] rep called to report she has a co pay of $69. Per week and $18. Daily for supplies.    DEENA Melissa     Case Management   [phone]    6/3/2025  3:39 PM

## 2025-06-03 NOTE — CONSULTS
Cardiovascular Consultation     Attending Physician: Diaz Lopez MD    PATIENT: Anika Rudolph  : 1940  MRN: [id number]    Reason for Consultation:   Chief Complaint   Patient presents with    Chest Pain     Pt arrives ems from home c/o chest tightness started yesterday shortness of breath. Hx of heart issues.     Shortness of Breath     History of present illness:  Ms. Anika Rudolph is a 84 y.o. female patient who follows closely with cardiology, pulmonology, hematology/oncology for history of Takotsubo cardiomyopathy, pneumonia with hypoxic respiratory failure, and CLL.   Anika lives alone and presented by EMS for concerns of worsening shortness of breath and hypoxia at home.  She went on a cruise approximately 4 weeks ago and persistent nonbloody diarrhea.  She was treated and placed in isolation.  Denies recurrent diarrhea since home.  She has struggled with her appetite.  Yesterday she developed a nonproductive cough and increased work of breathing.  She still has portable oxygen available at home and continue to monitor her pulse oximetry.  This morning she was hypoxic and significantly short of breath at rest.  Patient diaphoretic and requiring BiPAP.  She had an EKG in the field with concerns for transient ST elevation laterally.  EKG on arrival without ST elevations.  States she has no new stress or recent change.  She is asking about being able to go on another cruise to [place].  She is full code.      Medical History:      Diagnosis Date    Acute ST elevation myocardial infarction (STEMI) ([facility]) 2021    Atrial fibrillation (HCC)     Chronic lymphocytic leukemia in remission (HCC)     CLL (chronic lymphocytic leukemia) (HCC) 2015    COPD (chronic obstructive pulmonary disease) (HCC)     Essential hypertension 2015    controlled with salt restriction (initially)    Factor V Leiden mutation     Goiter 2011    1.5 cm midline    Hypercholesterolemia     
Clinical Pharmacy Note  Vancomycin Consult    Anika Rudolph is a 84 y.o. female ordered vancomycin for PNA; consult received from Dr. Lopez to manage therapy. Also receiving Cefepime.    Allergies:  Patient has no known allergies.     Temp max:  Temp (24hrs), Av.6 °F (38.7 °C), Min:101.6 °F (38.7 °C), Max:101.6 °F (38.7 °C)      Recent Labs     25  0711   WBC 44.5*       Recent Labs     25  0754   BUN 11   CREATININE 1.3*         Intake/Output Summary (Last 24 hours) at 2025 1106  Last data filed at 2025 1044  Gross per 24 hour   Intake --   Output 767 ml   Net -767 ml       Culture Results:  pending    Ht Readings from Last 1 Encounters:   25 1.549 m (5' 1\")        Wt Readings from Last 1 Encounters:   25 69.4 kg (153 lb)         Estimated Creatinine Clearance: 28 mL/min (A) (based on SCr of 1.3 mg/dL (H)).    Assessment/Plan:  Day # 1 of Vancomycin.  Vancomycin 1,500 mg IVPB x 1 administered in ER this morning  Patient with mild BRENNAN:  serum creatinine = 1.3 mg/dL (baseline ~ 0.6 mg/dL)  Will obtain a random Vancomycin level on 25 with AM labs to assist with subsequent dosing/management.      Thank you for the consult.  Will continue to follow.    Enrique Medina, TATA, PharmD, BCPS  2025 11:08 AM    
HF RN consult noted per order set, chart reviewed. Pt admitted with c/o SOB and is being treated for sepsis due to PNA, COPD exacerbation, and stressed induced cardiomyopathy. NICM is not new for pt and she follows with Dr Mckay, dry wt noted around 144 lbs, today's wt 148 lbs. Cardiology followed and has signed off. HF education being done via bedside nursing in CVICU. Appropriate HF orders are in place. I have added HF dc instructions to the AVS as well as to the AMAIRANI as pt will be going [facility] and is also agreeable to initiating 'remote patient monitoring system' through PCP office. SW/CM will deliver RPM kit prior to pt's dc. I have reached out to [facility] and have requested a cardiology hospital follow up appt which will be on AVS.  
embolus.  2. CT findings suggestive of pulmonary hypertension.  3. Interval development of multiple bilateral solid/ground-glass pulmonary  nodules.  The differential includes atypical infection and metastatic disease.  4. Diverticulosis.  No acute abdominopelvic abnormality.      CXR PA/LAT: Results for orders placed during the hospital encounter of 03/08/23    XR CHEST (2 VW)    Narrative  EXAMINATION:  TWO XRAY VIEWS OF THE CHEST    3/8/2023 7:20 pm    COMPARISON:  11/03/2022    HISTORY:  ORDERING SYSTEM PROVIDED HISTORY: chest pain  TECHNOLOGIST PROVIDED HISTORY:  Reason for exam:->chest pain  Reason for Exam: CHEST PAIN, SOB    FINDINGS:  Right-sided central venous catheter remains in place.    The lungs are without acute focal process.  There is no effusion or  pneumothorax. The cardiomediastinal silhouette is stable. The osseous  structures are stable.    Impression  No acute process.      CXR portable: Results for orders placed during the hospital encounter of 05/30/25    XR CHEST PORTABLE    Narrative  EXAMINATION:  ONE XRAY VIEW OF THE CHEST    5/30/2025 7:25 am    COMPARISON:  06/19/2024 radiograph    HISTORY:  ORDERING SYSTEM PROVIDED HISTORY: chf?  TECHNOLOGIST PROVIDED HISTORY:  Reason for exam:->chf?    FINDINGS:  The heart is enlarged.  Mediastinum is normal.  Prominence of the right  pulmonary hilum has been observed on prior imaging and correlated with  pulmonary arterial dilation suggesting an underlying history of pulmonary  hypertension.  On current study, moderate perihilar opacities have developed  on the right.  No pleural fluid.  Unchanged port catheter.  No skeletal  finding.    Impression  Cardiomegaly with asymmetric perihilar opacities on the right. Pulmonary  edema or developing pneumonitis may be considered.  Fullness of the right  pulmonary hilum is exacerbated by prominent pulmonary arteries better  characterized on prior cross-sectional imaging.        Natalio Escobar MD, M.D.

## 2025-06-03 NOTE — CARE COORDINATION
06/03/25 1441   Service Assessment   Patient Orientation Alert and Oriented   Cognition Alert   History Provided By Patient   Primary Caregiver Self   Accompanied By/Relationship none   Support Systems Children;Family Members   Patient's Healthcare Decision Maker is: Legal Next of Kin  (Legal next of kin, her daughters.)   PCP Verified by CM Yes  (Dr Mccall   last visit was 4/2025)   Last Visit to PCP Within last 3 months   Prior Functional Level Independent in ADLs/IADLs   Current Functional Level Independent in ADLs/IADLs   Can patient return to prior living arrangement Yes   Ability to make needs known: Good   Family able to assist with home care needs: Yes   Would you like for me to discuss the discharge plan with any other family members/significant others, and if so, who? Yes  (family)   Financial Resources Medicare   Community Resources ECF/Home Care   Discharge Planning   Type of Residence Apartment   Living Arrangements Alone   Current Services Prior To Admission Durable Medical Equipment   Current DME Prior to Arrival Oxygen Therapy (Comment);Shower Chair;Walker;Cane;Other (Comment)  (rollator)   Potential Assistance Needed Home Care   DME Ordered? No   Potential Assistance Purchasing Medications No   Type of Home Care Services PT;OT;Skilled Therapy;Nursing Services   Patient expects to be discharged to: Apartment   One/Two Story Residence One story   History of falls? 0   Services At/After Discharge   Transition of Care Consult (CM Consult) Home Health   Internal Home Health Yes   Services At/After Discharge Home Health   [place] Resource Information Provided? No   Mode of Transport at Discharge Other (see comment)  (dgtr to transport home.)   Confirm Follow Up Transport Family   Condition of Participation: Discharge Planning   The Plan for Transition of Care is related to the following treatment goals: Provided her with CMS Star rated list of home care agencies and she states she uses [facility].   The

## 2025-06-03 NOTE — CARE COORDINATION
Received call from [facility] Lou does want to enroll in RPM. She will be discharged with a kit.    Mei Ku RN BSN  Care Transition Nurse  [phone]

## 2025-06-03 NOTE — CARE COORDINATION
Referral sent to Mei Stauffer regarding RPM.    DEENA Melissa     Case Management   [phone]    6/3/2025  1:57 PM

## 2025-06-03 NOTE — CARE COORDINATION
Chart Reviewed.  Goal:   home with home care, IV therapies, RPM system.    Met with pt to review possible DC for tomorrow.  Informed her of recommendation for home care therapies and possible IV therapies at home.  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  Presented her with CMS star rated list of agencies and she reports she uses [facility].   She would like to use them again.    The Plan for Transition of Care is related to the following treatment goals:  to progress in personal care and ambulation and nursing needs in home setting.     The Patient  was provided with a choice of provider and agrees   with the discharge plan. [x] Yes [] No    Freedom of choice list was provided with basic dialogue that supports the patient's individualized plan of care/goals, treatment preferences and shares the quality data associated with the providers. [x] Yes [] No    Informed her of possible IV therapies.  She is agreeable.  Referral made to [employer] for insurance check.  Referral initiated to Marlene who will check her insurance.    Presented patient with RPM Program. She is interested in this program.  Provided her with Booklet about the program.  Will deliver the tools she needs prior to DC home.    Referral initiated with [place] of [facility].  She will accept this referral.    IMM letter presented.  Dgtr to transport home.  Dgtr will bring in her portable oxygen concentrator.    DEENA Melissa     Case Management   [phone]    6/3/2025  1:52 PM

## 2025-06-03 NOTE — PLAN OF CARE
Problem: Chronic Conditions and Co-morbidities  Goal: Patient's chronic conditions and co-morbidity symptoms are monitored and maintained or improved  6/3/2025 1043 by Latoya Everett RN  Flowsheets (Taken 6/3/2025 0800)  Care Plan - Patient's Chronic Conditions and Co-Morbidity Symptoms are Monitored and Maintained or Improved: Monitor and assess patient's chronic conditions and comorbid symptoms for stability, deterioration, or improvement     Problem: Skin/Tissue Integrity  Goal: Skin integrity remains intact  Description: 1.  Monitor for areas of redness and/or skin breakdown2.  Assess vascular access sites hourly3.  Every 4-6 hours minimum:  Change oxygen saturation probe site4.  Every 4-6 hours:  If on nasal continuous positive airway pressure, respiratory therapy assess nares and determine need for appliance change or resting period  6/3/2025 1043 by Latoya Everett RN  Outcome: Progressing  Flowsheets (Taken 6/3/2025 0800)  Skin Integrity Remains Intact:   Monitor for areas of redness and/or skin breakdown   Assess vascular access sites hourly   Every 4-6 hours minimum:  Change oxygen saturation probe site   Every 4-6 hours:  If on nasal continuous positive airway pressure, assess nares and determine need for appliance change or resting period   Turn and reposition as indicated   Pressure redistribution bed/mattress (bed type)     Problem: Safety - Adult  Goal: Free from fall injury  Outcome: Progressing     Problem: ABCDS Injury Assessment  Goal: Absence of physical injury  Outcome: Progressing     Problem: Pain  Goal: Verbalizes/displays adequate comfort level or baseline comfort level  6/3/2025 1043 by Latoya Everett RN  Outcome: Progressing  Flowsheets (Taken 6/3/2025 0841)  Verbalizes/displays adequate comfort level or baseline comfort level:   Encourage patient to monitor pain and request assistance   Assess pain using appropriate pain scale   Administer analgesics based on type and severity of pain and

## 2025-06-04 VITALS
WEIGHT: 145.5 LBS | RESPIRATION RATE: 16 BRPM | HEART RATE: 74 BPM | DIASTOLIC BLOOD PRESSURE: 51 MMHG | HEIGHT: 61 IN | OXYGEN SATURATION: 93 % | TEMPERATURE: 98.2 F | BODY MASS INDEX: 27.47 KG/M2 | SYSTOLIC BLOOD PRESSURE: 106 MMHG

## 2025-06-04 LAB
ANION GAP SERPL CALCULATED.3IONS-SCNC: 5 MMOL/L (ref 3–16)
BACTERIA BLD CULT: NORMAL
BUN SERPL-MCNC: 18 MG/DL (ref 7–20)
CALCIUM SERPL-MCNC: 8.1 MG/DL (ref 8.3–10.6)
CHLORIDE SERPL-SCNC: 104 MMOL/L (ref 99–110)
CO2 SERPL-SCNC: 30 MMOL/L (ref 21–32)
CREAT SERPL-MCNC: 0.8 MG/DL (ref 0.6–1.2)
GFR SERPLBLD CREATININE-BSD FMLA CKD-EPI: 72 ML/MIN/{1.73_M2}
GLUCOSE BLD-MCNC: 100 MG/DL (ref 70–99)
GLUCOSE BLD-MCNC: 97 MG/DL (ref 70–99)
GLUCOSE SERPL-MCNC: 101 MG/DL (ref 70–99)
MAGNESIUM SERPL-MCNC: 2.3 MG/DL (ref 1.8–2.4)
PERFORMED ON: ABNORMAL
PERFORMED ON: NORMAL
POTASSIUM SERPL-SCNC: 4.6 MMOL/L (ref 3.5–5.1)
SODIUM SERPL-SCNC: 139 MMOL/L (ref 136–145)

## 2025-06-04 PROCEDURE — 36592 COLLECT BLOOD FROM PICC: CPT

## 2025-06-04 PROCEDURE — 6370000000 HC RX 637 (ALT 250 FOR IP): Performed by: FAMILY MEDICINE

## 2025-06-04 PROCEDURE — 94669 MECHANICAL CHEST WALL OSCILL: CPT

## 2025-06-04 PROCEDURE — 94640 AIRWAY INHALATION TREATMENT: CPT

## 2025-06-04 PROCEDURE — 2500000003 HC RX 250 WO HCPCS: Performed by: FAMILY MEDICINE

## 2025-06-04 PROCEDURE — 6360000002 HC RX W HCPCS: Performed by: FAMILY MEDICINE

## 2025-06-04 PROCEDURE — 80048 BASIC METABOLIC PNL TOTAL CA: CPT

## 2025-06-04 PROCEDURE — 94761 N-INVAS EAR/PLS OXIMETRY MLT: CPT

## 2025-06-04 PROCEDURE — 94680 O2 UPTK RST&XERS DIR SIMPLE: CPT

## 2025-06-04 PROCEDURE — 6370000000 HC RX 637 (ALT 250 FOR IP): Performed by: STUDENT IN AN ORGANIZED HEALTH CARE EDUCATION/TRAINING PROGRAM

## 2025-06-04 PROCEDURE — 2700000000 HC OXYGEN THERAPY PER DAY

## 2025-06-04 PROCEDURE — 83735 ASSAY OF MAGNESIUM: CPT

## 2025-06-04 RX ORDER — LEVOFLOXACIN 500 MG/1
750 TABLET, FILM COATED ORAL EVERY 24 HOURS
Status: DISCONTINUED | OUTPATIENT
Start: 2025-06-04 | End: 2025-06-04 | Stop reason: DRUGHIGH

## 2025-06-04 RX ORDER — BUDESONIDE AND FORMOTEROL FUMARATE DIHYDRATE 160; 4.5 UG/1; UG/1
2 AEROSOL RESPIRATORY (INHALATION) 2 TIMES DAILY
Qty: 10.2 G | Refills: 0 | Status: SHIPPED | OUTPATIENT
Start: 2025-06-04

## 2025-06-04 RX ORDER — LEVOFLOXACIN 500 MG/1
750 TABLET, FILM COATED ORAL
Status: COMPLETED | OUTPATIENT
Start: 2025-06-04 | End: 2025-06-04

## 2025-06-04 RX ADMIN — IPRATROPIUM BROMIDE AND ALBUTEROL SULFATE 1 DOSE: .5; 2.5 SOLUTION RESPIRATORY (INHALATION) at 08:41

## 2025-06-04 RX ADMIN — BUDESONIDE 500 MCG: 0.5 INHALANT RESPIRATORY (INHALATION) at 08:41

## 2025-06-04 RX ADMIN — APIXABAN 5 MG: 5 TABLET, FILM COATED ORAL at 09:10

## 2025-06-04 RX ADMIN — SODIUM CHLORIDE, PRESERVATIVE FREE 10 ML: 5 INJECTION INTRAVENOUS at 09:11

## 2025-06-04 RX ADMIN — LEVOFLOXACIN 750 MG: 500 TABLET, FILM COATED ORAL at 09:09

## 2025-06-04 RX ADMIN — SACUBITRIL AND VALSARTAN 0.5 TABLET: 24; 26 TABLET, FILM COATED ORAL at 09:09

## 2025-06-04 RX ADMIN — AMIODARONE HYDROCHLORIDE 200 MG: 200 TABLET ORAL at 09:10

## 2025-06-04 RX ADMIN — IPRATROPIUM BROMIDE AND ALBUTEROL SULFATE 1 DOSE: .5; 2.5 SOLUTION RESPIRATORY (INHALATION) at 12:35

## 2025-06-04 NOTE — CARE COORDINATION
Delivered RPM Kit  Serial Number:   [id number] to pt's room.   Informed them the Rep will be calling her to set things up with her.  She reports she already got a call from [facility].  Called this number to Mei Blood.  Emailed this to her as well.  DEENA Melissa     Case Management   [phone]    6/4/2025  10:45 AM

## 2025-06-04 NOTE — CARE COORDINATION
Chart Reviewed.  Spoke with bedside RN who states IV ABx have been discontinued.  [facility]  [facility].  Pt will need delivery of RPM Program kit at discharge.  Following for DC needs.  DEENA Melissa     Case Management   [phone]    6/4/2025  8:56 AM

## 2025-06-04 NOTE — DISCHARGE SUMMARY
V2.0  Discharge Summary    Name:  Anika Rudolph /Age/Sex: 1940 (84 y.o. female)   Admit Date: 2025  Discharge Date: 25    MRN & CSN:  [id number] & [id number] Encounter Date and Time 25 9:28 AM EDT    Attending:  Mynor Barcenas DO Discharging Provider: Mynor Barcenas DO       Hospital Course:     Brief HPI: Anika Rudolph is a 84 y.o. female with a pertinent PMHx of CAD, A-fib, COPD, HTN, CLL, Takotsubo cardiomyopathy who presented complaining of chest pain and shortness of breath.  In the ED she was started on BiPAP and was admitted for NSTEMI as well as concern for pneumonia.    Brief Problem Based Course:     Sepsis, present on admission, resolved  Multifocal pneumonia  - Completed 5-day course of IV cefepime and then was transitioned to oral Levaquin to complete a total 7-day course of antibiotics  - 1 of 2 blood cultures on admission had growth of Micrococcus luteus, suspect that this is likely contaminant    Acute respiratory failure with hypoxia and hypercapnia, improved  COPD exacerbation  - Unfortunately was unable to completely wean off supplemental oxygen, qualified for 3 L via nasal cannula  - Completed 5-day course of IV Solu-Medrol  - Symbicort  - Outpatient follow-up with pulmonology    Acute on chronic systolic heart failure  NSTEMI  Takotsubo cardiomyopathy  - Echo  showed EF 35% with apical akinesis consistent with Takotsubo cardiomyopathy  - Continue Entresto  - Cardiology was following and recommended further outpatient titration of GDMT  - Has been unable to tolerate beta-blocker, MRA, SGLT2 inhibitor secondary to hypotension  - Outpatient follow-up with cardiology    Atrial fibrillation  - Amiodarone 200 mg daily  - Anticoagulation with Eliquis      The patient expressed appropriate understanding of, and agreement with the discharge recommendations, medications, and plan.     Consults this admission:  IP CONSULT TO HEART FAILURE NURSE/COORDINATOR  IP CONSULT TO

## 2025-06-04 NOTE — PLAN OF CARE
Problem: Chronic Conditions and Co-morbidities  Goal: Patient's chronic conditions and co-morbidity symptoms are monitored and maintained or improved  Outcome: Progressing  Flowsheets (Taken 6/3/2025 2001)  Care Plan - Patient's Chronic Conditions and Co-Morbidity Symptoms are Monitored and Maintained or Improved: Monitor and assess patient's chronic conditions and comorbid symptoms for stability, deterioration, or improvement     Problem: Skin/Tissue Integrity  Goal: Skin integrity remains intact  Description: 1.  Monitor for areas of redness and/or skin breakdown2.  Assess vascular access sites hourly3.  Every 4-6 hours minimum:  Change oxygen saturation probe site4.  Every 4-6 hours:  If on nasal continuous positive airway pressure, respiratory therapy assess nares and determine need for appliance change or resting period  Outcome: Progressing  Flowsheets (Taken 6/3/2025 2001)  Skin Integrity Remains Intact: Monitor for areas of redness and/or skin breakdown     Problem: Safety - Adult  Goal: Free from fall injury  Outcome: Progressing     Problem: ABCDS Injury Assessment  Goal: Absence of physical injury  Outcome: Progressing     Problem: Pain  Goal: Verbalizes/displays adequate comfort level or baseline comfort level  Outcome: Progressing  Flowsheets (Taken 6/3/2025 2000)  Verbalizes/displays adequate comfort level or baseline comfort level: Encourage patient to monitor pain and request assistance     Problem: Respiratory - Adult  Goal: Achieves optimal ventilation and oxygenation  Outcome: Progressing  Flowsheets (Taken 6/3/2025 2001)  Achieves optimal ventilation and oxygenation: Assess for changes in respiratory status     Problem: Cardiovascular - Adult  Goal: Maintains optimal cardiac output and hemodynamic stability  Outcome: Progressing  Flowsheets (Taken 6/3/2025 2001)  Maintains optimal cardiac output and hemodynamic stability:   Monitor blood pressure and heart rate   Monitor urine output and notify

## 2025-06-04 NOTE — PLAN OF CARE
Problem: Chronic Conditions and Co-morbidities  Goal: Patient's chronic conditions and co-morbidity symptoms are monitored and maintained or improved  6/4/2025 1437 by Luis Nguyễn RN  Outcome: Progressing  6/4/2025 0558 by Vandana Ibanez RN  Outcome: Progressing  Flowsheets (Taken 6/3/2025 2001)  Care Plan - Patient's Chronic Conditions and Co-Morbidity Symptoms are Monitored and Maintained or Improved: Monitor and assess patient's chronic conditions and comorbid symptoms for stability, deterioration, or improvement     Problem: Skin/Tissue Integrity  Goal: Skin integrity remains intact  Description: 1.  Monitor for areas of redness and/or skin breakdown2.  Assess vascular access sites hourly3.  Every 4-6 hours minimum:  Change oxygen saturation probe site4.  Every 4-6 hours:  If on nasal continuous positive airway pressure, respiratory therapy assess nares and determine need for appliance change or resting period  6/4/2025 1437 by Luis Nguyễn RN  Outcome: Progressing  6/4/2025 0558 by Vandnaa Ibanez RN  Outcome: Progressing  Flowsheets (Taken 6/3/2025 2001)  Skin Integrity Remains Intact: Monitor for areas of redness and/or skin breakdown     Problem: Safety - Adult  Goal: Free from fall injury  6/4/2025 1437 by Luis Nguyễn RN  Outcome: Progressing  6/4/2025 0558 by Vandana Ibanez RN  Outcome: Progressing     Problem: ABCDS Injury Assessment  Goal: Absence of physical injury  6/4/2025 1437 by Luis Nguyễn RN  Outcome: Progressing  6/4/2025 0558 by Vandana Ibanez RN  Outcome: Progressing     Problem: Pain  Goal: Verbalizes/displays adequate comfort level or baseline comfort level  6/4/2025 1437 by Luis Nguyễn RN  Outcome: Progressing  Flowsheets (Taken 6/4/2025 0600 by Vandana Ibanez RN)  Verbalizes/displays adequate comfort level or baseline comfort level: Encourage patient to monitor pain and request assistance  6/4/2025 0558 by Vandana Ibanez RN  Outcome: Progressing  Flowsheets (Taken 6/3/2025

## 2025-06-04 NOTE — PROGRESS NOTES
Pulmonary Critical Care progress note     Patient's name:  Anika Rudolph  Medical Record Number: [id number]  Patient's account/billing number: [id number]  Patient's YOB: 1940  Age: 84 y.o.  Date of Admission: 5/30/2025  7:02 AM  Date of Consult: 6/1/2025      Primary Care Physician: Josselyn Vargas APRN - CNP      Code Status: Full Code    Reason for consult: Acute on chronic hypoxic and hypercapnic respiratory failure    Assessment and Plan     Acute on chronic hypoxic and hypercapnic respiratory failure  Recurrent pneumonia, multifocal   COPD with acute exacerbation   CLL  Immunocompromised/asplenic   P Afib on anticoagulation   NSTEMI/cardiomyopathy, stress-induced      Plan:  Cefepime x 7 days, d/c vanc  Follow up culture results  Normal immunoglobulin  Maintain even fluid balance  Bipap as needed for increase wob, O2 titrate to keep sat > 90%  Solumedrol x 5days   Wean pressors as tolerated, keep MAP > 65  O2 to keep sat > 90%  Check CXR today    Due to the immediate potential for life-threatening deterioration due to above , I spent 35 minutes providing critical care.  This time is excluding time spent performing procedures.  This note was transcribed using Dragon Dictation software. Please disregard any translational errors.          HISTORY OF PRESENT ILLNESS:   Mr./MsAjay Rudolph is a 84 y.o. lady with past medical history stated below significant for CLL, status post splenectomy, COPD, history of recurrent pneumonia,  Presented with worsening shortness of breath chest tightness:    CT chest with multifocal infiltrate   EKG a fib with RBBB     REVIEW OF SYSTEMS:  Review of Systems -   General ROS: negative  Psychological ROS: negative  Ophthalmic ROS: negative  ENT ROS: negative  Allergy and Immunology ROS: negative  Hematological and Lymphatic ROS: negative  Endocrine ROS: negative  Breast ROS: negative  Respiratory ROS: cough, 
                                                Pulmonary Critical Care progress note     Patient's name:  Anika Rudolph  Medical Record Number: [id number]  Patient's account/billing number: [id number]  Patient's YOB: 1940  Age: 84 y.o.  Date of Admission: 5/30/2025  7:02 AM  Date of Consult: 5/31/2025      Primary Care Physician: Josselyn Vargas APRN - CNP      Code Status: Full Code    Reason for consult: Acute on chronic hypoxic and hypercapnic respiratory failure    Assessment and Plan     Acute on chronic hypoxic and hypercapnic respiratory failure  Recurrent pneumonia, multifocal   COPD with acute exacerbation   CLL  Immunocompromised/asplenic   P Afib on anticoagulation   NSTEMI/cardiomyopathy, stress-induced      Plan:  Cefepime x 7 days  Follow up culture results  Normal immunoglobulin  Maintain even fluid balance  Bipap as needed for increase wob, O2 titrate to keep sat > 90%  solumedrol  O2 to keep sat > 90%  Echo results reviewed  Pressors as needed to keep Map > 65  Due to the immediate potential for life-threatening deterioration due to above , I spent 35 minutes providing critical care.  This time is excluding time spent performing procedures.  This note was transcribed using Dragon Dictation software. Please disregard any translational errors.          HISTORY OF PRESENT ILLNESS:   Mr./Ms. Anika Rudolph is a 84 y.o. lady with past medical history stated below significant for CLL, status post splenectomy, COPD, history of recurrent pneumonia,  Presented with worsening shortness of breath chest tightness:    CT chest with multifocal infiltrate   EKG a fib with RBBB     REVIEW OF SYSTEMS:  Review of Systems -   General ROS: negative  Psychological ROS: negative  Ophthalmic ROS: negative  ENT ROS: negative  Allergy and Immunology ROS: negative  Hematological and Lymphatic ROS: negative  Endocrine ROS: negative  Breast ROS: negative  Respiratory ROS: cough, wheezing, sob, 
            Cardiovascular Consultation     Attending Physician: Mynor Barcenas DO    PATIENT: Anika Rudolph  : 1940  MRN: [id number]    Reason for Consultation:   Chief Complaint   Patient presents with    Chest Pain     Pt arrives ems from home c/o chest tightness started yesterday shortness of breath. Hx of heart issues.     Shortness of Breath     INTERVAL HISTORY:   Off pressors  Really wants to go home  Feeling improved      Medical History:      Diagnosis Date    Acute ST elevation myocardial infarction (STEMI) ([facility]) 2021    Atrial fibrillation (HCC)     Chronic lymphocytic leukemia in remission (HCC)     CLL (chronic lymphocytic leukemia) (HCC) 2015    COPD (chronic obstructive pulmonary disease) (HCC)     Essential hypertension 2015    controlled with salt restriction (initially)    Factor V Leiden mutation     Goiter 2011    1.5 cm midline    Hypercholesterolemia     Impaired fasting glucose 2011    Osteoarthritis     Osteoporosis     Post herpetic neuralgia     Vitamin D deficiency 2011       Surgical History:      Procedure Laterality Date    CATARACT REMOVAL WITH IMPLANT  12    left eye    JOINT REPLACEMENT      partial knee R and L    KNEE SURGERY      partial replacements, both knees    MASTOIDECTOMY Right 2024    BILATERAL EAR EXAM UNDER ANESTHESIA, RIGHT TYMPANOSTOMY TUBE performed by Myron Russell MD at [facility] OR    MOHS SURGERY  2019    R cheek and R superior temple    SPLENECTOMY      TUNNELED VENOUS PORT PLACEMENT      UPPER GASTROINTESTINAL ENDOSCOPY N/A 2019    EGD ESOPHAGOGASTRODUODENOSCOPY, WITH ENDOSCOPIC ULTRASOUND OF ESOPHAGUS performed by Enmanuel Mccollum MD at [facility] ENDOSCOPY       Social History:  Social History     Socioeconomic History    Marital status:      Spouse name: Not on file    Number of children: Not on file    Years of education: Not on file    Highest education level: Not on file 
            Cardiovascular Consultation     Attending Physician: Mynor Barcenas DO    PATIENT: Anika Rudolph  : 1940  MRN: [id number]    Reason for Consultation:   Chief Complaint   Patient presents with    Chest Pain     Pt arrives ems from home c/o chest tightness started yesterday shortness of breath. Hx of heart issues.     Shortness of Breath     INTERVAL HISTORY:     Off pressors, doing well, has no new complaints, feels close to her baseline  Lying flat   Up for transfer to step down       Medical History:      Diagnosis Date    Acute ST elevation myocardial infarction (STEMI) ([facility]) 2021    Atrial fibrillation (HCC)     Chronic lymphocytic leukemia in remission (HCC)     CLL (chronic lymphocytic leukemia) (HCC) 2015    COPD (chronic obstructive pulmonary disease) (HCC)     Essential hypertension 2015    controlled with salt restriction (initially)    Factor V Leiden mutation     Goiter 2011    1.5 cm midline    Hypercholesterolemia     Impaired fasting glucose 2011    Osteoarthritis     Osteoporosis     Post herpetic neuralgia     Vitamin D deficiency 2011       Surgical History:      Procedure Laterality Date    CATARACT REMOVAL WITH IMPLANT  12    left eye    JOINT REPLACEMENT      partial knee R and L    KNEE SURGERY      partial replacements, both knees    MASTOIDECTOMY Right 2024    BILATERAL EAR EXAM UNDER ANESTHESIA, RIGHT TYMPANOSTOMY TUBE performed by Myron Russell MD at [facility] OR    MOHS SURGERY  2019    R cheek and R superior temple    SPLENECTOMY      TUNNELED VENOUS PORT PLACEMENT      UPPER GASTROINTESTINAL ENDOSCOPY N/A 2019    EGD ESOPHAGOGASTRODUODENOSCOPY, WITH ENDOSCOPIC ULTRASOUND OF ESOPHAGUS performed by Enmanuel Mccollum MD at [facility] ENDOSCOPY       Social History:  Social History     Socioeconomic History    Marital status:      Spouse name: Not on file    Number of children: Not on file    Years of 
            Cardiovascular Consultation     Attending Physician: Sachin Davila MD    PATIENT: Anika Rudolph  : 1940  MRN: [id number]    Reason for Consultation:   Chief Complaint   Patient presents with    Chest Pain     Pt arrives ems from home c/o chest tightness started yesterday shortness of breath. Hx of heart issues.     Shortness of Breath     INTERVAL HISTORY:     Patient had to go back on teresa overnight  Feels well  MAPs much improved this AM so weaning down again      Medical History:      Diagnosis Date    Acute ST elevation myocardial infarction (STEMI) (HCC) 2021    Atrial fibrillation (HCC)     Chronic lymphocytic leukemia in remission (HCC)     CLL (chronic lymphocytic leukemia) (HCC) 2015    COPD (chronic obstructive pulmonary disease) (HCC)     Essential hypertension 2015    controlled with salt restriction (initially)    Factor V Leiden mutation     Goiter 2011    1.5 cm midline    Hypercholesterolemia     Impaired fasting glucose 2011    Osteoarthritis     Osteoporosis     Post herpetic neuralgia     Vitamin D deficiency 2011       Surgical History:      Procedure Laterality Date    CATARACT REMOVAL WITH IMPLANT  12    left eye    JOINT REPLACEMENT      partial knee R and L    KNEE SURGERY      partial replacements, both knees    MASTOIDECTOMY Right 2024    BILATERAL EAR EXAM UNDER ANESTHESIA, RIGHT TYMPANOSTOMY TUBE performed by Myron Russell MD at [facility] OR    MOHS SURGERY  2019    R cheek and R superior temple    SPLENECTOMY      TUNNELED VENOUS PORT PLACEMENT      UPPER GASTROINTESTINAL ENDOSCOPY N/A 2019    EGD ESOPHAGOGASTRODUODENOSCOPY, WITH ENDOSCOPIC ULTRASOUND OF ESOPHAGUS performed by Enmanuel Mccollum MD at [facility] ENDOSCOPY       Social History:  Social History     Socioeconomic History    Marital status:      Spouse name: Not on file    Number of children: Not on file    Years of education: Not on 
    V2.0    [facility] [place]      Name:  Anika Rudolph /Age/Sex: 1940  (84 y.o. female)   MRN & CSN:  [id number] & [id number] Encounter Date/Time: 6/3/2025 8:03 AM EDT   Location:  [id number]/1306-01 PCP: Josselyn Vargas, FAB - CNP     Attending:Mynor Barcenas DO       Hospital Day: 5  Brief HPI  Anika Rudolph is a 84 y.o. female with pertinent PMHx of CAD, A-fib, COPD, HTN, CLL, Takotsubo cardiomyopathy who presented complaint of chest pain and shortness of breath.  In the ED she was started on BiPAP.  She was admitted for NSTEMI as well as concern for pneumonia.  Assessment & Plan     Sepsis, present on admission, improved  Multifocal pneumonia  - Continue IV cefepime, currently day 5 of 7  - 1 of 2 blood culture from admission with growth of micrococcus luteus, suspect this is likely contaminant    Acute on chronic respiratory failure with hypoxia and hypercapnia, improved  COPD exacerbation  - Wean supplemental oxygen as tolerated, may need home O2 eval prior to discharge  - Pulmicort twice daily  - DuoNebs every 4 hours  - Completing 5-day course of IV Solu-Medrol today  - Pulmonology following    Acute on chronic systolic heart failure  NSTEMI  Takotsubo cardiomyopathy  - Continue Entresto  - Cardiology previously following, recommended outpatient titration of GDMT    Atrial fibrillation  - Amiodarone 200 mg daily  - Continue anticoagulation with Eliquis    Prediabetes with hyperglycemia  - Blood sugars much better controlled  - Discontinue scheduled prandial lispro  - Medium dose sliding scale correctional insulin 4 times daily with meals and nightly  - Check point-of-care glucose 4 times daily with meals and nightly to monitor for hypoglycemia from insulin toxicity    Chronic lymphocytic leukemia  - Check CBC tomorrow    Diet ADULT DIET; Regular; No Added Salt (3-4 gm)   DVT Prophylaxis [] Lovenox, []  Heparin, [] SCDs, [] Ambulation,  [x] Eliquis, [] Xarelto  [] Coumadin   Code Status 
    V2.0    [facility] Progress Note      Name:  Anika Rudolph /Age/Sex: 1940  (84 y.o. female)   MRN & CSN:  [id number] & [id number] Encounter Date/Time: 2025 8:03 AM EDT   Location:  [id number] PCP: Josselyn Vargas, FAB - CNP     Attending:Mynor Barcenas DO       Hospital Day: 4  Brief HPI  Anika Rudolph is a 84 y.o. female with pertinent PMHx of CAD, A-fib, COPD, HTN, CLL, Takotsubo cardiomyopathy who presented complaint of chest pain and shortness of breath.  In the ED she was started on BiPAP.  She was admitted for NSTEMI as well as concern for pneumonia.  Assessment & Plan     Sepsis, present on admission, improved  Multifocal pneumonia  - Continue IV cefepime  - 1 blood culture from admission with growth of micrococcus gluteus, suspect this is likely contaminant    Acute on chronic respiratory failure with hypoxia and hypercapnia, improved  COPD exacerbation  - Wean supplemental oxygen as tolerated  - Pulmicort twice daily  - DuoNebs every 4 hours  - IV Solu-Medrol 20 mg daily    Acute on chronic systolic heart failure  NSTEMI  Takotsubo cardiomyopathy  - Resume Entresto, previously held secondary to hypotension  - Off pressors now  - Cardiology following  - Transfer out of the ICU today    Atrial fibrillation  - Amiodarone 200 mg daily  - Continue anticoagulation with Eliquis    Prediabetes with hyperglycemia  - Hyperglycemia likely exacerbated by systemic steroids  - Lispro 5 units 3 times daily with meals  - Medium dose sliding scale correctional insulin 4 times daily with meals and nightly  - Check point-of-care glucose 4 times daily with meals and nightly to monitor for hypoglycemia from insulin toxicity    Chronic lymphocytic leukemia  - Check CBC tomorrow    Diet ADULT DIET; Regular; No Added Salt (3-4 gm)   DVT Prophylaxis [] Lovenox, []  Heparin, [] SCDs, [] Ambulation,  [] Eliquis, [] Xarelto  [] Coumadin   Code Status Full Code   Disposition From: Home  Expected Disposition: 
  [facility] - Physical Therapy   Phone: [phone]    Physical Therapy  Facility/Department:[address] CVICU    [x] Initial Evaluation            [x] Daily Treatment Note         [] Discharge Summary      Patient: Anika Rudolph   : 1940   MRN: [id number]   Date of Service:  6/3/2025  Staff Mobility Recommendation: RW x 1    AM-PAC score:   Discharge Recommendations: HHPT    Admitting Diagnosis: Acute diastolic CHF (congestive heart failure) (HCC); COPD exacerbation  Ordering Physician: Mp Mckay MD on 25  Current Admission Summary: \"Pt arrives ems from home c/o chest tightness started yesterday shortness of breath. Hx of heart issues.\"     Past Medical History:  has a past medical history of Acute ST elevation myocardial infarction (STEMI) (HCC), Atrial fibrillation (HCC), Chronic lymphocytic leukemia in remission (HCC), CLL (chronic lymphocytic leukemia) (HCC), COPD (chronic obstructive pulmonary disease) (HCC), Essential hypertension, Factor V Leiden mutation, Goiter, Hypercholesterolemia, Impaired fasting glucose, Osteoarthritis, Osteoporosis, Post herpetic neuralgia, and Vitamin D deficiency.  Past Surgical History:  has a past surgical history that includes Splenectomy; knee surgery (); Cataract removal with implant (12); Tunneled venous port placement; Upper gastrointestinal endoscopy (N/A, 2019); Mohs surgery (2019); joint replacement; and mastoidectomy (Right, 2024).    Assessment  Activity Tolerance: Good  Impairments Requiring Therapeutic Intervention: decreased functional mobility, decreased endurance, decreased balance  Prognosis: good    Clinical Assessment: Pt agreeable to activity, mildly SOB on 4 L. O2.  She demonstrated functional LE strength and was able to complete mobility tasks with RW, supervision.  She would benefit from continued therapy to improve her balance and endurance.  Anticipate safe return home with prn assist, HHPT.       DME 
  PHARMACY NOTE:    The electrolyte replacement protocol for potassium/magnesium has been discontinued per P&T guidelines because the patient has reduced renal function (CrCl < 30 mL/min).      The patient's most recent potassium & magnesium levels are:  Recent Labs     05/30/25  0754   K 3.9   MG 1.97     Estimated Creatinine Clearance: 28 mL/min (A) (based on SCr of 1.3 mg/dL (H)).    For patients with decreased renal function (below 30ml/min) needing potassium/magnesium supplementation, please order individual bolus doses with appropriate monitoring.      Please contact the inpatient pharmacy with any concerns.  Thank you.  Ciera Maxwell [facility]  5/30/2025 10:45 AM   
  Pulmonary Progress Note    Date of Admission: 5/30/2025   LOS: 3 days     CC:  Chief Complaint   Patient presents with    Chest Pain     Pt arrives ems from home c/o chest tightness started yesterday shortness of breath. Hx of heart issues.     Shortness of Breath           Assessment/Plan       Acute on chronic hypoxemic and hypercapnic respiratory failure  Recurrent multifocal pneumonia  - Cefepime x 7 days  - BiPAP as needed currently doing well    Acute exacerbation of COPD  - Antibiotics as above  - Solu-Medrol x 5 days  - DuoNebs and Pulmicort    Okay for transfer out of the ICU  HPI/Subjective  No acute events overnight.  Patient wants to go home    ROS:   No nausea  No Vomiting  No chest pain      Intake/Output Summary (Last 24 hours) at 6/2/2025 1221  Last data filed at 6/2/2025 0200  Gross per 24 hour   Intake 918.45 ml   Output 800 ml   Net 118.45 ml         PHYSICAL EXAM:   Blood pressure (!) 115/56, pulse 70, temperature 98.2 °F (36.8 °C), temperature source Oral, resp. rate 21, height 1.549 m (5' 1\"), weight 67.6 kg (149 lb 0.5 oz), SpO2 95%, not currently breastfeeding.'  Gen:  No acute distress.   Resp:  No crackles. No wheezes. No rhonchi. No dullness on percussion.  CV: Regular rate. Regular rhythm. No murmur or rub. No edema.   M/S: No cyanosis. No clubbing.    Neuro: Awake alert oriented          Labs reviewed:  CBC:   Recent Labs     05/31/25  0629 06/01/25  0528 06/02/25  0605   WBC 38.8* 34.9* 31.3*   HGB 14.8 12.9 11.5*   HCT 46.1 39.5 35.5*   MCV 96.6 95.4 94.8    240 215     BMP:   Recent Labs     05/31/25  0629 06/01/25  0528 06/02/25  0605    140 140   K 5.0 3.8 4.2    105 105   CO2 26 27 28   BUN 19 17 14   CREATININE 1.3* 0.9 0.7     LIVER PROFILE:   Recent Labs     05/31/25  0629 06/01/25  0528 06/02/25  0605   AST 43* 27 25   ALT 22 17 15   BILIDIR <0.1  --   --    BILITOT 0.4 0.3 0.3   ALKPHOS 80 65 62     PT/INR: No results for input(s): \"PROTIME\", \"INR\" in the 
  Pulmonary Progress Note    Date of Admission: 5/30/2025   LOS: 4 days     CC:  Chief Complaint   Patient presents with    Chest Pain     Pt arrives ems from home c/o chest tightness started yesterday shortness of breath. Hx of heart issues.     Shortness of Breath           Assessment/Plan       Acute on chronic hypoxemic and hypercapnic respiratory failure  Recurrent multifocal pneumonia  - Cefepime x 7 days  - BiPAP as needed currently doing well    Acute exacerbation of COPD  - Antibiotics as above  - Solu-Medrol x 5 days  - DuoNebs and Pulmicort    No further inpatient recommendations, we will sign off at this time.  Please let us know if we can be of any further assistance.   HPI/Subjective  No acute events overnight.  Patient continues to feel better.    ROS:   No nausea  No Vomiting  No chest pain      Intake/Output Summary (Last 24 hours) at 6/3/2025 1111  Last data filed at 6/3/2025 0857  Gross per 24 hour   Intake 1097.32 ml   Output 2100 ml   Net -1002.68 ml         PHYSICAL EXAM:   Blood pressure 119/69, pulse 78, temperature 98 °F (36.7 °C), temperature source Oral, resp. rate 13, height 1.549 m (5' 1\"), weight 67.4 kg (148 lb 9.4 oz), SpO2 98%, not currently breastfeeding.'  Gen:  No acute distress.   Resp:  No crackles. No wheezes. No rhonchi. No dullness on percussion.  CV: Regular rate. Regular rhythm. No murmur or rub. No edema.   M/S: No cyanosis. No clubbing.    Neuro: Awake alert oriented          Labs reviewed:  CBC:   Recent Labs     06/01/25  0528 06/02/25  0605   WBC 34.9* 31.3*   HGB 12.9 11.5*   HCT 39.5 35.5*   MCV 95.4 94.8    215     BMP:   Recent Labs     06/01/25  0528 06/02/25  0605 06/03/25  0505    140 140   K 3.8 4.2 4.3    105 105   CO2 27 28 27   BUN 17 14 16   CREATININE 0.9 0.7 0.7     LIVER PROFILE:   Recent Labs     06/01/25  0528 06/02/25  0605   AST 27 25   ALT 17 15   BILITOT 0.3 0.3   ALKPHOS 65 62     PT/INR: No results for input(s): \"PROTIME\", 
4 Eyes Skin Assessment     NAME:  Anika Rudolph  YOB: 1940  MEDICAL RECORD NUMBER:  [id number]    The patient is being assessed for  Admission    I agree that at least one RN has performed a thorough Head to Toe Skin Assessment on the patient. ALL assessment sites listed below have been assessed.      Areas assessed by both nurses:    Head, Face, Ears, Shoulders, Back, Chest, Arms, Elbows, Hands, Sacrum. Buttock, Coccyx, Ischium, Legs. Feet and Heels, and Under Medical Devices         Does the Patient have a Wound? No noted wound(s)       Zana Prevention initiated by RN: Yes  Wound Care Orders initiated by RN: No    Pressure Injury (Stage 3,4, Unstageable, DTI, NWPT, and Complex wounds) if present, place Wound referral order by RN under : No    New Ostomies, if present place, Ostomy referral order under : No     Nurse 1 eSignature: Electronically signed by Randi Marquez RN on 5/30/25 at 1:28 PM EDT    **SHARE this note so that the co-signing nurse can place an eSignature**    Nurse 2 eSignature: Electronically signed by Latoya Everett RN on 6/3/25 at 12:08 PM EDT    
AVS thoroughly reviewed with patient and patient daughter. Questions answered, patient and family verbalized understanding. HF education also included in AVS review. Portacath de-accessed, patient removed from the monitor. Patient gathered belongings including glasses, hearing aids, and personal oxygen device. Patient taken to private vehicle via wheelchair and discharged from hospital.   
Clinical Pharmacy Note  Renal Dose Adjustment    Anika Rudolph is receiving Levaquin.  This medication is renally eliminated.  Based on the patient's Estimated Creatinine Clearance: 44 mL/min (based on SCr of 0.8 mg/dL). and urine output, the dose has been adjusted to 750 mg q48h per protocol.    Pharmacy will continue to monitor and adjust dose as needed for changes in renal function.    Ciera Maxwell, [facility],6/4/2025,7:39 AM    
Clinical Pharmacy Note  Vancomycin Consult    Anika Rudolph is a 84 y.o. female ordered vancomycin for CAP; consult received from Dr. Lopez to manage therapy. Also receiving cefepime.    Allergies:  Patient has no known allergies.     Temp max:  Temp (24hrs), Av.7 °F (36.5 °C), Min:97.4 °F (36.3 °C), Max:98.1 °F (36.7 °C)      Recent Labs     25  0711 25  0629   WBC 44.5* 38.8*       Recent Labs     25  0754 25  0629   BUN 11 19   CREATININE 1.3* 1.3*         Intake/Output Summary (Last 24 hours) at 2025 0746  Last data filed at 2025 0533  Gross per 24 hour   Intake 1558.21 ml   Output 1742 ml   Net -183.79 ml       Culture Results:  Pending; MRSA nasal probe collected    Ht Readings from Last 1 Encounters:   25 1.549 m (5' 1\")        Wt Readings from Last 1 Encounters:   25 66.3 kg (146 lb 2.6 oz)         Estimated Creatinine Clearance: 27 mL/min (A) (based on SCr of 1.3 mg/dL (H)).    Assessment:  Day # 2 of vancomycin.  Current regimen: intermittent dosing due to BRENNAN - no improvement in SCr  Vancomycin level: 11.6 mg/L  Goal trough 15-20 mg/L    Plan:  Continue with intermittent dosing. Vancomycin 1000 mg x 1 IVPB today.  Check vancomycin random level tomorrow with AM labs.    Thank you for the consult.     Maryann Chery, PharmD  2025 7:49 AM  
Giovanni PENA notified of pt Qtc 620 msec and rising. Repeat EKG done.  
Home oxygen evaluation performed with the following results.    Patient saturation on 1lpm nasal cannula at rest was 87% and on 2lpm at rest was 88-90%       06/04/25 0843   Resting (Room Air)   SpO2 85   Resting (On O2)   SpO2 94   O2 Device Nasal cannula   O2 Flow Rate (l/min) 3 l/min   After Walk   Does the Patient Qualify for Home O2 Yes   Liter Flow at Rest 3   Liter Flow on Exertion 3   Does the Patient Need Portable Oxygen Tanks Yes       
Hospitalist Progress Note  5/31/2025 8:00 AM  Subjective:   Admit Date: 5/30/2025  PCP: Josselyn Vargas APRN - CNP Status: Inpatient   Interval History: Hospital Day: 2, admitted with sepsis secondary to multifocal pneumonia on vancomycin and cefepime requiring high flow oxygen in the setting of acute on chronic systolic heart failure (EF 35%) and hypotension requiring phenylephrine vasopressor.  Volume resuscitation less than 30 ml/kg due to heart failure. Acute kidney injury with creatine increase from baseline 0.7 to 1.3 (eGFR 40).    Medical co-morbidities include CLL and immunocompromised, aspenic.       Diet: regular, 3-4 gram salt  Right subclavian implantable port (POA)  Left antecubital peripheral IV (5/30, day #2)  Right forearm peripheral IV (5/30), day #2)  Urinary catheter (5/31, day #1)    Medications:     Phenylephrine at 20 mcg/min  vancomycin  1,000 mg IntraVENous Once   cefepime  1,000 mg IntraVENous Q12H   amiodarone  200 mg Oral Daily   apixaban  5 mg Oral BID   sacubitril-valsartan  0.5 tablet Oral BID [held]   ipratropium-albuterol  1 Dose Inhalation Q4H WA RT   budesonide  0.5 mg Nebulization BID RT   methylprednisolone  20 mg IntraVENous Daily       Labs:       05/30/25  0754 05/31/25  0629   WBC 44.5* 38.8*   HGB 16.4* 14.8    250   MCV 96.3 96.6         140   K 3.9 5.0    102   CO2 21 26   BUN 11 19   CREATININE 1.3* 1.3*   GLUCOSE 226* 105*        MG 1.97 2.20   CALCIUM 9.3 9.1   ALBUMIN 4.1 3.6   INR 1.23*  --    AST 39* 43*   ALT 19 22   ALKPHOS 100 80   BILIDIR  --  <0.1     VBG (5/30) 7.22 / 58  ABG (5/31) 7.36 / 47 / 146 on 10 LPM  Lactate (5/30) 4.0 / 2.7 / 3.1 mmol/L  hsTnT (5/30) 57 ng/L  proBNP (5/30) 3,254 pg/mL  Fe / TIBC (5/30) 22 / 258 = 9% iron saturation    IgA (5/31) 192 mg/dL  IgG (5/31) 826 mg/dL  IgM (5/31) 31.4 mg/dL (40 - 230)    Lipid panel (5/31)   Cholesterol, Total 252 H   Triglycerides 78   HDL 70 H   LDL Cholesterol 166 H  
Medication Reconciliation    List of medications patient is currently taking is complete.     Source of information: 1. Conversation with patient/RN                                      2. EPIC records      Enrique Medina RPH, PharmD, BCPS  5/30/2025 11:20 AM                
NAME:  Anika Rudolph  YOB: 1940  MEDICAL RECORD NUMBER:  [id number]    Shift Summary: Uneventful shift. No changes.     Family updated: No    Rhythm: Normal Sinus Rhythm     Most recent vitals:   Visit Vitals  BP (!) 113/54   Pulse 71   Temp 98.2 °F (36.8 °C) (Axillary)   Resp 17   Ht 1.549 m (5' 1\")   Wt 66 kg (145 lb 8.1 oz)   SpO2 92%   BMI 27.49 kg/m²           No data found.    No data found.      Respiratory support needed (if any):  - O2 - NC - 3 lpm    Admission weight Weight - Scale: 69.5 kg (153 lb 3.5 oz) (05/30/25 0725)    Today's weight    Wt Readings from Last 1 Encounters:   06/04/25 66 kg (145 lb 8.1 oz)        Stevenson need assessed each shift: N/A - no stevenson present  UOP >30ml/hr: YES  Last documented BM (in last 48 hrs):  Patient Vitals for the past 48 hrs:   Last BM (including prior to admit) Stool Occurrence   06/02/25 0800 05/29/25 --   06/02/25 1828 -- 1   06/03/25 2001 06/03/25 --   06/04/25 0600 06/03/25 --                Restraints (in use currently or dc'd in last 12 hrs): No    Order current and documentation up to date? No    Lines/Drains reviewed @ bedside.  Implantable Port 06/19/24 Right Subclavian (Active)   Number of days: 349         Drip rates at handoff:    dextrose      sodium chloride Stopped (06/03/25 0458)       Lab Data:   CBC:   Recent Labs     06/02/25  0605   WBC 31.3*   HGB 11.5*   HCT 35.5*   MCV 94.8        BMP:    Recent Labs     06/03/25  0505 06/04/25  0420    139   K 4.3 4.6   CO2 27 30   BUN 16 18   CREATININE 0.7 0.8     ABG: No results for input(s): \"PHART\", \"SKC3STO\", \"PO2ART\" in the last 72 hours.    Any consults during the shift? No    Any signed and held orders to be released?  No        4 Eyes Skin Assessment       The patient is being assessed for  Shift Handoff    I agree that at least one RN has performed a thorough Head to Toe Skin Assessment on the patient. ALL assessment sites listed below have been assessed.      Areas 
NAME:  Anika Rudolph  YOB: 1940  MEDICAL RECORD NUMBER:  [id number]    Shift Summary: Still on Ubaldo with difficulty weaning. Blood culture from port resulted positive for gram positive cocci. NP notified. Otherwise uneventful shift.     Family updated: No    Rhythm: Normal Sinus Rhythm     Most recent vitals:   Visit Vitals  /60   Pulse 70   Temp 98.3 °F (36.8 °C) (Oral)   Resp 22   Ht 1.549 m (5' 1\")   Wt 66.3 kg (146 lb 2.6 oz)   SpO2 93%   BMI 27.62 kg/m²           No data found.    No data found.      Respiratory support needed (if any):  - O2 - NC - 3 lpm    Admission weight Weight - Scale: 69.5 kg (153 lb 3.5 oz) (05/30/25 0725)    Today's weight    Wt Readings from Last 1 Encounters:   05/31/25 66.3 kg (146 lb 2.6 oz)        Stevenson need assessed each shift: YES -  - continue stevenson r/t - retention  UOP >30ml/hr: YES  Last documented BM (in last 48 hrs):  Patient Vitals for the past 48 hrs:   Last BM (including prior to admit)   05/30/25 2000 05/29/25                Restraints (in use currently or dc'd in last 12 hrs): No    Order current and documentation up to date? No    Lines/Drains reviewed @ bedside.  Implantable Port 06/19/24 Right Subclavian (Active)   Number of days: 346       Peripheral IV 05/30/25 Left Antecubital (Active)   Number of days: 1       Peripheral IV 05/30/25 Distal;Posterior;Right Forearm (Active)   Number of days: 1       Urinary Catheter 05/31/25 Stevenson (Active)   Number of days: 1         Drip rates at handoff:    dextrose      sodium chloride      phenylephrine 50 mcg/min (06/01/25 0600)       Lab Data:   CBC:   Recent Labs     05/30/25  0711 05/31/25  0629   WBC 44.5* 38.8*   HGB 16.4* 14.8   HCT 50.4* 46.1   MCV 96.3 96.6    250     BMP:    Recent Labs     05/30/25  0754 05/31/25  0629    140   K 3.9 5.0   CO2 21 26   BUN 11 19   CREATININE 1.3* 1.3*     ABG:   Recent Labs     05/31/25  0745   PHART 7.364   IEL2KUK 46.6*   PO2ART 146.0*       Any 
NAME:  Anika Rudolph  YOB: 1940  MEDICAL RECORD NUMBER:  [id number]    Shift Summary: Pt restarted on teresa for soft BP. Started on entresto per cardiology. Tylenol x1 given for back/abd pain. Giovanni Larkin notified of Elevated Qtc and decreasing UOP.    Family updated: No    Rhythm: Normal Sinus Rhythm     Most recent vitals:   Visit Vitals  /65   Pulse 73   Temp 98.7 °F (37.1 °C) (Oral)   Resp 24   Ht 1.549 m (5' 1\")   Wt 66.3 kg (146 lb 2.6 oz)   SpO2 96%   BMI 27.62 kg/m²           No data found.    No data found.      Respiratory support needed (if any):  - O2 - HFNC 3 lpm    Admission weight Weight - Scale: 69.5 kg (153 lb 3.5 oz) (05/30/25 0725)    Today's weight    Wt Readings from Last 1 Encounters:   05/31/25 66.3 kg (146 lb 2.6 oz)        Stevenson need assessed each shift: YES -  - continue stevenson r/t - strict I and O  UOP >30ml/hr: YES  Last documented BM (in last 48 hrs):  Patient Vitals for the past 48 hrs:   Last BM (including prior to admit)   05/30/25 2000 05/29/25                Restraints (in use currently or dc'd in last 12 hrs): No    Order current and documentation up to date? No    Lines/Drains reviewed @ bedside.  Implantable Port 06/19/24 Right Subclavian (Active)   Number of days: 345       Peripheral IV 05/30/25 Left Antecubital (Active)   Number of days: 1       Peripheral IV 05/30/25 Distal;Posterior;Right Forearm (Active)   Number of days: 1       Urinary Catheter 05/31/25 Stevenson (Active)   Number of days: 0         Drip rates at handoff:    dextrose      sodium chloride      phenylephrine 50 mcg/min (05/31/25 1501)       Lab Data:   CBC:   Recent Labs     05/30/25  0711 05/31/25  0629   WBC 44.5* 38.8*   HGB 16.4* 14.8   HCT 50.4* 46.1   MCV 96.3 96.6    250     BMP:    Recent Labs     05/30/25  0754 05/31/25  0629    140   K 3.9 5.0   CO2 21 26   BUN 11 19   CREATININE 1.3* 1.3*     ABG:   Recent Labs     05/31/25  0745   PHART 7.364   [id number] 46.6* 
NAME:  Anika Rudolph  YOB: 1940  MEDICAL RECORD NUMBER:  [id number]    Shift Summary: Held entresto overnight per MD instruction. Ubaldo now off.   Increase in O2 needs from 2L to 4L.     Family updated: No    Rhythm: Normal Sinus Rhythm     Most recent vitals:   Visit Vitals  BP (!) 120/53   Pulse 78   Temp 98.2 °F (36.8 °C) (Oral)   Resp 21   Ht 1.549 m (5' 1\")   Wt 67.6 kg (149 lb 0.5 oz)   SpO2 95%   BMI 28.16 kg/m²           No data found.    No data found.      Respiratory support needed (if any):  - O2 - NC - 4 lpm    Admission weight Weight - Scale: 69.5 kg (153 lb 3.5 oz) (05/30/25 0725)    Today's weight    Wt Readings from Last 1 Encounters:   06/02/25 67.6 kg (149 lb 0.5 oz)        Stevenson need assessed each shift: YES -  - but stevenson no longer needed and should be discontinued  UOP >30ml/hr: YES  Last documented BM (in last 48 hrs):  No data found.             Restraints (in use currently or dc'd in last 12 hrs): No    Order current and documentation up to date? No    Lines/Drains reviewed @ bedside.  Implantable Port 06/19/24 Right Subclavian (Active)   Number of days: 347       Peripheral IV 05/30/25 Left Antecubital (Active)   Number of days: 2       Peripheral IV 05/30/25 Distal;Posterior;Right Forearm (Active)   Number of days: 2       Urinary Catheter 05/31/25 Stevenson (Active)   Number of days: 2         Drip rates at handoff:    dextrose      sodium chloride      phenylephrine Stopped (06/02/25 0003)       Lab Data:   CBC:   Recent Labs     05/31/25  0629 06/01/25 0528   WBC 38.8* 34.9*   HGB 14.8 12.9   HCT 46.1 39.5   MCV 96.6 95.4    240     BMP:    Recent Labs     05/31/25  0629 06/01/25 0528    140   K 5.0 3.8   CO2 26 27   BUN 19 17   CREATININE 1.3* 0.9     ABG:   Recent Labs     05/31/25  0745   PHART 7.364   HHB1RYP 46.6*   PO2ART 146.0*       Any consults during the shift? No    Any signed and held orders to be released?  No        4 Eyes Skin Assessment 
NAME:  Anika Rudolph  YOB: 1940  MEDICAL RECORD NUMBER:  [id number]    Shift Summary: Admitted to CVICU for chest tightness and SOB. Admitted for PNA and CHF exacerbation. Mild hypotension; teresa ordered if needed.    Family updated: Yes:  daughter, Olimpia at bedside.    Rhythm: Normal Sinus Rhythm     Most recent vitals:   Visit Vitals  BP (!) 89/48   Pulse 61   Temp 98.1 °F (36.7 °C) (Oral)   Resp 20   Ht 1.549 m (5' 1\")   Wt 69.4 kg (153 lb)   SpO2 94%   BMI 28.91 kg/m²        Respiratory support needed (if any):  - O2 - NC - 4 lpm    Admission weight Weight - Scale: 69.5 kg (153 lb 3.5 oz) (05/30/25 0725)    Today's weight    Wt Readings from Last 1 Encounters:   05/30/25 69.4 kg (153 lb)        Stevenson need assessed each shift: N/A - no stevenson present  UOP >30ml/hr: YES  Last documented BM (in last 48 hrs):  No data found.             Restraints (in use currently or dc'd in last 12 hrs): No    Order current and documentation up to date? No    Lines/Drains reviewed @ bedside.  Peripheral IV 05/30/25 Left Antecubital (Active)   Number of days: 0       Peripheral IV 05/30/25 Distal;Posterior;Right Forearm (Active)   Number of days: 0         Drip rates at handoff:    sodium chloride      phenylephrine         Lab Data:   CBC:   Recent Labs     05/30/25  0711   WBC 44.5*   HGB 16.4*   HCT 50.4*   MCV 96.3        BMP:    Recent Labs     05/30/25  0754      K 3.9   CO2 21   BUN 11   CREATININE 1.3*     ABG: No results for input(s): \"PHART\", \"LFZ4AWP\", \"PO2ART\" in the last 72 hours.    Any consults during the shift? Yes: Consults received: : Critical Care, Cardiology, Heart Failure RN    Any signed and held orders to be released?  No        4 Eyes Skin Assessment       The patient is being assessed for  Shift Handoff    I agree that at least one RN has performed a thorough Head to Toe Skin Assessment on the patient. ALL assessment sites listed below have been assessed.      Areas assessed by 
NAME:  Anika Rudolph  YOB: 1940  MEDICAL RECORD NUMBER:  [id number]    Shift Summary: Ubaldo still difficult to wean. Up to chair for 3 hours.  UOP improved.  Family updated: Yes:  son    Rhythm: Normal Sinus Rhythm     Most recent vitals:   Visit Vitals  BP (!) 116/53   Pulse 67   Temp 98.4 °F (36.9 °C) (Oral)   Resp 18   Ht 1.549 m (5' 1\")   Wt 66.2 kg (145 lb 15.1 oz)   SpO2 95%   BMI 27.58 kg/m²           No data found.    No data found.      Respiratory support needed (if any):  - O2 - HFNC 2 lpm    Admission weight Weight - Scale: 69.5 kg (153 lb 3.5 oz) (05/30/25 0725)    Today's weight    Wt Readings from Last 1 Encounters:   06/01/25 66.2 kg (145 lb 15.1 oz)        Stevenson need assessed each shift: YES -  - continue stevenson r/t - strict I and O's  UOP >30ml/hr: YES  Last documented BM (in last 48 hrs):  Patient Vitals for the past 48 hrs:   Last BM (including prior to admit)   05/30/25 2000 05/29/25                Restraints (in use currently or dc'd in last 12 hrs): No    Order current and documentation up to date? No    Lines/Drains reviewed @ bedside.  Implantable Port 06/19/24 Right Subclavian (Active)   Number of days: 346       Peripheral IV 05/30/25 Left Antecubital (Active)   Number of days: 2       Peripheral IV 05/30/25 Distal;Posterior;Right Forearm (Active)   Number of days: 2       Urinary Catheter 05/31/25 Stevenson (Active)   Number of days: 1         Drip rates at handoff:    dextrose      sodium chloride      phenylephrine 30 mcg/min (06/01/25 1608)       Lab Data:   CBC:   Recent Labs     05/31/25 0629 06/01/25 0528   WBC 38.8* 34.9*   HGB 14.8 12.9   HCT 46.1 39.5   MCV 96.6 95.4    240     BMP:    Recent Labs     05/31/25  0629 06/01/25  0528    140   K 5.0 3.8   CO2 26 27   BUN 19 17   CREATININE 1.3* 0.9     ABG:   Recent Labs     05/31/25  0745   PHART 7.364   [id number] 46.6*   PO2ART 146.0*       Any consults during the shift? No    Any signed and held orders to 
NAME:  Anika Rudolph  YOB: 1940  MEDICAL RECORD NUMBER:  [id number]    Shift Summary:  patient tolerated weaning O2 to 2L n/c. port needle access exchanged and new dressing in place, patient tolerated well, Plan to d/c stevenson today in am and increase activity/ work with therapy starting tomorrow.     Family updated: No    Rhythm: Normal Sinus Rhythm     Most recent vitals:   Visit Vitals  BP (!) 141/75   Pulse 85   Temp 98.2 °F (36.8 °C) (Oral)   Resp 17   Ht 1.549 m (5' 1\")   Wt 67.4 kg (148 lb 9.4 oz)   SpO2 93%   BMI 28.08 kg/m²           No data found.    No data found.      Respiratory support needed (if any):  - O2 - NC - 2 lpm    Admission weight Weight - Scale: 69.5 kg (153 lb 3.5 oz) (05/30/25 0725)    Today's weight    Wt Readings from Last 1 Encounters:   06/03/25 67.4 kg (148 lb 9.4 oz)        Stevenson need assessed each shift: YES -  - continue stevenson r/t - dc today  UOP >30ml/hr: YES  Last documented BM (in last 48 hrs):  Patient Vitals for the past 48 hrs:   Last BM (including prior to admit) Stool Occurrence   06/02/25 0800 05/29/25 --   06/02/25 1828 -- 1                Restraints (in use currently or dc'd in last 12 hrs): No    Order current and documentation up to date? No    Lines/Drains reviewed @ bedside.  Implantable Port 06/19/24 Right Subclavian (Active)   Number of days: 348       Peripheral IV 05/30/25 Left Antecubital (Active)   Number of days: 3       Peripheral IV 05/30/25 Distal;Posterior;Right Forearm (Active)   Number of days: 3       Urinary Catheter 05/31/25 Stevenson (Active)   Number of days: 2         Drip rates at handoff:    dextrose      sodium chloride 5 mL/hr at 06/02/25 0910       Lab Data:   CBC:   Recent Labs     06/01/25  0528 06/02/25  0605   WBC 34.9* 31.3*   HGB 12.9 11.5*   HCT 39.5 35.5*   MCV 95.4 94.8    215     BMP:    Recent Labs     06/01/25  0528 06/02/25  0605    140   K 3.8 4.2   CO2 27 28   BUN 17 14   CREATININE 0.9 0.7     ABG: 
NAME:  Anika Rudolph  YOB: 1940  MEDICAL RECORD NUMBER:  [id number]    Shift Summary: Pt had rested well overnight and O2 was titrated to 2 lpm, however, this AM has been progressively desating and now requiring 11 lpm HFNC. Ubaldo-synephrine @ 45 mcg overnight, now down to 20mcg/min. Only able to void 175ml, bladder scanned and revealed 577 and was unable to urinate. 16 fr stevenson catheter inserted per order without issue for immediate return of 500ml clear yellow urine. HR down to mid 40's at times while sleeping. Denies any pain or discomfort. Bath given, hair washed.     Family updated: No    Rhythm: Normal Sinus Rhythm     Most recent vitals:   Visit Vitals  /69   Pulse 63   Temp 97.9 °F (36.6 °C) (Oral)   Resp 17   Ht 1.549 m (5' 1\")   Wt 66.3 kg (146 lb 2.6 oz)   SpO2 94%   BMI 27.62 kg/m²           No data found.    No data found.      Respiratory support needed (if any):  - O2 - HFNC 11 lpm    Admission weight Weight - Scale: 69.5 kg (153 lb 3.5 oz) (05/30/25 0725)    Today's weight    Wt Readings from Last 1 Encounters:   05/31/25 66.3 kg (146 lb 2.6 oz)        Stevenson need assessed each shift: YES -  - continue stevenson r/t - urinary retention   UOP >30ml/hr: YES  Last documented BM (in last 48 hrs):  Patient Vitals for the past 48 hrs:   Last BM (including prior to admit)   05/30/25 2000 05/29/25                Restraints (in use currently or dc'd in last 12 hrs): No    Order current and documentation up to date? No    Lines/Drains reviewed @ bedside.  Peripheral IV 05/30/25 Left Antecubital (Active)   Number of days: 0       Peripheral IV 05/30/25 Distal;Posterior;Right Forearm (Active)   Number of days: 0       Urinary Catheter 05/31/25 Stevenson (Active)   Number of days: 0         Drip rates at handoff:    sodium chloride      phenylephrine 20 mcg/min (05/31/25 0533)       Lab Data:   CBC:   Recent Labs     05/30/25  0711   WBC 44.5*   HGB 16.4*   HCT 50.4*   MCV 96.3        BMP: 
NAME:  Anika Rudolph  YOB: 1940  MEDICAL RECORD NUMBER:  [id number]    Shift Summary: patient tolerated weaning O2 to 2L n/c. Continues with IV ABT, no adverse reaction.  port needle access exchanged and new dressing in place, patient tolerated well, Plan to d/c stevenson in am and increase activity/ work with therapy starting tomorrow.     Family updated: Yes:  At bedside    Rhythm: Normal Sinus Rhythm     Most recent vitals:   Visit Vitals  BP (!) 93/47   Pulse 83   Temp 98.1 °F (36.7 °C) (Oral)   Resp 19   Ht 1.549 m (5' 1\")   Wt 67.6 kg (149 lb 0.5 oz)   SpO2 92%   BMI 28.16 kg/m²           No data found.    No data found.      Respiratory support needed (if any):  - O2 - NC - 2 lpm    Admission weight Weight - Scale: 69.5 kg (153 lb 3.5 oz) (05/30/25 0725)    Today's weight    Wt Readings from Last 1 Encounters:   06/02/25 67.6 kg (149 lb 0.5 oz)        Stevenson need assessed each shift: YES -  - continue stevenson r/t - fluid volume managment  UOP >30ml/hr: YES  Last documented BM (in last 48 hrs):  Patient Vitals for the past 48 hrs:   Last BM (including prior to admit)   06/02/25 0800 05/29/25                Restraints (in use currently or dc'd in last 12 hrs): No    Order current and documentation up to date? No    Lines/Drains reviewed @ bedside.  Implantable Port 06/19/24 Right Subclavian (Active)   Number of days: 347       Peripheral IV 05/30/25 Left Antecubital (Active)   Number of days: 3       Peripheral IV 05/30/25 Distal;Posterior;Right Forearm (Active)   Number of days: 3       Urinary Catheter 05/31/25 Stevenson (Active)   Number of days: 2         Drip rates at handoff:    dextrose      sodium chloride 5 mL/hr at 06/02/25 0910       Lab Data:   CBC:   Recent Labs     06/01/25  0528 06/02/25  0605   WBC 34.9* 31.3*   HGB 12.9 11.5*   HCT 39.5 35.5*   MCV 95.4 94.8    215     BMP:    Recent Labs     06/01/25  0528 06/02/25  0605    140   K 3.8 4.2   CO2 27 28   BUN 17 14 
Overnight NIV therapy refused by patient.    Electronically signed by Ronald Adams RCP on 5/31/2025 at 11:05 PM    
Patient was evaluated today for the diagnosis of COPD.  I entered a DME order for home oxygen at 3 lpm because the diagnosis and testing require the patient to have supplemental oxygen.  Condition will improve or be benefited by oxygen use.  The patient is  able to perform good mobility in a home setting and therefore does require the use of a portable oxygen system.  The need for this equipment was discussed with the patient and she understands and is in agreement.   
Pt reports coughing so frequently she's having trouble sleeping. One time doses of melatonin and robitussin ordered and given.   
Pt respirations are easy and unlabored. Pt resting comfortably on 4 lpm NC. Bipap on stby in room. Pt instructed to have RT paged if breathing status changes. Will continue to monitor.   
Pt states that she has not used bipap in a couple days.  
This RN entered room upon hearing Pt sounding distressed. Pt had dropped her right hearing aid and the outer casing cracked open. It was temporarily repaired with tape and still functions per Pt.   
LPM  Lactate (5/30) 4.0 / 2.7 / 3.1 mmol/L  hsTnT (5/30) 57 ng/L  proBNP (5/30) 3,254 pg/mL  Fe / TIBC (5/30) 22 / 258 = 9% iron saturation     IgA (5/31) 192 mg/dL  IgG (5/31) 826 mg/dL  IgM (5/31) 31.4 mg/dL (40 - 230)     Lipid panel (5/31)   Cholesterol, Total 252 H   Triglycerides 78   HDL 70 H   LDL Cholesterol 166 H       Procalcitonin (5/30) 0.10 ng/mL  MRSA nasal DNA (5/30) pending  SARS-CoV-2 NAAT (5/30) not detected  Influenza A/B (5/30) not detected     Blood culture x 2 (5/30) Gram positive cocci in clusters resembling Staphylococcus     POC Glucose:   Recent Labs     05/31/25  0717 05/31/25  1101 05/31/25  1718 05/31/25 2007   POCGLU 94 142* 248* 153*     Portable CXR (6/1) Similar-appearing right perihilar fullness which may be related to suspected pulmonary artery hypertension.  No focal consolidation.     Portable CXR (5/31) Nonspecific perihilar, peribronchial thickening bilaterally which may reflect atypical infection or reactive airways disease.  No consolidation.  Evidence of pulmonary arterial hypertension redemonstrated.     CT abd / pelvis w/ IV contrast (5/30) Diverticulosis.  No acute abdominopelvic abnormality.      CTA chest (5/30) Negative for pulmonary embolus. CT findings suggestive of pulmonary hypertension. Interval development of multiple bilateral solid/ground-glass pulmonary nodules.  The differential includes atypical infection and metastatic disease.      Portable CXR (5/30) Cardiomegaly with asymmetric perihilar opacities on the right. Pulmonary edema or developing pneumonitis may be considered.  Fullness of the right pulmonary hilum is exacerbated by prominent pulmonary arteries better characterized on prior cross-sectional imaging.     Transthoracic echo (5/30) Left Ventricle: Moderately reduced left ventricular systolic function. EF by visual approximation is 35%. Left ventricle size is normal. Basal septal thickening. Mid to apical akinesis and ballooning consistent with 
Hard of hearing/hearing concerns  Observation:   Posture:   Fair  Sensation:   WFL  Coordination Testing:   WFL    ROM:   (B) UE AROM WFL  Strength:   (B) UE strength grossly WFL    Therapist Clinical Decision Making (Complexity): low complexity         Activities of Daily Living  Basic Activities of Daily Living  Grooming: supervision stand by assistance  Grooming Comments: to complete washing face, combing hair, and oral care in stance at sink  General Comments: anticipate pt to require up to SBA for full ADLs based on strength, balance, endurance and ROM observed this date.   Instrumental Activities of Daily Living  No IADL completed on this date.    Functional Mobility  Bed Mobility:  Bed mobility not completed on this date.  Comments: pt seated in recliner at beginning and end of session  Transfers:  Sit to stand transfer:stand by assistance  Stand to sit transfer: stand by assistance  Comments: <>RW, <>recliner with cues for hand placement   Functional Mobility  Functional Mobility Activity: to/from bathroom  Device Use: rolling walker  Required Assistance: stand by assistance  Comment: no unsteadiness or LOB noted; no complaints of feeling SOB; assist required for line management   Balance:  Static Standing Balance: fair (-): maintains balance at SBA with use of UE support  Dynamic Standing Balance: fair (-): maintains balance at SBA with use of UE support    Cognition  WFL  Orientation:    alert and oriented x 4     Education  Barriers To Learning: hearing  Patient Education: patient educated on goals, OT role and benefits, plan of care, proper use of assistive device/equipment, energy conservation, transfer training, discharge recommendations  Learning Assessment:  patient verbalizes and demonstrates understanding  Safety Interventions: patient at risk for falls, call light within reach, patient left in chair, chair alarm in place, and nurse notified    Plan  Frequency: 2-3 x/week  Current Treatment 
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  Every effort was made to ensure accuracy; however, inadvertent computerized transcription errors may be present.

## 2025-06-04 NOTE — DISCHARGE INSTR - DIET

## 2025-06-04 NOTE — NURSE NAVIGATOR
DC order noted. HF dc instructions are on AVS and AMAIRANI as pt going home with [facility]. Pt agreeable to RPM program, kit was delivered per . PCP appt scheduled for 6/6 at [facility]. Cardiology hospital f/u appt scheduled for 6/20 at 3:00 with Dr Mckay. Bedside RN, Luis, updated.     Dc wt 145 lbs

## 2025-06-04 NOTE — CARE COORDINATION
SOCIAL WORK DISCHARGE SUMMARY        DATE OF DISCHARGE:   Wednesday, 6-4-2025      LOCATION:   Home          Discharging to Facility/ Agency   Name:  [facility]    Address: [address] [place] [address]  Phone: [phone]  Fax: [phone] m       TIME:   1 pm    Daughter        PHARMACY:        DME:     RPM Kit  Serial Number:  [id number]        DEENA Melissa     Case Management   [phone]    6/4/2025  12:01 PM

## 2025-06-05 ENCOUNTER — TELEPHONE (OUTPATIENT)
Dept: FAMILY MEDICINE CLINIC | Age: 85
End: 2025-06-05

## 2025-06-05 ENCOUNTER — CARE COORDINATION (OUTPATIENT)
Dept: PRIMARY CARE CLINIC | Age: 85
End: 2025-06-05

## 2025-06-05 ENCOUNTER — CARE COORDINATION (OUTPATIENT)
Dept: CASE MANAGEMENT | Age: 85
End: 2025-06-05

## 2025-06-05 DIAGNOSIS — I10 ESSENTIAL HYPERTENSION: Primary | ICD-10-CM

## 2025-06-05 DIAGNOSIS — J43.2 CENTRILOBULAR EMPHYSEMA (HCC): ICD-10-CM

## 2025-06-05 DIAGNOSIS — I50.42 CHRONIC COMBINED SYSTOLIC (CONGESTIVE) AND DIASTOLIC (CONGESTIVE) HEART FAILURE (HCC): ICD-10-CM

## 2025-06-05 NOTE — CARE COORDINATION
6/6/2025 1:30 PM Josselyn Vargas APRN - CNP Family Medicine [phone]    6/20/2025 3:00 PM Mp Mckay MD Cardiology [phone]    11/18/2025 2:00 PM Enrique Lenz MD Pulmonology [phone]            Care Transition Nurse provided contact information.  Plan for follow-up call in 6-10 days based on severity of symptoms and risk factors.  Plan for next call:  chest pain - SOB - HHC started      Mei Ku RN BSN  Care Transition Nurse  [phone]

## 2025-06-05 NOTE — TELEPHONE ENCOUNTER
Mei with [facility] called to let us know pt got discharged from   The hospital yesterday.  She has a Medicare Annual wellness scheduled for tomorrow .  I changed the appt to be a hospital follow up.    Transitional Care Management Service-  Initial Post-discharge Communication    Date of discharge: 6-4-25  Facility: [facility]  Non-face-to-face services provided:  Scheduled appointment with PCP-6-6-25

## 2025-06-05 NOTE — TELEPHONE ENCOUNTER
Care Transitions Initial Follow Up Call    Outreach made within 2 business days of discharge: Yes    Patient: Anika Rudolph Patient : 1940   MRN: [id number]  Reason for Admission: CHEST PAIN  Discharge Date: 25       Spoke with: LINDA     Discharge department/facility: [facility] Interactive Patient Contact:  Was patient able to fill all prescriptions:   Was patient instructed to bring all medications to the follow-up visit:   Is patient taking all medications as directed in the discharge summary?   Does patient understand their discharge instructions:   Does patient have questions or concerns that need addressed prior to 7-14 day follow up office visit:     Additional needs identified to be addressed with provider               Scheduled appointment with PCP within 7-14 days    Follow Up  Future Appointments   Date Time Provider Department Center   2025  1:30 PM Josselyn Vargas APRN - CNP [facility]   2025  3:00 PM Mp Mckay MD [facility]   2025  2:00 PM Enrique Lenz MD [facility]       Swetha Lovelace MA

## 2025-06-05 NOTE — CARE COORDINATION
RPM IP Order    Market: [place]   Kit requested: complete kit   Care Plan preset: CHF; condition managed by Dr.Adam Mkcay. COPD; condition managed by Dr.Kyle Lenz. HTN; condition managed by Josselyn SANON.   BP cuff size requested: regular (9.05\"-15.74\")   Weight Scale requested: regular (<330lbs)   RPM Kit Number [id number]- [id number] (If not applicable, please enter N/A)

## 2025-06-05 NOTE — PROGRESS NOTES
Remote Patient Monitoring Treatment Plan    Received request from [facility]/CTMei Quevedo RN   to order remote patient monitoring for in home monitoring of CHF; condition managed by Dr.Adam Mckay. COPD; condition managed by Dr.Kyle Lenz. HTN; condition managed by Josselyn SANON.  and order completed. Home oxygen therapy at 3L    Patient will be monitoring blood pressure   pulse ox   weight.      Patient will engage in Remote Patient Monitoring each day to develop the skills necessary for self management.       RPM Care Team Responsibilities:   Alerts will be reviewed daily and addressed within 2-4 hours during operational hours (Monday -Friday 9 am-4 pm)  Alert response and intervention documented in patient medical record  Alert response escalated to PCP per protocol and documented in patient medical record  Patient monitored over approximately  days  Discharge from program based on self-management readiness    See care coordination encounters for additional details.

## 2025-06-06 ENCOUNTER — OFFICE VISIT (OUTPATIENT)
Dept: FAMILY MEDICINE CLINIC | Age: 85
End: 2025-06-06

## 2025-06-06 ENCOUNTER — CARE COORDINATION (OUTPATIENT)
Dept: OTHER | Facility: CLINIC | Age: 85
End: 2025-06-06

## 2025-06-06 VITALS
HEART RATE: 79 BPM | BODY MASS INDEX: 28.13 KG/M2 | OXYGEN SATURATION: 93 % | HEIGHT: 61 IN | SYSTOLIC BLOOD PRESSURE: 120 MMHG | WEIGHT: 149 LBS | DIASTOLIC BLOOD PRESSURE: 62 MMHG

## 2025-06-06 DIAGNOSIS — Z09 HOSPITAL DISCHARGE FOLLOW-UP: Primary | ICD-10-CM

## 2025-06-06 DIAGNOSIS — J18.9 COMMUNITY ACQUIRED PNEUMONIA, UNSPECIFIED LATERALITY: ICD-10-CM

## 2025-06-06 DIAGNOSIS — Z71.89 ACP (ADVANCE CARE PLANNING): ICD-10-CM

## 2025-06-06 DIAGNOSIS — Z99.81 SUPPLEMENTAL OXYGEN DEPENDENT: ICD-10-CM

## 2025-06-06 RX ORDER — LEVOFLOXACIN 500 MG/1
500 TABLET, FILM COATED ORAL DAILY
Qty: 7 TABLET | Refills: 0 | Status: SHIPPED | OUTPATIENT
Start: 2025-06-06 | End: 2025-06-13

## 2025-06-06 NOTE — CARE COORDINATION
2025 9:00 AM  *  RPM Verification RPM Verification and Welcome Call Successful? Yes,     Remote Patient Monitoring Welcome Note  Date/Time:  2025 9:01 AM  Patient Current Location: Home: [address] [place], [place] [id number]  Verified patients name and  as identifiers.       Completed and confirmed the following:    [x] Patient received all RPM equipment (tablet, scale, blood pressure device and cuff, and pulse oximeter)  Cuff Size: regular (9.05\"-15.74\")    Weight Scale:  regular (<330lbs)                    [x] Instructed patient keep box for use when returning equipment                                                          [x] Reviewed Patient Welcome Letter with patient    []  Reviewed Consent Form  Copy of consent form in chart.                 [x] Reviewed expectations for patient and care team  Monitoring hours M-F 9-4pm  It is important to take your vitals every day, even on the weekends,to keep your care team aware of how you are doing every day of the week.  Completing monitoring by 12pm on  so that alerts can be responded to in the same day  Patient weighs self at same time every day (or after urinating and waking up)  Take blood pressure 1-2 hrs after medications   RPM team may have different phone area code (including [place], [place], [place] or [place])                              [x] Instructed patient to keep scale on flat surface                                                         [x] Instructed patient to keep tablet plugged in at all times                         [x] Instructed how to contact IT support  ([phone])  [x] Provided Remote Patient Monitoring care  information     Emergency Contact Verified: Olimpia Avilez and jenifer Zaman               All questions answered at this time.        EVE GarciaN, RN  Associate Care Manager   Cell: [phone]  [email or web address]

## 2025-06-06 NOTE — PATIENT INSTRUCTIONS
You may receive a survey regarding the care you received during your visit.  Your input is valuable to us.  We encourage you to complete and return your survey.  We hope you will choose us in the future for your healthcare needs.       Advance Care Planning     Advance Care Planning opens a door to talk about and write down your wishes before a sudden accident or illness.  Make your goals, values, and preferences known.     This puts you in the ’s seat and helps others know what matters most to you so they won’t have to guess.      Where can you learn more?    Go to [email or web address]   to learn how to:    Name someone you trust to make healthcare decisions for you, only if you can’t. (Healthcare Power of )    Document your wishes for care if you were seriously ill and not expected to recover or are approaching end of life. (Advance Directive or Living Will)    The same page can be found using the QR code below.

## 2025-06-06 NOTE — CARE COORDINATION
2025 9:06 AM  *  Alert and Triage   -Remote Alert Monitoring Note      Date/Time:  2025 9:06 AM  Patient Current Location: Home: [address] [place], [place] [id number]  Verified patients name and  as identifiers.    Rpm alert to be reviewed by the provider   red alert  pulse ox reading (87/80)  Vitals Recheck pulse ox reading (90/73)  Additional needs to be addressed by provider: No                   ACM contacted patient by telephone regarding red alert received   Background: CHF, COPD, HTN  Refer to 911 immediately if:  Patient unresponsive or unable to provide history  Change in cognition or sudden confusion  Patient unable to respond in complete sentences  Intense chest pain/tightness  Any concern for any clinical emergency  Red Alert: Provider response time of 1 hr required for any red alert requiring intervention  Yellow Alert: Provider response time of 3hr required for any escalated yellow alert  Patient Chief Complaint:  Pulse Ox Triage  Were your hands cold when you used your pulse oximeter today? yes   Are your hands typically cold? yes   Have tried warming your hands and rechecking your SpO2? (rubbing hands together, running them under warm water) yes   Did you complete an activity (ambulation, make bed, cook meal, shower) before checking your SpO2 today? yes   Do you use oxygen? yes   Was your oxygen on when you took today's reading? no   Do you have nail polish on? no      Clinical Interventions: Reviewed and followed up on alerts and treatments-discussed red alert for low SpO2 with patient and daughter Glenys. Patient had not put her O2 back on when she checked O2 sats. No new or concerning s/s. She re-checked just now and it is improved and now normal. Also discussed need for Consent form with Glenys. She will go do this now. BP and weight to be checked before noon, patient just took her BP medications and will wait at least 1 hour before checking BP. No questions or concerns at this time.

## 2025-06-06 NOTE — PROGRESS NOTES
Post-Discharge Transitional Care  Follow Up      Anika Rudolph   YOB: 1940    Date of Office Visit:  6/6/2025  Date of Hospital Admission: 5/30/25  Date of Hospital Discharge: 6/4/25  Risk of hospital readmission (high >=14%. Medium >=10%) :Readmission Risk Score: 12.2    Care management risk score Rising risk (score 2-5) and Complex Care (Scores >=6): No Risk Score On File     Non face to face  following discharge, date last encounter closed (first attempt may have been earlier): 06/05/2025    Call initiated 2 business days of discharge: Yes    ASSESSMENT/PLAN:   Hospital discharge follow-up  -     [place] DISCHARGE MEDS RECONCILED W/ CURRENT OUTPATIENT MED LIST    Community acquired pneumonia, unspecified laterality  -     not at goal, rhonchi auscultated to RLL, pt is requiring 6 L NC with ambulation. Pt never received her PO Levaquin prescription, this was sent to her pharmacy per Dr. Barcenas's instructions.   - levoFLOXacin (LEVAQUIN) 500 MG tablet; Take 1 tablet by mouth daily for 7 days, Disp-7 tablet, R-0Normal    Supplemental oxygen dependent  -     Oxygen Concentrator; Starting Fri 6/6/2025, Disp-1 each, R-0, Normal    ACP (advance care planning)  - pt and dtrs are working with pt's  on redoing her ACP documents.    Medical Decision Making: high complexity  Return if symptoms worsen or fail to improve.           Subjective:   HPI:  Follow up of Hospital problems/diagnosis(es): NSTEMI and PNA    Inpatient course: Discharge summary reviewed- see chart.    Interval history/Current status:   Completed IV ATB's for PNA, pt was supposed to receive a course of oral Levaquin for 7 days but never received an order for this antibiotic.   Today, pt reports she has been needing more oxygen, was discharged on 3 LNC but currently needs 6 L NC with ambulation. Pt's O2 levels are currently 93% on 6 L NC. Pt is able to speak in complete sentences, respirs are even.    Pt has no O2 concentrator, only

## 2025-06-09 ENCOUNTER — TELEPHONE (OUTPATIENT)
Dept: CARDIOLOGY CLINIC | Age: 85
End: 2025-06-09

## 2025-06-09 ENCOUNTER — CARE COORDINATION (OUTPATIENT)
Dept: CARE COORDINATION | Age: 85
End: 2025-06-09

## 2025-06-09 NOTE — CARE COORDINATION
explained for any new or worsening symptoms to outreach provider or take patient to the ED. Patient daughter VU and noted she will continue to monitor her.     Plan/Follow Up: Will continue to review, monitor and address alerts with follow up based on severity of symptoms and risk factors.  **For any new or worsening symptoms or you are concerned in anyway, please contact your Provider or report to the nearest Emergency Room.**

## 2025-06-09 NOTE — TELEPHONE ENCOUNTER
I called and spoke to Olimpia.  They are leaving for vacation on June 20th she wanted to see if she could get earlier appt or different day.  Earlier in the week.  Instructed her that Tammie CARMEN will call tomorrow.  She verbalized understanding.

## 2025-06-09 NOTE — TELEPHONE ENCOUNTER
Patient's daughter Olimpia called to r/s (move to earlier time) Anika De La Rosa's 6/20 appt.     Please assist.    Olimpia's callback: [phone]

## 2025-06-10 ENCOUNTER — CARE COORDINATION (OUTPATIENT)
Dept: OTHER | Facility: CLINIC | Age: 85
End: 2025-06-10

## 2025-06-10 ENCOUNTER — HOSPITAL ENCOUNTER (OUTPATIENT)
Age: 85
Discharge: HOME | End: 2025-06-10
Payer: MEDICARE

## 2025-06-10 ENCOUNTER — TELEPHONE (OUTPATIENT)
Dept: FAMILY MEDICINE CLINIC | Age: 85
End: 2025-06-10

## 2025-06-10 ENCOUNTER — CARE COORDINATION (OUTPATIENT)
Dept: CARE COORDINATION | Age: 85
End: 2025-06-10

## 2025-06-10 ENCOUNTER — HOSPITAL ENCOUNTER (OUTPATIENT)
Dept: GENERAL RADIOLOGY | Age: 85
Discharge: HOME | End: 2025-06-10
Attending: INTERNAL MEDICINE
Payer: MEDICARE

## 2025-06-10 ENCOUNTER — OFFICE VISIT (OUTPATIENT)
Dept: PULMONOLOGY | Age: 85
End: 2025-06-10
Payer: MEDICARE

## 2025-06-10 VITALS
RESPIRATION RATE: 18 BRPM | HEIGHT: 61 IN | OXYGEN SATURATION: 94 % | BODY MASS INDEX: 28.02 KG/M2 | HEART RATE: 68 BPM | DIASTOLIC BLOOD PRESSURE: 62 MMHG | TEMPERATURE: 97.7 F | SYSTOLIC BLOOD PRESSURE: 120 MMHG | WEIGHT: 148.4 LBS

## 2025-06-10 DIAGNOSIS — R06.02 SOB (SHORTNESS OF BREATH): ICD-10-CM

## 2025-06-10 DIAGNOSIS — J41.0 SIMPLE CHRONIC BRONCHITIS (HCC): ICD-10-CM

## 2025-06-10 DIAGNOSIS — R06.02 SOB (SHORTNESS OF BREATH): Primary | ICD-10-CM

## 2025-06-10 DIAGNOSIS — J15.69 GRAM-NEGATIVE PNEUMONIA (HCC): ICD-10-CM

## 2025-06-10 PROCEDURE — 1090F PRES/ABSN URINE INCON ASSESS: CPT | Performed by: INTERNAL MEDICINE

## 2025-06-10 PROCEDURE — G8417 CALC BMI ABV UP PARAM F/U: HCPCS | Performed by: INTERNAL MEDICINE

## 2025-06-10 PROCEDURE — 3023F SPIROM DOC REV: CPT | Performed by: INTERNAL MEDICINE

## 2025-06-10 PROCEDURE — G8427 DOCREV CUR MEDS BY ELIG CLIN: HCPCS | Performed by: INTERNAL MEDICINE

## 2025-06-10 PROCEDURE — 1036F TOBACCO NON-USER: CPT | Performed by: INTERNAL MEDICINE

## 2025-06-10 PROCEDURE — 1111F DSCHRG MED/CURRENT MED MERGE: CPT | Performed by: INTERNAL MEDICINE

## 2025-06-10 PROCEDURE — 1123F ACP DISCUSS/DSCN MKR DOCD: CPT | Performed by: INTERNAL MEDICINE

## 2025-06-10 PROCEDURE — G2211 COMPLEX E/M VISIT ADD ON: HCPCS | Performed by: INTERNAL MEDICINE

## 2025-06-10 PROCEDURE — 99214 OFFICE O/P EST MOD 30 MIN: CPT | Performed by: INTERNAL MEDICINE

## 2025-06-10 PROCEDURE — 71046 X-RAY EXAM CHEST 2 VIEWS: CPT

## 2025-06-10 NOTE — TELEPHONE ENCOUNTER
Turner was calling to get a verbal order for home PT & OT to start today.    Please give him a call back, the phone number is secure you can leave a voicemail.

## 2025-06-10 NOTE — CARE COORDINATION
call and have patient recheck reading. RN will await reading and follow up as needed. Patient daughter denied any concerns at this time.       RN received updated reading and follow up with patient daughter. Daughter notes her mom is feeling well with no concerns. Noted she has a plum appt today and plans to discuss parameters. Patient daughter reminded of current parameters and when to seek emergency care. Daughter VU. Will continue to monitor as patient has follow up scheduled today.   Plan/Follow Up: Will continue to review, monitor and address alerts with follow up based on severity of symptoms and risk factors.  **For any new or worsening symptoms or you are concerned in anyway, please contact your Provider or report to the nearest Emergency Room.**

## 2025-06-10 NOTE — CARE COORDINATION
address alerts with follow up based on severity of symptoms and risk factors.  **For any new or worsening symptoms or you are concerned in anyway, please contact your Provider or report to the nearest Emergency Room.**              AURELIA Garcia, RN  Associate Care Manager   Cell: [phone]  [email or web address]

## 2025-06-10 NOTE — PROGRESS NOTES
Pulmonary Critical Care progress note     Patient's name:  Anika Rudolph  Medical Record Number: [id number]  Patient's account/billing number:   Patient's YOB: 1940  Age: 84 y.o.  Date of Admission: No admission date for patient encounter.  Date of Consult: 6/13/2025      Primary Care Physician: Josselyn Vargas APRN - CNP      Code Status: Full Code    Reason for consult: Acute on chronic hypoxic and hypercapnic respiratory failure    Assessment and Plan     Chronic respiratory with hypoxia and hypercapnia  Recurrent pneumonia, multifocal   COPD   CLL  Immunocompromised/asplenic   P Afib on anticoagulation         Plan:  Her O2 saturation on room air at rest 86%, on 3 L oxygen goes up to 93%.  Could not tolerate BiPAP while in hospital will not order  Will order chest x-ray to rule out any pulmonary edema.  Encouraged to use incentive spirometry and Acapella.  Continue symbicort bid  Follow up with Dr. Lenz in 3 months        HISTORY OF PRESENT ILLNESS:   Mr./Ms. Anika Rudolph is a 84 y.o. lady with past medical history stated below significant for CLL, status post splenectomy, COPD, history of recurrent pneumonia,  Was recently seen at [facility] ICU presented with worsening shortness of breath chest tightness:    CT chest with multifocal infiltrate   EKG a fib with RBBB   Echocardiogram with Takotsubo.    Here today for follow-up  Still struggling to breathe      REVIEW OF SYSTEMS:  Review of Systems -   General ROS: negative  Psychological ROS: negative  Ophthalmic ROS: negative  ENT ROS: negative  Allergy and Immunology ROS: negative  Hematological and Lymphatic ROS: negative  Endocrine ROS: negative  Breast ROS: negative  Respiratory ROS: cough, shortness of breath sputum.  Cardiovascular ROS: chest tightness, cough,   Gastrointestinal ROS: no appetite   Genito-Urinary ROS: negative  Musculoskeletal ROS: negative  Neurological ROS:

## 2025-06-10 NOTE — TELEPHONE ENCOUNTER
May offer patient earlier appt date on 6/18/25 at 9 am at [facility]

## 2025-06-10 NOTE — TELEPHONE ENCOUNTER
Josselyn Vargas, APRN - CNP  [facility] Practice Staff4 minutes ago (12:41 PM)       OK for verbal order for PT and OT     CALLED AND LEFT DETAILED MESSAGE FOR HITESH ADVISING ON VERBAL ORDERS. [place]

## 2025-06-11 ENCOUNTER — CARE COORDINATION (OUTPATIENT)
Dept: CARE COORDINATION | Age: 85
End: 2025-06-11

## 2025-06-11 ENCOUNTER — OFFICE VISIT (OUTPATIENT)
Dept: FAMILY MEDICINE CLINIC | Age: 85
End: 2025-06-11

## 2025-06-11 VITALS
DIASTOLIC BLOOD PRESSURE: 62 MMHG | SYSTOLIC BLOOD PRESSURE: 120 MMHG | WEIGHT: 145 LBS | OXYGEN SATURATION: 95 % | HEART RATE: 65 BPM | BODY MASS INDEX: 27.38 KG/M2 | HEIGHT: 61 IN

## 2025-06-11 DIAGNOSIS — J18.9 COMMUNITY ACQUIRED PNEUMONIA, UNSPECIFIED LATERALITY: Primary | ICD-10-CM

## 2025-06-11 DIAGNOSIS — R20.0 NUMBNESS IN FEET: ICD-10-CM

## 2025-06-11 ASSESSMENT — ENCOUNTER SYMPTOMS
SHORTNESS OF BREATH: 0
COUGH: 0
WHEEZING: 0
BACK PAIN: 0
CHEST TIGHTNESS: 0

## 2025-06-11 NOTE — CARE COORDINATION
Remote Patient Monitoring Note      Date/Time:  2025 10:42 AM  Patient Current Location: Home: [address] [place] [address]  LPN contacted caregiver by telephone regarding red alert received for blood pressure reading (176/138). Verified patients name and  as identifiers.  Background: pt enrolled for CHF, COPD and HTN  Clinical Interventions:  spoke with pt Olimpia lofton regarding red alert for bp reading of 176/138, she notes the pt attempts to take bp readings alone and has difficulty. She notes the pt has an appt this morning at 11:30am and she is currently on the way to the patients home,she will help her recheck her blood pressure when she gets there or we will see how her bp reading is at her appt.     Plan/Follow Up: Will continue to review, monitor and address alerts with follow up based on severity of symptoms and risk factors.

## 2025-06-11 NOTE — PROGRESS NOTES
will be in the mid-90's on 4 L NC at rest.     Pt reports having numbness to the forefeet, bilaterally.  This started 5 days ago.  Pt endorses being more sedentary than usual given her recovery.  Denies noticing any color changes to her feet, limited movement, increased pain, temperature changes.     Review of Systems   Constitutional:  Negative for chills and fever.   Respiratory:  Negative for cough, chest tightness, shortness of breath and wheezing.    Cardiovascular:  Negative for chest pain, palpitations and leg swelling.   Genitourinary:  Negative for difficulty urinating.   Musculoskeletal:  Negative for arthralgias, back pain, gait problem, joint swelling and myalgias.        Bilateral feet numbness   Skin: Negative.    Neurological:  Negative for dizziness, light-headedness and headaches.          Objective   Physical Exam  Vitals and nursing note reviewed.   Constitutional:       General: She is not in acute distress.     Appearance: Normal appearance. She is normal weight. She is not ill-appearing, toxic-appearing or diaphoretic.   HENT:      Nose: Nose normal.   Cardiovascular:      Rate and Rhythm: Normal rate and regular rhythm.      Pulses:           Dorsalis pedis pulses are 3+ on the right side and 3+ on the left side.        Posterior tibial pulses are 3+ on the right side and 3+ on the left side.      Heart sounds: Normal heart sounds.   Pulmonary:      Effort: Pulmonary effort is normal. No respiratory distress.      Breath sounds: Normal breath sounds. No wheezing or rales.   Musculoskeletal:         General: No swelling or signs of injury. Normal range of motion.      Cervical back: Normal range of motion.      Right lower leg: No edema.      Left lower leg: No edema.   Skin:     General: Skin is warm and dry.      Capillary Refill: Capillary refill takes less than 2 seconds.      Findings: No erythema.   Neurological:      General: No focal deficit present.      Mental Status: She is alert and

## 2025-06-12 ENCOUNTER — TELEPHONE (OUTPATIENT)
Dept: PULMONOLOGY | Age: 85
End: 2025-06-12

## 2025-06-12 ENCOUNTER — CARE COORDINATION (OUTPATIENT)
Dept: CARE COORDINATION | Age: 85
End: 2025-06-12

## 2025-06-12 NOTE — PROGRESS NOTES
Remote Patient Monitoring Change to Monitoring Parameters    Patient currently being managed with remote patient monitoring for CHF, COPD, and HTN.    Request to change monitoring parameters for pulse ox  from 92 to 90 in order to accommodate patient's baseline measurements, or associated changes in patient's status .    See care coordination encounters for additional details.

## 2025-06-12 NOTE — TELEPHONE ENCOUNTER
Pt daughter called and is asking what is going on with the POC wants to know when pt will get this

## 2025-06-12 NOTE — CARE COORDINATION
RN outreached to patient/Patient family to discuss PO reading of 90%. Daughter notes patient is doing well and we reviewed office notes from yesterday. Patient goal is 90-92%. Patient is currently in the parameters set and RPM NP is updating. Patient daughter reminded of s/s to monitor and when to outreach provider.   No further questions at this time.

## 2025-06-12 NOTE — TELEPHONE ENCOUNTER
Spoke to Pt's Dtr let her Know that order will be faxed once OVN has been completed and oxygen company will reach out to her to schedule delivery.

## 2025-06-13 ENCOUNTER — CARE COORDINATION (OUTPATIENT)
Dept: OTHER | Facility: CLINIC | Age: 85
End: 2025-06-13

## 2025-06-13 ENCOUNTER — CARE COORDINATION (OUTPATIENT)
Dept: CASE MANAGEMENT | Age: 85
End: 2025-06-13

## 2025-06-13 VITALS
DIASTOLIC BLOOD PRESSURE: 81 MMHG | HEART RATE: 59 BPM | SYSTOLIC BLOOD PRESSURE: 168 MMHG | OXYGEN SATURATION: 91 % | BODY MASS INDEX: 27.62 KG/M2 | WEIGHT: 146.2 LBS

## 2025-06-13 NOTE — CARE COORDINATION
Care Transitions Note    Follow Up Call     Patient Current Location:  Home: [address] [place] [id number]    Care Transition Nurse contacted the family, daughter Olimpia by telephone. Verified name and  as identifiers.    Additional needs identified to be addressed with provider   No needs identified                 Method of communication with provider: none.    Care Summary Note: Olimpia states her mother is doing \"good.\"  Olimpia states [facility] remains active.  Per HRS Anika's weight = 146.2lbs, which is up from 145.6 yesterday.  Olimpia denies her mother having any SOB, chest pain, or edema at this time.  She states her mother is in the \"green\" heart failure zone at this time.  Olimpia states her mother continues to use her home oxygen @ 3lpm.  Per HRS her most recent O2 sat = 91%.  Olimpia denies her mother having a fever at this time.  She states her mother is tolerating food & fluids - denies any N/V at this time.  Per HRS her most recent B/P = 168/81.  Olimpia denies any needs for her mother at this time.      Plan of care updates since last contact:  N/A       Advance Care Planning:   Does patient have an Advance Directive: reviewed during previous call, see note. .    Medication Review:  No medications were reviewed today.    Remote Patient Monitoring:  Offered patient enrollment in the Remote Patient Monitoring (RPM) program for in-home monitoring: Yes, patient enrolled; current status is activated and monitoring.    Assessments:  Care Transitions Subsequent and Final Call    Subsequent and Final Calls  Do you have any ongoing symptoms?: No  Do you have any questions related to your medications?: No  Do you currently have any active services?: Yes  Are you currently active with any services?: Home Health  Do you have any needs or concerns that I can assist you with?: No  Identified Barriers: None  Care Transitions Interventions  Other Interventions:              Follow Up Appointment:   Reviewed

## 2025-06-13 NOTE — CARE COORDINATION
this time. Agreeable to follow-up within 60 minutes for repeat metrics if they are not placed before then.     Plan/Follow Up: Will continue to review, monitor and address alerts with follow up based on severity of symptoms and risk factors.  **For any new or worsening symptoms or you are concerned in anyway, please contact your Provider or report to the nearest Emergency Room.**              AURELIA Garcia, RN  Associate Care Manager   Cell: [phone]  [email or web address]

## 2025-06-16 ENCOUNTER — TELEPHONE (OUTPATIENT)
Dept: PULMONOLOGY | Age: 85
End: 2025-06-16

## 2025-06-17 NOTE — PROGRESS NOTES
Cardiology Follow Up    Anika Rudolph  1940    PCP: Josselyn Vargas, FAB - CNP  Chief Complaint: \"I feel fine\"      Subjective:   History of Present Illness: The patient is 84 y.o. female with a past medical history significant for CLL, factor V mutation and CAD s/p STEMI resulting normal coronary arteries with severe LV systolic function (7/2021). Admitted to  7/2021 with CP. EKG showed inferior STEMI and she underwent emergent LHC resulting in normal coronaries. Found to be in acute CHF with EF <10%. Required impella support and inotropes. Admitted 6/19/2024 with complaints of nausea and vomiting. In the ER EKG showed subtle ST depressions with elevated troponin. Repeat EKG showed no significant ST changes, no indication for ischemic work up. Patient admitted to  5/30/25 with complaints of chest pain and hypoxia. Diagnosed with pneumonia. 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 Patient presents today for hospital follow up. Presents on supplemental oxygen. She states this is new since her hospitalization. States that she does wear this daily. When she removes it, her O2 saturation drops below 90. She reports feeling fine since discharge otherwise. She is using a walker to aid in longer distances of ambulation. She states se is able to walk around her house and in the yard without complaints. Patient currently denies any weight gain, edema, palpitations, chest pain, worsening shortness of breath, PND, dizziness, and syncope. She states she has upcoming trip to the beach planned.         Past Medical History:   Diagnosis Date    Acute ST elevation myocardial infarction (STEMI) ([facility]) 07/19/2021    Atrial fibrillation (HCC)     Chronic lymphocytic leukemia in remission ([facility])     CLL (chronic lymphocytic leukemia) ([facility]) 02/12/2015    COPD (chronic obstructive pulmonary disease) ([facility])     Essential hypertension 01/01/2015    controlled with salt restriction (initially)

## 2025-06-18 ENCOUNTER — OFFICE VISIT (OUTPATIENT)
Dept: CARDIOLOGY CLINIC | Age: 85
End: 2025-06-18
Payer: MEDICARE

## 2025-06-18 VITALS
WEIGHT: 148 LBS | SYSTOLIC BLOOD PRESSURE: 144 MMHG | OXYGEN SATURATION: 95 % | HEIGHT: 61 IN | HEART RATE: 45 BPM | DIASTOLIC BLOOD PRESSURE: 60 MMHG | BODY MASS INDEX: 27.94 KG/M2

## 2025-06-18 DIAGNOSIS — I42.9 CARDIOMYOPATHY, UNSPECIFIED TYPE (HCC): ICD-10-CM

## 2025-06-18 DIAGNOSIS — I48.0 PAF (PAROXYSMAL ATRIAL FIBRILLATION) (HCC): ICD-10-CM

## 2025-06-18 DIAGNOSIS — I51.81 STRESS-INDUCED CARDIOMYOPATHY: Primary | ICD-10-CM

## 2025-06-18 PROCEDURE — G8427 DOCREV CUR MEDS BY ELIG CLIN: HCPCS | Performed by: INTERNAL MEDICINE

## 2025-06-18 PROCEDURE — 99214 OFFICE O/P EST MOD 30 MIN: CPT | Performed by: INTERNAL MEDICINE

## 2025-06-18 PROCEDURE — 1159F MED LIST DOCD IN RCRD: CPT | Performed by: INTERNAL MEDICINE

## 2025-06-18 PROCEDURE — 1123F ACP DISCUSS/DSCN MKR DOCD: CPT | Performed by: INTERNAL MEDICINE

## 2025-06-18 PROCEDURE — G8417 CALC BMI ABV UP PARAM F/U: HCPCS | Performed by: INTERNAL MEDICINE

## 2025-06-18 PROCEDURE — 1111F DSCHRG MED/CURRENT MED MERGE: CPT | Performed by: INTERNAL MEDICINE

## 2025-06-18 PROCEDURE — 1036F TOBACCO NON-USER: CPT | Performed by: INTERNAL MEDICINE

## 2025-06-18 PROCEDURE — 1090F PRES/ABSN URINE INCON ASSESS: CPT | Performed by: INTERNAL MEDICINE

## 2025-06-18 RX ORDER — DAPAGLIFLOZIN 10 MG/1
10 TABLET, FILM COATED ORAL EVERY MORNING
Qty: 90 TABLET | Refills: 3 | Status: SHIPPED | OUTPATIENT
Start: 2025-06-18

## 2025-06-18 RX ORDER — SACUBITRIL AND VALSARTAN 24; 26 MG/1; MG/1
1 TABLET, FILM COATED ORAL 2 TIMES DAILY
Qty: 90 TABLET | Refills: 3 | Status: SHIPPED | OUTPATIENT
Start: 2025-06-18

## 2025-06-19 ENCOUNTER — RESULTS FOLLOW-UP (OUTPATIENT)
Dept: PULMONOLOGY | Age: 85
End: 2025-06-19

## 2025-06-19 ENCOUNTER — CARE COORDINATION (OUTPATIENT)
Dept: CASE MANAGEMENT | Age: 85
End: 2025-06-19

## 2025-06-19 NOTE — CARE COORDINATION
Care Transitions Note    Follow Up Call     Attempted to reach family daughterElijah for transitions of care follow up.  Unable to reach Rosanne gutiérrez.      Outreach Attempts:   HIPAA compliant voicemail left for patient.     Follow Up Appointment:   Future Appointments         Provider Specialty Dept Phone    7/28/2025 1:00 PM (Arrive by 12:45 PM) [facility] 2 Cardiology [phone]    9/5/2025 1:00 PM Mp Mckay MD Cardiology [phone]    9/22/2025 11:20 AM Enrique Lenz MD Pulmonology [phone]    11/18/2025 2:00 PM Enrique Lenz MD Pulmonology [phone]            Plan for follow-up call in 2-5 days based on severity of symptoms and risk factors. Plan for next call: symptom management-CP, SOB  self management-BP/P, SpO2    Nidhi Millard LPN

## 2025-06-20 ENCOUNTER — TELEPHONE (OUTPATIENT)
Dept: CARDIOLOGY CLINIC | Age: 85
End: 2025-06-20

## 2025-06-23 ENCOUNTER — CARE COORDINATION (OUTPATIENT)
Dept: CARE COORDINATION | Age: 85
End: 2025-06-23

## 2025-06-23 NOTE — CARE COORDINATION
RN attempted to reach patient by phone to discuss no RPM readings x3 days. Patient did not answer and VM left with a reminder to take readings daily before noon and to outreach with any questions.

## 2025-06-24 ENCOUNTER — CARE COORDINATION (OUTPATIENT)
Dept: CASE MANAGEMENT | Age: 85
End: 2025-06-24

## 2025-06-24 ENCOUNTER — CARE COORDINATION (OUTPATIENT)
Dept: CARE COORDINATION | Age: 85
End: 2025-06-24

## 2025-06-24 NOTE — CARE COORDINATION
Care Transitions Note    Follow Up Call     Patient: Anika Rudolph                             Patient : 1940   MRN: [id number]                             Reason for Admission: Chest Pain & SOB  Discharge Date: 25         RURS: Readmission Risk Score: 12.2       Patient Current Location:  [facility] Care Coordinator contacted the family, Olimpia by telephone. Verified name and  as identifiers.    Additional needs identified to be addressed with provider   No needs identified                 Method of communication with provider: none.    Care Summary Note:   Spoke with patients daughter, Olimpia. HIPAA verified. She stated patient is doing well. She is in [place] with her. Denies sob, cough, swelling, ha, lh, dizziness, cp, palpitations, fever or chills. Adequate dietary intake. No urinary or bowel elimination issues. Uses O2 @ 2 lpm. O2 sat 95%. She stated patient is monitoring her BP with her own equipment while she is there. She is due back home next week. Taking medication as prescribed. No questions or needs voiced.    Plan of care updates since last contact:  Review of patient management of conditions/medications:       Advance Care Planning:   Does patient have an Advance Directive: not on file.  Healthcare Decision Maker:    Primary Decision Maker: Olimpia Avilez - Child - [phone]    Secondary Decision Maker: Glenys Zaman - Child - [phone]          Medication Review:  No changes since last call.     Remote Patient Monitoring:  Offered patient enrollment in the Remote Patient Monitoring (RPM) program for in-home monitoring: Yes, patient enrolled; current status is activated and monitoring.    Assessments:  Care Transitions Subsequent and Final Call    Subsequent and Final Calls  Do you have any ongoing symptoms?: No  Have your medications changed?: No  Do you have any questions related to your medications?: No  Do you currently have any active services?: No  Are you  Occupational Therapy   Treatment    Name: Vaibhav Vargas  MRN: [id number]  Admitting Diagnosis:     acute ischemic CVA  1. Non-traumatic rhabdomyolysis    2. Screening due    3. PRIYA (acute kidney injury)    4. NSTEMI (non-ST elevated myocardial infarction)    5. Chest pain    6. Syncope    Problem List[1]   Recommendations:     Discharge Recommendations: High Intensity Therapy  Discharge Equipment Recommendations:  to be determined by next level of care  Barriers to discharge:  None    Assessment:     Vaibhav Vargas is a 72 y.o. male with a medical diagnosis of acute ischemic CVA.      He presents with improved RUE function, but still limited by attention deficits/decreased insight into deficits, and RLE weakness with knee buckling on attempts to take steps with RLE.  Pt motivated and with max cues is able to take small steps with LLE if R knee is braced by therapist.  In sitting, pt demonstrated improved midline orientation but required cues to attempt to self-correct with leaning to R side and posteriorly; inconsistent ability to self-correct with cueing.  Improved R UE AROM noted during functional reaching tasks.     Pt able to progress to transfer training from EOB to recliner chair at bedside; attempted with RW but pt unable to coordinate task safely.  Improved with handheld attempt but still max assist x2 at this time.    Performance deficits affecting function are weakness, impaired endurance, impaired sensation, impaired self care skills, impaired functional mobility, gait instability, impaired balance, impaired cognition, decreased upper extremity function, decreased lower extremity function, decreased safety awareness, decreased ROM, impaired coordination, impaired fine motor, edema.     Pt is highly motivated and would benefit from high intensity therapy.     Rehab Prognosis:  Good; patient would benefit from acute skilled OT services to address these deficits and reach maximum level of function.        Plan:     Patient to be seen 4 x/week to address the above listed problems via self-care/home management, therapeutic activities, therapeutic exercises  Plan of Care Expires:  (d/c)  Plan of Care Reviewed with: patient    Subjective     Chief Complaint: RLE weakness, edema  Patient/Family Comments/goals: get stronger and return to PLOF and independence  Pain/Comfort:  Pain Rating 1: 0/10    Objective:     Communicated with: Nurse Velázquez prior to session.  Patient found HOB elevated with SCD, telemetry, peripheral IV, PureWick , bed alarm upon OT entry to room.    General Precautions: Standard, fall    Orthopedic Precautions:N/A  Braces: N/A  Respiratory Status: Room air     Occupational Performance:     Bed Mobility:    Patient completed Supine to Sit with moderate assistance and only 1 person assist required to come to sitting at L side rather than R, mod assist to manage RLE out of bed and then pt able to use LUE to help push himself up into sitting, and used RUE to help pull himself with therapist's hand for trunk into sitting EOB    Functional Mobility/Transfers:  Patient completed Bed <> Chair Transfer using Step Transfer technique with total assistance and of 2 persons with rolling walker and unsuccessful d/t R knee buckling, inability to coordinate both RW management and weightshifting, but improved to max assist x2 therapists handheld , still limited by RLE weakness    Treatment & Education:  Pt. educated on OT goals, POC, reiterated multiple times to use call bell for assist with transfers back to bed or for any other needs due to fall risk.     Patient left up in chair with all lines intact, call button in reach, nurse  notified, nursing student present, and nurse stated no need for chair alarm at this time, staff will provide extra supervision but pt's cognition has improved since admission and per staff he has not make any recent attempts to get out of bed unassisted, with improved understanding of  safety and fall risk.    GOALS:   Multidisciplinary Problems       Occupational Therapy Goals          Problem: Occupational Therapy    Goal Priority Disciplines Outcome Interventions   Occupational Therapy Goal     OT, PT/OT Progressing    Description: Goals to be met by: d/c     Patient will increase functional independence with ADLs by performing:    UE Dressing with Stand-by Assistance while seated EOB .  LE Dressing with Moderate Assistance.  Grooming while seated EOB with Stand-by Assistance.  Pt will improve sitting balance EOB to SBA > 8 min to impact performance in self care tasks  Toileting from bedside commode with  Moderate Assistance for hygiene and clothing management.   Toilet transfer to bedside commode with Minimal Assistance.  Pt will increase R shoulder strength to 3/3+/5 as needed to impact efficiency during self care tasks                         DME Justifications:  TBD pending progress    Time Tracking:     OT Date of Treatment: 03/27/25  OT Start Time: 1015  OT Stop Time: 1035  OT Total Time (min): 20 min    Billable Minutes:Neuromuscular Re-education 20 min  Co- treatment performed due to patient's multiple deficits requiring two skilled therapists to appropriately and safely assess patient's strength and endurance while facilitating functional tasks in addition to accommodating for patient's activity tolerance    OT/TETE: OT          3/27/2025       [1]   Patient Active Problem List  Diagnosis    Chronic low back pain    Edema    Obesity    Osteoarthritis of knee    Primary hypertension    Type 2 diabetes mellitus    Coronary artery disease    Myocardial infarction

## 2025-06-24 NOTE — CARE COORDINATION
RN attempted to reach patient/EC by phone to discuss no readings x4 days. Pateint/EC did not answer and VM left with reminder. Will route to AC for further attempts.

## 2025-06-30 ENCOUNTER — CARE COORDINATION (OUTPATIENT)
Dept: OTHER | Facility: CLINIC | Age: 85
End: 2025-06-30

## 2025-06-30 NOTE — CARE COORDINATION
2025 9:19 AM  *  Alert and Triage   -Remote Alert Monitoring Note      Date/Time:  2025 9:19 AM  Patient Current Location: Home: [address] [place], [place] [id number]  Verified patients name and  as identifiers.    Rpm alert to be reviewed by the provider   yellow alert  Day 3 no metrics  Vitals Recheck later today  Additional needs to be addressed by provider: No                   ACM contacted patient by telephone regarding red alert received   Background: CHF, COPD, HTN  Refer to 911 immediately if:  Patient unresponsive or unable to provide history  Change in cognition or sudden confusion  Patient unable to respond in complete sentences  Intense chest pain/tightness  Any concern for any clinical emergency  Red Alert: Provider response time of 1 hr required for any red alert requiring intervention  Yellow Alert: Provider response time of 3hr required for any escalated yellow alert  Patient Chief Complaint:  Day 3 no metrics  Clinical Interventions: Reviewed and followed up on alerts and treatments-discussed yellow alert with daughter and EC Olimpia. Olimpia reports they have been traveling but Anika is doing great. Metrics will be entered later today. No questions or concerns at this time.    Plan/Follow Up: Will continue to review, monitor and address alerts with follow up based on severity of symptoms and risk factors.  **For any new or worsening symptoms or you are concerned in anyway, please contact your Provider or report to the nearest Emergency Room.**                AURELIA Garcia, RN  Associate Care Manager   Cell: [phone]  [email or web address]

## 2025-07-02 ENCOUNTER — CARE COORDINATION (OUTPATIENT)
Dept: CARE COORDINATION | Age: 85
End: 2025-07-02

## 2025-07-02 NOTE — CARE COORDINATION
Care Transitions Note    Final Call    Patient Current Location:  Home: [address] [place] [address]    Care Transition Nurse contacted the patient by telephone. Verified name and  as identifiers.    Additional needs identified to be addressed with provider   No needs identified                 Method of communication with provider: none.    Care Summary Note: Olimpia states pt is doing well. She just got back from [place]. Denies any new/worsening SOB, cough, congestion, CP, lightheadedness, dizziness, fevers, flu-like symptoms or other symptoms or concerns. Reports O2 sat has remained > 90% on 2L O2 and BP has been stable. Reports taking meds as prescribed and denies questions or concerns. Aware of final follow up call and resources for new/worsening symptoms, questions or needs.    Plan of care updates since last contact:  Education:    Review of patient management of conditions/medications:         Advance Care Planning:   Does patient have an Advance Directive: reviewed during previous call, see note. .    Medication Review:  No changes since last call.     Remote Patient Monitoring:  Offered patient enrollment in the Remote Patient Monitoring (RPM) program for in-home monitoring: enrolled and monitoring.    Assessments:  Care Transitions Subsequent and Final Call    Subsequent and Final Calls  Care Transitions Interventions  Other Interventions:              Follow Up Appointment:   Reviewed upcoming appointment(s).  Future Appointments         Provider Specialty Dept Phone    2025 1:00 PM (Arrive by 12:45 PM) [facility] 2 Cardiology [phone]    2025 1:00 PM Mp Mckay MD Cardiology [phone]    2025 11:20 AM Enrique Lenz MD Pulmonology [phone]    2025 2:00 PM Enrique Lenz MD Pulmonology [phone]            Care Transition Nurse provided contact information.  Patient referred back to ACM   for continued management. based on severity of symptoms

## 2025-07-02 NOTE — CARE COORDINATION
Ambulatory Care Coordination Note     7/2/2025 2:28 PM     ACM received RPM patient hand off from SHAKIRA Stout .  ACM will outreach to patient in 1-2 business days to enroll in a High Risk Care Management Program.      RPM clinician updated to ACM.

## 2025-07-03 ENCOUNTER — CARE COORDINATION (OUTPATIENT)
Dept: CARE COORDINATION | Age: 85
End: 2025-07-03

## 2025-07-03 SDOH — ECONOMIC STABILITY: INCOME INSECURITY: IN THE LAST 12 MONTHS, WAS THERE A TIME WHEN YOU WERE NOT ABLE TO PAY THE MORTGAGE OR RENT ON TIME?: NO

## 2025-07-03 SDOH — ECONOMIC STABILITY: FOOD INSECURITY: WITHIN THE PAST 12 MONTHS, THE FOOD YOU BOUGHT JUST DIDN'T LAST AND YOU DIDN'T HAVE MONEY TO GET MORE.: NEVER TRUE

## 2025-07-03 SDOH — ECONOMIC STABILITY: FOOD INSECURITY: WITHIN THE PAST 12 MONTHS, YOU WORRIED THAT YOUR FOOD WOULD RUN OUT BEFORE YOU GOT MONEY TO BUY MORE.: NEVER TRUE

## 2025-07-03 SDOH — HEALTH STABILITY: PHYSICAL HEALTH: ON AVERAGE, HOW MANY DAYS PER WEEK DO YOU ENGAGE IN MODERATE TO STRENUOUS EXERCISE (LIKE A BRISK WALK)?: 4 DAYS

## 2025-07-03 SDOH — HEALTH STABILITY: PHYSICAL HEALTH: ON AVERAGE, HOW MANY MINUTES DO YOU ENGAGE IN EXERCISE AT THIS LEVEL?: 20 MIN

## 2025-07-03 ASSESSMENT — SOCIAL DETERMINANTS OF HEALTH (SDOH): HOW HARD IS IT FOR YOU TO PAY FOR THE VERY BASICS LIKE FOOD, HOUSING, MEDICAL CARE, AND HEATING?: NOT HARD AT ALL

## 2025-07-03 NOTE — CARE COORDINATION
office to be scanned into electronic medical records and patient is agreeable. Reviewed upcoming appointments. Patient is scheduled to see [facility] next week. RPM reviewed. Discussed program requirements and patient verbalized understanding. Metrics listed below. Patient stated that she has all of her medications filled and is taking as prescribed. Denied any medication questions or concerns at this time. Patient denied any other questions or concerns at this time. Discussed with patient signs and symptoms to monitor and importance of reporting concerns to appropriate site of care for early intervention. Patient has VU. Patient is aware of provider on call services for non emergent after hours questions and to report to ED with RED flags concerns. Patient provided with AC phone number for any non-emergent questions or concerns. Patient agreeable to future care coordination outreaches. ACM to follow up at a later date.         Date BP Pulse Ox Weight   7/3/2025      7/2/2025 137/67/66  (10:40 AM) 90/67  (10:38 AM) 146  (1:16 PM)   7/1/2025 144/65/71  (10:04 AM) 93/74  (10:31 AM) 147.8  (10:01 AM)   6/30/2025 128/67/73  (9:13 AM) 86/74  (9:12 AM) 148.6  (9:09 AM)   6/29/2025 6/28/2025 6/27/2025 128/76/65  (9:14 AM) 92/62  (9:14 AM)  88/71  (9:12 AM) 148.2  (9:12 AM)   6/26/2025 126/70/64  (10:43 AM) 90/60  (10:40 AM) 146.8  (10:41 AM)   6/25/2025 6/24/2025 6/23/2025 6/22/2025 6/21/2025 6/20/2025 148/72/50  (10:15 AM) 90/53  (10:13 AM) 148.2  (10:12 AM)   6/19/2025 133/71/51  (10:16 AM) 91/51  (10:14 AM) 147.4  (10:15 AM)   6/18/2025 165/73/55  (11:08 AM) 91/58  (11:06 AM) 149.4  (11:05 AM)   6/17/2025 160/67/54  (10:27 AM) 96/53  (10:27 AM) 147.6  (10:23 AM)   6/16/2025 127/58/55  (12:36 PM)  167/71/58  (11:52 AM) 91/56  (12:35 PM) 147.6  (11:48 AM)   6/15/2025 140/74/56  (10:37 AM) 93/58  (10:35 AM) 148  (10:34 AM)   6/14/2025 139/67/61  (10:38 AM) 94/60  (10:39 AM) 146.4  (10:40 AM)

## 2025-07-05 DIAGNOSIS — I48.20 CHRONIC ATRIAL FIBRILLATION (HCC): Primary | ICD-10-CM

## 2025-07-07 RX ORDER — AMIODARONE HYDROCHLORIDE 200 MG/1
200 TABLET ORAL DAILY
Qty: 90 TABLET | Refills: 0 | Status: SHIPPED | OUTPATIENT
Start: 2025-07-07

## 2025-07-07 NOTE — TELEPHONE ENCOUNTER
Requested Prescriptions     Pending Prescriptions Disp Refills    amiodarone (PACERONE) 100 MG tablet [Pharmacy Med Name: AMIODARONE 100MG TABLETS] 180 tablet 1     Sig: TAKE 2 TABLETS BY MOUTH DAILY        Last OV: 6/18/2025  Next OV: 9/5/2025  Last refill:12/24/2024  Most recent Labs: CMP 6/2/2025  TSH is ordered  Last EKG (if needed):6/1/2025

## 2025-07-17 ENCOUNTER — CARE COORDINATION (OUTPATIENT)
Dept: CARE COORDINATION | Age: 85
End: 2025-07-17

## 2025-07-17 NOTE — CARE COORDINATION
Ambulatory Care Coordination Note     2025 10:05 AM     Patient Current Location:  [place]     ACM contacted the family by telephone. Verified name and  with family as identifiers.         ACM: Louisa Perez RN     Challenges to be reviewed by the provider   Additional needs identified to be addressed with provider No                 Method of communication with provider: none.    Utilization: Patient has not had any utilization since our last call.     Care Summary Note:     ACM made care coordination outreach and spoke with patient's daughter, Olimpia. Daughter reported that her mom is doing \"great.\" Walking mostly without her walker. Primarily utilizing her walking stick. Denied any chest pain, shortness of breath or swelling. Denied any concerns with HTN, COPD or CHF. Reported that her mom is now using oxygen as needed. Going without it majority of the time. RPM reviewed. Metrics listed below.   Denied any other questions or concerns at this time. Discussed signs and symptoms to monitor and importance of reporting concerns to appropriate site of care for early intervention. Aware of provider on call services for non emergent after hours questions and to report to ED with RED flags concerns. Agreeable to future care coordination outreaches. ACM to follow up at a later date.       Offered patient enrollment in the Remote Patient Monitoring (RPM) program for in-home monitoring: Yes, patient enrolled; current status is activated and monitoring.    Date BP Pulse Ox Weight   2025 120/71/52  (9:04 AM) 94/53  (9:04 AM) 146.4  (9:02 AM)   2025 125/59/51  (10:05 AM) 93/51  (10:06 AM)    7/15/2025 140/65/57  (11:52 AM) 97/53  (11:55 AM) 148  (11:51 AM)   2025 143/75/58  (9:27 AM) 92/61  (9:26 AM) 147.4  (9:26 AM)   2025 138/67/61  (12:02 PM) 86/63  (12:01 PM) 148.2  (12:00 PM)   2025 147/66/64  (10:13 AM) 91/59  (10:02 AM) 146.8  (10:01 AM)   2025 133/62/72  (10:14 AM) 97/69  (10:16 AM)

## 2025-07-21 ENCOUNTER — CARE COORDINATION (OUTPATIENT)
Dept: CARE COORDINATION | Age: 85
End: 2025-07-21

## 2025-07-22 ENCOUNTER — CARE COORDINATION (OUTPATIENT)
Dept: CASE MANAGEMENT | Age: 85
End: 2025-07-22

## 2025-07-22 NOTE — CARE COORDINATION
7/22/2025 1:07 PM  *  No Metrics 5 Days Anika Rudolph  is enrolled in Remote Patient Monitoring (RPM) and has not entered vitals in 5 days. The RPM team has Sent MyChart Message your patient to discuss adherence in RPM.      Please reach out to your patient and discuss adherence with RPM. If the patient is no longer interested in participating, please send a dis-enrollment request to the RPM pool for processing.     Thank You,     Colleen Duenas LPN, [facility], Remote Patient Monitoring    PH: [phone]  Email: [email or web address]

## 2025-07-23 ENCOUNTER — CARE COORDINATION (OUTPATIENT)
Dept: CARE COORDINATION | Age: 85
End: 2025-07-23

## 2025-07-23 NOTE — CARE COORDINATION
Ambulatory Care Coordination Note     7/23/2025 11:09 AM     Patient outreach attempt by this ACM today to perform care management follow up . ACM was unable to reach the patient by telephone today;   left voice message requesting a return phone call to this ACM.     ACM: Louisa Perez RN    PCP/Specialist follow up:   Future Appointments         Provider Specialty Dept Phone    7/28/2025 1:00 PM (Arrive by 12:45 PM) [facility] 2 Cardiology [phone]    9/5/2025 1:00 PM Mp Mckay MD Cardiology [phone]    9/22/2025 11:20 AM Enrique Lenz MD Pulmonology [phone]    11/18/2025 2:00 PM Enrique Lenz MD Pulmonology [phone]            Follow Up:   Plan for next ACM outreach in approximately 2 weeks to complete:  - medication review  - goal progression  - education   - RPM.

## 2025-07-28 ENCOUNTER — HOSPITAL ENCOUNTER (OUTPATIENT)
Age: 85
Discharge: HOME | End: 2025-07-30
Attending: INTERNAL MEDICINE
Payer: MEDICARE

## 2025-07-28 ENCOUNTER — CARE COORDINATION (OUTPATIENT)
Dept: CARE COORDINATION | Age: 85
End: 2025-07-28

## 2025-07-28 ENCOUNTER — CARE COORDINATION (OUTPATIENT)
Dept: PRIMARY CARE CLINIC | Age: 85
End: 2025-07-28

## 2025-07-28 VITALS
SYSTOLIC BLOOD PRESSURE: 132 MMHG | BODY MASS INDEX: 27.56 KG/M2 | DIASTOLIC BLOOD PRESSURE: 71 MMHG | WEIGHT: 146 LBS | HEIGHT: 61 IN

## 2025-07-28 DIAGNOSIS — J43.2 CENTRILOBULAR EMPHYSEMA (HCC): ICD-10-CM

## 2025-07-28 DIAGNOSIS — I42.9 CARDIOMYOPATHY, UNSPECIFIED TYPE (HCC): ICD-10-CM

## 2025-07-28 DIAGNOSIS — I10 ESSENTIAL HYPERTENSION: Primary | ICD-10-CM

## 2025-07-28 DIAGNOSIS — I50.31 ACUTE DIASTOLIC CHF (CONGESTIVE HEART FAILURE) (HCC): ICD-10-CM

## 2025-07-28 DIAGNOSIS — I51.81 STRESS-INDUCED CARDIOMYOPATHY: ICD-10-CM

## 2025-07-28 LAB
ECHO AR MAX VEL PISA: 4.3 M/S
ECHO AV AREA PEAK VELOCITY: 2 CM2
ECHO AV AREA VTI: 2.2 CM2
ECHO AV AREA/BSA PEAK VELOCITY: 1.2 CM2/M2
ECHO AV AREA/BSA VTI: 1.3 CM2/M2
ECHO AV CUSP MM: 2 CM
ECHO AV MEAN GRADIENT: 4 MMHG
ECHO AV MEAN VELOCITY: 0.9 M/S
ECHO AV PEAK GRADIENT: 6 MMHG
ECHO AV PEAK VELOCITY: 1.3 M/S
ECHO AV REGURGITANT PHT: 685 MS
ECHO AV VELOCITY RATIO: 0.54
ECHO AV VTI: 32.7 CM
ECHO BSA: 1.69 M2
ECHO EST RA PRESSURE: 3 MMHG
ECHO IVC PROX: 1.6 CM
ECHO LA AREA 2C: 22 CM2
ECHO LA AREA 4C: 18 CM2
ECHO LA MAJOR AXIS: 5.6 CM
ECHO LA MINOR AXIS: 6.2 CM
ECHO LA VOL BP: 57 ML (ref 22–52)
ECHO LA VOL MOD A2C: 64 ML (ref 22–52)
ECHO LA VOL MOD A4C: 47 ML (ref 22–52)
ECHO LA VOL/BSA BIPLANE: 35 ML/M2 (ref 16–34)
ECHO LA VOLUME INDEX MOD A2C: 39 ML/M2 (ref 16–34)
ECHO LA VOLUME INDEX MOD A4C: 28 ML/M2 (ref 16–34)
ECHO LV E' LATERAL VELOCITY: 3.81 CM/S
ECHO LV E' SEPTAL VELOCITY: 3.15 CM/S
ECHO LV EDV A2C: 61 ML
ECHO LV EDV A4C: 77 ML
ECHO LV EDV INDEX A4C: 47 ML/M2
ECHO LV EDV NDEX A2C: 37 ML/M2
ECHO LV EF PHYSICIAN: 58 %
ECHO LV EJECTION FRACTION A2C: 60 %
ECHO LV EJECTION FRACTION A4C: 65 %
ECHO LV EJECTION FRACTION BIPLANE: 63 % (ref 55–100)
ECHO LV ESV A2C: 25 ML
ECHO LV ESV A4C: 27 ML
ECHO LV ESV INDEX A2C: 15 ML/M2
ECHO LV ESV INDEX A4C: 16 ML/M2
ECHO LV FRACTIONAL SHORTENING: 31 % (ref 28–44)
ECHO LV INTERNAL DIMENSION DIASTOLE INDEX: 2.97 CM/M2
ECHO LV INTERNAL DIMENSION DIASTOLIC: 4.9 CM (ref 3.9–5.3)
ECHO LV INTERNAL DIMENSION SYSTOLIC INDEX: 2.06 CM/M2
ECHO LV INTERNAL DIMENSION SYSTOLIC: 3.4 CM
ECHO LV IVSD: 1.1 CM (ref 0.6–0.9)
ECHO LV MASS 2D: 188.1 G (ref 67–162)
ECHO LV MASS INDEX 2D: 114 G/M2 (ref 43–95)
ECHO LV POSTERIOR WALL DIASTOLIC: 1 CM (ref 0.6–0.9)
ECHO LV RELATIVE WALL THICKNESS RATIO: 0.41
ECHO LVOT AREA: 3.8 CM2
ECHO LVOT AV VTI INDEX: 0.57
ECHO LVOT DIAM: 2.2 CM
ECHO LVOT MEAN GRADIENT: 1 MMHG
ECHO LVOT PEAK GRADIENT: 2 MMHG
ECHO LVOT PEAK VELOCITY: 0.7 M/S
ECHO LVOT STROKE VOLUME INDEX: 42.6 ML/M2
ECHO LVOT SV: 70.3 ML
ECHO LVOT VTI: 18.5 CM
ECHO MV A VELOCITY: 0.82 M/S
ECHO MV E DECELERATION TIME (DT): 359 MS
ECHO MV E VELOCITY: 0.44 M/S
ECHO MV E/A RATIO: 0.54
ECHO MV E/E' LATERAL: 11.55
ECHO MV E/E' RATIO (AVERAGED): 12.76
ECHO MV E/E' SEPTAL: 13.97
ECHO MV REGURGITANT PEAK GRADIENT: 85 MMHG
ECHO MV REGURGITANT PEAK VELOCITY: 4.6 M/S
ECHO PULMONARY ARTERY END DIASTOLIC PRESSURE: 3 MMHG
ECHO PV REGURGITANT MAX VELOCITY: 0.8 M/S
ECHO RA AREA 4C: 14.1 CM2
ECHO RA END SYSTOLIC VOLUME APICAL 4 CHAMBER INDEX BSA: 20 ML/M2
ECHO RA VOLUME: 33 ML
ECHO RIGHT VENTRICULAR SYSTOLIC PRESSURE (RVSP): 30 MMHG
ECHO RV FREE WALL PEAK S': 16.5 CM/S
ECHO RV TAPSE: 2.5 CM (ref 1.7–?)
ECHO TV REGURGITANT MAX VELOCITY: 2.6 M/S
ECHO TV REGURGITANT PEAK GRADIENT: 27 MMHG

## 2025-07-28 PROCEDURE — 93306 TTE W/DOPPLER COMPLETE: CPT

## 2025-07-28 PROCEDURE — 93306 TTE W/DOPPLER COMPLETE: CPT | Performed by: INTERNAL MEDICINE

## 2025-07-28 NOTE — PROGRESS NOTES
Remote Patient Order Discontinued    Received request from Louisa Perez RN   to discontinue order for remote patient monitoring of CHF, COPD, and HTN and order completed.

## 2025-07-28 NOTE — CARE COORDINATION
RPM Kit Return    Patient Anika Rudolph  07/28/25     Care Coordination  placed call to patient to arrange RPM kit  through [employer]. Left HIPAA Compliant Message     provided return and how to pack equipment in original packing via the patients voicemail if available and provided call back number should patient have questions.    Patient made aware [employer] will  equipment in 2-4 days.

## 2025-07-28 NOTE — CARE COORDINATION
SnapNote 7.28.25 Patient has been Graduated  and HRS profile deactivated. RPM return kit order completed. and Return Kit Order [employer] tracking # [id number]

## 2025-07-28 NOTE — CARE COORDINATION
Ambulatory Care Coordination Note     2025 11:18 AM     Patient Current Location:  Home: [address] [place] [address]     ACM contacted the patient by telephone. Verified name and  with patient as identifiers.     Patient graduated from the High Risk Care Management program on 2025.  Patient verbalizes confidence in the ability to self-manage at this time..  Care management goals have been completed. No further Ambulatory Care Manager follow up scheduled.      ACM: Louisa Perez RN     Challenges to be reviewed by the provider   Additional needs identified to be addressed with provider No                 Method of communication with provider: chart routing.    Utilization: Patient has not had any utilization since our last call.     Care Summary Note:     ACM made final care coordination outreach and spoke with patient.  Patient stated that she is doing \"pretty good.\" Denied any chest pain, shortness of breath or swelling. Denied any breathing concerns at this time. Using oxygen as needed. Reviewed upcoming appointments. Discussed RPM and ending today. Aware that she will be receiving a call regarding equipment . Denied any concerns with appetite or bowels. Reviewed medications. Patient reported that all medications are filled and she is taking as prescribed. Denied any medication questions or concerns at this time. Reviewed zone tools. Patient denied any other questions or concerns at this time.  Patient given [facility] contact information and informed that she can call for any future care coordination needs or if health status changes.      Remote Patient Monitoring Graduation      Date/Time:  2025 11:27 AM  Patient Current Location: [place]  Patient has graduated from the Remote Patient Monitoring program on 2025.   RPM goals have been met at this time.      Patient has been provided instruction on process to return RPM equipment and RPM has been deactivated.     Patient has AC's

## 2025-08-14 DIAGNOSIS — I48.20 CHRONIC ATRIAL FIBRILLATION (HCC): ICD-10-CM

## 2025-08-15 RX ORDER — AMIODARONE HYDROCHLORIDE 200 MG/1
200 TABLET ORAL DAILY
Qty: 90 TABLET | Refills: 0 | OUTPATIENT
Start: 2025-08-15

## (undated) DEVICE — NEPTUNE E-SEP SMOKE EVACUATION PENCIL, COATED, 70MM BLADE, PUSH BUTTON SWITCH: Brand: NEPTUNE E-SEP

## (undated) DEVICE — STAPLER SKIN H3.9MM WIRE DIA0.58MM CRWN 6.9MM 35 STPL ROT

## (undated) DEVICE — GOWN,SIRUS,POLYRNF,BRTHSLV,XL,30/CS: Brand: MEDLINE

## (undated) DEVICE — DRAPE MICSCP DISPOSABLE

## (undated) DEVICE — TOWEL,OR,DSP,ST,BLUE,STD,4/PK,20PK/CS: Brand: MEDLINE

## (undated) DEVICE — ELECTRODE 8227411 PAIRED 4 CH SET ROHS

## (undated) DEVICE — SURGIFOAM SPNG SZ 100

## (undated) DEVICE — WAX SURG 2.5GM HEMSTAT BNE BEESWAX PARAFFIN ISO PALMITATE

## (undated) DEVICE — GAUZE,SPONGE,4"X4",16PLY,XRAY,STRL,LF: Brand: MEDLINE

## (undated) DEVICE — SUTURE PLN GUT SZ 5-0 L18IN ABSRB YELLOWISH TAN L13MM PC-1 1915G

## (undated) DEVICE — SUTURE VICRYL + SZ 3-0 L18IN ABSRB UD SH 1/2 CIR TAPERCUT NDL VCP864D

## (undated) DEVICE — ENT: Brand: MEDLINE INDUSTRIES, INC.

## (undated) DEVICE — GLOVE ORANGE PI 7 1/2   MSG9075

## (undated) DEVICE — 1010 S-DRAPE TOWEL DRAPE 10/BX: Brand: STERI-DRAPE™

## (undated) DEVICE — MICRO TIP WIPE: Brand: DEVON

## (undated) DEVICE — SYRINGE MED 10ML LUERLOCK TIP W/O SFTY DISP

## (undated) DEVICE — TUBING IRD800 MR8 IRRIGTION ON-DRILL 5PK: Brand: MIDAS REX MR8

## (undated) DEVICE — ELECTRODE 8227410 PAIRED 2 CH SET ROHS

## (undated) DEVICE — STOCKINETTE,IMPERVIOUS,12X48,STERILE: Brand: MEDLINE

## (undated) DEVICE — SINGLE-USE BIOPSY FORCEPS: Brand: RADIAL JAW 4

## (undated) DEVICE — TRANSFER SET 3": Brand: MEDLINE INDUSTRIES, INC.

## (undated) DEVICE — CANNULA PERF L2IN BLNT TIP 2MM VES CLR RADPQ BODY FEM LUER

## (undated) DEVICE — BLADE TYMPLSTY W2.5MM 60DEG SHRP ALL ARND BVL DN

## (undated) DEVICE — TOOL MR8-7BA50 MR8 7CM BALL 5MM DIAMETER: Brand: MIDAS REX MR8

## (undated) DEVICE — MOUTHPIECE ENDOSCP L CTRL OPN AND SIDE PORTS DISP

## (undated) DEVICE — BLADE OPHTH GRN ROUNDED TIP 1 SIDE SHRP GRINDLESS MINI-BLDE

## (undated) DEVICE — Device

## (undated) DEVICE — 3M™ STERI-DRAPE™ INSTRUMENT POUCH 1018: Brand: STERI-DRAPE™

## (undated) DEVICE — ELECTRODE PT RET AD L9FT HI MOIST COND ADH HYDRGEL CORDED

## (undated) DEVICE — COVER LT HNDL BLU PLAS

## (undated) DEVICE — SKIN MARKER REGULAR TIP WITH RULER CAP AND LABELS: Brand: DEVON

## (undated) DEVICE — SYRINGE MEDICAL 3ML CLEAR PLASTIC STANDARD NON CONTROL LUERLOCK TIP DISPOSABLE

## (undated) DEVICE — SOLUTION PREP PAINT POV IOD FOR SKIN MUCOUS MEM

## (undated) DEVICE — DRESSING EAR AD 5.5IN GLSCOCK

## (undated) DEVICE — EMG TUBE 8229707 NIM TRIVANTAGE 7.0MM ID: Brand: NIM TRIVANTAGE™

## (undated) DEVICE — STRIP,CLOSURE,WOUND,MEDI-STRIP,1/2X4: Brand: MEDLINE

## (undated) DEVICE — PACKING 440406 10PK POPE EPISTAXIS: Brand: MEROCEL®

## (undated) DEVICE — SURGIFOAM SPNG SZ 12-7

## (undated) DEVICE — MASTISOL ADHESIVE LIQ 2/3ML

## (undated) DEVICE — BLADE ES ELASTOMERIC COAT INSUL DURABLE BEND UPTO 90DEG

## (undated) DEVICE — CATHETER ETER IV 18GA L125IN POLYUR STR RADPQ INTROCAN SFTY

## (undated) DEVICE — ENDO CARRY-ON PROCEDURE KIT: Brand: ENDO CARRY-ON PROCEDURE KIT

## (undated) DEVICE — SUTURE PROL SZ 4-0 L18IN NONABSORBABLE BLU L13MM P-3 3/8 8699G

## (undated) DEVICE — SOLUTION SCRB 4OZ 7.5% POVIDONE IOD ANTIMIC BTL

## (undated) DEVICE — MERCY HEALTH WEST TURNOVER: Brand: MEDLINE INDUSTRIES, INC.

## (undated) DEVICE — PREMIUM DRY TRAY LF: Brand: MEDLINE INDUSTRIES, INC.

## (undated) DEVICE — PENROSE DRAIN 12" X 1/4: Brand: CARDINAL HEALTH

## (undated) DEVICE — INTENDED FOR TISSUE SEPARATION, AND OTHER PROCEDURES THAT REQUIRE A SHARP SURGICAL BLADE TO PUNCTURE OR CUT.: Brand: BARD-PARKER ® STAINLESS STEEL BLADES

## (undated) DEVICE — OPHTHALMIC CORNEAL/SCLERAL V-LANCE KNIFE 20 GAUGE [1.3MM]: Brand: V-LANCE